# Patient Record
Sex: FEMALE | Race: WHITE | Employment: OTHER | URBAN - NONMETROPOLITAN AREA
[De-identification: names, ages, dates, MRNs, and addresses within clinical notes are randomized per-mention and may not be internally consistent; named-entity substitution may affect disease eponyms.]

---

## 2018-02-08 ENCOUNTER — HOSPITAL ENCOUNTER (OUTPATIENT)
Age: 68
Discharge: HOME | End: 2018-02-08
Payer: MEDICARE

## 2018-02-08 ENCOUNTER — INITIAL CONSULT (OUTPATIENT)
Dept: NEUROLOGY | Age: 68
End: 2018-02-08
Payer: MEDICARE

## 2018-02-08 VITALS
WEIGHT: 172.8 LBS | DIASTOLIC BLOOD PRESSURE: 78 MMHG | SYSTOLIC BLOOD PRESSURE: 106 MMHG | HEIGHT: 67 IN | BODY MASS INDEX: 27.12 KG/M2 | HEART RATE: 76 BPM

## 2018-02-08 DIAGNOSIS — R20.0 BILATERAL HAND NUMBNESS: ICD-10-CM

## 2018-02-08 DIAGNOSIS — G25.0 BENIGN ESSENTIAL TREMOR: Primary | ICD-10-CM

## 2018-02-08 DIAGNOSIS — G25.0 BENIGN ESSENTIAL TREMOR: ICD-10-CM

## 2018-02-08 LAB
FOLATE: > 20 NG/ML (ref 4.8–24.2)
VITAMIN B-12: 543 PG/ML (ref 211–911)

## 2018-02-08 PROCEDURE — 99204 OFFICE O/P NEW MOD 45 MIN: CPT | Performed by: PSYCHIATRY & NEUROLOGY

## 2018-02-08 PROCEDURE — 1036F TOBACCO NON-USER: CPT | Performed by: PSYCHIATRY & NEUROLOGY

## 2018-02-08 PROCEDURE — 82607 VITAMIN B-12: CPT

## 2018-02-08 PROCEDURE — 1123F ACP DISCUSS/DSCN MKR DOCD: CPT | Performed by: PSYCHIATRY & NEUROLOGY

## 2018-02-08 PROCEDURE — G8419 CALC BMI OUT NRM PARAM NOF/U: HCPCS | Performed by: PSYCHIATRY & NEUROLOGY

## 2018-02-08 PROCEDURE — 1090F PRES/ABSN URINE INCON ASSESS: CPT | Performed by: PSYCHIATRY & NEUROLOGY

## 2018-02-08 PROCEDURE — 82746 ASSAY OF FOLIC ACID SERUM: CPT

## 2018-02-08 PROCEDURE — G8400 PT W/DXA NO RESULTS DOC: HCPCS | Performed by: PSYCHIATRY & NEUROLOGY

## 2018-02-08 PROCEDURE — 4040F PNEUMOC VAC/ADMIN/RCVD: CPT | Performed by: PSYCHIATRY & NEUROLOGY

## 2018-02-08 PROCEDURE — 3017F COLORECTAL CA SCREEN DOC REV: CPT | Performed by: PSYCHIATRY & NEUROLOGY

## 2018-02-08 PROCEDURE — G8484 FLU IMMUNIZE NO ADMIN: HCPCS | Performed by: PSYCHIATRY & NEUROLOGY

## 2018-02-08 PROCEDURE — 3014F SCREEN MAMMO DOC REV: CPT | Performed by: PSYCHIATRY & NEUROLOGY

## 2018-02-08 PROCEDURE — 36415 COLL VENOUS BLD VENIPUNCTURE: CPT

## 2018-02-08 PROCEDURE — G8427 DOCREV CUR MEDS BY ELIG CLIN: HCPCS | Performed by: PSYCHIATRY & NEUROLOGY

## 2018-02-08 RX ORDER — PRIMIDONE 50 MG/1
50 TABLET ORAL
COMMUNITY
End: 2018-02-08

## 2018-02-08 RX ORDER — OSELTAMIVIR PHOSPHATE 75 MG/1
75 CAPSULE ORAL 2 TIMES DAILY
Status: ON HOLD | COMMUNITY
End: 2020-09-05

## 2018-02-08 RX ORDER — ALPRAZOLAM 0.25 MG/1
0.25 TABLET ORAL NIGHTLY PRN
Status: ON HOLD | COMMUNITY
End: 2020-09-05

## 2018-02-08 RX ORDER — ENALAPRIL MALEATE 5 MG/1
5 TABLET ORAL DAILY
Status: ON HOLD | COMMUNITY
End: 2020-09-05 | Stop reason: ALTCHOICE

## 2018-02-08 RX ORDER — BENZONATATE 200 MG/1
200 CAPSULE ORAL 3 TIMES DAILY PRN
COMMUNITY
End: 2019-12-24

## 2018-02-08 RX ORDER — PRIMIDONE 50 MG/1
50 TABLET ORAL NIGHTLY
Qty: 30 TABLET | Refills: 1 | Status: SHIPPED | OUTPATIENT
Start: 2018-02-08 | End: 2018-04-05 | Stop reason: SDUPTHER

## 2018-02-08 NOTE — PROGRESS NOTES
Pulse: 76   Weight: 172 lb 12.8 oz (78.4 kg)   Height: 5' 7\" (1.702 m)       Physical Examination:  General appearance - alert, well appearing, and in no distress, oriented to person, place, and time and normal weight  Mental status- Level of Alertness: awake  Orientation: person, place, time  Memory: normal  Fund of Knowledge: normal  Attention/Concentration: normal  Language: normal. Mood is normal.   Neck - supple, no significant adenopathy, carotids upstroke normal bilaterally,   There is no carotid bruit . No neck lymphadenopathy . No thyroid enlargement   Neurological -   Cranial Pmfxlb-TV-TEI:.   Cranial nerve II: Normal   Cranial nerve III: Pupils: equal, round, reactive to light  Cranial nerves III, IV, VI: Extraocular Movements: intact   Cranial nerve V: Facial sensation: intact   Cranial nerve VII:Facial strength: intact   Cranial nerve VIII: Hearing: intact   Cranial nerve IX: Palate Elevation intact bilaterally  Cranial nerve XI: Shoulder shrug intact bilaterally  Cranial nerve XII: Tongue midline   neck supple without rigidity  DTR's are decreased distal and symmetric  Babinski sign negative. Romberg unable to performed  Motor exam is 5/5 in the upper and lower extremities except Weakness of the right hand intrinsic muscles. Normal muscle tone . There is muscle atrophy in the right FDI and ADM. Sensory is intact for light touch, cortical sensation. Coordination: finger to nose intact  Gait and station intact. Abnormal movement that is symmetric action tremor noted, there is no resting tremor noted. There is head tremor, there is no voice tremor. vibration normal, proprioception normal  Skin - normal coloration, no rashes, no suspicious skin lesions  Superficial temporal artery pulses are normal.   There is no limitation of range of motion of the neck. There is no resting tremor, no pin rolling, no bradykinesia, no Hypohonia, normal blink rate.   Musculoskeletal: Has no hand arthritis, no

## 2018-02-08 NOTE — LETTER
[facility] PROFESSIONAL SERVS  [facility]  [address]  Dept: [phone]  Dept Fax: [phone]  Loc: Lila Vaughan MD        2/8/2018      Patient:  Charley Hassan  MRN:  [id number]  YOB: 1950  Date of Visit:  2/8/2018    Dear Dr. Annemarie Alfaro,    Thank you for referring Yoselin Mina to me for consultation. Please see attached visit summary with my findings. If you have any questions, please do not hesitate to call me.       Sincerely,         Carole Ma MD

## 2018-03-06 ENCOUNTER — PROCEDURE VISIT (OUTPATIENT)
Dept: NEUROLOGY | Age: 68
End: 2018-03-06
Payer: MEDICARE

## 2018-03-06 DIAGNOSIS — G56.03 BILATERAL CARPAL TUNNEL SYNDROME: Primary | ICD-10-CM

## 2018-03-06 DIAGNOSIS — R20.0 BILATERAL HAND NUMBNESS: ICD-10-CM

## 2018-03-06 PROCEDURE — 95911 NRV CNDJ TEST 9-10 STUDIES: CPT | Performed by: PSYCHIATRY & NEUROLOGY

## 2018-03-06 PROCEDURE — 95886 MUSC TEST DONE W/N TEST COMP: CPT | Performed by: PSYCHIATRY & NEUROLOGY

## 2018-03-16 ENCOUNTER — HOSPITAL ENCOUNTER (OUTPATIENT)
Dept: WOMENS IMAGING | Age: 68
Discharge: HOME | End: 2018-03-16
Payer: MEDICARE

## 2018-03-16 DIAGNOSIS — Z12.31 VISIT FOR SCREENING MAMMOGRAM: ICD-10-CM

## 2018-03-16 PROCEDURE — 77063 BREAST TOMOSYNTHESIS BI: CPT

## 2018-04-05 DIAGNOSIS — R20.0 BILATERAL HAND NUMBNESS: ICD-10-CM

## 2018-04-05 DIAGNOSIS — G25.0 BENIGN ESSENTIAL TREMOR: ICD-10-CM

## 2018-04-05 RX ORDER — PRIMIDONE 50 MG/1
50 TABLET ORAL NIGHTLY
Qty: 90 TABLET | Refills: 0 | Status: ON HOLD | OUTPATIENT
Start: 2018-04-05 | End: 2020-11-11 | Stop reason: HOSPADM

## 2018-08-01 ENCOUNTER — HOSPITAL ENCOUNTER (EMERGENCY)
Age: 68
Discharge: HOME | End: 2018-08-01
Payer: MEDICARE

## 2018-08-01 ENCOUNTER — APPOINTMENT (OUTPATIENT)
Dept: GENERAL RADIOLOGY | Age: 68
End: 2018-08-01
Payer: MEDICARE

## 2018-08-01 VITALS
TEMPERATURE: 97.8 F | HEART RATE: 71 BPM | BODY MASS INDEX: 26.84 KG/M2 | WEIGHT: 171 LBS | OXYGEN SATURATION: 97 % | DIASTOLIC BLOOD PRESSURE: 66 MMHG | RESPIRATION RATE: 18 BRPM | HEIGHT: 67 IN | SYSTOLIC BLOOD PRESSURE: 104 MMHG

## 2018-08-01 DIAGNOSIS — R07.9 CHEST PAIN, UNSPECIFIED TYPE: Primary | ICD-10-CM

## 2018-08-01 DIAGNOSIS — E87.5 HYPERKALEMIA: ICD-10-CM

## 2018-08-01 LAB
ANION GAP SERPL CALCULATED.3IONS-SCNC: 14 MEQ/L (ref 8–16)
BASOPHILS # BLD: 0.3 %
BASOPHILS ABSOLUTE: 0 THOU/MM3 (ref 0–0.1)
BUN BLDV-MCNC: 32 MG/DL (ref 7–22)
CALCIUM SERPL-MCNC: 9.9 MG/DL (ref 8.5–10.5)
CHLORIDE BLD-SCNC: 103 MEQ/L (ref 98–111)
CO2: 25 MEQ/L (ref 23–33)
CREAT SERPL-MCNC: 1.7 MG/DL (ref 0.4–1.2)
D-DIMER QUANTITATIVE: 475 NG/ML FEU (ref 0–500)
EKG ATRIAL RATE: 68 BPM
EKG P AXIS: 69 DEGREES
EKG P-R INTERVAL: 128 MS
EKG Q-T INTERVAL: 382 MS
EKG QRS DURATION: 68 MS
EKG QTC CALCULATION (BAZETT): 406 MS
EKG R AXIS: -20 DEGREES
EKG T AXIS: 70 DEGREES
EKG VENTRICULAR RATE: 68 BPM
EOSINOPHIL # BLD: 2 %
EOSINOPHILS ABSOLUTE: 0.1 THOU/MM3 (ref 0–0.4)
ERYTHROCYTE [DISTWIDTH] IN BLOOD BY AUTOMATED COUNT: 12.2 % (ref 11.5–14.5)
ERYTHROCYTE [DISTWIDTH] IN BLOOD BY AUTOMATED COUNT: 38.9 FL (ref 35–45)
GFR SERPL CREATININE-BSD FRML MDRD: 30 ML/MIN/1.73M2
GLUCOSE BLD-MCNC: 105 MG/DL (ref 70–108)
GLUCOSE BLD-MCNC: 174 MG/DL (ref 70–108)
HCT VFR BLD CALC: 37 % (ref 37–47)
HEMOGLOBIN: 12 GM/DL (ref 12–16)
IMMATURE GRANS (ABS): 0.02 THOU/MM3 (ref 0–0.07)
IMMATURE GRANULOCYTES: 0.3 %
LIPASE: 62.4 U/L (ref 5.6–51.3)
LYMPHOCYTES # BLD: 17.1 %
LYMPHOCYTES ABSOLUTE: 1.1 THOU/MM3 (ref 1–4.8)
MCH RBC QN AUTO: 28.8 PG (ref 26–33)
MCHC RBC AUTO-ENTMCNC: 32.4 GM/DL (ref 32.2–35.5)
MCV RBC AUTO: 88.9 FL (ref 81–99)
MONOCYTES # BLD: 8.5 %
MONOCYTES ABSOLUTE: 0.6 THOU/MM3 (ref 0.4–1.3)
NUCLEATED RED BLOOD CELLS: 0 /100 WBC
OSMOLALITY CALCULATION: 294.2 MOSMOL/KG (ref 275–300)
PLATELET # BLD: 161 THOU/MM3 (ref 130–400)
PMV BLD AUTO: 9.8 FL (ref 9.4–12.4)
POTASSIUM SERPL-SCNC: 5.3 MEQ/L (ref 3.5–5.2)
RBC # BLD: 4.16 MILL/MM3 (ref 4.2–5.4)
SEG NEUTROPHILS: 71.8 %
SEGMENTED NEUTROPHILS ABSOLUTE COUNT: 4.7 THOU/MM3 (ref 1.8–7.7)
SODIUM BLD-SCNC: 142 MEQ/L (ref 135–145)
TROPONIN T: < 0.01 NG/ML
TROPONIN T: < 0.01 NG/ML
WBC # BLD: 6.6 THOU/MM3 (ref 4.8–10.8)

## 2018-08-01 PROCEDURE — 94640 AIRWAY INHALATION TREATMENT: CPT

## 2018-08-01 PROCEDURE — 80048 BASIC METABOLIC PNL TOTAL CA: CPT

## 2018-08-01 PROCEDURE — 2709999900 HC NON-CHARGEABLE SUPPLY

## 2018-08-01 PROCEDURE — 83690 ASSAY OF LIPASE: CPT

## 2018-08-01 PROCEDURE — 93010 ELECTROCARDIOGRAM REPORT: CPT | Performed by: INTERNAL MEDICINE

## 2018-08-01 PROCEDURE — 93005 ELECTROCARDIOGRAM TRACING: CPT | Performed by: STUDENT IN AN ORGANIZED HEALTH CARE EDUCATION/TRAINING PROGRAM

## 2018-08-01 PROCEDURE — 36415 COLL VENOUS BLD VENIPUNCTURE: CPT

## 2018-08-01 PROCEDURE — 6370000000 HC RX 637 (ALT 250 FOR IP): Performed by: STUDENT IN AN ORGANIZED HEALTH CARE EDUCATION/TRAINING PROGRAM

## 2018-08-01 PROCEDURE — 85379 FIBRIN DEGRADATION QUANT: CPT

## 2018-08-01 PROCEDURE — 96374 THER/PROPH/DIAG INJ IV PUSH: CPT

## 2018-08-01 PROCEDURE — 2580000003 HC RX 258: Performed by: STUDENT IN AN ORGANIZED HEALTH CARE EDUCATION/TRAINING PROGRAM

## 2018-08-01 PROCEDURE — 6360000002 HC RX W HCPCS: Performed by: STUDENT IN AN ORGANIZED HEALTH CARE EDUCATION/TRAINING PROGRAM

## 2018-08-01 PROCEDURE — 82948 REAGENT STRIP/BLOOD GLUCOSE: CPT

## 2018-08-01 PROCEDURE — 84484 ASSAY OF TROPONIN QUANT: CPT

## 2018-08-01 PROCEDURE — 85025 COMPLETE CBC W/AUTO DIFF WBC: CPT

## 2018-08-01 PROCEDURE — 99285 EMERGENCY DEPT VISIT HI MDM: CPT

## 2018-08-01 PROCEDURE — 71046 X-RAY EXAM CHEST 2 VIEWS: CPT

## 2018-08-01 RX ORDER — IPRATROPIUM BROMIDE AND ALBUTEROL SULFATE 2.5; .5 MG/3ML; MG/3ML
1 SOLUTION RESPIRATORY (INHALATION) ONCE
Status: COMPLETED | OUTPATIENT
Start: 2018-08-01 | End: 2018-08-01

## 2018-08-01 RX ORDER — NITROGLYCERIN 0.4 MG/1
0.4 TABLET SUBLINGUAL EVERY 5 MIN PRN
Status: DISCONTINUED | OUTPATIENT
Start: 2018-08-01 | End: 2018-08-01 | Stop reason: HOSPADM

## 2018-08-01 RX ORDER — FUROSEMIDE 10 MG/ML
20 INJECTION INTRAMUSCULAR; INTRAVENOUS ONCE
Status: COMPLETED | OUTPATIENT
Start: 2018-08-01 | End: 2018-08-01

## 2018-08-01 RX ORDER — 0.9 % SODIUM CHLORIDE 0.9 %
1000 INTRAVENOUS SOLUTION INTRAVENOUS ONCE
Status: COMPLETED | OUTPATIENT
Start: 2018-08-01 | End: 2018-08-01

## 2018-08-01 RX ADMIN — SODIUM CHLORIDE 1000 ML: 9 INJECTION, SOLUTION INTRAVENOUS at 16:16

## 2018-08-01 RX ADMIN — IPRATROPIUM BROMIDE AND ALBUTEROL SULFATE 1 AMPULE: .5; 3 SOLUTION RESPIRATORY (INHALATION) at 16:45

## 2018-08-01 RX ADMIN — FUROSEMIDE 20 MG: 10 INJECTION, SOLUTION INTRAMUSCULAR; INTRAVENOUS at 16:12

## 2018-08-01 ASSESSMENT — ENCOUNTER SYMPTOMS
NAUSEA: 0
BACK PAIN: 0
ABDOMINAL PAIN: 0
COUGH: 1
DIARRHEA: 0
SHORTNESS OF BREATH: 0
VOMITING: 0
EYE DISCHARGE: 0
SORE THROAT: 0
EYE PAIN: 0
WHEEZING: 0
RHINORRHEA: 0

## 2018-08-01 ASSESSMENT — HEART SCORE: ECG: 0

## 2018-08-01 NOTE — ED TRIAGE NOTES
Patient presents to the ED with complaints of chest pain and chills that began about 45 minutes ago after going shopping today and doing housework. Patient denies SOB. Currently patient denies chest pain. Patient denies fevers. Patient states she took an aspirin after feeling the sharp chest pain and laid down for a little bit but didn't feel any better. EKG completed. Federico Hathaway in to evaluate patient.

## 2018-08-01 NOTE — ED NOTES
Rounding on patient. Patient denies any pain at this time. Call light in reach. Will continue to monitor.       Lolis Vigil RN  08/01/18 [id number]

## 2019-11-29 ENCOUNTER — HOSPITAL ENCOUNTER (OUTPATIENT)
Dept: WOMENS IMAGING | Age: 69
Discharge: HOME | End: 2019-11-29
Payer: MEDICARE

## 2019-11-29 DIAGNOSIS — Z12.31 VISIT FOR SCREENING MAMMOGRAM: ICD-10-CM

## 2019-11-29 PROCEDURE — 77063 BREAST TOMOSYNTHESIS BI: CPT

## 2019-12-24 ENCOUNTER — HOSPITAL ENCOUNTER (EMERGENCY)
Age: 69
Discharge: HOME | End: 2019-12-24
Payer: MEDICARE

## 2019-12-24 ENCOUNTER — APPOINTMENT (OUTPATIENT)
Dept: GENERAL RADIOLOGY | Age: 69
End: 2019-12-24
Payer: MEDICARE

## 2019-12-24 VITALS
TEMPERATURE: 97.5 F | OXYGEN SATURATION: 95 % | RESPIRATION RATE: 18 BRPM | HEIGHT: 67 IN | HEART RATE: 82 BPM | WEIGHT: 160 LBS | BODY MASS INDEX: 25.11 KG/M2 | SYSTOLIC BLOOD PRESSURE: 115 MMHG | DIASTOLIC BLOOD PRESSURE: 64 MMHG

## 2019-12-24 DIAGNOSIS — R07.9 CHEST PAIN, UNSPECIFIED TYPE: Primary | ICD-10-CM

## 2019-12-24 DIAGNOSIS — R06.89 DYSPNEA AND RESPIRATORY ABNORMALITIES: ICD-10-CM

## 2019-12-24 DIAGNOSIS — R06.00 DYSPNEA AND RESPIRATORY ABNORMALITIES: ICD-10-CM

## 2019-12-24 LAB
ALBUMIN SERPL-MCNC: 3.8 G/DL (ref 3.5–5.1)
ALP BLD-CCNC: 69 U/L (ref 38–126)
ALT SERPL-CCNC: 26 U/L (ref 11–66)
ANION GAP SERPL CALCULATED.3IONS-SCNC: 13 MEQ/L (ref 8–16)
AST SERPL-CCNC: 57 U/L (ref 5–40)
BASOPHILS # BLD: 0.4 %
BASOPHILS ABSOLUTE: 0 THOU/MM3 (ref 0–0.1)
BILIRUB SERPL-MCNC: 0.4 MG/DL (ref 0.3–1.2)
BILIRUBIN DIRECT: < 0.2 MG/DL (ref 0–0.3)
BUN BLDV-MCNC: 16 MG/DL (ref 7–22)
CALCIUM SERPL-MCNC: 10.5 MG/DL (ref 8.5–10.5)
CHLORIDE BLD-SCNC: 102 MEQ/L (ref 98–111)
CO2: 24 MEQ/L (ref 23–33)
CREAT SERPL-MCNC: 1 MG/DL (ref 0.4–1.2)
EKG ATRIAL RATE: 68 BPM
EKG P AXIS: 41 DEGREES
EKG P-R INTERVAL: 126 MS
EKG Q-T INTERVAL: 384 MS
EKG QRS DURATION: 64 MS
EKG QTC CALCULATION (BAZETT): 408 MS
EKG R AXIS: -37 DEGREES
EKG T AXIS: 40 DEGREES
EKG VENTRICULAR RATE: 68 BPM
EOSINOPHIL # BLD: 1.3 %
EOSINOPHILS ABSOLUTE: 0.1 THOU/MM3 (ref 0–0.4)
ERYTHROCYTE [DISTWIDTH] IN BLOOD BY AUTOMATED COUNT: 12.9 % (ref 11.5–14.5)
ERYTHROCYTE [DISTWIDTH] IN BLOOD BY AUTOMATED COUNT: 43.7 FL (ref 35–45)
GFR SERPL CREATININE-BSD FRML MDRD: 55 ML/MIN/1.73M2
GLUCOSE BLD-MCNC: 97 MG/DL (ref 70–108)
HCT VFR BLD CALC: 39.1 % (ref 37–47)
HEMOGLOBIN: 12 GM/DL (ref 12–16)
IMMATURE GRANS (ABS): 0.02 THOU/MM3 (ref 0–0.07)
IMMATURE GRANULOCYTES: 0.3 %
LYMPHOCYTES # BLD: 19.1 %
LYMPHOCYTES ABSOLUTE: 1.3 THOU/MM3 (ref 1–4.8)
MCH RBC QN AUTO: 28.5 PG (ref 26–33)
MCHC RBC AUTO-ENTMCNC: 30.7 GM/DL (ref 32.2–35.5)
MCV RBC AUTO: 92.9 FL (ref 81–99)
MONOCYTES # BLD: 9 %
MONOCYTES ABSOLUTE: 0.6 THOU/MM3 (ref 0.4–1.3)
NUCLEATED RED BLOOD CELLS: 0 /100 WBC
OSMOLALITY CALCULATION: 278.6 MOSMOL/KG (ref 275–300)
PLATELET # BLD: 196 THOU/MM3 (ref 130–400)
PMV BLD AUTO: 9.4 FL (ref 9.4–12.4)
POTASSIUM SERPL-SCNC: 5.9 MEQ/L (ref 3.5–5.2)
PRO-BNP: 451.7 PG/ML (ref 0–900)
RBC # BLD: 4.21 MILL/MM3 (ref 4.2–5.4)
SEG NEUTROPHILS: 69.9 %
SEGMENTED NEUTROPHILS ABSOLUTE COUNT: 4.8 THOU/MM3 (ref 1.8–7.7)
SODIUM BLD-SCNC: 139 MEQ/L (ref 135–145)
TOTAL PROTEIN: 6 G/DL (ref 6.1–8)
TROPONIN T: < 0.01 NG/ML
TROPONIN T: < 0.01 NG/ML
WBC # BLD: 6.9 THOU/MM3 (ref 4.8–10.8)

## 2019-12-24 PROCEDURE — 6370000000 HC RX 637 (ALT 250 FOR IP): Performed by: PHYSICIAN ASSISTANT

## 2019-12-24 PROCEDURE — 84484 ASSAY OF TROPONIN QUANT: CPT

## 2019-12-24 PROCEDURE — 82248 BILIRUBIN DIRECT: CPT

## 2019-12-24 PROCEDURE — 99285 EMERGENCY DEPT VISIT HI MDM: CPT | Performed by: INTERNAL MEDICINE

## 2019-12-24 PROCEDURE — 85025 COMPLETE CBC W/AUTO DIFF WBC: CPT

## 2019-12-24 PROCEDURE — 36415 COLL VENOUS BLD VENIPUNCTURE: CPT

## 2019-12-24 PROCEDURE — 80053 COMPREHEN METABOLIC PANEL: CPT

## 2019-12-24 PROCEDURE — 83880 ASSAY OF NATRIURETIC PEPTIDE: CPT

## 2019-12-24 PROCEDURE — 71046 X-RAY EXAM CHEST 2 VIEWS: CPT

## 2019-12-24 PROCEDURE — 93005 ELECTROCARDIOGRAM TRACING: CPT | Performed by: PHYSICIAN ASSISTANT

## 2019-12-24 PROCEDURE — 99284 EMERGENCY DEPT VISIT MOD MDM: CPT

## 2019-12-24 RX ORDER — ASPIRIN 81 MG/1
324 TABLET, CHEWABLE ORAL ONCE
Status: COMPLETED | OUTPATIENT
Start: 2019-12-24 | End: 2019-12-24

## 2019-12-24 RX ORDER — ATORVASTATIN CALCIUM 80 MG/1
80 TABLET, FILM COATED ORAL DAILY
Status: ON HOLD | COMMUNITY
End: 2022-06-07 | Stop reason: SDUPTHER

## 2019-12-24 RX ORDER — LISINOPRIL 2.5 MG/1
2.5 TABLET ORAL DAILY
Status: ON HOLD | COMMUNITY
End: 2020-11-11 | Stop reason: HOSPADM

## 2019-12-24 RX ORDER — EZETIMIBE 10 MG/1
10 TABLET ORAL DAILY
Status: ON HOLD | COMMUNITY
End: 2022-06-07 | Stop reason: SDUPTHER

## 2019-12-24 RX ADMIN — ASPIRIN 81 MG 324 MG: 81 TABLET ORAL at 17:45

## 2019-12-24 RX ADMIN — LIDOCAINE HYDROCHLORIDE: 20 SOLUTION ORAL; TOPICAL at 19:41

## 2019-12-24 RX ADMIN — LIDOCAINE HYDROCHLORIDE: 20 SOLUTION ORAL; TOPICAL at 18:28

## 2019-12-24 ASSESSMENT — ENCOUNTER SYMPTOMS
SHORTNESS OF BREATH: 1
VOMITING: 0
DIARRHEA: 0
NAUSEA: 0
PHOTOPHOBIA: 0
RHINORRHEA: 0
BACK PAIN: 0
COUGH: 0
ABDOMINAL PAIN: 0

## 2019-12-24 ASSESSMENT — PAIN SCALES - GENERAL
PAINLEVEL_OUTOF10: 7
PAINLEVEL_OUTOF10: 3

## 2019-12-24 ASSESSMENT — PAIN DESCRIPTION - PAIN TYPE: TYPE: ACUTE PAIN

## 2019-12-24 ASSESSMENT — HEART SCORE: ECG: 0

## 2019-12-24 ASSESSMENT — PAIN DESCRIPTION - ORIENTATION: ORIENTATION: UPPER

## 2019-12-24 ASSESSMENT — PAIN DESCRIPTION - LOCATION: LOCATION: ABDOMEN

## 2020-09-05 ENCOUNTER — HOSPITAL ENCOUNTER (INPATIENT)
Age: 70
LOS: 4 days | Discharge: NURSING, REHAB OR LONG-TERM CARE | DRG: 480 | End: 2020-09-09
Attending: STUDENT IN AN ORGANIZED HEALTH CARE EDUCATION/TRAINING PROGRAM | Admitting: ORTHOPAEDIC SURGERY
Payer: MEDICARE

## 2020-09-05 ENCOUNTER — APPOINTMENT (OUTPATIENT)
Dept: GENERAL RADIOLOGY | Age: 70
DRG: 480 | End: 2020-09-05
Payer: MEDICARE

## 2020-09-05 ENCOUNTER — APPOINTMENT (OUTPATIENT)
Dept: CT IMAGING | Age: 70
DRG: 480 | End: 2020-09-05
Payer: MEDICARE

## 2020-09-05 PROBLEM — R55 SYNCOPE AND COLLAPSE: Status: ACTIVE | Noted: 2020-09-05

## 2020-09-05 LAB
ALBUMIN SERPL-MCNC: 3.5 G/DL (ref 3.5–5.1)
ALP BLD-CCNC: 75 U/L (ref 38–126)
ALT SERPL-CCNC: 29 U/L (ref 11–66)
ANION GAP SERPL CALCULATED.3IONS-SCNC: 9 MEQ/L (ref 8–16)
AST SERPL-CCNC: 35 U/L (ref 5–40)
BASOPHILS # BLD: 0.4 %
BASOPHILS ABSOLUTE: 0 THOU/MM3 (ref 0–0.1)
BILIRUB SERPL-MCNC: 0.2 MG/DL (ref 0.3–1.2)
BILIRUBIN URINE: NEGATIVE
BLOOD, URINE: NEGATIVE
BUN BLDV-MCNC: 28 MG/DL (ref 7–22)
CALCIUM SERPL-MCNC: 9.4 MG/DL (ref 8.5–10.5)
CHARACTER, URINE: CLEAR
CHLORIDE BLD-SCNC: 106 MEQ/L (ref 98–111)
CO2: 25 MEQ/L (ref 23–33)
COLOR: YELLOW
CREAT SERPL-MCNC: 1.4 MG/DL (ref 0.4–1.2)
EKG ATRIAL RATE: 83 BPM
EKG P AXIS: 56 DEGREES
EKG P-R INTERVAL: 122 MS
EKG Q-T INTERVAL: 346 MS
EKG QRS DURATION: 76 MS
EKG QTC CALCULATION (BAZETT): 406 MS
EKG R AXIS: 3 DEGREES
EKG T AXIS: 51 DEGREES
EKG VENTRICULAR RATE: 83 BPM
EOSINOPHIL # BLD: 3.5 %
EOSINOPHILS ABSOLUTE: 0.2 THOU/MM3 (ref 0–0.4)
ERYTHROCYTE [DISTWIDTH] IN BLOOD BY AUTOMATED COUNT: 13 % (ref 11.5–14.5)
ERYTHROCYTE [DISTWIDTH] IN BLOOD BY AUTOMATED COUNT: 44.8 FL (ref 35–45)
GFR SERPL CREATININE-BSD FRML MDRD: 37 ML/MIN/1.73M2
GLUCOSE BLD-MCNC: 117 MG/DL (ref 70–108)
GLUCOSE BLD-MCNC: 179 MG/DL (ref 70–108)
GLUCOSE BLD-MCNC: 99 MG/DL (ref 70–108)
GLUCOSE URINE: NEGATIVE MG/DL
HCT VFR BLD CALC: 34.3 % (ref 37–47)
HEMOGLOBIN: 10.4 GM/DL (ref 12–16)
IMMATURE GRANS (ABS): 0.03 THOU/MM3 (ref 0–0.07)
IMMATURE GRANULOCYTES: 0.6 %
KETONES, URINE: NEGATIVE
LEUKOCYTE ESTERASE, URINE: NEGATIVE
LYMPHOCYTES # BLD: 12.8 %
LYMPHOCYTES ABSOLUTE: 0.7 THOU/MM3 (ref 1–4.8)
MCH RBC QN AUTO: 28.5 PG (ref 26–33)
MCHC RBC AUTO-ENTMCNC: 30.3 GM/DL (ref 32.2–35.5)
MCV RBC AUTO: 94 FL (ref 81–99)
MONOCYTES # BLD: 11.9 %
MONOCYTES ABSOLUTE: 0.6 THOU/MM3 (ref 0.4–1.3)
NITRITE, URINE: NEGATIVE
NUCLEATED RED BLOOD CELLS: 0 /100 WBC
OSMOLALITY CALCULATION: 285.9 MOSMOL/KG (ref 275–300)
PH UA: 5.5 (ref 5–9)
PLATELET # BLD: 147 THOU/MM3 (ref 130–400)
PMV BLD AUTO: 9.8 FL (ref 9.4–12.4)
POTASSIUM REFLEX MAGNESIUM: 5.1 MEQ/L (ref 3.5–5.2)
PRO-BNP: 439.2 PG/ML (ref 0–900)
PROTEIN UA: NEGATIVE
RBC # BLD: 3.65 MILL/MM3 (ref 4.2–5.4)
SARS-COV-2, NAAT: NOT DETECTED
SEG NEUTROPHILS: 70.8 %
SEGMENTED NEUTROPHILS ABSOLUTE COUNT: 3.6 THOU/MM3 (ref 1.8–7.7)
SODIUM BLD-SCNC: 140 MEQ/L (ref 135–145)
SPECIFIC GRAVITY, URINE: 1.01 (ref 1–1.03)
TOTAL PROTEIN: 5.8 G/DL (ref 6.1–8)
TROPONIN T: < 0.01 NG/ML
UROBILINOGEN, URINE: 0.2 EU/DL (ref 0–1)
WBC # BLD: 5.1 THOU/MM3 (ref 4.8–10.8)

## 2020-09-05 PROCEDURE — 84484 ASSAY OF TROPONIN QUANT: CPT

## 2020-09-05 PROCEDURE — 81003 URINALYSIS AUTO W/O SCOPE: CPT

## 2020-09-05 PROCEDURE — 96375 TX/PRO/DX INJ NEW DRUG ADDON: CPT

## 2020-09-05 PROCEDURE — 93005 ELECTROCARDIOGRAM TRACING: CPT | Performed by: STUDENT IN AN ORGANIZED HEALTH CARE EDUCATION/TRAINING PROGRAM

## 2020-09-05 PROCEDURE — U0002 COVID-19 LAB TEST NON-CDC: HCPCS

## 2020-09-05 PROCEDURE — 99285 EMERGENCY DEPT VISIT HI MDM: CPT

## 2020-09-05 PROCEDURE — 70450 CT HEAD/BRAIN W/O DYE: CPT

## 2020-09-05 PROCEDURE — 82948 REAGENT STRIP/BLOOD GLUCOSE: CPT

## 2020-09-05 PROCEDURE — 73030 X-RAY EXAM OF SHOULDER: CPT

## 2020-09-05 PROCEDURE — 6360000002 HC RX W HCPCS: Performed by: STUDENT IN AN ORGANIZED HEALTH CARE EDUCATION/TRAINING PROGRAM

## 2020-09-05 PROCEDURE — 99223 1ST HOSP IP/OBS HIGH 75: CPT | Performed by: PHYSICIAN ASSISTANT

## 2020-09-05 PROCEDURE — 1200000003 HC TELEMETRY R&B

## 2020-09-05 PROCEDURE — 73080 X-RAY EXAM OF ELBOW: CPT

## 2020-09-05 PROCEDURE — 72125 CT NECK SPINE W/O DYE: CPT

## 2020-09-05 PROCEDURE — 6360000002 HC RX W HCPCS: Performed by: PHYSICIAN ASSISTANT

## 2020-09-05 PROCEDURE — 73552 X-RAY EXAM OF FEMUR 2/>: CPT

## 2020-09-05 PROCEDURE — 71045 X-RAY EXAM CHEST 1 VIEW: CPT

## 2020-09-05 PROCEDURE — 96374 THER/PROPH/DIAG INJ IV PUSH: CPT

## 2020-09-05 PROCEDURE — 6370000000 HC RX 637 (ALT 250 FOR IP): Performed by: PHYSICIAN ASSISTANT

## 2020-09-05 PROCEDURE — 2580000003 HC RX 258: Performed by: PHYSICIAN ASSISTANT

## 2020-09-05 PROCEDURE — 83880 ASSAY OF NATRIURETIC PEPTIDE: CPT

## 2020-09-05 PROCEDURE — 80053 COMPREHEN METABOLIC PANEL: CPT

## 2020-09-05 PROCEDURE — 6820000001 HC L2 TRAUMA SURGERY EVALUATION: Performed by: NURSE ANESTHETIST, CERTIFIED REGISTERED

## 2020-09-05 PROCEDURE — 94760 N-INVAS EAR/PLS OXIMETRY 1: CPT

## 2020-09-05 PROCEDURE — 36415 COLL VENOUS BLD VENIPUNCTURE: CPT

## 2020-09-05 PROCEDURE — 85025 COMPLETE CBC W/AUTO DIFF WBC: CPT

## 2020-09-05 PROCEDURE — 72170 X-RAY EXAM OF PELVIS: CPT

## 2020-09-05 RX ORDER — FENTANYL CITRATE 50 UG/ML
25 INJECTION, SOLUTION INTRAMUSCULAR; INTRAVENOUS ONCE
Status: COMPLETED | OUTPATIENT
Start: 2020-09-05 | End: 2020-09-05

## 2020-09-05 RX ORDER — ATORVASTATIN CALCIUM 80 MG/1
80 TABLET, FILM COATED ORAL DAILY
Status: DISCONTINUED | OUTPATIENT
Start: 2020-09-05 | End: 2020-09-09 | Stop reason: HOSPADM

## 2020-09-05 RX ORDER — LISINOPRIL 2.5 MG/1
2.5 TABLET ORAL DAILY
Status: DISCONTINUED | OUTPATIENT
Start: 2020-09-05 | End: 2020-09-05

## 2020-09-05 RX ORDER — SODIUM CHLORIDE 0.9 % (FLUSH) 0.9 %
10 SYRINGE (ML) INJECTION PRN
Status: DISCONTINUED | OUTPATIENT
Start: 2020-09-05 | End: 2020-09-06 | Stop reason: SDUPTHER

## 2020-09-05 RX ORDER — INSULIN GLARGINE 100 [IU]/ML
20 INJECTION, SOLUTION SUBCUTANEOUS EVERY MORNING
Status: DISCONTINUED | OUTPATIENT
Start: 2020-09-06 | End: 2020-09-09 | Stop reason: HOSPADM

## 2020-09-05 RX ORDER — EZETIMIBE 10 MG/1
10 TABLET ORAL DAILY
Status: DISCONTINUED | OUTPATIENT
Start: 2020-09-05 | End: 2020-09-05

## 2020-09-05 RX ORDER — SODIUM CHLORIDE 0.9 % (FLUSH) 0.9 %
10 SYRINGE (ML) INJECTION EVERY 12 HOURS SCHEDULED
Status: DISCONTINUED | OUTPATIENT
Start: 2020-09-05 | End: 2020-09-06 | Stop reason: SDUPTHER

## 2020-09-05 RX ORDER — ACETAMINOPHEN 650 MG/1
650 SUPPOSITORY RECTAL EVERY 6 HOURS PRN
Status: DISCONTINUED | OUTPATIENT
Start: 2020-09-05 | End: 2020-09-08

## 2020-09-05 RX ORDER — ALPRAZOLAM 0.25 MG/1
0.25 TABLET ORAL NIGHTLY PRN
Status: DISCONTINUED | OUTPATIENT
Start: 2020-09-05 | End: 2020-09-05

## 2020-09-05 RX ORDER — INSULIN GLARGINE 100 [IU]/ML
36 INJECTION, SOLUTION SUBCUTANEOUS EVERY MORNING
Status: DISCONTINUED | OUTPATIENT
Start: 2020-09-06 | End: 2020-09-05

## 2020-09-05 RX ORDER — PROMETHAZINE HYDROCHLORIDE 25 MG/1
12.5 TABLET ORAL EVERY 6 HOURS PRN
Status: DISCONTINUED | OUTPATIENT
Start: 2020-09-05 | End: 2020-09-09 | Stop reason: HOSPADM

## 2020-09-05 RX ORDER — CLOPIDOGREL BISULFATE 75 MG/1
75 TABLET ORAL DAILY
Status: DISCONTINUED | OUTPATIENT
Start: 2020-09-05 | End: 2020-09-06 | Stop reason: SDUPTHER

## 2020-09-05 RX ORDER — SODIUM CHLORIDE 9 MG/ML
INJECTION, SOLUTION INTRAVENOUS CONTINUOUS
Status: DISCONTINUED | OUTPATIENT
Start: 2020-09-05 | End: 2020-09-07

## 2020-09-05 RX ORDER — ONDANSETRON 2 MG/ML
4 INJECTION INTRAMUSCULAR; INTRAVENOUS EVERY 6 HOURS PRN
Status: DISCONTINUED | OUTPATIENT
Start: 2020-09-05 | End: 2020-09-09 | Stop reason: HOSPADM

## 2020-09-05 RX ORDER — ENALAPRIL MALEATE 5 MG/1
5 TABLET ORAL DAILY
Status: DISCONTINUED | OUTPATIENT
Start: 2020-09-05 | End: 2020-09-05

## 2020-09-05 RX ORDER — PRIMIDONE 50 MG/1
50 TABLET ORAL NIGHTLY
Status: DISCONTINUED | OUTPATIENT
Start: 2020-09-05 | End: 2020-09-09 | Stop reason: HOSPADM

## 2020-09-05 RX ORDER — ATORVASTATIN CALCIUM 40 MG/1
40 TABLET, FILM COATED ORAL DAILY
Status: DISCONTINUED | OUTPATIENT
Start: 2020-09-05 | End: 2020-09-05

## 2020-09-05 RX ORDER — PRIMIDONE 50 MG/1
200 TABLET ORAL DAILY
Status: DISCONTINUED | OUTPATIENT
Start: 2020-09-06 | End: 2020-09-09 | Stop reason: HOSPADM

## 2020-09-05 RX ORDER — PROPRANOLOL HYDROCHLORIDE 80 MG/1
80 CAPSULE, EXTENDED RELEASE ORAL DAILY
Status: DISCONTINUED | OUTPATIENT
Start: 2020-09-05 | End: 2020-09-09 | Stop reason: HOSPADM

## 2020-09-05 RX ORDER — ACETAMINOPHEN 325 MG/1
650 TABLET ORAL EVERY 6 HOURS PRN
Status: DISCONTINUED | OUTPATIENT
Start: 2020-09-05 | End: 2020-09-08

## 2020-09-05 RX ORDER — POLYETHYLENE GLYCOL 3350 17 G/17G
17 POWDER, FOR SOLUTION ORAL DAILY PRN
Status: DISCONTINUED | OUTPATIENT
Start: 2020-09-05 | End: 2020-09-09 | Stop reason: HOSPADM

## 2020-09-05 RX ORDER — PRIMIDONE 50 MG/1
100 TABLET ORAL NIGHTLY
Status: ON HOLD | COMMUNITY
End: 2020-11-11 | Stop reason: HOSPADM

## 2020-09-05 RX ADMIN — SODIUM CHLORIDE: 9 INJECTION, SOLUTION INTRAVENOUS at 16:20

## 2020-09-05 RX ADMIN — ACETAMINOPHEN 650 MG: 325 TABLET ORAL at 17:25

## 2020-09-05 RX ADMIN — PRIMIDONE 50 MG: 50 TABLET ORAL at 21:53

## 2020-09-05 RX ADMIN — HYDROMORPHONE HYDROCHLORIDE 0.5 MG: 1 INJECTION, SOLUTION INTRAMUSCULAR; INTRAVENOUS; SUBCUTANEOUS at 23:41

## 2020-09-05 RX ADMIN — FENTANYL CITRATE 25 MCG: 50 INJECTION, SOLUTION INTRAMUSCULAR; INTRAVENOUS at 13:49

## 2020-09-05 RX ADMIN — FENTANYL CITRATE 25 MCG: 50 INJECTION, SOLUTION INTRAMUSCULAR; INTRAVENOUS at 12:14

## 2020-09-05 RX ADMIN — ATORVASTATIN CALCIUM 80 MG: 80 TABLET, FILM COATED ORAL at 21:53

## 2020-09-05 ASSESSMENT — ENCOUNTER SYMPTOMS
EYE DISCHARGE: 0
COLOR CHANGE: 0
ABDOMINAL DISTENTION: 0
ABDOMINAL PAIN: 0
CHEST TIGHTNESS: 0
SINUS PRESSURE: 0
EYE REDNESS: 0
BACK PAIN: 0
DIARRHEA: 0
RHINORRHEA: 0
CONSTIPATION: 0
EYE ITCHING: 0
VOMITING: 0
SINUS PAIN: 0
COUGH: 0
SHORTNESS OF BREATH: 0
EYE PAIN: 0
NAUSEA: 0

## 2020-09-05 ASSESSMENT — PAIN DESCRIPTION - PAIN TYPE: TYPE: ACUTE PAIN

## 2020-09-05 ASSESSMENT — PAIN SCALES - GENERAL
PAINLEVEL_OUTOF10: 6
PAINLEVEL_OUTOF10: 7
PAINLEVEL_OUTOF10: 7
PAINLEVEL_OUTOF10: 5
PAINLEVEL_OUTOF10: 8

## 2020-09-05 ASSESSMENT — PAIN - FUNCTIONAL ASSESSMENT: PAIN_FUNCTIONAL_ASSESSMENT: PREVENTS OR INTERFERES WITH MANY ACTIVE NOT PASSIVE ACTIVITIES

## 2020-09-05 ASSESSMENT — PAIN DESCRIPTION - FREQUENCY: FREQUENCY: INTERMITTENT

## 2020-09-05 ASSESSMENT — PAIN DESCRIPTION - ONSET: ONSET: ON-GOING

## 2020-09-05 ASSESSMENT — PAIN DESCRIPTION - DESCRIPTORS: DESCRIPTORS: STABBING

## 2020-09-05 ASSESSMENT — PAIN DESCRIPTION - LOCATION: LOCATION: HIP

## 2020-09-05 ASSESSMENT — PAIN DESCRIPTION - PROGRESSION: CLINICAL_PROGRESSION: NOT CHANGED

## 2020-09-05 ASSESSMENT — PAIN DESCRIPTION - ORIENTATION: ORIENTATION: LEFT

## 2020-09-05 NOTE — ED NOTES
Called 7K and informed Nubia that the patient is on their way to the unit. Patient transported via bed in a stable condition.      Juanito Posey  09/05/20 1418

## 2020-09-05 NOTE — LETTER
Beneficiary Notification Letter  BPCI Advanced     Your Doctor or [address],    We wanted to let you know that your health care provider, Adi, has volunteered to take part in our Centers for Medicare & Medicaid Services (CMS) Bundled Payments for [facility] (BPCI Advanced). This doesnt change your Medicare rights or benefits and you dont need to do anything. What are bundled payments? A bundled payment combines, or bundles together, payments that Medicare makes to your health care providers for the many different kinds of medical services you might get in a specific time period. In BPCI Advanced, this time period could include a hospital inpatient stay or outpatient procedure, plus 90 days. Why would Medicare bundle payments? Bundled payments are thought of as a value-based way to pay because health care providers are responsible for both the quality and cost of medical care they give. This is a relatively new way of paying health care providers compared to thefee-for-service way Medicare has traditionally paid, where providers are paid separately for each service they provide. Bundled payments encourage these providers to work together to provide better, more coordinated care during your hospital stay, or outpatient procedure, and through your recovery. What does BPCI Advance mean for me? Youre more likely to get even better care when hospitals, doctors, and other health care providers work together. In BPCI Advanced, hospitals, doctors, and other health care providers may be rewarded for providing better, more coordinated health care. Medicare will watch BPCI Advanced participants closely to make sure that you and other patients keep getting efficient, high quality care. What do I need to know about BPCI Advanced? Whats most important for you to know is that your Medicare rights and benefits wont change because your health care provider is participating in [address]. Medicare will keep covering all of your medically necessary services. Even though Medicare will pay your doctor in a different way under BPCI Advanced, how much you have to pay wont change. Health care providers and suppliers who are enrolled in Medicare will submit their Medicare claims like they always have. Youll have all the same Medicare rights and protections, including the right to choose which hospital, doctor, or other health care provider you see. If you dont want to get care from a health care provider whos participating in [address], then youll have to choose a different health care provider whos not participating in the Model. How can I give feedback about my health care? Medicare might ask you to take a voluntary survey about the services and care you received from [facility] during your hospital stay or outpatient procedure and for a specific period of time afterwards. You can decide whether you want to take the voluntary survey, but if you do, itll help Medicare make BPCI Advanced and the care of other Medicare patients better. If you have concerns or complaints about your care, you can:   · Talk to your doctor or health care provider. · Contact your Beneficiary and Family Centered Care Quality Improvement   Organization [facility]). You can get your [employer]-QIOs phone number  at  Medicare.gov/contacts or by calling 1-800-MEDICARE. TTY users can call  9-989.530.3970. Where can I learn more about BPCI Advanced? Learn more about BPCI Advanced at https://innovation.cms.gov/initiatives/bpci-advanced/:  · A list of all the hospitals and physician group practices in the country participating in [address].   · All of the inpatient and outpatient Clinical Episodes that are currently

## 2020-09-05 NOTE — ED PROVIDER NOTES
[facility] ENCOUNTER          Pt Name: Leandro Schuster  MRN: [id number]  [place] 1950  Date of evaluation: 9/5/2020  Treating Resident Physician: Chad Shaw DO  Supervising Physician: Anson Frausto MD    CHIEF COMPLAINT       Chief Complaint   Patient presents with    Fall     History obtained from the patient. HISTORY OF PRESENT ILLNESS    Leandro Schuster is a 65 y.o. female with a past medical history of diabetes and prior MI who presents to the emergency department for evaluation of syncope. Patient states that she was at the grocery store this morning shortly prior to arrival, and upon leaving the grocery store she felt acutely short of breath, and lost consciousness. She reports that she fell onto her left side, and landed on her elbow and the back of her head. She endorses losing consciousness after she fell, and bystanders report that she was dazed. She regained consciousness shortly later, however she reports that she has not been ambulatory since her fall. In the emergency department she is complaining of head pain, shoulder pain, elbow pain, and left hip pain. She is additionally complaining of swelling to her left posterior scalp. Denies vision changes, headache, nausea, vomiting. Denies neck pain. The patient takes insulin, however she denies taking any of her medicines this morning and denies eating this morning. The patient has no other acute complaints at this time. REVIEW OF SYSTEMS   Review of Systems   Constitutional: Negative for fever. HENT: Negative for rhinorrhea, sinus pressure and sinus pain. Eyes: Negative for discharge, redness and itching. Respiratory: Negative for chest tightness and shortness of breath. Cardiovascular: Negative for chest pain. Gastrointestinal: Negative for abdominal distention, abdominal pain, diarrhea, nausea and vomiting.    Genitourinary: Negative for difficulty urinating, dysuria, frequency, hematuria and urgency. Musculoskeletal: Negative for arthralgias. Skin: Negative for color change and pallor. Neurological: Negative for dizziness, light-headedness and headaches.          PAST MEDICAL AND SURGICAL HISTORY     Past Medical History:   Diagnosis Date    [facility] ([facility]     [facility] ([facility]     Hyperlipidemia     Hypoxemic respiratory failure, chronic (HCC)     secondary to sepsis and possibly pneumonia    MI (myocardial infarction) ([facility] 2011    Normocytic anemia     Type II or unspecified type diabetes mellitus without mention of complication, not stated as uncontrolled      Past Surgical History:   Procedure Laterality Date    CYSTOSCOPY  12/7/12    with stent insertion    HYSTERECTOMY      LITHOTRIPSY  12/18/2012    right         MEDICATIONS     Current Facility-Administered Medications:     ALPRAZolam (XANAX) tablet 0.25 mg, 0.25 mg, Oral, Nightly PRN, CARRIE Cortes    atorvastatin (LIPITOR) tablet 80 mg, 80 mg, Oral, Daily, Mercedes Martinez    clopidogrel (PLAVIX) tablet 75 mg, 75 mg, Oral, Daily, Mercedes Martinez    [START ON 9/6/2020] insulin glargine (LANTUS) injection vial 36 Units, 36 Units, Subcutaneous, QAM, CARRIE Cortes    primidone (MYSOLINE) tablet 50 mg, 50 mg, Oral, Nightly, CARRIE Cortes    propranolol (INDERAL LA) extended release capsule 80 mg, 80 mg, Oral, Daily, Mercedes Martinez    sodium chloride flush 0.9 % injection 10 mL, 10 mL, Intravenous, 2 times per day, [employer], [employer]    sodium chloride flush 0.9 % injection 10 mL, 10 mL, Intravenous, PRN, [employer], [employer]    acetaminophen (TYLENOL) tablet 650 mg, 650 mg, Oral, Q6H PRN **OR** acetaminophen (TYLENOL) suppository 650 mg, 650 mg, Rectal, Q6H PRN, CARRIE Cortes    polyethylene glycol (GLYCOLAX) packet 17 g, 17 g, Oral, Daily PRN, CARRIE Cortes    promethazine (PHENERGAN) tablet 12.5 mg, 12.5 mg, Oral, Q6H PRN **OR** ondansetron (ZOFRAN) injection 4 mg, 4 mg, Intravenous, Q6H PRN, CARRIE Cortes    0.9 % sodium chloride infusion, , Intravenous, Continuous, Maurilio Cortes    HYDROmorphone (DILAUDID) injection 0.25 mg, 0.25 mg, Intravenous, Q3H PRN **OR** HYDROmorphone (DILAUDID) injection 0.5 mg, 0.5 mg, Intravenous, Q3H PRN, CARRIE Rowley      SOCIAL HISTORY     Social History     Social History Narrative    Not on file     Social History     Tobacco Use    Smoking status: Never Smoker    Smokeless tobacco: Never Used   Substance Use Topics    Alcohol use: Yes     Comment: social    Drug use: No         ALLERGIES   No Known Allergies      FAMILY HISTORY     Family History   Problem Relation Age of Onset    Cancer Mother     Heart Disease Mother          PREVIOUS RECORDS   Previous records reviewed:       PHYSICAL EXAM     ED Triage Vitals [09/05/20 0945]   BP Temp Temp Source Pulse Resp SpO2 Height Weight   129/72 99 °F (37.2 °C) Oral 86 16 98 % -- 163 lb (73.9 kg)     Initial vital signs and nursing assessment reviewed and normal. Pulsoximetry is normal per my interpretation. Additional Vital Signs:  Vitals:    09/05/20 1348   BP: 120/71   Pulse: 90   Resp: 16   Temp:    SpO2: 95%       Physical Exam  Vitals signs and nursing note reviewed. Constitutional:       General: She is not in acute distress. Appearance: Normal appearance. She is not toxic-appearing. HENT:      Head: Normocephalic. Comments: There is an approximate 3 cm hematoma with surrounding ecchymosis to the left posterior parietal scalp. Right Ear: Tympanic membrane, ear canal and external ear normal.      Left Ear: Tympanic membrane, ear canal and external ear normal.      Ears:      Comments: No hemotympanum. Nose: Nose normal. No congestion or rhinorrhea. Comments: No septal hematoma.      Mouth/Throat:      Mouth: Mucous membranes are moist.      Pharynx: Oropharynx is Cardiac work-up, labs. ED RESULTS   Laboratory results:  Labs Reviewed   CBC WITH AUTO DIFFERENTIAL - Abnormal; Notable for the following components:       Result Value    RBC 3.65 (*)     Hemoglobin 10.4 (*)     Hematocrit 34.3 (*)     MCHC 30.3 (*)     Lymphocytes Absolute 0.7 (*)     All other components within normal limits   COMPREHENSIVE METABOLIC PANEL W/ REFLEX TO MG FOR LOW K - Abnormal; Notable for the following components:    Glucose 117 (*)     CREATININE 1.4 (*)     BUN 28 (*)     Total Protein 5.8 (*)     Total Bilirubin 0.2 (*)     All other components within normal limits   GLOMERULAR FILTRATION RATE, ESTIMATED - Abnormal; Notable for the following components:    Est, Glom Filt Rate 37 (*)     All other components within normal limits   TROPONIN   BRAIN NATRIURETIC PEPTIDE   URINE RT REFLEX TO CULTURE   ANION GAP   OSMOLALITY       Radiologic studies results:  XR FEMUR LEFT (MIN 2 VIEWS)   Final Result   Mildly impacted subcapital fracture left femoral neck. **This report has been created using voice recognition software. It may contain minor errors which are inherent in voice recognition technology. **      Final report electronically signed by Dr. Ladarius Davidson on 9/5/2020 11:23 AM      XR PELVIS (1-2 VIEWS)   Final Result   Acute slightly impacted subcapital fracture left femoral neck. **This report has been created using voice recognition software. It may contain minor errors which are inherent in voice recognition technology. **      Final report electronically signed by Dr. Ladarius Davidson on 9/5/2020 11:20 AM      XR ELBOW LEFT (MIN 3 VIEWS)   Final Result   Soft tissue swelling. No fracture. No dislocation. **This report has been created using voice recognition software. It may contain minor errors which are inherent in voice recognition technology. **      Final report electronically signed by Dr. Ladarius Davidson on 9/5/2020 11:23 AM      XR SHOULDER LEFT of head pain where she fell, and pain to her left elbow, left hip, and left shoulder. She was noted to have abrasions to her left shoulder and left elbow where she fell, and a hematoma on the posterior parietal scalp. Pain was controlled with fentanyl. Work-up in the emergency department was remarkable for CT of the head without contrast showing no acute intracranial process, CT of the C-spine showing no acute fracture, and an x-ray of the of the left femur and pelvis showing a mildly impacted subcapital fracture left femoral neck. Will plan to admit the patient to the hospital for further evaluation and treatment as an inpatient with orthopedics on consult. I discussed the treatment plan and test findings with the patient and all questions were answered at bedside. The patient was subsequently admitted to the hospital.     Final diagnoses:   Closed fracture of neck of left femur, initial encounter ([facility]   Syncope and collapse     Condition: condition: stable  Dispo: Admit to med/surg floor      This transcription was electronically signed. Parts of this transcriptions may have been dictated by use of voice recognition software and electronically transcribed, and parts may have been transcribed with the assistance of an ED scribe. The transcription may contain errors not detected in proofreading. Please refer to my supervising physician's documentation if my documentation differs.     Electronically Signed: Saulo Taylor, 09/05/20, 4:01 PM         Saulo Taylor DO  Resident  09/05/20 5511

## 2020-09-05 NOTE — ED NOTES
Pt medicated per orders. Appears restful and calm on cart at this time. Friend at bedside.  Maryam Boone RN  09/05/20 8983

## 2020-09-05 NOTE — H&P
Orthopedic H&P      CHIEF COMPLAINT: Fall left hip fracture     HISTORY OF PRESENT ILLNESS:      The patient is a 65 y.o. female with left femoral neck fracture s/p [place] 65. She was walking out of the grocery and fell. Isolates the majority of pain to the left hip. She has some left shoulder and elbow pain as well. She doesn't remember how it happened. She just blacked out. Denies history of other syncopal episodes. Denies numbness/tingling. Past Medical History:    Past Medical History:   Diagnosis Date    [facility] injury ([facility] [address] [facility]     Escherichia coli septicemia ([facility]     Hyperlipidemia     Hypoxemic respiratory failure, chronic (HCC)     secondary to sepsis and possibly pneumonia    MI (myocardial infarction) ([facility] [address] [facility] 2011    Normocytic anemia     Type II or unspecified type diabetes mellitus without mention of complication, not stated as uncontrolled        Past Surgical History:    Past Surgical History:   Procedure Laterality Date    CYSTOSCOPY  12/7/12    with stent insertion    HYSTERECTOMY      LITHOTRIPSY  12/18/2012    right       Medications Prior to Admission:   Prior to Admission medications    Medication Sig Start Date End Date Taking? Authorizing Provider   primidone (MYSOLINE) 50 MG tablet Take 100 mg by mouth nightly   Yes Historical Provider, MD   atorvastatin (LIPITOR) 80 MG tablet Take 80 mg by mouth daily   Yes Historical Provider, MD   lisinopril (PRINIVIL;ZESTRIL) 2.5 MG tablet Take 2.5 mg by mouth daily   Yes Historical Provider, MD   ezetimibe (ZETIA) 10 MG tablet Take 10 mg by mouth daily   Yes Historical Provider, MD   primidone (MYSOLINE) 50 MG tablet Take 1 tablet by mouth nightly  Patient taking differently: Take 200 mg by mouth daily  4/5/18  Yes Ebony Estes MD   clopidogrel (PLAVIX) 75 MG tablet Take 1 tablet by mouth daily.  3/31/14  Yes Renae Rangel, JOANNA - CNP   insulin detemir (LEVEMIR) 100 UNIT/ML injection Inject 20 Units into the skin daily    Yes Historical Provider, MD   metformin (GLUCOPHAGE) 500 MG tablet Take 1,000 mg by mouth 2 times daily (with meals). Yes Historical Provider, MD   propranolol (INDERAL LA) 80 MG CR capsule Take 60 mg by mouth daily    Yes Historical Provider, MD   Pantoprazole Sodium (PROTONIX) 40 MG PACK packet Take 40 mg by mouth daily. Yes Historical Provider, MD   aspirin 81 MG EC tablet Take 81 mg by mouth daily. Yes Historical Provider, MD   Acetaminophen (TYLENOL PO) Take  by mouth as needed. Historical Provider, MD       Allergies:    Patient has no known allergies. Social History:   Social History     Socioeconomic History    Marital status:       Spouse name: None    Number of children: None    Years of education: None    Highest education level: None   Occupational History    None   Social Needs    Financial resource strain: None    Food insecurity     Worry: None     Inability: None    Transportation needs     Medical: None     Non-medical: None   Tobacco Use    Smoking status: Never Smoker    Smokeless tobacco: Never Used   Substance and Sexual Activity    Alcohol use: Yes     Comment: social    Drug use: No    Sexual activity: Not Currently   Lifestyle    Physical activity     Days per week: None     Minutes per session: None    Stress: None   Relationships    Social connections     Talks on phone: None     Gets together: None     Attends Scientologist service: None     Active member of club or organization: None     Attends meetings of clubs or organizations: None     Relationship status: None    Intimate partner violence     Fear of current or ex partner: None     Emotionally abused: None     Physically abused: None     Forced sexual activity: None   Other Topics Concern    None   Social History Narrative    None       Family History:  Family History   Problem Relation Age of Onset    Cancer Mother     Heart Disease Mother          REVIEW OF SYSTEMS:  General: No fever or chills  Respiratory: no cough or wheezing  Cardiovascular: no chest pain or dyspnea    MSK:new left hip pain    PHYSICAL EXAM:  Blood pressure 120/71, pulse 90, temperature 99 °F (37.2 °C), temperature source Oral, resp. rate 16, height 5' 7\" (1.702 m), weight 163 lb (73.9 kg), SpO2 93 %, not currently breastfeeding. Gen: alert and oriented  Head: normocephalic and atraumatic   Chest: Non labored breathing   Heart: Reg rate   Extremity: LUE: Skin intact. Soft compartments. 2+ rad pulse. AIN/PIN/rad/u/med motor intact. RMU SILT. Superficial abrasion on elbow. No pain with ROM 0-140. No pain with wrist ROM. No significant bony TTP or swelling. Shoulder  with no pain. LLE: Skin intact. Soft compartments. 2+ DP pulse. TA/EHL/FHL/GS motor intact. DP/SP/T/S/Saph SILT. +log roll. No knee TTP or pain with ROM.           LABS:  Recent Labs     09/05/20  1012   WBC 5.1   HGB 10.4*   HCT 34.3*         K 5.1   BUN 28*   CREATININE 1.4*   GLUCOSE 117*        Radiology:   Valgus impacted left femoral neck fracture     A/P: 65 y.o. female left valgus impacted femoral neck fracture  RBA's to surgery discussed   Pt elected to proceed  To OR for tomorrow for CRPS left hip pending medical clearance   NPO  Abx OCTOR        Electronically signed by Dee Servin DO on 9/5/2020 at 7:34 PM

## 2020-09-05 NOTE — H&P
Hospitalist - History & Physical      Patient: Kam Tapia    Unit/Bed:05/005A  YOB: 1950  MRN: [id number]   Acct: [de-identified]   PCP: Josiane Leonard MD    Date of Service: Pt seen/examined on 09/05/20  and Admitted to Inpatient with expected LOS greater than two midnights due to medical therapy. Chief Complaint:  Syncope & Collapse     Assessment and Plan:-  1. Syncope & Collapse: Telemetry. Orthostatics. Echo pending. Pt might need to see Cardiology? 2. Left femoral neck fracture: Ortho consulted. NPO after midnight. Bedrest. PT/OT when appropriate. Dilaudid PRN. 3. Coronary artery disease: Pt historically takes aspirin and Plavix. This is been held due to patient needing surgery. 4. Essential hypertension: Continue home medications. 5. IDDM: Continue insulin regimen. 6. Anemia, chronic: Hgb, on arrival, 10.4. Baseline ~12. Hemoglobin will likely drop due to hip fracture and surgery. We will continue to monitor. 7. Pre-Op Risk Stratification: RCRI score is 2 due to having a positive history for ischemic heart disease and pre-operative treatment with insulin and negative for high risk surgery, history of heart failure, history of CVA,  and pre-operative creatinine greater than 2. This calculates to a 10.1% 30 day risk of MI, death or cardiac arrest. Pt understands and accepts these risks and would like to proceed with surgery. History Of Present Illness: This is a chronically ill 47-year-old female who presented to [facility] on 9/5 due to syncope and collapse and hip pain. Patient states that she fell when she left the grocery store. Patient states that she fell on her left side and then her elbow and hit the back of her head. Patient states that she lost consciousness. The patient states that she has not been able to walk since she left the grocery store and does not want to try. Patient rates the pain 10 out of 10.   Patient denies any alleviating or aggravating factors. In the emergency department, the patient was found to have a left hip fracture. All other radiographs were negative for acute issues. Patient was admitted to hospitalist due to syncopal work-up. Ortho will be consulted. Past Medical History:        Diagnosis Date    [facility] kidney [facility] ([facility]     Escherichia coli septicemia ([facility]     Hyperlipidemia     Hypoxemic respiratory failure, chronic (HCC)     secondary to sepsis and possibly pneumonia    MI (myocardial infarction) ([facility] 2011    Normocytic anemia     Type II or unspecified type diabetes mellitus without mention of complication, not stated as uncontrolled        Past Surgical History:        Procedure Laterality Date    CYSTOSCOPY  12/7/12    with stent insertion    HYSTERECTOMY      LITHOTRIPSY  12/18/2012    right       Home Medications:   No current facility-administered medications on file prior to encounter. Current Outpatient Medications on File Prior to Encounter   Medication Sig Dispense Refill    atorvastatin (LIPITOR) 80 MG tablet Take 80 mg by mouth daily      lisinopril (PRINIVIL;ZESTRIL) 2.5 MG tablet Take 2.5 mg by mouth daily      ezetimibe (ZETIA) 10 MG tablet Take 10 mg by mouth daily      primidone (MYSOLINE) 50 MG tablet Take 1 tablet by mouth nightly 90 tablet 0    oseltamivir (TAMIFLU) 75 MG capsule Take 75 mg by mouth 2 times daily      ALPRAZolam (XANAX) 0.25 MG tablet Take 0.25 mg by mouth nightly as needed for Sleep.  enalapril (VASOTEC) 5 MG tablet Take 5 mg by mouth daily      clopidogrel (PLAVIX) 75 MG tablet Take 1 tablet by mouth daily. 30 tablet 0    insulin detemir (LEVEMIR) 100 UNIT/ML injection Inject 36 Units into the skin nightly.  metformin (GLUCOPHAGE) 500 MG tablet Take 1,000 mg by mouth 2 times daily (with meals).  simvastatin (ZOCOR) 40 MG tablet Take 40 mg by mouth nightly.         propranolol (INDERAL LA) 80 MG CR capsule Take 80 mg by mouth daily. 2 tablets        Acetaminophen (TYLENOL PO) Take  by mouth as needed.  Pantoprazole Sodium (PROTONIX) 40 MG PACK packet Take 40 mg by mouth daily.  aspirin 81 MG EC tablet Take 81 mg by mouth daily. Allergies:    Patient has no known allergies. Social History:    reports that she has never smoked. She has never used smokeless tobacco. She reports current alcohol use. She reports that she does not use drugs. Family History:       Problem Relation Age of Onset    Cancer Mother     Heart Disease Mother        Diet:  No diet orders on file    Review of systems:     Review of Systems   Constitutional: Positive for activity change. Negative for chills and fever. HENT: Negative for sinus pressure, sinus pain and sneezing. Eyes: Negative for pain, redness and itching. Respiratory: Negative for cough, chest tightness and shortness of breath. Cardiovascular: Negative for chest pain, palpitations and leg swelling. Gastrointestinal: Negative for abdominal pain, constipation and diarrhea. Genitourinary: Negative for dysuria, flank pain and hematuria. Musculoskeletal: Positive for arthralgias and myalgias. Negative for back pain. Neurological: Positive for dizziness, syncope, weakness and light-headedness. Psychiatric/Behavioral: Negative for agitation, behavioral problems and confusion. PHYSICAL EXAM:  /63   Pulse 86   Temp 99 °F (37.2 °C) (Oral)   Resp 16   Wt 163 lb (73.9 kg)   SpO2 96%   BMI 25.53 kg/m²   General appearance: Chronically ill-appearing white female  HEENT: Normal cephalic, atraumatic without obvious deformity. Pupils equal, round, and reactive to light. Extra ocular muscles intact. Conjunctivae/corneas clear. Neck: Supple, with full range of motion. No jugular venous distention. Trachea midline. Respiratory:  Normal respiratory effort. Clear to auscultation, bilaterally without Rales/Wheezes/Rhonchi.   Cardiovascular: Regular rate and rhythm with normal S1/S2 without murmurs, rubs or gallops. Abdomen: Soft, non-tender, non-distended with normal bowel sounds. Musculoskeletal:  No clubbing, cyanosis or edema bilaterally. TTP to left hip region. Skin intact. Skin: Skin color, texture, turgor normal.  No rashes or lesions. Neurologic:  Neurovascularly intact without any focal sensory/motor deficits. Cranial nerves: II-XII intact, grossly non-focal.  Psychiatric: Alert and oriented, thought content appropriate, normal insight  Capillary Refill: Brisk,< 3 seconds   Peripheral Pulses: +2 palpable, equal bilaterally     Labs:   Recent Labs     09/05/20  1012   WBC 5.1   HGB 10.4*   HCT 34.3*        Recent Labs     09/05/20  1012      K 5.1      CO2 25   BUN 28*   CREATININE 1.4*   CALCIUM 9.4     Recent Labs     09/05/20  1012   AST 35   ALT 29   BILITOT 0.2*   ALKPHOS 75     Radiology:   XR FEMUR LEFT (MIN 2 VIEWS)   Final Result   Mildly impacted subcapital fracture left femoral neck. **This report has been created using voice recognition software. It may contain minor errors which are inherent in voice recognition technology. **      Final report electronically signed by Dr. Lubna Tavares on 9/5/2020 11:23 AM      XR PELVIS (1-2 VIEWS)   Final Result   Acute slightly impacted subcapital fracture left femoral neck. **This report has been created using voice recognition software. It may contain minor errors which are inherent in voice recognition technology. **      Final report electronically signed by Dr. Lubna Tavares on 9/5/2020 11:20 AM      XR ELBOW LEFT (MIN 3 VIEWS)   Final Result   Soft tissue swelling. No fracture. No dislocation. **This report has been created using voice recognition software. It may contain minor errors which are inherent in voice recognition technology. **      Final report electronically signed by Dr. Lubna Tavares on 9/5/2020 11:23 AM      XR SHOULDER LEFT (MIN 2 VIEWS)   Final Result   Osteoporosis. No fracture. **This report has been created using voice recognition software. It may contain minor errors which are inherent in voice recognition technology. **      Final report electronically signed by Dr. Christyne Cabot on 9/5/2020 11:19 AM      XR CHEST PORTABLE   Final Result   Normal mobile chest.            **This report has been created using voice recognition software. It may contain minor errors which are inherent in voice recognition technology. **      Final report electronically signed by Dr. Christyne Cabot on 9/5/2020 11:19 AM      CT Head WO Contrast   Final Result   Mild superficial soft tissue swelling. No acute intracranial process. **This report has been created using voice recognition software. It may contain minor errors which are inherent in voice recognition technology. **      Final report electronically signed by Dr. Christyne Cabot on 9/5/2020 10:53 AM      CT Cervical Spine WO Contrast   Final Result   Multifocal degenerative changes. Questionable muscle spasm right side. No acute fracture seen. **This report has been created using voice recognition software. It may contain minor errors which are inherent in voice recognition technology. **      Final report electronically signed by Dr. Christyne Cabot on 9/5/2020 10:56 AM        EKG: normal sinus rhythm, no blocks or conduction defects, no ischemic changes    Electronically signed by CARRIE Moise on 9/5/2020 at 1:18 PM

## 2020-09-06 ENCOUNTER — APPOINTMENT (OUTPATIENT)
Dept: GENERAL RADIOLOGY | Age: 70
DRG: 480 | End: 2020-09-06
Payer: MEDICARE

## 2020-09-06 ENCOUNTER — ANESTHESIA (OUTPATIENT)
Dept: OPERATING ROOM | Age: 70
DRG: 480 | End: 2020-09-06
Payer: MEDICARE

## 2020-09-06 ENCOUNTER — ANESTHESIA EVENT (OUTPATIENT)
Dept: OPERATING ROOM | Age: 70
DRG: 480 | End: 2020-09-06
Payer: MEDICARE

## 2020-09-06 VITALS
RESPIRATION RATE: 22 BRPM | TEMPERATURE: 98.6 F | DIASTOLIC BLOOD PRESSURE: 62 MMHG | SYSTOLIC BLOOD PRESSURE: 108 MMHG | OXYGEN SATURATION: 100 %

## 2020-09-06 LAB
ANION GAP SERPL CALCULATED.3IONS-SCNC: 7 MEQ/L (ref 8–16)
BUN BLDV-MCNC: 22 MG/DL (ref 7–22)
CALCIUM SERPL-MCNC: 8.7 MG/DL (ref 8.5–10.5)
CHLORIDE BLD-SCNC: 106 MEQ/L (ref 98–111)
CO2: 24 MEQ/L (ref 23–33)
CREAT SERPL-MCNC: 1.1 MG/DL (ref 0.4–1.2)
ERYTHROCYTE [DISTWIDTH] IN BLOOD BY AUTOMATED COUNT: 13.1 % (ref 11.5–14.5)
ERYTHROCYTE [DISTWIDTH] IN BLOOD BY AUTOMATED COUNT: 45.2 FL (ref 35–45)
GFR SERPL CREATININE-BSD FRML MDRD: 49 ML/MIN/1.73M2
GLUCOSE BLD-MCNC: 123 MG/DL (ref 70–108)
GLUCOSE BLD-MCNC: 129 MG/DL (ref 70–108)
GLUCOSE BLD-MCNC: 136 MG/DL (ref 70–108)
GLUCOSE BLD-MCNC: 156 MG/DL (ref 70–108)
GLUCOSE BLD-MCNC: 168 MG/DL (ref 70–108)
HCT VFR BLD CALC: 33.9 % (ref 37–47)
HEMOGLOBIN: 10.2 GM/DL (ref 12–16)
LACTIC ACID: 0.6 MMOL/L (ref 0.5–2.2)
MCH RBC QN AUTO: 28.5 PG (ref 26–33)
MCHC RBC AUTO-ENTMCNC: 30.1 GM/DL (ref 32.2–35.5)
MCV RBC AUTO: 94.7 FL (ref 81–99)
PLATELET # BLD: 131 THOU/MM3 (ref 130–400)
PMV BLD AUTO: 9.8 FL (ref 9.4–12.4)
POTASSIUM REFLEX MAGNESIUM: 4.3 MEQ/L (ref 3.5–5.2)
RBC # BLD: 3.58 MILL/MM3 (ref 4.2–5.4)
SODIUM BLD-SCNC: 137 MEQ/L (ref 135–145)
WBC # BLD: 4.9 THOU/MM3 (ref 4.8–10.8)

## 2020-09-06 PROCEDURE — 99232 SBSQ HOSP IP/OBS MODERATE 35: CPT | Performed by: FAMILY MEDICINE

## 2020-09-06 PROCEDURE — 93010 ELECTROCARDIOGRAM REPORT: CPT | Performed by: INTERNAL MEDICINE

## 2020-09-06 PROCEDURE — 7100000001 HC PACU RECOVERY - ADDTL 15 MIN: Performed by: ORTHOPAEDIC SURGERY

## 2020-09-06 PROCEDURE — 83605 ASSAY OF LACTIC ACID: CPT

## 2020-09-06 PROCEDURE — 2500000003 HC RX 250 WO HCPCS: Performed by: ORTHOPAEDIC SURGERY

## 2020-09-06 PROCEDURE — 6370000000 HC RX 637 (ALT 250 FOR IP): Performed by: PHYSICIAN ASSISTANT

## 2020-09-06 PROCEDURE — 2500000003 HC RX 250 WO HCPCS: Performed by: NURSE ANESTHETIST, CERTIFIED REGISTERED

## 2020-09-06 PROCEDURE — 73502 X-RAY EXAM HIP UNI 2-3 VIEWS: CPT

## 2020-09-06 PROCEDURE — 7100000000 HC PACU RECOVERY - FIRST 15 MIN: Performed by: ORTHOPAEDIC SURGERY

## 2020-09-06 PROCEDURE — 97530 THERAPEUTIC ACTIVITIES: CPT

## 2020-09-06 PROCEDURE — 2709999900 HC NON-CHARGEABLE SUPPLY: Performed by: ORTHOPAEDIC SURGERY

## 2020-09-06 PROCEDURE — 85027 COMPLETE CBC AUTOMATED: CPT

## 2020-09-06 PROCEDURE — 6360000002 HC RX W HCPCS: Performed by: PHYSICIAN ASSISTANT

## 2020-09-06 PROCEDURE — 3700000001 HC ADD 15 MINUTES (ANESTHESIA): Performed by: ORTHOPAEDIC SURGERY

## 2020-09-06 PROCEDURE — 6360000002 HC RX W HCPCS: Performed by: NURSE ANESTHETIST, CERTIFIED REGISTERED

## 2020-09-06 PROCEDURE — 94760 N-INVAS EAR/PLS OXIMETRY 1: CPT

## 2020-09-06 PROCEDURE — 3600000014 HC SURGERY LEVEL 4 ADDTL 15MIN: Performed by: ORTHOPAEDIC SURGERY

## 2020-09-06 PROCEDURE — 2720000010 HC SURG SUPPLY STERILE: Performed by: ORTHOPAEDIC SURGERY

## 2020-09-06 PROCEDURE — 97166 OT EVAL MOD COMPLEX 45 MIN: CPT

## 2020-09-06 PROCEDURE — 2580000003 HC RX 258: Performed by: PHYSICIAN ASSISTANT

## 2020-09-06 PROCEDURE — 6360000002 HC RX W HCPCS: Performed by: ORTHOPAEDIC SURGERY

## 2020-09-06 PROCEDURE — 36415 COLL VENOUS BLD VENIPUNCTURE: CPT

## 2020-09-06 PROCEDURE — 3700000000 HC ANESTHESIA ATTENDED CARE: Performed by: ORTHOPAEDIC SURGERY

## 2020-09-06 PROCEDURE — 3209999900 FLUORO FOR SURGICAL PROCEDURES

## 2020-09-06 PROCEDURE — C1713 ANCHOR/SCREW BN/BN,TIS/BN: HCPCS | Performed by: ORTHOPAEDIC SURGERY

## 2020-09-06 PROCEDURE — 0QS734Z REPOSITION LEFT UPPER FEMUR WITH INTERNAL FIXATION DEVICE, PERCUTANEOUS APPROACH: ICD-10-PCS | Performed by: ORTHOPAEDIC SURGERY

## 2020-09-06 PROCEDURE — 97535 SELF CARE MNGMENT TRAINING: CPT

## 2020-09-06 PROCEDURE — 2580000003 HC RX 258: Performed by: ORTHOPAEDIC SURGERY

## 2020-09-06 PROCEDURE — 3600000004 HC SURGERY LEVEL 4 BASE: Performed by: ORTHOPAEDIC SURGERY

## 2020-09-06 PROCEDURE — 1200000003 HC TELEMETRY R&B

## 2020-09-06 PROCEDURE — 82948 REAGENT STRIP/BLOOD GLUCOSE: CPT

## 2020-09-06 PROCEDURE — 80048 BASIC METABOLIC PNL TOTAL CA: CPT

## 2020-09-06 DEVICE — 6.5MMX80MM CANNULATED SCREW 22MM                                    PARTIAL THREAD STAINLESS STEEL: Type: IMPLANTABLE DEVICE | Site: HIP | Status: FUNCTIONAL

## 2020-09-06 DEVICE — 6.5MMX95MM CANNULATED SCREW 22MM                                    PARTIAL THREAD STAINLESS STEEL: Type: IMPLANTABLE DEVICE | Site: HIP | Status: FUNCTIONAL

## 2020-09-06 DEVICE — 6.5MMX90MM CANNULATED SCREW 22MM                                    PARTIAL THREAD STAINLESS STEEL: Type: IMPLANTABLE DEVICE | Site: HIP | Status: FUNCTIONAL

## 2020-09-06 DEVICE — ROUND WASHER 12.7MM: Type: IMPLANTABLE DEVICE | Site: HIP | Status: FUNCTIONAL

## 2020-09-06 RX ORDER — NICOTINE POLACRILEX 4 MG
15 LOZENGE BUCCAL PRN
Status: DISCONTINUED | OUTPATIENT
Start: 2020-09-06 | End: 2020-09-09 | Stop reason: HOSPADM

## 2020-09-06 RX ORDER — LIDOCAINE HCL/PF 100 MG/5ML
SYRINGE (ML) INJECTION PRN
Status: DISCONTINUED | OUTPATIENT
Start: 2020-09-06 | End: 2020-09-06 | Stop reason: SDUPTHER

## 2020-09-06 RX ORDER — DEXTROSE MONOHYDRATE 50 MG/ML
100 INJECTION, SOLUTION INTRAVENOUS PRN
Status: DISCONTINUED | OUTPATIENT
Start: 2020-09-06 | End: 2020-09-09 | Stop reason: HOSPADM

## 2020-09-06 RX ORDER — DEXTROSE MONOHYDRATE 25 G/50ML
12.5 INJECTION, SOLUTION INTRAVENOUS PRN
Status: DISCONTINUED | OUTPATIENT
Start: 2020-09-06 | End: 2020-09-09 | Stop reason: HOSPADM

## 2020-09-06 RX ORDER — FENTANYL CITRATE 50 UG/ML
INJECTION, SOLUTION INTRAMUSCULAR; INTRAVENOUS PRN
Status: DISCONTINUED | OUTPATIENT
Start: 2020-09-06 | End: 2020-09-06 | Stop reason: SDUPTHER

## 2020-09-06 RX ORDER — SENNA AND DOCUSATE SODIUM 50; 8.6 MG/1; MG/1
1 TABLET, FILM COATED ORAL 2 TIMES DAILY
Status: DISCONTINUED | OUTPATIENT
Start: 2020-09-06 | End: 2020-09-09 | Stop reason: HOSPADM

## 2020-09-06 RX ORDER — ASPIRIN 81 MG/1
81 TABLET ORAL DAILY
Status: DISCONTINUED | OUTPATIENT
Start: 2020-09-07 | End: 2020-09-09 | Stop reason: HOSPADM

## 2020-09-06 RX ORDER — PROPOFOL 10 MG/ML
INJECTION, EMULSION INTRAVENOUS PRN
Status: DISCONTINUED | OUTPATIENT
Start: 2020-09-06 | End: 2020-09-06 | Stop reason: SDUPTHER

## 2020-09-06 RX ORDER — SODIUM CHLORIDE 0.9 % (FLUSH) 0.9 %
10 SYRINGE (ML) INJECTION EVERY 12 HOURS SCHEDULED
Status: DISCONTINUED | OUTPATIENT
Start: 2020-09-06 | End: 2020-09-09 | Stop reason: HOSPADM

## 2020-09-06 RX ORDER — SODIUM CHLORIDE 0.9 % (FLUSH) 0.9 %
10 SYRINGE (ML) INJECTION PRN
Status: DISCONTINUED | OUTPATIENT
Start: 2020-09-06 | End: 2020-09-09 | Stop reason: HOSPADM

## 2020-09-06 RX ORDER — CLOPIDOGREL BISULFATE 75 MG/1
75 TABLET ORAL DAILY
Status: DISCONTINUED | OUTPATIENT
Start: 2020-09-07 | End: 2020-09-09 | Stop reason: HOSPADM

## 2020-09-06 RX ORDER — METOPROLOL TARTRATE 5 MG/5ML
INJECTION INTRAVENOUS PRN
Status: DISCONTINUED | OUTPATIENT
Start: 2020-09-06 | End: 2020-09-06 | Stop reason: SDUPTHER

## 2020-09-06 RX ADMIN — FENTANYL CITRATE 25 MCG: 50 INJECTION, SOLUTION INTRAMUSCULAR; INTRAVENOUS at 08:35

## 2020-09-06 RX ADMIN — HYDROMORPHONE HYDROCHLORIDE 0.5 MG: 1 INJECTION, SOLUTION INTRAMUSCULAR; INTRAVENOUS; SUBCUTANEOUS at 02:57

## 2020-09-06 RX ADMIN — ACETAMINOPHEN 650 MG: 325 TABLET ORAL at 16:13

## 2020-09-06 RX ADMIN — ATORVASTATIN CALCIUM 80 MG: 80 TABLET, FILM COATED ORAL at 21:08

## 2020-09-06 RX ADMIN — SODIUM CHLORIDE: 9 INJECTION, SOLUTION INTRAVENOUS at 14:46

## 2020-09-06 RX ADMIN — DOCUSATE SODIUM 50 MG AND SENNOSIDES 8.6 MG 1 TABLET: 8.6; 5 TABLET, FILM COATED ORAL at 21:08

## 2020-09-06 RX ADMIN — HYDROMORPHONE HYDROCHLORIDE 0.5 MG: 1 INJECTION, SOLUTION INTRAMUSCULAR; INTRAVENOUS; SUBCUTANEOUS at 06:08

## 2020-09-06 RX ADMIN — PRIMIDONE 50 MG: 50 TABLET ORAL at 21:08

## 2020-09-06 RX ADMIN — CEFAZOLIN 2 G: 10 INJECTION, POWDER, FOR SOLUTION INTRAVENOUS at 16:09

## 2020-09-06 RX ADMIN — Medication 10 ML: at 21:08

## 2020-09-06 RX ADMIN — METOPROLOL TARTRATE 2.5 MG: 5 INJECTION, SOLUTION INTRAVENOUS at 09:11

## 2020-09-06 RX ADMIN — PROPOFOL 130 MG: 10 INJECTION, EMULSION INTRAVENOUS at 08:16

## 2020-09-06 RX ADMIN — PROPRANOLOL HYDROCHLORIDE 80 MG: 80 CAPSULE, EXTENDED RELEASE ORAL at 14:46

## 2020-09-06 RX ADMIN — Medication 60 MG: at 08:16

## 2020-09-06 ASSESSMENT — PULMONARY FUNCTION TESTS
PIF_VALUE: 2
PIF_VALUE: 2
PIF_VALUE: 7
PIF_VALUE: 2
PIF_VALUE: 8
PIF_VALUE: 3
PIF_VALUE: 2
PIF_VALUE: 8
PIF_VALUE: 2
PIF_VALUE: 3
PIF_VALUE: 2
PIF_VALUE: 3
PIF_VALUE: 2
PIF_VALUE: 3
PIF_VALUE: 2
PIF_VALUE: 7
PIF_VALUE: 2
PIF_VALUE: 3
PIF_VALUE: 3
PIF_VALUE: 2
PIF_VALUE: 3
PIF_VALUE: 2
PIF_VALUE: 6
PIF_VALUE: 3
PIF_VALUE: 2
PIF_VALUE: 2
PIF_VALUE: 8
PIF_VALUE: 2
PIF_VALUE: 13
PIF_VALUE: 3
PIF_VALUE: 2
PIF_VALUE: 2
PIF_VALUE: 3
PIF_VALUE: 2
PIF_VALUE: 3
PIF_VALUE: 2
PIF_VALUE: 1
PIF_VALUE: 2
PIF_VALUE: 2
PIF_VALUE: 3
PIF_VALUE: 15
PIF_VALUE: 2
PIF_VALUE: 3
PIF_VALUE: 26
PIF_VALUE: 2
PIF_VALUE: 3
PIF_VALUE: 2
PIF_VALUE: 3
PIF_VALUE: 2
PIF_VALUE: 3
PIF_VALUE: 5
PIF_VALUE: 2
PIF_VALUE: 2

## 2020-09-06 ASSESSMENT — PAIN DESCRIPTION - PAIN TYPE: TYPE: SURGICAL PAIN

## 2020-09-06 ASSESSMENT — PAIN SCALES - GENERAL
PAINLEVEL_OUTOF10: 7
PAINLEVEL_OUTOF10: 5
PAINLEVEL_OUTOF10: 5
PAINLEVEL_OUTOF10: 7

## 2020-09-06 ASSESSMENT — PAIN DESCRIPTION - FREQUENCY: FREQUENCY: INTERMITTENT

## 2020-09-06 ASSESSMENT — PAIN DESCRIPTION - ORIENTATION: ORIENTATION: LEFT

## 2020-09-06 ASSESSMENT — PAIN SCALES - WONG BAKER: WONGBAKER_NUMERICALRESPONSE: 6

## 2020-09-06 ASSESSMENT — PAIN DESCRIPTION - PROGRESSION: CLINICAL_PROGRESSION: NOT CHANGED

## 2020-09-06 ASSESSMENT — PAIN DESCRIPTION - DESCRIPTORS: DESCRIPTORS: SORE;ACHING

## 2020-09-06 ASSESSMENT — PAIN DESCRIPTION - LOCATION: LOCATION: HIP

## 2020-09-06 ASSESSMENT — PAIN - FUNCTIONAL ASSESSMENT: PAIN_FUNCTIONAL_ASSESSMENT: PREVENTS OR INTERFERES SOME ACTIVE ACTIVITIES AND ADLS

## 2020-09-06 NOTE — PLAN OF CARE
Problem: Falls - Risk of:  Goal: Will remain free from falls  Description: Will remain free from falls  Outcome: Ongoing  Note: Patient using call light appropriately to call for assistance. Patient is compliant with use of non-skid slippers. Patient reports understanding of fall prevention when discussed. Bed alarm remains in place. Goal: Absence of physical injury  Description: Absence of physical injury  Outcome: Ongoing  Note: Patient is free from physical injury at this time. Problem: Skin Integrity:  Goal: Will show no infection signs and symptoms  Description: Will show no infection signs and symptoms  Outcome: Ongoing  Note: Scattered bruising noted in various stages of healing. No other skin impairments noted. Patient understands the importance of frequent repositioning in order to prevent any skin breakdown. Goal: Absence of new skin breakdown  Description: Absence of new skin breakdown  Outcome: Ongoing  Note: No new skin breakdown noted at this time. Problem: Pain:  Goal: Pain level will decrease  Description: Pain level will decrease  Outcome: Ongoing  Note: Patient report pain at a 5 on scale. Patient states oral and IV medication helping to achieve pain goal of a no pain on scale. Patient able to reposition for comfort, ice applied to hip, and pillows used for support. Goal: Control of acute pain  Description: Control of acute pain  Outcome: Ongoing  Goal: Control of chronic pain  Description: Control of chronic pain  Outcome: Ongoing     Problem: DISCHARGE BARRIERS  Goal: Patient's continuum of care needs are met  Outcome: Ongoing  Note: Discharge planning in progress.  consulted for discharge planning. Care plan reviewed with patient and [place]. Patient and [place] verbalize understanding of the plan of care and contribute to goal setting.

## 2020-09-06 NOTE — PROGRESS NOTES
Pt admitted to  [phone] from ED. Complaints: left hip pain. INT RAC. IV site free of s/s of infection or infiltration. Vital signs obtained. Assessment and data collection initiated. Two nurse skin assessment performed by Nubia GARRIDO and Makeda Bee. Oriented to room. Explained patients right to have family, representative or physician notified of their admission. Patient has Requested for physician to be notified. Patient has Declined for family/representative to be notified. The patient is interested in [facility]. [facility] to beds program?:  No    Policies and procedures for [facility] explained. All questions answered with no further questions at this time. Fall prevention and safety brochure discussed with patient. Bed alarm on. Call light in reach.

## 2020-09-06 NOTE — PROGRESS NOTES
8206: pt arrived to pacu. Pt drowsy but arouses easily on command. Left hip dressing cdi. Ice applied   6164: RN called report to [address]: Pt resting easy.  No needs expressed

## 2020-09-06 NOTE — OP NOTE
[address] [place] [address]                                OPERATIVE REPORT    PATIENT NAME: Jarek Pickering                   :        1950  MED REC NO:   [id number]                           ROOM:       0013  ACCOUNT NO:   [de-identified]                           ADMIT DATE: 2020  PROVIDER:     Mariela Mata D.O.    DATE OF PROCEDURE:  2020    PREOPERATIVE DIAGNOSIS:  Left hip valgus impacted femoral neck fracture. POSTOPERATIVE DIAGNOSIS:  Left hip valgus impacted femoral neck  fracture. OPERATION PERFORMED:  Closed reduction and percutaneous screw fixation  of the left hip valgus impacted fracture. SURGEON:  Mariela Mata DO    ASSISTANT:  Chad Jones PA-C    TYPE OF ANESTHESIA:  General.    ESTIMATED BLOOD LOSS:  Minimal.    IMPLANTS:  Three [employer] partially threaded 6.5 cannulated  screws, 80 mm, 90 mm and 95 mm in length, each with a washer. INDICATION FOR OPERATION:  The patient is a 77-year-old female, who  presented to the ER after a syncopal event while in a grocery store. She was diagnosed with a valgus impacted femoral neck fracture. After  medical optimization and risk stratification, I have recommended  percutaneous screw fixation. The risks, benefits, and alternatives were  reviewed. The patient elected to proceed. OPERATIVE SUMMARY:  The patient was met in the preoperative holding area  and the correct extremity was identified and marked. Informed consent  was obtained. The patient was escorted to the operative suite. General  anesthesia was administered. She was transferred to the fracture table. The fracture was valgus impacted, so reduction maneuver was not  performed. Fluoroscopy confirmed that the fracture was still reduced,  positioned only lateral and about its impacted position in AP.   The  patient was prepped and draped in a standard surgical fashion. Time-out  was taken. The correct patient, procedure, and operative site were  agreed upon by all that were present. I then placed  three partially  threaded cannulated screws. I started with a guidewire in an inferior  central position and then placed a guidewire in the superior, anterior,  and superior posterior positions. The screws were measured. The  lateral cortex was drilled, and the screws were placed under  fluoroscopic guidance. Final fluoroscopy showed acceptable fracture  reduction and appropriate hardware position. The wound was copiously  irrigated. The wound was closed in a layered fashion with the IT band  with #1 Vicryl and remainder of incision with 0 Vicryl, 2-0 Vicryl, and  3-0 Monocryl. Sterile dressing was applied. The patient was  transferred back to PACU in stable condition. There were no  complications. She will be admitted back to the floor for postoperative  antibiotics and physical therapy and possible placement. She will be  made weightbearing as tolerated with a walker.         Radha Sheridan D.O.    D: 09/06/2020 10:40:16       T: 09/06/2020 11:43:02     [email or web address]  Job#: [id number]     Doc#: [id number]    CC:

## 2020-09-06 NOTE — ANESTHESIA POSTPROCEDURE EVALUATION
Department of Anesthesiology  Postprocedure Note    Patient: Savannah Anderson  MRN: [id number]  YOB: 1950  Date of evaluation: 9/6/2020  Time:  10:29 AM     Procedure Summary     Date:  09/06/20 Room / Location:  [facility]    Anesthesia Start:  7346 Anesthesia Stop:  2395    Procedure:  HIP PINNING (Left Hip) Diagnosis:  (left hip fracture)    Surgeon:  Griselda Saavedra DO Responsible Provider:  Nuha Ambriz MD    Anesthesia Type:  general ASA Status:  3          Anesthesia Type: No value filed. Parish Phase I: Parish Score: 9    Parish Phase II:      Last vitals: Reviewed and per EMR flowsheets.        Anesthesia Post Evaluation    Patient location during evaluation: PACU  Patient participation: complete - patient participated  Level of consciousness: awake and alert  Airway patency: patent  Nausea & Vomiting: no nausea  Complications: no  Cardiovascular status: blood pressure returned to baseline and hemodynamically stable  Respiratory status: acceptable and spontaneous ventilation  Hydration status: euvolemic

## 2020-09-06 NOTE — PROGRESS NOTES
Subjective:   Pain controlled. No new complaints. Denies numbness or tingling. Afebrile. Hb 10.2. Objective:   /71   Pulse 98   Temp 98.1 °F (36.7 °C) (Oral)   Resp 15   Ht 5' 7\" (1.702 m)   Wt 163 lb (73.9 kg)   SpO2 92%   BMI 25.53 kg/m²     Recent Labs     09/05/20  1012 09/06/20  0544   WBC 5.1 4.9   HGB 10.4* 10.2*   HCT 34.3* 33.9*    131       Current Facility-Administered Medications: glucose (GLUTOSE) 40 % oral gel 15 g, 15 g, Oral, PRN  dextrose 50 % IV solution, 12.5 g, Intravenous, PRN  glucagon (rDNA) injection 1 mg, 1 mg, Intramuscular, PRN  dextrose 5 % solution, 100 mL/hr, Intravenous, PRN  atorvastatin (LIPITOR) tablet 80 mg, 80 mg, Oral, Daily  [Held by provider] clopidogrel (PLAVIX) tablet 75 mg, 75 mg, Oral, Daily  primidone (MYSOLINE) tablet 50 mg, 50 mg, Oral, Nightly  propranolol (INDERAL LA) extended release capsule 80 mg, 80 mg, Oral, Daily  sodium chloride flush 0.9 % injection 10 mL, 10 mL, Intravenous, 2 times per day  sodium chloride flush 0.9 % injection 10 mL, 10 mL, Intravenous, PRN  acetaminophen (TYLENOL) tablet 650 mg, 650 mg, Oral, Q6H PRN **OR** acetaminophen (TYLENOL) suppository 650 mg, 650 mg, Rectal, Q6H PRN  polyethylene glycol (GLYCOLAX) packet 17 g, 17 g, Oral, Daily PRN  promethazine (PHENERGAN) tablet 12.5 mg, 12.5 mg, Oral, Q6H PRN **OR** ondansetron (ZOFRAN) injection 4 mg, 4 mg, Intravenous, Q6H PRN  0.9 % sodium chloride infusion, , Intravenous, Continuous  HYDROmorphone (DILAUDID) injection 0.25 mg, 0.25 mg, Intravenous, Q3H PRN **OR** HYDROmorphone (DILAUDID) injection 0.5 mg, 0.5 mg, Intravenous, Q3H PRN  insulin glargine (LANTUS) injection vial 20 Units, 20 Units, Subcutaneous, QAM  primidone (MYSOLINE) tablet 200 mg, 200 mg, Oral, Daily      Exam:  Gen: NAD  LLE: Skin intact. Soft compartments. 2+ DP pulse. TA/EHL/FHL/GS motor intact. DP/SP/T/S/Saph SILT. +log roll.       Assessment:  65 y.o. female left valgus impacted femoral neck fracture    Plan:  RBA's to surgery discussed   Pt elected to proceed  To OR for tomorrow for CRPS left hip   Cleared and risk stratified per IM, recommend Echo postop for syncope workup  NPO  Abx OCTOR  Admit back to floor post op      Electronically signed by Michelle Bates DO on 9/6/2020 at 8:00 AM

## 2020-09-06 NOTE — PROGRESS NOTES
Pt has arrived to pre-op holding area. Nasal swabbing is performed and the patient's temperature is 97.8.

## 2020-09-06 NOTE — BRIEF OP NOTE
Brief Postoperative Note      Patient: Judah Cadena  YOB: 1950  MRN: [id number]    Date of Procedure: 9/6/2020    Pre-Op Diagnosis: left hip valgus impacted femoral neck fracture    Post-Op Diagnosis: Same       Procedure(s):  Left hip CRPS    Surgeon(s):  Terrance Gerardo DO    Assistant:  Physician Assistant: CARRIE Mustafa    Anesthesia: General    Estimated Blood Loss (mL): minimal    Complications: None    Specimens:   * No specimens in log *    Implants:  Implant Name Type Inv.  Item Serial No.  Lot No. LRB No. Used Action   WASHER RND 12.7MM Screw/Plate/Nail/Ariel WASHER RND 12.7MM  [place]  Left 3 Implanted   SCREW DARLENE 22MM PTHRD SS 6.5X90MM Screw/Plate/Nail/Ariel SCREW DARLENE 22MM PTHRD SS 6.5X90MM  [employer]  Left 1 Implanted   SCREW DARLENE 22MM PTHRD SS 6.5X95MM Screw/Plate/Nail/Ariel SCREW DARLENE 22MM PTHRD SS 6.5X95MM  [place]  Left 2 Implanted   SCREW DARLENE 22MM PTHRD SS 6.5X80MM Screw/Plate/Nail/Ariel SCREW DARLENE 22MM PTHRD SS 6.5X80MM  [employer]  Left 1 Implanted         Drains:   External Urinary Catheter (Active)   Catheter changed  Yes 09/06/20 0445   Urine Color Candis 09/06/20 0445   Urine Appearance Clear 09/06/20 0445   Output (mL) 400 mL 09/06/20 0445   Suction- Female Only 40 mmgHg continuous 09/06/20 0445   Placement Replaced 09/06/20 0445   Skin Assessment No Injury 09/06/20 0445       Findings: see post op diagnosis    Electronically signed by Terrance Gerardo DO on 9/6/2020 at 9:23 AM

## 2020-09-06 NOTE — PROGRESS NOTES
Kennethedgargabriela Rausch 58  INPATIENT OCCUPATIONAL THERAPY  [facility] ORTHOPEDICS 7K  EVALUATION    Time:   Time In: 4388  Time Out: 1750  Timed Code Treatment Minutes: 39 Minutes  Minutes: 60          Date: 2020  Patient Name: Natividad Schwab,   Gender: female      MRN: [id number]  : 1950  (65 y.o.)  Referring Practitioner: CARRIE Lee  Diagnosis: Syncope and Collapse  Additional Pertinent Hx: Pt [facility] She was walking out of the grocery store and fell. Isolates the majority of pain to the left hip. She has some left shoulder and elbow pain as well. She doesn't remember how it happened. She just blacked out. Denies history of other syncopal episodes. Denies numbness/tingling. She underwent L hip pinning on 20 in the AM.. Restrictions/Precautions:  Restrictions/Precautions: Weight Bearing  Left Lower Extremity Weight Bearing: Weight Bearing As Tolerated    Subjective  Chart Reviewed: Yes, Orders, History and Physical    Subjective: Pleasant and cooperative  Comments: RN approved session. Pt agreed to try getting up. She reported soreness in her LLE. She reported feeling suprised that it was not hurting her more. She was informed that she may still have some of the anesthesia from her surgery in her system.     Pain:  Pain Assessment  Patient Currently in Pain: Yes  Pain Assessment: Faces  Carrillo-Baker Pain Rating: Hurts even more  Pain Type: Surgical pain  Pain Location: Hip  Pain Orientation: Left  Pain Descriptors: Sore;Aching  Pain Frequency: Intermittent  Clinical Progression: Not changed  Patient's Stated Pain Goal: 3  Response to Pain Intervention: Patient Satisfied  Multiple Pain Sites: No    Social/Functional History:  Lives With: Alone  Type of Home: Apartment  Home Layout: One level  Home Access: Stairs to enter without rails  Entrance Stairs - Number of Steps: pt holds onto the door frame  Home Equipment: Quad cane, Cane, Standard walker   Bathroom preparing to walk; finishing toileting    Transfers:  Sit to stand: Moderate assistance(from the edge of bed x 2 trials)  Stand to sit: Minimal assistance(to the edge of bed)  Toilet Transfers  Toilet - Technique: Ambulating  Equipment Used: Standard bedside commode  Toilet Transfer: Minimal assistance(with cues to push off of the armrests)    Upper Extremity Assessment:Hand Dominance: Right  LUE AROM : WNL  Left Hand AROM: WNL  RUE AROM : WNL  Right Hand AROM: WNL    LUE Strength  L Hand General: 4/5  LUE Strength Comment: 3+/5 deltoid; 3+/5 pectoral; 4-/5 biceps/triceps    RUE Strength  R Hand General: 4/5  RUE Strength Comment: 3+/5 deltoid; 3+/5 pectoral; 4-/5 biceps/triceps      Sensation  Overall Sensation Status: WFL  Fine Motor Skills  Fine Motor Comment: No difficulty noted with doing self care using B hands. Activity Tolerance: Patient limited by fatigue, Patient limited by pain  Pt had an ice pack placed on her L hip following session. She was using 1L of O2 throughout. When it had been checked at the start of the session, pt was showing 88% oxygen saturation on room air. Assessment:  Assessment: Patient would benefit from continued skilled OT services to address above deficits. She presents with syncope and collapse and to undergo L femur ORIF after having sustained a L femoral neck fx. The surgery was early this AM.  She is weight bearing as tolerated. She was not using any AD for ambulating prior to admission. She also was doing her own ADLs and homemaking prior to admission. Pt drives. She demonstrated use of the bedside commode after getting up out of bed with moderate assistance needed. She needed to use a rolling walker to ambulate. She needed cues for pursed lip breathing as pt as SOB and had 1L of O2. Pt was not using any O2 at home.     Performance deficits / Impairments: Decreased functional mobility , Decreased ADL status, Decreased endurance, Decreased safe awareness, Decreased high-level IADLs  Prognosis: Good  REQUIRES OT FOLLOW UP: Yes  Decision Making: Medium Complexity    Treatment Initiated: Treatment and education initiated within context of evaluation. Evaluation time included review of current medical information, gathering information related to past medical, social and functional history, completion of standardized testing, formal and informal observation of tasks, assessment of data and development of plan of care and goals. Treatment time included skilled education and facilitation of tasks to increase safety and independence with ADL's for improved functional independence and quality of life. Pt took rest breaks after each standing trial.  She could help with repositioning in the bed after having a rest break. MIN A provided for scooting her hips into the middle of the bed and bringing her legs across the bed while in supine. Pt was instructed on safe hand placement during transfers and safe management of the walker while taking a few steps. Pt described her home routine and indicated she was independent with all of her homemaking. She was motivated to work with therapy to help regain her independence. Discharge Recommendations:  IP Rehab, Continue to assess pending progress, Patient would benefit from continued therapy after discharge    Patient Education:  OT Education: OT Role, Plan of Care, Precautions, Orientation, Transfer Training  Patient Education: Pursed lip breathing    Equipment Recommendations:  Equipment Needed: Yes  Other: LHAE    Plan:  Times per week: 6x  Current Treatment Recommendations: Endurance Training, Functional Mobility Training, Self-Care / ADL, Equipment Evaluation, Education, & procurement  Plan Comment: Pt would benefit from continued skilled OT services when medically stable and discharged from Acute. IP Rehab would be recommended.   Specific instructions for Next Treatment: Functioinal mobility; ADLs and standing balance; AE training as needed; endurance exercises and breathing techniques    Goals:  Patient goals : \"I want to return to being independent with things in my apartment. \" pt states. Short term goals  Time Frame for Short term goals: By discharge  Short term goal 1: Pt will demonstrate functional mobility ambulating to/from the bathroom while using a rolling walker with no > than MIN A to prepare for doing self care at the sink. Short term goal 2: Pt will complete simple grooming tasks with CGA while using 1-2 hand release to increase her independence with toileting routine. Short term goal 3: Pt will complete transfers to/from various surfaces including an elevated toilet seat with armrests with SBA and cues for hand placement to increase her independence with toileting routine. Short term goal 4: Pt will complete BUE light resistance exercises with demonstration and verbal cues for pursed lip breathing to increase her endurance for ease of doing self care and functional mobility. Short term goal 5: Pt will complete lower body ADLs while using any LHAE needed with no >than MIn A to increase her independence with self care. See long-term goal time frame for expected duration of plan of care. If no long-term goals established, a short length of stay is anticipated. Following session, patient left in safe position with all fall risk precautions in place.

## 2020-09-06 NOTE — PROGRESS NOTES
Hospitalist Progress Note    Patient:  Prema Johnson      Unit/Bed:7K-13/013-A    YOB: 1950    MRN: [id number]       Acct: [de-identified]     PCP: Joel Mock MD    Date of Admission: 9/5/2020    Assessment/Plan:    Anticipated Discharge in :    KRISTAN/Ash Castañeda [address] Problems    Diagnosis Date Noted    Syncope and collapse [R55] 09/05/2020     Syncope & Collapse  Continue telemetry monitoring  Obtain Orthostatic vitals, laying and sitting for now as patient is unable to bear weight  CT head with mild superficial soft tissue swelling, no acute intracranial process  Cervical CT showed multifocal degenerative changes, no acute fracture  No electrolyte abnormality, BNP normal, troponin negative  EKG normal sinus rhythm  Previous 2D echo in 5862 showed systolic function lower limits of normal, EF estimated to be 05%, grade 1 diastolic dysfunction  Previous stress test in 2014 showed no evidence of ischemia  Repeat Echo still pending    Left femoral neck fracture  S/p closed reduction and percutaneous screw fixation on 9/6  Ortho consulted. Pain control per Ortho  PT/OT when appropriate. Pre-Op Risk Stratification: RCRI score is 2 due to having a positive history for ischemic heart disease and pre-operative treatment with insulin and negative for high risk surgery, history of heart failure, history of CVA, and pre-operative creatinine greater than 2. This calculates to a 10.1% 30 day risk of MI, death or cardiac arrest. Pt understands and accepts these risks and would like to proceed with surgery.      Acute postoperative hypoxic respiratory failure  Patient initially requiring 4 LPM O2 per nasal cannula, being weaned postoperatively, currently on 2 LPM and saturating well  Echo pending  Chest x-ray on 9/5 unremarkable  Encourage incentive spirometry    Essential hypertension  Patient takes aspirin and Plavix, hold prior to surgery  Patient also takes Inderal and lisinopril    Hyperlipidemia  Continue atorvastatin and Zetia     IDDM type II  Patient is currently on Levemir 20 units daily and metformin 1000 mg twice daily, resume    Chronic normocytic anemia  Hgb, on arrival, 10.4. Baseline ~12. Hemoglobin will likely drop due to hip fracture and surgery. Continue to monitor    Chief Complaint:   syncope    Hospital Course: This is a chronically ill 77-year-old female who presented to [facility] on 9/5 due to syncope and collapse and hip pain. Patient states that she fell when she left the grocery store. Patient states that she fell on her left side and then her elbow and hit the back of her head. Patient states that she lost consciousness. The patient states that she has not been able to walk since she left the grocery store and does not want to try. Patient rates the pain 10 out of 10. Patient denies any alleviating or aggravating factors.     In the emergency department, the patient was found to have a left hip fracture. All other radiographs were negative for acute issues. Patient was admitted to hospitalist due to syncopal work-up. Ortho will be consulted. Subjective:   Patient seen and examined, appears comfortable in bed, without any signs of cardiorespiratory distress. She just came back after her surgery, appears sleepy and tired.   Otherwise VS stable, afebrile, saturating well on 2 LPM O2 per nasal cannula        Medications:  Reviewed    Infusion Medications    sodium chloride 75 mL/hr at 09/05/20 1620     Scheduled Medications    atorvastatin  80 mg Oral Daily    [Held by provider] clopidogrel  75 mg Oral Daily    primidone  50 mg Oral Nightly    propranolol  80 mg Oral Daily    sodium chloride flush  10 mL Intravenous 2 times per day    insulin glargine  20 Units Subcutaneous QAM    primidone  200 mg Oral Daily     PRN Meds: sodium chloride flush, acetaminophen **OR** acetaminophen, polyethylene glycol, promethazine **OR** ondansetron, HYDROmorphone **OR** HYDROmorphone      Intake/Output Summary (Last 24 hours) at 9/6/2020 0776  Last data filed at 9/6/2020 0445  Gross per 24 hour   Intake 1623 ml   Output 700 ml   Net 923 ml       Diet:  Diet NPO, After Midnight    Exam:  /71   Pulse 98   Temp 98.1 °F (36.7 °C) (Oral)   Resp 15   Ht 5' 7\" (1.702 m)   Wt 163 lb (73.9 kg)   SpO2 92%   BMI 25.53 kg/m²     General appearance: No apparent distress, appears stated age and cooperative. HEENT: Pupils equal, round, and reactive to light. Conjunctivae/corneas clear. Neck: Supple, with full range of motion. No jugular venous distention. Trachea midline. Respiratory:  Normal respiratory effort. Clear to auscultation, bilaterally without Rales/Wheezes/Rhonchi. Cardiovascular: Regular rate and rhythm with normal S1/S2 without murmurs, rubs or gallops. Abdomen: Soft, non-tender, non-distended with normal bowel sounds. Musculoskeletal: passive and active ROM x 4 extremities. Tenderness on palpation left hip, dressing noted on the left hip, no gross bleeding or discharge  Skin: Skin color, texture, turgor normal.  No rashes or lesions. Neurologic:  Neurovascularly intact without any focal sensory/motor deficits. Cranial nerves: II-XII intact, grossly non-focal.  Psychiatric: Alert and oriented, thought content appropriate, normal insight  Capillary Refill: Brisk,< 3 seconds   Peripheral Pulses: +2 palpable, equal bilaterally       Labs:   Recent Labs     09/05/20  1012 09/06/20  0544   WBC 5.1 4.9   HGB 10.4* 10.2*   HCT 34.3* 33.9*    131     Recent Labs     09/05/20  1012 09/06/20  0544    137   K 5.1 4.3    106   CO2 25 24   BUN 28* 22   CREATININE 1.4* 1.1   CALCIUM 9.4 8.7     Recent Labs     09/05/20  1012   AST 35   ALT 29   BILITOT 0.2*   ALKPHOS 75     No results for input(s): INR in the last 72 hours. No results for input(s): Serene [place] in the last 72 hours.     Urinalysis:      Lab Results   Component Value Date    NITRU NEGATIVE 09/05/2020    WBCUA 25-50 12/07/2012    WBCUA >200W/CLUMPS 10/20/2011    BACTERIA NONE 12/07/2012    RBCUA 25-50 12/07/2012    BLOODU NEGATIVE 09/05/2020    GLUCOSEU NEGATIVE 09/05/2020       Radiology:  XR FEMUR LEFT (MIN 2 VIEWS)   Final Result   Mildly impacted subcapital fracture left femoral neck. **This report has been created using voice recognition software. It may contain minor errors which are inherent in voice recognition technology. **      Final report electronically signed by Dr. Robin Melton on 9/5/2020 11:23 AM      XR PELVIS (1-2 VIEWS)   Final Result   Acute slightly impacted subcapital fracture left femoral neck. **This report has been created using voice recognition software. It may contain minor errors which are inherent in voice recognition technology. **      Final report electronically signed by Dr. Robin Melton on 9/5/2020 11:20 AM      XR ELBOW LEFT (MIN 3 VIEWS)   Final Result   Soft tissue swelling. No fracture. No dislocation. **This report has been created using voice recognition software. It may contain minor errors which are inherent in voice recognition technology. **      Final report electronically signed by Dr. Robin Melton on 9/5/2020 11:23 AM      XR SHOULDER LEFT (MIN 2 VIEWS)   Final Result   Osteoporosis. No fracture. **This report has been created using voice recognition software. It may contain minor errors which are inherent in voice recognition technology. **      Final report electronically signed by Dr. Robin Melton on 9/5/2020 11:19 AM      XR CHEST PORTABLE   Final Result   Normal mobile chest.            **This report has been created using voice recognition software. It may contain minor errors which are inherent in voice recognition technology. **      Final report electronically signed by Dr. Robin Melton on 9/5/2020 11:19 AM      CT Head WO Contrast   Final Result Mild superficial soft tissue swelling. No acute intracranial process. **This report has been created using voice recognition software. It may contain minor errors which are inherent in voice recognition technology. **      Final report electronically signed by Dr. Vadim Shannon on 9/5/2020 10:53 AM      CT Cervical Spine WO Contrast   Final Result   Multifocal degenerative changes. Questionable muscle spasm right side. No acute fracture seen. **This report has been created using voice recognition software. It may contain minor errors which are inherent in voice recognition technology. **      Final report electronically signed by Dr. Vadim Shannon on 9/5/2020 10:56 AM          Diet: Diet NPO, After Midnight    DVT prophylaxis: [] Lovenox                                 [] SCDs                                 [] SQ Heparin                                 [] Encourage ambulation           [] Already on Anticoagulation     Disposition:    [] Home       [] TCU       [] Rehab       [] Psych       [] SNF       [] [place]       [] Other-    Code Status: Full Code    PT/OT Eval Status:        Electronically signed by Javy Cunningham MD on 9/6/2020 at 7:40 AM

## 2020-09-06 NOTE — ANESTHESIA PRE PROCEDURE
Department of Anesthesiology  Preprocedure Note       Name:  Lyubov Men   Age:  65 y.o.  :  1950                                          MRN:  [id number]         Date:  2020      Surgeon: Shadi Trujillo):  Huey Stauffer DO    Procedure: Procedure(s):  HIP PINNING    Medications prior to admission:   Prior to Admission medications    Medication Sig Start Date End Date Taking? Authorizing Provider   primidone (MYSOLINE) 50 MG tablet Take 100 mg by mouth nightly   Yes Historical Provider, MD   atorvastatin (LIPITOR) 80 MG tablet Take 80 mg by mouth daily   Yes Historical Provider, MD   lisinopril (PRINIVIL;ZESTRIL) 2.5 MG tablet Take 2.5 mg by mouth daily   Yes Historical Provider, MD   ezetimibe (ZETIA) 10 MG tablet Take 10 mg by mouth daily   Yes Historical Provider, MD   primidone (MYSOLINE) 50 MG tablet Take 1 tablet by mouth nightly  Patient taking differently: Take 200 mg by mouth daily  18  Yes Rashad Valdez MD   clopidogrel (PLAVIX) 75 MG tablet Take 1 tablet by mouth daily. 3/31/14  Yes Renae Rangel APRN - CNP   insulin detemir (LEVEMIR) 100 UNIT/ML injection Inject 20 Units into the skin daily    Yes Historical Provider, MD   metformin (GLUCOPHAGE) 500 MG tablet Take 1,000 mg by mouth 2 times daily (with meals). Yes Historical Provider, MD   propranolol (INDERAL LA) 80 MG CR capsule Take 60 mg by mouth daily    Yes Historical Provider, MD   Pantoprazole Sodium (PROTONIX) 40 MG PACK packet Take 40 mg by mouth daily. Yes Historical Provider, MD   aspirin 81 MG EC tablet Take 81 mg by mouth daily. Yes Historical Provider, MD   Acetaminophen (TYLENOL PO) Take  by mouth as needed.       Historical Provider, MD       Current medications:    Current Facility-Administered Medications   Medication Dose Route Frequency Provider Last Rate Last Dose    glucose (GLUTOSE) 40 % oral gel 15 g  15 g Oral PRN Janeth Wells MD        dextrose 50 % IV solution  12.5 g 09/06/2020    HCT 33.9 09/06/2020    MCV 94.7 09/06/2020    RDW 13.6 02/08/2017     09/06/2020       CMP:   Lab Results   Component Value Date     09/06/2020    K 4.3 09/06/2020     09/06/2020    CO2 24 09/06/2020    BUN 22 09/06/2020    CREATININE 1.1 09/06/2020    LABGLOM 49 09/06/2020    GLUCOSE 156 09/06/2020    GLUCOSE 232 10/27/2011    PROT 5.8 09/05/2020    CALCIUM 8.7 09/06/2020    BILITOT 0.2 09/05/2020    ALKPHOS 75 09/05/2020    AST 35 09/05/2020    ALT 29 09/05/2020       POC Tests:   Recent Labs     09/06/20  0634   POCGLU 136*       Coags:   Lab Results   Component Value Date    PROTIME 1.27 10/24/2011    INR 1.08 02/08/2017    APTT 88.3 10/24/2011       HCG (If Applicable): No results found for: PREGTESTUR, PREGSERUM, HCG, HCGQUANT     ABGs: No results found for: PHART, PO2ART, COW0BWO, MLB3MOQ, BEART, [id number]     Type & Screen (If Applicable):  Lab Results   Component Value Date    LABRH POS 10/20/2011       Drug/Infectious Status (If Applicable):  No results found for: HIV, HEPCAB    COVID-19 Screening (If Applicable):   Lab Results   Component Value Date    COVID19 NOT DETECTED 09/05/2020         Anesthesia Evaluation   no history of anesthetic complications:   Airway: Mallampati: II  TM distance: >3 FB   Neck ROM: full  Mouth opening: > = 3 FB Dental:          Pulmonary:normal exam              Patient did not smoke on day of surgery. Cardiovascular:    (+) hypertension:, CAD:, hyperlipidemia                  Neuro/Psych:   Negative Neuro/Psych ROS              GI/Hepatic/Renal:   (+) GERD: well controlled,           Endo/Other:    (+) Diabetes, . Pt had no PAT visit       Abdominal:           Vascular: negative vascular ROS. Anesthesia Plan      general     ASA 3       Induction: intravenous. MIPS: Postoperative opioids intended and Prophylactic antiemetics administered.   Anesthetic plan and risks discussed with patient. Plan discussed with CRNA.                   Beatriz Kennedy MD   9/6/2020

## 2020-09-06 NOTE — PROGRESS NOTES
[facility] [place] updated per patient request on current condition and plans for surgery in the morning. All questions answered. Ashley plans to be up here at 0700 prior to surgery.

## 2020-09-07 LAB
ANION GAP SERPL CALCULATED.3IONS-SCNC: 9 MEQ/L (ref 8–16)
BASOPHILS # BLD: 0.3 %
BASOPHILS ABSOLUTE: 0 THOU/MM3 (ref 0–0.1)
BUN BLDV-MCNC: 18 MG/DL (ref 7–22)
CALCIUM SERPL-MCNC: 8.3 MG/DL (ref 8.5–10.5)
CHLORIDE BLD-SCNC: 109 MEQ/L (ref 98–111)
CO2: 22 MEQ/L (ref 23–33)
CREAT SERPL-MCNC: 1.1 MG/DL (ref 0.4–1.2)
EOSINOPHIL # BLD: 2.2 %
EOSINOPHILS ABSOLUTE: 0.1 THOU/MM3 (ref 0–0.4)
ERYTHROCYTE [DISTWIDTH] IN BLOOD BY AUTOMATED COUNT: 12.9 % (ref 11.5–14.5)
ERYTHROCYTE [DISTWIDTH] IN BLOOD BY AUTOMATED COUNT: 46.3 FL (ref 35–45)
GFR SERPL CREATININE-BSD FRML MDRD: 49 ML/MIN/1.73M2
GLUCOSE BLD-MCNC: 129 MG/DL (ref 70–108)
GLUCOSE BLD-MCNC: 135 MG/DL (ref 70–108)
GLUCOSE BLD-MCNC: 135 MG/DL (ref 70–108)
GLUCOSE BLD-MCNC: 139 MG/DL (ref 70–108)
GLUCOSE BLD-MCNC: 139 MG/DL (ref 70–108)
HCT VFR BLD CALC: 33.4 % (ref 37–47)
HEMOGLOBIN: 9.9 GM/DL (ref 12–16)
IMMATURE GRANS (ABS): 0.03 THOU/MM3 (ref 0–0.07)
IMMATURE GRANULOCYTES: 0.5 %
LV EF: 55 %
LVEF MODALITY: NORMAL
LYMPHOCYTES # BLD: 15.9 %
LYMPHOCYTES ABSOLUTE: 0.9 THOU/MM3 (ref 1–4.8)
MCH RBC QN AUTO: 28.8 PG (ref 26–33)
MCHC RBC AUTO-ENTMCNC: 29.6 GM/DL (ref 32.2–35.5)
MCV RBC AUTO: 97.1 FL (ref 81–99)
MONOCYTES # BLD: 12 %
MONOCYTES ABSOLUTE: 0.7 THOU/MM3 (ref 0.4–1.3)
NUCLEATED RED BLOOD CELLS: 0 /100 WBC
PLATELET # BLD: 128 THOU/MM3 (ref 130–400)
PMV BLD AUTO: 9.8 FL (ref 9.4–12.4)
POTASSIUM SERPL-SCNC: 4.5 MEQ/L (ref 3.5–5.2)
RBC # BLD: 3.44 MILL/MM3 (ref 4.2–5.4)
SEG NEUTROPHILS: 69.1 %
SEGMENTED NEUTROPHILS ABSOLUTE COUNT: 4.1 THOU/MM3 (ref 1.8–7.7)
SODIUM BLD-SCNC: 140 MEQ/L (ref 135–145)
WBC # BLD: 5.9 THOU/MM3 (ref 4.8–10.8)

## 2020-09-07 PROCEDURE — 80048 BASIC METABOLIC PNL TOTAL CA: CPT

## 2020-09-07 PROCEDURE — 6360000002 HC RX W HCPCS: Performed by: PHYSICIAN ASSISTANT

## 2020-09-07 PROCEDURE — 2580000003 HC RX 258: Performed by: PHYSICIAN ASSISTANT

## 2020-09-07 PROCEDURE — 94760 N-INVAS EAR/PLS OXIMETRY 1: CPT

## 2020-09-07 PROCEDURE — 36415 COLL VENOUS BLD VENIPUNCTURE: CPT

## 2020-09-07 PROCEDURE — 6370000000 HC RX 637 (ALT 250 FOR IP): Performed by: PHYSICIAN ASSISTANT

## 2020-09-07 PROCEDURE — 93306 TTE W/DOPPLER COMPLETE: CPT

## 2020-09-07 PROCEDURE — 85025 COMPLETE CBC W/AUTO DIFF WBC: CPT

## 2020-09-07 PROCEDURE — 2700000000 HC OXYGEN THERAPY PER DAY

## 2020-09-07 PROCEDURE — 1200000003 HC TELEMETRY R&B

## 2020-09-07 PROCEDURE — 99232 SBSQ HOSP IP/OBS MODERATE 35: CPT | Performed by: FAMILY MEDICINE

## 2020-09-07 PROCEDURE — 82948 REAGENT STRIP/BLOOD GLUCOSE: CPT

## 2020-09-07 RX ADMIN — ATORVASTATIN CALCIUM 80 MG: 80 TABLET, FILM COATED ORAL at 22:08

## 2020-09-07 RX ADMIN — Medication 10 ML: at 22:08

## 2020-09-07 RX ADMIN — INSULIN GLARGINE 20 UNITS: 100 INJECTION, SOLUTION SUBCUTANEOUS at 09:25

## 2020-09-07 RX ADMIN — PROPRANOLOL HYDROCHLORIDE 80 MG: 80 CAPSULE, EXTENDED RELEASE ORAL at 09:23

## 2020-09-07 RX ADMIN — PRIMIDONE 200 MG: 50 TABLET ORAL at 09:24

## 2020-09-07 RX ADMIN — ASPIRIN 81 MG: 81 TABLET ORAL at 09:23

## 2020-09-07 RX ADMIN — DOCUSATE SODIUM 50 MG AND SENNOSIDES 8.6 MG 1 TABLET: 8.6; 5 TABLET, FILM COATED ORAL at 09:24

## 2020-09-07 RX ADMIN — DOCUSATE SODIUM 50 MG AND SENNOSIDES 8.6 MG 1 TABLET: 8.6; 5 TABLET, FILM COATED ORAL at 22:08

## 2020-09-07 RX ADMIN — CLOPIDOGREL BISULFATE 75 MG: 75 TABLET ORAL at 09:23

## 2020-09-07 RX ADMIN — PRIMIDONE 50 MG: 50 TABLET ORAL at 22:08

## 2020-09-07 RX ADMIN — SODIUM CHLORIDE: 9 INJECTION, SOLUTION INTRAVENOUS at 00:15

## 2020-09-07 RX ADMIN — CEFAZOLIN 2 G: 10 INJECTION, POWDER, FOR SOLUTION INTRAVENOUS at 00:15

## 2020-09-07 ASSESSMENT — PAIN SCALES - WONG BAKER
WONGBAKER_NUMERICALRESPONSE: 0

## 2020-09-07 ASSESSMENT — PAIN DESCRIPTION - PAIN TYPE: TYPE: ACUTE PAIN

## 2020-09-07 ASSESSMENT — PAIN SCALES - GENERAL
PAINLEVEL_OUTOF10: 6
PAINLEVEL_OUTOF10: 0
PAINLEVEL_OUTOF10: 0

## 2020-09-07 ASSESSMENT — PAIN DESCRIPTION - LOCATION: LOCATION: HEAD

## 2020-09-07 NOTE — FLOWSHEET NOTE
[place] [address] PROGRESS NOTE      Patient: Shamar Roy  Room #: 3D-32/620-O            YOB: 1950  Age: 65 y.o. Gender: female            Admit Date & Time: 9/5/2020  9:41 AM    Assessment:  Patient is 65year old female. Patient is in her bed, awake and engaged in conversation. Patient shared she passed out and fell at home and in falling she injured her hip. Patient said she had surgery yesterday and is very pleased with all the staff tending to her needs. Patient spoke highly of the therapist who is helping her. Patient told me she is doing fine and is aiming at getting better, going home and will do her best not to fall again. Interventions:  Advance Directive Consult: Advance Directive materials were provided to patient and explained. Patient is not ready to complete at this time, gave information for Spiritual Care to be contacted if further assistance is needed. Outcomes:  Patient is receptive to chaplains visit, engaged in conversation. Patient accepted AD/LW form and will contact spiritual care for help when ready. Plan:  (Is there more work to be done regarding the spiritual needs identified? If so, provide details about your suggested plan for follow-up. DELETE THIS parathetical guide prior to filing!)  1. SC will follow up with patient to assist with LW/DPOA when ready.      Electronically signed by Justo Rueda on 9/6/2020 at 9:08 PM.  Lamont Chanel  [phone]

## 2020-09-07 NOTE — PROGRESS NOTES
Pt. Is A&Ox3. Patient is currently on bedrest, but goal is to get to chair today. Speech is clear. Patient denied pain. Sensory function is intact. Sclera is white. Conjunctive are pink and moist. Hand grasp is 2+ and strong bilaterally. Pedal push and pull are 2+ and strong bilaterally. Patient's facial expression is appropriate for the situation and mood is pleasant. Breathing is nonlabored and clear. Patient has a non-productive cough. Lung sounds are clear anteriorly and posteriorly bilaterally. Patient is on 2L of Oxygen per NC. Heart tones are regular and S1 and S2 are clearly noted in all four areas. No carotid bruit present. Capillary refill is less than 3 seconds. Radial, Pedal, and Posterior Tibial Pulses are 2+ bilaterally. No edema present. Sequential pumps on both legs. ROM is limited on the left leg due to surgery. ROM on right leg and upper body is full and strong. Abdomen is soft and non-tender. Bowel sounds are active. Pt is incontinent and has an external catheter present. Urine is dark yellow and clear. Surgical site dressing om the upper left thigh is clean, dry, and intact. IV dressings are clean dry and intact on the right hand and right arm. Skin is pink, warm and dry. Skin turgor is good with no tenting present.

## 2020-09-07 NOTE — PROGRESS NOTES
Subjective:   Pain controlled. Denies numbness or tingling. Afebrile. Hb 9.9. Objective:   BP (!) 140/82   Pulse 72   Temp 98.3 °F (36.8 °C) (Oral)   Resp 24   Ht 5' 7\" (1.702 m)   Wt 163 lb (73.9 kg)   SpO2 96%   BMI 25.53 kg/m²     Recent Labs     09/05/20  1012 09/06/20  0544 09/07/20  0658   WBC 5.1 4.9 5.9   HGB 10.4* 10.2* 9.9*   HCT 34.3* 33.9* 33.4*    131 128*       Current Facility-Administered Medications: glucose (GLUTOSE) 40 % oral gel 15 g, 15 g, Oral, PRN  dextrose 50 % IV solution, 12.5 g, Intravenous, PRN  glucagon (rDNA) injection 1 mg, 1 mg, Intramuscular, PRN  dextrose 5 % solution, 100 mL/hr, Intravenous, PRN  clopidogrel (PLAVIX) tablet 75 mg, 75 mg, Oral, Daily  sodium chloride flush 0.9 % injection 10 mL, 10 mL, Intravenous, 2 times per day  sodium chloride flush 0.9 % injection 10 mL, 10 mL, Intravenous, PRN  sennosides-docusate sodium (SENOKOT-S) 8.6-50 MG tablet 1 tablet, 1 tablet, Oral, BID  magnesium hydroxide (MILK OF MAGNESIA) 400 MG/5ML suspension 30 mL, 30 mL, Oral, Daily PRN  aspirin EC tablet 81 mg, 81 mg, Oral, Daily  atorvastatin (LIPITOR) tablet 80 mg, 80 mg, Oral, Daily  primidone (MYSOLINE) tablet 50 mg, 50 mg, Oral, Nightly  propranolol (INDERAL LA) extended release capsule 80 mg, 80 mg, Oral, Daily  acetaminophen (TYLENOL) tablet 650 mg, 650 mg, Oral, Q6H PRN **OR** acetaminophen (TYLENOL) suppository 650 mg, 650 mg, Rectal, Q6H PRN  polyethylene glycol (GLYCOLAX) packet 17 g, 17 g, Oral, Daily PRN  promethazine (PHENERGAN) tablet 12.5 mg, 12.5 mg, Oral, Q6H PRN **OR** ondansetron (ZOFRAN) injection 4 mg, 4 mg, Intravenous, Q6H PRN  HYDROmorphone (DILAUDID) injection 0.25 mg, 0.25 mg, Intravenous, Q3H PRN **OR** HYDROmorphone (DILAUDID) injection 0.5 mg, 0.5 mg, Intravenous, Q3H PRN  insulin glargine (LANTUS) injection vial 20 Units, 20 Units, Subcutaneous, QAM  primidone (MYSOLINE) tablet 200 mg, 200 mg, Oral, Daily      Exam:  Gen: NAD  LLE: Dressing CDI. Soft compartments. 2+ DP pulse. TA/EHL/FHL/GS motor intact. DP/SP/T/S/Saph SILT. No calf TTP or swelling. Assessment:  65 y.o. female POD 1 s/p left CRPS left hip    Plan:  Pain control  Activity: WBAT with walker   PT/OT  IM primary  DVT ppx: ASA and plavix, ambulation and EPC  DC planning: Per IM when medically appropriate.      Electronically signed by Alejandro Carrasco DO on 9/7/2020 at 11:07 AM

## 2020-09-07 NOTE — PLAN OF CARE
Problem: Falls - Risk of:  Goal: Will remain free from falls  Description: Will remain free from falls  Outcome: Ongoing  Note: Pt using call light appropriately to call for assistance with ambulation to the bathroom and to chair. Pt is also compliant with use of non-skid slippers. Pt reports understanding of fall prevention when discussed. Goal: Absence of physical injury  Description: Absence of physical injury  Outcome: Ongoing  Note: Pt free from physical injury at this time. Will continue to monitor     Problem: Skin Integrity:  Goal: Will show no infection signs and symptoms  Description: Will show no infection signs and symptoms  Outcome: Ongoing  Note: Pt on post op atb. No evidence of infection at this time. Will continue to monitor  Goal: Absence of new skin breakdown  Description: Absence of new skin breakdown  Outcome: Ongoing  Note: Pt turned and repositioned with pillow support. No new skin breakdown noted at this time. Problem: Pain:  Goal: Pain level will decrease  Description: Pain level will decrease  Outcome: Ongoing  Note: Pt denies pain at this time. Ice applied to sight. Will continue to monitor  Goal: Control of acute pain  Description: Control of acute pain  Outcome: Ongoing  Note: Pt denies pain at this time. Ice applied to sight. Will continue to monitor  Goal: Control of chronic pain  Description: Control of chronic pain  Outcome: Ongoing  Note: Pt denies pain at this time. Ice applied to sight. Will continue to monitor     Problem: DISCHARGE BARRIERS  Goal: Patient's continuum of care needs are met  Outcome: Ongoing  Note: Pt plans ECF at discharge. Care manager and social working helping with discharge needs. Care plan reviewed with patient. Patient verbalize understanding of the plan of care and contribute to goal setting.

## 2020-09-07 NOTE — PROGRESS NOTES
Hospitalist Progress Note    Patient:  Mily Peres      Unit/Bed:7K-13/013-A    YOB: 1950    MRN: [id number]       Acct: [de-identified]     PCP: Luis Manuel Flores MD    Date of Admission: 9/5/2020    Assessment/Plan:    Anticipated Discharge in :    [facility] Problems    Diagnosis Date Noted    Closed fracture of neck of left femur ([facility] [address] [S72.002A]     Acute respiratory failure with hypoxia ([address] [J96.01]     Syncope and collapse [R55] 09/05/2020     Syncope & Collapse  Continue telemetry monitoring  Obtain Orthostatic vitals, laying and sitting for now as patient is unable to bear weight  CT head with mild superficial soft tissue swelling, no acute intracranial process  Cervical CT showed multifocal degenerative changes, no acute fracture  No electrolyte abnormality, BNP normal, troponin negative  EKG normal sinus rhythm  Previous 2D echo in 3249 showed systolic function lower limits of normal, EF estimated to be 77%, grade 1 diastolic dysfunction  Previous stress test in 2014 showed no evidence of ischemia  Repeat Echo still pending    Left femoral neck fracture  S/p closed reduction and percutaneous screw fixation on 9/6  Ortho consulted. Pain control per Ortho  PT/OT when appropriate. Acute postoperative hypoxic respiratory failure  Patient initially requiring 4 LPM O2 per nasal cannula, being weaned postoperatively, currently on 2 LPM and saturating well  Echo pending  Chest x-ray on 9/5 unremarkable  Encourage incentive spirometry    Essential hypertension  Patient takes aspirin and Plavix, hold prior to surgery  Patient also takes Inderal and lisinopril    Hyperlipidemia  Continue atorvastatin and Zetia     IDDM type II  Patient is currently on Levemir 20 units daily and metformin 1000 mg twice daily, resume    Chronic normocytic anemia  Hgb, on arrival, 10.4. Baseline ~12. Hemoglobin will likely drop due to hip fracture and surgery.    Continue to monitor    Disposition  Likely to be discharged tomorrow  Pending Echo      Chief Complaint:   syncope    Hospital Course: This is a chronically ill 78-year-old female who presented to [facility] on 9/5 due to syncope and collapse and hip pain. Patient states that she fell when she left the grocery store. Patient states that she fell on her left side and then her elbow and hit the back of her head. Patient states that she lost consciousness. The patient states that she has not been able to walk since she left the grocery store and does not want to try. Patient rates the pain 10 out of 10. Patient denies any alleviating or aggravating factors.     In the emergency department, the patient was found to have a left hip fracture. All other radiographs were negative for acute issues. Patient was admitted to hospitalist due to syncopal work-up. Ortho will be consulted. Subjective:   Patient seen and examined, appears comfortable in bed, without any signs of cardiorespiratory distress. Patient states that she is tired and sleepy today, she did not touch her breakfast yet. Otherwise VS stable, afebrile, saturating well on 2 LPM O2 per nasal cannula.          Medications:  Reviewed    Infusion Medications    dextrose      sodium chloride 75 mL/hr at 09/07/20 0015     Scheduled Medications    clopidogrel  75 mg Oral Daily    sodium chloride flush  10 mL Intravenous 2 times per day    sennosides-docusate sodium  1 tablet Oral BID    aspirin  81 mg Oral Daily    atorvastatin  80 mg Oral Daily    primidone  50 mg Oral Nightly    propranolol  80 mg Oral Daily    insulin glargine  20 Units Subcutaneous QAM    primidone  200 mg Oral Daily     PRN Meds: glucose, dextrose, glucagon (rDNA), dextrose, sodium chloride flush, magnesium hydroxide, acetaminophen **OR** acetaminophen, polyethylene glycol, promethazine **OR** ondansetron, HYDROmorphone **OR** HYDROmorphone      Intake/Output Summary (Last 24 hours) at 9/7/2020 0720  Last data filed at 9/7/2020 0314  Gross per 24 hour   Intake 2292.25 ml   Output 1550 ml   Net 742.25 ml       Diet:  DIET CARB CONTROL; Exam:  /64   Pulse 96   Temp 99.3 °F (37.4 °C) (Oral)   Resp 16   Ht 5' 7\" (1.702 m)   Wt 163 lb (73.9 kg)   SpO2 95%   BMI 25.53 kg/m²     General appearance: No apparent distress, appears stated age and cooperative. HEENT: Pupils equal, round, and reactive to light. Conjunctivae/corneas clear. Neck: Supple, with full range of motion. No jugular venous distention. Trachea midline. Respiratory:  Normal respiratory effort. Clear to auscultation, bilaterally without Rales/Wheezes/Rhonchi. Cardiovascular: Regular rate and rhythm with normal S1/S2 without murmurs, rubs or gallops. Abdomen: Soft, non-tender, non-distended with normal bowel sounds. Musculoskeletal: passive and active ROM x 4 extremities. Tenderness on palpation left hip, dressing noted on the left hip, no gross bleeding or discharge  Skin: Skin color, texture, turgor normal.  No rashes or lesions. Neurologic:  Neurovascularly intact without any focal sensory/motor deficits. Cranial nerves: II-XII intact, grossly non-focal.  Psychiatric: Alert and oriented, thought content appropriate, normal insight  Capillary Refill: Brisk,< 3 seconds   Peripheral Pulses: +2 palpable, equal bilaterally       Labs:   Recent Labs     09/05/20  1012 09/06/20  0544 09/07/20  0658   WBC 5.1 4.9 5.9   HGB 10.4* 10.2* 9.9*   HCT 34.3* 33.9* 33.4*    131 128*     Recent Labs     09/05/20  1012 09/06/20  0544    137   K 5.1 4.3    106   CO2 25 24   BUN 28* 22   CREATININE 1.4* 1.1   CALCIUM 9.4 8.7     Recent Labs     09/05/20  1012   AST 35   ALT 29   BILITOT 0.2*   ALKPHOS 75     No results for input(s): INR in the last 72 hours. No results for input(s): Angel Carter in the last 72 hours.     Urinalysis:      Lab Results   Component Value Date    NITRU NEGATIVE VIEWS)   Final Result   Osteoporosis. No fracture. **This report has been created using voice recognition software. It may contain minor errors which are inherent in voice recognition technology. **      Final report electronically signed by Dr. Sherley Bautista on 9/5/2020 11:19 AM      XR CHEST PORTABLE   Final Result   Normal mobile chest.            **This report has been created using voice recognition software. It may contain minor errors which are inherent in voice recognition technology. **      Final report electronically signed by Dr. Sherley Bautista on 9/5/2020 11:19 AM      CT Head WO Contrast   Final Result   Mild superficial soft tissue swelling. No acute intracranial process. **This report has been created using voice recognition software. It may contain minor errors which are inherent in voice recognition technology. **      Final report electronically signed by Dr. Sherley Bautista on 9/5/2020 10:53 AM      CT Cervical Spine WO Contrast   Final Result   Multifocal degenerative changes. Questionable muscle spasm right side. No acute fracture seen. **This report has been created using voice recognition software. It may contain minor errors which are inherent in voice recognition technology. **      Final report electronically signed by Dr. Sherley Bautista on 9/5/2020 10:56 AM          Diet: DIET CARB CONTROL;    DVT prophylaxis: [] Lovenox                                 [] SCDs                                 [] SQ Heparin                                 [] Encourage ambulation           [] Already on Anticoagulation     Disposition:    [] Home       [] TCU       [] Rehab       [] Psych       [] SNF       [] [place]       [] Other-    Code Status: Full Code    PT/OT Eval Status:        Electronically signed by Jax Prado MD on 9/7/2020 at 7:20 AM

## 2020-09-07 NOTE — PROGRESS NOTES
Patient is only oriented to person. Patient is disoriented to place, time, and situation. Heart sounds are normal, S1 and S2 are clearly noted in all four areas. Lung sounds are nonlabored and clear anteriorly and posteriorly bilaterally. Bowel sounds are active in all four quadrants. Surgical site on the upper left thigh is clean, dry, and intact.

## 2020-09-08 LAB
GLUCOSE BLD-MCNC: 123 MG/DL (ref 70–108)
GLUCOSE BLD-MCNC: 125 MG/DL (ref 70–108)
GLUCOSE BLD-MCNC: 162 MG/DL (ref 70–108)
GLUCOSE BLD-MCNC: 185 MG/DL (ref 70–108)
HCT VFR BLD CALC: 32.6 % (ref 37–47)
HEMOGLOBIN: 10.2 GM/DL (ref 12–16)

## 2020-09-08 PROCEDURE — 85014 HEMATOCRIT: CPT

## 2020-09-08 PROCEDURE — 94760 N-INVAS EAR/PLS OXIMETRY 1: CPT

## 2020-09-08 PROCEDURE — 97163 PT EVAL HIGH COMPLEX 45 MIN: CPT

## 2020-09-08 PROCEDURE — 1200000003 HC TELEMETRY R&B

## 2020-09-08 PROCEDURE — 6370000000 HC RX 637 (ALT 250 FOR IP): Performed by: PHYSICIAN ASSISTANT

## 2020-09-08 PROCEDURE — 97530 THERAPEUTIC ACTIVITIES: CPT

## 2020-09-08 PROCEDURE — 82948 REAGENT STRIP/BLOOD GLUCOSE: CPT

## 2020-09-08 PROCEDURE — 36415 COLL VENOUS BLD VENIPUNCTURE: CPT

## 2020-09-08 PROCEDURE — 2580000003 HC RX 258: Performed by: PHYSICIAN ASSISTANT

## 2020-09-08 PROCEDURE — 97535 SELF CARE MNGMENT TRAINING: CPT

## 2020-09-08 PROCEDURE — 85018 HEMOGLOBIN: CPT

## 2020-09-08 PROCEDURE — 99232 SBSQ HOSP IP/OBS MODERATE 35: CPT | Performed by: FAMILY MEDICINE

## 2020-09-08 RX ORDER — ACETAMINOPHEN 650 MG/1
650 SUPPOSITORY RECTAL EVERY 4 HOURS PRN
Status: DISCONTINUED | OUTPATIENT
Start: 2020-09-08 | End: 2020-09-09 | Stop reason: HOSPADM

## 2020-09-08 RX ORDER — ACETAMINOPHEN 325 MG/1
650 TABLET ORAL EVERY 4 HOURS PRN
Status: DISCONTINUED | OUTPATIENT
Start: 2020-09-08 | End: 2020-09-09 | Stop reason: HOSPADM

## 2020-09-08 RX ADMIN — Medication 10 ML: at 20:34

## 2020-09-08 RX ADMIN — INSULIN GLARGINE 20 UNITS: 100 INJECTION, SOLUTION SUBCUTANEOUS at 10:13

## 2020-09-08 RX ADMIN — ATORVASTATIN CALCIUM 80 MG: 80 TABLET, FILM COATED ORAL at 20:34

## 2020-09-08 RX ADMIN — Medication 10 ML: at 10:13

## 2020-09-08 RX ADMIN — PRIMIDONE 50 MG: 50 TABLET ORAL at 20:34

## 2020-09-08 RX ADMIN — CLOPIDOGREL BISULFATE 75 MG: 75 TABLET ORAL at 10:12

## 2020-09-08 RX ADMIN — ACETAMINOPHEN 650 MG: 325 TABLET ORAL at 10:12

## 2020-09-08 RX ADMIN — PRIMIDONE 200 MG: 50 TABLET ORAL at 10:13

## 2020-09-08 RX ADMIN — PROPRANOLOL HYDROCHLORIDE 80 MG: 80 CAPSULE, EXTENDED RELEASE ORAL at 10:13

## 2020-09-08 RX ADMIN — ASPIRIN 81 MG: 81 TABLET ORAL at 10:12

## 2020-09-08 ASSESSMENT — PAIN SCALES - GENERAL
PAINLEVEL_OUTOF10: 0
PAINLEVEL_OUTOF10: 2
PAINLEVEL_OUTOF10: 0
PAINLEVEL_OUTOF10: 0

## 2020-09-08 ASSESSMENT — PAIN SCALES - WONG BAKER
WONGBAKER_NUMERICALRESPONSE: 0

## 2020-09-08 ASSESSMENT — ENCOUNTER SYMPTOMS
SHORTNESS OF BREATH: 0
CONSTIPATION: 0
ALLERGIC/IMMUNOLOGIC NEGATIVE: 1
DIARRHEA: 0
VOICE CHANGE: 0
EYES NEGATIVE: 1
TROUBLE SWALLOWING: 0
COUGH: 0
NAUSEA: 0

## 2020-09-08 NOTE — PROGRESS NOTES
Hospitalist Progress Note    Patient:  Judah Cadena      Unit/Bed:7K-13/013-A    YOB: 1950    MRN: [id number]       Acct: [de-identified]     PCP: Pasquale Bell MD    Date of Admission: 9/5/2020    Assessment/Plan:    Anticipated Discharge in :    [facility] Problems    Diagnosis Date Noted    Closed fracture of neck of left femur ([facility] [address] [S72.002A]     Acute respiratory failure with hypoxia ([address] [J96.01]     Syncope and collapse [R55] 09/05/2020     Syncope & Collapse  Continue telemetry monitoring  CT head with mild superficial soft tissue swelling, no acute intracranial process  Cervical CT showed multifocal degenerative changes, no acute fracture  No electrolyte abnormality, BNP normal, troponin negative  EKG normal sinus rhythm  Previous 2D echo in 6603 showed systolic function lower limits of normal, EF estimated to be 91%, grade 1 diastolic dysfunction  Previous stress test in 2014 showed no evidence of ischemia  Repeat Echo 9/7 Normal left ventricle size and systolic function. EF 55 %. grade 1 diastolic dysfunction  Obtain Orthostatic vitals today as patient is more awake   PT/OT    Left femoral neck fracture  S/p closed reduction and percutaneous screw fixation on 9/6  Ortho consulted. Pain control per Ortho  PT/OT when appropriate. Acute postoperative hypoxic respiratory failure  Patient documented to have hypoxia 87% post op and initially requiring 4 LPM O2 per nasal cannula, being weaned postoperatively, currently on 2 LPM and saturating well  Chest x-ray on 9/5 unremarkable  Encourage incentive spirometry  Wean as tolerated    Essential hypertension  Patient takes aspirin and Plavix, hold prior to surgery  Patient also takes Inderal and lisinopril    Hyperlipidemia  Continue atorvastatin and Zetia     IDDM type II  Patient is currently on Levemir 20 units daily and metformin 1000 mg twice daily, resume    Chronic normocytic anemia, stable  Hgb, on arrival, 10.4. Baseline ~12. Continue to monitor  Transfuse if  hgb <7 or hemodynamically unstable    Physical Deconditioning  PT/OT ordered    Disposition  Planning for discharge to ECF vs IP rehab      Chief Complaint:   syncope    Hospital Course: This is a chronically ill 70-year-old female who presented to [facility] on 9/5 due to syncope and collapse and hip pain. Patient states that she fell when she left the grocery store. Patient states that she fell on her left side and then her elbow and hit the back of her head. Patient states that she lost consciousness. The patient states that she has not been able to walk since she left the grocery store and does not want to try. Patient rates the pain 10 out of 10. Patient denies any alleviating or aggravating factors.     In the emergency department, the patient was found to have a left hip fracture. All other radiographs were negative for acute issues. Patient was admitted to hospitalist due to syncopal work-up. Ortho will be consulted. Subjective:   Patient seen and examined, appears comfortable in bed, without any signs of cardiorespiratory distress. Patient is more awake and alert today, and finished her breakfast. Echo came back unremarkable. RN to do Ortho vitals today. Otherwise VS stable, afebrile, saturating well on 2 LPM O2 per nasal cannula.          Medications:  Reviewed    Infusion Medications    dextrose       Scheduled Medications    clopidogrel  75 mg Oral Daily    sodium chloride flush  10 mL Intravenous 2 times per day    sennosides-docusate sodium  1 tablet Oral BID    aspirin  81 mg Oral Daily    atorvastatin  80 mg Oral Daily    primidone  50 mg Oral Nightly    propranolol  80 mg Oral Daily    insulin glargine  20 Units Subcutaneous QAM    primidone  200 mg Oral Daily     PRN Meds: glucose, dextrose, glucagon (rDNA), dextrose, sodium chloride flush, magnesium hydroxide, acetaminophen **OR** acetaminophen, No results for input(s): INR in the last 72 hours. No results for input(s): Thelda Rough in the last 72 hours. Urinalysis:      Lab Results   Component Value Date    NITRU NEGATIVE 09/05/2020    WBCUA 25-50 12/07/2012    WBCUA >200W/CLUMPS 10/20/2011    BACTERIA NONE 12/07/2012    RBCUA 25-50 12/07/2012    BLOODU NEGATIVE 09/05/2020    GLUCOSEU NEGATIVE 09/05/2020       Radiology:  XR HIP 2-3 VW W PELVIS LEFT   Final Result   : Postop appearance left hip. **This report has been created using voice recognition software. It may contain minor errors which are inherent in voice recognition technology. **      Final report electronically signed by Dr. Ben Powell on 9/6/2020 2:37 PM      XR HIP LEFT (2-3 VIEWS)   Final Result   Postop appearance left hip. **This report has been created using voice recognition software. It may contain minor errors which are inherent in voice recognition technology. **      Final report electronically signed by Dr. Ben Powell on 9/6/2020 9:33 AM      FLUORO FOR SURGICAL PROCEDURES   Final Result      XR FEMUR LEFT (MIN 2 VIEWS)   Final Result   Mildly impacted subcapital fracture left femoral neck. **This report has been created using voice recognition software. It may contain minor errors which are inherent in voice recognition technology. **      Final report electronically signed by Dr. Ben Powell on 9/5/2020 11:23 AM      XR PELVIS (1-2 VIEWS)   Final Result   Acute slightly impacted subcapital fracture left femoral neck. **This report has been created using voice recognition software. It may contain minor errors which are inherent in voice recognition technology. **      Final report electronically signed by Dr. Ben Powell on 9/5/2020 11:20 AM      XR ELBOW LEFT (MIN 3 VIEWS)   Final Result   Soft tissue swelling. No fracture. No dislocation. **This report has been created using voice recognition software. It may contain minor errors which are inherent in voice recognition technology. **      Final report electronically signed by Dr. Lea Bland on 9/5/2020 11:23 AM      XR SHOULDER LEFT (MIN 2 VIEWS)   Final Result   Osteoporosis. No fracture. **This report has been created using voice recognition software. It may contain minor errors which are inherent in voice recognition technology. **      Final report electronically signed by Dr. Lea Bland on 9/5/2020 11:19 AM      XR CHEST PORTABLE   Final Result   Normal mobile chest.            **This report has been created using voice recognition software. It may contain minor errors which are inherent in voice recognition technology. **      Final report electronically signed by Dr. Lea Bland on 9/5/2020 11:19 AM      CT Head WO Contrast   Final Result   Mild superficial soft tissue swelling. No acute intracranial process. **This report has been created using voice recognition software. It may contain minor errors which are inherent in voice recognition technology. **      Final report electronically signed by Dr. Lea Bland on 9/5/2020 10:53 AM      CT Cervical Spine WO Contrast   Final Result   Multifocal degenerative changes. Questionable muscle spasm right side. No acute fracture seen. **This report has been created using voice recognition software. It may contain minor errors which are inherent in voice recognition technology. **      Final report electronically signed by Dr. Lea Bland on 9/5/2020 10:56 AM          Diet: DIET CARB CONTROL;    DVT prophylaxis: [] Lovenox                                 [] SCDs                                 [] SQ Heparin                                 [] Encourage ambulation           [] Already on Anticoagulation     Disposition:    [] Home       [] TCU       [] Rehab       [] Psych       [] SNF       [] [place]       [] Other-    Code Status: Full Code    PT/OT Eval Status:        Electronically signed by Brent Jacinto MD on 9/8/2020 at 9:18 AM

## 2020-09-08 NOTE — PLAN OF CARE
Problem: Falls - Risk of:  Goal: Will remain free from falls  Description: Will remain free from falls  Outcome: Ongoing  Note: No falls this shift. Pt using call light appropriately to call for assistance with ambulation to the bathroom and to chair. Pt is also compliant with use of non-skid slippers. Pt reports understanding of fall prevention when discussed. Goal: Absence of physical injury  Description: Absence of physical injury  Outcome: Ongoing  Note: No falls this shift. Pt using call light appropriately to call for assistance with ambulation to the bathroom and to chair. Pt is also compliant with use of non-skid slippers. Pt reports understanding of fall prevention when discussed. Problem: Skin Integrity:  Goal: Will show no infection signs and symptoms  Description: Will show no infection signs and symptoms  Outcome: Ongoing  Note: Pt's left hip surgical incision healing. Dressing is dry and intact and not due to be changed today. No other skin impairments noted. Pt understands the importance of frequent repositioning in order to prevent any skin breakdown. Goal: Absence of new skin breakdown  Description: Absence of new skin breakdown  Outcome: Ongoing  Note: Pt's left hip surgical incision healing. Dressing is dry and intact and not due to be changed today. No other skin impairments noted. Pt understands the importance of frequent repositioning in order to prevent any skin breakdown. Problem: Pain:  Goal: Pain level will decrease  Description: Pain level will decrease  Outcome: Ongoing  Note: Pt report pain at 0 on scale. Pt states oral medication helping to achieve pain goal of a 3 on scale. Goal: Control of acute pain  Description: Control of acute pain  Outcome: Ongoing  Note: Pt report pain at 0 on scale. Pt states oral medication helping to achieve pain goal of a 3 on scale.    Goal: Control of chronic pain  Description: Control of chronic pain  Outcome: Ongoing  Note: Pt report pain at 0 on scale. Pt states oral medication helping to achieve pain goal of a 3 on scale. Problem: DISCHARGE BARRIERS  Goal: Patient's continuum of care needs are met  Outcome: Ongoing  Note: Pt plans ECF at discharge. Care manager and social working helping with discharge needs. Problem: Discharge Planning:  Goal: Discharged to appropriate level of care  Description: Discharged to appropriate level of care  Outcome: Ongoing  Note: Pt plans ECF at discharge. Care manager and social working helping with discharge needs. Care plan reviewed with patient. Patient verbalizes understanding of the plan of care and contributes to goal setting.

## 2020-09-08 NOTE — PROGRESS NOTES
Physician Progress Note      Melody SEAMAN #:                  [id number]  :                       1950  ADMIT DATE:       2020 9:41 AM  DISCH DATE:  RESPONDING  PROVIDER #:        Maya Peguero MD          QUERY TEXT:    Patient post ORIF of femur and noted documentation of acute post-op   respiratory failure. Please indicate one of the following and document in the   medical record: The medical record reflects the following:  Risk Factors: surgery  Clinical Indicators: hypoxia post-op requiring oxygen to maintain SpO2 and   weaned to RA  Treatment: Post-op on 4 LPM oxygen    Acute Respiratory Failure Clinical Indicators per  MS-DRG Training Guide and   Quick Reference Guide:  pO2 < 60 mmHg or SpO2 (pulse oximetry) < 91% breathing room air  pCO2 > 50 and pH < 7.35  P/F ratio (pO2 / FIO2) < 300  pO2 decrease or pCO2 increase by 10 mmHg from baseline (if known)  Supplemental oxygen of 40% or more  Presence of respiratory distress, tachypnea, dyspnea, shortness of breath,   wheezing  Unable to speak in complete sentences  Use of accessory muscles to breathe  Extreme anxiety and feeling of impending doom  Tripod position  Confusion/altered mental status/obtunded  Options provided:  -- Post-op pulmonary insufficiency  -- Acute Respiratory Failure currently as evidenced by, Please document   evidence.   -- Acute Respiratory Failure ruled out after study  -- Other - I will add my own diagnosis  -- Disagree - Not applicable / Not valid  -- Disagree - Clinically unable to determine / Unknown  -- Refer to Clinical Documentation Reviewer    PROVIDER RESPONSE TEXT:    This patient had post-op pulmonary insufficiency    Query created by: Marjorie Hernandez on 2020 11:14 AM      Electronically signed by:  Maya Peguero MD 2020 12:08 PM

## 2020-09-08 NOTE — PROGRESS NOTES
Subjective:   Pain controlled. Denies numbness or tingling. Afebrile. Objective:   /74   Pulse 89   Temp 98.5 °F (36.9 °C) (Oral)   Resp 18   Ht 5' 7\" (1.702 m)   Wt 163 lb (73.9 kg)   SpO2 93%   BMI 25.53 kg/m²     Recent Labs     09/05/20  1012 09/06/20  0544 09/07/20  0658   WBC 5.1 4.9 5.9   HGB 10.4* 10.2* 9.9*   HCT 34.3* 33.9* 33.4*    131 128*       Current Facility-Administered Medications: glucose (GLUTOSE) 40 % oral gel 15 g, 15 g, Oral, PRN  dextrose 50 % IV solution, 12.5 g, Intravenous, PRN  glucagon (rDNA) injection 1 mg, 1 mg, Intramuscular, PRN  dextrose 5 % solution, 100 mL/hr, Intravenous, PRN  clopidogrel (PLAVIX) tablet 75 mg, 75 mg, Oral, Daily  sodium chloride flush 0.9 % injection 10 mL, 10 mL, Intravenous, 2 times per day  sodium chloride flush 0.9 % injection 10 mL, 10 mL, Intravenous, PRN  sennosides-docusate sodium (SENOKOT-S) 8.6-50 MG tablet 1 tablet, 1 tablet, Oral, BID  magnesium hydroxide (MILK OF MAGNESIA) 400 MG/5ML suspension 30 mL, 30 mL, Oral, Daily PRN  aspirin EC tablet 81 mg, 81 mg, Oral, Daily  atorvastatin (LIPITOR) tablet 80 mg, 80 mg, Oral, Daily  primidone (MYSOLINE) tablet 50 mg, 50 mg, Oral, Nightly  propranolol (INDERAL LA) extended release capsule 80 mg, 80 mg, Oral, Daily  acetaminophen (TYLENOL) tablet 650 mg, 650 mg, Oral, Q6H PRN **OR** acetaminophen (TYLENOL) suppository 650 mg, 650 mg, Rectal, Q6H PRN  polyethylene glycol (GLYCOLAX) packet 17 g, 17 g, Oral, Daily PRN  promethazine (PHENERGAN) tablet 12.5 mg, 12.5 mg, Oral, Q6H PRN **OR** ondansetron (ZOFRAN) injection 4 mg, 4 mg, Intravenous, Q6H PRN  HYDROmorphone (DILAUDID) injection 0.25 mg, 0.25 mg, Intravenous, Q3H PRN **OR** HYDROmorphone (DILAUDID) injection 0.5 mg, 0.5 mg, Intravenous, Q3H PRN  insulin glargine (LANTUS) injection vial 20 Units, 20 Units, Subcutaneous, QAM  primidone (MYSOLINE) tablet 200 mg, 200 mg, Oral, Daily      Exam:  Gen: NAD  LLE: Dressing CDI.  Soft compartments. 2+ DP pulse. TA/EHL/FHL/GS motor intact. DP/SP/T/S/Saph SILT. No calf TTP or swelling.         Assessment:  65 y.o. female POD 2 s/p left CRPS left hip     Plan:  Pain control  Hb pending  Dressing is dry, OK to leave in place until DC  Activity: WBAT with walker   PT/OT  IM primary  DVT ppx: ASA and plavix, ambulation and EPC  DC planning: Per IM when medically appropriate.      Electronically signed by Dee Servin DO on 9/8/2020 at 6:16 AM

## 2020-09-08 NOTE — CARE COORDINATION
DISCHARGE/PLANNING EVALUATION  9/8/20, 10:27 AM EDT    Reason for Referral: discharge plan  Mental Status: alert, oriented to name  Decision Making: has been making her own decisions,  has Ila Brumfield, who is a friend  Family/Social/Home Environment: Maritza Lua lives alone at home. She has been managing her own care, including her meals, housekeeping, personal care, and is an active . She does not have family for support. She has a POA, Ashley, who is assisting with discharge planning, but unable to help with care at home. Maritza Lua shares that she has several other friends who are supportive. Current Services including food security, transportation and housekeeping:  No current services, was managing her grocery shopping, meals, housekeeping and transportation at home  Current Equipment: none  Payment Source: medicare, [place] [employer] supplement   Concerns or Barriers to Discharge:  POA does not want ecf, requesting inpt rehab   Post acute provider list with quality measures, geographic area and applicable managed care information provided. Questions regarding selection process answered:  Declined list, POA states preference for inpt rehab. Patient is agreeable to inpt rehab    Teach Back Method used with Ila Brumfield,,  regarding care plan and discharge plan  Patient and POA verbalize understanding of the plan of care and contribute to goal setting. Patient goals, treatment preferences and discharge plan:  Spoke with Maritza Lua. She is able to answer questions, but is confused about some things, such as how long she has been sitting in the chair, and what her plan might be at discharge. Spoke with Ila Brumfield, who shares that she feels it is early to discuss discharge plan, but prefers inpt rehab. She has a limited number of ecfs that she would consider, if necessary. Patient is in agreement with inpt rehab.       Electronically signed by WANG Campoverde on 9/8/2020 at 10:27 AM

## 2020-09-08 NOTE — PROGRESS NOTES
[address] [address]  INPATIENT PHYSICAL THERAPY  EVALUATION  [facility] ORTHOPEDICS 7K - 7K-13/013-A    Time In: 1340  Time Out: 1405  Timed Code Treatment Minutes: 10 Minutes  Minutes: 25          Date: 2020  Patient Name: Mac Noguera,  Gender:  female        MRN: [id number]  : 1950  (65 y.o.)      Referring Practitioner: CARRIE Dwoning  Diagnosis: Syncope and collapse  Additional Pertinent Hx: Per H&P, pt is a 65 y.o. female with a past medical history of diabetes and prior MI who presents to the emergency department for evaluation of syncope. Patient states that she was at the grocery store this morning shortly prior to arrival, and upon leaving the grocery store she felt acutely short of breath, and lost consciousness. She reports that she fell onto her left side, and landed on her elbow and the back of her head. She endorses losing consciousness after she fell, and bystanders report that she was dazed. She regained consciousness shortly later, however she reports that she has not been ambulatory since her fall. In the emergency department she is complaining of head pain, shoulder pain, elbow pain, and left hip pain. She is additionally complaining of swelling to her left posterior scalp. Pt is now s/p HIP PINNING on 2020 by Dr Dotty Stacy. Restrictions/Precautions:  Restrictions/Precautions: Weight Bearing, General Precautions, Fall Risk  Left Lower Extremity Weight Bearing: Weight Bearing As Tolerated    Subjective:  Chart Reviewed: Yes  Patient assessed for rehabilitation services?: Yes  Subjective: Pt sitting up in recliner and is ready to return to bed.     General:  Overall Orientation Status: Within Functional Limits    Vision: Impaired  Vision Exceptions: Wears glasses for reading    Hearing: Within functional limits         Pain: 8/10: L hip    Social/Functional History:    Lives With: Alone  Type of Home: House  Home Layout: One level  Home Access: Stairs to enter without rails  Entrance Stairs - Number of Steps: pt holds onto the door frame  Home Equipment: Quad cane, Cane, Standard walker     Bathroom Shower/Tub: Tub/Shower unit, Walk-in shower  Bathroom Toilet: Standard  Bathroom Equipment: Grab bars in shower  Bathroom Accessibility: Accessible    Receives Help From: Friend(s)  ADL Assistance: Independent  Homemaking Assistance: Independent  Homemaking Responsibilities: Yes  Ambulation Assistance: Independent  Transfer Assistance: Independent    Active : Yes  Mode of Transportation: Car  Occupation: Retired  Leisure & Hobbies: Gardening, reading, getting her hair done; partcipating in activities at [employer]  Additional Comments: Pt did not use any AD for ambulating. She did her own homemaking and cooking. OBJECTIVE:  Range of Motion:  Right Lower Extremity: WFL  Left Lower Extremity: limited by pain in hip to nearly [facility]    Strength:  Right Lower Extremity: WFL  Left Lower Extremity: limited by pain in hip    Balance:  Static Sitting Balance:  Supervision  Dynamic Sitting Balance: Stand By Assistance, Contact Guard Assistance  Static Standing Balance: Stand By Assistance, Contact Guard Assistance  Dynamic Standing Balance: Contact Guard Assistance, with verbal cues , to keep walker closer    Bed Mobility:  Sit to Supine: Moderate Assistance for LEs onto bed    Transfers:  Sit to Stand: Minimal Assistance, with increased time for completion, cues for hand placement  [facility] , cues for hand placement    Ambulation:  Minimal Assistance, with verbal cues , to keep walker closer as she tended to move it too far forward  Distance: 18 ft  Surface: Level Tile  Device:Rolling Walker  Gait Deviations:   Forward Flexed Posture, Decreased Step Length Bilaterally, Decreased Gait Speed, Decreased Heel Strike Bilaterally, Narrow Base of Support and Unsteady Gait    Exercise:  Patient was guided in 1 set(s) 10 reps of exercise to both lower Patient/Caregiver Education & Training, Safety Education & Training, Home Exercise Program, Endurance Training    Goals:  Patient goals : go home  Short term goals  Time Frame for Short term goals: at discharge  Short term goal 1: Pt to be CGA for supine <> sit to get in/out of bed  Short term goal 2: Pt to be SBA for sit <> stand to get up to ambulate  Short term goal 3: Pt to ambulate > 80 ft with RW with SBA for household distances  Long term goals  Time Frame for Long term goals : not set due to short ELOS    Following session, patient left in safe position with all fall risk precautions in place. George Rust.  Emiliano Opitz, [place] [address]

## 2020-09-08 NOTE — CARE COORDINATION
9/8/20, 10:23 AM EDT  DISCHARGE PLANNING EVALUATION:    Gallo Charles       Admitted from: ED 9/5/2020/ [facility] day: 3   Location: [address] [place] [address]- Reason for admit: Syncope and collapse [R55] Status: inpatient  Admit order signed?: yes  PMH:  has a past medical history of Acute kidney injury ([facility] Escherichia coli septicemia ([facility] Hyperlipidemia, Hypoxemic respiratory failure, chronic ([facility] MI (myocardial infarction) ([facility] Normocytic anemia, and Type II or unspecified type diabetes mellitus without mention of complication, not stated as uncontrolled. Procedure: 9/6/20 surgery by Dr Wil Mclean: Left hip CRPS  Pertinent abnormal Imaging:left hip fx  Medications:  Scheduled Meds:   clopidogrel  75 mg Oral Daily    sodium chloride flush  10 mL Intravenous 2 times per day    sennosides-docusate sodium  1 tablet Oral BID    aspirin  81 mg Oral Daily    atorvastatin  80 mg Oral Daily    primidone  50 mg Oral Nightly    propranolol  80 mg Oral Daily    insulin glargine  20 Units Subcutaneous QAM    primidone  200 mg Oral Daily     Continuous Infusions:   dextrose        Pertinent Info/Orders/Treatment Plan:  Dr Gonzalez Rm attending. Dr Joseph Baig did right hip surgery. N/V checks. Pain management. PT/OT. FWBAT. Orthostatic VS today. Diet: DIET CARB CONTROL;   Smoking status:  reports that she has never smoked.  She has never used smokeless tobacco.   PCP: Servando Cai MD  Readmission 30 days or less: no  Readmission Risk Score: 13%    Discharge Planning Evaluation  Current Residence:  Private Residence  Living Arrangements:  Alone   Support Systems:  Friends/Neighbors  Current Services PTA:     Potential Assistance Needed:  Transitional Care  Potential Assistance Purchasing Medications:  No  Does patient want to participate in local refill/ meds to beds program?  No  Type of Home Care Services:  None  Patient expects to be discharged to:  home  Expected Discharge date:  09/08/20  Follow Up Appointment:

## 2020-09-08 NOTE — PROGRESS NOTES
[facility] [facility]  Occupational Therapy  Daily Note  Time:   Time In: 830  Time Out: 0910  Timed Code Treatment Minutes: 40 Minutes  Minutes: 40          Date: 2020  Patient Name: Radha Finley,   Gender: female      Room: -13/013-A  MRN: [id number]  : 1950  (65 y.o.)  Referring Practitioner: CARRIE Weller  Diagnosis: Syncope and Collapse  Additional Pertinent Hx: Pt with left femoral neck fracture s/p [facility]. She was walking out of the grocery store and fell. Isolates the majority of pain to the left hip. She has some left shoulder and elbow pain as well. She doesn't remember how it happened. She just blacked out. Denies history of other syncopal episodes. Denies numbness/tingling. She underwent L hip pinning on 20 in the AM.. Restrictions/Precautions:  Restrictions/Precautions: Weight Bearing  Left Lower Extremity Weight Bearing: Weight Bearing As Tolerated     SUBJECTIVE: Patient in bed upon arrival agreeable to OT treatment, pleasant, min confusion noted, patient perseverating on the \"good looking doctor\", easy to redirect. PAIN: did not rate    COGNITION: Slow Processing, Decreased Insight, Impaired Memory, Inattention, Decreased Problem Solving and Decreased Safety Awareness    ADL:   Lower Extremity Dressing: Maximum Assistance. gilbert/doff socks   Toileting: Contact Guard Assistance and Dependent. CGA for balance and dep for clean as patient was incontient in bed of stool, patient then transfers to [facility] and required CGA and dep for clean up. Toilet Transfer: Contact Guard Assistance. CGA- min A at times patient with poor motor planning and advancing feet and assist with manevuering RW . BALANCE:  Sitting Balance:  Contact Guard Assistance. Standing Balance: Contact Guard Assistance, Minimal Assistance. BED MOBILITY:  Supine to Sit: Maximum Assistance      TRANSFERS:  Sit to Stand:  Minimal Assistance. Stand to Sit: Minimal Assistance. FUNCTIONAL MOBILITY:  Assistive Device: Rolling Walker  Assist Level:  Minimal Assistance. Distance: from EOB to [facility] to recliner chair    Patient required increased time, increased verbal cues for safe technique, difficulty motor planning and advancing BLE while ambulating, slow pace, small steps, required assist with maneuvering RW.     ASSESSMENT:     Activity Tolerance:  Patient tolerance of  treatment: fair. Discharge Recommendations: IP Rehab, Continue to assess pending progress, Patient would benefit from continued therapy after discharge   Equipment Recommendations: Equipment Needed: Yes  Other: LHAE  Plan: Times per week: 6x  Specific instructions for Next Treatment: Functioinal mobility; ADLs and standing balance; AE training as needed; endurance exercises and breathing techniques  Current Treatment Recommendations: Endurance Training, Functional Mobility Training, Self-Care / ADL, Equipment Evaluation, Education, & procurement  Plan Comment: Pt would benefit from continued skilled OT services when medically stable and discharged from Acute. IP Rehab would be recommended. Patient Education  Patient Education: ADL's    Goals  Short term goals  Time Frame for Short term goals: By discharge  Short term goal 1: Pt will demonstrate functional mobility ambulating to/from the bathroom while using a rolling walker with no > than MIN A to prepare for doing self care at the sink. Short term goal 2: Pt will complete simple grooming tasks with CGA while using 1-2 hand release to increase her independence with toileting routine. Short term goal 3: Pt will complete transfers to/from various surfaces including an elevated toilet seat with armrests with SBA and cues for hand placement to increase her independence with toileting routine.   Short term goal 4: Pt will complete BUE light resistance exercises with demonstration and verbal cues for pursed lip breathing to increase her endurance for ease of doing self care and functional mobility. Short term goal 5: Pt will complete lower body ADLs while using any LHAE needed with no >than MIn A to increase her independence with self care. Following session, patient left in safe position with all fall risk precautions in place.

## 2020-09-08 NOTE — CONSULTS
Physical Medicine and Rehabilitation Consult Note     Pratik Gan  [id number]    Reason for Consult: evaluation for rehabilitation needs s/p left femoral neck impacted subcapital fracture    Impression/Recommendations:     Impression:  1. Ground-level fall after syncopal event. 2. Acute slightly impacted subcapital fracture of the left femoral neck. 3. Status post closed reduction and percutaneous screw fixation of the left hip impacted subcapital fracture of the left femoral neck by Dr. Suze Choi, D.O. on 09/06/2020. EBL minimal.  4.  Gait instability. 5. Mild TBI. 6. Insulin-dependent type 2 diabetes, controlled with hyperglycemia. Last hemoglobin A1c was 6.0 on 1/6/2020.  7. Essential tremor. 8. Normocytic anemia. History of prior myocardial infarction in 2011. 9. Hypertension. 10. Hyperlipidemia. 11. Coronary artery disease. 12. History of prior myocardial infarction. 13. CKD 3.  14. 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 15. Osteoporosis. Recommendations:  1. Patient is a good candidate for IPR and is agreeable to do so. 2. Continue PT/OT. 3. Can transfer once medically stable. It was my pleasure to evaluate Pratik Gan today. Please call with questions. Lorene Wilkins MD       History:   History of Present Illness:  Pratik Gan is a 65 y.o. female who  has a past medical history of Acute kidney injury ([address] CKD (chronic kidney disease) stage 3, GFR 30-59 ml/min ([address] Diabetes mellitus, insulin dependent (IDDM), controlled ([address] Escherichia coli septicemia ([address] Essential tremor, Hyperlipidemia, Hypoxemic respiratory failure, chronic ([address] MI (myocardial infarction) ([address] Normocytic anemia, and Osteoporosis. She was admitted 9/5/2020 after being brought into the emergency department by [address] [facility] for injuries sustained after a syncopal event.   The patient had a fall that occurred while she was walking in the grocery store when she blacked out and when she regained consciousness she found herself lying on the floor with people standing around her. She had left-sided hip pain and also abrasions on her left arm along with a left-sided superficial hematoma on her parietal scalp. Her initial x-rays showed an acute slightly impacted subcapital fracture of the left femoral neck. She underwent surgical correction with a closed reduction and percutaneous screw fixation of the left hip impacted subcapital fracture of the left femoral neck by Dr. Jc Pierre on 9/6/2020. On initial consultation exam the patient was noted to be sleeping in her hospital bed in no acute distress. She awoke readily to being addressed with her name. She is alert and oriented x4. She states that her current pain level is 5/10 at rest and is noted to currently be on IV Dilaudid for pain control. She has an external female catheter in place. She did participate in physical therapy today and mobilized appropriately by walking in her room. It was discussed with the patient that she would be a good candidate to come to the acute inpatient rehabilitation unit under her Medicare benefits to improve her gait stability and be able to care for herself with less assistance required when she goes home. She states that she would be agreeable to this plan and wishes to proceed with the understanding that her length of stay will be approximately 14 days. Prior Function  Receives Help From: Friend(s)  ADL Assistance: Independent  Homemaking Assistance: Independent  Ambulation Assistance: Independent  Transfer Assistance: Independent  Additional Comments: Pt did not use any AD for ambulating. She did her own homemaking and cooking. Previously was independent of ADLs and used no AD for ambulation prior to recent admission. Initially has ambulated 25' with RW at Minimal contact assistance with PT.   With therapy is Moderate assistance for bed mobility, Minimal contact assistance for transfers, and contact-guard assistance with balance. ROM restrictions, WB restrictions, precautions: Restrictions/Precautions: Weight Bearing, General Precautions, Fall Risk  Left Lower Extremity Weight Bearing: Weight Bearing As Tolerated    Fall Risk    Current Rehabilitation Assessments:  PT:    OBJECTIVE:  Range of Motion:  Right Lower Extremity: WFL  Left Lower Extremity: limited by pain in hip to nearly [facility]     Strength:  Right Lower Extremity: WFL  Left Lower Extremity: limited by pain in hip     Balance:  Static Sitting Balance:  Supervision  Dynamic Sitting Balance: Stand By Assistance, Contact Guard Assistance  Static Standing Balance: Stand By Assistance, Contact Guard Assistance  Dynamic Standing Balance: Contact Guard Assistance, with verbal cues , to keep walker closer     Bed Mobility:  Sit to Supine: Moderate Assistance for LEs onto bed     Transfers:  Sit to Stand: Minimal Assistance, with increased time for completion, cues for hand placement  Stand to [place] [id number], cues for hand placement     Ambulation:  Minimal Assistance, with verbal cues , to keep walker closer as she tended to move it too far forward  Distance: 18 ft  Surface: Level Tile  Device:Rolling Walker  Gait Deviations: Forward Flexed Posture, Decreased Step Length Bilaterally, Decreased Gait Speed, Decreased Heel Strike Bilaterally, Narrow Base of Support and Unsteady Gait     Exercise:  Patient was guided in 1 set(s) 10 reps of exercise to both lower extremities. Ankle pumps, Glut sets, Quad sets, Heelslides, Hip abduction/adduction, Seated heel/toe raises and Long arc quads.   Exercises were completed for increased independence with functional mobility.     Functional Outcome Measures: Completed  -PAC Inpatient Mobility Raw Score : 15  AM-PAC Inpatient T-Scale Score : 39.45     ASSESSMENT:  Activity Tolerance:  Patient tolerance of  treatment: good.         Treatment Initiated: Treatment and education initiated within context of evaluation. Evaluation time included review of current medical information, gathering information related to past medical, social and functional history, completion of standardized testing, formal and informal observation of tasks, assessment of data and development of plan of care and goals. Treatment time included skilled education and facilitation of tasks to increase safety and independence with functional mobility for improved independence and quality of life.     Assessment: Body structures, Functions, Activity limitations: Decreased functional mobility , Decreased endurance, Decreased balance, Decreased ROM, Decreased strength, Increased pain  Assessment: Pt is a 65 y.o. female that is s/p L hip pinning due to fall. Pt was previously Independent for all mobility. Pt is requiring Min A for transfers and gait, and Mod A for bed mobility. Pt would benefit from continued skilled PT to address strengthening, mobility training, and safety with functional mobility. Prognosis: Good     REQUIRES PT FOLLOW UP: Yes     Discharge Recommendations: Continue to assess pending progress, IP Rehab    OT:  ADL  Grooming: Stand by assistance  UE Dressing: Maximum assistance(donning slipper socks)  LE Dressing: Maximum assistance  Toileting: Minimal assistance     Bed mobility  Supine to Sit: Moderate assistance(using the bedrail and having assist to bring her LLE over the edge of bed)  Scooting: Minimal assistance(bringing her hips forward with help to initiate)    Transfers  Sit to stand:  Moderate assistance(from the edge of bed x 2 trials)  Stand to sit: Minimal assistance(to the edge of bed)  Toilet Transfers  Toilet - Technique: Ambulating  Equipment Used: Standard bedside commode  Toilet Transfer: Minimal assistance(with cues to push off of the armrests)    Balance  Sitting Balance: Contact guard assistance(leaning toward her L side with cues needed to use the bedrail and stay in middle)  Standing Balance: Minimal assistance(preparing to take steps with cues to get as tall as possible and to have her feet spread apart)        ST:  Not applicable     Functional History:  Prior Function  Receives Help From: Friend(s)  ADL Assistance: Independent  Homemaking Assistance: Independent  Ambulation Assistance: Independent  Transfer Assistance: Independent  Additional Comments: Pt did not use any AD for ambulating. She did her own homemaking and cooking. IADL History:  Lift/Transfer Equipment Utilized: None    Past Medical, Surgical, and Family History:    Medical:   Past Medical History:   Diagnosis Date    [facility] ([facility]     CKD (chronic kidney disease) stage 3, GFR 30-59 ml/min ([facility])     Diabetes mellitus, insulin dependent (IDDM), controlled ([facility]     Escherichia coli septicemia ([facility])     Essential tremor     Hyperlipidemia     Hypoxemic respiratory failure, chronic ([facility])     secondary to sepsis and possibly pneumonia    MI (myocardial infarction) ([facility] 2011    Normocytic anemia     Osteoporosis      Surgical:   Past Surgical History:   Procedure Laterality Date    CYSTOSCOPY  12/7/12    with stent insertion    HIP SURGERY Left 9/6/2020    HIP PINNING performed by Emmanuel De La Fuente DO at [address] [place] [address] LITHOTRIPSY  12/18/2012    right     Family:   Family History   Problem Relation Age of Onset    Cancer Mother     Heart Disease Mother        Social History:   reports that she has never smoked. She has never used smokeless tobacco. She reports current alcohol use. She reports that she does not use drugs. Lives at [address] [place] [address] [address] [address]. Review of Systems   Review of Systems   Constitutional: Positive for activity change, appetite change and fatigue. Negative for fever. HENT: Negative for trouble swallowing and voice change. Eyes: Negative. Respiratory: Negative for cough and shortness of breath.     Gastrointestinal: Negative for constipation, diarrhea and nausea. Genitourinary: Negative for frequency and urgency. Musculoskeletal: Positive for arthralgias, gait problem and joint swelling. Skin: Negative for pallor. Allergic/Immunologic: Negative. Neurological: Positive for tremors and weakness. Negative for dizziness, speech difficulty, light-headedness and headaches. Psychiatric/Behavioral: Negative for confusion, decreased concentration and dysphoric mood. The patient is not nervous/anxious. All other systems reviewed and are negative.      Medications     Reviewed in EMR   clopidogrel  75 mg Oral Daily    sodium chloride flush  10 mL Intravenous 2 times per day    sennosides-docusate sodium  1 tablet Oral BID    aspirin  81 mg Oral Daily    atorvastatin  80 mg Oral Daily    primidone  50 mg Oral Nightly    propranolol  80 mg Oral Daily    insulin glargine  20 Units Subcutaneous QAM    primidone  200 mg Oral Daily     PRNs: acetaminophen **OR** acetaminophen, glucose, dextrose, glucagon (rDNA), dextrose, sodium chloride flush, magnesium hydroxide, polyethylene glycol, promethazine **OR** ondansetron, HYDROmorphone **OR** HYDROmorphone    Allergies: No Known Allergies    Physical Exam:     Physical Exam:  /61   Pulse 86   Temp 98 °F (36.7 °C) (Oral)   Resp 18   Ht 5' 7\" (1.702 m)   Wt 163 lb (73.9 kg)   SpO2 90%   BMI 25.53 kg/m²     awake  Orientation:   person, place, time, situation  Mood: within normal limits  Affect: calm  General appearance: Patient is well nourished, well developed, well groomed and in no acute distress, appearing stated age, external female catheter in place    Memory:   grossly normal  Attention/Concentration: normal  Language:  normal    Cranial Nerves:  cranial nerves II-XII are grossly intact  ROM:  abnormal - left hip fracture surgery  Motor Exam:  Motor exam is 4+ out of 5 all extremities with the exception of left hip not tested    Tone:  normal  Muscle bulk: within normal limits  Sensory:  Sensory intact  Coordination:   normal  Deep Tendon Reflexes:  Reflexes are intact and symmetrical bilaterally    Skin: warm and dry, no rash or erythema  Peripheral vascular: Pulses: Normal upper and lower extremity pulses; Edema: 1+    Relevant Studies:     Diagnostics:  Recent Results (from the past 24 hour(s))   POCT glucose    Collection Time: 09/08/20  6:05 AM   Result Value Ref Range    POC Glucose 123 (H) 70 - 108 mg/dl   Hemoglobin and Hematocrit, Blood    Collection Time: 09/08/20  6:16 AM   Result Value Ref Range    Hemoglobin 10.2 (L) 12.0 - 16.0 gm/dl    Hematocrit 32.6 (L) 37.0 - 47.0 %   POCT glucose    Collection Time: 09/08/20 11:10 AM   Result Value Ref Range    POC Glucose 162 (H) 70 - 108 mg/dl   POCT glucose    Collection Time: 09/08/20  4:15 PM   Result Value Ref Range    POC Glucose 125 (H) 70 - 108 mg/dl   POCT glucose    Collection Time: 09/08/20  8:14 PM   Result Value Ref Range    POC Glucose 185 (H) 70 - 108 mg/dl     Lab Results   Component Value Date    LABA1C 6.7 (H) 09/16/2013     No results found for: EAG  Recent Labs     09/08/20  0616 09/07/20  0658 09/06/20  0544 09/05/20  1012   WBC  --  5.9 4.9 5.1   HGB 10.2* 9.9* 10.2* 10.4*   HCT 32.6* 33.4* 33.9* 34.3*   MCV  --  97.1 94.7 94.0   PLT  --  128* 131 147       Imaging  Xr Pelvis (1-2 Views)    Result Date: 9/5/2020  PROCEDURE: XR PELVIS (1-2 VIEWS) CLINICAL INFORMATION: fall, decreased ROM, hip pain COMPARISON: No prior study. 12/5/2012 TECHNIQUE: A single AP view of the pelvis was obtained FINDINGS: Bones are demineralized, consistent with osteoporosis. Femoral heads are normally related to the acetabula. Sacroiliac joints unremarkable. Bones and pelvis are intact. There is a slightly impacted subcapital fracture of the left femoral neck. Acute slightly impacted subcapital fracture left femoral neck. **This report has been created using voice recognition software.   It may contain minor errors which are inherent in voice recognition technology. ** Final report electronically signed by Dr. Kraft Comment on 9/5/2020 11:20 AM    Xr Elbow Left (min 3 Views)    Result Date: 9/5/2020  PROCEDURE: XR ELBOW LEFT (MIN 3 VIEWS) CLINICAL INFORMATION: fall, elbow pain COMPARISON: No prior study. TECHNIQUE: AP, lateral, and both oblique views of the left  elbow were obtained FINDINGS: Mild soft tissue swelling overlies the olecranon process. There is no joint effusion. No evidence for acute fracture or dislocation. Soft tissue swelling. No fracture. No dislocation. **This report has been created using voice recognition software. It may contain minor errors which are inherent in voice recognition technology. ** Final report electronically signed by Dr. Kraft Comment on 9/5/2020 11:23 AM    Xr Hip Left (2-3 Views)    Result Date: 9/6/2020  PROCEDURE: XR HIP LEFT (2-3 VIEWS) CLINICAL INFORMATION: Left hip fracture. COMPARISON: 9/5/2020 TECHNIQUE: 2 fluoroscopic spot films of the left hip were obtained following hip pinning procedure. The actual fluoroscopy time was 2 minutes 3 seconds. FINDINGS: 3 threaded hip pins have been inserted along the axis of the left femoral neck, stabilizing the previously described subcapital fracture. Postop appearance left hip. **This report has been created using voice recognition software. It may contain minor errors which are inherent in voice recognition technology. ** Final report electronically signed by Dr. Kraft Comment on 9/6/2020 9:33 AM    Xr Femur Left (min 2 Views)    Result Date: 9/5/2020  PROCEDURE: XR FEMUR LEFT (MIN 2 VIEWS) CLINICAL INFORMATION: fall, femur pain COMPARISON: No comparison available. TECHNIQUE: AP and crossfire lateral views of the femur were obtained. FINDINGS: There is a slightly impacted subcapital fracture left femoral neck. Moderate degenerative changes are seen in the left knee. Femur otherwise unremarkable. Mildly impacted subcapital fracture left femoral neck.  **This report has been created using voice recognition software. It may contain minor errors which are inherent in voice recognition technology. ** Final report electronically signed by Dr. Lubna Tavares on 9/5/2020 11:23 AM    Ct Head Wo Contrast    Result Date: 9/5/2020  PROCEDURE: NONCONTRAST CT BRAIN CLINICAL INFORMATION: Fall, head trauma, on ASA/plavix COMPARISON: 2/8/2017 TECHNIQUE: Multiple axial 5 mm images of the brain were obtained without administration of intravenous contrast material. ALL CT SCANS AT THIS FACILITY use dose modulation, iterative reconstruction, and/or weight-based dosing when appropriate to reduce radiation dose to as low as reasonably achievable. FINDINGS: Lateral, third, fourth ventricles: Normal in size, contour, position. .. Parenchyma:  No acute infarction, mass lesion, or intracranial hemorrhage is seen. Mastoid processes: Well aerated. Normal in appearance. .   Paranasal sinuses/calvarium: Paranasal sinuses unremarkable. No skull fracture is seen. Mild focal soft tissue swelling high left parietal region posteriorly. Mild superficial soft tissue swelling. No acute intracranial process. **This report has been created using voice recognition software. It may contain minor errors which are inherent in voice recognition technology. ** Final report electronically signed by Dr. Lubna Tavares on 9/5/2020 10:53 AM    Ct Cervical Spine Wo Contrast    Result Date: 9/5/2020  PROCEDURE: CT CERVICAL SPINE WO CONTRAST CLINICAL INFORMATION: fall COMPARISON: 2/8/2017 TECHNIQUE: Multiple axial 3 mm images of the entire cervical spine were obtained without the administration of intravenous contrast material.. Computer generated sagittal images of the cervical spine were reconstructed. ALL CT SCANS AT THIS FACILITY use dose modulation, iterative reconstruction, and/or weight-based dosing when appropriate to reduce radiation dose to as low as reasonably achievable.  FINDINGS: Chronic straightening of the lower cervical lordosis, unchanged from prior study. Moderate multifocal degenerative changes scattered in the cervical spine. Prominent Schmorl's node along the infra aspect of the C6 vertebra, unchanged from prior  study. 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 Cannot exclude muscle spasm. Multifocal degenerative facet arthropathy bilaterally, worse on the left. Multifocal degenerative changes. Questionable muscle spasm right side. No acute fracture seen. **This report has been created using voice recognition software. It may contain minor errors which are inherent in voice recognition technology. ** Final report electronically signed by Dr. Larry Robles on 9/5/2020 10:56 AM    Xr Shoulder Left (min 2 Views)    Result Date: 9/5/2020  PROCEDURE: XR SHOULDER LEFT (MIN 2 VIEWS) CLINICAL INFORMATION: fall, shoulder pain COMPARISON: No prior study. TECHNIQUE: 3 standard views of the left shoulder were obtained FINDINGS: No bone, joint, or soft tissue abnormality is seen. Moderate degenerative facet arthropathy lower cervical spine. Bones are demineralized, consistent with osteoporosis. Osteoporosis. No fracture. **This report has been created using voice recognition software. It may contain minor errors which are inherent in voice recognition technology. ** Final report electronically signed by Dr. Larry Robles on 9/5/2020 11:19 AM    Xr Chest Portable    Result Date: 9/5/2020  PROCEDURE: XR CHEST PORTABLE CLINICAL INFORMATION: Trauma by fall today. COMPARISON: 12/24/2019 TECHNIQUE: A single mobile view of the chest was obtained. Normal mobile chest. **This report has been created using voice recognition software. It may contain minor errors which are inherent in voice recognition technology. ** Final report electronically signed by Dr. Larry Robles on 9/5/2020 11:19 AM    Fluoro For Surgical Procedures    Result Date: 9/6/2020  Radiology exam is complete. No Radiologist dictation.  Please follow up

## 2020-09-08 NOTE — CONSULTS
Patient seen and examined for closed reduction and percutaneous screw fixation  of the left hip valgus impacted fracture. Note to follow. Patient is a good candidate for rehab admission and is agreeable to do so. She will not require a prior authorization under her Medicare benefits. Patient will need to be transitioned to oral pain medications prior to coming to the acute inpatient rehabilitation unit. Thank you for the consult.     Ale Rosales MD

## 2020-09-09 ENCOUNTER — HOSPITAL ENCOUNTER (INPATIENT)
Age: 70
LOS: 15 days | Discharge: HOME HEALTH | DRG: 560 | End: 2020-09-24
Attending: PHYSICAL MEDICINE & REHABILITATION | Admitting: PHYSICAL MEDICINE & REHABILITATION
Payer: MEDICARE

## 2020-09-09 VITALS
RESPIRATION RATE: 18 BRPM | HEART RATE: 101 BPM | OXYGEN SATURATION: 95 % | HEIGHT: 67 IN | DIASTOLIC BLOOD PRESSURE: 63 MMHG | TEMPERATURE: 98.6 F | SYSTOLIC BLOOD PRESSURE: 107 MMHG | BODY MASS INDEX: 25.58 KG/M2 | WEIGHT: 163 LBS

## 2020-09-09 PROBLEM — S72.012D: Status: ACTIVE | Noted: 2020-09-09

## 2020-09-09 LAB
ANION GAP SERPL CALCULATED.3IONS-SCNC: 11 MEQ/L (ref 8–16)
AVERAGE GLUCOSE: 111 MG/DL (ref 70–126)
BUN BLDV-MCNC: 19 MG/DL (ref 7–22)
CALCIUM SERPL-MCNC: 9.2 MG/DL (ref 8.5–10.5)
CHLORIDE BLD-SCNC: 108 MEQ/L (ref 98–111)
CO2: 23 MEQ/L (ref 23–33)
CREAT SERPL-MCNC: 1.1 MG/DL (ref 0.4–1.2)
ERYTHROCYTE [DISTWIDTH] IN BLOOD BY AUTOMATED COUNT: 13.1 % (ref 11.5–14.5)
ERYTHROCYTE [DISTWIDTH] IN BLOOD BY AUTOMATED COUNT: 44.7 FL (ref 35–45)
FERRITIN: 120 NG/ML (ref 10–291)
GFR SERPL CREATININE-BSD FRML MDRD: 49 ML/MIN/1.73M2
GLUCOSE BLD-MCNC: 118 MG/DL (ref 70–108)
GLUCOSE BLD-MCNC: 128 MG/DL (ref 70–108)
GLUCOSE BLD-MCNC: 267 MG/DL (ref 70–108)
HBA1C MFR BLD: 5.7 % (ref 4.4–6.4)
HCT VFR BLD CALC: 35.7 % (ref 37–47)
HEMOGLOBIN: 11.1 GM/DL (ref 12–16)
IRON: 25 UG/DL (ref 50–170)
MCH RBC QN AUTO: 28.8 PG (ref 26–33)
MCHC RBC AUTO-ENTMCNC: 31.1 GM/DL (ref 32.2–35.5)
MCV RBC AUTO: 92.5 FL (ref 81–99)
PLATELET # BLD: 227 THOU/MM3 (ref 130–400)
PMV BLD AUTO: 10.2 FL (ref 9.4–12.4)
POTASSIUM SERPL-SCNC: 4.6 MEQ/L (ref 3.5–5.2)
RBC # BLD: 3.86 MILL/MM3 (ref 4.2–5.4)
SODIUM BLD-SCNC: 142 MEQ/L (ref 135–145)
VITAMIN D 25-HYDROXY: 14 NG/ML (ref 30–100)
WBC # BLD: 6.6 THOU/MM3 (ref 4.8–10.8)

## 2020-09-09 PROCEDURE — 85027 COMPLETE CBC AUTOMATED: CPT

## 2020-09-09 PROCEDURE — 83036 HEMOGLOBIN GLYCOSYLATED A1C: CPT

## 2020-09-09 PROCEDURE — 36415 COLL VENOUS BLD VENIPUNCTURE: CPT

## 2020-09-09 PROCEDURE — 97110 THERAPEUTIC EXERCISES: CPT

## 2020-09-09 PROCEDURE — 97116 GAIT TRAINING THERAPY: CPT

## 2020-09-09 PROCEDURE — 80048 BASIC METABOLIC PNL TOTAL CA: CPT

## 2020-09-09 PROCEDURE — 97530 THERAPEUTIC ACTIVITIES: CPT

## 2020-09-09 PROCEDURE — 83540 ASSAY OF IRON: CPT

## 2020-09-09 PROCEDURE — 82728 ASSAY OF FERRITIN: CPT

## 2020-09-09 PROCEDURE — 82306 VITAMIN D 25 HYDROXY: CPT

## 2020-09-09 PROCEDURE — 6370000000 HC RX 637 (ALT 250 FOR IP): Performed by: PHYSICIAN ASSISTANT

## 2020-09-09 PROCEDURE — 82948 REAGENT STRIP/BLOOD GLUCOSE: CPT

## 2020-09-09 PROCEDURE — 99239 HOSP IP/OBS DSCHRG MGMT >30: CPT | Performed by: INTERNAL MEDICINE

## 2020-09-09 PROCEDURE — 97535 SELF CARE MNGMENT TRAINING: CPT

## 2020-09-09 PROCEDURE — 1180000000 HC REHAB R&B

## 2020-09-09 PROCEDURE — 6370000000 HC RX 637 (ALT 250 FOR IP): Performed by: INTERNAL MEDICINE

## 2020-09-09 PROCEDURE — 6370000000 HC RX 637 (ALT 250 FOR IP): Performed by: PHYSICAL MEDICINE & REHABILITATION

## 2020-09-09 RX ORDER — CALCIUM CARBONATE 500(1250)
500 TABLET ORAL 2 TIMES DAILY WITH MEALS
Status: DISCONTINUED | OUTPATIENT
Start: 2020-09-09 | End: 2020-09-24 | Stop reason: HOSPADM

## 2020-09-09 RX ORDER — ASCORBIC ACID 500 MG
250 TABLET ORAL 2 TIMES DAILY WITH MEALS
Status: DISCONTINUED | OUTPATIENT
Start: 2020-09-09 | End: 2020-09-24 | Stop reason: HOSPADM

## 2020-09-09 RX ORDER — SENNA AND DOCUSATE SODIUM 50; 8.6 MG/1; MG/1
1 TABLET, FILM COATED ORAL 2 TIMES DAILY
Status: DISCONTINUED | OUTPATIENT
Start: 2020-09-09 | End: 2020-09-18

## 2020-09-09 RX ORDER — CLOPIDOGREL BISULFATE 75 MG/1
75 TABLET ORAL DAILY
Status: CANCELLED | OUTPATIENT
Start: 2020-09-10

## 2020-09-09 RX ORDER — FERROUS SULFATE 325(65) MG
325 TABLET ORAL 2 TIMES DAILY WITH MEALS
Status: CANCELLED | OUTPATIENT
Start: 2020-09-09

## 2020-09-09 RX ORDER — ACETAMINOPHEN 325 MG/1
650 TABLET ORAL EVERY 4 HOURS PRN
Status: DISCONTINUED | OUTPATIENT
Start: 2020-09-09 | End: 2020-09-09

## 2020-09-09 RX ORDER — TRAMADOL HYDROCHLORIDE 50 MG/1
50 TABLET ORAL EVERY 6 HOURS PRN
Status: DISCONTINUED | OUTPATIENT
Start: 2020-09-09 | End: 2020-09-24 | Stop reason: HOSPADM

## 2020-09-09 RX ORDER — VITAMIN B COMPLEX
2000 TABLET ORAL 2 TIMES DAILY WITH MEALS
Status: CANCELLED | OUTPATIENT
Start: 2020-09-09

## 2020-09-09 RX ORDER — PRIMIDONE 50 MG/1
50 TABLET ORAL NIGHTLY
Status: DISCONTINUED | OUTPATIENT
Start: 2020-09-09 | End: 2020-09-24 | Stop reason: HOSPADM

## 2020-09-09 RX ORDER — PROPRANOLOL HYDROCHLORIDE 80 MG/1
80 CAPSULE, EXTENDED RELEASE ORAL DAILY
Status: CANCELLED | OUTPATIENT
Start: 2020-09-10

## 2020-09-09 RX ORDER — INSULIN GLARGINE 100 [IU]/ML
20 INJECTION, SOLUTION SUBCUTANEOUS EVERY MORNING
Status: CANCELLED | OUTPATIENT
Start: 2020-09-10

## 2020-09-09 RX ORDER — POLYETHYLENE GLYCOL 3350 17 G/17G
17 POWDER, FOR SOLUTION ORAL DAILY PRN
Status: DISCONTINUED | OUTPATIENT
Start: 2020-09-09 | End: 2020-09-24 | Stop reason: HOSPADM

## 2020-09-09 RX ORDER — VITAMIN B COMPLEX
2000 TABLET ORAL 2 TIMES DAILY WITH MEALS
Status: DISCONTINUED | OUTPATIENT
Start: 2020-09-09 | End: 2020-09-09 | Stop reason: HOSPADM

## 2020-09-09 RX ORDER — ACETAMINOPHEN 650 MG/1
650 SUPPOSITORY RECTAL EVERY 4 HOURS PRN
Status: CANCELLED | OUTPATIENT
Start: 2020-09-09

## 2020-09-09 RX ORDER — NICOTINE POLACRILEX 4 MG
15 LOZENGE BUCCAL PRN
Status: DISCONTINUED | OUTPATIENT
Start: 2020-09-09 | End: 2020-09-24 | Stop reason: HOSPADM

## 2020-09-09 RX ORDER — VITAMIN B COMPLEX
5000 TABLET ORAL
Status: DISCONTINUED | OUTPATIENT
Start: 2020-09-09 | End: 2020-09-24 | Stop reason: HOSPADM

## 2020-09-09 RX ORDER — ASPIRIN 81 MG/1
81 TABLET ORAL DAILY
Status: DISCONTINUED | OUTPATIENT
Start: 2020-09-10 | End: 2020-09-24 | Stop reason: HOSPADM

## 2020-09-09 RX ORDER — NICOTINE POLACRILEX 4 MG
15 LOZENGE BUCCAL PRN
Status: CANCELLED | OUTPATIENT
Start: 2020-09-09

## 2020-09-09 RX ORDER — SENNA AND DOCUSATE SODIUM 50; 8.6 MG/1; MG/1
1 TABLET, FILM COATED ORAL 2 TIMES DAILY
Status: CANCELLED | OUTPATIENT
Start: 2020-09-09

## 2020-09-09 RX ORDER — ATORVASTATIN CALCIUM 80 MG/1
80 TABLET, FILM COATED ORAL DAILY
Status: CANCELLED | OUTPATIENT
Start: 2020-09-09

## 2020-09-09 RX ORDER — ATORVASTATIN CALCIUM 80 MG/1
80 TABLET, FILM COATED ORAL DAILY
Status: DISCONTINUED | OUTPATIENT
Start: 2020-09-09 | End: 2020-09-24 | Stop reason: HOSPADM

## 2020-09-09 RX ORDER — ASPIRIN 81 MG/1
81 TABLET ORAL DAILY
Status: CANCELLED | OUTPATIENT
Start: 2020-09-10

## 2020-09-09 RX ORDER — ACETAMINOPHEN 325 MG/1
650 TABLET ORAL EVERY 8 HOURS SCHEDULED
Status: DISCONTINUED | OUTPATIENT
Start: 2020-09-09 | End: 2020-09-24 | Stop reason: HOSPADM

## 2020-09-09 RX ORDER — CALCIUM CARBONATE 500(1250)
500 TABLET ORAL 2 TIMES DAILY WITH MEALS
Status: CANCELLED | OUTPATIENT
Start: 2020-09-09

## 2020-09-09 RX ORDER — VITAMIN B COMPLEX
2000 TABLET ORAL 2 TIMES DAILY WITH MEALS
Status: DISCONTINUED | OUTPATIENT
Start: 2020-09-09 | End: 2020-09-09

## 2020-09-09 RX ORDER — TRAMADOL HYDROCHLORIDE 50 MG/1
100 TABLET ORAL EVERY 6 HOURS PRN
Status: DISCONTINUED | OUTPATIENT
Start: 2020-09-09 | End: 2020-09-24 | Stop reason: HOSPADM

## 2020-09-09 RX ORDER — ACETAMINOPHEN 325 MG/1
650 TABLET ORAL EVERY 4 HOURS PRN
Status: CANCELLED | OUTPATIENT
Start: 2020-09-09

## 2020-09-09 RX ORDER — INSULIN GLARGINE 100 [IU]/ML
20 INJECTION, SOLUTION SUBCUTANEOUS EVERY MORNING
Status: DISCONTINUED | OUTPATIENT
Start: 2020-09-10 | End: 2020-09-15

## 2020-09-09 RX ORDER — ACETAMINOPHEN 650 MG/1
650 SUPPOSITORY RECTAL EVERY 4 HOURS PRN
Status: DISCONTINUED | OUTPATIENT
Start: 2020-09-09 | End: 2020-09-09

## 2020-09-09 RX ORDER — PROPRANOLOL HYDROCHLORIDE 80 MG/1
80 CAPSULE, EXTENDED RELEASE ORAL DAILY
Status: DISCONTINUED | OUTPATIENT
Start: 2020-09-10 | End: 2020-09-24 | Stop reason: HOSPADM

## 2020-09-09 RX ORDER — PRIMIDONE 50 MG/1
200 TABLET ORAL DAILY
Status: CANCELLED | OUTPATIENT
Start: 2020-09-10

## 2020-09-09 RX ORDER — CALCIUM CARBONATE 500(1250)
500 TABLET ORAL 2 TIMES DAILY WITH MEALS
Status: DISCONTINUED | OUTPATIENT
Start: 2020-09-09 | End: 2020-09-09

## 2020-09-09 RX ORDER — PRIMIDONE 50 MG/1
200 TABLET ORAL DAILY
Status: DISCONTINUED | OUTPATIENT
Start: 2020-09-10 | End: 2020-09-24 | Stop reason: HOSPADM

## 2020-09-09 RX ORDER — POLYETHYLENE GLYCOL 3350 17 G/17G
17 POWDER, FOR SOLUTION ORAL DAILY PRN
Status: CANCELLED | OUTPATIENT
Start: 2020-09-09

## 2020-09-09 RX ORDER — CLOPIDOGREL BISULFATE 75 MG/1
75 TABLET ORAL DAILY
Status: DISCONTINUED | OUTPATIENT
Start: 2020-09-10 | End: 2020-09-24 | Stop reason: HOSPADM

## 2020-09-09 RX ORDER — CALCIUM CARBONATE 500(1250)
500 TABLET ORAL 2 TIMES DAILY WITH MEALS
Status: DISCONTINUED | OUTPATIENT
Start: 2020-09-09 | End: 2020-09-09 | Stop reason: HOSPADM

## 2020-09-09 RX ORDER — FERROUS SULFATE 325(65) MG
325 TABLET ORAL 2 TIMES DAILY WITH MEALS
Status: DISCONTINUED | OUTPATIENT
Start: 2020-09-09 | End: 2020-09-24 | Stop reason: HOSPADM

## 2020-09-09 RX ORDER — ASCORBIC ACID 500 MG
250 TABLET ORAL 2 TIMES DAILY WITH MEALS
Status: DISCONTINUED | OUTPATIENT
Start: 2020-09-09 | End: 2020-09-09 | Stop reason: HOSPADM

## 2020-09-09 RX ORDER — FERROUS SULFATE 325(65) MG
325 TABLET ORAL 2 TIMES DAILY WITH MEALS
Status: DISCONTINUED | OUTPATIENT
Start: 2020-09-09 | End: 2020-09-09 | Stop reason: HOSPADM

## 2020-09-09 RX ORDER — ASCORBIC ACID 500 MG
250 TABLET ORAL 2 TIMES DAILY WITH MEALS
Status: CANCELLED | OUTPATIENT
Start: 2020-09-09

## 2020-09-09 RX ORDER — PRIMIDONE 50 MG/1
50 TABLET ORAL NIGHTLY
Status: CANCELLED | OUTPATIENT
Start: 2020-09-09

## 2020-09-09 RX ORDER — LIDOCAINE 4 G/G
2 PATCH TOPICAL
Status: DISCONTINUED | OUTPATIENT
Start: 2020-09-10 | End: 2020-09-24 | Stop reason: HOSPADM

## 2020-09-09 RX ORDER — ACETAMINOPHEN 325 MG/1
650 TABLET ORAL EVERY 4 HOURS PRN
Status: DISCONTINUED | OUTPATIENT
Start: 2020-09-09 | End: 2020-09-24 | Stop reason: HOSPADM

## 2020-09-09 RX ADMIN — PROPRANOLOL HYDROCHLORIDE 80 MG: 80 CAPSULE, EXTENDED RELEASE ORAL at 09:37

## 2020-09-09 RX ADMIN — ACETAMINOPHEN 650 MG: 325 TABLET ORAL at 21:59

## 2020-09-09 RX ADMIN — PRIMIDONE 50 MG: 50 TABLET ORAL at 21:58

## 2020-09-09 RX ADMIN — Medication 500 MG: at 21:58

## 2020-09-09 RX ADMIN — PRIMIDONE 200 MG: 50 TABLET ORAL at 09:37

## 2020-09-09 RX ADMIN — CLOPIDOGREL BISULFATE 75 MG: 75 TABLET ORAL at 09:38

## 2020-09-09 RX ADMIN — ATORVASTATIN CALCIUM 80 MG: 80 TABLET, FILM COATED ORAL at 21:59

## 2020-09-09 RX ADMIN — ASPIRIN 81 MG: 81 TABLET ORAL at 09:38

## 2020-09-09 RX ADMIN — DOCUSATE SODIUM 50 MG AND SENNOSIDES 8.6 MG 1 TABLET: 8.6; 5 TABLET, FILM COATED ORAL at 21:58

## 2020-09-09 RX ADMIN — Medication 5000 UNITS: at 21:58

## 2020-09-09 RX ADMIN — Medication 250 MG: at 21:58

## 2020-09-09 RX ADMIN — ACETAMINOPHEN 650 MG: 325 TABLET ORAL at 14:56

## 2020-09-09 RX ADMIN — ACETAMINOPHEN 650 MG: 325 TABLET ORAL at 15:18

## 2020-09-09 RX ADMIN — Medication 5000 UNITS: at 14:56

## 2020-09-09 RX ADMIN — INSULIN GLARGINE 20 UNITS: 100 INJECTION, SOLUTION SUBCUTANEOUS at 09:35

## 2020-09-09 RX ADMIN — FERROUS SULFATE TAB 325 MG (65 MG ELEMENTAL FE) 325 MG: 325 (65 FE) TAB at 21:58

## 2020-09-09 ASSESSMENT — PAIN SCALES - GENERAL
PAINLEVEL_OUTOF10: 0
PAINLEVEL_OUTOF10: 6
PAINLEVEL_OUTOF10: 0

## 2020-09-09 ASSESSMENT — ENCOUNTER SYMPTOMS
DIARRHEA: 0
VOICE CHANGE: 0
EYES NEGATIVE: 1
SHORTNESS OF BREATH: 0
ALLERGIC/IMMUNOLOGIC NEGATIVE: 1
COUGH: 0
TROUBLE SWALLOWING: 0
CONSTIPATION: 0
NAUSEA: 0

## 2020-09-09 ASSESSMENT — PAIN DESCRIPTION - ONSET
ONSET: ON-GOING
ONSET: ON-GOING

## 2020-09-09 ASSESSMENT — PAIN DESCRIPTION - ORIENTATION
ORIENTATION: LEFT
ORIENTATION: LEFT

## 2020-09-09 ASSESSMENT — PAIN DESCRIPTION - DESCRIPTORS
DESCRIPTORS: ACHING
DESCRIPTORS: PENETRATING

## 2020-09-09 ASSESSMENT — PAIN - FUNCTIONAL ASSESSMENT
PAIN_FUNCTIONAL_ASSESSMENT: ACTIVITIES ARE NOT PREVENTED
PAIN_FUNCTIONAL_ASSESSMENT: PREVENTS OR INTERFERES SOME ACTIVE ACTIVITIES AND ADLS

## 2020-09-09 ASSESSMENT — PAIN DESCRIPTION - PROGRESSION
CLINICAL_PROGRESSION: NOT CHANGED
CLINICAL_PROGRESSION: NOT CHANGED

## 2020-09-09 ASSESSMENT — PAIN DESCRIPTION - PAIN TYPE
TYPE: SURGICAL PAIN
TYPE: ACUTE PAIN;SURGICAL PAIN

## 2020-09-09 ASSESSMENT — PAIN DESCRIPTION - FREQUENCY: FREQUENCY: CONTINUOUS

## 2020-09-09 ASSESSMENT — PAIN DESCRIPTION - LOCATION
LOCATION: HIP
LOCATION: HIP

## 2020-09-09 NOTE — CARE COORDINATION
9/9/20, 10:01 AM EDT    Patient goals/plan/ treatment preferences discussed by  and . Patient goals/plan/ treatment preferences reviewed with patient/ family. Patient/ family verbalize understanding of discharge plan and are in agreement with goal/plan/treatment preferences. Understanding was demonstrated using the teach back method. AVS provided by RN at time of discharge, which includes all necessary medical information pertaining to the patients current course of illness, treatment, post-discharge goals of care, and treatment preferences. Services After Discharge  Services At/After Discharge: PT, OT, Nursing Services, Skilled Therapy   IMM Letter  IMM Letter given to Patient/Family/Significant other/Guardian/POA/by[de-identified] cm  IMM Letter date given[de-identified] 09/09/20  IMM Letter time given[de-identified] 0871     Planning discharge readmit to [address] [place] [address] today if medically stable.  I just discussed with Dr Lanette Wild and he will eval.

## 2020-09-09 NOTE — PROGRESS NOTES
Admitted to the Inpatient Rehabilitation Unit via wheelchair. Patient was then oriented to room and unit. Education provided on the rehabilitation routine: three hours of therapy five days per week and dining room for lunch. Explained patients right to have family, representative or physician notified of their admission. Patient has N/A for physician to be notified. Patient has N/A for family/representative to be notified. Admitting medication orders compared with acute stay medications; home medication list reviewed with patient/family. Medication issues identified No  Medication issue: NA  If yes, physician notified Dr Wang Lewis. Bladder and Bowel Function Assessment:  1. Prior history of bladder problems: urgency, frequency and stress incontinence  2. Number of pads used per day: 4  3. Frequency of night time voiding: twice  4. Fluid intake volume and pattern: na  5. Last BM: 09/09/20  6. Prior history of bowel problems: No      Incontinence      Frequent diarrhea      Constipation      Hemorrhoids      Diverticulitis      Bowel Surgery     Two nurse skin assessment performed by Skip   and Katie. Care plan was created with patient's input and goals were agreed upon. Admission folder provided with education regarding patients diagnoses, fall prevention, skin care, and M in the box. \"Data Collection Information Summary for Patients in Inpatient Rehabilitation Facilities\" and \"Privacy Act Statement - Health Care Records\" provided. Please refer to the admission navigator for further information.

## 2020-09-09 NOTE — PROGRESS NOTES
Orthopaedic Progress Note      SUBJECTIVE:    Chief Complaint   Patient presents with    Fall   POD 3 s/p left hip CRPP  hgb 11.1  Pain controlled  Denies numbness and tingling      Physical    Vitals:    09/09/20 0910   BP: 107/63   Pulse: 101   Resp: 18   Temp: 98.6 °F (37 °C)   SpO2: 95%         OBJECTIVE  LLE dressing c/d/i. Compartments soft. Flex and extends toes and ankle.  SILT      Data  CBC:   Lab Results   Component Value Date    WBC 6.6 09/09/2020    RBC 3.86 09/09/2020    RBC 3.55 10/27/2011    HGB 11.1 09/09/2020    HCT 35.7 09/09/2020    MCV 92.5 09/09/2020    MCH 28.8 09/09/2020    MCHC 31.1 09/09/2020    RDW 13.6 02/08/2017     09/09/2020    MPV 10.2 09/09/2020     BMP:    Lab Results   Component Value Date     09/09/2020    K 4.6 09/09/2020    K 4.3 09/06/2020     09/09/2020    CO2 23 09/09/2020    BUN 19 09/09/2020    LABALBU 3.5 09/05/2020    LABALBU 3.3 10/24/2011    CREATININE 1.1 09/09/2020    CALCIUM 9.2 09/09/2020    LABGLOM 49 09/09/2020    GLUCOSE 128 09/09/2020    GLUCOSE 232 10/27/2011     Uric Acid:  No components found for: URIC  PT/INR:    Lab Results   Component Value Date    PROTIME 1.27 10/24/2011    INR 1.08 02/08/2017     PTT:    Lab Results   Component Value Date    APTT 88.3 10/24/2011   [APTT  Troponin:    Lab Results   Component Value Date    TROPONINI <0.006 09/03/2012    TROPONINI 0.198 10/22/2011     Urine Culture:  No components found for: ABIODUN    Current Inpatient Medications    Current Facility-Administered Medications: ferrous sulfate (IRON 325) tablet 325 mg, 325 mg, Oral, BID WC **AND** vitamin C (ASCORBIC ACID) tablet 250 mg, 250 mg, Oral, BID WC  Vitamin D (CHOLECALCIFEROL) tablet 2,000 Units, 2,000 Units, Oral, BID WC **AND** calcium elemental (OSCAL) tablet 500 mg, 500 mg, Oral, BID WC  acetaminophen (TYLENOL) tablet 650 mg, 650 mg, Oral, Q4H PRN **OR** acetaminophen (TYLENOL) suppository 650 mg, 650 mg, Rectal, Q4H PRN  glucose (GLUTOSE) 40

## 2020-09-09 NOTE — H&P
Physical Medicine & Rehabilitation  History and Physical      Chief Complaint and Reason for Rehabilitation Admission: left femoral neck impacted subcapital fracture    History of Present Illness:  Leandro Schuster is a 65 y.o. female who  has a past medical history of Acute kidney injury ([facility] [address] CAD (coronary artery disease), CKD (chronic kidney disease) stage 3, GFR 30-59 ml/min ([facility] [address] Diabetes mellitus, insulin dependent (IDDM), controlled ([facility] [address] Escherichia coli septicemia ([facility] [address] Essential tremor, Hyperlipidemia, Hypertension, Hypoxemic respiratory failure, chronic ([address] MI (myocardial infarction) ([facility] [address] Normocytic anemia, and Osteoporosis. She was admitted 9/9/2020 to the inpatient rehabilitation unit. Original admission 9/5/2020 after being brought into the emergency department by [facility] for injuries sustained after a syncopal event. The patient had a fall that occurred while she was walking in the grocery store when she blacked out and when she regained consciousness she found herself lying on the floor with people standing around her. She had left-sided hip pain and also abrasions on her left arm along with a left-sided superficial hematoma on her parietal scalp. Her initial x-rays showed an acute slightly impacted subcapital fracture of the left femoral neck. She underwent surgical correction with a closed reduction and percutaneous screw fixation of the left hip impacted subcapital fracture of the left femoral neck by Dr. Luis Austin on 9/6/2020. On initial consultation exam the patient was noted to be sleeping in her hospital bed in no acute distress. She awoke readily to being addressed with her name. She is alert and oriented x4. She states that her current pain level is 5/10 at rest and is noted to currently be on IV Dilaudid for pain control. She has an external female catheter in place. She did participate in physical therapy today and mobilized appropriately by walking in her room.   It was discussed with the patient that she would be a good candidate to come to the acute inpatient rehabilitation unit under her Medicare benefits to improve her gait stability and be able to care for herself with less assistance required when she goes home. She states that she would be agreeable to this plan and wishes to proceed with the understanding that her length of stay will be approximately 14 days. In the interim the patient was deemed medically stable and she was in agreement with the plan for her to come to the acute inpatient rehabilitation unit. She did have a bowel movement today. Her labs were abnormal both her iron levels and vitamin D and the recommendation of supplementing for both of these issues was discussed and the patient was in agreement to starting appropriate medications. She notes that her pain is currently controlled. It was discussed that she will likely have higher pain levels with the more intensive therapy that will be offered starting tomorrow and she is agreeable to adding tramadol to her as needed medications. She had no other concerns or questions at this time. Previously was independent of ADLs and used no AD for ambulation prior to recent admission. Initially has ambulated 8' with RW at Minimal contact assistance with PT. With therapy is not tested for bed mobility, Minimal contact assistance for transfers, and Minimal contact assistance with balance. ROM restrictions, WB restrictions, precautions:      Fall Risk    Current Rehabilitation Assessments:  Physical Therapy:  OBJECTIVE:  Transfers:  Sit to Stand: Minimal Assistance  Stand to [address] for proper   hand placement. Ambulation:  Contact Guard Assistance, Minimal Assistance  Distance: 3ft + 3ft + 8ft  Surface: Level Tile  Device:Rolling Walker  Gait Deviations:   Forward Flexed Posture, Slow Susanna, Decreased Step Length on Right, Decreased Weight Shift Left, Lean to Right, Decreased Gait Speed and Decreased Heel Strike Bilaterally     Balance:  Static Sitting Balance:  Supervision  Dynamic Sitting Balance: Stand By Assistance  Static Standing Balance: Contact Guard Assistance  Dynamic Standing Balance: Minimal Assistance     Exercise:  Patient was guided in 10 reps of exercise to both lower extremities. Ankle pumps, Glut sets, Quad sets, Heelslides and Hip abduction/adduction. Exercises were completed for increased independence with functional mobility. Requires active assist for L hip exercises.      Functional Outcome Measures: Completed    ASSESSMENT:  Assessment: Patient progressing toward established goals. Activity Tolerance:  Patient tolerance of  treatment: good. Equipment Recommendations: Other: monitor for needs  Discharge Recommendations:  Continue to assess pending progress, IP Rehab, planning IP Rehab today. Occupational Therapy:  COGNITION: Decreased Recall, Decreased Insight, Impaired Memory, Inattention, Decreased Problem Solving and Decreased Safety Awareness     ADL:   Grooming: Minimal Assistance, with verbal cues  and with increased time for completion. standing at sink to wash hands   Toileting: Minimal Assistance, with verbal cues  and with increased time for completion. for thoroughness   Toilet Transfer: Minimal Assistance, with verbal cues  and with increased time for completion. to STS with cues for safety and RW placement to self .     BALANCE:  Standing Balance: Contact Guard Assistance, Minimal Assistance, with cues for safety, with increased time for completion. at [facility]      BED MOBILITY:  Not Tested     TRANSFERS:  Sit to Stand:  Minimal Assistance, with increased time for completion, cues for hand placement. from recliner and STS   Stand to Sit: Minimal Assistance, with increased time for completion, cues for hand placement.  to STS and recliner      FUNCTIONAL MOBILITY:  Assistive Device: Rolling Walker  Assist Level:  Minimal Assistance, with verbal cues  and with increased time for completion. Distance: To and from bathroom  Pt had no LOB but moves slow and requires lots of cues throughout for RW placement to self for safety      ASSESSMENT:     Activity Tolerance:  Patient tolerance of  treatment: good. Discharge Recommendations: IP Rehab, Continue to assess pending progress, Patient would benefit from continued therapy after discharge   Equipment Recommendations: Equipment Needed: Yes  Other: LHAE    Speech Therapy:  Not applicable     Past Medical History:      Diagnosis Date    Acute kidney injury ([facility] [address]     CAD (coronary artery disease)     CKD (chronic kidney disease) stage 3, [facility] 30[facility]59 ml[facility]     Diabetes mellitus, insulin dependent (IDDM), controlled ([facility] [address]     Escherichia coli septicemia ([facility] [address]     Essential tremor     Hyperlipidemia     Hypertension     Hypoxemic respiratory failure, chronic ([facility] [address]     secondary to sepsis and possibly pneumonia    MI (myocardial infarction) ([facility] [address] 2011    Normocytic anemia     Osteoporosis      Primary care provider: Carolina Sanchez MD     Past Surgical History:      Procedure Laterality Date    CYSTOSCOPY  12/7/12    with stent insertion    HIP SURGERY Left 9/6/2020    HIP PINNING performed by Barrett Garner DO at [facility] LITHOTRIPSY  12/18/2012    right     Allergies:    Patient has no known allergies.     Current Medications:    Current Facility-Administered Medications: magnesium hydroxide (MILK OF MAGNESIA) 400 MG/5ML suspension 30 mL, 30 mL, Oral, Daily PRN  sennosides-docusate sodium (SENOKOT-S) 8.6-50 MG tablet 1 tablet, 1 tablet, Oral, BID  polyethylene glycol (GLYCOLAX) packet 17 g, 17 g, Oral, Daily PRN  glucagon (rDNA) injection 1 mg, 1 mg, Intramuscular, PRN  glucose (GLUTOSE) 40 % oral gel 15 g, 15 g, Oral, PRN  [START ON 9/10/2020] aspirin EC tablet 81 mg, 81 mg, Oral, Daily  atorvastatin (LIPITOR) tablet 80 mg, 80 mg, Oral, Daily  [START ON 9/10/2020] clopidogrel (PLAVIX) tablet 75 mg, 75 mg, Oral, Daily  ferrous sulfate (IRON 325) tablet 325 mg, 325 mg, Oral, BID  **AND** vitamin C (ASCORBIC ACID) tablet 250 mg, 250 mg, Oral, BID WC  [START ON 9/10/2020] insulin glargine (LANTUS) injection vial 20 Units, 20 Units, Subcutaneous, QAM  [START ON 9/10/2020] primidone (MYSOLINE) tablet 200 mg, 200 mg, Oral, Daily  primidone (MYSOLINE) tablet 50 mg, 50 mg, Oral, Nightly  [START ON 9/10/2020] propranolol (INDERAL LA) extended release capsule 80 mg, 80 mg, Oral, Daily  acetaminophen (TYLENOL) tablet 650 mg, 650 mg, Oral, 3 times per day  acetaminophen (TYLENOL) tablet 650 mg, 650 mg, Oral, Q4H PRN  Vitamin D (CHOLECALCIFEROL) tablet 5,000 Units, 5,000 Units, Oral, TID  **AND** calcium elemental (OSCAL) tablet 500 mg, 500 mg, Oral, BID   [START ON 9/10/2020] lidocaine 4 % external patch 2 patch, 2 patch, Transdermal, QAM AC  traMADol (ULTRAM) tablet 50 mg, 50 mg, Oral, Q6H PRN **OR** traMADol (ULTRAM) tablet 100 mg, 100 mg, Oral, Q6H PRN  [START ON 9/10/2020] metFORMIN (GLUCOPHAGE) tablet 1,000 mg, 1,000 mg, Oral, BID      Social History:   reports that she has never smoked. She has never used smokeless tobacco. She reports current alcohol use. She reports that she does not use drugs. Lives at [address] [place] [address]  [address] [place] [address] [address] [id number].      Lives With: Alone  Type of Home: House  Home Layout: One level  Home Access: Stairs to enter without rails  Entrance Stairs - Number of Steps: pt holds onto the door frame  Home Equipment: Quad cane, Cane, Standard walker   Bathroom Shower/Tub: Tub/Shower unit, Walk-in shower  Bathroom Toilet: Standard  Bathroom Equipment: Grab bars in shower  Bathroom Accessibility: Accessible    Functional History:     Receives Help From: Friend(s)  ADL Assistance: [address]: Independent  Homemaking Responsibilities: Yes  Ambulation Assistance: Independent  Transfer Assistance: Independent  Active : Yes  Additional Comments: Pt did not use any AD for ambulating. She did her own homemaking and cooking. Family History:       Problem Relation Age of Onset    Cancer Mother     Heart Disease Mother      Review of Systems:  Review of Systems   Constitutional: Positive for activity change and fatigue. Negative for appetite change and fever. HENT: Negative for trouble swallowing and voice change. Eyes: Negative. Respiratory: Negative for cough and shortness of breath. Cardiovascular: Negative for leg swelling. Gastrointestinal: Negative for constipation, diarrhea and nausea. Endocrine: Negative for cold intolerance. Genitourinary: Negative for frequency and urgency. Musculoskeletal: Positive for arthralgias, gait problem and myalgias. Skin: Negative for pallor. Allergic/Immunologic: Negative. Neurological: Positive for tremors and weakness. Negative for dizziness, light-headedness, numbness and headaches. Hematological: Negative. Psychiatric/Behavioral: Positive for decreased concentration. Negative for confusion and dysphoric mood. The patient is not nervous/anxious. All other systems reviewed and are negative.      Physical Exam:  /69   Pulse 91   Temp 96.5 °F (35.8 °C) (Oral)   Resp 18   Ht 5' 7\" (1.702 m)   Wt 155 lb 9.6 oz (70.6 kg)   SpO2 97%   BMI 24.37 kg/m²     awake  Orientation:   person, place, time, situation  Mood: within normal limits  Affect: anxious  General appearance: Patient is well nourished, well developed, well groomed and in no acute distress, appearing stated age, up in chair    Memory:   normal,   Attention/Concentration: normal  Language:  normal    Cranial Nerves:  cranial nerves II-XII are grossly intact  ROM:  abnormal - left hip surgery  Motor Exam:  Motor exam is 4+ out of 5 all extremities with the exception of left hip not tested well    Tone:  normal  Muscle bulk: within normal limits  Sensory:  Sensory intact  Coordination: normal  Deep Tendon Reflexes:  Reflexes are intact and symmetrical bilaterally    Skin: warm and dry, no rash or erythema  Peripheral vascular: Pulses: Normal upper and lower extremity pulses; Edema: 1+    Diagnostics:  Recent Results (from the past 24 hour(s))   POCT glucose    Collection Time: 09/09/20  5:48 AM   Result Value Ref Range    POC Glucose 118 (H) 70 - 108 mg/dl   CBC    Collection Time: 09/09/20  8:09 AM   Result Value Ref Range    WBC 6.6 4.8 - 10.8 thou/mm3    RBC 3.86 (L) 4.20 - 5.40 mill/mm3    Hemoglobin 11.1 (L) 12.0 - 16.0 gm/dl    Hematocrit 35.7 (L) 37.0 - 47.0 %    MCV 92.5 81.0 - 99.0 fL    MCH 28.8 26.0 - 33.0 pg    MCHC 31.1 (L) 32.2 - 35.5 gm/dl    RDW-CV 13.1 11.5 - 14.5 %    RDW-SD 44.7 35.0 - 45.0 fL    Platelets 157 429 - 011 thou/mm3    MPV 10.2 9.4 - 12.4 fL   Basic Metabolic Panel    Collection Time: 09/09/20  8:09 AM   Result Value Ref Range    Sodium 142 135 - 145 meq/L    Potassium 4.6 3.5 - 5.2 meq/L    Chloride 108 98 - 111 meq/L    CO2 23 23 - 33 meq/L    Glucose 128 (H) 70 - 108 mg/dL    BUN 19 7 - 22 mg/dL    CREATININE 1.1 0.4 - 1.2 mg/dL    Calcium 9.2 8.5 - 10.5 mg/dL   Hemoglobin A1C    Collection Time: 09/09/20  8:09 AM   Result Value Ref Range    Hemoglobin A1C 5.7 4.4 - 6.4 %    AVERAGE GLUCOSE 111 70 - 126 mg/dL   Vitamin D 25 Hydroxy    Collection Time: 09/09/20  8:09 AM   Result Value Ref Range    Vit D, 25-Hydroxy 14 (L) 30 - 100 ng/ml   Iron    Collection Time: 09/09/20  8:09 AM   Result Value Ref Range    Iron 25 (L) 50 - 170 ug/dL   Ferritin    Collection Time: 09/09/20  8:09 AM   Result Value Ref Range    Ferritin 120 10 - 291 ng/mL   Anion Gap    Collection Time: 09/09/20  8:09 AM   Result Value Ref Range    Anion Gap 11.0 8.0 - 16.0 meq/L   Glomerular Filtration Rate, Estimated    Collection Time: 09/09/20  8:09 AM   Result Value Ref Range    Est, Glom Filt Rate 49 (A) ml/min/1.73m2   POCT glucose    Collection Time: 09/09/20 10:17 AM   Result Value Ref Range    POC Glucose 267 (H) 70 - 108 mg/dl      Lab Results   Component Value Date    LABA1C 5.7 09/09/2020     No results found for: EAG  Recent Labs     09/09/20  0809 09/08/20  0616 09/07/20  0658 09/06/20  0544   WBC 6.6  --  5.9 4.9   HGB 11.1* 10.2* 9.9* 10.2*   HCT 35.7* 32.6* 33.4* 33.9*   MCV 92.5  --  97.1 94.7     --  128* 131     Impression:  1. Ground-level fall after syncopal event. 2. Acute slightly impacted subcapital fracture of the left femoral neck. 3. Status post closed reduction and percutaneous screw fixation of the left hip impacted subcapital fracture of the left femoral neck by Dr. Valencia Alexander, D.O. on 09/06/2020. EBL minimal.  4. Gait instability. 5. Mild TBI. 6. Iron deficiency anemia. 7. Vitamin D deficiency. 8. Insulin-dependent type 2 diabetes, controlled with hyperglycemia. Last hemoglobin A1c was 5.7 on 9/9/2020.  9. Essential tremor. 10. History of prior myocardial infarction in 2011. 11. Hypertension. 12. Hyperlipidemia. 13. Coronary artery disease. 14. History of prior myocardial infarction. 15. CKD 3.  16. 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 17. Osteoporosis. Plan:   The patient demonstrates good potential to participate in an inpatient rehabilitation program involving at least 3 hours per day, 5 days per week of intensive rehabilitation. Rehabilitation services will include PT and OT/RT in order to improve functional status prior to discharge. Family education and training will be completed. Equipment evaluations and recommendations will be completed as appropriate. Medical management: Per primary team and Dr. Jarad Hinojosa. Consultants: Orthopedic Surgery, Internal Medicine, Physical Medicine    Narcotic usage: Tramadol last 24 hour usage pending  Last BM:    9/9. Acute/Rehabilitation Problems:  1. Ground-level fall after syncopal event. 2. Acute slightly impacted subcapital fracture of the left femoral neck.   1. Addressed by surgery below.  3. Status post closed reduction and percutaneous screw fixation of the left hip impacted subcapital fracture of the left femoral neck by Dr. Jc Pierre, D.O. on 09/06/2020. EBL minimal.  1. WBAT. 2. Wound care. 3. Pain control. 1. PRN and scheduled Tylenol. 2. Lidoderm patches. 3. Tramadol. 4. Gait instability. 1. PT/OT. 5. Mild TBI. 1. SLP. 2. Low stimulation TBI guidelines as deemed necessary. 6. Iron deficiency anemia. 1. Iron 25 and ferritin 120. Abnormal/normal.  2. Ferrous sulfate and vitamin C twice daily with meals. 7. Vitamin D deficiency. 8. Nutrition:  Consultation to dietician for nutritional counseling and recommendations. 1. CMP pending  2. Vitamin 25OH level of 14 on 9/9/2020.  3. Cholecalciferol 5000 IU 3 times daily with meals and 500 mg calcium twice daily with meals. 9. Electrolytes. 1. Normal on 9/9/2020  10. Bladder: No issues  11. Bowel: Senna, colace, MOM  12. Rehabilitation nursing will be involved for bowel, bladder, skin, and pain management. Nursing will also provide education and training to patient and family. 13. Prophylaxis:  DVT: Plavix and aspirin as directed by Orthopedic Surgery. GI: None. .  14.  and case management consultations for coordination of care and discharge planning. Chronic Problems:  1. Insulin-dependent type 2 diabetes, controlled with hyperglycemia. Last hemoglobin A1c was 5.7 on 9/9/2020.  1. Lantus 20 units every morning. 2. Metformin 1000 mg twice daily with meals. 2. Essential tremor. 1. Continue home primidone and propranolol. 3. History of prior myocardial infarction in 2011.  1. ASA 81 mg.  2. Plavix. 4. Hypertension. 1. Propranolol. 5. Hyperlipidemia. 1. Lipitor. 6. Coronary artery disease/History of prior myocardial infarction. 1. ASA 81 mg.  2. Plavix. 3. Lipitor.   7. CKD 3.  1. Cr/BUN/GFR on 9/9 was 1.1/19/1951 which is stable over 9/7 with labs of 1[birth year].  8. 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 9. Osteoporosis. 1. See plan above for vitamin D and calcium supplementation. Labs reviewed on:  1. 9/8.  2. 9/9. Infectious Disease:  1. N/A    The main medical problem(s) and comorbidities being actively managed by the physicians and requiring 24 hour rehabilitation nursing care during this stay include pain management, bowel management, wound care, iron deficiency anemia, vitamin D deficiency, diabetes management,*. The domains of functional impairment present in this patient which will require an intensive and interdisciplinary rehabilitation environment include self care, mobility, motor dysfunction, bowel/bladder management, pain management, safety and cognitive function. Estimated length of stay for this admission 14 days. Anticipated disposition: Home. The potential to achieve that is good. The post admission physician evaluation (PRUDENCE) is consistent with the pre-admission assessment. See above findings to reflect the elements required in the PRUDENCE. Patient's admitting condition is consistent with the findings of the preadmission assessment by the rehabilitation admissions coordinator. Greater than 50% of 70 minutes was spent formulating a rehabilitation admission plan, reviewing labs and imaging, and answering patient's questions in detail.     Royce Elizalde MD

## 2020-09-09 NOTE — PROGRESS NOTES
[address] [address]  Acute Inpatient Rehab Preadmission Assessment    Patient Name: Gabrielle Zaragoza        MRN: [id number]    : 1950  (65 y.o.)  Gender: female     Admitted from:[address]  Initial Assessment    Date of admission to the hospital: 2020  9:41 AM  Date patient eligible for admission:2020    Primary Diagnosis:  left femoral neck impacted subcapital fracture      Did patient have surgery? yes -   Closed reduction and percutaneous screw fixation  of the left hip valgus impacted fracture. Physicians: Sophia Fontana MD, Dr Azael López for clinical complications/co-morbidities:   Past Medical History:   Diagnosis Date    Acute kidney injury ([address]     CAD (coronary artery disease)     CKD (chronic kidney disease) stage 3, GFR 30-59 ml/min (HCC)     Diabetes mellitus, insulin dependent (IDDM), controlled ([address]     Escherichia coli septicemia ([address]     Essential tremor     Hyperlipidemia     Hypertension     Hypoxemic respiratory failure, chronic (HCC)     secondary to sepsis and possibly pneumonia    MI (myocardial infarction) ([address]     Normocytic anemia     Osteoporosis        Financial Information  Primary insurance: Medicare    Secondary Insurance:  [place] [employer] [place]    Has the patient had two or more falls in the past year or any fall with injury in the past year? yes    Did the patient have major surgery during the 100 days prior to admission?   yes    Precautions:   falls, infections and skin  Restrictions/Precautions: Weight Bearing, General Precautions, Fall Risk  Left Lower Extremity Weight Bearing: Weight Bearing As Tolerated    Isolation Precautions: None       Physiatrist: Dr. Dewey Burn    Patients Occupation: Retired  Reviewed Lab and Diagnostic reports from Current Admission: Yes    Patients Prior Functional  Level: Prior Function  Receives Help From: Friend(s)  ADL Assistance: Independent  Homemaking Assistance: Independent  Ambulation Assistance: Independent  Transfer Assistance: Independent  Additional Comments: Pt did not use any AD for ambulating. She did her own homemaking and cooking. Current functional status for upper extremity ADLs: Contact guard assistance    Current functional status for lower extremity ADLs: Maximum assistance    Current functional status for bed, chair, wheelchair transfers: Sit to Stand: Minimal Assistance, with increased time for completion, cues for hand placement  Stand to [address] [address], cues for hand placement     Current functional status for toilet transfers: Minimal Assistance, with verbal cues  and with increased time for completion. to STS with cues for safety and RW placement to self . Current functional status for locomotion: Minimal Assistance, with verbal cues , to keep walker closer as she tended to move it too far forward  Distance: 18 ft  Surface: Level Tile  Device:Rolling Walker  Gait Deviations:   Forward Flexed Posture, Decreased Step Length Bilaterally, Decreased Gait Speed, Decreased Heel Strike Bilaterally, Narrow Base of Support and Unsteady Gait    Current functional status for bladder management: Minimal contact assistance    Current functional status for bowel management:Minimal contact assistance    Current functional status for comprehension: Supervision    Current functional status for expression: Supervision    Current functional status for social interaction: Supervision    Current functional status for problem solving: Minimal contact assistance    Current functional status for memory: Minimal contact assistance    Expected level of Improvement in Self-Care:  Complete independence    Expected level of Improvement in Sphincter Control:  Complete independence    Expected level of Improvement in Transfers: Complete independence    Expected level of Improvement in Locomotion:  Complete independence    Expected level of Improvement in Communication and Social Cognition: Complete independence    Expected length of time to achieve that level of improvement: 2 weeks    Current rehab issues: ADL dysfunction,bladder management,bowel management,carry over of therapy techniques, discharge planning, disease and co-morbidity management, gait/mobility dysfunction, medication management, nutrition and hydration management,Ongoing assessment of safety, Pain management, Patient and family education, Prevention of secondary complications, Skin Integrity. Required therapy: Physical Therapy and Occupational Therapy 3 hours per day, 5-6 days per week. Recreational Therapy 1 hour per week. Expected Discharge Destination: Home    Expected Post Discharge Treatments: Out Patient    Other information relevant to the care needs:     Acute Inpatient Rehabilitation Disclosure Statement provided to patient. Patient verbalized understanding. I have reviewed and concur with the findings and results of the pre-admission screening assessment completed by the Inpatient Rehabilitation Admissions Coordinator.     Estephania Oconnell MD

## 2020-09-09 NOTE — PROGRESS NOTES
days   Your insurance Coverage has been verified as follows:    Primary Insurance: Medicare   Deductible: $1799  Coverage: Active  Secondary Insurance: [place] [employer] often cover co-pay amounts, but to ensure payment please contact your insurance company.     Alternative Resources: Please ask the  for more information [phone]

## 2020-09-09 NOTE — PLAN OF CARE
Problem: Falls - Risk of:  Goal: Will remain free from falls  Description: Will remain free from falls  Outcome: Ongoing  Pt will remain free of falls. Pt is 1 assist with walker and gait belt, pt is compliant with use of call light. Problem: Confusion - Acute:  Goal: Absence of continued neurological deterioration signs and symptoms  Description: Absence of continued neurological deterioration signs and symptoms  Outcome: Ongoing  Pt is confused when giving current date and use of phone to call friends, pt becomes visibly frustrated with forgetfullness      Problem: Injury - Risk of, Physical Injury:  Goal: Will remain free from falls  Description: Will remain free from falls  Outcome: Ongoing     Problem: Mood - Altered:  Goal: Mood stable  Description: Mood stable  Outcome: Ongoing  Pt is pleasantly confused at times.

## 2020-09-09 NOTE — PLAN OF CARE
Problem: Falls - Risk of:  Goal: Will remain free from falls  Description: Will remain free from falls  Outcome: Ongoing  Note: Pt using call light appropriately to call for assistance with ambulation to the bathroom and to chair. Pt is also compliant with use of non-skid slippers. Pt reports understanding of fall prevention when discussed. Goal: Absence of physical injury  Description: Absence of physical injury  Outcome: Ongoing  Note: Pt free from physical injury at this time. Will continue to monitor     Problem: Skin Integrity:  Goal: Will show no infection signs and symptoms  Description: Will show no infection signs and symptoms  Outcome: Ongoing  Note: Pt dressing sry ans intact  Goal: Absence of new skin breakdown  Description: Absence of new skin breakdown  Outcome: Ongoing  Note: Pt able to assist with turning and repositioning. Pillow support in place     Problem: Pain:  Goal: Pain level will decrease  Description: Pain level will decrease  Outcome: Ongoing  Note: Pt denies pain at this time. Will continue to monitor  Goal: Control of acute pain  Description: Control of acute pain  Outcome: Ongoing  Note: Pt denies pain at this time. Will continue to monitor  Goal: Control of chronic pain  Description: Control of chronic pain  Outcome: Ongoing  Note: Pt denies pain at this time. Will continue to monitor     Problem: DISCHARGE BARRIERS  Goal: Patient's continuum of care needs are met  Outcome: Ongoing  Note: Pt needs are being met     Problem: Discharge Planning:  Goal: Discharged to appropriate level of care  Description: Discharged to appropriate level of care  Outcome: Ongoing  Note: Pt plans IP rehab at discharge. Care manager and social working helping with discharge needs. Care plan reviewed with patient. Patient verbalize understanding of the plan of care and contribute to goal setting.

## 2020-09-09 NOTE — PLAN OF CARE
functional sequencing and thought organizational tasks with 70% accuracy given mod cues to improve overall executive functioning skills. Goal 3: Patient will complete functional problem solving, reasoning and basic computational tasks with 70% accuracy given mod cues to improve overall management of ADLs. Goal 4: Pt will complete functional problem solving and computational tasks wtih 70% accuracy given mod cues to improve overall management of ADL's. Timeframe for Short-term Goals: 1 week    Plan of Care: Patient to be seen by speech therapy services 30 minutes per day Monday through Friday     Anticipated interventions may include speech/language/communication therapy, cognitive training, group therapy, education, and/or dysphagia therapy based on the above goals. CASE MANAGEMENT:  Goals:   Assist patient/family with discharge planning, patient/family counseling,  and coordination with insurance during the inpatient rehabilitation stay. Other members of the multidisciplinary rehabilitation team that will be involved in the patient's plan of care include recreational therapy, dietary, respiratory therapy, and neuropsychology. Medical issues being managed closely and that require 24 hour availability of a physician:  Bowel/Bladder function, Wound care, Pain management, Infection protection, DVT prophylaxis, Fall precautions, Fluid/Electrolyte balance, Nutritional status, Anemia and History of heart disease                                           Physician anticipated functional outcomes: Improved independence with functional measures   Estimated length of stay for this admission 14 days. Medical Prognosis: Good  Anticipated disposition: Home. The potential to achieve the above medical and rehabilitative goals is good. This plan of care has been developed with the assistance and input of the multidisciplinary rehabilitation team.  The plan was reviewed with the patient.   The patient has had

## 2020-09-09 NOTE — CARE COORDINATION
CMS BPCI Advanced Beneficiary Notification letter given to patient.   Electronically signed by Vielka Bae RN on 9/9/2020 at 7:52 AM

## 2020-09-09 NOTE — PROGRESS NOTES
[facility]  INPATIENT PHYSICAL THERAPY  DAILY NOTE  [facility] ORTHOPEDICS 7K - 7K-13/013-A    Time In: 08  Time Out: 8354  Timed Code Treatment Minutes: 35 Minutes  Minutes: 33          Date: 2020  Patient Name: Mily Peres,  Gender:  female        MRN: [id number]  : 1950  (65 y.o.)     Referring Practitioner: CARRIE Méndez  Diagnosis: Syncope and collapse  Additional Pertinent Hx: Per H&P, pt is a 65 y.o. female with a past medical history of diabetes and prior MI who presents to the emergency department for evaluation of syncope. Patient states that she was at the grocery store this morning shortly prior to arrival, and upon leaving the grocery store she felt acutely short of breath, and lost consciousness. She reports that she fell onto her left side, and landed on her elbow and the back of her head. She endorses losing consciousness after she fell, and bystanders report that she was dazed. She regained consciousness shortly later, however she reports that she has not been ambulatory since her fall. In the emergency department she is complaining of head pain, shoulder pain, elbow pain, and left hip pain. She is additionally complaining of swelling to her left posterior scalp. Pt is now s/p HIP PINNING on 2020 by Dr Adrian Castillo. Prior Level of Function:  Lives With: Alone  Type of Home: House  Home Layout: One level  Home Access: Stairs to enter without rails  Entrance Stairs - Number of Steps: pt holds onto the door frame  Home Equipment: Quad cane, Cane, Standard walker   Bathroom Shower/Tub: Tub/Shower unit, Walk-in shower  Bathroom Toilet: Standard  Bathroom Equipment: Grab bars in shower  Bathroom Accessibility: Accessible    Receives Help From: Friend(s)  ADL Assistance: [facility] [facility] [address]: Independent  Homemaking Responsibilities: Yes  Ambulation Assistance: Independent  Transfer Assistance: Independent  Active :  Yes  Additional Comments: Pt did not use any AD for ambulating. She did her own homemaking and cooking. Restrictions/Precautions:  Restrictions/Precautions: Weight Bearing, General Precautions, Fall Risk  Left Lower Extremity Weight Bearing: Weight Bearing As Tolerated    SUBJECTIVE: RN approved session. Pt in bedside chair, pleasant and agreeable to therapy session. Reports ambulating to bathroom with staff, with increased fatigue. PAIN: 6/10 in L hip. Requesting pain pill post tx. Ice applied to L hip with elevation. OBJECTIVE:  Transfers:  Sit to Stand: Minimal Assistance  Stand to [address] for proper   hand placement. Ambulation:  Contact Guard Assistance, Minimal Assistance  Distance: 3ft + 3ft + 8ft  Surface: Level Tile  Device:Rolling Walker  Gait Deviations: Forward Flexed Posture, Slow Susanna, Decreased Step Length on Right, Decreased Weight Shift Left, Lean to Right, Decreased Gait Speed and Decreased Heel Strike Bilaterally    Balance:  Static Sitting Balance:  Supervision  Dynamic Sitting Balance: Stand By Assistance  Static Standing Balance: Contact Guard Assistance  Dynamic Standing Balance: Minimal Assistance    Exercise:  Patient was guided in 10 reps of exercise to both lower extremities. Ankle pumps, Glut sets, Quad sets, Heelslides and Hip abduction/adduction. Exercises were completed for increased independence with functional mobility. Requires active assist for L hip exercises. Functional Outcome Measures: Completed       ASSESSMENT:  Assessment: Patient progressing toward established goals. Activity Tolerance:  Patient tolerance of  treatment: good. Equipment Recommendations: Other: monitor for needs  Discharge Recommendations:  Continue to assess pending progress, IP Rehab, planning IP Rehab today.      Plan: Times per week: 6x O  Current Treatment Recommendations: Strengthening, ROM, Balance Training, Functional Mobility Training, Gait Training, Transfer Training, Patient/Caregiver Education & Training, Safety Education & Training, Home Exercise Program, Endurance Training    Patient Education  Patient Education: Home Exercise Program, Precautions/Restrictions, Transfers, Reviewed Prior Education, Gait, Use of [employer]    Goals:  Patient goals : go home  Short term goals  Time Frame for Short term goals: at discharge  Short term goal 1: Pt to be CGA for supine <> sit to get in/out of bed  Short term goal 2: Pt to be SBA for sit <> stand to get up to ambulate  Short term goal 3: Pt to ambulate > 80 ft with RW with SBA for household distances  Long term goals  Time Frame for Long term goals : not set due to short ELOS    Following session, patient left in safe position with all fall risk precautions in place.

## 2020-09-09 NOTE — DISCHARGE SUMMARY
Will discharge to [facility]. Assessment/Plan:    Syncope & Collapse  Continue telemetry monitoring  CT head with mild superficial soft tissue swelling, no acute intracranial process  Cervical CT showed multifocal degenerative changes, no acute fracture  No electrolyte abnormality, BNP normal, troponin negative  EKG normal sinus rhythm  Previous 2D echo in 1456 showed systolic function lower limits of normal, EF estimated to be 81%, grade 1 diastolic dysfunction  Previous stress test in 2014 showed no evidence of ischemia  Repeat Echo 9/7 Normal left ventricle size and systolic function. EF 55 %. grade 1 diastolic dysfunction     Left femoral neck fracture  S/p closed reduction and percutaneous screw fixation on 9/6  Ortho consulted. Pain control per Ortho  PT/OT when appropriate.      Acute postoperative hypoxic respiratory failure  Patient documented to have hypoxia 87% post op and initially requiring 4 LPM O2 per nasal cannula, being weaned postoperatively, currently on 2 LPM and saturating well  Chest x-ray on 9/5 unremarkable  Encourage incentive spirometry  Wean as tolerated     Essential hypertension  Patient takes aspirin and Plavix, hold prior to surgery  Patient also takes Inderal and lisinopril     Hyperlipidemia  Continue atorvastatin and Zetia      IDDM type II  Patient is currently on Levemir 20 units daily and metformin 1000 mg twice daily, resume     Chronic normocytic anemia, stable  Hgb, on arrival, 10.4.  Baseline ~12.    Continue to monitor  Transfuse if  hgb <7 or hemodynamically unstable     Physical Deconditioning  PT/OT ordered    Exam:     Vitals:  Vitals:    09/08/20 1613 09/08/20 2030 09/09/20 0330 09/09/20 0910   BP: (!) 110/59 117/61 102/60 107/63   Pulse: 79 86 83 101   Resp: 18 18 18 18   Temp: 98 °F (36.7 °C)   98.6 °F (37 °C)   TempSrc: Oral Oral  Oral   SpO2: 93% 90% 91% 95%   Weight:       Height:         Weight: Weight: 163 lb (73.9 kg)     24 hour intake/output:    Intake/Output Summary (Last 24 hours) at 9/9/2020 1016  Last data filed at 9/9/2020 0353  Gross per 24 hour   Intake 100 ml   Output 1000 ml   Net -900 ml         Labs: For convenience and continuity at follow-up the following most recent labs are provided:      CBC:    Lab Results   Component Value Date    WBC 6.6 09/09/2020    HGB 11.1 09/09/2020    HCT 35.7 09/09/2020     09/09/2020       Renal:    Lab Results   Component Value Date     09/09/2020    K 4.6 09/09/2020    K 4.3 09/06/2020     09/09/2020    CO2 23 09/09/2020    BUN 19 09/09/2020    CREATININE 1.1 09/09/2020    CALCIUM 9.2 09/09/2020    PHOS 4.4 10/24/2011         Significant Diagnostic Studies    Radiology:   XR HIP 2-3 VW W PELVIS LEFT   Final Result   : Postop appearance left hip. **This report has been created using voice recognition software. It may contain minor errors which are inherent in voice recognition technology. **      Final report electronically signed by Dr. Armando Madrigal on 9/6/2020 2:37 PM      XR HIP LEFT (2-3 VIEWS)   Final Result   Postop appearance left hip. **This report has been created using voice recognition software. It may contain minor errors which are inherent in voice recognition technology. **      Final report electronically signed by Dr. Armando Madrigal on 9/6/2020 9:33 AM      FLUORO FOR SURGICAL PROCEDURES   Final Result      XR FEMUR LEFT (MIN 2 VIEWS)   Final Result   Mildly impacted subcapital fracture left femoral neck. **This report has been created using voice recognition software. It may contain minor errors which are inherent in voice recognition technology. **      Final report electronically signed by Dr. Armando Madrigal on 9/5/2020 11:23 AM      XR PELVIS (1-2 VIEWS)   Final Result   Acute slightly impacted subcapital fracture left femoral neck. **This report has been created using voice recognition software.   It may contain minor errors which are inherent in CONSULT TO REHAB/TCU ADMISSION COORDINATOR  IP CONSULT TO PHYSICAL MEDICINE REHAB  IP CONSULT TO ORTHOPEDIC SURGERY    Disposition:    [] Home       [] TCU       [x] Rehab       [] Psych       [] SNF       [] [place]       [] Other-    Condition at Discharge: Stable    Code Status:  Full Code     Patient Instructions: Activity: activity as tolerated  Diet: DIET CARB CONTROL; Follow-up visits:   MD KRISTAN Meza/Curtis Castañeda Natanael  [address] [facility]  [phone]          [address] [facility] [address] [facility]  [address] [place] [address]  [phone]             Discharge Medications:      Papo Stout   Home Medication Instructions QPV:[id number]    Printed on:09/09/20 1022   Medication Information                      Acetaminophen (TYLENOL PO)  Take  by mouth as needed. aspirin 81 MG EC tablet  Take 81 mg by mouth daily. atorvastatin (LIPITOR) 80 MG tablet  Take 80 mg by mouth daily             clopidogrel (PLAVIX) 75 MG tablet  Take 1 tablet by mouth daily. ezetimibe (ZETIA) 10 MG tablet  Take 10 mg by mouth daily             insulin detemir (LEVEMIR) 100 UNIT/ML injection  Inject 20 Units into the skin daily              lisinopril (PRINIVIL;ZESTRIL) 2.5 MG tablet  Take 2.5 mg by mouth daily             metformin (GLUCOPHAGE) 500 MG tablet  Take 1,000 mg by mouth 2 times daily (with meals). Pantoprazole Sodium (PROTONIX) 40 MG PACK packet  Take 40 mg by mouth daily. primidone (MYSOLINE) 50 MG tablet  Take 1 tablet by mouth nightly             primidone (MYSOLINE) 50 MG tablet  Take 100 mg by mouth nightly             propranolol (INDERAL LA) 80 MG CR capsule  Take 60 mg by mouth daily                  Time Spent on discharge is more than 30 minutes in the examination, evaluation, counseling and review of medications and discharge plan. Signed:     Thank you Jennifer Bass MD for the opportunity to be involved in

## 2020-09-09 NOTE — PROGRESS NOTES
Kathy Castellano [address] [facility]  Occupational Therapy  Daily Note  Time:   Time In: 732  Time Out: 0907  Timed Code Treatment Minutes: 27 Minutes  Minutes: 27          Date: 2020  Patient Name: Prema Johnson,   Gender: female      Room: [facility]/013-A  MRN: [id number]  : 1950  (65 y.o.)  Referring Practitioner: CARRIE Warren  Diagnosis: Syncope and Collapse  Additional Pertinent Hx: [facility] She was walking out of the grocery store and fell. Isolates the majority of pain to the left hip. She has some left shoulder and elbow pain as well. She doesn't remember how it happened. She just blacked out. Denies history of other syncopal episodes. Denies numbness/tingling. She underwent L hip pinning on 20 in the AM.. Restrictions/Precautions:  Restrictions/Precautions: Weight Bearing, General Precautions, Fall Risk  Left Lower Extremity Weight Bearing: Weight Bearing As Tolerated     SUBJECTIVE: pt sitting in recliner when arrived. Pt agreeable to OT     PAIN: Pt did not state number did state pain in LLE     COGNITION: Decreased Recall, Decreased Insight, Impaired Memory, Inattention, Decreased Problem Solving and Decreased Safety Awareness    ADL:   Grooming: Minimal Assistance, with verbal cues  and with increased time for completion. standing at sink to wash hands   Toileting: Minimal Assistance, with verbal cues  and with increased time for completion. for thoroughness   Toilet Transfer: Minimal Assistance, with verbal cues  and with increased time for completion. to STS with cues for safety and RW placement to self . BALANCE:  Standing Balance: Contact Guard Assistance, Minimal Assistance, with cues for safety, with increased time for completion. at [facility]     BED MOBILITY:  Not Tested    TRANSFERS:  Sit to Stand:  Minimal Assistance, with increased time for completion, cues for hand placement.  from recliner and STS   Stand to Sit: Minimal Assistance, with increased time for completion, cues for hand placement. to STS and recliner     FUNCTIONAL MOBILITY:  Assistive Device: Rolling Walker  Assist Level:  Minimal Assistance, with verbal cues  and with increased time for completion. Distance: To and from bathroom  Pt had no LOB but moves slow and requires lots of cues throughout for RW placement to self for safety        ASSESSMENT:     Activity Tolerance:  Patient tolerance of  treatment: good. Discharge Recommendations: IP Rehab, Continue to assess pending progress, Patient would benefit from continued therapy after discharge   Equipment Recommendations: Equipment Needed: Yes  Other: LHAE  Plan: Times per week: 6x  Specific instructions for Next Treatment: Functioinal mobility; ADLs and standing balance; AE training as needed; endurance exercises and breathing techniques  Current Treatment Recommendations: Endurance Training, Functional Mobility Training, Self-Care / ADL, Equipment Evaluation, Education, & procurement  Plan Comment: Pt would benefit from continued skilled OT services when medically stable and discharged from Acute. IP Rehab would be recommended. Patient Education  Patient Education: ADL's, Importance of Increasing Activity and Assistive Device Safety    Goals  Short term goals  Time Frame for Short term goals: By discharge  Short term goal 1: Pt will demonstrate functional mobility ambulating to/from the bathroom while using a rolling walker with no > than MIN A to prepare for doing self care at the sink. Short term goal 2: Pt will complete simple grooming tasks with CGA while using 1-2 hand release to increase her independence with toileting routine. Short term goal 3: Pt will complete transfers to/from various surfaces including an elevated toilet seat with armrests with SBA and cues for hand placement to increase her independence with toileting routine.   Short term goal 4: Pt will complete BUE light resistance exercises with demonstration and verbal cues for pursed lip breathing to increase her endurance for ease of doing self care and functional mobility. Short term goal 5: Pt will complete lower body ADLs while using any LHAE needed with no >than MIn A to increase her independence with self care. Following session, patient left in safe position with all fall risk precautions in place.

## 2020-09-09 NOTE — DISCHARGE SUMMARY
Physical Medicine & Rehabilitation  Discharge Summary     Patient Identification:  Immanuel Batista  : 1950  Admit date: 2020  Discharge date:  2020   Attending provider: Sarah Dixon MD    Primary care provider: Neto Belcher MD     Discharge Diagnoses:   Primary impairment requiring rehabilitation: 8.11 Unilateral Hip Fracture      Etiologic Diagnosis that led to the condition: subcapital fracture of the left femoral neck. Comorbid conditions affecting rehabilitation:  1. Ground-level fall after syncopal event. 2. Acute slightly impacted subcapital fracture of the left femoral neck. 3. Status post closed reduction and percutaneous screw fixation of the left hip impacted subcapital fracture of the left femoral neck by Dr. Viviane Cedillo D.PARISA on 2020. EBL minimal.  4. Gait instability. 5. Mild TBI. 6. Iron deficiency anemia. 7. Vitamin D deficiency. 8. *Insulin-dependent type 2 diabetes, controlled with hyperglycemia. 9. Essential tremor. 10. History of prior myocardial infarction in . 11. Hypertension. 12. Hyperlipidemia. 13. Coronary artery disease. 14. History of prior myocardial infarction. 15. CKD 3.  16. 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 17. Osteoporosis. 18. Moderate to severe cognitive deficits. 19. Hyperkalemia. 20. *Acute kidney injury. 21. Hypercalcemia. 22. Tachycardia. 23. Depressed mood with suicidal ideation.     Discharge Functional Status:    Physical therapy:  OBJECTIVE:  Bed Mobility:  Supine to Sit: Modified Independent  Sit to Supine: Modified Independent   Scooting: Modified Independent     Transfers:  Sit to Stand: Stand By Assistance, cues for hand placement  Stand to Sit:Stand By Assistance, cues for hand placement     Ambulation:  Supervision  Distance: 12ftx2  Surface: Level Tile  Device:Rolling Walker  Gait Deviations:  Grossly steady, short steps lengths     Exercise:  Patient was guided in 1 set(s) 10 reps of exercise to both lower extremities. Ankle pumps, Glut sets, Quad sets, Heelslides, Short arc quads and Hip abduction/adduction. Exercises were completed for increased independence with functional mobility. reviewed and completed HEP, pt needing cues for appropriate completion     Functional Outcome Measures: Completed     ASSESSMENT:  Assessment: Patient progressing toward established goals, meeting 4/5 short term goals and 3 long term goals. Written information/instructions given to pt to reinforce safest technique for transfers as pt is easily distracted and will perform the transfer, but not position hands consistently. Pt will benefit from continued skilled PT in the discharge setting to reinforce safe functional mobility, progress gait by improving strength and endurance. Activity Tolerance:  Patient tolerance of  treatment: good. Pt tolerated session well. Pt demos decreased strength, endurance, balance and safety with mobility. Pt needing cues for safety with mobility and transfers.       Equipment Recommendations:Equipment Needed: Yes(RW)     Plan: Discharge to home with friend to assist.  HH P.T. to f/u     Patient Education  Patient Education: Plan of Care, Home Exercise Program, Bed Mobility, Equipment Education, Transfers, Gait, Use of Gait [place], Verbal Exercise Instruction, Home Safety Education     Goals:  Patient goals : go home  Short term goals  Time Frame for Short term goals: 1 week  Short term goal 1: pt to complete supine to/from sit at SBA to get in and out of bed  MET, see LTG  Short term goal 2: pt to complete sit to/from stand at SBA to rise from bed or chair  MET, see LTG  Short term goal 3: pt to ambulate >75ft with RW at SBA for mobility in the home MET, see LTG  Short term goal 4: pt to complete car transfer at [facility] for safe transport to home  MET, see LTG  Short term goal 5: pt score on Tinetti balance test will improve to >=20 for improved balance and decreased fall risk at home  NOT ADDRESSED  Long term goals  Time Frame for Long term goals : 2 weeks  Long term goal 1: pt to complete supine to/from sit at Mod I to get in and out of bed MET, continue  Long term goal 2: pt to complete sit to/from stand at Mod I to rise from bed or chair  NOT MET  Long term goal 3: pt to ambulate >50ft with RW at Mod I, >125ft with RW at S for mobility in the home and community  MET  Long term goal 4: pt to complete car transfer at S for safe transport to home  MET, continue  Long term goal 5: pt to negotiate 6\" platform step with RW at S for curb negotiation in the community MET  Long term goal 6: pt score on Tinetti balance test will improve to >=24 for improved balance and decreased fall risk at home  NOT MET    Mobility:  , PT Equipment Recommendations  Equipment Needed: Yes(RW), Assessment: Pt presents this date after fall resulting in L hip fracture requiring operative repair. At this time she demos significant decline in level of independence requiring use of RW as well as frequent cues for proper safety during mobility. Current score of 16 on Tinetti balance test places her at high risk for falls at this time. She requires skilled PT services to improve level of independence and mobility prior to returning home alone    Occupational therapy  COGNITION: Decreased Recall and Decreased Safety Awareness     ADL:   Upper Extremity Dressing: Modified Independent. Lower Extremity Dressing: Modified Independent. with increased time to problem solve surgical donning technique .     BALANCE:  Sitting Balance:  Modified Independent. Standing Balance: Modified Independent.       BED MOBILITY:  Not Tested     TRANSFERS:  Sit to Stand:  Modified Independent. EOB  Stand to Sit: Modified Independent.       FUNCTIONAL MOBILITY:  Assistive Device: Rolling Walker  Assist Level:  Modified Independent. Distance: within room      ASSESSMENT:  Activity Tolerance:  Patient tolerance of  treatment: good. Assessment: Assessment: Marivel Beverly has made steady gains on IP rehab. Pt is mod I with rote ADL tasks but does require supervision for standing and mobility tasks within IADLs due to poor safety awareness. She is limited by safety awareness and recall with her daily routines. She requires cont skilled OT services on a [facility] basis to progress with safety during BADL and IADL and to decrease risk of fall / future injury. Discharge Recommendations: Home with nursing aide, Home with Home health OT, 24 hour supervision or assist  Equipment Recommendations: Equipment Needed: Yes  Other: Pt may benefit from a BSC, shower chair, grab bars in the shower . BSC ordered on 9-15.   Plan: Discharge home with [facility] OT    Patient Education  Patient Education: ADL's     Goals  Short term goals  Time Frame for Short term goals: 1 week  Short term goal 1: PT will complete ADL routine with 0-1 cues for organization, problem solving adapted tech and S with LHAE PRN to increase independence in self care tasks GOAL MET   Short term goal 2: Pt will complete  grooming/ toilteting tasks with MI  while using 1-2 hand release to increase her independence with [place] ADL routine GOAL MET   Short term goal 3: PT will complete functional mobility to/ from the BR as well as around obstacles with S  and 0-1 cues for safe tech to increase independence in toileting tasks at home GOAL MET   Short term goal 4: Pt will complete self feeding tasks with AE without cues to increase ability to eat soft foods GOAL MET   Short term goal 5: Pt will complete 1 step homemaking tasks with no > min cues for safe tech safely  to increase independence in retrieving a snack GOAL NOT MET CONSISTENTLY   Long term goals  Time Frame for Long term goals : 1 week  Long term goal 1: Pt will complete ADL routine with no cues for safe and adapted tech to increase independence in self care tasks GOAL NOT MET   Long term goal 2: Pt will complete 2 step homemaking tasks with no cues to analyze, navigate multiple areas on ad to answer question  *Poor success with basic math computation. Long-Term Goals:  Timeframe for Long-term Goals: 3 weeks     LONG TERM GOAL #1:  Goal 1: Pt will improve cognitive linguistic skills to a min assist level in order to improve level of independence in the home environment. - GOAL NOT MET     Comprehension: 5 - Patient understands basic needs (hungry/hot/pain)  Expression: 5 - Expresses basic ideas/needs only (hungry/hot/pain)  Social Interaction: 5 - Patient is appropriate with supervision/cues  Problem Solving: 3 - Patient solves simple/routine tasks 50%-74%  Memory: 3 - Patient remembers 50%-74% of the time        EDUCATION:  Learner: Patient  Education:  Education Related to Potential Risks and Complications Due to Impairment/Illness/Injury, Education Related to [employer] and Wellness and Home Safety Education  Evaluation of Education: Alice Gonzalez understanding     ASSESSMENT/PLAN:  SUMMARY: Patient met 3/3 STG and partially met 1/1 LTG. Patient continues to present with a moderate cognitive impairment with deficits in the following areas: functional problem solving and reasoning, memory (immediate, delayed, working), thought organization, basic computational tasks, and sequencing. Based on these deficits, home safety concerns are present when completing home activities (cooking, laundry, cleaning, medication management). Patient's SANTINO Ramirez is aware of deficits and was present for family education session on 9/17. Ashley will assist in management of home activities. Recommendations that patient will likely NOT return to driving in the future, and concerns regarding potential continued cognitive decline have been discussed.  An additional discussion with Ashley was conducted on 9/23 that reviewed how typically patients do function best in their home environment, but that with repeated instruction and visual/verbal cues from staff during her rehab stay, patient continues to demonstrate unsafe transfer techniques which place her at increased risk for falls. POA planning to look into further resources ([employer], Jehovah's witness friends visiting more frequently, Meals on wheels, etc) to increase supervision efforts and allow for patient to remain in her home as long as safely possible. Recommend further  home health services to address cognitive deficits to improve overall home safety. Activity Tolerance:  Patient tolerance of treatment: Good     Assessment/Plan: Patient discharged from Speech Therapy at this time due to completion of inpatient rehab stay . Discharge Disposition: Home with supervision/assistance from [address] [place] [address] . Continued Speech Therapy Services recommended: Yes [facility]        Comprehension: 5 - Patient understands basic needs (hungry/hot/pain)  Expression: 5 - Expresses basic ideas/needs only (hungry/hot/pain)  Social Interaction: 5 - Patient is appropriate with supervision/cues  Problem Solving: 3 - Patient solves simple/routine tasks 50%-74%  Memory: 3 - Patient remembers 50%-74% of the time    Wt Readings from Last 3 Encounters:   09/20/20 149 lb 3.2 oz (67.7 kg)   09/05/20 163 lb (73.9 kg)   12/24/19 160 lb (72.6 kg)     Vitals:    09/23/20 0853 09/23/20 1227 09/23/20 2130 09/24/20 0752   BP: 105/68 113/66 109/60 (!) 107/59   Pulse: 100 85 90 90   Resp: 20 18 16   Temp: 98.7 °F (37.1 °C)  99.1 °F (37.3 °C) 98.8 °F (37.1 °C)   TempSrc: Oral  Oral Oral   SpO2: 92%  95% 92%   Weight:       Height:          Inpatient Rehabilitation Course:   Lyubov Mcarthur is a 65 y.o. female admitted to inpatient rehabilitation on 9/9/2020. Original admission 9/5/2020 after being brought into the emergency department by [facility] for injuries sustained after a syncopal event.   The patient had a fall that occurred while she was walking in the grocery store when she blacked out and when she regained consciousness she found herself lying on the floor with people as needed medications. She had no other concerns or questions at this time. The patient was discharged with stable vital signs with exception of asymptomatic mild hypotension; pain was well controlled at time of discharge. The patient was tolerating a diet at discharge; last bowel movement was on 9/24. Medical course on the inpatient rehabilitation unit was notable for postoperative pain control, signs and symptoms of depression, gait instability, traumatic brain injury, tachycardia, postoperative anemia, iron deficiency, and vitamin D deficiency. The patient was hesitant, or perhaps forgetful to ask for pain medications; at time of discharge she did not feel that she needed pain medications and so she was not prescribed any. She did progress with the therapies ambulating well over 100 feet consistently. She did show signs and symptoms of depression and was agreeable to trialing Prozac, chosen for its stimulating properties in the setting of the patient sleeping most of the time when not engaged in therapies. Her gait instability as measured by Tinetti score still places her in the high risk fall category at time of discharge. She was closely followed by Speech Therapy for her mild traumatic brain injury with slight improvements noted in her cognition at time of discharge. There was also a brief run of tachycardia for which an EKG was ordered which showed sinus tachycardia; this issue resolved without any intervention required. She did have persistent postoperative anemia with essentially stable hemoglobin labs throughout her stay; she did have significant low iron levels of 25 which did improve to 46 by time of discharge with a regimen of ferrous sulfate and vitamin C twice daily with meals. She was discharged with a 30-day prescription for ferrous sulfate and vitamin C twice daily to continue addressing her iron deficiency.   She also had a significantly low vitamin D 25-hydroxy level of 14 on presentation which was addressed with 5000 IU of cholecalciferol 3 times daily and 500 mg of elemental calcium twice daily with meals; vitamin D level improved to 28 at time of discharge and she was provided with a prescription for 3000 IU of cholecalciferol daily to continue addressing her vitamin D level deficits. Of note during her stay on the acute inpatient rehabilitation unit she did have multiple abnormal labs of mildly elevated calcium levels with a normal PTH level; this resolved without issues and her vitamin D supplementation and calcium were continued without any repeat of this issue. Details are provided below. The patient progressed well with the offered therapies and was discharged to home with Home Health Physical Therapy, Occupational Therapy, Speech Therapy, , Nursing and an aide. Issues addressed during rehabilitation stay and medication changes:   Acute/Rehabilitation Problems:  1. Ground-level fall after syncopal event. 2. Acute slightly impacted subcapital fracture of the left femoral neck. 1. Addressed by surgery below. 3. Status post closed reduction and percutaneous screw fixation of the left hip impacted subcapital fracture of the left femoral neck by Dr. Leatha Anna, D.O. on 09/06/2020. EBL minimal.  1. WBAT. 2. Wound care. 3. Pain control. 1. PRN and scheduled Tylenol. 2. Lidoderm patches. 3. Tramadol. Patient still requires encouragement to take her pain medications prior to her therapy sessions. 4. Gait instability. 1. PT/OT. 2. Tinetti 16/28. Improvement to 18/28 on 9/21. Risk Indicators: Less than/equal to 18 = high risk; 19-23 Moderate risk; Greater than/equal to 24 = low risk. 5. Mild TBI/Moderate to severe cognitive deficits. (MOCA) version 8.2 completed. Patient scored 10/30. 1. SLP. 2. Low stimulation TBI guidelines if deemed necessary.   3. As of 9/15 the patient was felt to have a decline in her cognition; her Nancy Soto was queried as to how he perceived her cognition and he stated this is her baseline. 4. Urinalysis was negative for signs of a UTI on 9/15. 6. Iron deficiency anemia. 1. Iron 25 and ferritin 120. Abnormal/normal.  Iron improved to 46 on 9/22. Anticipate patient discharging with 30 days of continuation on current regimen of iron and vitamin C supplementation. 2. Ferrous sulfate and vitamin C twice daily with meals. 3. Hemoglobin remained stable at 9.6 on 9/21.  7. Tachycardia. 1. Patient had an episode of tachycardia on 9/16 with a heart rate of 130 for which an EKG was ordered that showed sinus tachycardia. After receiving on propranolol her heart rate did come down to less than 100 bpm.  Resolved. 8. Nutrition:  Consultation to dietician for nutritional counseling and recommendations. 1. Protein 5.6 and albumin 2.8 on 9/10/2020. Abnormal.  2. Vitamin 25OH level of 14 on 9/9/2020.  3. Cholecalciferol 5000 IU 3 times daily with meals and 500 mg calcium twice daily with meals. Vitamin D on 9/21 has improved to 28. Anticipate patient going home on 3000 IU daily to maintain a normal vitamin D level. 9. Depressed mood with suicidal ideation. 1. Patient agreeable to trial Prozac with a starting dose of 10 mg ordered and patient endorsing understanding that her primary care provider can adjust the medication after discharge depending upon benefits at current dosing. 10. Electrolytes. 1. Normal on 9/21 with exception of.  1. Hyperkalemia with a potassium of 5.4 on 9/14. Potassium decreased to 5.1 on 9/15. Potassium stable at 5.1 on 9/21. 1. Kayexalate 15 mg x 2 ordered on 9/14. 2. Hypercalcemia. 1. Hold supplemental calcium on 9/15. Calcium normal on 9/15 at 10.3. Calcium elevated again at 11.0 on 9/16. Normal at 10.0 on 9/18. Calcium supplementation resumed. Calcium 10.0 on 9/21. Normal.  2. PTH normal at 17.4 on 9/16. 11. Bladder: No issues  12. Bowel: Senna, colace, MOM  13.  Rehabilitation nursing will be involved for bowel, bladder, skin, and pain management. Nursing will also provide education and training to patient and family. 14. Prophylaxis:  DVT: Plavix and aspirin as directed by Orthopedic Surgery. GI: None. .  15.  and case management consultations for coordination of care and discharge planning.     Chronic Problems:  1. Insulin-dependent type 2 diabetes, controlled with hyperglycemia. Last hemoglobin A1c was 5.7 on 9/9/2020.  1. Lantus 20 units every morning. Lantus increased to 24 units starting on 9/16 to improve blood glucose control. 2. Metformin 1000 mg twice daily with meals. 2. Essential tremor. 1. Continue home primidone and propranolol. 3. History of prior myocardial infarction in 2011.  1. ASA 81 mg.  2. Plavix. 4. Hypertension. 1. Propranolol. 5. Hyperlipidemia. 1. Lipitor. 6. Coronary artery disease/History of prior myocardial infarction. 1. ASA 81 mg.  2. Plavix. 3. Lipitor. 7. ISAURA superimposed on CKD 3.  1. Cr/BUN/GFR on 9/9 was 1.1/19/49 which is stable over 9/7 with labs of 1.1/18/49. Mild decrease to 1.2/25/44 on 9/10. Further worsened from 1.3/29/40 on 9/14. Renal function stable over prior at 1.3/25/40 on 9/15. Remains elevated at 1.3/25/40 on 9/18. 2. Encourage fluids. 8. 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 9. Osteoporosis. 1. See plan above for vitamin D and calcium supplementation. The patient participated in an aggressive multidisciplinary inpatient rehabilitation program involving 3 hours per day, 5 days per week of rehabilitation.     Estimated Discharge Date: 9/24   Destination: home health and discharge home with supervision  Services at Discharge: [address], Occupational Therapy, Speech Therapy, , Nursing and an aide 3x week  Is patient appropriate for an outpatient driving evaluation? yes, when appropriate  Equipment at Discharge: RW, [facility], shower chair, grab bars    Diabetic management was maximized with diet and medication options to attempt to achieve blood sugar control between . Hypertension management was undertaken with medication management on any patient with systolic blood pressure greater than 422 or diastolic blood pressure greater than 90. Hyperlipidemia was considered and the patient was started or continued on a statin medication for any LDL>100. Appropriate DVT prophylaxis options were considered throughout rehabilitation stay. DME:  Sara Alston requires a Bedside Commode to complete bathing, toileting, dressing and grooming tasks. Patient requires a Bedside Commode due to Upper Extremity/Lower Extremity Weakness and limited ambulation and is unable to walk to the bathroom at home. Without the Bedside Commode, Sara Alston is at increased risk for falls and would require increased assistance for ADL's and mobility. Consults:   Orthopedic Surgery, Internal Medicine, Family Medicine, Physical Medicine    Significant Diagnostics:     Lab Results   Component Value Date     09/24/2020    K 5.1 09/24/2020     09/24/2020    CO2 28 09/24/2020    BUN 26 (H) 09/24/2020    CREATININE 1.4 (H) 09/24/2020    GLUCOSE 78 09/24/2020    CALCIUM 10.6 (H) 09/24/2020    PROT 5.5 (L) 09/21/2020    LABALBU 3.1 (L) 09/21/2020    BILITOT 0.2 (L) 09/21/2020    ALKPHOS 117 09/21/2020    AST 39 09/21/2020    ALT 21 09/21/2020    LABGLOM 37 (A) 09/24/2020     Recent Labs     09/24/20  0611 09/21/20  0534 09/14/20  1055   WBC 5.1 6.2 7.5   HGB 9.9* 9.6* 10.5*   HCT 32.3* 31.8* 35.1*   MCV 95.6 97.0 95.1    288 281     Results for Kaycee Tamayo (MRN [id number]) as of 9/23/2020 17:39   Ref.  Range 9/9/2020 08:09 9/22/2020 00:34   Iron Latest Ref Range: 50 - 170 ug/dL 25 (L) 46 (L)     Recent Labs     09/22/20  0659 09/23/20  0747 09/24/20  0754   POCGLU 102 93 85     Labs Renal Latest Ref Rng & Units 9/24/2020 9/21/2020 9/18/2020 9/16/2020 9/15/2020   BUN 7 - 22 mg/dL 26(H) 26(H) 25(H) 25(H) 25(H)   Cr 0.4 - 1.2 mg/dL 1.4(H) 1. 3(H) 1. 3(H) 1. 3(H) 1. 3(H)   K 3.5 - 5.2 meq/L 5.1 5.1 4.9 4.6 5.1   Na 135 - 145 meq/L 139 138 139 141 142      Results for Armando Hernandez (MRN [id number]) as of 9/23/2020 17:39   Ref. Range 9/9/2020 08:09 9/21/2020 05:34   Vit D, 25-Hydroxy Latest Ref Range: 30 - 100 ng/ml 14 (L) 28 (L)     Patient Instructions:    See AVS  Follow-up visits: See after visit summary from hospitalization    Discharge Medications:   Sushila Chew   Home Medication Instructions HEA:[id number]    Printed on:09/24/20 1342   Medication Information                      Acetaminophen (TYLENOL PO)  Take  by mouth as needed. aspirin 81 MG EC tablet  Take 81 mg by mouth daily. atorvastatin (LIPITOR) 80 MG tablet  Take 80 mg by mouth daily             Cholecalciferol 75 MCG (3000 UT) TABS  Take 1 tablet by mouth daily             clopidogrel (PLAVIX) 75 MG tablet  Take 1 tablet by mouth daily. ezetimibe (ZETIA) 10 MG tablet  Take 10 mg by mouth daily             ferrous sulfate (IRON 325) 325 (65 Fe) MG tablet  Take 1 tablet by mouth 2 times daily (with meals) for 21 days             FLUoxetine (PROZAC) 10 MG capsule  Take 1 capsule by mouth daily             insulin detemir (LEVEMIR) 100 UNIT/ML injection  Inject 20 Units into the skin daily              lisinopril (PRINIVIL;ZESTRIL) 2.5 MG tablet  Take 2.5 mg by mouth daily             metformin (GLUCOPHAGE) 500 MG tablet  Take 1,000 mg by mouth 2 times daily (with meals). Pantoprazole Sodium (PROTONIX) 40 MG PACK packet  Take 40 mg by mouth daily.                primidone (MYSOLINE) 50 MG tablet  Take 1 tablet by mouth nightly             primidone (MYSOLINE) 50 MG tablet  Take 100 mg by mouth nightly             propranolol (INDERAL LA) 80 MG CR capsule  Take 60 mg by mouth daily              traMADol (ULTRAM) 50 MG tablet  Take 1 tablet by mouth every 6 hours as needed for Pain for up to 7 days. vitamin C (VITAMIN C) 250 MG tablet  Take 1 tablet by mouth 2 times daily (with meals) for 21 days               Controlled substances monitoring: not applicable.      40 minutes spent preparing the patient for discharge    Michelle Mckeon MD

## 2020-09-10 LAB
ALBUMIN SERPL-MCNC: 2.8 G/DL (ref 3.5–5.1)
ALP BLD-CCNC: 90 U/L (ref 38–126)
ALT SERPL-CCNC: 27 U/L (ref 11–66)
ANION GAP SERPL CALCULATED.3IONS-SCNC: 8 MEQ/L (ref 8–16)
AST SERPL-CCNC: 58 U/L (ref 5–40)
BILIRUB SERPL-MCNC: 0.4 MG/DL (ref 0.3–1.2)
BUN BLDV-MCNC: 25 MG/DL (ref 7–22)
CALCIUM SERPL-MCNC: 9.2 MG/DL (ref 8.5–10.5)
CHLORIDE BLD-SCNC: 106 MEQ/L (ref 98–111)
CO2: 23 MEQ/L (ref 23–33)
CREAT SERPL-MCNC: 1.2 MG/DL (ref 0.4–1.2)
ERYTHROCYTE [DISTWIDTH] IN BLOOD BY AUTOMATED COUNT: 13.1 % (ref 11.5–14.5)
ERYTHROCYTE [DISTWIDTH] IN BLOOD BY AUTOMATED COUNT: 45.3 FL (ref 35–45)
GFR SERPL CREATININE-BSD FRML MDRD: 44 ML/MIN/1.73M2
GLUCOSE BLD-MCNC: 131 MG/DL (ref 70–108)
GLUCOSE BLD-MCNC: 239 MG/DL (ref 70–108)
HCT VFR BLD CALC: 32.1 % (ref 37–47)
HEMOGLOBIN: 9.7 GM/DL (ref 12–16)
MCH RBC QN AUTO: 28.6 PG (ref 26–33)
MCHC RBC AUTO-ENTMCNC: 30.2 GM/DL (ref 32.2–35.5)
MCV RBC AUTO: 94.7 FL (ref 81–99)
PLATELET # BLD: 186 THOU/MM3 (ref 130–400)
PMV BLD AUTO: 10.1 FL (ref 9.4–12.4)
POTASSIUM SERPL-SCNC: 4.2 MEQ/L (ref 3.5–5.2)
RBC # BLD: 3.39 MILL/MM3 (ref 4.2–5.4)
SODIUM BLD-SCNC: 137 MEQ/L (ref 135–145)
TOTAL PROTEIN: 5.6 G/DL (ref 6.1–8)
WBC # BLD: 4.9 THOU/MM3 (ref 4.8–10.8)

## 2020-09-10 PROCEDURE — 97110 THERAPEUTIC EXERCISES: CPT

## 2020-09-10 PROCEDURE — 97116 GAIT TRAINING THERAPY: CPT

## 2020-09-10 PROCEDURE — 1180000000 HC REHAB R&B

## 2020-09-10 PROCEDURE — 36415 COLL VENOUS BLD VENIPUNCTURE: CPT

## 2020-09-10 PROCEDURE — 80053 COMPREHEN METABOLIC PANEL: CPT

## 2020-09-10 PROCEDURE — 97162 PT EVAL MOD COMPLEX 30 MIN: CPT

## 2020-09-10 PROCEDURE — 85027 COMPLETE CBC AUTOMATED: CPT

## 2020-09-10 PROCEDURE — 92523 SPEECH SOUND LANG COMPREHEN: CPT

## 2020-09-10 PROCEDURE — 97166 OT EVAL MOD COMPLEX 45 MIN: CPT

## 2020-09-10 PROCEDURE — 6370000000 HC RX 637 (ALT 250 FOR IP): Performed by: PHYSICAL MEDICINE & REHABILITATION

## 2020-09-10 PROCEDURE — 82948 REAGENT STRIP/BLOOD GLUCOSE: CPT

## 2020-09-10 PROCEDURE — 97530 THERAPEUTIC ACTIVITIES: CPT

## 2020-09-10 PROCEDURE — 97535 SELF CARE MNGMENT TRAINING: CPT

## 2020-09-10 PROCEDURE — 94760 N-INVAS EAR/PLS OXIMETRY 1: CPT

## 2020-09-10 PROCEDURE — 6370000000 HC RX 637 (ALT 250 FOR IP): Performed by: INTERNAL MEDICINE

## 2020-09-10 RX ADMIN — ACETAMINOPHEN 650 MG: 325 TABLET ORAL at 05:26

## 2020-09-10 RX ADMIN — FERROUS SULFATE TAB 325 MG (65 MG ELEMENTAL FE) 325 MG: 325 (65 FE) TAB at 09:50

## 2020-09-10 RX ADMIN — METFORMIN HYDROCHLORIDE 1000 MG: 500 TABLET ORAL at 16:31

## 2020-09-10 RX ADMIN — Medication 5000 UNITS: at 11:06

## 2020-09-10 RX ADMIN — ACETAMINOPHEN 650 MG: 325 TABLET ORAL at 13:47

## 2020-09-10 RX ADMIN — INSULIN GLARGINE 20 UNITS: 100 INJECTION, SOLUTION SUBCUTANEOUS at 09:42

## 2020-09-10 RX ADMIN — Medication 5000 UNITS: at 16:31

## 2020-09-10 RX ADMIN — Medication 250 MG: at 16:31

## 2020-09-10 RX ADMIN — DOCUSATE SODIUM 50 MG AND SENNOSIDES 8.6 MG 1 TABLET: 8.6; 5 TABLET, FILM COATED ORAL at 09:51

## 2020-09-10 RX ADMIN — PRIMIDONE 200 MG: 50 TABLET ORAL at 09:50

## 2020-09-10 RX ADMIN — ASPIRIN 81 MG: 81 TABLET ORAL at 09:51

## 2020-09-10 RX ADMIN — METFORMIN HYDROCHLORIDE 1000 MG: 500 TABLET ORAL at 09:52

## 2020-09-10 RX ADMIN — Medication 5000 UNITS: at 09:50

## 2020-09-10 RX ADMIN — FERROUS SULFATE TAB 325 MG (65 MG ELEMENTAL FE) 325 MG: 325 (65 FE) TAB at 16:31

## 2020-09-10 RX ADMIN — Medication 500 MG: at 16:31

## 2020-09-10 RX ADMIN — Medication: at 15:10

## 2020-09-10 RX ADMIN — CLOPIDOGREL BISULFATE 75 MG: 75 TABLET ORAL at 09:51

## 2020-09-10 RX ADMIN — PROPRANOLOL HYDROCHLORIDE 80 MG: 80 CAPSULE, EXTENDED RELEASE ORAL at 09:51

## 2020-09-10 RX ADMIN — ATORVASTATIN CALCIUM 80 MG: 80 TABLET, FILM COATED ORAL at 21:19

## 2020-09-10 RX ADMIN — ACETAMINOPHEN 650 MG: 325 TABLET ORAL at 21:19

## 2020-09-10 RX ADMIN — Medication 250 MG: at 09:50

## 2020-09-10 RX ADMIN — PRIMIDONE 50 MG: 50 TABLET ORAL at 21:19

## 2020-09-10 RX ADMIN — Medication 500 MG: at 09:49

## 2020-09-10 ASSESSMENT — ENCOUNTER SYMPTOMS
COUGH: 0
EYES NEGATIVE: 1
CONSTIPATION: 0
VOICE CHANGE: 0
ALLERGIC/IMMUNOLOGIC NEGATIVE: 1
DIARRHEA: 0
NAUSEA: 0
TROUBLE SWALLOWING: 0
SHORTNESS OF BREATH: 0

## 2020-09-10 ASSESSMENT — PAIN SCALES - GENERAL
PAINLEVEL_OUTOF10: 2
PAINLEVEL_OUTOF10: 0
PAINLEVEL_OUTOF10: 0
PAINLEVEL_OUTOF10: 6
PAINLEVEL_OUTOF10: 2

## 2020-09-10 ASSESSMENT — PAIN DESCRIPTION - LOCATION: LOCATION: HIP

## 2020-09-10 ASSESSMENT — PAIN DESCRIPTION - FREQUENCY: FREQUENCY: CONTINUOUS

## 2020-09-10 ASSESSMENT — PAIN DESCRIPTION - DESCRIPTORS: DESCRIPTORS: ACHING

## 2020-09-10 ASSESSMENT — PAIN DESCRIPTION - ORIENTATION: ORIENTATION: LEFT

## 2020-09-10 ASSESSMENT — PAIN - FUNCTIONAL ASSESSMENT: PAIN_FUNCTIONAL_ASSESSMENT: ACTIVITIES ARE NOT PREVENTED

## 2020-09-10 ASSESSMENT — PAIN DESCRIPTION - ONSET: ONSET: ON-GOING

## 2020-09-10 ASSESSMENT — PAIN DESCRIPTION - PAIN TYPE: TYPE: SURGICAL PAIN

## 2020-09-10 ASSESSMENT — PAIN DESCRIPTION - PROGRESSION: CLINICAL_PROGRESSION: NOT CHANGED

## 2020-09-10 ASSESSMENT — PAIN SCALES - WONG BAKER: WONGBAKER_NUMERICALRESPONSE: 0

## 2020-09-10 NOTE — PLAN OF CARE
Problem: Confusion - Acute:  Goal: Absence of continued neurological deterioration signs and symptoms  Description: Absence of continued neurological deterioration signs and symptoms  9/10/2020 0211 by Kinsey Carrillo LPN  Outcome: Met This Shift  9/9/2020 1520 by Nadine King LPN  Outcome: Ongoing     Problem: Mood - Altered:  Goal: Mood stable  Description: Mood stable  9/10/2020 0211 by Kinsey Carrillo LPN  Outcome: Met This Shift  9/9/2020 1520 by Nadine King LPN  Outcome: Ongoing  Goal: Absence of abusive behavior  Description: Absence of abusive behavior  Outcome: Met This Shift  Goal: Verbalizations of feeling emotionally comfortable while being cared for will increase  Description: Verbalizations of feeling emotionally comfortable while being cared for will increase  Outcome: Met This Shift     Problem: Sleep Pattern Disturbance:  Goal: Appears well-rested  Description: Appears well-rested  Outcome: Met This Shift     Problem: Skin Integrity:  Goal: Will show no infection signs and symptoms  Description: Will show no infection signs and symptoms  Outcome: Met This Shift     Problem: Falls - Risk of:  Goal: Will remain free from falls  Description: Will remain free from falls  9/10/2020 0211 by Kinsey Carrillo LPN  Outcome: Ongoing  9/9/2020 1520 by Nadine King LPN  Outcome: Ongoing  Goal: Absence of physical injury  Description: Absence of physical injury  Outcome: Ongoing     Problem: Confusion - Acute:  Goal: Mental status will be restored to baseline  Description: Mental status will be restored to baseline  Outcome: Ongoing     Problem: Discharge Planning:  Goal: Ability to perform activities of daily living will improve  Description: Ability to perform activities of daily living will improve  Outcome: Ongoing  Goal: Participates in care planning  Description: Participates in care planning  Outcome: Ongoing     Problem: Injury - Risk of, Physical Injury:  Goal: Will remain free from falls  Description: Will remain free from falls  9/10/2020 0211 by Kalli Gan LPN  Outcome: Ongoing  9/9/2020 1520 by Deana Stark LPN  Outcome: Ongoing  Goal: Absence of physical injury  Description: Absence of physical injury  Outcome: Ongoing     Problem: Psychomotor Activity - Altered:  Goal: Absence of psychomotor disturbance signs and symptoms  Description: Absence of psychomotor disturbance signs and symptoms  Outcome: Ongoing     Problem: Skin Integrity:  Goal: Absence of new skin breakdown  Description: Absence of new skin breakdown  Outcome: Ongoing     Problem: Mobility - Impaired:  Goal: Mobility will improve  Description: Mobility will improve  Outcome: Ongoing     Problem: IP BLADDER/VOIDING  Goal: LTG - patient will complete bladder elimination  Outcome: Ongoing     Problem: IP BLADDER/VOIDING  Goal: STG - Patient demonstrates no accidents  Outcome: Not Met This Shift     Problem: IP BLADDER/VOIDING  Goal: STG - Patient demonstrates ability to take care of indwelling catheter  Outcome: Completed   Care plan reviewed with patient. Patient verbalize understanding of the plan of care and contribute to goal setting.

## 2020-09-10 NOTE — CONSULTS
Comprehensive Nutrition Assessment    Type and Reason for Visit:  Initial, Positive Nutrition Screen(Weight Loss/Decreased Appetite/Intake)    Nutrition Recommendations/Plan:   *Recommend a Multivitamin w/minerals daily to aid in healing. *Started Glucerna TID. *Continue current diet. Nutrition Assessment: Pt. nutritionally compromised AEB wounds. At risk for further nutrition compromise r/t increased nutrient needs for wound healing, underlying medical condition (hx of CAD, CKD, DM, HTN, HLD, Osteoporosis). Nutrition recommendations/interventions as per above. Malnutrition Assessment:  Malnutrition Status: At risk for malnutrition (Comment)    Context:  Acute Illness     Findings of the 6 clinical characteristics of malnutrition:  Energy Intake:  No significant decrease in energy intake  Weight Loss:  1 - 1% to 2% over 1 week(-5% in one week per EMR +edema noted)     Body Fat Loss:  No significant body fat loss     Muscle Mass Loss:  No significant muscle mass loss    Fluid Accumulation:  1 - Mild Extremities   Strength:  Not Performed    Estimated Daily Nutrient Needs:  Energy (kcal):  4246-0283 kcal/day (25-30); Weight Used for Energy Requirements:  (70.6 kg on 9/9)     Protein (g):  98+ g/day (1.4+); Weight Used for Protein Requirements: 70.6 kg on 9/9    Nutrition Related Findings:  admit d/t fracture of left femur; pt with wound; pt seen- reports good appetite and intake of meals PTA; per EMR; -5% in one week per EMR +edema noted; po intake appears to vary since admit; pt denies N/V; no BM yet; pt denies difficulty chewing/swallowing food. Labs: Glucose 289 today, Hg 9.7, HgA1C 5.7% on 9/9/20. Rx includes: Vitamin D, Oscal, Iron, Vitamin C, Lantus, Metformin, Senna      Wounds:  Pressure Injury, Stage II(on coccyx)       Current Nutrition Therapies:    DIET CARB CONTROL;     Anthropometric Measures:  · Height: 5' 7\" (170.2 cm)  · Current Body Weight: 155 lb 9.6 oz (70.6 kg)(9/9; +1 LLE edema) · Admission Body Weight: 155 lb 9.6 oz (70.6 kg)(9/9; +1 LLE edema)    · Usual Body Weight: 160 lb (72.6 kg)(per pt report. Per EMR: 163 lb on 9/5/20)     · Ideal Body Weight: 135 lbs  · BMI: 24.4  · BMI Categories: Normal Weight (BMI 22.0 to 24.9) age over 72       Nutrition Diagnosis:   · Increased nutrient needs related to inadequate protein-energy intake as evidenced by wounds    Nutrition Interventions:   Food and/or Nutrient Delivery:  Continue Current Diet, Start Oral Nutrition Supplement, Vitamin Supplement  Nutrition Education/Counseling:  Education initiated   Coordination of Nutrition Care:  Continued Inpatient Monitoring    Goals:  Pt will consume 75% or more of meals to aid in healing or weight loss       Nutrition Monitoring and Evaluation:   Food/Nutrient Intake Outcomes:  Food and Nutrient Intake, Supplement Intake, Vitamin/Mineral Intake  Physical Signs/Symptoms Outcomes:  Biochemical Data, Fluid Status or Edema, Weight, Nutrition Focused Physical Findings, Skin     Discharge Planning:     Too soon to determine     Electronically signed by Aggie Cox RD, LD on 9/10/20 at 1:12 PM EDT    Contact: [phone]

## 2020-09-10 NOTE — PROGRESS NOTES
[facility]  INPATIENT PHYSICAL THERAPY  DAILY NOTE  [address] [facility] - [id number]      Time In: 1400  Time Out: 1439  Timed Code Treatment Minutes: 44 Minutes  Minutes: 39          Date: 9/10/2020  Patient Name: Sylvester Garcia,  Gender:  female        MRN: [id number]  : 1950  (65 y.o.)     Referring Practitioner: DAVID Muhammad MD  Diagnosis: subcapital fracture of left femur s/p repair  Additional Pertinent Hx: Per H&P, pt is a 65 y.o. female with a past medical history of diabetes and prior MI who presents to the emergency department for evaluation of syncope. Patient states that she was at the grocery store this morning shortly prior to arrival, and upon leaving the grocery store she felt acutely short of breath, and lost consciousness. She reports that she fell onto her left side, and landed on her elbow and the back of her head. She endorses losing consciousness after she fell, and bystanders report that she was dazed. She regained consciousness shortly later, however she reports that she has not been ambulatory since her fall. In the emergency department she is complaining of head pain, shoulder pain, elbow pain, and left hip pain. She is additionally complaining of swelling to her left posterior scalp. Pt is now s/p HIP PINNING on 2020 by Dr Lynette Palma. To IPR on      Prior Level of Function:  Lives With: Alone  Type of Home: Apartment  Home Layout: One level  Home Access: Level entry  Home Equipment: Quad cane   Bathroom Shower/Tub: Tub/Shower unit, Walk-in shower(Pt prefers the walk in shower.)  Bathroom Toilet: Standard(uses a countertop on Right side.)  Bathroom Accessibility: Accessible    Receives Help From: Friend(s)  ADL Assistance: Independent  Homemaking Assistance: Independent  Homemaking Responsibilities: Yes  Ambulation Assistance: Independent  Transfer Assistance: Independent  Active :  Yes  Additional Comments: Pt did not use any AD for ambulating. She did her own homemaking and cooking. reports walking daily PTA. Has a  1 x month    Restrictions/Precautions:  Restrictions/Precautions: Weight Bearing, General Precautions, Fall Risk  Left Lower Extremity Weight Bearing: Weight Bearing As Tolerated    SUBJECTIVE: pt up in chair on arrival agreeable for therapy but did c/o of fatigue, after she was in bed she requested to get back upto the bathroom, pt demonstrated some difficulty with remembering things and she was aware of this during session     PAIN: 5/10: at left LE     OBJECTIVE:  Bed Mobility:  Supine to Sit: Moderate Assistance, at trunk and assist at left LE, once sitting pt needed min assist to scoot to edge of bed   Sit to Supine: Minimal Assistance, at left LE, then 1/2 bridges to reposition hips and once in bed she needed min assist at shoulders      Transfers:  Sit to Stand: Minimal Assistance, cues for hand placement 100% of time   Stand to [facility] 68 cues for hand placement     Ambulation:  Contact Guard Assistance  Distance: 45x1, 10x1  Surface: Level Tile  Device:Rolling Walker  Gait Deviations:  Slow lelo with decreased stance at left LE, she demonstrated a heavy reliance on walker with shortened step length at right LE,     Balance:  Standing in bathroom she was able to release an UE to complete clothing management with CGA for balance     Exercise:  Patient was guided in 1 set(s) 10 reps of exercise to both lower extremities. Ankle pumps, Glut sets, Quad sets, Heelslides, Short arc quads, Hip abduction/adduction, Long arc quads and with assist at left LE . Exercises were completed for increased independence with functional mobility. Functional Outcome Measures: Not completed      ASSESSMENT:  Assessment: Patient progressing toward established goals. Activity Tolerance:  Patient tolerance of  treatment: fair.         Equipment Recommendations:Equipment Needed: Yes(RW)  Discharge

## 2020-09-10 NOTE — PLAN OF CARE
Problem: DISCHARGE BARRIERS  Goal: Patient's continuum of care needs are met  Note:   [facility]  Physical Medicine Case Management Assessment    [x] Inpatient Rehabilitation Unit  [] Transitional Care Unit    Patient Name: Maribel Banks        MRN: [id number]    : 1950  (65 y.o.)  Gender: female     Date of Admission: 2020     Family/Social/Home Environment: Prior to hospitalization, patient indicates independence with ADL's and personal care. Patient lives alone. Patient reports enjoying \"getting out\" and \"doing things\". Patient was not using any medical equipment, but reports having access to a quad cane. Patient reports ability to drive, complete errands, manage own finances, manage own medications, meal preparation, and laundry. Patient reports having a cleaning lady once a month. Patient reports managing own insulin at home, but admits to not checking her blood sugar very often or often enough. Patient reports plan to look into an emergency alert button upon discharge. Patient reports primary support to be provided by friend/Ashley DE. Patient reports talking to friend, Soo Francois, on a daily basis and visiting three to four times a week. Patient will not have twenty four hour support available at discharge. Social/Functional History  Lives With: Alone  Type of Home: House  Home Layout: One level  Home Access: Level entry  Home Equipment: Quad cane  Ambulation Assistance: Independent  Transfer Assistance: Independent  Active : Yes  Mode of Transportation: Car  Occupation: Retired  Leisure & Hobbies: Gardening, reading, getting her hair done; partcipating in activities at [employer]  Additional Comments: Pt did not use any AD for ambulating. She did her own homemaking and cooking. reports walking daily PTA    Contact/Guardian Information: Terry Lucio, [phone]. Community Resources Utilized: No community resources utilized prior to admission. Sexuality/Intimacy: No issues or concerns identified at time of SW assessment. Complementary Health Approaches: Patient with no current interest in complementary health approaches at time of SW assessment. Anticipated Needs/Discharge Plans: Anticipate patient would benefit from continued therapy upon discharge home. SW met with patient to introduce self and explain role, complete SW assessment, and initiate discharge planning. Prior to hospitalization, patient indicates independence with ADL's and personal care. Patient lives alone. Patient reports enjoying \"getting out\" and \"doing things\". Patient was not using any medical equipment, but reports having access to a quad cane. Patient reports ability to drive, complete errands, manage own finances, manage own medications, meal preparation, and laundry. Patient reports having a cleaning lady once a month. Patient reports managing own insulin at home, but admits to not checking her blood sugar very often or often enough. Patient reports plan to look into an emergency alert button upon discharge. Patient reports primary support to be provided by friend/Ashley DE. Patient reports talking to friend, Juanis Bae, on a daily basis and visiting three to four times a week. Patient will not have twenty four hour support available at discharge. Anticipate patient would benefit from continued therapy upon discharge home. SW reviewed with patient team conference on Tuesday, 09/15/2020, to further discuss progress and discharge recommendations. SW to follow and maintain involvement in discharge planning.            Discharge Planning  Living Arrangements: Alone  Support Systems: Other (Comment), Friends/Neighbors(SANTINO Pizarro)  Potential Assistance Needed: Outpatient PT/OT  Potential Assistance Purchasing Medications: No  Meds-to-Beds: Does the patient want to have any new prescriptions delivered to bedside prior to discharge?: No(Patient prefers to use [facility] on [address] for discharge medications.)  Type of Home Care Services: None  Patient expects to be discharged to[de-identified] home  Expected Discharge Date: (Undetermined)  Follow Up Appointment: Best Day/Time : Monday AM      Electronically signed by WANG Wilkerson on 9/10/2020 at 8:53 AM

## 2020-09-10 NOTE — PROGRESS NOTES
RECREATIONAL THERAPY  MISSED TREATMENT    []Transitional Care Unit  [x]Inpatient Rehabilitation    Date:  9/10/2020            Patient Name: Tone Membreno           MRN: [id number]  Acct: [de-identified]          YOB: 1950 (65 y.o.)       Gender: female   Diagnosis: subcapital fracture of the left femur , s/p repair  Physician: Referring Practitioner: Dr. Rochelle Pitt:    []Hold treatment per nursing request  []Patient refusal  []Family declined treatment  []Patient at testing and/or off the floor  []Patient unavailable, with PT/OT/nursing, etc.  [x]Other after lunch pt was having visitors come in to visit and she declined coming to play bingo with peers   Adriana Braswell, [address]    9/10/2020

## 2020-09-10 NOTE — PROGRESS NOTES
Physical Medicine & Rehabilitation  Progress Note    Chief Complaint:  left femoral neck impacted subcapital fracture    Subjective: Current level of pain is stated to be 4/10 at rest.  She states that she is happy that she made the decision to come to the acute inpatient rehabilitation unit and feels that the staff is been very nice and she is benefiting from the offered therapies. Her labs from today were discussed including her mildly decreased hemoglobin. Additionally she was again reminded to make sure she takes her pain medications before she starts her therapy day. She had no other concerns or questions at this time. Rehabilitation:   Physical Therapy:  OBJECTIVE:  Bed Mobility:  Supine to Sit: Moderate Assistance, at trunk and assist at left LE, once sitting pt needed min assist to scoot to edge of bed   Sit to Supine: Minimal Assistance, at left LE, then 1/2 bridges to reposition hips and once in bed she needed min assist at shoulders       Transfers:  Sit to Stand: Minimal Assistance, cues for hand placement 100% of time   Stand to [facility] 68 cues for hand placement      Ambulation:  Contact Guard Assistance  Distance: 45x1, 10x1  Surface: Level Tile  Device:Rolling Walker  Gait Deviations:  Slow lelo with decreased stance at left LE, she demonstrated a heavy reliance on walker with shortened step length at right LE,      Balance:  Standing in bathroom she was able to release an UE to complete clothing management with CGA for balance      Exercise:  Patient was guided in 1 set(s) 10 reps of exercise to both lower extremities. Ankle pumps, Glut sets, Quad sets, Heelslides, Short arc quads, Hip abduction/adduction, Long arc quads and with assist at left LE . Exercises were completed for increased independence with functional mobility.     Functional Outcome Measures: Not completed    ASSESSMENT:  Assessment: Patient progressing toward established goals.   Activity Tolerance: Patient tolerance of  treatment: fair. Equipment Recommendations:Equipment Needed: Yes(RW)  Discharge Recommendations:   Continue to assess pending progress    Occupational Therapy:  Cognition/Orientation:  min cues for problem solving and orientation, min cues for thoroughness and attention to task     ADL's:      EATING:Supervision or touching assistance. ,  CARE Score: 4. ORAL HYGIENE:Substantial/maximal assistance. Pt able to apply / remove dentures. Assist for cleaning , denture tablet and rinsing. Barrera Sarkar CARE Score: 2. TOILETING HYGIENE:Substantial/maximal assistance. assist for wiping for thoroughness, mod A for clothing mgt up and down. min unsteadiness in standing . CGA to light min A , leaning posteriorly. CARE Score: 2. SHOWERING/BATHING:Partial/moderate assistance. assist for B feet and bottom. min cues for organization and thoroughness. Barrera Sarkar CARE Score: 3.     UPPER BODY DRESSING:Partial/moderate assistance. min A for shirt. seated . Barrera Sarkar CARE Score: 3. LOWER BODY DRESSING:Substantial/maximal assistance. assist for pants over feet, and shoes. CGA to light min A for standign to pull pants up with min A.. CARE Score: 2. FOOTWEAR:Dependent   . CARE Score: 1.     TOILET TRANSFER: Partial/moderate assistance  min A sit to stand and stand to sit to an ETS with mod cues for safe hand placement. CARE Score: 3        Functional Mobility:    Functional Mobility  Functional - Mobility Device: Rolling Walker  Activity: To/from bathroom  Assist Level: Minimal assistance  Functional Mobility Comments: CGA to min A for functional mobility and min cues for safe tech.  ambulated in room x 2nd trial to and from the BR for grooming tasks      Balance:  Balance  Sitting Balance: Stand by assistance  Standing Balance: Minimal assistance     Transfers:  Sit to stand: Minimal assistance  Stand to sit: Minimal assistance  Transfer Comments: mod cues for safe hand placement,  Toilet recall, orientation, auditory comprehension, thought organization, and abstract thinking. Pt demonstrates [facility] that [facility] for simple commands/directions/tasks, but presents with increasing difficulty as tasks become more complex. Pt demonstrates expressive language skills to be [facility] for simple ideas/information but often experiences word finding deficits and decreased processing speed when  expressing complex ideas/information. Voice and speech are judged to be [facility]. Pt would benefit from further continued speech therapy services to further address the above barriers and deficits. There is some concern at this time, in regards to severity of cognitive deficits, to patient's safety with driving and independent living. Will further monitor and provide recommendations as warranted. Rehabilitation Potential: good    Review of Systems:  Review of Systems   Constitutional: Positive for activity change. Negative for appetite change, fatigue and fever. HENT: Negative for trouble swallowing and voice change. Eyes: Negative. Respiratory: Negative for cough and shortness of breath. Cardiovascular: Positive for leg swelling. Gastrointestinal: Negative for constipation, diarrhea and nausea. Endocrine: Negative for cold intolerance. Genitourinary: Positive for urgency. Negative for frequency. Musculoskeletal: Positive for arthralgias, gait problem and myalgias. Skin: Negative for pallor. Allergic/Immunologic: Negative. Neurological: Positive for tremors and weakness. Negative for dizziness, light-headedness and headaches. Hematological: Negative. Psychiatric/Behavioral: Positive for decreased concentration. Negative for confusion and dysphoric mood. The patient is not nervous/anxious. All other systems reviewed and are negative.      Objective:  BP (!) 109/57   Pulse 106   Temp 98.4 °F (36.9 °C) (Oral)   Resp 18   Ht 5' 7\" (1.702 m)   Wt 155 lb 9.6 oz (70.6 kg)   SpO2 AM   Result Value Ref Range    Sodium 137 135 - 145 meq/L    Potassium 4.2 3.5 - 5.2 meq/L    Chloride 106 98 - 111 meq/L    CO2 23 23 - 33 meq/L    Glucose 131 (H) 70 - 108 mg/dL    BUN 25 (H) 7 - 22 mg/dL    CREATININE 1.2 0.4 - 1.2 mg/dL    Calcium 9.2 8.5 - 10.5 mg/dL    AST 58 (H) 5 - 40 U/L    Alkaline Phosphatase 90 38 - 126 U/L    Total Protein 5.6 (L) 6.1 - 8.0 g/dL    Alb 2.8 (L) 3.5 - 5.1 g/dL    Total Bilirubin 0.4 0.3 - 1.2 mg/dL    ALT 27 11 - 66 U/L   Anion Gap    Collection Time: 09/10/20  6:17 AM   Result Value Ref Range    Anion Gap 8.0 8.0 - 16.0 meq/L   Glomerular Filtration Rate, Estimated    Collection Time: 09/10/20  6:17 AM   Result Value Ref Range    Est, Glom Filt Rate 44 (A) ml/min/1.73m2   POCT glucose    Collection Time: 09/10/20  9:39 AM   Result Value Ref Range    POC Glucose 239 (H) 70 - 108 mg/dl     Labs Renal Latest Ref Rng & Units 9/10/2020 9/9/2020 9/7/2020 9/6/2020 9/5/2020   BUN 7 - 22 mg/dL 25(H) 19 18 22 28(H)   Cr 0.4 - 1.2 mg/dL 1.2 1.1 1.1 1.1 1.4(H)   K 3.5 - 5.2 meq/L 4.2 4.6 4.5 4.3 5.1   Na 135 - 145 meq/L 137 142 140 137 140      Recent Labs     09/10/20  0617 09/09/20  0809 09/08/20  0616 09/07/20  0658   WBC 4.9 6.6  --  5.9   HGB 9.7* 11.1* 10.2* 9.9*   HCT 32.1* 35.7* 32.6* 33.4*   MCV 94.7 92.5  --  97.1    227  --  128*      Impression:  1. Ground-level fall after syncopal event. 2. Acute slightly impacted subcapital fracture of the left femoral neck. 3. Status post closed reduction and percutaneous screw fixation of the left hip impacted subcapital fracture of the left femoral neck by Dr. Viviane Cedillo, D.O. on 09/06/2020. EBL minimal.  4. Gait instability. 5. Mild TBI. 6. Iron deficiency anemia. 7. Vitamin D deficiency. 8. Insulin-dependent type 2 diabetes, controlled with hyperglycemia. Last hemoglobin A1c was 5.7 on 9/9/2020.  9. Essential tremor.   10. History of prior myocardial infarction in 2011.  11. Hypertension. 12. Hyperlipidemia. 13. Coronary artery disease. 14. History of prior myocardial infarction. 15. CKD 3.  16. 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 17. Osteoporosis. 18. Moderate to severe cognitive deficits. (MOCA) version 8.2 completed. Patient scored 10/30.     Plan:      Medical management: Per primary team and Dr. Maria R Trevino, Internal Medicine, Physical Medicine     Narcotic usage: Tramadol last 24 hour usage pending  Last BM:   Stool Amount: Small (09/10/20 1132)     FUNCTIONAL OUTCOMES TOOLS:    GODINEZ -      Tinetti - Balance Score: 10  Gait Score: 6  Tinetti Total Score: 16    TUG -       Acute/Rehabilitation Problems:  1. Ground-level fall after syncopal event. 2. Acute slightly impacted subcapital fracture of the left femoral neck. 1. Addressed by surgery below. 3. Status post closed reduction and percutaneous screw fixation of the left hip impacted subcapital fracture of the left femoral neck by Dr. Lizeth Means DCHARITY on 09/06/2020. EBL minimal.  1. WBAT. 2. Wound care. 3. Pain control. 1. PRN and scheduled Tylenol. 2. Lidoderm patches. 3. Tramadol. 4. Gait instability. 1. PT/OT. 2. Tinetti 16/28. Risk Indicators: Less than/equal to 18 = high risk; 19-23 Moderate risk; Greater than/equal to 24 = low risk. 5. Mild TBI/Moderate to severe cognitive deficits. (MOCA) version 8.2 completed. Patient scored 10/30. 1. SLP. 2. Low stimulation TBI guidelines if deemed necessary. 6. Iron deficiency anemia. 1. Iron 25 and ferritin 120. Abnormal/normal.  2. Ferrous sulfate and vitamin C twice daily with meals. 7. Nutrition:  Consultation to dietician for nutritional counseling and recommendations. 1. Protein 5.6 and albumin 2.8 on 9/10/2020. Abnormal.  2. Vitamin 25OH level of 14 on 9/9/2020.  3. Cholecalciferol 5000 IU 3 times daily with meals and 500 mg calcium twice daily with meals. 8. Electrolytes.   1. Normal on 9/10.  9. Bladder: No issues  10. Bowel: Senna, colace, MOM  11. Rehabilitation nursing will be involved for bowel, bladder, skin, and pain management. Nursing will also provide education and training to patient and family. 12. Prophylaxis:  DVT: Plavix and aspirin as directed by Orthopedic Surgery. GI: None. .  13.  and case management consultations for coordination of care and discharge planning.     Chronic Problems:  1. Insulin-dependent type 2 diabetes, controlled with hyperglycemia. Last hemoglobin A1c was 5.7 on 9/9/2020.  1. Lantus 20 units every morning. 2. Metformin 1000 mg twice daily with meals. 2. Essential tremor. 1. Continue home primidone and propranolol. 3. History of prior myocardial infarction in 2011.  1. ASA 81 mg.  2. Plavix. 4. Hypertension. 1. Propranolol. 5. Hyperlipidemia. 1. Lipitor. 6. Coronary artery disease/History of prior myocardial infarction. 1. ASA 81 mg.  2. Plavix. 3. Lipitor. 7. CKD 3.  1. Cr/BUN/GFR on 9/9 was 1.1/19/49 which is stable over 9/7 with labs of 1.1/18/49. Mild decrease to 1.2/25/44 on 9/10.  2. Encourage fluids. 8. 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 9. Osteoporosis. 1. See plan above for vitamin D and calcium supplementation.     Labs reviewed on:  1. 9/8.  2. 9/9.  3. 9/10.     Infectious Disease:  1. N/A     Missed Therapy Time:  None     DME:    Discharge Plan:      Greater than 50% of 30 minutes was spent with the patient reviewing her progress with her first day of therapy, the findings from her speech therapy evaluation, and encouragement to use her medications appropriately so that she can tolerate her offered therapies. An additional 10 minutes of patient related documentation for completion of the IPOC was in addition to the time previously noted.       Alka Solomon MD

## 2020-09-10 NOTE — PROGRESS NOTES
Level entry  Home Equipment: Quad cane    LANGUAGE:  Receptive:  2 Step Commands: 4/4  Complex Yes/No Questions: 3/4    Expressive:  Confrontational Naming: 3/3  Responsive Naming: 3/4    COGNITION:  Jordan Cognitive Assessment (MOCA) version 8.2 completed. Pt scored 10/30. Normal is greater than or equal to 26/30. Orientation: 2/6  Immediate Recall: 3/5  Short-Term Recall: 3/5  Divergent Naming: 3/3  Sequencin/4  Attention: 26  Math Computation: 0/  Executive Functionin/5    SWALLOWING:  Current Diet: Regular diet with thin liquids   WNL    Comprehension: 4 - Patient understands basic needs 75-90%+ of the time  Expression: 5 - Expresses basic ideas/needs only (hungry/hot/pain)  Social Interaction: 5 - Patient is appropriate with supervision/cues  Problem Solving: 3 - Patient solves simple/routine tasks 50%-74%  Memory: 2 - Patient remembers 25%-49% of the time    RECOMMENDATIONS/ASSESSMENT:  DIAGNOSTIC IMPRESSIONS:  Pt presents with moderate to severe cognitive deficits characterized by impaired skills in executive functioning, immediate and delayed recall, orientation, auditory comprehension, thought organization, and abstract thinking. Pt demonstrates receptive language skills that are [facility] for simple commands/directions/tasks, but presents with increasing difficulty as tasks become more complex. Pt demonstrates expressive language skills to be [facility] for simple ideas/information but often experiences word finding deficits and decreased processing speed when  expressing complex ideas/information. Voice and speech are judged to be [facility]. Pt would benefit from further continued speech therapy services to further address the above barriers and deficits. There is some concern at this time, in regards to severity of cognitive deficits, to patient's safety with driving and independent living. Will further monitor and provide recommendations as warranted.    Rehabilitation Potential: good    EDUCATION:  Learner: Patient  Education:  Reviewed recommendations for follow-up and Education Related to Potential Risks and Complications Due to Impairment/Illness/Injury  Evaluation of Education: Verbalizes understanding, Needs further instruction and Family not present    PLAN:  Skilled SLP intervention on IP Rehab or TCU 60 minutes per day ,BID, 5 days per week. Specific interventions for next session may include: cognitive skills    PATIENT GOAL:    Did not state. Will further assess during treatment. SHORT TERM GOALS:  Short-term Goals  Timeframe for Short-term Goals: 1 week  Goal 1: Pt will complete orientation, immediate/delayed recall and working memory tasks with 70% accuracy given mod cues to improve retention of new and functional information. Goal 2: Pt will complete functional sequencing and thought organizational tasks with 70% accuracy given mod cues to improve overall executive functioning skills. Goal 3: Patient will complete functional problem solving, reasoning and basic computational tasks with 70% accuracy given mod cues to improve overall management of ADLs. Goal 4: Pt will complete functional problem solving and computational tasks wtih 70% accuracy given mod cues to improve overall management of ADL's. LONG TERM GOALS:  Long-term Goals  Timeframe for Long-term Goals: 3 weeks  Goal 1: Pt will improve cognitive linguistic skills to a min assist level in order to improve level of independence in the home environment. [address] \[facility] [place] [facility]\"" Speech Intern  Belia Baig.  [address] [facility], [facility] [address], [address]

## 2020-09-10 NOTE — PROGRESS NOTES
[address] [address]  Recreational Therapy  Evaluation  Inpatient Rehabilitation Unit      Time Spent with Patient: 40 minutes    Date:  9/10/2020       Patient Name: Jeromy Parks      MRN: [id number]       YOB: 1950 (71 y.o.)       Gender: female          RESTRICTIONS/PRECAUTIONS:  Restrictions/Precautions: Weight Bearing, General Precautions, Fall Risk  Vision: Impaired  Hearing: Within functional limits    PAIN: 8    SUBJECTIVE:  Pt lives alone-her  passed away several years ago and she has no children-she has no siblings but has great friends close by    VISION:  Glasses-her glasses are not here and she is going to ask her good friend Steve Richey if she has them-she can see without them but she cannot read without them    HEARING: Within Normal Limit    LEISURE INTERESTS:   Pt states she was independent with her cooking and cleaning, she states she has good friends close by and they enjoy going out to eat, visiting and watching movies-she gets her hair done weekly and told her about our beautician here each week, she enjoys her Worship and Worship activities-gave her some adult coloring sheets and colored pencils to use in free time-feels she is not up to any word puzzles yet due to her hitting her head-she does repeat herself during conversation-she is very pleasant and social-she enjoys reading but does not have her glasses here and will ask friend if she has them-    BARRIERS TO LEISURE INTERESTS:    Decreased endurance, Impaired vision and Pain           Patient Education  New Education Provided: Importance of Leisure, RT Plan of Care    Plan:  Continue to follow patient through this admission  See patient individually    Electronically signed by: NISHA Daniels  Date: 9/10/2020

## 2020-09-10 NOTE — PROGRESS NOTES
Bathroom Shower/Tub: Tub/Shower unit, Walk-in shower(Pt prefers the walk in shower.)  Bathroom Toilet: Standard(uses a countertop on Right side.)  Bathroom Accessibility: Accessible    Receives Help From: Friend(s)  ADL Assistance: Independent  Homemaking Assistance: Independent  Homemaking Responsibilities: Yes  Ambulation Assistance: Independent  Transfer Assistance: Independent    Active : Yes  Mode of Transportation: Car  Occupation: Retired  Leisure & Hobbies: Gardening, reading, getting her hair done; partcipating in activities at [employer]  Additional Comments: Pt did not use any AD for ambulating. She did her own homemaking and cooking. reports walking daily PTA. Has a  1 x month    Cognition/Orientation:   min cues for problem solving and orientation, min cues for thoroughness and attention to task       ADL's:      EATING:Supervision or touching assistance. ,  CARE Score: 4. ORAL HYGIENE:Substantial/maximal assistance. Pt able to apply / remove dentures. Assist for cleaning , denture tablet and rinsing. Tonye Cogan CARE Score: 2. TOILETING HYGIENE:Substantial/maximal assistance. assist for wiping for thoroughness, mod A for clothing mgt up and down. min unsteadiness in standing . CGA to light min A , leaning posteriorly. CARE Score: 2. SHOWERING/BATHING:Partial/moderate assistance. assist for B feet and bottom. min cues for organization and thoroughness. Tonye Cogan CARE Score: 3.     UPPER BODY DRESSING:Partial/moderate assistance. min A for shirt. seated . Tonye Cogan CARE Score: 3. LOWER BODY DRESSING:Substantial/maximal assistance. assist for pants over feet, and shoes. CGA to light min A for standign to pull pants up with min A.. CARE Score: 2. FOOTWEAR:Dependent   . CARE Score: 1.     TOILET TRANSFER: Partial/moderate assistance  min A sit to stand and stand to sit to an ETS with mod cues for safe hand placement.   CARE Score: 3         Functional Mobility:       Functional Mobility  Functional - Mobility Device: Rolling Walker  Activity: To/from bathroom  Assist Level: Minimal assistance  Functional Mobility Comments: CGA to min A for functional mobility and min cues for safe tech. ambulated in room x 2nd trial to and from the BR for grooming tasks     Balance:  Balance  Sitting Balance: Stand by assistance  Standing Balance: Minimal assistance    Transfers:  Sit to stand: Minimal assistance  Stand to sit: Minimal assistance  Transfer Comments: mod cues for safe hand placement,  Toilet Transfers  Equipment Used: Raised toilet seat with rails  Toilet Transfer: Minimal assistance    Upper Extremity Assessment:   LUE AROM : WFL  Left Hand AROM: WFL       LUE Strength  LUE Strength Comment: min deconditioned throughout B UEs. 4-/5, intention tremor noted throughout B UEs            Activity Tolerance: Patient Tolerated treatment well       Assessment:  Assessment: Pt presents with a decline in independent functioning due to a recent fall with hip fracture. She currently requires max A for LE ADLs, min A for safe transfers and short distance ambulation with the RW. THis is a decline from being independent with ADLs, and homemaking tasks . Pt would benefit from additional skilled OT services to address increasing independence in self care and functional mobility to return home as independently as possibe to reduce incidence of falls and caregiver burdon. Performance deficits / Impairments: Decreased functional mobility , Decreased ADL status, Decreased endurance, Decreased safe awareness, Decreased high-level IADLs, Decreased balance, Decreased strength  Prognosis: Good  Decision Making: Medium Complexity    Treatment Initiated: Treatment and education initiated within context of evaluation.   Evaluation time included review of current medical information, gathering information related to past medical, social and functional history, completion of standardized testing, formal and informal with no cues for safe and adapted tech to increase independence in self care tasks  Long term goal 2: Pt will complete 2 step homemaking tasks with no cues for safe tech and S for problem solving to increase independence in light homemaking tasks  See long-term goal time frame for expected duration of plan of care. If no long-term goals established, a short length of stay is anticipated. Following session, patient left in safe position with all fall risk precautions in place.

## 2020-09-10 NOTE — FLOWSHEET NOTE
09/10/20 1445   Vital Signs   Orthostatic B/P and Pulse?  Yes   Blood Pressure Lying 96/57   Pulse Lying 84 PER MINUTE   Blood Pressure Sitting 98/60   Pulse Sitting 88 PER MINUTE   Blood Pressure Standing 109/57   Pulse Standing 106 PER MINUTE

## 2020-09-10 NOTE — PROGRESS NOTES
[facility]  INPATIENT PHYSICAL THERAPY  EVALUATION  [address] [facility] - [id number]    Time In: 0700  Time Out: 0800  Timed Code Treatment Minutes: 39 Minutes  Minutes: 60          Date: 9/10/2020  Patient Name: Maribel Banks,  Gender:  female        MRN: [id number]  : 1950  (65 y.o.)      Referring Practitioner: DAVID Pederson MD  Diagnosis: subcapital fracture of left femur s/p repair  Additional Pertinent Hx: Per H&P, pt is a 65 y.o. female with a past medical history of diabetes and prior MI who presents to the emergency department for evaluation of syncope. Patient states that she was at the grocery store this morning shortly prior to arrival, and upon leaving the grocery store she felt acutely short of breath, and lost consciousness. She reports that she fell onto her left side, and landed on her elbow and the back of her head. She endorses losing consciousness after she fell, and bystanders report that she was dazed. She regained consciousness shortly later, however she reports that she has not been ambulatory since her fall. In the emergency department she is complaining of head pain, shoulder pain, elbow pain, and left hip pain. She is additionally complaining of swelling to her left posterior scalp. Pt is now s/p HIP PINNING on 2020 by Dr Cristóbal Sanchez.  To IPR on      Restrictions/Precautions:  Restrictions/Precautions: Weight Bearing, General Precautions, Fall Risk  Left Lower Extremity Weight Bearing: Weight Bearing As Tolerated    Subjective:  Chart Reviewed: Yes  Patient assessed for rehabilitation services?: Yes  Subjective: pt in chair on arrival, pleasant and agrees to therapy evaluation    General:  Overall Orientation Status: Within Functional Limits  Follows Commands: Within Functional Limits    Vision: Impaired  Vision Exceptions: Wears glasses for reading    Hearing: Within functional limits         Pain: 3/10: L hip at end of session    Social/Functional History:    Lives With: Alone  Type of Home: House  Home Layout: One level  Home Access: Level entry  Home Equipment: Quad cane                   Ambulation Assistance: Independent  Transfer Assistance: Independent    Active : Yes  Mode of Transportation: Car  Occupation: Retired  Leisure & Hobbies: Gardening, reading, getting her hair done; partcipating in activities at [employer]  Additional Comments: Pt did not use any AD for ambulating. She did her own homemaking and cooking. reports walking daily PTA    OBJECTIVE:  Range of Motion:  Bilateral Lower Extremity: WFL    Strength:  Right Lower Extremity: WFL  Left Lower Extremity: Impaired - hip flexion 3/5, knee extension and flexion 4-/5, ankle WFL    Balance:  Dynamic Standing Balance: Contact Guard Assistance, with verbal cues , with increased time for completion, in restroom able to gilbert depends and shorts with cues for safety, B UE release. also able to april hands at sink and brush hair with increased time and mild instability during BUE release.  education provided on single UE release and increased stability, pt verbalized agreement    Bed Mobility:  Rolling to Left: Contact Guard Assistance   Rolling to Right: Contact Guard Assistance   Supine to Sit: Contact Guard Assistance, with verbal cues , with increased time for completion  Sit to Supine: Contact Guard Assistance, with verbal cues , with increased time for completion   Scooting: Contact Guard Assistance, with increased time for completion    Transfers:  Sit to Stand: [address], cues for hand placement  Stand to [facility], cues for hand placement  -Poor carryover of hand placement throughout session    Ambulation:  [address], with verbal cues , with increased time for completion, cues for upright trunk and to maintain close proximity to walker  Distance: 15ft to restroom, 40ft  Surface: Level Tile  Device:Rolling Walker  Gait Deviations: Forward Flexed Posture, Slow Susanna, Decreased Step Length Bilaterally, Decreased Weight Shift Left, Decreased Gait Speed, Narrow Base of Support, Mild Path Deviations and Unsteady Gait    Exercise:  Patient was guided in 1 set(s) 10 reps of exercise to both lower extremities. Ankle pumps, Glut sets, Heelslides, Short arc quads and Hip abduction/adduction. Exercises were completed for increased independence with functional mobility. Functional Outcome Measures: Completed  Balance Score: 10  Gait Score: 6  Tinetti Total Score: 16  Risk Indicators:  Less than/equal to 18 = high risk  19-23 Moderate risk  Greater than/equal to 24 = low risk    ASSESSMENT:  Activity Tolerance:  Patient tolerance of  treatment: good. Motivated throughout session      Treatment Initiated: Treatment and education initiated within context of evaluation. Evaluation time included review of current medical information, gathering information related to past medical, social and functional history, completion of standardized testing, formal and informal observation of tasks, assessment of data and development of plan of care and goals. Treatment time included skilled education and facilitation of tasks to increase safety and independence with functional mobility for improved independence and quality of life. Assessment: Body structures, Functions, Activity limitations: Decreased functional mobility , Decreased endurance, Decreased balance, Decreased ROM, Decreased strength, Increased pain  Assessment: Pt presents this date after fall resulting in L hip fracture requiring operative repair. At this time she demos significant decline in level of independence requiring use of RW as well as frequent cues for proper safety during mobility. Current score of 16 on Tinetti balance test places her at high risk for falls at this time.  She requires skilled PT services to improve level of independence and mobility prior to returning home alone  Prognosis: Good         Discharge Recommendations:  Discharge Recommendations: Continue to assess pending progress    Patient Education:  PT Education: Goals, PT Role, Plan of Care, Functional Mobility Training    Equipment Recommendations:  Equipment Needed: Yes(RW)    Plan:  Times per week: 5x/wk 90 min, 1x/wk 30 min  Current Treatment Recommendations: Strengthening, ROM, Balance Training, Functional Mobility Training, Gait Training, Transfer Training, Patient/Caregiver Education & Training, Safety Education & Training, Home Exercise Program, Endurance Training, Stair training, Equipment Evaluation, Education, & procurement, Wheelchair Mobility Training, ADL/Self-care Training    Goals:  Patient goals : go home  Short term goals  Time Frame for Short term goals: 1 week  Short term goal 1: pt to complete supine to/from sit at SBA to get in and out of bed  Short term goal 2: pt to complete sit to/from stand at SBA to rise from bed or chair  Short term goal 3: pt to ambulate >75ft with RW at [place] [id number] for mobility in the home  Short term goal 4: pt to complete car transfer at [facility] for safe transport to home  Short term goal 5: pt score on Tinetti balance test will improve to >=20 for improved balance and decreased fall risk at home  Long term goals  Time Frame for Long term goals : 2 weeks  Long term goal 1: pt to complete supine to/from sit at Mod I to get in and out of bed  Long term goal 2: pt to complete sit to/from stand at Mod I to rise from bed or chair  Long term goal 3: pt to ambulate >50ft with RW at Mod I, >125ft with RW at S for mobility in the home and community  Long term goal 4: pt to complete car transfer at S for safe transport to home  Long term goal 5: pt to negotiate 6\" platform step with RW at S for curb negotiation in the community  Long term goal 6: pt score on Tinetti balance test will improve to >=24 for improved balance and decreased fall risk at home    Following session, patient left in safe position with all fall risk precautions in place.

## 2020-09-10 NOTE — PLAN OF CARE
Problem: Nutrition  Goal: Optimal nutrition therapy  Outcome: Ongoing   Nutrition Problem #1: Increased nutrient needs  Intervention: Food and/or Nutrient Delivery: Continue Current Diet, Start Oral Nutrition Supplement, Vitamin Supplement  Nutritional Goals: Pt will consume 75% or more of meals to aid in healing or weight loss

## 2020-09-11 LAB — GLUCOSE BLD-MCNC: 125 MG/DL (ref 70–108)

## 2020-09-11 PROCEDURE — 1180000000 HC REHAB R&B

## 2020-09-11 PROCEDURE — 6370000000 HC RX 637 (ALT 250 FOR IP): Performed by: INTERNAL MEDICINE

## 2020-09-11 PROCEDURE — 82948 REAGENT STRIP/BLOOD GLUCOSE: CPT

## 2020-09-11 PROCEDURE — 97530 THERAPEUTIC ACTIVITIES: CPT

## 2020-09-11 PROCEDURE — 97129 THER IVNTJ 1ST 15 MIN: CPT

## 2020-09-11 PROCEDURE — 6370000000 HC RX 637 (ALT 250 FOR IP): Performed by: PHYSICAL MEDICINE & REHABILITATION

## 2020-09-11 PROCEDURE — 97110 THERAPEUTIC EXERCISES: CPT

## 2020-09-11 PROCEDURE — 97130 THER IVNTJ EA ADDL 15 MIN: CPT

## 2020-09-11 PROCEDURE — 97116 GAIT TRAINING THERAPY: CPT

## 2020-09-11 RX ADMIN — ACETAMINOPHEN 650 MG: 325 TABLET ORAL at 05:07

## 2020-09-11 RX ADMIN — Medication 5000 UNITS: at 08:58

## 2020-09-11 RX ADMIN — METFORMIN HYDROCHLORIDE 1000 MG: 500 TABLET ORAL at 16:37

## 2020-09-11 RX ADMIN — CLOPIDOGREL BISULFATE 75 MG: 75 TABLET ORAL at 08:57

## 2020-09-11 RX ADMIN — FERROUS SULFATE TAB 325 MG (65 MG ELEMENTAL FE) 325 MG: 325 (65 FE) TAB at 08:57

## 2020-09-11 RX ADMIN — PROPRANOLOL HYDROCHLORIDE 80 MG: 80 CAPSULE, EXTENDED RELEASE ORAL at 08:57

## 2020-09-11 RX ADMIN — ATORVASTATIN CALCIUM 80 MG: 80 TABLET, FILM COATED ORAL at 21:53

## 2020-09-11 RX ADMIN — ACETAMINOPHEN 650 MG: 325 TABLET ORAL at 13:09

## 2020-09-11 RX ADMIN — Medication 5000 UNITS: at 16:37

## 2020-09-11 RX ADMIN — ACETAMINOPHEN 650 MG: 325 TABLET ORAL at 21:54

## 2020-09-11 RX ADMIN — PRIMIDONE 50 MG: 50 TABLET ORAL at 21:53

## 2020-09-11 RX ADMIN — ASPIRIN 81 MG: 81 TABLET ORAL at 08:57

## 2020-09-11 RX ADMIN — FERROUS SULFATE TAB 325 MG (65 MG ELEMENTAL FE) 325 MG: 325 (65 FE) TAB at 16:37

## 2020-09-11 RX ADMIN — Medication 5000 UNITS: at 13:08

## 2020-09-11 RX ADMIN — METFORMIN HYDROCHLORIDE 1000 MG: 500 TABLET ORAL at 08:58

## 2020-09-11 RX ADMIN — Medication 250 MG: at 08:58

## 2020-09-11 RX ADMIN — TRAMADOL HYDROCHLORIDE 50 MG: 50 TABLET, FILM COATED ORAL at 10:46

## 2020-09-11 RX ADMIN — Medication 250 MG: at 16:37

## 2020-09-11 RX ADMIN — PRIMIDONE 200 MG: 50 TABLET ORAL at 08:58

## 2020-09-11 RX ADMIN — DOCUSATE SODIUM 50 MG AND SENNOSIDES 8.6 MG 1 TABLET: 8.6; 5 TABLET, FILM COATED ORAL at 08:57

## 2020-09-11 RX ADMIN — Medication 500 MG: at 08:58

## 2020-09-11 RX ADMIN — INSULIN GLARGINE 20 UNITS: 100 INJECTION, SOLUTION SUBCUTANEOUS at 08:59

## 2020-09-11 RX ADMIN — Medication 500 MG: at 16:37

## 2020-09-11 ASSESSMENT — PAIN SCALES - WONG BAKER
WONGBAKER_NUMERICALRESPONSE: 0

## 2020-09-11 ASSESSMENT — PAIN DESCRIPTION - PAIN TYPE: TYPE: SURGICAL PAIN

## 2020-09-11 ASSESSMENT — PAIN - FUNCTIONAL ASSESSMENT: PAIN_FUNCTIONAL_ASSESSMENT: ACTIVITIES ARE NOT PREVENTED

## 2020-09-11 ASSESSMENT — PAIN SCALES - GENERAL
PAINLEVEL_OUTOF10: 0
PAINLEVEL_OUTOF10: 6
PAINLEVEL_OUTOF10: 0
PAINLEVEL_OUTOF10: 0
PAINLEVEL_OUTOF10: 8
PAINLEVEL_OUTOF10: 4
PAINLEVEL_OUTOF10: 0
PAINLEVEL_OUTOF10: 0

## 2020-09-11 ASSESSMENT — PAIN DESCRIPTION - LOCATION: LOCATION: HIP

## 2020-09-11 ASSESSMENT — PAIN DESCRIPTION - ORIENTATION: ORIENTATION: LEFT

## 2020-09-11 NOTE — PROGRESS NOTES
time  Expression: 5 - Expresses basic ideas/needs only (hungry/hot/pain)  Social Interaction: 5 - Patient is appropriate with supervision/cues  Problem Solving: 3 - Patient solves simple/routine tasks 50%-74%  Memory: 3 - Patient remembers 50%-74% of the time     EDUCATION:  Learner: Patient  Education:  Education Related to Potential Risks and Complications Due to Impairment/Illness/Injury, Education Related to [employer] and Wellness and Home Safety Education  Evaluation of Education: Prema Sibley understanding     ASSESSMENT/PLAN:  Activity Tolerance:  Patient tolerance of  treatment: Good     Assessment/Plan: Patient progressing toward established goals. Continues to require skilled care of licensed speech pathologist to progress toward achievement of established goals and plan of care. .     Plan for Next Session: Medication Management Task, Delayed Recall      Review of Systems:  Review of Systems   Constitutional: Positive for activity change. Negative for appetite change, fatigue and fever. HENT: Negative for trouble swallowing and voice change. Eyes: Negative. Respiratory: Negative for cough and shortness of breath. Cardiovascular: Positive for leg swelling. Gastrointestinal: Negative for constipation, diarrhea and nausea. Endocrine: Negative for cold intolerance. Genitourinary: Positive for urgency. Negative for frequency. Musculoskeletal: Positive for arthralgias, gait problem and myalgias. Skin: Negative for pallor. Allergic/Immunologic: Negative. Neurological: Positive for tremors and weakness. Negative for dizziness, light-headedness and headaches. Hematological: Negative. Psychiatric/Behavioral: Positive for decreased concentration. Negative for confusion and dysphoric mood. The patient is not nervous/anxious. All other systems reviewed and are negative.      Objective:  BP (!) 104/58   Pulse 83   Temp 98.3 °F (36.8 °C) (Oral)   Resp 16   Ht 5' 7\" (1.702 m) WBC 4.9 6.6  --  5.9   HGB 9.7* 11.1* 10.2* 9.9*   HCT 32.1* 35.7* 32.6* 33.4*   MCV 94.7 92.5  --  97.1    227  --  128*      Impression:  1. Ground-level fall after syncopal event. 2. Acute slightly impacted subcapital fracture of the left femoral neck. 3. Status post closed reduction and percutaneous screw fixation of the left hip impacted subcapital fracture of the left femoral neck by Dr. Ankush Rincon, D.O. on 09/06/2020. EBL minimal.  4. Gait instability. 5. Mild TBI. 6. Iron deficiency anemia. 7. Vitamin D deficiency. 8. Insulin-dependent type 2 diabetes, controlled with hyperglycemia. Last hemoglobin A1c was 5.7 on 9/9/2020.  9. Essential tremor. 10. History of prior myocardial infarction in 2011. 11. Hypertension. 12. Hyperlipidemia. 13. Coronary artery disease. 14. History of prior myocardial infarction. 15. CKD 3.  16. 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 17. Osteoporosis. 18. Moderate to severe cognitive deficits. (MOCA) version 8.2 completed. Patient scored 10/30.     Plan:      Medical management: Per primary team and Dr. Gabby Santos, Internal Medicine, Physical Medicine     Narcotic usage: Tramadol last 24 hour usage 50 mg. Increased. Last BM:   Stool Amount: Medium (09/11/20 0520)     FUNCTIONAL OUTCOMES TOOLS:    GODINEZ -      Tinetti - Balance Score: 10  Gait Score: 6  Tinetti Total Score: 16    TUG -       Acute/Rehabilitation Problems:  1. Ground-level fall after syncopal event. 2. Acute slightly impacted subcapital fracture of the left femoral neck. 1. Addressed by surgery below. 3. Status post closed reduction and percutaneous screw fixation of the left hip impacted subcapital fracture of the left femoral neck by Dr. Ankush Rincon, D.O. on 09/06/2020. EBL minimal.  1. WBAT. 2. Wound care. 3. Pain control. 1. PRN and scheduled Tylenol. 2. Lidoderm patches. 3. Tramadol. 4. Gait instability. 1. PT/OT. 2. Tinetti 16/28.   Risk calcium supplementation.     Labs reviewed on:  1. 9/8.  2. 9/9.  3. 9/10.     Infectious Disease:  1. N/A     Missed Therapy Time:  None     DME:    Discharge Plan:      Greater than 50% of 30 minutes was spent with the patient reviewing her progress with therapy, her current level of pain control, and the plans for drawing labs on this coming Monday.      Catarino Herzog MD

## 2020-09-11 NOTE — PLAN OF CARE
Care plan reviewed with patient. Patient verbalize understanding of the plan of care and contribute to goal setting.         Problem: Falls - Risk of:  Goal: Will remain free from falls  Description: Will remain free from falls  Outcome: Met This Shift  Note: Patient requests assistance with transfers and has remained free of falls during this shift    Goal: Absence of physical injury  Description: Absence of physical injury  Outcome: Met This Shift  Note: Patient requests assistance with transfers and has remained free of falls during this shift       Problem: Confusion - Acute:  Goal: Absence of continued neurological deterioration signs and symptoms  Description: Absence of continued neurological deterioration signs and symptoms  Outcome: Met This Shift  Note: No confusion noted  Goal: Mental status will be restored to baseline  Description: Mental status will be restored to baseline  Outcome: Met This Shift  Note: No confusion noted     Problem: Injury - Risk of, Physical Injury:  Goal: Will remain free from falls  Description: Will remain free from falls  Outcome: Met This Shift  Note: Patient requests assistance with transfers and has remained free of falls during this shift    Goal: Absence of physical injury  Description: Absence of physical injury  Outcome: Met This Shift  Note: Patient requests assistance with transfers and has remained free of falls during this shift       Problem: Mood - Altered:  Goal: Mood stable  Description: Mood stable  Outcome: Met This Shift  Goal: Absence of abusive behavior  Description: Absence of abusive behavior  Outcome: Met This Shift  Goal: Verbalizations of feeling emotionally comfortable while being cared for will increase  Description: Verbalizations of feeling emotionally comfortable while being cared for will increase  Outcome: Met This Shift     Problem: Skin Integrity:  Goal: Will show no infection signs and symptoms  Description: Will show no infection signs and symptoms  Outcome: Met This Shift  Note:  Skin care provided and no new skin issues identified. Goal: Absence of new skin breakdown  Description: Absence of new skin breakdown  Outcome: Met This Shift  Note:  Skin care provided and no new skin issues identified.         Problem: IP BLADDER/VOIDING  Goal: LTG - patient will complete bladder elimination  Outcome: Met This Shift  Note: Patient continent of bladder during this shift    Goal: LTG - patient will achieve acceptable level of continence  Outcome: Met This Shift  Note: Patient continent of bladder during this shift    Goal: STG - Patient demonstrates no accidents  Outcome: Met This Shift  Note: Patient continent of bladder during this shift       Problem: Pain:  Goal: Pain level will decrease  Description: Pain level will decrease  Outcome: Met This Shift  Note: Pain was controlled with prescribed pain medication during this shift    Goal: Control of acute pain  Description: Control of acute pain  Outcome: Met This Shift  Note: Pain was controlled with prescribed pain medication during this shift    Goal: Control of chronic pain  Description: Control of chronic pain  Outcome: Met This Shift  Note: Pain was controlled with prescribed pain medication during this shift       Problem: Discharge Planning:  Goal: Ability to perform activities of daily living will improve  Description: Ability to perform activities of daily living will improve  Outcome: Ongoing  Note: Patient continues to attempt to complete ADL's and requests assistance with transfers

## 2020-09-11 NOTE — PROGRESS NOTES
for ambulating. She did her own homemaking and cooking. reports walking daily PTA. Has a  1 x month    Restrictions/Precautions:  Restrictions/Precautions: Weight Bearing, General Precautions, Fall Risk  Left Lower Extremity Weight Bearing: Weight Bearing As Tolerated    SUBJECTIVE: Pt. Seated in WC and pleasantly agrees to therapy session. Pt. Al Null on completing stairs and nu-step activities. PAIN: Not rated: L hip    OBJECTIVE:  Transfers:  Sit to Stand: Contact Guard Assistance  Stand to [employer] Assistance  Stand Pivot:Contact Guard Assistance    Ambulation:  Not Tested    Stairs:  Stairs:  6\" steps. X 4 using Bilateral Handrails and Contact Guard Assistance. Balance:  Dynamic Standing Balance: Contact Guard Assistance  Pt. Stood with no UE support while completing ring toss activity. Pt. Steady overall but limits L sided weight shift throughout. Pt. Requiring rest breaks due to fatigue. Activity completed to improve standing tolerance and balance to return to PLOF    Exercise:  Pt. Completed nu-step L3 for 10' for increased strength and endurance for improved functional mobility. Functional Outcome Measures: Not completed       ASSESSMENT:  Assessment: Patient progressing toward established goals. Activity Tolerance:  Patient tolerance of  treatment: good.       Equipment Recommendations:Equipment Needed: Yes(RW)  Discharge Recommendations:  Continue to assess pending progress    Plan: Times per week: 5x/wk 90 min, 1x/wk 30 min  Current Treatment Recommendations: Strengthening, ROM, Balance Training, Functional Mobility Training, Gait Training, Transfer Training, Patient/Caregiver Education & Training, Safety Education & Training, Home Exercise Program, Endurance Training, Stair training, Equipment Evaluation, Education, & procurement, Wheelchair Mobility Training, ADL/Self-care Training    Patient Education  Patient Education: Plan of Care, Transfers, Reviewed Prior Education, Stairs, Verbal Exercise Instruction    Goals:  Patient goals : go home  Short term goals  Time Frame for Short term goals: 1 week  Short term goal 1: pt to complete supine to/from sit at SBA to get in and out of bed  Short term goal 2: pt to complete sit to/from stand at SBA to rise from bed or chair  Short term goal 3: pt to ambulate >75ft with RW at SBA for mobility in the home  Short term goal 4: pt to complete car transfer at [facility] for safe transport to home  Short term goal 5: pt score on Tinetti balance test will improve to >=20 for improved balance and decreased fall risk at home  Long term goals  Time Frame for Long term goals : 2 weeks  Long term goal 1: pt to complete supine to/from sit at Mod I to get in and out of bed  Long term goal 2: pt to complete sit to/from stand at Mod I to rise from bed or chair  Long term goal 3: pt to ambulate >50ft with RW at Mod I, >125ft with RW at S for mobility in the home and community  Long term goal 4: pt to complete car transfer at S for safe transport to home  Long term goal 5: pt to negotiate 6\" platform step with RW at S for curb negotiation in the community  Long term goal 6: pt score on Tinetti balance test will improve to >=24 for improved balance and decreased fall risk at home    Following session, patient left in safe position with all fall risk precautions in place.

## 2020-09-11 NOTE — PROGRESS NOTES
[address] [facility] THERAPY  [address] [facility]  DAILY NOTE    TIME   SLP Individual Minutes  Time In: 7434  Time Out: 0930  Minutes: 25  Timed Code Treatment Minutes: 25 Minutes       Date: 2020  Patient Name: Akanksha Contreras      CSN: [id number]   : 1950  (65 y.o.)  Gender: female   Referring Physician:  Dr. Tiffany Allen   Diagnosis: Left femoral neck impacted subcapital fracture  Secondary Diagnosis: Cognition  Precautions: Fall risk  Current Diet: Regular diet, thin liquids  Swallowing Strategies: Standard Universal Swallow Precautions  Date of Last MBS: Not Applicable    Pain:  No pain reported. Subjective:  Patient seated upright in wheelchair, pleasant and attentive. Short-Term Goals:  SHORT TERM GOAL #1:  Goal 1: Pt will complete orientation, immediate/delayed recall and working memory tasks with 70% accuracy given mod cues to improve retention of new and functional information. INTERVENTIONS: When asked to recall what the speech therapists do, Pt independently said they help her \"thinking skills. \"     Recall of date- Independent for month and date    SHORT TERM GOAL #2:  Goal 2: Pt will complete functional sequencing and thought organizational tasks with 70% accuracy given mod cues to improve overall executive functioning skills. INTERVENTIONS: Pt completed medication management task with 3 prescriptions that were to be sorted in a pill box.    Prescription 1: Completed task with 0 errors, required min cues to determine where to put pills in box and what time of day pills should be taken  Prescription 2: Completed task with 3 errors, required min cues to determine where to put pills in box and what time of day pills should be taken  Prescription 3: Completed task with 1 error, required min cues to determine what time of day pills should be taken  *Pt verbalized that essential tremor made task somewhat difficult when manipulating pills but expressed willingness to practice using pill box because she \"liked the layout. \"  *When confronted with problem (unsure of what time of day to take pill), Pt required min cues to determine time of day to take pill based on needing to take with or without food. Cues included questions that targeted functional reasoning. Recommend medication management tasks continue with gradual increasing complexity. SHORT TERM GOAL #3:  Goal 3: Patient will complete functional problem solving, reasoning and basic computational tasks with 70% accuracy given mod cues to improve overall management of ADLs. INTERVENTIONS: *See Goal 2 for details on functional problem solving and reasoning. Long-Term Goals:  Timeframe for Long-term Goals: 3 weeks    LONG TERM GOAL #1:  Goal 1: Pt will improve cognitive linguistic skills to a min assist level in order to improve level of independence in the home environment. Comprehension: 4 - Patient understands basic needs 75-90%+ of the time  Expression: 5 - Expresses basic ideas/needs only (hungry/hot/pain)  Social Interaction: 5 - Patient is appropriate with supervision/cues  Problem Solving: 3 - Patient solves simple/routine tasks 50%-74%  Memory: 2 - Patient remembers 25%-49% of the time    EDUCATION:  Learner: Patient  Education:  Reviewed ST goals and Plan of Care, Education Related to [employer] and Wellness and Home Safety Education  Evaluation of Education: Verbalizes understanding and Demonstrates with assistance    ASSESSMENT/PLAN:  Activity Tolerance:  Patient tolerance of  treatment: Good     Assessment/Plan: Patient progressing toward established goals. Continues to require skilled care of licensed speech pathologist to progress toward achievement of established goals and plan of care. .     Plan for Next Session: Calendar Scheduling task, Thought Organization, Immediate/Delayed Recall     [facility] YI Speech Intern  García Ambrose.  [address] [facility], [facility] [address], [address]

## 2020-09-11 NOTE — PROGRESS NOTES
[address]  Occupational Therapy  Daily Note  Time:   Time In: 0700  Time Out: 0830  Timed Code Treatment Minutes: 90 Minutes  Minutes: 90    Date: 2020  Patient Name: Kam Tapia,   Gender: female      Room: [facility]/054-A  MRN: [id number]  : 1950  (65 y.o.)  Referring Practitioner: Dr. Geni Hansen  Diagnosis: subcapital fracture of the left femur , s/p repair  Additional Pertinent Hx: She was admitted 2020 after being brought into the emergency department by [address] [facility] for injuries sustained after a syncopal event. The patient had a fall that occurred while she was walking in the grocery store when she blacked out and when she regained consciousness she found herself lying on the floor with people standing around her. She had left-sided hip pain and also abrasions on her left arm along with a left-sided superficial hematoma on her parietal scalp. Her initial x-rays showed an acute slightly impacted subcapital fracture of the left femoral neck. She underwent surgical correction with a closed reduction and percutaneous screw fixation of the left hip impacted subcapital fracture of the left femoral neck by Dr. Ernst Rubinstein on 2020. Restrictions/Precautions:  Restrictions/Precautions: Weight Bearing, General Precautions, Fall Risk  Left Lower Extremity Weight Bearing: Weight Bearing As Tolerated     SUBJECTIVE: Patient supine upon arrival, min increased time to arouse. Agreeable to OT. Joking appropriately throughout session, in bright spirits. PAIN: 4/10: L hip. COGNITION: WFL    ADL:   Grooming: Contact Guard Assistance. standing x 2 min hand hygiene. Sat to comb hair secondary to fatigue. Bathing: Contact Guard Assistance. CGA standing balane during bottom / rajan care. Upper Extremity Dressing: Minimal Assistance. min A with bra on back. set-up with t-shirt. Lower Extremity Dressing: Minimal Assistance.   min A to complete R leg due to toe catching. Skilled edu on donning techniques. CGA balance. Toileting: Contact Guard Assistance. Toilet Transfer: Contact Guard Assistance. ETS . * Sponge bath vs shower completed per pt's preference     BALANCE:  Sitting Balance:  Modified Independent. Standing Balance: Contact Guard Assistance. BED MOBILITY:  Not Tested    TRANSFERS:  Sit to Stand:  Modified Independent. Stand to Sit: Contact Guard Assistance. EOB, ETS, w/c.   * With mod VCs for hand placement during transfers     FUNCTIONAL MOBILITY:  Assistive Device: Rolling Walker  Assist Level:  Contact Guard Assist   Distance: To and from bathroom and within apartment during IADL. Used w/c for long distances secondary to fatigue. No LOB but with min unsteadiness and min VCs for RW safety. ADDITIONAL ACTIVITIES:  Pt completed graded IADL task that facilitated: RW safety awareness, fall risk prevention awareness, standing endurance and balance. Pt demo'ed endurance ranging from x 3 min to x 6 minutes with self initiated rest breaks between each event due to fatigue. Pt required CGA throughout and demo'ed min unsteadiness. Pt with min impulsiveness with transfers requiring moderate cue'ing for hand placement and  RW safety. ASSESSMENT:     Activity Tolerance:  Patient tolerance of  treatment: good. Discharge Recommendations: Continue to assess pending progress   Equipment Recommendations: Equipment Needed: Yes  Other: Pt may benefit from a BSC, shower chair, grab bars in the shower . Need to assess for LHAE.   Plan: Times per week: 5xs week x 90 min, 1 x week x 30 min  Current Treatment Recommendations: Endurance Training, Functional Mobility Training, Self-Care / ADL, Equipment Evaluation, Education, & procurement, Patient/Caregiver Education & Training, Home Management Training, Safety Education & Training    Patient Education  Patient Education: ADL's, IADL's, Energy Conservation, Precautions, Equipment Education, Home Safety and Assistive Device Safety    Goals  Short term goals  Time Frame for Short term goals: 1 week  Short term goal 1: PT will complete ADL routine with no > minimal cues for organization, problem solving adapted tech and min A with LHE to increase independence in self care tasks  Short term goal 2: Pt will complete simple grooming tasks with CGA while using 1-2 hand release to increase her independence with toileting routine. Short term goal 3: PT will complete functional mobility to/ from the BR as well as around obstacles with CGA and no > Min cues for safe tech to increase independence in toileting tasks at home  Short term goal 4: Pt will complete self feeding tasks with AE without cues to increase ability to eat soft foods  Short term goal 5: Pt will complete 1 step homemaking tasks iwth no > min cues for safe tech to increase independence in retrieving a snack  Long term goals  Time Frame for Long term goals : 2-3 weeks  Long term goal 1: Pt will complete ADL routine with no cues for safe and adapted tech to increase independence in self care tasks  Long term goal 2: Pt will complete 2 step homemaking tasks with no cues for safe tech and S for problem solving to increase independence in light homemaking tasks    Following session, patient left in safe position with all fall risk precautions in place.

## 2020-09-11 NOTE — PROGRESS NOTES
[facility]  INPATIENT PHYSICAL THERAPY  DAILY NOTE  [address] [facility] - 7E-54/054-A    Time In: 1130  Time Out: 1230  Timed Code Treatment Minutes: 60 Minutes  Minutes: 60          Date: 2020  Patient Name: Christ Johnson,  Gender:  female        MRN: [id number]  : 1950  (65 y.o.)     Referring Practitioner: DAVID Quintanilla MD  Diagnosis: subcapital fracture of left femur s/p repair  Additional Pertinent Hx: Per H&P, pt is a 65 y.o. female with a past medical history of diabetes and prior MI who presents to the emergency department for evaluation of syncope. Patient states that she was at the grocery store this morning shortly prior to arrival, and upon leaving the grocery store she felt acutely short of breath, and lost consciousness. She reports that she fell onto her left side, and landed on her elbow and the back of her head. She endorses losing consciousness after she fell, and bystanders report that she was dazed. She regained consciousness shortly later, however she reports that she has not been ambulatory since her fall. In the emergency department she is complaining of head pain, shoulder pain, elbow pain, and left hip pain. She is additionally complaining of swelling to her left posterior scalp. Pt is now s/p HIP PINNING on 2020 by Dr Will Rincon. To IPR on      Prior Level of Function:  Lives With: Alone  Type of Home: Apartment  Home Layout: One level  Home Access: Level entry  Home Equipment: Quad cane   Bathroom Shower/Tub: Tub/Shower unit, Walk-in shower(Pt prefers the walk in shower.)  Bathroom Toilet: Standard(uses a countertop on Right side.)  Bathroom Accessibility: Accessible    Receives Help From: Friend(s)  ADL Assistance: Independent  Homemaking Assistance: Independent  Homemaking Responsibilities: Yes  Ambulation Assistance: Independent  Transfer Assistance: Independent  Active :  Yes  Additional Comments: Pt did not use any AD for ambulating. She did her own homemaking and cooking. reports walking daily PTA. Has a  1 x month    Restrictions/Precautions:  Restrictions/Precautions: Weight Bearing, General Precautions, Fall Risk  Left Lower Extremity Weight Bearing: Weight Bearing As Tolerated    SUBJECTIVE: Pt. Seated in [facility] upon arrival and pleasantly agrees to therapy session. Pt. Talkative throughout session. Pt. With difficulty staying on task during session. PAIN: 6/10: L hip    OBJECTIVE:  Transfers:  Sit to Stand: Contact Guard Assistance  Stand to [employer] Assistance    Ambulation:  Stand By Assistance, [employer] Assistance  Distance: 80' x 1, 35' x 2, 12' x 2  Surface: Level Tile  Device:Rolling Walker  Gait Deviations:  Slow Susanna, Decreased Step Length Bilaterally, Decreased Weight Shift Left and Decreased Gait Speed    Stairs:  Stairs:  6\" steps. X 4 using Bilateral Handrails and Contact Guard Assistance, with verbal cues , with increased time for completion. Balance:  Dynamic Standing Balance: Contact Guard Assistance, Minimal Assistance  Pt. Stood at [employer] with double UE support while tapping feet on cones in alternating pattern. Pt. Slightly unsteady requiring cues and assistance for support during SLS. Pt. Fatigues easily requiring a rest break after bout. Exercise:  Patient was guided in 1  set(s) 10 reps of exercise to both lower extremities. Glut sets, Hip abduction/adduction, Seated marches, Seated hamstring curls, Seated heel/toe raises, Long arc quads and Seated isometric hip adduction. Nu step completed for 8' on L3 to increase strength and endurance. Exercises were completed for increased independence with functional mobility. Functional Outcome Measures: Not completed       ASSESSMENT:  Assessment: Patient progressing toward established goals. Activity Tolerance:  Patient tolerance of  treatment: good.       Equipment Recommendations:Equipment Needed: Yes(RW)  Discharge

## 2020-09-11 NOTE — PROGRESS NOTES
[address] [facility] THERAPY  [address] [facility]  DAILY NOTE    TIME   SLP Individual Minutes  Time In: 2417  Time Out: 1130  Minutes: 28  Timed Code Treatment Minutes: 28 Minutes       Date: 2020  Patient Name: Mily Peres      CSN: [id number]   : 1950  (65 y.o.)  Gender: female   Referring Physician:  Dr. Mamie Scott   Diagnosis: Left femoral neck impacted subcapital fracture  Secondary Diagnosis: Cognition  Precautions: Fall risk  Current Diet: Regular diet, thin liquids  Swallowing Strategies: Standard Universal Swallow Precautions  Date of Last MBS: Not Applicable    Pain:  No pain reported. Subjective:  Patient seated upright in wheelchair, pleasant and attentive. Patient reported that family friend was working to obtain a Life Alert device for the patient when she is discharged. The family friend will also be installing bars in the patient's home bathroom to assist with stability. Patient expressed content and excitement about speech therapy with multiple comments throughout the session. Short-Term Goals:  SHORT TERM GOAL #1:  Goal 1: Pt will complete orientation, immediate/delayed recall and working memory tasks with 70% accuracy given mod cues to improve retention of new and functional information. INTERVENTIONS: Immediate recall of appointment details: 2/2 ind  Delayed recall of appointment details (~3-5 min): 1/1 max cues, cues to reference appointment card. -Decreased comprehension of task resulting in poor retention of information. SHORT TERM GOAL #2:  Goal 2: Pt will complete functional sequencing and thought organizational tasks with 70% accuracy given mod cues to improve overall executive functioning skills. INTERVENTIONS: Patient was presented with a blank monthly calendar and verbally presented appointments (event name, date, time) that patient was to write in calendar.  Patient expressed that writing was difficult due to essential tremor and stated that family friend often keeps track of her appointments and notifies patient when appointments are approaching. A discussion about ways the patient could organize appointment/activity times was completed. A decision was made to meet with patient's POA towards end of stay to discuss ways that she could assist in organizing patient's appointments including writing in the appointment names and times on patient's calendar due to patient's writing difficulties. Answering questions about appointment card with two appointments: 7/7 min cues, cues included referencing appointment card and verbal directions to reference appointment card    Cee Hernandez 1277 #3:  Goal 3: Patient will complete functional problem solving, reasoning and basic computational tasks with 70% accuracy given mod cues to improve overall management of ADLs. INTERVENTIONS: Choosing items that belong in category from word list: 10/10 ind  Choosing 4 items required to complete a task: 3/4 ind, 1/4 min cues- cues included describing missing item. Long-Term Goals:  Timeframe for Long-term Goals: 3 weeks    LONG TERM GOAL #1:  Goal 1: Pt will improve cognitive linguistic skills to a min assist level in order to improve level of independence in the home environment.        Comprehension: 4 - Patient understands basic needs 75-90%+ of the time  Expression: 5 - Expresses basic ideas/needs only (hungry/hot/pain)  Social Interaction: 5 - Patient is appropriate with supervision/cues  Problem Solving: 3 - Patient solves simple/routine tasks 50%-74%  Memory: 3 - Patient remembers 50%-74% of the time    EDUCATION:  Learner: Patient  Education:  Education Related to Potential Risks and Complications Due to Impairment/Illness/Injury, Education Related to [employer] and Wellness and Home Safety Education  Evaluation of Education: Verbalizes understanding    ASSESSMENT/PLAN:  Activity Tolerance:  Patient tolerance of  treatment: Good     Assessment/Plan: Patient progressing toward established goals. Continues to require skilled care of licensed speech pathologist to progress toward achievement of established goals and plan of care. .     Plan for Next Session: Medication Management Task, Delayed Recall      [facility] Speech Intern  Phillip Wynne.  [address] [facility] [address], [address]

## 2020-09-11 NOTE — PROGRESS NOTES
[address] [place] [address]  Recreational Therapy  Daily Note  [address]    Time Spent with Patient: 0 minutes    Date:  9/11/2020       Patient Name: Maribel Banks      MRN: [id number]      YOB: 1950 (65 y.o.)       Gender: female  Diagnosis: subcapital fracture of the left femur , s/p repair  Referring Practitioner: Dr. Adriana Flores    RESTRICTIONS/PRECAUTIONS:  Restrictions/Precautions: Weight Bearing, General Precautions, Fall Risk  Vision: Impaired  Hearing: Within functional limits    PAIN: 0-no c/o pain     SUBJECTIVE:  I will help    OBJECTIVE:  O.T. brought pt to the apartment to help clean potatoes, poke them and season them and put in crock pot for lunch today-affect brightened-social-good sense of humor          Patient Education  New Education Provided: Importance of Leisure,     Electronically signed by: NISHA Harris  Date: 9/11/2020

## 2020-09-12 PROBLEM — E11.9 TYPE 2 DIABETES MELLITUS WITHOUT COMPLICATION, WITH LONG-TERM CURRENT USE OF INSULIN (HCC): Status: ACTIVE | Noted: 2020-09-12

## 2020-09-12 PROBLEM — E55.9 VITAMIN D DEFICIENCY: Status: ACTIVE | Noted: 2020-09-12

## 2020-09-12 PROBLEM — Z79.4 TYPE 2 DIABETES MELLITUS WITHOUT COMPLICATION, WITH LONG-TERM CURRENT USE OF INSULIN (HCC): Status: ACTIVE | Noted: 2020-09-12

## 2020-09-12 PROBLEM — M80.00XD AGE-RELATED OSTEOPOROSIS WITH CURRENT PATHOLOGICAL FRACTURE WITH ROUTINE HEALING: Status: ACTIVE | Noted: 2020-09-12

## 2020-09-12 LAB — GLUCOSE BLD-MCNC: 131 MG/DL (ref 70–108)

## 2020-09-12 PROCEDURE — 97116 GAIT TRAINING THERAPY: CPT

## 2020-09-12 PROCEDURE — 97130 THER IVNTJ EA ADDL 15 MIN: CPT

## 2020-09-12 PROCEDURE — 6370000000 HC RX 637 (ALT 250 FOR IP): Performed by: FAMILY MEDICINE

## 2020-09-12 PROCEDURE — 97129 THER IVNTJ 1ST 15 MIN: CPT

## 2020-09-12 PROCEDURE — 97530 THERAPEUTIC ACTIVITIES: CPT

## 2020-09-12 PROCEDURE — 82948 REAGENT STRIP/BLOOD GLUCOSE: CPT

## 2020-09-12 PROCEDURE — 6370000000 HC RX 637 (ALT 250 FOR IP): Performed by: INTERNAL MEDICINE

## 2020-09-12 PROCEDURE — 97110 THERAPEUTIC EXERCISES: CPT

## 2020-09-12 PROCEDURE — 97535 SELF CARE MNGMENT TRAINING: CPT

## 2020-09-12 PROCEDURE — 6370000000 HC RX 637 (ALT 250 FOR IP): Performed by: PHYSICAL MEDICINE & REHABILITATION

## 2020-09-12 PROCEDURE — 1180000000 HC REHAB R&B

## 2020-09-12 RX ORDER — M-VIT,TX,IRON,MINS/CALC/FOLIC 27MG-0.4MG
1 TABLET ORAL DAILY
Status: DISCONTINUED | OUTPATIENT
Start: 2020-09-12 | End: 2020-09-24 | Stop reason: HOSPADM

## 2020-09-12 RX ADMIN — ASPIRIN 81 MG: 81 TABLET ORAL at 08:36

## 2020-09-12 RX ADMIN — ACETAMINOPHEN 650 MG: 325 TABLET ORAL at 16:54

## 2020-09-12 RX ADMIN — METFORMIN HYDROCHLORIDE 1000 MG: 500 TABLET ORAL at 08:36

## 2020-09-12 RX ADMIN — Medication 5000 UNITS: at 08:35

## 2020-09-12 RX ADMIN — ACETAMINOPHEN 650 MG: 325 TABLET ORAL at 06:03

## 2020-09-12 RX ADMIN — Medication 500 MG: at 08:34

## 2020-09-12 RX ADMIN — INSULIN GLARGINE 20 UNITS: 100 INJECTION, SOLUTION SUBCUTANEOUS at 10:25

## 2020-09-12 RX ADMIN — Medication 500 MG: at 16:55

## 2020-09-12 RX ADMIN — CLOPIDOGREL BISULFATE 75 MG: 75 TABLET ORAL at 08:36

## 2020-09-12 RX ADMIN — MULTIPLE VITAMINS W/ MINERALS TAB 1 TABLET: TAB at 16:55

## 2020-09-12 RX ADMIN — Medication 250 MG: at 16:53

## 2020-09-12 RX ADMIN — ACETAMINOPHEN 650 MG: 325 TABLET ORAL at 20:34

## 2020-09-12 RX ADMIN — FERROUS SULFATE TAB 325 MG (65 MG ELEMENTAL FE) 325 MG: 325 (65 FE) TAB at 16:53

## 2020-09-12 RX ADMIN — FERROUS SULFATE TAB 325 MG (65 MG ELEMENTAL FE) 325 MG: 325 (65 FE) TAB at 08:35

## 2020-09-12 RX ADMIN — METFORMIN HYDROCHLORIDE 1000 MG: 500 TABLET ORAL at 16:54

## 2020-09-12 RX ADMIN — PRIMIDONE 50 MG: 50 TABLET ORAL at 20:35

## 2020-09-12 RX ADMIN — PRIMIDONE 200 MG: 50 TABLET ORAL at 08:35

## 2020-09-12 RX ADMIN — Medication 250 MG: at 08:34

## 2020-09-12 RX ADMIN — Medication 5000 UNITS: at 16:53

## 2020-09-12 RX ADMIN — PROPRANOLOL HYDROCHLORIDE 80 MG: 80 CAPSULE, EXTENDED RELEASE ORAL at 08:34

## 2020-09-12 RX ADMIN — ATORVASTATIN CALCIUM 80 MG: 80 TABLET, FILM COATED ORAL at 20:35

## 2020-09-12 ASSESSMENT — PAIN SCALES - WONG BAKER
WONGBAKER_NUMERICALRESPONSE: 0

## 2020-09-12 ASSESSMENT — PAIN SCALES - GENERAL
PAINLEVEL_OUTOF10: 0
PAINLEVEL_OUTOF10: 0
PAINLEVEL_OUTOF10: 4

## 2020-09-12 NOTE — PROGRESS NOTES
[address] [facility] THERAPY  [address] [facility]  DAILY NOTE    TIME   SLP Individual Minutes  Time In: 1200  Time Out: 1230  Minutes: 30  Timed Code Treatment Minutes: 30 Minutes       Date: 2020  Patient Name: Marcio Kaba      CSN: [id number]   : 1950  (65 y.o.)  Gender: female   Referring Physician:  Dr. Jonathan Jeffrey   Diagnosis: Left femoral neck impacted subcapital fracture  Secondary Diagnosis: Cognition  Precautions: Fall risk  Current Diet: Regular diet, thin liquids  Swallowing Strategies: Standard Universal Swallow Precautions  Date of Last MBS: Not Applicable    Pain:  No pain reported. Subjective:  Patient seen resting in bed upon ST arrival. Patient reported being fatigued following AM therapy sessions, however, in agreement to participate in therapy this date. No family present. Short-Term Goals:  SHORT TERM GOAL #1:  Goal 1: Pt will complete orientation, immediate/delayed recall and working memory tasks with 70% accuracy given mod cues to improve retention of new and functional information. INTERVENTIONS:   Orientation  Date:  with visual aid  DUSTY:  with visual aid  Month:  independent  Year:  independent  Place:  independent   Floor:  with explicit instruction  Win/1 with explicit instruction  Room:  independent with use of visual aid  *Patient with improved performance given calendar as visual aid.   *Patient with excellent use of care board to locate information upon request.    Recall of Appointment  Immediate Recall: 2/3 independent, 3/3 with additional repetition  5 Minute Delayed Recall: 3/3 given verbal multiple choice cues  10 Minute Delayed Recall: 3/3 given additional repetition and verbal multiple choice cues  15 Minute Delayed Recall: 3/3 with written visual aid  *Patient initially successful with recall of three unit appointment with repetition, however, in need of increasing levels of verbal cues to follow. Therefore, written visual aid implemented, however, patient unable to read writing; ST wrote information with patient instruction with good success following 15 minute delay. *Patient with excellent, independent use of written visual aid. SHORT TERM GOAL #2:  Goal 2: Pt will complete functional sequencing and thought organizational tasks with 70% accuracy given mod cues to improve overall executive functioning skills. INTERVENTIONS: No formal thought organization tasks completed this date, however, patient with decreased ability to maintain conversational topic; suspect due to increased levels of fatigue this date. Increased presence of anomia with need for ST assistance to name items (e.g., grocery store, dust pan, etc.). SHORT TERM GOAL #3:  Goal 3: Patient will complete functional problem solving, reasoning and basic computational tasks with 70% accuracy given mod cues to improve overall management of ADLs. INTERVENTIONS:   Functional Problem Solving - Medical: 2/3 independent, 1/3 with moderate cuing  *Continue to monitor and provide interventions for home safety and education, including calling emergency services (e.g., 911). *Patient currently unsafe to return to home independently. Long-Term Goals:  Timeframe for Long-term Goals: 3 weeks    LONG TERM GOAL #1:  Goal 1: Pt will improve cognitive linguistic skills to a min assist level in order to improve level of independence in the home environment.        Comprehension: 4 - Patient understands basic needs 75-90%+ of the time  Expression: 5 - Expresses basic ideas/needs only (hungry/hot/pain)  Social Interaction: 5 - Patient is appropriate with supervision/cues  Problem Solving: 3 - Patient solves simple/routine tasks 50%-74%  Memory: 2 - Patient remembers 25%-49% of the time    EDUCATION:  Learner: Patient  Education:  Reviewed ST goals and Plan of Care and Home Safety Education  Evaluation of Education: Fartun understanding    ASSESSMENT/PLAN:  Activity Tolerance:  Patient tolerance of  treatment: Good     Assessment/Plan: Patient progressing toward established goals. Continues to require skilled care of licensed speech pathologist to progress toward achievement of established goals and plan of care. .     Plan for Next Session: Medication Management Task     Makayla Carrera M.S., [facility]

## 2020-09-12 NOTE — PROGRESS NOTES
[address] [address]  INPATIENT PHYSICAL THERAPY  DAILY NOTE  [address] [facility] - 7E-[id number]    Time In: 0830  Time Out: 0930  Timed Code Treatment Minutes: 60 Minutes  Minutes: 60          Date: 2020  Patient Name: Kacie Howard,  Gender:  female        MRN: [id number]  : 1950  (65 y.o.)     Referring Practitioner: DAVID Limon MD  Diagnosis: subcapital fracture of left femur s/p repair  Additional Pertinent Hx: Per H&P, pt is a 65 y.o. female with a past medical history of diabetes and prior MI who presents to the emergency department for evaluation of syncope. Patient states that she was at the grocery store this morning shortly prior to arrival, and upon leaving the grocery store she felt acutely short of breath, and lost consciousness. She reports that she fell onto her left side, and landed on her elbow and the back of her head. She endorses losing consciousness after she fell, and bystanders report that she was dazed. She regained consciousness shortly later, however she reports that she has not been ambulatory since her fall. In the emergency department she is complaining of head pain, shoulder pain, elbow pain, and left hip pain. She is additionally complaining of swelling to her left posterior scalp. Pt is now s/p HIP PINNING on 2020 by Dr Cassandra Adames. To IPR on      Prior Level of Function:  Lives With: Alone  Type of Home: Apartment  Home Layout: One level  Home Access: Level entry  Home Equipment: Quad cane   Bathroom Shower/Tub: Tub/Shower unit, Walk-in shower(Pt prefers the walk in shower.)  Bathroom Toilet: Standard(uses a countertop on Right side.)  Bathroom Accessibility: Accessible    Receives Help From: Friend(s)  ADL Assistance: Independent  Homemaking Assistance: Independent  Homemaking Responsibilities: Yes  Ambulation Assistance: Independent  Transfer Assistance: Independent  Active :  Yes  Additional Comments: Pt did not use any AD for ambulating. She did her own homemaking and cooking. reports walking daily PTA. Has a  1 x month    Restrictions/Precautions:  Restrictions/Precautions: Weight Bearing, General Precautions, Fall Risk  Left Lower Extremity Weight Bearing: Weight Bearing As Tolerated    SUBJECTIVE: Pt. Seated in WC and pleasantly agrees to therapy session. Pt. Talkative and easily distracted. Pt. Requests to complete nu-step activity during session. PAIN: Not rated: L hip    OBJECTIVE:  Bed Mobility:  Supine to Sit: Minimal Assistance  Sit to Supine: Minimal Assistance     Transfers:  Sit to Stand: Stand By Assistance  Stand to Sit:Stand By Assistance    Ambulation:  Stand By Assistance  Distance: 130' x 2, 40' x 1  Surface: Level Tile  Device:Rolling Walker  Gait Deviations: Forward Flexed Posture, Slow Susanna, Decreased Weight Shift Left, Decreased Gait Speed, Decreased Heel Strike Bilaterally and Decreased Terminal Knee Extension    Balance:  Dynamic Standing Balance: Stand By Assistance   Pt. Stood at tray table with varying amounts of UE support. Pt. Instructed to place clothespins on yardstick, following the same pattern throughout. Pt. Easily distracted and unable to place clothespins in the correct sequence. Pt. Stood for ~10' total. Pt. With slight forward flexed posture throughout. Exercise:  Pt. Completed 10' on nu-step L 3 to increase B LE strength and endurance. Patient was guided in 1 set(s) 10 reps of exercise to both lower extremities. Ankle pumps, Glut sets, Heelslides, Hip abduction/adduction, Seated marches, Long arc quads and Seated isometric hip adduction. Exercises were completed for increased independence with functional mobility. Functional Outcome Measures: Not completed       ASSESSMENT:  Assessment: Patient progressing toward established goals. Activity Tolerance:  Patient tolerance of  treatment: good.       Equipment Recommendations:Equipment Needed: Yes(RW)  Discharge Recommendations:  Continue to assess pending progress    Plan: Times per week: 5x/wk 90 min, 1x/wk 30 min  Current Treatment Recommendations: Strengthening, ROM, Balance Training, Functional Mobility Training, Gait Training, Transfer Training, Patient/Caregiver Education & Training, Safety Education & Training, Home Exercise Program, Endurance Training, Stair training, Equipment Evaluation, Education, & procurement, Wheelchair Mobility Training, ADL/Self-care Training    Patient Education  Patient Education: Plan of Care, Transfers, Gait, Verbal Exercise Instruction    Goals:  Patient goals : go home  Short term goals  Time Frame for Short term goals: 1 week  Short term goal 1: pt to complete supine to/from sit at SBA to get in and out of bed  Short term goal 2: pt to complete sit to/from stand at SBA to rise from bed or chair  Short term goal 3: pt to ambulate >75ft with RW at [place] [address] for mobility in the home  Short term goal 4: pt to complete car transfer at [facility] for safe transport to home  Short term goal 5: pt score on Tinetti balance test will improve to >=20 for improved balance and decreased fall risk at home  Long term goals  Time Frame for Long term goals : 2 weeks  Long term goal 1: pt to complete supine to/from sit at Mod I to get in and out of bed  Long term goal 2: pt to complete sit to/from stand at Mod I to rise from bed or chair  Long term goal 3: pt to ambulate >50ft with RW at Mod I, >125ft with RW at S for mobility in the home and community  Long term goal 4: pt to complete car transfer at S for safe transport to home  Long term goal 5: pt to negotiate 6\" platform step with RW at S for curb negotiation in the community  Long term goal 6: pt score on Tinetti balance test will improve to >=24 for improved balance and decreased fall risk at home    Following session, patient left in safe position with all fall risk precautions in place.

## 2020-09-12 NOTE — CONSULTS
Department of Family Practice  Consult Note        Reason for Consult:  Medical management on Inpatient Rehab   Requesting Physician:  Dr Murrell Necessary:  Fall with fracture of the left hip. History Obtained From:  patient, EMR    HISTORY OF PRESENT ILLNESS:              The patient is a 65 y.o. female with significant past medical history of       Diagnosis Date    [facility]     CAD (coronary artery disease)     CKD (chronic kidney disease) stage 3, GFR 30-59 ml/min (HCC)     Diabetes mellitus, insulin dependent (IDDM), controlled ([address]     Escherichia coli septicemia ([address]     Essential tremor     Hyperlipidemia     Hypertension     Hypoxemic respiratory failure, chronic (HCC)     secondary to sepsis and possibly pneumonia    MI (myocardial infarction) ([address] 2011    Normocytic anemia     Osteoporosis     who presents with a fracture of the left hip after falling at a [employer]. She stated some initial soreness to the left elbow and shoulder initially but that is much better now. She feels that the left leg is becoming less sore and progressively stronger with time. Following the acute hospital stay she was too deconditioned to return home so she presents to the Inpatient Rehab unit for more concentrated time with therapies prior to the anticipated return home.     Past Medical History:        Diagnosis Date    Acute kidney injury ([facility]     CAD (coronary artery disease)     CKD (chronic kidney disease) stage 3, GFR 30-59 ml/min (HCC)     Diabetes mellitus, insulin dependent (IDDM), controlled (HCC)     Escherichia coli septicemia (HCC)     Essential tremor     Hyperlipidemia     Hypertension     Hypoxemic respiratory failure, chronic (HCC)     secondary to sepsis and possibly pneumonia    MI (myocardial infarction) ([address] 2011    Normocytic anemia     Osteoporosis      Past Surgical History:        Procedure Laterality Date    CYSTOSCOPY  12/7/12    with stent insertion    HIP SURGERY Left 9/6/2020    HIP PINNING performed by Dmitriy Sanderson DO at [facility] LITHOTRIPSY  12/18/2012    right     Current Medications:   Current Facility-Administered Medications: therapeutic multivitamin-minerals 1 tablet, 1 tablet, Oral, Daily  zinc oxide (PINXAV) 30 % ointment, , Topical, 4x Daily PRN  magnesium hydroxide (MILK OF MAGNESIA) 400 MG/5ML suspension 30 mL, 30 mL, Oral, Daily PRN  sennosides-docusate sodium (SENOKOT-S) 8.6-50 MG tablet 1 tablet, 1 tablet, Oral, BID  polyethylene glycol (GLYCOLAX) packet 17 g, 17 g, Oral, Daily PRN  glucagon (rDNA) injection 1 mg, 1 mg, Intramuscular, PRN  glucose (GLUTOSE) 40 % oral gel 15 g, 15 g, Oral, PRN  aspirin EC tablet 81 mg, 81 mg, Oral, Daily  atorvastatin (LIPITOR) tablet 80 mg, 80 mg, Oral, Daily  clopidogrel (PLAVIX) tablet 75 mg, 75 mg, Oral, Daily  ferrous sulfate (IRON 325) tablet 325 mg, 325 mg, Oral, BID WC **AND** vitamin C (ASCORBIC ACID) tablet 250 mg, 250 mg, Oral, BID WC  insulin glargine (LANTUS) injection vial 20 Units, 20 Units, Subcutaneous, QAM  primidone (MYSOLINE) tablet 200 mg, 200 mg, Oral, Daily  primidone (MYSOLINE) tablet 50 mg, 50 mg, Oral, Nightly  propranolol (INDERAL LA) extended release capsule 80 mg, 80 mg, Oral, Daily  acetaminophen (TYLENOL) tablet 650 mg, 650 mg, Oral, 3 times per day  acetaminophen (TYLENOL) tablet 650 mg, 650 mg, Oral, Q4H PRN  Vitamin D (CHOLECALCIFEROL) tablet 5,000 Units, 5,000 Units, Oral, TID WC **AND** calcium elemental (OSCAL) tablet 500 mg, 500 mg, Oral, BID WC  lidocaine 4 % external patch 2 patch, 2 patch, Transdermal, QAM AC  traMADol (ULTRAM) tablet 50 mg, 50 mg, Oral, Q6H PRN **OR** traMADol (ULTRAM) tablet 100 mg, 100 mg, Oral, Q6H PRN  metFORMIN (GLUCOPHAGE) tablet 1,000 mg, 1,000 mg, Oral, BID WC  Allergies:  Patient has no known allergies.     Social History:   OCCUPATION:  Unemployed    Family History:       Problem Relation Age of Onset    Cancer Mother     Heart Disease Mother      REVIEW OF SYSTEMS:    CONSTITUTIONAL:  positive for  fatigue  EYES:  positive for  glasses  HEENT:  negative for  epistaxis  RESPIRATORY:  negative for  dyspnea  CARDIOVASCULAR:  negative for  chest pain  GASTROINTESTINAL:  negative for jaundice  GENITOURINARY:  negative for dysuria  INTEGUMENT/BREAST:  negative for rash  HEMATOLOGIC/LYMPHATIC:  negative for petechiae  ALLERGIC/IMMUNOLOGIC:  negative for anaphylaxis  ENDOCRINE:  negative for tremor  MUSCULOSKELETAL:  positive for  myalgias, arthralgias, stiff joints, decreased range of motion, muscle weakness and bone pain  NEUROLOGICAL:  positive for gait problems and weakness  BEHAVIOR/PSYCH:  negative for elated mood  PHYSICAL EXAM:      Vitals:    /62   Pulse 78   Temp 98.9 °F (37.2 °C) (Oral)   Resp 16   Ht 5' 7\" (1.702 m)   Wt 161 lb 9.6 oz (73.3 kg)   SpO2 95%   BMI 25.31 kg/m²     CONSTITUTIONAL:  awake, alert, cooperative, no apparent distress, and appears stated age  EYES:  sclera clear and conjunctiva normal  ENT:  normocepalic, without obvious abnormality  NECK:  supple, symmetrical, trachea midline  HEMATOLOGIC/LYMPHATICS:  no cervical lymphadenopathy and no supraclavicular lymphadenopathy  LUNGS:  no increased work of breathing, good air exchange and clear to auscultation  CARDIOVASCULAR:  normal S1 and S2, no murmur noted and no edema  ABDOMEN:  normal bowel sounds, soft and non-distended  CHEST/BREASTS:  symmetrical expansion  MUSCULOSKELETAL:  tone is normal  NEUROLOGIC:  weak  SKIN:  no rashes    Results for Charlotte Leon (MRN [id number]) as of 9/12/2020 17:52   Ref.  Range 9/9/2020 10:17 9/10/2020 06:17 9/10/2020 09:39 9/11/2020 08:42 9/12/2020 08:19   Glucose Latest Ref Range: 70 - 108 mg/dL  131 (H)      POC Glucose Latest Ref Range: 70 - 108 mg/dl 267 (H)  239 (H) 125 (H) 131 (H)     IMPRESSION/RECOMMENDATIONS:      Active Hospital Problems    Diagnosis Date Noted    Type 2 diabetes mellitus without complication, with long-term current use of insulin ([facility] [address] [E11.9, Z79.4] 09/12/2020    Vitamin D deficiency [E55.9] 09/12/2020    Age-related osteoporosis with current pathological fracture with routine healing [M80.00XD] 09/12/2020    Subcapital fracture of femur, left, closed, with routine healing, subsequent encounter [S72.012D] 09/09/2020    Syncope and collapse [R55] 09/05/2020    Limb tremor [R25.1] 03/29/2014

## 2020-09-12 NOTE — PROGRESS NOTES
[address]  Occupational Therapy  Daily Note  Time:   Time In: 1030  Time Out: 1130  Timed Code Treatment Minutes: 60 Minutes  Minutes: 60    Date: 2020  Patient Name: Kam Tapia,   Gender: female      Room: [facility]54/054-A  MRN: [id number]  : 1950  (65 y.o.)  Referring Practitioner: Dr. Geni Hansen  Diagnosis: subcapital fracture of the left femur , s/p repair  Additional Pertinent Hx: She was admitted 2020 after being brought into the emergency department by [facility] for injuries sustained after a syncopal event. The patient had a fall that occurred while she was walking in the grocery store when she blacked out and when she regained consciousness she found herself lying on the floor with people standing around her. She had left-sided hip pain and also abrasions on her left arm along with a left-sided superficial hematoma on her parietal scalp. Her initial x-rays showed an acute slightly impacted subcapital fracture of the left femoral neck. She underwent surgical correction with a closed reduction and percutaneous screw fixation of the left hip impacted subcapital fracture of the left femoral neck by Dr. Ernst Rubinstein on 2020. Restrictions/Precautions:  Restrictions/Precautions: Weight Bearing, General Precautions, Fall Risk  Left Lower Extremity Weight Bearing: Weight Bearing As Tolerated     SUBJECTIVE: Patient pleasant and cooperative, agreeable to OT. PAIN: 6/10: back     COGNITION: WFL and Decreased Recall    ADL:   Grooming: with set-up. Bathing: Contact Guard Assistance. [facility] standing in shower. edu on safety. Upper Extremity Dressing: with set-up. Lower Extremity Dressing: Minimal Assistance. with LLE. CGA balance. Shower Transfer: [address] [facility] [address] Amrita Jack BALANCE:  Sitting Balance:  Modified Independent. Standing Balance: Contact Guard Assistance.       BED MOBILITY:  Sit to Supine: Minimal Assistance with LLE    TRANSFERS:  Sit to Stand:  Contact Guard Assistance. w/c, shower chair. x 1 cue for safety. Stand to Sit: Contact Guard Assistance. FUNCTIONAL MOBILITY:  Assistive Device: Rolling Walker  Assist Level:  Contact Guard Assistance. Distance: Within room. used w/c for long distances secondary to fatigue. ASSESSMENT:     Activity Tolerance:  Patient tolerance of  treatment: good. Discharge Recommendations: Continue to assess pending progress   Equipment Recommendations: Equipment Needed: Yes  Other: Pt may benefit from a BSC, shower chair, grab bars in the shower . Need to assess for LHAE. Plan: Times per week: 5xs week x 90 min, 1 x week x 30 min  Current Treatment Recommendations: Endurance Training, Functional Mobility Training, Self-Care / ADL, Equipment Evaluation, Education, & procurement, Patient/Caregiver Education & Training, Home Management Training, Safety Education & Training    Patient Education  Patient Education: ADL's, Reviewed Prior Education, Home Safety and Assistive Device Safety    Goals  Short term goals  Time Frame for Short term goals: 1 week  Short term goal 1: PT will complete ADL routine with no > minimal cues for organization, problem solving adapted tech and min A with LHE to increase independence in self care tasks  Short term goal 2: Pt will complete simple grooming tasks with CGA while using 1-2 hand release to increase her independence with toileting routine.   Short term goal 3: PT will complete functional mobility to/ from the BR as well as around obstacles with CGA and no > Min cues for safe tech to increase independence in toileting tasks at home  Short term goal 4: Pt will complete self feeding tasks with AE without cues to increase ability to eat soft foods  Short term goal 5: Pt will complete 1 step homemaking tasks iwth no > min cues for safe tech to increase independence in retrieving a snack  Long term goals  Time Frame for Long term

## 2020-09-12 NOTE — PROGRESS NOTES
[address]  Occupational Therapy  Daily Note  Time:   Time In: 1330  Time Out: 1400  Timed Code Treatment Minutes: 30 Minutes  Minutes: 30    Date: 2020  Patient Name: Judah Cadena,   Gender: female      Room: [facility]54/054-A  MRN: [id number]  : 1950  (65 y.o.)  Referring Practitioner: Dr. Vivien Hooks  Diagnosis: subcapital fracture of the left femur , s/p repair  Additional Pertinent Hx: She was admitted 2020 after being brought into the emergency department by [address] [facility] for injuries sustained after a syncopal event. The patient had a fall that occurred while she was walking in the grocery store when she blacked out and when she regained consciousness she found herself lying on the floor with people standing around her. She had left-sided hip pain and also abrasions on her left arm along with a left-sided superficial hematoma on her parietal scalp. Her initial x-rays showed an acute slightly impacted subcapital fracture of the left femoral neck. She underwent surgical correction with a closed reduction and percutaneous screw fixation of the left hip impacted subcapital fracture of the left femoral neck by Dr. Cosme Guzman on 2020. Restrictions/Precautions:  Restrictions/Precautions: Weight Bearing, General Precautions, Fall Risk  Left Lower Extremity Weight Bearing: Weight Bearing As Tolerated     SUBJECTIVE: Patient pleasant and cooperative, agreeable to OT. Reports fatigue, but agreeable to complete IADLs    PAIN: 6/10: back     COGNITION: WFL and Decreased Recall    ADL:   Grooming: CGA - hand hygiene x 2 min     BALANCE:  Sitting Balance:  Modified Independent. Standing Balance: Contact Guard Assistance. BED MOBILITY:  Sit to Supine: Minimal Assistance with LLE    TRANSFERS:  Sit to Stand:  Contact Guard Assistance. W/c, x 2 cues for hand placement   Stand to Sit: Contact Guard Assistance.   X 1 cue for hand placement weeks  Long term goal 1: Pt will complete ADL routine with no cues for safe and adapted tech to increase independence in self care tasks  Long term goal 2: Pt will complete 2 step homemaking tasks with no cues for safe tech and S for problem solving to increase independence in light homemaking tasks    Following session, patient left in safe position with all fall risk precautions in place.

## 2020-09-13 LAB — GLUCOSE BLD-MCNC: 166 MG/DL (ref 70–108)

## 2020-09-13 PROCEDURE — 1180000000 HC REHAB R&B

## 2020-09-13 PROCEDURE — 6370000000 HC RX 637 (ALT 250 FOR IP): Performed by: PHYSICAL MEDICINE & REHABILITATION

## 2020-09-13 PROCEDURE — 82948 REAGENT STRIP/BLOOD GLUCOSE: CPT

## 2020-09-13 PROCEDURE — 6370000000 HC RX 637 (ALT 250 FOR IP): Performed by: FAMILY MEDICINE

## 2020-09-13 PROCEDURE — 6370000000 HC RX 637 (ALT 250 FOR IP): Performed by: INTERNAL MEDICINE

## 2020-09-13 RX ADMIN — PRIMIDONE 200 MG: 50 TABLET ORAL at 09:17

## 2020-09-13 RX ADMIN — PRIMIDONE 50 MG: 50 TABLET ORAL at 21:04

## 2020-09-13 RX ADMIN — PROPRANOLOL HYDROCHLORIDE 80 MG: 80 CAPSULE, EXTENDED RELEASE ORAL at 09:16

## 2020-09-13 RX ADMIN — TRAMADOL HYDROCHLORIDE 50 MG: 50 TABLET, FILM COATED ORAL at 10:33

## 2020-09-13 RX ADMIN — METFORMIN HYDROCHLORIDE 1000 MG: 500 TABLET ORAL at 18:46

## 2020-09-13 RX ADMIN — MULTIPLE VITAMINS W/ MINERALS TAB 1 TABLET: TAB at 09:16

## 2020-09-13 RX ADMIN — CLOPIDOGREL BISULFATE 75 MG: 75 TABLET ORAL at 09:19

## 2020-09-13 RX ADMIN — Medication 500 MG: at 09:17

## 2020-09-13 RX ADMIN — FERROUS SULFATE TAB 325 MG (65 MG ELEMENTAL FE) 325 MG: 325 (65 FE) TAB at 18:45

## 2020-09-13 RX ADMIN — ACETAMINOPHEN 650 MG: 325 TABLET ORAL at 21:04

## 2020-09-13 RX ADMIN — DOCUSATE SODIUM 50 MG AND SENNOSIDES 8.6 MG 1 TABLET: 8.6; 5 TABLET, FILM COATED ORAL at 21:04

## 2020-09-13 RX ADMIN — ATORVASTATIN CALCIUM 80 MG: 80 TABLET, FILM COATED ORAL at 21:03

## 2020-09-13 RX ADMIN — INSULIN GLARGINE 20 UNITS: 100 INJECTION, SOLUTION SUBCUTANEOUS at 09:39

## 2020-09-13 RX ADMIN — Medication 5000 UNITS: at 09:16

## 2020-09-13 RX ADMIN — Medication 5000 UNITS: at 18:46

## 2020-09-13 RX ADMIN — Medication 250 MG: at 18:45

## 2020-09-13 RX ADMIN — Medication 500 MG: at 18:45

## 2020-09-13 RX ADMIN — FERROUS SULFATE TAB 325 MG (65 MG ELEMENTAL FE) 325 MG: 325 (65 FE) TAB at 09:16

## 2020-09-13 RX ADMIN — Medication 5000 UNITS: at 14:48

## 2020-09-13 RX ADMIN — ASPIRIN 81 MG: 81 TABLET ORAL at 09:16

## 2020-09-13 RX ADMIN — Medication 250 MG: at 09:17

## 2020-09-13 RX ADMIN — METFORMIN HYDROCHLORIDE 1000 MG: 500 TABLET ORAL at 09:17

## 2020-09-13 RX ADMIN — ACETAMINOPHEN 650 MG: 325 TABLET ORAL at 14:48

## 2020-09-13 ASSESSMENT — PAIN SCALES - GENERAL
PAINLEVEL_OUTOF10: 0
PAINLEVEL_OUTOF10: 0
PAINLEVEL_OUTOF10: 5

## 2020-09-13 ASSESSMENT — PAIN SCALES - WONG BAKER
WONGBAKER_NUMERICALRESPONSE: 0

## 2020-09-13 ASSESSMENT — ENCOUNTER SYMPTOMS
VOICE CHANGE: 0
NAUSEA: 0
TROUBLE SWALLOWING: 0
SHORTNESS OF BREATH: 0
DIARRHEA: 0
CONSTIPATION: 0
EYES NEGATIVE: 1
ALLERGIC/IMMUNOLOGIC NEGATIVE: 1
COUGH: 0

## 2020-09-13 NOTE — PLAN OF CARE
Problem: Falls - Risk of:  Goal: Will remain free from falls  Description: Will remain free from falls  9/13/2020 0244 by Noreen Restrepo LPN  Outcome: Met This Shift  Note: Pt. Without falls or incidents noted. 9/13/2020 0216 by Noreen Restrepo LPN  Outcome: Ongoing  Note: No incidents or issues noted. 9/12/2020 1449 by Shahnaz Borrero RN  Outcome: Ongoing  Note: Pt using call light appropriately to call for assistance with ambulation to the bathroom and to chair. Pt is also compliant with use of non-skid slippers. Pt reports understanding of fall prevention when discussed. Problem: Injury - Risk of, Physical Injury:  Goal: Will remain free from falls  Description: Will remain free from falls  9/13/2020 0244 by Noreen Restrepo LPN  Outcome: Met This Shift  Note: Pt. Without falls or incidents noted. 9/13/2020 0216 by Noreen Restrepo LPN  Outcome: Ongoing  Note: No incidents or issues noted. 9/12/2020 1449 by Shahnaz Borrero RN  Outcome: Ongoing  Note: Pt using call light appropriately to call for assistance with ambulation to the bathroom and to chair. Pt is also compliant with use of non-skid slippers. Pt reports understanding of fall prevention when discussed. Problem: Mood - Altered:  Goal: Mood stable  Description: Mood stable  9/13/2020 0244 by Noreen Restrepo LPN  Outcome: Met This Shift  9/13/2020 0216 by Noreen Restrepo LPN  Outcome: Met This Shift  Note: Pt. Mood pleasant no mood changes noted.   Goal: Absence of abusive behavior  Description: Absence of abusive behavior  9/13/2020 0244 by Noreen Restrepo LPN  Outcome: Met This Shift  9/13/2020 0216 by Noreen Restrepo LPN  Outcome: Met This Shift  Goal: Verbalizations of feeling emotionally comfortable while being cared for will increase  Description: Verbalizations of feeling emotionally comfortable while being cared for will increase  9/13/2020 0244 by Noreen Restrepo LPN  Outcome: Met This Shift  9/13/2020 0216 by Noreen Restrepo LPN  Outcome: Met This Shift     Problem: Skin Integrity:  Goal: Absence of new skin breakdown  Description: Absence of new skin breakdown  9/13/2020 0244 by Claudeen Ore Hire, LPN  Outcome: Met This Shift  9/12/2020 1449 by Etta Nicole, HEMA  Outcome: Ongoing  Note: Open area at coccyx. Cleansed with soap and water, EPC applied. No new skin breakdown noted     Problem: Falls - Risk of:  Goal: Absence of physical injury  Description: Absence of physical injury  9/13/2020 0244 by Claudeen Ore Hire, LPN  Outcome: Ongoing  9/13/2020 0216 by Claudeen Ore Hire, LPN  Outcome: Met This Shift  Note: No injuries noted this shift. 9/12/2020 1449 by Etta Nicole, HEMA  Outcome: Ongoing  Note: No injuries noted. Problem: Confusion - Acute:  Goal: Absence of continued neurological deterioration signs and symptoms  Description: Absence of continued neurological deterioration signs and symptoms  9/13/2020 0244 by Claudeen Ore Hire, LPN  Outcome: Ongoing  Note: Nuero deficits continue. 9/13/2020 0216 by Claudeen Ore Hire, LPN  Outcome: Ongoing  Note: Ocassional forgetfulness noted earlier. Goal: Mental status will be restored to baseline  Description: Mental status will be restored to baseline  Outcome: Ongoing     Problem: Discharge Planning:  Goal: Ability to perform activities of daily living will improve  Description: Ability to perform activities of daily living will improve  Outcome: Ongoing  Note: Client needing verbal cues for ADLs. Sequencing is a problem for patient. Goal: Participates in care planning  Description: Participates in care planning  Outcome: Ongoing  Note: Care plan discussed. with patient and unable to state goals. Problem: Injury - Risk of, Physical Injury:  Goal: Absence of physical injury  Description: Absence of physical injury  9/13/2020 0244 by Claudeen Ore Hire, LPN  Outcome: Ongoing  9/13/2020 0216 by Claudeen Ore Hire, LPN  Outcome: Met This Shift  Note: No injuries noted this shift.   9/12/2020 1449 by Chacha Crow Elie Lobato RN  Outcome: Ongoing  Note: No injuries noted. Problem: Psychomotor Activity - Altered:  Goal: Absence of psychomotor disturbance signs and symptoms  Description: Absence of psychomotor disturbance signs and symptoms  9/13/2020 0244 by Katie Restrepo LPN  Outcome: Ongoing  9/13/2020 0216 by Katie Restrepo LPN  Outcome: Ongoing     Problem: Sensory Perception - Impaired:  Goal: Demonstrations of improved sensory functioning will increase  Description: Demonstrations of improved sensory functioning will increase  Outcome: Ongoing  Goal: Decrease in sensory misperception frequency  Description: Decrease in sensory misperception frequency  Outcome: Ongoing  Goal: Able to distinguish between reality-based and nonreality-based thinking  Description: Able to distinguish between reality-based and nonreality-based thinking  Outcome: Ongoing  Note: Flight of ideas. Goal: Able to interrupt nonreality-based thinking  Description: Able to interrupt nonreality-based thinking  Outcome: Ongoing     Problem: Skin Integrity:  Goal: Will show no infection signs and symptoms  Description: Will show no infection signs and symptoms  9/13/2020 0244 by Katie Restrepo LPN  Outcome: Ongoing  Note: Coccyx continues to be peeling and red. 9/12/2020 1449 by Mirtha Olson RN  Outcome: Ongoing  Note: No new s/s of infection noted at this time. Problem: Mobility - Impaired:  Goal: Mobility will improve  Description: Mobility will improve  9/13/2020 0244 by Katie Restrepo LPN  Outcome: Ongoing  Note: Pt. Up with assist of one and the walker. 9/12/2020 1449 by Mirtha Olson RN  Outcome: Ongoing  Note: Up with 1 assist, walker, and gait belt, and with steady gait.       Problem: IP BLADDER/VOIDING  Goal: LTG - patient will complete bladder elimination  Outcome: Ongoing

## 2020-09-13 NOTE — PROGRESS NOTES
toward established goals. Activity Tolerance:  Patient tolerance of  treatment: good. Equipment Recommendations:Equipment Needed: Yes(RW)  Discharge Recommendations:  Continue to assess pending progress     Occupational Therapy:  COGNITION: Decreased Recall, Decreased Insight, Decreased Problem Solving, Decreased Safety Awareness and Impaired Attention     ADL:   Grooming: Contact Guard Assistance. Standing sink side washing hands and for oral care. Toileting: Contact Guard Assistance. CGA for clothing management. Pt completed hygeine while seated on RTS. Toilet Transfer: Contact Guard Assistance. To/from RTS- mod cues for hand placement. .     BALANCE:  Sitting Balance:  Supervision. Standing Balance: Contact Guard Assistance. x7 minutes within IADL task.     BED MOBILITY:  Supine to Sit: Minimal Assistance Guero guiding BLE to use bed rails as assist.  Scooting: Contact Guard Assistance EOB.     TRANSFERS:  Sit to Stand:  Contact Guard Assistance. Stand to Sit: Contact Guard Assistance. Comment: Mod v/c to attend to visual aide for proper hand placement.     FUNCTIONAL MOBILITY:  Assistive Device: Rolling Walker  Assist Level:  Contact Guard Assistance. Distance: Completed functional mobility to/from BR and within therapy kitchen at fair pace, no LOB noted. Pt requires mod cues for walker safety- seated rest break after trial of mobility, min fatigue noted.     ADDITIONAL ACTIVITIES:  Pt participated in IADL task to ambulate within therapy kitchen with use of RW to reach into various planes at high/low levels to retrieve items for place setting at dining table- pt able to verbalize all items needed for task upon request. Pt required CGA for balance and max cues for safe technique transporting items along countertop utilizing walker bag and mod cues for walker safety to keep within TIFFANIE as pt attempted to abandon x3 episodes while reaching outside of TIFFANIE.  Completed to increase kitchen safety and facilitate functional reaching required for simple meal prep tasks.     ASSESSMENT:     Activity Tolerance:  Patient tolerance of  treatment: fair. Pt limited by decreased cognition. Discharge Recommendations: Continue to assess pending progress   Equipment Recommendations: Equipment Needed: Yes  Other: Pt may benefit from a BSC, shower chair, grab bars in the shower . Need to assess for LHAE. Speech Therapy:  Short-Term Goals:  SHORT TERM GOAL #1:  Goal 1: Pt will complete orientation, immediate/delayed recall and working memory tasks with 70% accuracy given mod cues to improve retention of new and functional information. - GOAL MET  REVISED GOAL: Pt will complete orientation, immediate/delayed recall and working memory tasks with 80% accuracy given mod cues to improve retention of new and functional information. INTERVENTIONS: Orientation WITH use of calendar  Month: indep  Year: mod cues  DOM: indep  DUSTY: mod cues  Place: max cues  Name: Leonel Cousin: magno  *fair-poor comprehension across task overall, pt with decreased understanding of question (e.g., required re-phrasing/repetition of question \"what year is it\" x3 before pt answered appropriately; initial answers included \"September\", \"the 14th\", \"September\")     AM SESSION:   Immediate 3-sentence retention of new information - 4/10 indep, 3/10 min cues, 3/10 mod cues  *decreased immediate recall and sustained attention/focus to task      SHORT TERM GOAL #2:  Goal 2: Pt will complete functional sequencing and thought organizational tasks with 70% accuracy given mod cues to improve overall executive functioning skills.  - GOAL NOT MET, CONTINUE   INTERVENTIONS: Setting up blank calendar according to current date - MAX cueing/assist  required for visual organization, placement of month/year/dates, and sequencing of numerical information      Identifying specific dates on a calendar (e.g., \"find the 14th of September\") - 3/5 indep, 2/5 mod cues  *poor visual scanning  *spoke with pt re: assistance managing calendar, pt stated Radha Jeffrey is my go to for that, her and Eva Ferreira do everything for me\"      AM SESSION:   Written x6 step ADL sequencing task  1. Making cookies - 3/6 indep, 1/6 min cues, 1/6 mod cues, 1/6 max cues  2. Getting groceries - 4/6 indep, 2/6 min cues  *very decreased sequential thought  *slow processing speed      SHORT TERM GOAL #3:  Goal 3: Patient will complete functional problem solving, reasoning and basic computational tasks with 70% accuracy given mod cues to improve overall management of ADLs. - GOAL NOT MET, CONTINUE   INTERVENTIONS: Interpretation of restaurant menu - 3/10 indep, 1/10 min cues, 6/10 max cues  *poor visual scanning; required max cues to utilize finger throughout, which appeared to improve overall success  *decreased success with reading numerical information (e.g., prices, time, dates)     AM SESSION:   Presented with x10 hypothetical problematic household scenarios, instructed to prioritize into order of importance (#1-10) then find a solution to each problem. Prioritizing - 7/10 indep, 3/10 min cues  Finding solutions - 6/10 indep, 3/10 min cues, 1/10 mod cues  *good insight into need for assistance with taxing or complex household chores  *decreased inclusion of detail with verbal reasoning      Long-Term Goals:  Timeframe for Long-term Goals: 3 weeks     LONG TERM GOAL #1:  Goal 1: Pt will improve cognitive linguistic skills to a min assist level in order to improve level of independence in the home environment.  - GOAL NOT MET, CONTINUE     Comprehension: 3 - Patient understands basic needs 50-74% of the time  Expression: 5 - Expresses basic ideas/needs only (hungry/hot/pain)  Social Interaction: 6 - Patient requires medication for mood and/or effect  Problem Solving: 3 - Patient solves simple/routine tasks 50%-74%  Memory: 2 - Patient remembers 25%-49% of the time      ST FIM ASSESSMENT:       Admission score Current score Goal Status   COMMUNICATION 4 - Patient understands basic needs 75-90%+ of the time    3 - Patient understands basic needs 50-74% of the time    Progressing   EXPRESSION 5 - Expresses basic ideas/needs only (hungry/hot/pain)       5 - Expresses basic ideas/needs only (hungry/hot/pain)    Stable   SOCIAL INTERACTION 5 - Patient is appropriate with supervision/cues    6 - Patient requires medication for mood and/or effect    Progressing   PROBLEM SOLVING 3 - Patient solves simple/routine tasks 50%-74%    3 - Patient solves simple/routine tasks 50%-74%    Stable   MEMORY 2 - Patient remembers 25%-49% of the time    2 - Patient remembers 25%-49% of the time    Stable      EDUCATION:  Learner: Patient  Education:  Reviewed ST goals and Plan of Care, Reviewed recommendations for follow-up, Education Related to Prevention of Recurrence of Impairment/Illness/Injury and Home Safety Education  Evaluation of Education: Verbalizes understanding     ASSESSMENT/PLAN:  SUMMARY:  Pt has met 1/3 STG's and 0/1 LTG's this therapy period. Improvements noted in basic recall, sequencing, and orientation with use of calendar. Pt continues to present with a  moderate-severe cognitive impairment characterized by deficits in memory, auditory comprehension, thought organization, sequencing, problem solving, basic orientation, and reasoning. Question whether cognitive functioning slightly regressed this date, given pt's extreme difficulty interpreting a [employer], identifying dates on a calendar, and responding to simple orientation questions. Receptive and expressive language skills appear WFL for simple commands during basic tasks, however, comprehension appears very poor upon introduction to mildly complex verbal or written instruction, requiring multiple repetitions/re-phrasing.  At this time, significant concerns for making return to driving, living alone, and ADL management (although do question pt's cognitive demand in home setting, as social hx appears to be inconsistently gathered). Would recommend nearly 24/7 supervision or at least a Life Alert device in addition to close supervision, as well as no return to driving. Suspect ongoing needs for skilled ST services to address the aforementioned cognitive deficits and permit potential return to PLOF. Activity Tolerance:  Patient tolerance of treatment: Good     Assessment/Plan: Patient progressing toward established goals. Continues to require skilled care of licensed speech pathologist to progress toward achievement of established goals and plan of care. Plan for Next Session: problem solving, very basic med management    Review of Systems:  Review of Systems   Constitutional: Positive for activity change. Negative for appetite change, fatigue and fever. HENT: Negative for trouble swallowing and voice change. Eyes: Negative. Respiratory: Negative for cough and shortness of breath. Cardiovascular: Negative for leg swelling. Gastrointestinal: Negative for constipation, diarrhea and nausea. Endocrine: Negative for cold intolerance. Genitourinary: Positive for urgency. Negative for frequency. Musculoskeletal: Positive for arthralgias, gait problem and myalgias. Skin: Negative for pallor. Allergic/Immunologic: Negative. Neurological: Positive for tremors and weakness. Negative for dizziness, light-headedness and headaches. Hematological: Negative. Psychiatric/Behavioral: Positive for decreased concentration. Negative for confusion and dysphoric mood. The patient is not nervous/anxious. All other systems reviewed and are negative. Objective:  /63   Pulse 88   Temp 98.1 °F (36.7 °C) (Oral)   Resp 16   Ht 5' 7\" (1.702 m)   Wt 161 lb 9.6 oz (73.3 kg)   SpO2 94%   BMI 25.31 kg/m²   CURRENT VITALS:  height is 5' 7\" (1.702 m) and weight is 161 lb 9.6 oz (73.3 kg). Her oral temperature is 98.1 °F (36.7 °C).  Her blood pressure is meq/L    CO2 28 23 - 33 meq/L    Glucose 116 (H) 70 - 108 mg/dL    BUN 29 (H) 7 - 22 mg/dL    CREATININE 1.3 (H) 0.4 - 1.2 mg/dL    Calcium 10.9 (H) 8.5 - 10.5 mg/dL   Anion Gap    Collection Time: 09/14/20 10:55 AM   Result Value Ref Range    Anion Gap 12.0 8.0 - 16.0 meq/L   Glomerular Filtration Rate, Estimated    Collection Time: 09/14/20 10:55 AM   Result Value Ref Range    Est, Glom Filt Rate 40 (A) ml/min/1.73m2     Labs Renal Latest Ref Rng & Units 9/14/2020 9/10/2020 9/9/2020 9/7/2020 9/6/2020   BUN 7 - 22 mg/dL 29(H) 25(H) 19 18 22   Cr 0.4 - 1.2 mg/dL 1.3(H) 1.2 1.1 1.1 1.1   K 3.5 - 5.2 meq/L 5.4(H) 4.2 4.6 4.5 4.3   Na 135 - 145 meq/L 140 137 142 140 137      Recent Labs     09/14/20  1055 09/10/20  0617 09/09/20  0809   WBC 7.5 4.9 6.6   HGB 10.5* 9.7* 11.1*   HCT 35.1* 32.1* 35.7*   MCV 95.1 94.7 92.5    186 227      Impression:  1. Ground-level fall after syncopal event. 2. Acute slightly impacted subcapital fracture of the left femoral neck. 3. Status post closed reduction and percutaneous screw fixation of the left hip impacted subcapital fracture of the left femoral neck by Dr. Cosme Guzman D.O. on 09/06/2020. EBL minimal.  4. Gait instability. 5. Mild TBI. 6. Iron deficiency anemia. 7. Vitamin D deficiency. 8. Insulin-dependent type 2 diabetes, controlled with hyperglycemia. Last hemoglobin A1c was 5.7 on 9/9/2020.  9. Essential tremor. 10. History of prior myocardial infarction in 2011. 11. Hypertension. 12. Hyperlipidemia. 13. Coronary artery disease. 14. History of prior myocardial infarction. 15. CKD 3.  16. 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 17. Osteoporosis. 18. Moderate to severe cognitive deficits. (MOCA) version 8.2 completed. Patient scored 10/30.  19. Hyperkalemia. 20. Acute kidney injury.   21. Hypercalcemia.     Plan:      Medical management: Per primary team and Dr. May Lopez, Internal Medicine, Physical Medicine     Narcotic usage: Tramadol last 24 hour usage none. Increased. Last BM:   Stool Amount: Large (09/14/20 1018)     FUNCTIONAL OUTCOMES TOOLS:    GODINEZ -      Tinetti - Balance Score: 10  Gait Score: 6  Tinetti Total Score: 16    TUG -       Acute/Rehabilitation Problems:  1. Ground-level fall after syncopal event. 2. Acute slightly impacted subcapital fracture of the left femoral neck. 1. Addressed by surgery below. 3. Status post closed reduction and percutaneous screw fixation of the left hip impacted subcapital fracture of the left femoral neck by Dr. Cosme Guzman D.O. on 09/06/2020. EBL minimal.  1. WBAT. 2. Wound care. 3. Pain control. 1. PRN and scheduled Tylenol. 2. Lidoderm patches. 3. Tramadol. 4. Gait instability. 1. PT/OT. 2. Tinetti 16/28. Risk Indicators: Less than/equal to 18 = high risk; 19-23 Moderate risk; Greater than/equal to 24 = low risk. 5. Mild TBI/Moderate to severe cognitive deficits. (MOCA) version 8.2 completed. Patient scored 10/30. 1. SLP. 2. Low stimulation TBI guidelines if deemed necessary. 6. Iron deficiency anemia. 1. Iron 25 and ferritin 120. Abnormal/normal.  2. Ferrous sulfate and vitamin C twice daily with meals. 7. Nutrition:  Consultation to dietician for nutritional counseling and recommendations. 1. Protein 5.6 and albumin 2.8 on 9/10/2020. Abnormal.  2. Vitamin 25OH level of 14 on 9/9/2020.  3. Cholecalciferol 5000 IU 3 times daily with meals and 500 mg calcium twice daily with meals. 8. Electrolytes. 1. Normal on 9/14 with exception of.  1. Hyperkalemia with a potassium of 5.4 on 9/14. 1. Kayexalate 15 mg x 2 ordered on 9/14. Recheck lab on 9/15. 2. Hypercalcemia. 1. Hold supplemental calcium on 9/15 and recheck labs. 9. Bladder: No issues  10. Bowel: Senna, colace, MOM  11. Rehabilitation nursing will be involved for bowel, bladder, skin, and pain management.   Nursing will also provide education and training to patient and family. 12. Prophylaxis:  DVT: Plavix and aspirin as directed by Orthopedic Surgery. GI: None. .  13.  and case management consultations for coordination of care and discharge planning.     Chronic Problems:  1. Insulin-dependent type 2 diabetes, controlled with hyperglycemia. Last hemoglobin A1c was 5.7 on 9/9/2020.  1. Lantus 20 units every morning. 2. Metformin 1000 mg twice daily with meals. 2. Essential tremor. 1. Continue home primidone and propranolol. 3. History of prior myocardial infarction in 2011.  1. ASA 81 mg.  2. Plavix. 4. Hypertension. 1. Propranolol. 5. Hyperlipidemia. 1. Lipitor. 6. Coronary artery disease/History of prior myocardial infarction. 1. ASA 81 mg.  2. Plavix. 3. Lipitor. 7. ISAURA superimposed on CKD 3.  1. Cr/BUN/GFR on 9/9 was 1.1/19/49 which is stable over 9/7 with labs of 1.1/18/49. Mild decrease to 1.2/25/44 on 9/10. Further worsened from 1.3/29/40 on 9/14. 2. Encourage fluids. 8. 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 9. Osteoporosis. 1. See plan above for vitamin D and calcium supplementation.     Labs reviewed on:  1. 9/8.  2. 9/9.  3. 9/10.  4. 9/14.     Infectious Disease:  1. N/A     Missed Therapy Time:  None     DME:    Discharge Plan:      Greater than 50% of 30 minutes was spent with the patient reviewing her progress with therapies, her current level of pain control, and her lab abnormalities including elevated calcium and potassium levels with mildly worsened renal function.     Barbara Saeed MD

## 2020-09-13 NOTE — PLAN OF CARE
Problem: Falls - Risk of:  Goal: Will remain free from falls  Description: Will remain free from falls  9/13/2020 1609 by Otilia Walls RN  Outcome: Ongoing  Note: Pt using call light appropriately to call for assistance with ambulation to the bathroom and to chair. Pt is also compliant with use of non-skid slippers. Pt reports understanding of fall prevention when discussed. Problem: Falls - Risk of:  Goal: Absence of physical injury  Description: Absence of physical injury  9/13/2020 1609 by Otilia Walls RN  Outcome: Ongoing  Note: No s/s of physical injury noted at this time. Problem: Confusion - Acute:  Goal: Absence of continued neurological deterioration signs and symptoms  Description: Absence of continued neurological deterioration signs and symptoms  9/13/2020 1609 by Otilia Walls RN    Problem: Confusion - Acute:  Goal: Absence of continued neurological deterioration signs and symptoms  Description: Absence of continued neurological deterioration signs and symptoms  9/13/2020 1609 by Otilia Walls RN  Outcome: Ongoing  Note: Patient alert and oriented x3. Problem: Injury - Risk of, Physical Injury:  Goal: Will remain free from falls  Description: Will remain free from falls  9/13/2020 1609 by Otilia Walls RN  Outcome: Ongoing  Note: Pt using call light appropriately to call for assistance with ambulation to the bathroom and to chair. Pt is also compliant with use of non-skid slippers. Pt reports understanding of fall prevention when discussed. Care plan reviewed with patient. Patient verbalize understanding of the plan of care and contribute to goal setting.

## 2020-09-14 LAB
ANION GAP SERPL CALCULATED.3IONS-SCNC: 12 MEQ/L (ref 8–16)
BUN BLDV-MCNC: 29 MG/DL (ref 7–22)
CALCIUM SERPL-MCNC: 10.9 MG/DL (ref 8.5–10.5)
CHLORIDE BLD-SCNC: 100 MEQ/L (ref 98–111)
CO2: 28 MEQ/L (ref 23–33)
CREAT SERPL-MCNC: 1.3 MG/DL (ref 0.4–1.2)
ERYTHROCYTE [DISTWIDTH] IN BLOOD BY AUTOMATED COUNT: 13.3 % (ref 11.5–14.5)
ERYTHROCYTE [DISTWIDTH] IN BLOOD BY AUTOMATED COUNT: 45.3 FL (ref 35–45)
GFR SERPL CREATININE-BSD FRML MDRD: 40 ML/MIN/1.73M2
GLUCOSE BLD-MCNC: 116 MG/DL (ref 70–108)
GLUCOSE BLD-MCNC: 117 MG/DL (ref 70–108)
HCT VFR BLD CALC: 35.1 % (ref 37–47)
HEMOGLOBIN: 10.5 GM/DL (ref 12–16)
MCH RBC QN AUTO: 28.5 PG (ref 26–33)
MCHC RBC AUTO-ENTMCNC: 29.9 GM/DL (ref 32.2–35.5)
MCV RBC AUTO: 95.1 FL (ref 81–99)
PLATELET # BLD: 281 THOU/MM3 (ref 130–400)
PMV BLD AUTO: 9.9 FL (ref 9.4–12.4)
POTASSIUM SERPL-SCNC: 5.4 MEQ/L (ref 3.5–5.2)
RBC # BLD: 3.69 MILL/MM3 (ref 4.2–5.4)
SODIUM BLD-SCNC: 140 MEQ/L (ref 135–145)
WBC # BLD: 7.5 THOU/MM3 (ref 4.8–10.8)

## 2020-09-14 PROCEDURE — 82948 REAGENT STRIP/BLOOD GLUCOSE: CPT

## 2020-09-14 PROCEDURE — 1180000000 HC REHAB R&B

## 2020-09-14 PROCEDURE — 6370000000 HC RX 637 (ALT 250 FOR IP): Performed by: PHYSICAL MEDICINE & REHABILITATION

## 2020-09-14 PROCEDURE — 97110 THERAPEUTIC EXERCISES: CPT

## 2020-09-14 PROCEDURE — 97129 THER IVNTJ 1ST 15 MIN: CPT

## 2020-09-14 PROCEDURE — 97535 SELF CARE MNGMENT TRAINING: CPT

## 2020-09-14 PROCEDURE — 6370000000 HC RX 637 (ALT 250 FOR IP): Performed by: FAMILY MEDICINE

## 2020-09-14 PROCEDURE — 97116 GAIT TRAINING THERAPY: CPT

## 2020-09-14 PROCEDURE — 6370000000 HC RX 637 (ALT 250 FOR IP): Performed by: INTERNAL MEDICINE

## 2020-09-14 PROCEDURE — 80048 BASIC METABOLIC PNL TOTAL CA: CPT

## 2020-09-14 PROCEDURE — 97130 THER IVNTJ EA ADDL 15 MIN: CPT

## 2020-09-14 PROCEDURE — 97530 THERAPEUTIC ACTIVITIES: CPT

## 2020-09-14 PROCEDURE — 36415 COLL VENOUS BLD VENIPUNCTURE: CPT

## 2020-09-14 PROCEDURE — 85027 COMPLETE CBC AUTOMATED: CPT

## 2020-09-14 RX ORDER — SODIUM POLYSTYRENE SULFONATE 15 G/60ML
15 SUSPENSION ORAL; RECTAL EVERY 8 HOURS
Status: COMPLETED | OUTPATIENT
Start: 2020-09-14 | End: 2020-09-15

## 2020-09-14 RX ADMIN — METFORMIN HYDROCHLORIDE 1000 MG: 500 TABLET ORAL at 17:21

## 2020-09-14 RX ADMIN — ACETAMINOPHEN 650 MG: 325 TABLET ORAL at 13:18

## 2020-09-14 RX ADMIN — PRIMIDONE 200 MG: 50 TABLET ORAL at 07:55

## 2020-09-14 RX ADMIN — CLOPIDOGREL BISULFATE 75 MG: 75 TABLET ORAL at 07:54

## 2020-09-14 RX ADMIN — Medication 250 MG: at 17:21

## 2020-09-14 RX ADMIN — Medication 5000 UNITS: at 07:55

## 2020-09-14 RX ADMIN — PRIMIDONE 50 MG: 50 TABLET ORAL at 21:38

## 2020-09-14 RX ADMIN — INSULIN GLARGINE 20 UNITS: 100 INJECTION, SOLUTION SUBCUTANEOUS at 07:56

## 2020-09-14 RX ADMIN — DOCUSATE SODIUM 50 MG AND SENNOSIDES 8.6 MG 1 TABLET: 8.6; 5 TABLET, FILM COATED ORAL at 07:55

## 2020-09-14 RX ADMIN — Medication 500 MG: at 17:21

## 2020-09-14 RX ADMIN — MULTIPLE VITAMINS W/ MINERALS TAB 1 TABLET: TAB at 07:55

## 2020-09-14 RX ADMIN — FERROUS SULFATE TAB 325 MG (65 MG ELEMENTAL FE) 325 MG: 325 (65 FE) TAB at 07:54

## 2020-09-14 RX ADMIN — FERROUS SULFATE TAB 325 MG (65 MG ELEMENTAL FE) 325 MG: 325 (65 FE) TAB at 17:21

## 2020-09-14 RX ADMIN — PROPRANOLOL HYDROCHLORIDE 80 MG: 80 CAPSULE, EXTENDED RELEASE ORAL at 07:55

## 2020-09-14 RX ADMIN — Medication 5000 UNITS: at 17:21

## 2020-09-14 RX ADMIN — ATORVASTATIN CALCIUM 80 MG: 80 TABLET, FILM COATED ORAL at 21:37

## 2020-09-14 RX ADMIN — ASPIRIN 81 MG: 81 TABLET ORAL at 07:54

## 2020-09-14 RX ADMIN — Medication 500 MG: at 07:55

## 2020-09-14 RX ADMIN — DOCUSATE SODIUM 50 MG AND SENNOSIDES 8.6 MG 1 TABLET: 8.6; 5 TABLET, FILM COATED ORAL at 21:38

## 2020-09-14 RX ADMIN — ACETAMINOPHEN 650 MG: 325 TABLET ORAL at 21:37

## 2020-09-14 RX ADMIN — ACETAMINOPHEN 650 MG: 325 TABLET ORAL at 06:10

## 2020-09-14 RX ADMIN — Medication 250 MG: at 07:55

## 2020-09-14 RX ADMIN — METFORMIN HYDROCHLORIDE 1000 MG: 500 TABLET ORAL at 07:54

## 2020-09-14 RX ADMIN — Medication 5000 UNITS: at 13:18

## 2020-09-14 ASSESSMENT — PAIN DESCRIPTION - FREQUENCY
FREQUENCY: INTERMITTENT
FREQUENCY: INTERMITTENT

## 2020-09-14 ASSESSMENT — PAIN SCALES - GENERAL
PAINLEVEL_OUTOF10: 4
PAINLEVEL_OUTOF10: 5
PAINLEVEL_OUTOF10: 0
PAINLEVEL_OUTOF10: 5
PAINLEVEL_OUTOF10: 0

## 2020-09-14 ASSESSMENT — PAIN DESCRIPTION - LOCATION
LOCATION: HIP
LOCATION: HIP

## 2020-09-14 ASSESSMENT — PAIN DESCRIPTION - PAIN TYPE
TYPE: ACUTE PAIN
TYPE: ACUTE PAIN

## 2020-09-14 ASSESSMENT — PAIN DESCRIPTION - DESCRIPTORS
DESCRIPTORS: DISCOMFORT
DESCRIPTORS: DISCOMFORT

## 2020-09-14 ASSESSMENT — PAIN DESCRIPTION - PROGRESSION
CLINICAL_PROGRESSION: NOT CHANGED

## 2020-09-14 ASSESSMENT — PAIN DESCRIPTION - ORIENTATION
ORIENTATION: LEFT
ORIENTATION: LEFT

## 2020-09-14 ASSESSMENT — PAIN DESCRIPTION - ONSET
ONSET: ON-GOING
ONSET: ON-GOING

## 2020-09-14 ASSESSMENT — PAIN - FUNCTIONAL ASSESSMENT
PAIN_FUNCTIONAL_ASSESSMENT: ACTIVITIES ARE NOT PREVENTED
PAIN_FUNCTIONAL_ASSESSMENT: ACTIVITIES ARE NOT PREVENTED

## 2020-09-14 NOTE — PLAN OF CARE
Problem: Falls - Risk of:  Goal: Will remain free from falls  Description: Will remain free from falls  Outcome: Ongoing  Absent of falls this shift. Call light within reach. Educated Pt on use of call light and waiting on staff to assist with ambulation. Problem: Skin Integrity:  Goal: Will show no infection signs and symptoms  Description: Will show no infection signs and symptoms  Outcome: Ongoing  Skin assessment done every shift. Problem: Pain:  Goal: Pain level will decrease  Description: Pain level will decrease  Outcome: Ongoing  Assess pain during hourly rounding. Encouraged non-pharmaceutical interventions.

## 2020-09-14 NOTE — PROGRESS NOTES
[facility]  Inpatient Rehabilitation  Occupational Therapy  Progress Note  Time:  Time In: 940   Time Out: 0518  Timed Code Treatment Minutes: 60 Minutes  Minutes: 60          Date: 2020  Patient Name: Sara Alston,   Gender: female      Room: -54/054-A  MRN: [id number]  : 1950  (65 y.o.)  Referring Practitioner: Dr. Lora Calvillo  Diagnosis: subcapital fracture of the left femur , s/p repair  Additional Pertinent Hx: She was admitted 2020 after being brought into the emergency department by [address] [facility] for injuries sustained after a syncopal event. The patient had a fall that occurred while she was walking in the grocery store when she blacked out and when she regained consciousness she found herself lying on the floor with people standing around her. She had left-sided hip pain and also abrasions on her left arm along with a left-sided superficial hematoma on her parietal scalp. Her initial x-rays showed an acute slightly impacted subcapital fracture of the left femoral neck. She underwent surgical correction with a closed reduction and percutaneous screw fixation of the left hip impacted subcapital fracture of the left femoral neck by Dr. Elli Noriega on 2020. Restrictions/Precautions:  Restrictions/Precautions: Weight Bearing, General Precautions, Fall Risk  Left Lower Extremity Weight Bearing: Weight Bearing As Tolerated    SUBJECTIVE: cooperative, reports having an upset stomach this AM.     PAIN: 5/10: in left hip;. COGNITION: Slow Processing, Decreased Recall and Decreased Insight    ADL:   Grooming: Stand By Assistance. standing sinkside x 3 min for grooming tasks. Toileting: Stand By Assistance. Toilet Transfer: Contact Guard Assistance. [place] BALANCE:  Sitting Balance:  Supervision. Standing Balance: Stand By Assistance. BED MOBILITY:  Not Tested    TRANSFERS:  Sit to Stand:  Contact Guard Assistance.  moderate cues for safe hand placement with Continue to assess pending progress  Equipment Recommendations: Equipment Needed: Yes  Other: Pt may benefit from a BSC, shower chair, grab bars in the shower . Need to assess for LHAE. Plan: Times per week: 5xs week x 90 min, 1 x week x 30 min  Current Treatment Recommendations: Endurance Training, Functional Mobility Training, Self-Care / ADL, Equipment Evaluation, Education, & procurement, Patient/Caregiver Education & Training, Home Management Training, Safety Education & Training    Patient Education  Patient Education: ADL's and IADL's    Goals  Short term goals  Time Frame for Short term goals: 1 week  Short term goal 1: PT will complete ADL routine with no > minimal cues for organization, problem solving adapted tech and min A with LHE to increase independence in self care tasks MET REVISE  Short term goal 2: Pt will complete simple grooming tasks with CGA while using 1-2 hand release to increase her independence with toileting routine. MET REVISE  Short term goal 3: PT will complete functional mobility to/ from the BR as well as around obstacles with CGA and no > Min cues for safe tech to increase independence in toileting tasks at home.  MET REVISE  Short term goal 4: Pt will complete self feeding tasks with AE without cues to increase ability to eat soft foods NOT MET CONTINUE  Short term goal 5: Pt will complete 1 step homemaking tasks iwth no > min cues for safe tech to increase independence in retrieving a snack NOT MET CONSISTENTLY CONTINUE  Long term goals  Time Frame for Long term goals : 2-3 weeks  Long term goal 1: Pt will complete ADL routine with no cues for safe and adapted tech to increase independence in self care tasks NOT MET CONTINUE  Long term goal 2: Pt will complete 2 step homemaking tasks with no cues for safe tech and S for problem solving to increase independence in light homemaking tasks NOT MET CONTINUE    Revised Short-Term Goals  Short term goals  Time Frame for Short term goals: 1 week  Short term goal 1: PT will complete ADL routine with 0-1 cues for organization, problem solving adapted tech and Setup with LHAE PRN to increase independence in self care tasks  Short term goal 2: Pt will complete simple grooming tasks with  S while using 1-2 hand release to increase her independence with toileting routine. Short term goal 3: PT will complete functional mobility to/ from the BR as well as around obstacles with S  and 0-1 cues for safe tech to increase independence in toileting tasks at home  Short term goal 4: Pt will complete self feeding tasks with AE without cues to increase ability to eat soft foods  Short term goal 5: Pt will complete 1 step homemaking tasks iwth no > min cues for safe tech to increase independence in retrieving a snack  Long term goals  Time Frame for Long term goals : 2-3 weeks  Long term goal 1: Pt will complete ADL routine with no cues for safe and adapted tech to increase independence in self care tasks  Long term goal 2: Pt will complete 2 step homemaking tasks with no cues for safe tech and S for problem solving to increase independence in light homemaking tasks    Following session, patient left in safe position with all fall risk precautions in place.

## 2020-09-14 NOTE — PROGRESS NOTES
[address] [address]  INPATIENT PHYSICAL THERAPY  DAILY NOTE  [address]    Time In: 1325  Time Out: 1355  Timed Code Treatment Minutes: 30 Minutes  Minutes: 30          Date: 2020  Patient Name: Pratik Gan,  Gender:  female        MRN: [id number]  : 1950  (65 y.o.)     Referring Practitioner: DAVID Payne MD  Diagnosis: subcapital fracture of left femur s/p repair  Additional Pertinent Hx: Per H&P, pt is a 65 y.o. female with a past medical history of diabetes and prior MI who presents to the emergency department for evaluation of syncope. Patient states that she was at the grocery store this morning shortly prior to arrival, and upon leaving the grocery store she felt acutely short of breath, and lost consciousness. She reports that she fell onto her left side, and landed on her elbow and the back of her head. She endorses losing consciousness after she fell, and bystanders report that she was dazed. She regained consciousness shortly later, however she reports that she has not been ambulatory since her fall. In the emergency department she is complaining of head pain, shoulder pain, elbow pain, and left hip pain. She is additionally complaining of swelling to her left posterior scalp. Pt is now s/p HIP PINNING on 2020 by Dr Charan Swenson. To IPR on      Prior Level of Function:  Lives With: Alone  Type of Home: Apartment  Home Layout: One level  Home Access: Level entry  Home Equipment: Quad cane   Bathroom Shower/Tub: Tub/Shower unit, Walk-in shower(Pt prefers the walk in shower.)  Bathroom Toilet: Standard(uses a countertop on Right side.)  Bathroom Accessibility: Accessible    Receives Help From: Friend(s)  ADL Assistance: Independent  Homemaking Assistance: Independent  Homemaking Responsibilities: Yes  Ambulation Assistance: Independent  Transfer Assistance: Independent  Active :  Yes  Additional Comments: Pt did not use any AD 10  Gait Score: 6  Tinetti Total Score: 16    ASSESSMENT:  Assessment: Patient progressing toward established goals. Activity Tolerance:  Patient tolerance of  treatment: good. Pt tolerated session well. Pt demos decreased strength, endurance, balance, and will benefit from cont Pt at this time to improve safety awareness and decrease the risk for falls.      Equipment Recommendations:Equipment Needed: Yes(RW)  Discharge Recommendations:  Continue to assess pending progress    Plan: Times per week: 5x/wk 90 min, 1x/wk 30 min  Current Treatment Recommendations: Strengthening, ROM, Balance Training, Functional Mobility Training, Gait Training, Transfer Training, Patient/Caregiver Education & Training, Safety Education & Training, Home Exercise Program, Endurance Training, Stair training, Equipment Evaluation, Education, & procurement, Wheelchair Mobility Training, ADL/Self-care Training    Patient Education  Patient Education: Plan of Care, Transfers, Gait, Verbal Exercise Instruction    Goals:  Patient goals : go home  Short term goals  Time Frame for Short term goals: 1 week  Short term goal 1: pt to complete supine to/from sit at SBA to get in and out of bed  Short term goal 2: pt to complete sit to/from stand at SBA to rise from bed or chair  Short term goal 3: pt to ambulate >75ft with RW at [place] [address] for mobility in the home  Short term goal 4: pt to complete car transfer at [facility] for safe transport to home  Short term goal 5: pt score on Tinetti balance test will improve to >=20 for improved balance and decreased fall risk at home  Long term goals  Time Frame for Long term goals : 2 weeks  Long term goal 1: pt to complete supine to/from sit at Mod I to get in and out of bed  Long term goal 2: pt to complete sit to/from stand at Mod I to rise from bed or chair  Long term goal 3: pt to ambulate >50ft with RW at Mod I, >125ft with RW at S for mobility in the home and community  Long term goal 4: pt to complete car

## 2020-09-14 NOTE — PROGRESS NOTES
for ambulating. She did her own homemaking and cooking. reports walking daily PTA. Has a  1 x month    Restrictions/Precautions:  Restrictions/Precautions: Weight Bearing, General Precautions, Fall Risk  Left Lower Extremity Weight Bearing: Weight Bearing As Tolerated    SUBJECTIVE: Pt. Seated in BS chair upon arrival and pleasantly agrees to therapy session. Pt. Talkative and easily distracted throughout session. PAIN: Not rated: L hip    OBJECTIVE:  Bed Mobility:  Supine to Sit: Stand By Assistance  Sit to Supine: Stand By Assistance     Transfers:  Sit to Stand: Stand By Assistance  Stand to Sit:Stand By Assistance  Car:Stand By Assistance  VCs required for hand placement throughout    Ambulation:  Stand By Assistance, Moderate Assistance, with verbal cues , with increased time for completion  Distance: 75' x 1, 150' x 1, multiple shorter distances  Surface: Level Tile  Device:Rolling Walker  Gait Deviations: Forward Flexed Posture, Slow Susanna, Decreased Step Length Bilaterally, Lean to Left, Decreased Gait Speed, Decreased Heel Strike Bilaterally, Unsteady Gait, Decreased Terminal Knee Extension and Decreased step height bilaterally. Pt. Impulsive at times requiring cues for safety. Mod A required on one occasion due to posterior LOB. Stairs:  Platform:  6\" platform X 1 using [employer] and Stand By Assistance, [employer], with verbal cues , with increased time for completion. Exercise:  Patient was guided in 1 set(s) 10 reps of exercise to both lower extremities. Ankle pumps, Quad sets, Heelslides, Hip abduction/adduction and Long arc quads. Exercises were completed for increased independence with functional mobility. Functional Outcome Measures: Not completed       ASSESSMENT:  Assessment: Patient progressing toward established goals. Activity Tolerance:  Patient tolerance of  treatment: good.       Equipment Recommendations:Equipment Needed: Yes(RW)  Discharge Recommendations:  Continue to assess pending progress    Plan: Times per week: 5x/wk 90 min, 1x/wk 30 min  Current Treatment Recommendations: Strengthening, ROM, Balance Training, Functional Mobility Training, Gait Training, Transfer Training, Patient/Caregiver Education & Training, Safety Education & Training, Home Exercise Program, Endurance Training, Stair training, Equipment Evaluation, Education, & procurement, Wheelchair Mobility Training, ADL/Self-care Training    Patient Education  Patient Education: Plan of Care, Precautions/Restrictions, Transfers, Gait, Stairs, Car Transfers, Verbal Exercise Instruction, Home Safety Education    Goals:  Patient goals : go home  Short term goals  Time Frame for Short term goals: 1 week  Short term goal 1: pt to complete supine to/from sit at SBA to get in and out of bed  Short term goal 2: pt to complete sit to/from stand at SBA to rise from bed or chair  Short term goal 3: pt to ambulate >75ft with RW at [place] [address] for mobility in the home  Short term goal 4: pt to complete car transfer at [facility] for safe transport to home  Short term goal 5: pt score on Tinetti balance test will improve to >=20 for improved balance and decreased fall risk at home  Long term goals  Time Frame for Long term goals : 2 weeks  Long term goal 1: pt to complete supine to/from sit at Mod I to get in and out of bed  Long term goal 2: pt to complete sit to/from stand at Mod I to rise from bed or chair  Long term goal 3: pt to ambulate >50ft with RW at Mod I, >125ft with RW at S for mobility in the home and community  Long term goal 4: pt to complete car transfer at S for safe transport to home  Long term goal 5: pt to negotiate 6\" platform step with RW at S for curb negotiation in the community  Long term goal 6: pt score on Tinetti balance test will improve to >=24 for improved balance and decreased fall risk at home    Following session, patient left in safe position with all fall risk precautions in place.

## 2020-09-14 NOTE — PROGRESS NOTES
[facility]  INPATIENT PHYSICAL THERAPY  PROGRESS NOTE  [address] [facility] - 7E-54/054-A    Time In: 0830  Time Out: 0910  Timed Code Treatment Minutes: 40 Minutes  Minutes: 40          Date: 2020  Patient Name: Kiki North,  Gender:  female        MRN: [id number]  : 1950  (65 y.o.)     Referring Practitioner: DAVID Johnson MD  Diagnosis: subcapital fracture of left femur s/p repair  Additional Pertinent Hx: Per H&P, pt is a 65 y.o. female with a past medical history of diabetes and prior MI who presents to the emergency department for evaluation of syncope. Patient states that she was at the grocery store this morning shortly prior to arrival, and upon leaving the grocery store she felt acutely short of breath, and lost consciousness. She reports that she fell onto her left side, and landed on her elbow and the back of her head. She endorses losing consciousness after she fell, and bystanders report that she was dazed. She regained consciousness shortly later, however she reports that she has not been ambulatory since her fall. In the emergency department she is complaining of head pain, shoulder pain, elbow pain, and left hip pain. She is additionally complaining of swelling to her left posterior scalp. Pt is now s/p HIP PINNING on 2020 by Dr Chris Aguilera. To IPR on      Prior Level of Function:  Lives With: Alone  Type of Home: Apartment  Home Layout: One level  Home Access: Level entry  Home Equipment: Quad cane   Bathroom Shower/Tub: Tub/Shower unit, Walk-in shower(Pt prefers the walk in shower.)  Bathroom Toilet: Standard(uses a countertop on Right side.)  Bathroom Accessibility: Accessible    Receives Help From: Friend(s)  ADL Assistance: Independent  Homemaking Assistance: Independent  Homemaking Responsibilities: Yes  Ambulation Assistance: Independent  Transfer Assistance: Independent  Active :  Yes  Additional Comments: Pt did not use any AD for ambulating. She did her own homemaking and cooking. reports walking daily PTA. Has a  1 x month    Restrictions/Precautions:  Restrictions/Precautions: Weight Bearing, General Precautions, Fall Risk  Left Lower Extremity Weight Bearing: Weight Bearing As Tolerated    SUBJECTIVE: Pt. In restroom with RN present upon arrival and pleasantly agrees to therapy session. Pt reports upset stomach this morning. Pt. Requesting to complete nu-step during session. PAIN: Not rated: L hip    OBJECTIVE:  Transfers:  Sit to Stand: Stand By Assistance  Stand to Sit:Stand By [address], with verbal cues    Ambulation:  Contact Guard Assistance  Distance: 20' x 1, 30' x 1  Surface: Level Tile  Device:Rolling Walker  Gait Deviations: Forward Flexed Posture, Slow Susanna, Decreased Step Length Bilaterally, Decreased Gait Speed, Decreased Heel Strike Bilaterally and Decreased Terminal Knee Extension      Exercise:  Pt. Completed 10' on nu-step L3 to increase strength and endurance in LEs and improve functional mobility. Functional Outcome Measures: Completed  Balance Score: 10  Gait Score: 6  Tinetti Total Score: 16     Risk Indicators:  Less than/equal to 18 = high risk  19-23 Moderate risk  Greater than/equal to 24 = low risk      ASSESSMENT:  Assessment: Patient progressing toward established goals, meeting 3 short term goals. Pt showing decreased strength, endurance as result of recent femur fracture. Pt will benefit from continued skilled PT to further advance in these areas for improved gait status, transfers and overall safety. Activity Tolerance:  Patient tolerance of  treatment: good.       Equipment Recommendations:Equipment Needed: Yes(RW)  Discharge Recommendations:  Continue to assess pending progress    Plan: Times per week: 5x/wk 90 min, 1x/wk 30 min  Current Treatment Recommendations: Strengthening, ROM, Balance Training, Functional Mobility Training, Progress Note completed by Jazzmine Buck, PT.

## 2020-09-14 NOTE — PROGRESS NOTES
RECREATIONAL THERAPY  MISSED TREATMENT    []Transitional Care Unit  [x]Inpatient Rehabilitation    Date:  9/14/2020            Patient Name: Jeromy Parks           MRN: [id number]  Acct: [de-identified]          YOB: 1950 (65 y.o.)       Gender: female   Diagnosis: subcapital fracture of the left femur , s/p repair  Physician: Referring Practitioner: Dr. Kayla Bautista:    []Hold treatment per nursing request  []Patient refusal  []Family declined treatment  []Patient at testing and/or off the floor  []Patient unavailable, with PT/OT/nursing, etc.  [x]Other pt sound asleep in recliner this am -no RT this am   Malick Cabrera, CTRS    9/14/2020

## 2020-09-14 NOTE — PROGRESS NOTES
[address] [facility] THERAPY  [address] [facility]  PROGRESS NOTE    TIME   SLP Individual Minutes  Time In: 1400  Time Out: 1430  Minutes: 30  Timed Code Treatment Minutes: 30 Minutes       Date: 2020  Patient Name: Radha Finley      CSN: [id number]   : 1950  (65 y.o.)  Gender: female   Referring Physician:  Dr. Kelly Bush    Diagnosis: Left femoral neck impacted subcapital fracture  Secondary Diagnosis: Cognition  Precautions: Fall risk  Current Diet: Regular diet, thin liquids  Swallowing Strategies: Standard Universal Swallow Precautions  Date of Last MBS: Not Applicable    Pain:  No pain reported. Subjective:  Pt sitting upright in recliner for duration of session. Alert and pleasantly engaged throughout. Given significant cognitive deficits observed across session and pt not fully comprehending questions asked of her, ST re-addressed social hx briefly this date. When asked, \"do you manage your own medications? \" pt states \"yes\", but when asked Damita Klinefelter fill out your pill box? \" pt states \"yes\". Then discussed calendar management; pt stated \"Radha takes care of that, I can't write\" (this observation also noted during tasks). Suspect pt does not manage calendar or medications independently. Will need to further confirm with family members/said Ye De La Fuente. Short-Term Goals:  SHORT TERM GOAL #1:  Goal 1: Pt will complete orientation, immediate/delayed recall and working memory tasks with 70% accuracy given mod cues to improve retention of new and functional information. - GOAL MET  REVISED GOAL: Pt will complete orientation, immediate/delayed recall and working memory tasks with 80% accuracy given mod cues to improve retention of new and functional information.   INTERVENTIONS: Orientation WITH use of calendar  Month: indep  Year: mod cues  DOM: indep  DUSTY: mod cues  Place: max cues  Name: [address] [place] [address] Avenue: indep  *fair-poor comprehension across task overall, pt with decreased understanding of question (e.g., required re-phrasing/repetition of question \"what year is it\" x3 before pt answered appropriately; initial answers included \"September\", \"the 14th\", \"September\")    AM SESSION:   Immediate 3-sentence retention of new information - 4/10 indep, 3/10 min cues, 3/10 mod cues  *decreased immediate recall and sustained attention/focus to task     Cee Hernandez 1277 #2:  Goal 2: Pt will complete functional sequencing and thought organizational tasks with 70% accuracy given mod cues to improve overall executive functioning skills. - GOAL NOT MET, CONTINUE   INTERVENTIONS: Setting up blank calendar according to current date - MAX cueing/assist  required for visual organization, placement of month/year/dates, and sequencing of numerical information     Identifying specific dates on a calendar (e.g., \"find the 14th of September\") - 3/5 indep, 2/5 mod cues  *poor visual scanning  *spoke with pt re: assistance managing calendar, pt stated Nicole Villarreal is my go to for that, her and Ladonna Rowe do everything for me\"     AM SESSION:   Written x6 step ADL sequencing task  1. Making cookies - 3/6 indep, 1/6 min cues, 1/6 mod cues, 1/6 max cues  2.  Getting groceries - 4/6 indep, 2/6 min cues  *very decreased sequential thought  *slow processing speed     SHORT TERM GOAL #3:  Goal 3: Patient will complete functional problem solving, reasoning and basic computational tasks with 70% accuracy given mod cues to improve overall management of ADLs. - GOAL NOT MET, CONTINUE   INTERVENTIONS: Interpretation of restaurant menu - 3/10 indep, 1/10 min cues, 6/10 max cues  *poor visual scanning; required max cues to utilize finger throughout, which appeared to improve overall success  *decreased success with reading numerical information (e.g., prices, time, dates)    AM SESSION:   Presented with x10 hypothetical problematic household scenarios, instructed to prioritize into order of importance (#1-10) then find a solution to each problem. Prioritizing - 7/10 indep, 3/10 min cues  Finding solutions - 6/10 indep, 3/10 min cues, 1/10 mod cues  *good insight into need for assistance with taxing or complex household chores  *decreased inclusion of detail with verbal reasoning     Long-Term Goals:  Timeframe for Long-term Goals: 3 weeks    LONG TERM GOAL #1:  Goal 1: Pt will improve cognitive linguistic skills to a min assist level in order to improve level of independence in the home environment. - GOAL NOT MET, CONTINUE        Comprehension: 3 - Patient understands basic needs 50-74% of the time  Expression: 5 - Expresses basic ideas/needs only (hungry/hot/pain)  Social Interaction: 6 - Patient requires medication for mood and/or effect  Problem Solving: 3 - Patient solves simple/routine tasks 50%-74%  Memory: 2 - Patient remembers 25%-49% of the time     ST FIM ASSESSMENT:     Admission score Current score Goal Status   COMMUNICATION 4 - Patient understands basic needs 75-90%+ of the time   3 - Patient understands basic needs 50-74% of the time   Progressing   EXPRESSION 5 - Expresses basic ideas/needs only (hungry/hot/pain)     5 - Expresses basic ideas/needs only (hungry/hot/pain)   Stable   SOCIAL INTERACTION 5 - Patient is appropriate with supervision/cues   6 - Patient requires medication for mood and/or effect   Progressing   PROBLEM SOLVING 3 - Patient solves simple/routine tasks 50%-74%   3 - Patient solves simple/routine tasks 50%-74%   Stable   MEMORY 2 - Patient remembers 25%-49% of the time   2 - Patient remembers 25%-49% of the time   Stable         EDUCATION:  Learner: Patient  Education:  Reviewed ST goals and Plan of Care, Reviewed recommendations for follow-up, Education Related to Prevention of Recurrence of Impairment/Illness/Injury and Home Safety Education  Evaluation of Education: Verbalizes understanding    ASSESSMENT/PLAN:  SUMMARY:  Pt has met 1/3 STG's and 0/1 LTG's this therapy period.

## 2020-09-14 NOTE — PROGRESS NOTES
[address]  Occupational Therapy  Daily Note  Time:   Time In: 1200  Time Out: 1230  Timed Code Treatment Minutes: 30 Minutes  Minutes: 30          Date: 2020  Patient Name: Kyle Krishna,   Gender: female      Room: -54/054-A  MRN: [id number]  : 1950  (65 y.o.)  Referring Practitioner: Dr. Jeromy Montero  Diagnosis: subcapital fracture of the left femur , s/p repair  Additional Pertinent Hx: She was admitted 2020 after being brought into the emergency department by [facility] for injuries sustained after a syncopal event. The patient had a fall that occurred while she was walking in the grocery store when she blacked out and when she regained consciousness she found herself lying on the floor with people standing around her. She had left-sided hip pain and also abrasions on her left arm along with a left-sided superficial hematoma on her parietal scalp. Her initial x-rays showed an acute slightly impacted subcapital fracture of the left femoral neck. She underwent surgical correction with a closed reduction and percutaneous screw fixation of the left hip impacted subcapital fracture of the left femoral neck by Dr. Sarbjit Michaud on 2020. Restrictions/Precautions:  Restrictions/Precautions: Weight Bearing, General Precautions, Fall Risk  Left Lower Extremity Weight Bearing: Weight Bearing As Tolerated     SUBJECTIVE: Pt lying supine with RLE hanging off EOB upon arrival requesting to use BR- bed alarms on, however, not alarming. Pt agreeable to OT session. PAIN: None. COGNITION: Decreased Recall, Decreased Insight, Decreased Problem Solving, Decreased Safety Awareness and Impaired Attention    ADL:   Grooming: Contact Guard Assistance. Standing sink side washing hands and for oral care. Toileting: Contact Guard Assistance. CGA for clothing management. Pt completed hygeine while seated on RTS.   Toilet Transfer: Contact Guard procurement, Patient/Caregiver Education & Training, Home Management Training, Safety Education & Training    Patient Education  Patient Education: ADL's, IADL's, Assistive Device Safety and Safety with transfers and mobility. Goals  Short term goals  Time Frame for Short term goals: 1 week  Short term goal 1: PT will complete ADL routine with no > minimal cues for organization, problem solving adapted tech and min A with LHE to increase independence in self care tasks  Short term goal 2: Pt will complete simple grooming tasks with CGA while using 1-2 hand release to increase her independence with toileting routine. Short term goal 3: PT will complete functional mobility to/ from the BR as well as around obstacles with CGA and no > Min cues for safe tech to increase independence in toileting tasks at home  Short term goal 4: Pt will complete self feeding tasks with AE without cues to increase ability to eat soft foods  Short term goal 5: Pt will complete 1 step homemaking tasks iwth no > min cues for safe tech to increase independence in retrieving a snack  Long term goals  Time Frame for Long term goals : 2-3 weeks  Long term goal 1: Pt will complete ADL routine with no cues for safe and adapted tech to increase independence in self care tasks  Long term goal 2: Pt will complete 2 step homemaking tasks with no cues for safe tech and S for problem solving to increase independence in light homemaking tasks    Following session, patient left in safe position with all fall risk precautions in place.

## 2020-09-14 NOTE — PROGRESS NOTES
[facility] NOTE    Conference Date: 9/15/2020  Admit Date:  2020 11:56 AM  Patient Name: Natividad Schwab    MRN: [id number]    : 1950  (65 y.o.)  Rehabilitation Admitting Diagnosis:  Subcapital fracture of left femur s/p repair  Referring Practitioner: DAVID Salvador MD    CASE MANAGEMENT  Current issues/needs regarding patient and family discharge status: SW met with patient to introduce self and explain role, complete SW assessment, and initiate discharge planning. Prior to hospitalization, patient indicates independence with ADL's and personal care. Patient lives alone. Patient reports enjoying \"getting out\" and \"doing things\". Patient was not using any medical equipment, but reports having access to a quad cane. Patient reports ability to drive, complete errands, manage own finances, manage own medications, meal preparation, and laundry. Patient reports having a cleaning lady once a month. Patient reports managing own insulin at home, but admits to not checking her blood sugar very often or often enough. Patient reports plan to look into an emergency alert button upon discharge. Patient reports primary support to be provided by friend/Ashley DE. Patient reports talking to friendBrandyn, on a daily basis and visiting three to four times a week. Patient will not have twenty four hour support available at discharge. Anticipate patient would benefit from continued therapy upon discharge home. SW reviewed with patient team conference on Tuesday, 09/15/2020, to further discuss progress and discharge recommendations. SW to follow and maintain involvement in discharge planning. PHYSICAL THERAPY    Assessment: Patient progressing toward established goals, meeting 3 short term goals. Pt showing decreased strength, endurance as result of recent femur fracture.   Pt will benefit from continued skilled PT to further advance in these areas for improved gait status, transfers and overall safety. Equipment Needed: Yes(RW)    SPEECH THERAPY  Pt has met 1/3 STG's and 0/1 LTG's this therapy period. Improvements noted in basic recall, sequencing, and orientation with use of calendar. Pt continues to present with a  moderate-severe cognitive impairment characterized by deficits in memory, auditory comprehension, thought organization, sequencing, problem solving, basic orientation, and reasoning. Question whether cognitive functioning slightly regressed this date, given pt's extreme difficulty interpreting a [employer], identifying dates on a calendar, and responding to simple orientation questions. Receptive and expressive language skills appear WFL for simple commands during basic tasks, however, comprehension appears very poor upon introduction to mildly complex verbal or written instruction, requiring multiple repetitions/re-phrasing. At this time, significant concerns for making return to driving, living alone, and ADL management (although do question pt's cognitive demand in home setting, as social hx appears to be inconsistently gathered). Would recommend nearly 24/7 supervision or at least a Life Alert device in addition to close supervision, as well as no return to driving. Suspect ongoing needs for skilled ST services to address the aforementioned cognitive deficits and permit potential return to PLOF. [address] is making steady gains with ADLs and safe mobility meeting 3/5 STGs. She currently requires min A for LE ADLs, CGA for safe transfers and  ambulation with the RW. She requires minimal cueing for safe hand placement with transfers as well as RW safety with functional ambulation. She requires moderate cues to max cues at times for problem solving RW safety with IADL tasks . THis remains a decline from being independent with ADLs, and homemaking tasks .  Pt would benefit from additional skilled OT services to address increasing independence in self care and functional mobility to return home as independently as possibe to reduce incidence of falls and caregiver burdon. Equipment Needed: Yes  Other: Pt may benefit from a BSC, shower chair, grab bars in the shower . Need to assess for LHAE. RECREATIONAL THERAPY  Patient has been offered participation in recreational therapy activities and participates as able. Pt states she was independent with her cooking and cleaning, she states she has good friends close by and they enjoy going out to eat, visiting and watching movies-she gets her hair done weekly and told her about our beautician here each week, she enjoys her Worship and Worship activities-gave her some adult coloring sheets and colored pencils to use in free time-feels she is not up to any word puzzles yet due to her hitting her head-she does repeat herself during conversation-she is very pleasant and social-she enjoys reading but does not have her glasses here and will ask friend if she has them- after lunch pt was having visitors come in to visit and she declined coming to play bingo with peers -O. T. brought pt to the apartment to help clean potatoes, poke them and season them and put in crock pot for lunch today-affect brightened-social-good sense of humor  -Pt found out that her friend/POA has her glasses and is in hopes that she will bring them in this weekend sometime-she cannot see to read without her glasses -states she is not ready yet for any word puzzles and may try some coloring this weekend-pleasant    NUTRITION  Weight: 161 lb 9.6 oz (73.3 kg) / Body mass index is 25.31 kg/m². Current diet: DIET CARB CONTROL; Dietary Nutrition Supplements: Diabetic Oral Supplement  Please see nutrition note for details.     NURSING  Continent of Bowel and Bladder: No  Pain is Managed:  Yes  Signs and Symptoms of Infection:  No  Signs and Symptoms of Skin Breakdown:  No  Injury and Fall Free during Inpatient Rehabilitation Admission: Yes    Consultations/Labs/X-rays: Orthopedic Surgery, Internal Medicine, Physical Medicine    Family Education: Need to make contact with family to initiate education  Fall Risk:  Falling star program initiated  Is the patient appropriate for a stay in the functional apartment? no    Discharge Plan   Estimated Discharge Date: 9/24   Destination: home health and discharge home with supervision  Services at Discharge: [address], Occupational Therapy, Speech Therapy, Nursing and an aide 3x week  Is patient appropriate for an outpatient driving evaluation? yes, when appropriate  Equipment at Discharge: RW, BSC, shower chair, grab bars  Factors facilitating achievement of predicted outcomes: Friend support, Motivated, Cooperative, Pleasant and Has needed Durable Medical Equipment at home  Barriers to the achievement of predicted outcomes: Limited family support, Cognitive deficit and Impulsivity    Team Members Present at Conference:  [facility]  Occupational Therapist:Kesha Jules [address] OTR/L [facility], [facility]  Speech Jeanine Sample M.S. [address]   Nurse:Nidia Foley, RN  Psychologist: Cm Levin, PhD.    I approve the established interdisciplinary plan of care as documented within the medical record of Geary Rue. Ethan Siemens

## 2020-09-14 NOTE — PROGRESS NOTES
[address] [facility] THERAP[facility]  [facility]  DAILY NOTE    TIME   SLP Individual Minutes  Time In: 1130  Time Out: 1200  Minutes: 30  Timed Code Treatment Minutes: 30 Minutes       Date: 2020  Patient Name: Fatemeh Chavez      CSN: [id number]   : 1950  (65 y.o.)  Gender: female   Referring Physician:  Dr. Harper Sher    Diagnosis: Left femoral neck impacted subcapital fracture  Secondary Diagnosis: Cognition  Precautions: Fall risk  Current Diet: Regular diet, thin liquids  Swallowing Strategies: Standard Universal Swallow Precautions  Date of Last MBS: Not Applicable    Pain:  No pain reported. Subjective:  Pt resting in bed for duration of session, but remained compliant with given tasks and cooperative throughout. Short-Term Goals:  SHORT TERM GOAL #1:  Goal 1: Pt will complete orientation, immediate/delayed recall and working memory tasks with 70% accuracy given mod cues to improve retention of new and functional information. INTERVENTIONS: Immediate 3-sentence retention of new information - 4/10 indep, 3/10 min cues, 3/10 mod cues  *decreased immediate recall and sustained attention/focus to task     [address] #2:  Goal 2: Pt will complete functional sequencing and thought organizational tasks with 70% accuracy given mod cues to improve overall executive functioning skills. INTERVENTIONS: Written x6 step ADL sequencing task  1. Making cookies - 3/6 indep, 1/6 min cues, 1/6 mod cues, 1/6 max cues  2. Getting groceries - 4/6 indep, 2/6 min cues  *very decreased sequential thought  *slow processing speed     SHORT TERM GOAL #3:  Goal 3: Patient will complete functional problem solving, reasoning and basic computational tasks with 70% accuracy given mod cues to improve overall management of ADLs.   INTERVENTIONS: Presented with x10 hypothetical problematic household scenarios, instructed to prioritize into order of importance (#1-10) then find a solution to each problem. Prioritizing - 7/10 indep, 3/10 min cues  Finding solutions - 6/10 indep, 3/10 min cues, 1/10 mod cues  *good insight into need for assistance with taxing or complex household chores  *decreased inclusion of detail with verbal reasoning     Long-Term Goals:  Timeframe for Long-term Goals: 3 weeks    LONG TERM GOAL #1:  Goal 1: Pt will improve cognitive linguistic skills to a min assist level in order to improve level of independence in the home environment. Comprehension: 4 - Patient understands basic needs 75-90%+ of the time  Expression: 5 - Expresses basic ideas/needs only (hungry/hot/pain)  Social Interaction: 6 - Patient requires medication for mood and/or effect  Problem Solving: 3 - Patient solves simple/routine tasks 50%-74%  Memory: 2 - Patient remembers 25%-49% of the time    EDUCATION:  Learner: Patient  Education:  Reviewed ST goals and Plan of Care, Reviewed recommendations for follow-up, Education Related to Prevention of Recurrence of Impairment/Illness/Injury and Home Safety Education  Evaluation of Education: Verbalizes understanding    ASSESSMENT/PLAN:  Activity Tolerance:  Patient tolerance of treatment: Good     Assessment/Plan: Patient progressing toward established goals. Continues to require skilled care of licensed speech pathologist to progress toward achievement of established goals and plan of care.      Plan for Next Session: medication management, calendar management    Royal Adam M.S. [address] [facility] ([place] [id number]

## 2020-09-15 LAB
ANION GAP SERPL CALCULATED.3IONS-SCNC: 6 MEQ/L (ref 8–16)
BILIRUBIN URINE: NEGATIVE
BLOOD, URINE: NEGATIVE
BUN BLDV-MCNC: 25 MG/DL (ref 7–22)
CALCIUM SERPL-MCNC: 10.3 MG/DL (ref 8.5–10.5)
CHARACTER, URINE: CLEAR
CHLORIDE BLD-SCNC: 103 MEQ/L (ref 98–111)
CO2: 33 MEQ/L (ref 23–33)
COLOR: YELLOW
CREAT SERPL-MCNC: 1.3 MG/DL (ref 0.4–1.2)
GFR SERPL CREATININE-BSD FRML MDRD: 40 ML/MIN/1.73M2
GLUCOSE BLD-MCNC: 180 MG/DL (ref 70–108)
GLUCOSE URINE: NEGATIVE MG/DL
KETONES, URINE: NEGATIVE
LEUKOCYTE ESTERASE, URINE: NEGATIVE
NITRITE, URINE: NEGATIVE
PH UA: 8.5 (ref 5–9)
POTASSIUM SERPL-SCNC: 5.1 MEQ/L (ref 3.5–5.2)
PROTEIN UA: NEGATIVE
SODIUM BLD-SCNC: 142 MEQ/L (ref 135–145)
SPECIFIC GRAVITY, URINE: 1.02 (ref 1–1.03)
UROBILINOGEN, URINE: 0.2 EU/DL (ref 0–1)

## 2020-09-15 PROCEDURE — 6370000000 HC RX 637 (ALT 250 FOR IP): Performed by: FAMILY MEDICINE

## 2020-09-15 PROCEDURE — 97535 SELF CARE MNGMENT TRAINING: CPT

## 2020-09-15 PROCEDURE — 81003 URINALYSIS AUTO W/O SCOPE: CPT

## 2020-09-15 PROCEDURE — 97116 GAIT TRAINING THERAPY: CPT

## 2020-09-15 PROCEDURE — 97110 THERAPEUTIC EXERCISES: CPT

## 2020-09-15 PROCEDURE — 97130 THER IVNTJ EA ADDL 15 MIN: CPT

## 2020-09-15 PROCEDURE — 36415 COLL VENOUS BLD VENIPUNCTURE: CPT

## 2020-09-15 PROCEDURE — 97129 THER IVNTJ 1ST 15 MIN: CPT

## 2020-09-15 PROCEDURE — 6370000000 HC RX 637 (ALT 250 FOR IP): Performed by: INTERNAL MEDICINE

## 2020-09-15 PROCEDURE — 97530 THERAPEUTIC ACTIVITIES: CPT

## 2020-09-15 PROCEDURE — 80048 BASIC METABOLIC PNL TOTAL CA: CPT

## 2020-09-15 PROCEDURE — 1180000000 HC REHAB R&B

## 2020-09-15 PROCEDURE — 6370000000 HC RX 637 (ALT 250 FOR IP): Performed by: PHYSICAL MEDICINE & REHABILITATION

## 2020-09-15 RX ORDER — INSULIN GLARGINE 100 [IU]/ML
24 INJECTION, SOLUTION SUBCUTANEOUS EVERY MORNING
Status: DISCONTINUED | OUTPATIENT
Start: 2020-09-16 | End: 2020-09-24 | Stop reason: HOSPADM

## 2020-09-15 RX ADMIN — FERROUS SULFATE TAB 325 MG (65 MG ELEMENTAL FE) 325 MG: 325 (65 FE) TAB at 17:04

## 2020-09-15 RX ADMIN — ATORVASTATIN CALCIUM 80 MG: 80 TABLET, FILM COATED ORAL at 19:45

## 2020-09-15 RX ADMIN — SODIUM POLYSTYRENE SULFONATE 15 G: 15 SUSPENSION ORAL; RECTAL at 00:14

## 2020-09-15 RX ADMIN — METFORMIN HYDROCHLORIDE 1000 MG: 500 TABLET ORAL at 10:53

## 2020-09-15 RX ADMIN — ACETAMINOPHEN 650 MG: 325 TABLET ORAL at 12:56

## 2020-09-15 RX ADMIN — ACETAMINOPHEN 650 MG: 325 TABLET ORAL at 05:46

## 2020-09-15 RX ADMIN — PRIMIDONE 50 MG: 50 TABLET ORAL at 19:45

## 2020-09-15 RX ADMIN — Medication 5000 UNITS: at 10:53

## 2020-09-15 RX ADMIN — PRIMIDONE 200 MG: 50 TABLET ORAL at 10:53

## 2020-09-15 RX ADMIN — Medication 250 MG: at 10:53

## 2020-09-15 RX ADMIN — DOCUSATE SODIUM 50 MG AND SENNOSIDES 8.6 MG 1 TABLET: 8.6; 5 TABLET, FILM COATED ORAL at 10:52

## 2020-09-15 RX ADMIN — MULTIPLE VITAMINS W/ MINERALS TAB 1 TABLET: TAB at 10:53

## 2020-09-15 RX ADMIN — CLOPIDOGREL BISULFATE 75 MG: 75 TABLET ORAL at 10:54

## 2020-09-15 RX ADMIN — Medication 5000 UNITS: at 17:05

## 2020-09-15 RX ADMIN — ACETAMINOPHEN 650 MG: 325 TABLET ORAL at 17:04

## 2020-09-15 RX ADMIN — Medication 250 MG: at 17:04

## 2020-09-15 RX ADMIN — METFORMIN HYDROCHLORIDE 1000 MG: 500 TABLET ORAL at 17:05

## 2020-09-15 RX ADMIN — ASPIRIN 81 MG: 81 TABLET ORAL at 10:54

## 2020-09-15 RX ADMIN — FERROUS SULFATE TAB 325 MG (65 MG ELEMENTAL FE) 325 MG: 325 (65 FE) TAB at 10:54

## 2020-09-15 RX ADMIN — Medication 5000 UNITS: at 12:58

## 2020-09-15 RX ADMIN — SODIUM POLYSTYRENE SULFONATE 15 G: 15 SUSPENSION ORAL; RECTAL at 10:52

## 2020-09-15 ASSESSMENT — ENCOUNTER SYMPTOMS
NAUSEA: 0
ALLERGIC/IMMUNOLOGIC NEGATIVE: 1
VOICE CHANGE: 0
COUGH: 0
EYES NEGATIVE: 1
CONSTIPATION: 0
SHORTNESS OF BREATH: 0
DIARRHEA: 0
TROUBLE SWALLOWING: 0

## 2020-09-15 ASSESSMENT — PAIN - FUNCTIONAL ASSESSMENT: PAIN_FUNCTIONAL_ASSESSMENT: ACTIVITIES ARE NOT PREVENTED

## 2020-09-15 ASSESSMENT — PAIN SCALES - WONG BAKER: WONGBAKER_NUMERICALRESPONSE: 0

## 2020-09-15 ASSESSMENT — PAIN SCALES - GENERAL
PAINLEVEL_OUTOF10: 0
PAINLEVEL_OUTOF10: 5
PAINLEVEL_OUTOF10: 0
PAINLEVEL_OUTOF10: 5
PAINLEVEL_OUTOF10: 0

## 2020-09-15 ASSESSMENT — PAIN DESCRIPTION - FREQUENCY: FREQUENCY: INTERMITTENT

## 2020-09-15 ASSESSMENT — PAIN DESCRIPTION - PAIN TYPE: TYPE: ACUTE PAIN

## 2020-09-15 ASSESSMENT — PAIN DESCRIPTION - PROGRESSION: CLINICAL_PROGRESSION: NOT CHANGED

## 2020-09-15 ASSESSMENT — PAIN DESCRIPTION - ONSET: ONSET: ON-GOING

## 2020-09-15 ASSESSMENT — PAIN DESCRIPTION - ORIENTATION: ORIENTATION: LEFT

## 2020-09-15 ASSESSMENT — PAIN DESCRIPTION - LOCATION: LOCATION: HIP

## 2020-09-15 ASSESSMENT — PAIN DESCRIPTION - DESCRIPTORS: DESCRIPTORS: DISCOMFORT

## 2020-09-15 NOTE — PLAN OF CARE
Problem: DISCHARGE BARRIERS  Goal: Patient's continuum of care needs are met  Note: Team conference held Tuesday, 09/15/2020. Recommendations of the team were explained to the patient by WANG Bo, and Dr. Tiffany Allen. Team is recommending that patient continue on acute inpatient rehab for nine more days, with expected discharge date of Thursday, 09/24/2020. Prior to discharge, team is recommending family education (possibly with friend, Mary Alice Blake). Following discharge, team is recommending home health care services for RN/PT/OT/ST/HHA. Concerns regarding patient's increased confusion and ability to return home if confusion does not improve. Care plan reviewed with patient. Patient verbalized understanding of the plan of care and contributed to goal setting. SW to follow and maintain involvement in discharge planning.

## 2020-09-15 NOTE — PROGRESS NOTES
[facility]  INPATIENT PHYSICAL THERAPY  DAILY NOTE  [address] [facility],4Th Floor - 7E-54/054-A    Time In: 0700  Time Out: 0800  Timed Code Treatment Minutes: 60 Minutes  Minutes: 60          Date: 9/15/2020  Patient Name: Marcio Kaba,  Gender:  female        MRN: [id number]  : 1950  (65 y.o.)     Referring Practitioner: DAVID Jeffrey MD  Diagnosis: subcapital fracture of left femur s/p repair  Additional Pertinent Hx: Per H&P, pt is a 65 y.o. female with a past medical history of diabetes and prior MI who presents to the emergency department for evaluation of syncope. Patient states that she was at the grocery store this morning shortly prior to arrival, and upon leaving the grocery store she felt acutely short of breath, and lost consciousness. She reports that she fell onto her left side, and landed on her elbow and the back of her head. She endorses losing consciousness after she fell, and bystanders report that she was dazed. She regained consciousness shortly later, however she reports that she has not been ambulatory since her fall. In the emergency department she is complaining of head pain, shoulder pain, elbow pain, and left hip pain. She is additionally complaining of swelling to her left posterior scalp. Pt is now s/p HIP PINNING on 2020 by Dr Jose Ramon Skelton. To IPR on      Prior Level of Function:  Lives With: Alone  Type of Home: Apartment  Home Layout: One level  Home Access: Level entry  Home Equipment: Quad cane   Bathroom Shower/Tub: Tub/Shower unit, Walk-in shower(Pt prefers the walk in shower.)  Bathroom Toilet: Standard(uses a countertop on Right side.)  Bathroom Accessibility: Accessible    Receives Help From: Friend(s)  ADL Assistance: Independent  Homemaking Assistance: Independent  Homemaking Responsibilities: Yes  Ambulation Assistance: Independent  Transfer Assistance: Independent  Active :  Yes  Additional Comments: Pt did not use any AD for ambulating. She did her own homemaking and cooking. reports walking daily PTA. Has a  1 x month    Restrictions/Precautions:  Restrictions/Precautions: Weight Bearing, General Precautions, Fall Risk  Left Lower Extremity Weight Bearing: Weight Bearing As Tolerated    SUBJECTIVE: Pt. Laying in bed upon arrival and agrees to therapy session with min encouragement. Pt. Requests for female staff to assist with clothing management. Per RN, pt. Incontinent and requiring to use restroom. PAIN: Not rated: L Hip     OBJECTIVE:  Transfers:  Sit to Stand: [employer], with verbal cues  Stand to [place] [address], Minimal Assistance, with verbal cues  Stand Pivot:[employer], with verbal cues    Ambulation:  Stand By Assistance, [employer], with verbal cues   Distance: 150' x 1, 20' x 1, 30' x 1, 50' x 2  Surface: Level Tile  Device:Rolling Walker  Gait Deviations: Forward Flexed Posture, Slow Susanna, Decreased Step Length Bilaterally, Decreased Weight Shift Left, Lean to Left, Decreased Gait Speed, Decreased Heel Strike Bilaterally, Mild Path Deviations and Unsteady Gait. Extra time spent on gait training to emphasize posture, proper walker use, and safety. Mirror utilized at times for [employer]. Exercise:  Pt. Completed 10' on nu-step L3 to increase strength and endurance for functional mobility. Functional Outcome Measures: Not completed       ASSESSMENT:  Assessment: Patient progressing toward established goals. Activity Tolerance:  Patient tolerance of  treatment: good.       Equipment Recommendations:Equipment Needed: Yes(RW)  Discharge Recommendations:  Continue to assess pending progress    Plan: Times per week: 5x/wk 90 min, 1x/wk 30 min  Current Treatment Recommendations: Strengthening, ROM, Balance Training, Functional Mobility Training, Gait Training, Transfer Training, Patient/Caregiver Education & Training, Safety Education & Training, Home Exercise Program, Endurance Training, Stair training, Equipment Evaluation, Education, & procurement, Wheelchair Mobility Training, ADL/Self-care Training    Patient Education  Patient Education: Plan of Care, Equipment Education, Transfers, Reviewed Prior Education, Gait, Verbal Exercise Instruction, Health Promotion and Wellness Education    Goals:  Patient goals : go home  Short term goals  Time Frame for Short term goals: 1 week  Short term goal 1: pt to complete supine to/from sit at SBA to get in and out of bed  MET, see LTG  Short term goal 2: pt to complete sit to/from stand at SBA to rise from bed or chair  MET, see LTG  Short term goal 3: pt to ambulate >75ft with RW at [place] [address] for mobility in the home  Short term goal 4: pt to complete car transfer at [facility] for safe transport to home  MET, see LTG  Short term goal 5: pt score on Tinetti balance test will improve to >=20 for improved balance and decreased fall risk at home  Long term goals  Time Frame for Long term goals : 2 weeks  Long term goal 1: pt to complete supine to/from sit at Mod I to get in and out of bed  Long term goal 2: pt to complete sit to/from stand at Mod I to rise from bed or chair  Long term goal 3: pt to ambulate >50ft with RW at Mod I, >125ft with RW at S for mobility in the home and community  Long term goal 4: pt to complete car transfer at S for safe transport to home  Long term goal 5: pt to negotiate 6\" platform step with RW at S for curb negotiation in the community  Long term goal 6: pt score on Tinetti balance test will improve to >=24 for improved balance and decreased fall risk at home    Following session, patient left in safe position with all fall risk precautions in place.

## 2020-09-15 NOTE — PROGRESS NOTES
Leadership rounds conducted with patient, patient verbalizes satisfaction with nursing care and overall experience on this nursing unit.

## 2020-09-15 NOTE — PLAN OF CARE
Problem: Nutrition  Goal: Optimal nutrition therapy  Outcome: Ongoing   Nutrition Problem #1: Increased nutrient needs  Intervention: Food and/or Nutrient Delivery: Continue Current Diet, Continue Oral Nutrition Supplement, Vitamin Supplement  Nutritional Goals: Pt will consume 75% or more of meals to aid in healing or weight loss

## 2020-09-15 NOTE — CARE COORDINATION
Leandro Schuster was evaluated today and a DME order was entered for a wheeled walker because she requires this to successfully complete daily living tasks of ambulating. A wheeled walker is necessary due to the patient's unsteady gait, upper body weakness, and inability to  an ambulation device; and she can ambulate only by pushing a walker instead of a lesser assistive device such as a cane, crutch, or standard walker. The need for this equipment was discussed with the patient and she understands and is in agreement.

## 2020-09-15 NOTE — PROGRESS NOTES
Assistance. with safety education. [facility] BALANCE:  Sitting Balance:  Modified Independent. Standing Balance: Stand By Assistance. BED MOBILITY:  Supine to Sit: Modified Independent    Sit to Supine: Minimal Assistance A with LLE. TRANSFERS:  Sit to Stand:  Stand By Assistance. with edu on hand placement   Stand to Sit: Stand By Assistance. with edu on hand placemetn and RW placement     FUNCTIONAL MOBILITY:  Assistive Device: Rolling Walker  Assist Level:  Stand by Assist   Distance: To and from bathroom and To and from shower room     ASSESSMENT:     Activity Tolerance:  Patient tolerance of  treatment: fair. Discharge Recommendations: 24 hour supervision or assist, Home with Home health OT, Home with nursing aide   Equipment Recommendations: Equipment Needed: Yes  Other: Pt may benefit from a BSC, shower chair, grab bars in the shower . Need to assess for LHAE. BSC ordered on 9-15. Plan: Times per week: 5xs week x 90 min, 1 x week x 30 min  Current Treatment Recommendations: Endurance Training, Functional Mobility Training, Self-Care / ADL, Equipment Evaluation, Education, & procurement, Patient/Caregiver Education & Training, Home Management Training, Safety Education & Training    Patient Education  Patient Education: ADL's, Precautions, Reviewed Prior Education and Assistive Device Safety    Goals  Short term goals  Time Frame for Short term goals: 1 week  Short term goal 1: PT will complete ADL routine with 0-1 cues for organization, problem solving adapted tech and Setup with LHAE PRN to increase independence in self care tasks  Short term goal 2: Pt will complete simple grooming tasks with  S while using 1-2 hand release to increase her independence with toileting routine.   Short term goal 3: PT will complete functional mobility to/ from the BR as well as around obstacles with S  and 0-1 cues for safe tech to increase independence in toileting tasks at home  Short term goal 4: Pt will

## 2020-09-15 NOTE — PROGRESS NOTES
Physical Medicine & Rehabilitation  Progress Note    Chief Complaint:  left femoral neck impacted subcapital fracture    Subjective:  Care conference held today with proposed discharge date on 9/24 to home with Home Health Physical Therapy, Occupational Therapy, Speech Therapy, Nursing and an aide. Family not present. She is in agreement with plan and timing. Pain is controlled. Her potassium and calcium levels are now normal again after holding calcium supplements and giving 2 doses of Kayexalate. There was concern expressed in the care conference that the patient's cognition had worsened over the last few days; the urinalysis was negative. Patient was seen later in the day by her Anabel Wood and he confirmed that the patient is usually \"goofy\" and provides entertainment when she is attending [employer] services. She had no other concerns or questions at this time. Rehabilitation:   Physical Therapy:  OBJECTIVE:  Transfers:  Sit to Stand: Contact Guard Assistance  Stand to [facility] 68  Max cues required for safe technique and proper hand placement with transfers      Ambulation:  [employer], Minimal Assistance, with verbal cues , with increased time for completion  Distance: 225' x 1, 150' x 1, multiple shorter distances  Surface: Level Tile  Device:Rolling Walker  Gait Deviations: Forward Flexed Posture, Slow Susanna, Decreased Step Length Bilaterally, Decreased Weight Shift Right, Lean to Left, Decreased Gait Speed, Decreased Heel Strike Bilaterally, Unsteady Gait and Increased reliance on walker. Many verbal and tactile cues required throughout for posture and safety.      Balance:  Pt. ambulated with quad cane to further challenge balance during ambulation and to inhibit reliance through UEs . Pt. Unsteady throughout requiring min-mod A but pt.  Does demo improved upright posture with fewer verbal cues.      Exercise:  None     Functional Outcome Measures: Not completed    ASSESSMENT:  Assessment: Patient progressing toward established goals. Activity Tolerance:  Patient tolerance of  treatment: good. Equipment Recommendations:Equipment Needed: Yes(RW)  Discharge Recommendations:  Continue to assess pending progress     Occupational Therapy:  COGNITION: Decreased Recall, Decreased Safety Awareness and Impaired Attention     ADL:   Grooming: Stand By Assistance.  hand hygiene  Toileting: Stand By Assistance. Toilet Transfer: Stand By Assistance. .     BALANCE:  Sitting Balance:  Modified Independent. Standing Balance: Stand By Assistance.       BED MOBILITY:  Not Tested     TRANSFERS:  Sit to Stand:  Stand By Assistance. EOB, ETS   Stand to Sit: Stand By Assistance. with cues for hand placement and alignment      FUNCTIONAL MOBILITY:  Assistive Device: Rolling Walker  Assist Level:  Stand By Assistance. Distance: To/from [facility]   - skilled edu on gravel technique       ADDITIONAL ACTIVITIES:  - Pt completed IADL task of ambulating within [facility] to attend to plants, graded to challenge RW safety, standing balance/endurance, and edu on fall risk prevention. SBA throughout with an endurance of x 8 min     - Patient identified one of their personal goals is to be able to sustain functional standing positions in order to complete various ADL and IADL skills in standing position, such as sinkside grooming or washing dishes. Dynamic standing task was then facilitated to challenge 1 UE release from AD reaching into graded planes and heights. Patient required SBA, and demo'ed an endurance of 5 minutes. ASSESSMENT:     Activity Tolerance:  Patient tolerance of  treatment: good. Discharge Recommendations: 24 hour supervision or assist, Home with Home health OT, Home with nursing aide   Equipment Recommendations: Equipment Needed: Yes  Other: Pt may benefit from a BSC, shower chair, grab bars in the shower .  Need to assess for LHAE. BSC ordered on 9-15.    Speech Therapy:  Short-Term Goals:  SHORT TERM GOAL #1:  Goal 1: Pt will complete orientation, immediate/delayed recall and working memory tasks with 80% accuracy given mod cues to improve retention of new and functional information. INTERVENTIONS: Orientation details (location, date, , reason for hospitalization)   -City:  min cues  -Label current location:  min cues  -Month:  mod cues, cues included referencing calendar   -Day:  mod cues, cues included referencing calendar  -Year: , cues included referencing calendar  -Reason for hospitalization:  min cues   *Pt continues to have difficulty recalling date without visual, often requires cues to use calendar to determine date      Delayed recall of 3 details of information AM session (indepedently initiated by Pt): 1/3 ind, 2/3 max cues   *Poor success overall with delayed recall.      SHORT TERM GOAL #2:  Goal 2: Pt will complete functional sequencing and thought organizational tasks with 70% accuracy given mod cues to improve overall executive functioning skills. INTERVENTIONS: Identifying time on clock:  ind, / min cues   Time management problems presented verbally: 8 ind, 3/8 min cues, 1/8 mod cues, 3/8 max cues   *Pt did better on time management problems when provided visual representation of clock, Pt reported difficulty in conceptualizing times mentally      SHORT TERM GOAL #3:  Goal 3: Patient will complete functional problem solving, reasoning and basic computational tasks with 70% accuracy given mod cues to improve overall management of ADLs.   INTERVENTIONS: Did not address d/t focus on other goals.      Long-Term Goals:  Timeframe for Long-term Goals: 3 weeks     LONG TERM GOAL #1:  Goal 1: Pt will improve cognitive linguistic skills to a min assist level in order to improve level of independence in the home environment.       Comprehension: 3 - Patient understands basic needs 50-74% of the time  Expression: 5 - Expresses basic ideas/needs only (hungry/hot/pain)  Social Interaction: 5 - Patient is appropriate with supervision/cues  Problem Solving: 3 - Patient solves simple/routine tasks 50%-74%  Memory: 2 - Patient remembers 25%-49% of the time     Review of Systems:  Review of Systems   Constitutional: Positive for activity change. Negative for appetite change, fatigue and fever. HENT: Negative for trouble swallowing and voice change. Eyes: Negative. Respiratory: Negative for cough and shortness of breath. Cardiovascular: Negative for leg swelling. Gastrointestinal: Negative for constipation, diarrhea and nausea. Endocrine: Negative for cold intolerance. Genitourinary: Positive for urgency. Negative for frequency. Musculoskeletal: Positive for arthralgias, gait problem and myalgias. Skin: Negative for pallor. Allergic/Immunologic: Negative. Neurological: Positive for tremors and weakness. Negative for dizziness, light-headedness and headaches. Hematological: Negative. Psychiatric/Behavioral: Positive for decreased concentration. Negative for confusion and dysphoric mood. The patient is not nervous/anxious. All other systems reviewed and are negative. Objective:  BP 97/61   Pulse 98   Temp 97.8 °F (36.6 °C) (Oral)   Resp 18   Ht 5' 7\" (1.702 m)   Wt 161 lb 9.6 oz (73.3 kg)   SpO2 92%   BMI 25.31 kg/m²   CURRENT VITALS:  height is 5' 7\" (1.702 m) and weight is 161 lb 9.6 oz (73.3 kg). Her oral temperature is 97.8 °F (36.6 °C). Her blood pressure is 97/61 and her pulse is 98. Her respiration is 18 and oxygen saturation is 92%. Body mass index is 25.31 kg/m².   Temperature Range (24h):Temp: 97.8 °F (36.6 °C) Temp  Av °F (36.7 °C)  Min: 97.8 °F (36.6 °C)  Max: 98.1 °F (36.7 °C)  BP Range (41K): Systolic (03JLQ), MGJ:822 , Min:97 , CCP:741     Diastolic (32KWW), ZLU:68, Min:61, Max:63    Pulse Range (24h): Pulse  Av  Min: 88  Max: 98  Respiration Range (24h): Resp  Av Min: 16  Max: 18  Current Pulse Ox (24h):  SpO2: 92 %  Pulse Ox Range (24h):  SpO2  Av %  Min: 92 %  Max: 94 %  Oxygen Amount and Delivery:      awake  Orientation:   person, place, time, situation  Mood: within normal limits  Affect: calm  General appearance: in no acute distress, up in wheelchair     Memory:   normal,   Attention/Concentration: normal  Language:  normal     ROM:  abnormal - left hip surgery  Motor Exam:  Motor exam is 4+ out of 5 all extremities     Sensory:  Sensory intact  Coordination:   normal  Deep Tendon Reflexes:  Reflexes are intact and symmetrical bilaterally     Skin: warm and dry, no rash or erythema  Peripheral vascular: Pulses: Normal upper and lower extremity pulses; Edema: 1+    Diagnostics:   Recent Results (from the past 24 hour(s))   Basic Metabolic Panel    Collection Time: 09/15/20 10:33 AM   Result Value Ref Range    Sodium 142 135 - 145 meq/L    Potassium 5.1 3.5 - 5.2 meq/L    Chloride 103 98 - 111 meq/L    CO2 33 23 - 33 meq/L    Glucose 180 (H) 70 - 108 mg/dL    BUN 25 (H) 7 - 22 mg/dL    CREATININE 1.3 (H) 0.4 - 1.2 mg/dL    Calcium 10.3 8.5 - 10.5 mg/dL   Anion Gap    Collection Time: 09/15/20 10:33 AM   Result Value Ref Range    Anion Gap 6.0 (L) 8.0 - 16.0 meq/L   Glomerular Filtration Rate, Estimated    Collection Time: 09/15/20 10:33 AM   Result Value Ref Range    Est, Glom Filt Rate 40 (A) ml/min/1.73m2   Urinalysis Reflex to Culture    Collection Time: 09/15/20 11:20 AM    Specimen: Urine, clean catch   Result Value Ref Range    Glucose, Ur NEGATIVE NEGATIVE mg/dl    Bilirubin Urine NEGATIVE NEGATIVE    Ketones, Urine NEGATIVE NEGATIVE    Specific Gravity, Urine 1.018 1.002 - 1.030    Blood, Urine NEGATIVE NEGATIVE    pH, UA 8.5 5.0 - 9.0    Protein, UA NEGATIVE NEGATIVE    Urobilinogen, Urine 0.2 0.0 - 1.0 eu/dl    Nitrite, Urine NEGATIVE NEGATIVE    Leukocyte Esterase, Urine NEGATIVE NEGATIVE    Color, UA YELLOW STRAW-YELLOW    Character, Urine CLEAR neck.  1. Addressed by surgery below. 3. Status post closed reduction and percutaneous screw fixation of the left hip impacted subcapital fracture of the left femoral neck by Dr. Sarbjit Michaud, D.O. on 09/06/2020. EBL minimal.  1. WBAT. 2. Wound care. 3. Pain control. 1. PRN and scheduled Tylenol. 2. Lidoderm patches. 3. Tramadol. 4. Gait instability. 1. PT/OT. 2. Tinetti 16/28. Risk Indicators: Less than/equal to 18 = high risk; 19-23 Moderate risk; Greater than/equal to 24 = low risk. 5. Mild TBI/Moderate to severe cognitive deficits. (MOCA) version 8.2 completed. Patient scored 10/30. 1. SLP. 2. Low stimulation TBI guidelines if deemed necessary. 3. As of 9/15 the patient was felt to have a decline in her cognition; her Isaías Resendiz was queried as to how he perceived her cognition and he stated this is her baseline. 4. Urinalysis was negative for signs of a UTI on 9/15. 6. Iron deficiency anemia. 1. Iron 25 and ferritin 120. Abnormal/normal.  2. Ferrous sulfate and vitamin C twice daily with meals. 7. Nutrition:  Consultation to dietician for nutritional counseling and recommendations. 1. Protein 5.6 and albumin 2.8 on 9/10/2020. Abnormal.  2. Vitamin 25OH level of 14 on 9/9/2020.  3. Cholecalciferol 5000 IU 3 times daily with meals and 500 mg calcium twice daily with meals. 8. Electrolytes. 1. Normal on 9/14 with exception of.  1. Hyperkalemia with a potassium of 5.4 on 9/14. Potassium decreased to 5.1 on 9/15. 1. Kayexalate 15 mg x 2 ordered on 9/14. 2. Hypercalcemia. 1. Hold supplemental calcium on 9/15. Calcium normal on 9/15 at 10.3.  9. Bladder: No issues  10. Bowel: Senna, colace, MOM  11. Rehabilitation nursing will be involved for bowel, bladder, skin, and pain management. Nursing will also provide education and training to patient and family. 12. Prophylaxis:  DVT: Plavix and aspirin as directed by Orthopedic Surgery. GI: None. .  13.  and case management consultations for coordination of care and discharge planning.     Chronic Problems:  1. Insulin-dependent type 2 diabetes, controlled with hyperglycemia. Last hemoglobin A1c was 5.7 on 9/9/2020.  1. Lantus 20 units every morning. Lantus increased to 24 units starting on 9/16 to improve blood glucose control. 2. Metformin 1000 mg twice daily with meals. 2. Essential tremor. 1. Continue home primidone and propranolol. 3. History of prior myocardial infarction in 2011.  1. ASA 81 mg.  2. Plavix. 4. Hypertension. 1. Propranolol. 5. Hyperlipidemia. 1. Lipitor. 6. Coronary artery disease/History of prior myocardial infarction. 1. ASA 81 mg.  2. Plavix. 3. Lipitor. 7. ISAURA superimposed on CKD 3.  1. Cr/BUN/GFR on 9/9 was 1.1/19/49 which is stable over 9/7 with labs of 1.1/18/49. Mild decrease to 1.2/25/44 on 9/10. Further worsened from 1.3/29/40 on 9/14. Renal function stable over prior at 1.3/25/40 on 9/15. 2. Encourage fluids. 8. 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 9. Osteoporosis. 1. See plan above for vitamin D and calcium supplementation.     Labs reviewed on:  1. 9/8.  2. 9/9.  3. 9/10.  4. 9/14.  5. 9/15.     Infectious Disease:  1. N/A     Missed Therapy Time:  None     DME:  Savannah Anderson requires a Bedside Commode to complete bathing, toileting, dressing and grooming tasks. Patient requires a Bedside Commode due to Upper Extremity/Lower Extremity Weakness and limited ambulation and is unable to walk to the bathroom at home. Without the Bedside Commode, Savannah Anderson is at increased risk for falls and would require increased assistance for ADL's and mobility.     Discharge Plan:  Estimated Discharge Date: 9/24   Destination: home health and discharge home with supervision  Services at Discharge: [address] [place] [address] Rd, Occupational Therapy, Speech Therapy, Nursing and an aide 3x week  Is patient appropriate for an outpatient driving evaluation? yes, when appropriate  Equipment at Discharge: RW, BSC, shower chair, grab bars    Greater than 50% of 30 minutes was spent with the patient reviewing the plan and recommendations from today's care conference, reviewing her labs and encouraged her to improve her fluid intake, and reviewing her current level of pain control.     Christie Watson MD

## 2020-09-15 NOTE — PROGRESS NOTES
[address]  Occupational Therapy  Daily Note  Time:   Time In: 1330  Time Out: 1400  Timed Code Treatment Minutes: 30 Minutes  Minutes: 30    Date: 9/15/2020  Patient Name: Casey Wheeler,   Gender: female      Room: [facility] [place]/054-A  MRN: [id number]  : 1950  (65 y.o.)  Referring Practitioner: Dr. Demian Restrepo  Diagnosis: subcapital fracture of the left femur , s/p repair  Additional Pertinent Hx: She was admitted 2020 after being brought into the emergency department by [address] for injuries sustained after a syncopal event. The patient had a fall that occurred while she was walking in the grocery store when she blacked out and when she regained consciousness she found herself lying on the floor with people standing around her. She had left-sided hip pain and also abrasions on her left arm along with a left-sided superficial hematoma on her parietal scalp. Her initial x-rays showed an acute slightly impacted subcapital fracture of the left femoral neck. She underwent surgical correction with a closed reduction and percutaneous screw fixation of the left hip impacted subcapital fracture of the left femoral neck by Dr. Angelita Hanna on 2020. Restrictions/Precautions:  Restrictions/Precautions: Weight Bearing, General Precautions, Fall Risk  Left Lower Extremity Weight Bearing: Weight Bearing As Tolerated     SUBJECTIVE: Pt pleasant and cooperative. Agreeable to OT. Min difficult to get pt to fully attend to tasks. PAIN: Denies pain     COGNITION: Decreased Recall, Decreased Safety Awareness and Impaired Attention    ADL:   Grooming: Stand By Assistance.  hand hygiene  Toileting: Stand By Assistance. Toilet Transfer: Stand By Assistance. Milagros Nowak BALANCE:  Sitting Balance:  Modified Independent. Standing Balance: Stand By Assistance. BED MOBILITY:  Not Tested    TRANSFERS:  Sit to Stand:  Stand By Assistance.  EOB, ETS   Stand to Sit: Stand By Assistance. with cues for hand placement and alignment     FUNCTIONAL MOBILITY:  Assistive Device: Rolling Walker  Assist Level:  Stand By Assistance. Distance: To/from [employer]   - skilled edu on gravel technique      ADDITIONAL ACTIVITIES:  - Pt completed IADL task of ambulating within [facility] to attend to plants, graded to challenge RW safety, standing balance/endurance, and edu on fall risk prevention. SBA throughout with an endurance of x 8 min     - Patient identified one of their personal goals is to be able to sustain functional standing positions in order to complete various ADL and IADL skills in standing position, such as sinkside grooming or washing dishes. Dynamic standing task was then facilitated to challenge 1 UE release from AD reaching into graded planes and heights. Patient required SBA, and demo'ed an endurance of 5 minutes. ASSESSMENT:     Activity Tolerance:  Patient tolerance of  treatment: good. Discharge Recommendations: 24 hour supervision or assist, Home with Home health OT, Home with nursing aide   Equipment Recommendations: Equipment Needed: Yes  Other: Pt may benefit from a BSC, shower chair, grab bars in the shower . Need to assess for LHAE. BSC ordered on 9-15.   Plan: Times per week: 5xs week x 90 min, 1 x week x 30 min  Current Treatment Recommendations: Endurance Training, Functional Mobility Training, Self-Care / ADL, Equipment Evaluation, Education, & procurement, Patient/Caregiver Education & Training, Home Management Training, Safety Education & Training    Patient Education  Patient Education: IADL's and endurance building    Goals  Short term goals  Time Frame for Short term goals: 1 week  Short term goal 1: PT will complete ADL routine with 0-1 cues for organization, problem solving adapted tech and Setup with LHAE PRN to increase independence in self care tasks  Short term goal 2: Pt will complete simple grooming tasks with  S while using 1-2 hand release to increase her independence with toileting routine. Short term goal 3: PT will complete functional mobility to/ from the BR as well as around obstacles with S  and 0-1 cues for safe tech to increase independence in toileting tasks at home  Short term goal 4: Pt will complete self feeding tasks with AE without cues to increase ability to eat soft foods  Short term goal 5: Pt will complete 1 step homemaking tasks iwth no > min cues for safe tech to increase independence in retrieving a snack  Long term goals  Time Frame for Long term goals : 2-3 weeks  Long term goal 1: Pt will complete ADL routine with no cues for safe and adapted tech to increase independence in self care tasks  Long term goal 2: Pt will complete 2 step homemaking tasks with no cues for safe tech and S for problem solving to increase independence in light homemaking tasks    Following session, patient left in safe position with all fall risk precautions in place.

## 2020-09-15 NOTE — PROGRESS NOTES
[address] [place] [address]  Recreational Therapy  Daily Note  [address]    Time Spent with Patient: 10 minutes    Date:  9/15/2020       Patient Name: Cora Pulido      MRN: [id number]      YOB: 1950 (65 y.o.)       Gender: female  Diagnosis: subcapital fracture of the left femur , s/p repair  Referring Practitioner: Dr. Salena Jaimes    RESTRICTIONS/PRECAUTIONS:  Restrictions/Precautions: Weight Bearing, General Precautions, Fall Risk  Vision: Impaired  Hearing: Within functional limits    PAIN: 0 -no c/o pain     SUBJECTIVE:  I would love that    OBJECTIVE:  Pt would like to get her hair done by our beautician tomorrow and will notify her friend of money needed and also would like to get a [employer] on Friday -states she loves their hamburgers-affect bright-appreciative          Patient Education  New Education Provided: Importance of Leisure,     Electronically signed by: NISHA Anaya  Date: 9/15/2020
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
## 2020-09-15 NOTE — PLAN OF CARE
I have reviewed the patient's plan of care and based on this review, the patient treatment plan has been updated. Problem: Falls - Risk of:  Goal: Will remain free from falls  Description: Will remain free from falls  Outcome: Ongoing  Goal: Absence of physical injury  Description: Absence of physical injury  Outcome: Ongoing  Note: Patient verbalizes understanding of fall precautions, uses call light appropriately. Problem: Confusion - Acute:  Goal: Absence of continued neurological deterioration signs and symptoms  Description: Absence of continued neurological deterioration signs and symptoms  Outcome: Ongoing  Goal: Mental status will be restored to baseline  Description: Mental status will be restored to baseline  Outcome: Ongoing  Note: Mental status resolving slowly. Problem: Discharge Planning:  Goal: Ability to perform activities of daily living will improve  Description: Ability to perform activities of daily living will improve  Outcome: Ongoing  Goal: Participates in care planning  Description: Participates in care planning  Outcome: Ongoing  Note: Working toward goal of discharge to home. Problem: Injury - Risk of, Physical Injury:  Goal: Will remain free from falls  Description: Will remain free from falls  Outcome: Ongoing  Goal: Absence of physical injury  Description: Absence of physical injury  Outcome: Ongoing  Note: Patient verbalizes understanding of fall precautions, uses call light appropriately. Problem: Mood - Altered:  Goal: Mood stable  Description: Mood stable  Outcome: Ongoing  Goal: Absence of abusive behavior  Description: Absence of abusive behavior  Outcome: Ongoing  Goal: Verbalizations of feeling emotionally comfortable while being cared for will increase  Description: Verbalizations of feeling emotionally comfortable while being cared for will increase  Outcome: Ongoing  Note: Mood remains stable 75% of the time.      Problem: Psychomotor Activity - Altered:  Goal: Absence of psychomotor disturbance signs and symptoms  Description: Absence of psychomotor disturbance signs and symptoms  Outcome: Ongoing  Note: No signs or symptoms of psychomotor disturbance noted. Problem: Sensory Perception - Impaired:  Goal: Demonstrations of improved sensory functioning will increase  Description: Demonstrations of improved sensory functioning will increase  Outcome: Ongoing  Goal: Decrease in sensory misperception frequency  Description: Decrease in sensory misperception frequency  Outcome: Ongoing  Goal: Able to refrain from responding to false sensory perceptions  Description: Able to refrain from responding to false sensory perceptions  Outcome: Ongoing  Goal: Demonstrates accurate environmental perceptions  Description: Demonstrates accurate environmental perceptions  Outcome: Ongoing  Goal: Able to distinguish between reality-based and nonreality-based thinking  Description: Able to distinguish between reality-based and nonreality-based thinking  Outcome: Ongoing  Goal: Able to interrupt nonreality-based thinking  Description: Able to interrupt nonreality-based thinking  Outcome: Ongoing  Note: Working toward goal of reality based thinking. Problem: Sleep Pattern Disturbance:  Goal: Appears well-rested  Description: Appears well-rested  Outcome: Ongoing  Note: Patient states she is sleeping well at night. Problem: IP BLADDER/VOIDING  Goal: LTG - patient will complete bladder elimination  Outcome: Ongoing  Goal: LTG - Patient will utilize adaptive techniques/equipment to complete bladder elimination  Outcome: Ongoing  Goal: LTG - patient will achieve acceptable level of continence  Outcome: Ongoing  Goal: STG - Patient demonstrates no accidents  Outcome: Ongoing  Note: Continent of bladder 75% of the time.      Problem: DISCHARGE BARRIERS  Goal: Patient's continuum of care needs are met  9/15/2020 1543 by Natalie Durant RN  Outcome: Ongoing  Note: Working

## 2020-09-16 LAB
ANION GAP SERPL CALCULATED.3IONS-SCNC: 10 MEQ/L (ref 8–16)
BUN BLDV-MCNC: 25 MG/DL (ref 7–22)
CALCIUM SERPL-MCNC: 11 MG/DL (ref 8.5–10.5)
CHLORIDE BLD-SCNC: 100 MEQ/L (ref 98–111)
CO2: 31 MEQ/L (ref 23–33)
CREAT SERPL-MCNC: 1.3 MG/DL (ref 0.4–1.2)
EKG ATRIAL RATE: 115 BPM
EKG P AXIS: 43 DEGREES
EKG P-R INTERVAL: 118 MS
EKG Q-T INTERVAL: 312 MS
EKG QRS DURATION: 64 MS
EKG QTC CALCULATION (BAZETT): 431 MS
EKG R AXIS: -27 DEGREES
EKG T AXIS: 54 DEGREES
EKG VENTRICULAR RATE: 115 BPM
GFR SERPL CREATININE-BSD FRML MDRD: 40 ML/MIN/1.73M2
GLUCOSE BLD-MCNC: 114 MG/DL (ref 70–108)
GLUCOSE BLD-MCNC: 125 MG/DL (ref 70–108)
POTASSIUM SERPL-SCNC: 4.6 MEQ/L (ref 3.5–5.2)
PTH INTACT: 17.4 PG/ML (ref 15–65)
SODIUM BLD-SCNC: 141 MEQ/L (ref 135–145)

## 2020-09-16 PROCEDURE — 6370000000 HC RX 637 (ALT 250 FOR IP): Performed by: FAMILY MEDICINE

## 2020-09-16 PROCEDURE — 6370000000 HC RX 637 (ALT 250 FOR IP): Performed by: PHYSICAL MEDICINE & REHABILITATION

## 2020-09-16 PROCEDURE — 82948 REAGENT STRIP/BLOOD GLUCOSE: CPT

## 2020-09-16 PROCEDURE — 97535 SELF CARE MNGMENT TRAINING: CPT

## 2020-09-16 PROCEDURE — 97110 THERAPEUTIC EXERCISES: CPT

## 2020-09-16 PROCEDURE — 97530 THERAPEUTIC ACTIVITIES: CPT

## 2020-09-16 PROCEDURE — 83970 ASSAY OF PARATHORMONE: CPT

## 2020-09-16 PROCEDURE — 97130 THER IVNTJ EA ADDL 15 MIN: CPT | Performed by: SPEECH-LANGUAGE PATHOLOGIST

## 2020-09-16 PROCEDURE — 97116 GAIT TRAINING THERAPY: CPT

## 2020-09-16 PROCEDURE — 93010 ELECTROCARDIOGRAM REPORT: CPT | Performed by: NUCLEAR MEDICINE

## 2020-09-16 PROCEDURE — 1180000000 HC REHAB R&B

## 2020-09-16 PROCEDURE — 97129 THER IVNTJ 1ST 15 MIN: CPT | Performed by: SPEECH-LANGUAGE PATHOLOGIST

## 2020-09-16 PROCEDURE — 6370000000 HC RX 637 (ALT 250 FOR IP): Performed by: INTERNAL MEDICINE

## 2020-09-16 PROCEDURE — 36415 COLL VENOUS BLD VENIPUNCTURE: CPT

## 2020-09-16 PROCEDURE — 80048 BASIC METABOLIC PNL TOTAL CA: CPT

## 2020-09-16 PROCEDURE — 93005 ELECTROCARDIOGRAM TRACING: CPT | Performed by: PHYSICAL MEDICINE & REHABILITATION

## 2020-09-16 RX ADMIN — Medication 5000 UNITS: at 12:39

## 2020-09-16 RX ADMIN — PROPRANOLOL HYDROCHLORIDE 80 MG: 80 CAPSULE, EXTENDED RELEASE ORAL at 08:43

## 2020-09-16 RX ADMIN — FERROUS SULFATE TAB 325 MG (65 MG ELEMENTAL FE) 325 MG: 325 (65 FE) TAB at 17:37

## 2020-09-16 RX ADMIN — Medication 5000 UNITS: at 17:37

## 2020-09-16 RX ADMIN — Medication 250 MG: at 08:43

## 2020-09-16 RX ADMIN — MULTIPLE VITAMINS W/ MINERALS TAB 1 TABLET: TAB at 08:44

## 2020-09-16 RX ADMIN — DOCUSATE SODIUM 50 MG AND SENNOSIDES 8.6 MG 1 TABLET: 8.6; 5 TABLET, FILM COATED ORAL at 21:52

## 2020-09-16 RX ADMIN — FERROUS SULFATE TAB 325 MG (65 MG ELEMENTAL FE) 325 MG: 325 (65 FE) TAB at 08:43

## 2020-09-16 RX ADMIN — ATORVASTATIN CALCIUM 80 MG: 80 TABLET, FILM COATED ORAL at 21:51

## 2020-09-16 RX ADMIN — ASPIRIN 81 MG: 81 TABLET ORAL at 08:42

## 2020-09-16 RX ADMIN — METFORMIN HYDROCHLORIDE 1000 MG: 500 TABLET ORAL at 08:43

## 2020-09-16 RX ADMIN — ACETAMINOPHEN 650 MG: 325 TABLET ORAL at 21:51

## 2020-09-16 RX ADMIN — ACETAMINOPHEN 650 MG: 325 TABLET ORAL at 06:15

## 2020-09-16 RX ADMIN — Medication 250 MG: at 17:37

## 2020-09-16 RX ADMIN — CLOPIDOGREL BISULFATE 75 MG: 75 TABLET ORAL at 08:42

## 2020-09-16 RX ADMIN — ACETAMINOPHEN 650 MG: 325 TABLET ORAL at 12:38

## 2020-09-16 RX ADMIN — TRAMADOL HYDROCHLORIDE 50 MG: 50 TABLET, FILM COATED ORAL at 08:42

## 2020-09-16 RX ADMIN — Medication 5000 UNITS: at 08:43

## 2020-09-16 RX ADMIN — PRIMIDONE 50 MG: 50 TABLET ORAL at 21:52

## 2020-09-16 RX ADMIN — DOCUSATE SODIUM 50 MG AND SENNOSIDES 8.6 MG 1 TABLET: 8.6; 5 TABLET, FILM COATED ORAL at 08:42

## 2020-09-16 RX ADMIN — PRIMIDONE 200 MG: 50 TABLET ORAL at 08:43

## 2020-09-16 RX ADMIN — METFORMIN HYDROCHLORIDE 1000 MG: 500 TABLET ORAL at 17:37

## 2020-09-16 RX ADMIN — INSULIN GLARGINE 24 UNITS: 100 INJECTION, SOLUTION SUBCUTANEOUS at 08:42

## 2020-09-16 ASSESSMENT — ENCOUNTER SYMPTOMS
COUGH: 0
CONSTIPATION: 0
SHORTNESS OF BREATH: 0
NAUSEA: 0
TROUBLE SWALLOWING: 0
DIARRHEA: 0
EYES NEGATIVE: 1
VOICE CHANGE: 0
ALLERGIC/IMMUNOLOGIC NEGATIVE: 1

## 2020-09-16 ASSESSMENT — PAIN SCALES - GENERAL
PAINLEVEL_OUTOF10: 0
PAINLEVEL_OUTOF10: 4
PAINLEVEL_OUTOF10: 6
PAINLEVEL_OUTOF10: 0
PAINLEVEL_OUTOF10: 0

## 2020-09-16 NOTE — PROGRESS NOTES
[address]  Occupational Therapy  Daily Note  Time:   Time In: 0700  Time Out: 0800  Timed Code Treatment Minutes: 60 Minutes  Minutes: 60    Date: 2020  Patient Name: Leandro Schuster,   Gender: female      Room: [facility]54/054-A  MRN: [id number]  : 1950  (65 y.o.)  Referring Practitioner: Dr. Maria Eugenia Mckeon  Diagnosis: subcapital fracture of the left femur , s/p repair  Additional Pertinent Hx: She was admitted 2020 after being brought into the emergency department by [facility] for injuries sustained after a syncopal event. The patient had a fall that occurred while she was walking in the grocery store when she blacked out and when she regained consciousness she found herself lying on the floor with people standing around her. She had left-sided hip pain and also abrasions on her left arm along with a left-sided superficial hematoma on her parietal scalp. Her initial x-rays showed an acute slightly impacted subcapital fracture of the left femoral neck. She underwent surgical correction with a closed reduction and percutaneous screw fixation of the left hip impacted subcapital fracture of the left femoral neck by Dr. Luis Austin on 2020. Restrictions/Precautions:  Restrictions/Precautions: Weight Bearing, General Precautions, Fall Risk  Left Lower Extremity Weight Bearing: Weight Bearing As Tolerated     SUBJECTIVE: Patient in chair upon arrival, talkative     PAIN: did not rate     COGNITION: Decreased Recall, Decreased Insight, Decreased Safety Awareness and Impaired Attention    ADL:   Grooming: Stand By Assistance. at sink completed oral care and brushing hair . BALANCE:  Sitting Balance:  Modified Independent. Standing Balance: Stand By Assistance. BED MOBILITY:  Not Tested    TRANSFERS:  Sit to Stand:  Stand By Assistance, cues for hand placement. Stand to Sit: Stand By Assistance, cues for hand placement.       FUNCTIONAL will complete functional mobility to/ from the BR as well as around obstacles with S  and 0-1 cues for safe tech to increase independence in toileting tasks at home  Short term goal 4: Pt will complete self feeding tasks with AE without cues to increase ability to eat soft foods  Short term goal 5: Pt will complete 1 step homemaking tasks iwth no > min cues for safe tech to increase independence in retrieving a snack  Long term goals  Time Frame for Long term goals : 2-3 weeks  Long term goal 1: Pt will complete ADL routine with no cues for safe and adapted tech to increase independence in self care tasks  Long term goal 2: Pt will complete 2 step homemaking tasks with no cues for safe tech and S for problem solving to increase independence in light homemaking tasks    Following session, patient left in safe position with all fall risk precautions in place.

## 2020-09-16 NOTE — PROGRESS NOTES
[facility] THERAPY  [facility]  DAILY NOTE    TIME  SLP Individual Minutes  Time In: 8881  Time Out: 1030  Minutes: 23  Timed Code Treatment Minutes: 23 Minutes       Date: 2020  Patient Name: Mily Peres      CSN: [id number]   : 1950  (65 y.o.)  Gender: female   Referring Physician:  Dr. Mamie Scott    Diagnosis: Left femoral neck impacted subcapital fracture  Secondary Diagnosis: Cognition  Precautions: Fall risk  Current Diet: Regular diet, thin liquids  Swallowing Strategies: Standard Universal Swallow Precautions  Date of Last MBS: Not Applicable    Pain:  No pain reported. Subjective:  Pt ambulating to the restroom when SLP entered the room, resulting in 7 minutes of missed time. Patient present and cooperative throughout the session. *Patient inquiring about the policy with [facility] in regards to visiting friends. Discussed that patient will be considered \"homebound\" and therefore not be able to go out to dinner with friends. Reviewed some of the concerns with community navigation and risk for falls, with discussion to complete [facility] therapy course prior to return to community events. Suggested patient offer to have visitors come to her home instead. Short-Term Goals:  SHORT TERM GOAL #1:  Goal 1: Pt will complete orientation, immediate/delayed recall and working memory tasks with 80% accuracy given mod cues to improve retention of new and functional information. INTERVENTIONS: Orientation:  -Month- Indep  -Year- Indep  -Date: Min cues   -DUSTY- Indep  -Location- Indep  -TOD- Indep    Functional recall: Reviewed functional event information (\"Wed. Hair appointment for wash, cut and style for $10\"), with prompts to have patient write the information down for future review. Poor success overall with legibility making it difficult to review written notes.  Discussed reviewing the information several time to aid in retention of information.   -Immediate recall: 3/3 indep  *Will assess delayed recall in later session. SHORT TERM GOAL #2:  Goal 2: Pt will complete functional sequencing and thought organizational tasks with 70% accuracy given mod cues to improve overall executive functioning skills. INTERVENTIONS: Daily schedule navigation for determining times of therapies: 4/4 indep  *Appropriate visual scanning and thought organization for determining target responses. SHORT TERM GOAL #3:  Goal 3: Patient will complete functional problem solving, reasoning and basic computational tasks with 70% accuracy given mod cues to improve overall management of ADLs. INTERVENTIONS:   Written comprehension tasks (2 step, 4 components): 15/16 indep, 1/16 with min cues. *Cues needed to attend to specific directives. Long-Term Goals:  Timeframe for Long-term Goals: 3 weeks    LONG TERM GOAL #1:  Goal 1: Pt will improve cognitive linguistic skills to a min assist level in order to improve level of independence in the home environment. Comprehension: 4 - Patient understands basic needs 75-90%+ of the time  Expression: 5 - Expresses basic ideas/needs only (hungry/hot/pain)  Social Interaction: 5 - Patient is appropriate with supervision/cues  Problem Solving: 3 - Patient solves simple/routine tasks 50%-74%  Memory: 2 - Patient remembers 25%-49% of the time         EDUCATION:  Learner: Patient  Education:  Reviewed recommendations for follow-up and Education Related to Potential Risks and Complications Due to Impairment/Illness/Injury  Evaluation of Education: Verbalizes understanding    ASSESSMENT/PLAN:  Activity Tolerance:  Patient tolerance of treatment: Good     Assessment/Plan: Patient progressing toward established goals. Continues to require skilled care of licensed speech pathologist to progress toward achievement of established goals and plan of care.      Plan for Next Session:  Immediate and Delayed Recall with focus on compensatory strategies, Basic Money Management, Grocery advertisement navigation       Wendie Lagunas.  [address] [facility], [facility] [address], [address]

## 2020-09-16 NOTE — PROGRESS NOTES
[address]  Occupational Therapy  Daily Note  Time:   Time In: 1100  Time Out: 1130  Timed Code Treatment Minutes: 30 Minutes  Minutes: 30          Date: 2020  Patient Name: Kacie Howard,   Gender: female      Room: [facility]54/054-A  MRN: [id number]  : 1950  (65 y.o.)  Referring Practitioner: Dr. Jewel Mayo  Diagnosis: subcapital fracture of the left femur , s/p repair  Additional Pertinent Hx: She was admitted 2020 after being brought into the emergency department by [facility] for injuries sustained after a syncopal event. The patient had a fall that occurred while she was walking in the grocery store when she blacked out and when she regained consciousness she found herself lying on the floor with people standing around her. She had left-sided hip pain and also abrasions on her left arm along with a left-sided superficial hematoma on her parietal scalp. Her initial x-rays showed an acute slightly impacted subcapital fracture of the left femoral neck. She underwent surgical correction with a closed reduction and percutaneous screw fixation of the left hip impacted subcapital fracture of the left femoral neck by Dr. Cl Neal on 2020. Restrictions/Precautions:  Restrictions/Precautions: Weight Bearing, General Precautions, Fall Risk  Left Lower Extremity Weight Bearing: Weight Bearing As Tolerated     SUBJECTIVE: Patient in chair upon arrival agreeable to OT treatment     PAIN: no     COGNITION: Slow Processing, Decreased Recall and Decreased Safety Awareness    ADL:   No ADL's completed this session. Collette Ruts BALANCE:  Sitting Balance:  Modified Independent. Standing Balance: Stand By Assistance. BED MOBILITY:  Not Tested    TRANSFERS:  Sit to Stand:  Stand By Assistance. Stand to Sit: Stand By Assistance. FUNCTIONAL MOBILITY:  Assistive Device: Rolling Walker  Assist Level:  Stand By Assistance. Distance:  To and from therapy apartment     ADDITIONAL ACTIVITIES:  Patient completed IADL dynamic standing tasks with SBA and multiple verbal cues for safety awareness with walker placement. Patient stood to load  with SBA. Steady no LOB     ASSESSMENT:     Activity Tolerance:  Patient tolerance of  treatment: fair. Discharge Recommendations: 24 hour supervision or assist, Home with Home health OT, Home with nursing aide   Equipment Recommendations: Equipment Needed: Yes  Other: Pt may benefit from a BSC, shower chair, grab bars in the shower . Need to assess for LHAE. BSC ordered on 9-15. Plan: Times per week: 5xs week x 90 min, 1 x week x 30 min  Current Treatment Recommendations: Endurance Training, Functional Mobility Training, Self-Care / ADL, Equipment Evaluation, Education, & procurement, Patient/Caregiver Education & Training, Home Management Training, Safety Education & Training    Patient Education  Patient Education: IADL's    Goals  Short term goals  Time Frame for Short term goals: 1 week  Short term goal 1: PT will complete ADL routine with 0-1 cues for organization, problem solving adapted tech and Setup with LHAE PRN to increase independence in self care tasks  Short term goal 2: Pt will complete simple grooming tasks with  S while using 1-2 hand release to increase her independence with toileting routine.   Short term goal 3: PT will complete functional mobility to/ from the BR as well as around obstacles with S  and 0-1 cues for safe tech to increase independence in toileting tasks at home  Short term goal 4: Pt will complete self feeding tasks with AE without cues to increase ability to eat soft foods  Short term goal 5: Pt will complete 1 step homemaking tasks iwth no > min cues for safe tech to increase independence in retrieving a snack  Long term goals  Time Frame for Long term goals : 2-3 weeks  Long term goal 1: Pt will complete ADL routine with no cues for safe and adapted tech to increase independence in self care tasks  Long term goal 2: Pt will complete 2 step homemaking tasks with no cues for safe tech and S for problem solving to increase independence in light homemaking tasks    Following session, patient left in safe position with all fall risk precautions in place.

## 2020-09-16 NOTE — PROGRESS NOTES
[facility]  INPATIENT PHYSICAL THERAPY  DAILY NOTE  [address]      Time In: 1498  Time Out: 1433  Timed Code Treatment Minutes: 29 Minutes  Minutes: 29          Date: 2020  Patient Name: Maribel Banks,  Gender:  female        MRN: [id number]  : 1950  (65 y.o.)     Referring Practitioner: DAVID Pederson MD  Diagnosis: subcapital fracture of left femur s/p repair  Additional Pertinent Hx: Per H&P, pt is a 65 y.o. female with a past medical history of diabetes and prior MI who presents to the emergency department for evaluation of syncope. Patient states that she was at the grocery store this morning shortly prior to arrival, and upon leaving the grocery store she felt acutely short of breath, and lost consciousness. She reports that she fell onto her left side, and landed on her elbow and the back of her head. She endorses losing consciousness after she fell, and bystanders report that she was dazed. She regained consciousness shortly later, however she reports that she has not been ambulatory since her fall. In the emergency department she is complaining of head pain, shoulder pain, elbow pain, and left hip pain. She is additionally complaining of swelling to her left posterior scalp. Pt is now s/p HIP PINNING on 2020 by Dr Cristóbal Sanchez. To IPR on      Prior Level of Function:  Lives With: Alone  Type of Home: Apartment  Home Layout: One level  Home Access: Level entry  Home Equipment: Quad cane   Bathroom Shower/Tub: Tub/Shower unit, Walk-in shower(Pt prefers the walk in shower.)  Bathroom Toilet: Standard(uses a countertop on Right side.)  Bathroom Accessibility: Accessible    Receives Help From: Friend(s)  ADL Assistance: Independent  Homemaking Assistance: Independent  Homemaking Responsibilities: Yes  Ambulation Assistance: Independent  Transfer Assistance: Independent  Active :  Yes  Additional Comments: Pt did not use any AD for ambulating. She did her own homemaking and cooking. reports walking daily PTA. Has a  1 x month    Restrictions/Precautions:  Restrictions/Precautions: Weight Bearing, General Precautions, Fall Risk  Left Lower Extremity Weight Bearing: Weight Bearing As Tolerated    SUBJECTIVE: pt up in chair on arrival cooperative and eager for therapy, she cont to get distracted and decreased carryover with cues for safety throughout session     PAIN: 5/10: at left LE     OBJECTIVE:  Transfers:  Sit to Stand: Stand By Assistance, to [address] to [address], to SBA  Pt required verbal cues and tactile cues ~ 75% of the time when completing various surfaces in apt area, recliner, sofa, arm chair and armless chair  Car transfer with CGA cues provided for proper technique     Ambulation:  [address], to SBA  Distance: 150x2- and various distances in apt area   Surface: Level Tile and carpet  Device:Rolling Walker  Gait Deviations:  Cues given for safety abraham in narrow spaces in apt area for proper walker placement abraham when she wasn't able to fit walker forward, she also needed cues to keep UEs on walker she would remove her UE to wave while still walking cues to stop if she has to let go of walker, pt also struggled with walker in and out of storm door needing min assist to manage the door cues provided for safety   Stairs:  Contact Guard Assistance  Number of Steps: 2  Height: 6\" step with Rolling Walker completed steps about 15 min apart and she required cues for proper sequencing up and down the step along with cues for walker placement         Functional Outcome Measures: Not completed       ASSESSMENT:  Assessment: Patient progressing toward established goals. Activity Tolerance:  Patient tolerance of  treatment: fair.         Equipment Recommendations:Equipment Needed: Yes(RW)  Discharge Recommendations:    Continue to assess pending progress    Plan: Times per week: 5x/wk 90 min, 1x/wk 30 min  Current Treatment Recommendations: Strengthening, ROM, Balance Training, Functional Mobility Training, Gait Training, Transfer Training, Patient/Caregiver Education & Training, Safety Education & Training, Home Exercise Program, Endurance Training, Stair training, Equipment Evaluation, Education, & procurement, Wheelchair Mobility Training, ADL/Self-care Training    Patient Education  Patient Education: Plan of Care, Gait, Stairs    Goals:  Patient goals : go home  Short term goals  Time Frame for Short term goals: 1 week  Short term goal 1: pt to complete supine to/from sit at SBA to get in and out of bed  MET, see LTG  Short term goal 2: pt to complete sit to/from stand at SBA to rise from bed or chair  MET, see LTG  Short term goal 3: pt to ambulate >75ft with RW at [place] [id number] for mobility in the home  Short term goal 4: pt to complete car transfer at [facility] for safe transport to home  MET, see LTG  Short term goal 5: pt score on Tinetti balance test will improve to >=20 for improved balance and decreased fall risk at home  Long term goals  Time Frame for Long term goals : 2 weeks  Long term goal 1: pt to complete supine to/from sit at Mod I to get in and out of bed  Long term goal 2: pt to complete sit to/from stand at Mod I to rise from bed or chair  Long term goal 3: pt to ambulate >50ft with RW at Mod I, >125ft with RW at S for mobility in the home and community  Long term goal 4: pt to complete car transfer at S for safe transport to home  Long term goal 5: pt to negotiate 6\" platform step with RW at S for curb negotiation in the community  Long term goal 6: pt score on Tinetti balance test will improve to >=24 for improved balance and decreased fall risk at home    Following session, patient left in safe position with all fall risk precautions in place.

## 2020-09-16 NOTE — DISCHARGE INSTR - COC
with current pathological fracture with routine healing M80.00XD       Isolation/Infection:   Isolation          No Isolation        Patient Infection Status     Infection Onset Added Last Indicated Last Indicated By Review Planned Expiration Resolved Resolved By    None active    Resolved    COVID-19 Rule Out 09/05/20 09/05/20 09/05/20 COVID-19 (Ordered)   09/05/20 Rule-Out Test Resulted          Nurse Assessment:  Last Vital Signs: /68   Pulse 84   Temp 98.1 °F (36.7 °C) (Oral)   Resp 18   Ht 5' 7\" (1.702 m)   Wt 161 lb 9.6 oz (73.3 kg)   SpO2 96%   BMI 25.31 kg/m²     Last documented pain score (0-10 scale): Pain Level: 6  Last Weight:   Wt Readings from Last 1 Encounters:   09/12/20 161 lb 9.6 oz (73.3 kg)     Mental Status:  oriented and alert    IV Access:  - None    Nursing Mobility/ADLs:  Walking   Independent  Transfer  Independent  Bathing  Independent  Dressing  Independent  Toileting  Independent  Feeding  Independent  Med Admin  Assisted  Med Delivery   none    Wound Care Documentation and Therapy:  Wound 09/07/20 Coccyx Mid purple/pink area (Active)   Wound Image   09/09/20 1606   Wound Pressure Stage  2 09/12/20 0830   Dressing Status Clean;Dry 09/16/20 0852   Dressing Changed Other (Comment) 09/09/20 1606   Dressing/Treatment Other (comment); Protective barrier 09/16/20 0852   Wound Length (cm) 1.5 cm 09/09/20 1606   Wound Width (cm) 2 cm 09/09/20 1606   Wound Surface Area (cm^2) 3 cm^2 09/09/20 1606   Wound Assessment Intact 09/15/20 1401   Drainage Amount None 09/16/20 0852   Odor None 09/16/20 0852   Margins Epibole (rolled edges) 09/13/20 0800   Red%Wound Bed 90 09/09/20 1606   Number of days: 8        Elimination:  Continence:   · Bowel:  Yes  · Bladder: No wears depends and has incontinence at times  Urinary Catheter: None   Colostomy/Ileostomy/Ileal Conduit: No       Date of Last BM: 9/23/2020    Intake/Output Summary (Last 24 hours) at 9/16/2020 1448  Last data filed at 9/16/2020 1241  Gross per 24 hour   Intake 675 ml   Output --   Net 675 ml     I/O last 3 completed shifts: In: 425 [P.O.:425]  Out: -     Safety Concerns:     History of Falls (last 30 days)    Impairments/Disabilities:      None    Nutrition Therapy:  Current Nutrition Therapy:   - Oral Diet:  Carb Control 3 carbs/meal (1500kcals/day)    Routes of Feeding: Oral  Liquids: Thin Liquids  Daily Fluid Restriction: no  Last Modified Barium Swallow with Video (Video Swallowing Test): not done    Treatments at the Time of Hospital Discharge:   Respiratory Treatments:   Oxygen Therapy:  is not on home oxygen therapy. Ventilator:    - No ventilator support    Rehab Therapies: Physical Therapy, Occupational Therapy and Speech/Language Therapy  Weight Bearing Status/Restrictions: No weight bearing restirctions  Other Medical Equipment (for information only, NOT a DME order):  walker  Other Treatments: use pink salve to coccyx 2 -3   Times a day till area healed  Nurse to set up meds in pill box and  Also insulin and put in refrigerator for pt. To use. Pt. To check blood sugars as did prior at home. Remove and replace MARNIE hose daily  Patient's personal belongings (please select all that are sent with patient):  Glasses    RN SIGNATURE:      Home health nurse to draw up insulin and put in refrig along with setting up pt.  Pill box

## 2020-09-16 NOTE — PLAN OF CARE
Problem: Falls - Risk of:  Goal: Will remain free from falls  Description: Will remain free from falls  9/15/2020 2054 by Abdullahi Rutledge RN  Outcome: Ongoing  Note: Taiwo Scott understanding of fall precautions. Uses call light appropriately. Non skid footwear, gait belt and walker in use for transfer. Bed in lowest position with alarm on and functioning and call light within reach.

## 2020-09-16 NOTE — PROGRESS NOTES
[facility]  INPATIENT PHYSICAL THERAPY  DAILY NOTE  [address] [facility] - [id number]    Time In: 0930  Time Out: 1000  Timed Code Treatment Minutes: 30 Minutes  Minutes: 30          Date: 2020  Patient Name: Prema Johnson,  Gender:  female        MRN: [id number]  : 1950  (65 y.o.)     Referring Practitioner: DAVID Bingham MD  Diagnosis: subcapital fracture of left femur s/p repair  Additional Pertinent Hx: Per H&P, pt is a 65 y.o. female with a past medical history of diabetes and prior MI who presents to the emergency department for evaluation of syncope. Patient states that she was at the grocery store this morning shortly prior to arrival, and upon leaving the grocery store she felt acutely short of breath, and lost consciousness. She reports that she fell onto her left side, and landed on her elbow and the back of her head. She endorses losing consciousness after she fell, and bystanders report that she was dazed. She regained consciousness shortly later, however she reports that she has not been ambulatory since her fall. In the emergency department she is complaining of head pain, shoulder pain, elbow pain, and left hip pain. She is additionally complaining of swelling to her left posterior scalp. Pt is now s/p HIP PINNING on 2020 by Dr Compa Lovelace. To IPR on      Prior Level of Function:  Lives With: Alone  Type of Home: Apartment  Home Layout: One level  Home Access: Level entry  Home Equipment: Quad cane   Bathroom Shower/Tub: Tub/Shower unit, Walk-in shower(Pt prefers the walk in shower.)  Bathroom Toilet: Standard(uses a countertop on Right side.)  Bathroom Accessibility: Accessible    Receives Help From: Friend(s)  ADL Assistance: Independent  Homemaking Assistance: Independent  Homemaking Responsibilities: Yes  Ambulation Assistance: Independent  Transfer Assistance: Independent  Active :  Yes  Additional Comments: Pt did not use any AD for ambulating. She did her own homemaking and cooking. reports walking daily PTA. Has a  1 x month    Restrictions/Precautions:  Restrictions/Precautions: Weight Bearing, General Precautions, Fall Risk  Left Lower Extremity Weight Bearing: Weight Bearing As Tolerated    SUBJECTIVE: Pt seated in w/c. Pleasant and cooperative. PAIN: 0/10:     OBJECTIVE:    Transfers:  Sit to Stand: CGA initially, then progressed to SBA. verbal cues for hand placement 50% of trials. Trials from w/c, padded armchair and recliner. Stand to Sit:Contact Guard Assistance to [address] Cue to fully align prior to attempt to reach hands back and for controlled descent. Pt was referring to handout taped to RW for technique, often. Ambulation:  Stand By Assistance straight path and turns. CGA with sidestepping with RW through narrowed spaces. Distance: 25 ft, 50 ft tile and carpet  Surface: Level Tile and Carpet  Device:Rolling Walker  Gait Deviations:  Slow Susanna and Decreased Step Length Bilaterally  Stairs:  Contact Guard Assistance  Number of Steps: 2  Height: 6\" step with Parallel Bars   Cues for sequencing  ASSESSMENT:  Assessment: Patient progressing toward established goals. Activity Tolerance:  Patient tolerance of  treatment: good.       Equipment Recommendations:Equipment Needed: Yes(RW)  Discharge Recommendations:  Continue to assess pending progress    Plan: Times per week: 5x/wk 90 min, 1x/wk 30 min  Current Treatment Recommendations: Strengthening, ROM, Balance Training, Functional Mobility Training, Gait Training, Transfer Training, Patient/Caregiver Education & Training, Safety Education & Training, Home Exercise Program, Endurance Training, Stair training, Equipment Evaluation, Education, & procurement, Wheelchair Mobility Training, ADL/Self-care Training    Patient Education  Patient Education: Transfers, Gait, Stairs,  - Patient Verbalized Understanding, - Patient Requires Continued Education    Goals:  Patient goals : go home  Short term goals  Time Frame for Short term goals: 1 week  Short term goal 1: pt to complete supine to/from sit at SBA to get in and out of bed  MET, see LTG  Short term goal 2: pt to complete sit to/from stand at SBA to rise from bed or chair  MET, see LTG  Short term goal 3: pt to ambulate >75ft with RW at SBA for mobility in the home  Short term goal 4: pt to complete car transfer at [facility] for safe transport to home  MET, see LTG  Short term goal 5: pt score on Tinetti balance test will improve to >=20 for improved balance and decreased fall risk at home  Long term goals  Time Frame for Long term goals : 2 weeks  Long term goal 1: pt to complete supine to/from sit at Mod I to get in and out of bed  Long term goal 2: pt to complete sit to/from stand at Mod I to rise from bed or chair  Long term goal 3: pt to ambulate >50ft with RW at Mod I, >125ft with RW at S for mobility in the home and community  Long term goal 4: pt to complete car transfer at S for safe transport to home  Long term goal 5: pt to negotiate 6\" platform step with RW at S for curb negotiation in the community  Long term goal 6: pt score on Tinetti balance test will improve to >=24 for improved balance and decreased fall risk at home    Following session, patient left in safe position with all fall risk precautions in place.

## 2020-09-16 NOTE — PROGRESS NOTES
[facility]  INPATIENT PHYSICAL THERAPY  DAILY NOTE  [address] [facility] - [id number]      Time In: 1150  Time Out: 1225  Timed Code Treatment Minutes: 35 Minutes  Minutes: 35          Date: 2020  Patient Name: Casey Wheelre,  Gender:  female        MRN: [id number]  : 1950  (65 y.o.)     Referring Practitioner: DAVID Jameson MD  Diagnosis: subcapital fracture of left femur s/p repair  Additional Pertinent Hx: Per H&P, pt is a 65 y.o. female with a past medical history of diabetes and prior MI who presents to the emergency department for evaluation of syncope. Patient states that she was at the grocery store this morning shortly prior to arrival, and upon leaving the grocery store she felt acutely short of breath, and lost consciousness. She reports that she fell onto her left side, and landed on her elbow and the back of her head. She endorses losing consciousness after she fell, and bystanders report that she was dazed. She regained consciousness shortly later, however she reports that she has not been ambulatory since her fall. In the emergency department she is complaining of head pain, shoulder pain, elbow pain, and left hip pain. She is additionally complaining of swelling to her left posterior scalp. Pt is now s/p HIP PINNING on 2020 by Dr Evelina Tavarez. To IPR on      Prior Level of Function:  Lives With: Alone  Type of Home: Apartment  Home Layout: One level  Home Access: Level entry  Home Equipment: Quad cane   Bathroom Shower/Tub: Tub/Shower unit, Walk-in shower(Pt prefers the walk in shower.)  Bathroom Toilet: Standard(uses a countertop on Right side.)  Bathroom Accessibility: Accessible    Receives Help From: Friend(s)  ADL Assistance: Independent  Homemaking Assistance: Independent  Homemaking Responsibilities: Yes  Ambulation Assistance: Independent  Transfer Assistance: Independent  Active :  Yes  Additional Comments: Pt did not use any AD for ambulating. She did her own homemaking and cooking. reports walking daily PTA. Has a  1 x month    Restrictions/Precautions:  Restrictions/Precautions: Weight Bearing, General Precautions, Fall Risk  Left Lower Extremity Weight Bearing: Weight Bearing As Tolerated    SUBJECTIVE: pt up in chair she was easily distracted throughout session needing cues to stay on task     PAIN: 6/10: at left LE     OBJECTIVE:  Bed Mobility:  Not Tested    Transfers:  Sit to Stand: Stand By Assistance  Stand to Sit:Stand By Assistance  Pt required verbal or tactile cues ~ 50% of time for hand placement   Ambulation:  Stand By Assistance  Distance: 150x2  Surface: Level Tile  Device:Rolling Walker  Gait Deviations:  Cues given for posture to avoid leaning on walker and to keep walker closer to her, she was easily distracted in the halls but no loss of balance     Balance:  pt completed dynamic balance activity in standing w/o UE at support reaching above head and to knee ht and ~ 2 in out of base with SBA To occ CGA     Exercise:  Patient was guided in 1 set(s) 10-15 reps of exercise to both lower extremities. Seated hamstring curls, Long arc quads, Standing heel/toe raises, Standing marches and Mini squats standing ex with UE at support and CGA for balance, pt did require assist for keeping count of ex due to her being distracted . Exercises were completed for increased independence with functional mobility. Functional Outcome Measures: Not completed       ASSESSMENT:  Assessment: Patient progressing toward established goals. Activity Tolerance:  Patient tolerance of  treatment: fair.         Equipment Recommendations:Equipment Needed: Yes(RW)  Discharge Recommendations:    Continue to assess pending progress    Plan: Times per week: 5x/wk 90 min, 1x/wk 30 min  Current Treatment Recommendations: Strengthening, ROM, Balance Training, Functional Mobility Training, Gait Training, Transfer Training, Patient/Caregiver Education & Training, Safety Education & Training, Home Exercise Program, Endurance Training, Stair training, Equipment Evaluation, Education, & procurement, Wheelchair Mobility Training, ADL/Self-care Training    Patient Education  Patient Education: Plan of Care, hand placement with transfers, and posture with gait     Goals:  Patient goals : go home  Short term goals  Time Frame for Short term goals: 1 week  Short term goal 1: pt to complete supine to/from sit at SBA to get in and out of bed  MET, see LTG  Short term goal 2: pt to complete sit to/from stand at SBA to rise from bed or chair  MET, see LTG  Short term goal 3: pt to ambulate >75ft with RW at [place] [id number] for mobility in the home  Short term goal 4: pt to complete car transfer at [facility] for safe transport to home  MET, see LTG  Short term goal 5: pt score on Tinetti balance test will improve to >=20 for improved balance and decreased fall risk at home  Long term goals  Time Frame for Long term goals : 2 weeks  Long term goal 1: pt to complete supine to/from sit at Mod I to get in and out of bed  Long term goal 2: pt to complete sit to/from stand at Mod I to rise from bed or chair  Long term goal 3: pt to ambulate >50ft with RW at Mod I, >125ft with RW at S for mobility in the home and community  Long term goal 4: pt to complete car transfer at S for safe transport to home  Long term goal 5: pt to negotiate 6\" platform step with RW at S for curb negotiation in the community  Long term goal 6: pt score on Tinetti balance test will improve to >=24 for improved balance and decreased fall risk at home    Following session, patient left in safe position with all fall risk precautions in place.

## 2020-09-16 NOTE — PROGRESS NOTES
[facility]  Recreational Therapy  Daily Note  [address]    Time Spent with Patient: 25 minutes    Date:  9/16/2020       Patient Name: Mac Noguera      MRN: [id number]      YOB: 1950 (65 y.o.)       Gender: female  Diagnosis: subcapital fracture of the left femur , s/p repair  Referring Practitioner: Dr. French Barkley    RESTRICTIONS/PRECAUTIONS:  Restrictions/Precautions: Weight Bearing, General Precautions, Fall Risk  Vision: Impaired  Hearing: Within functional limits    PAIN: 0-no c/o pain     SUBJECTIVE:  I need to call Teresita Rodrigez:  Helped pt call her POA to bring in money this am for her to get her hair done by our beautician-sit to stand from recliner with CGA-ambulated 3 ft to w/c with RW and CGA-via w/c took her to get her hair done by our beautician-affect bright and social-enjoyed getting her hair done         Patient Education  New Education Provided: Importance of Leisure, Transfer technique    Electronically signed by: NISHA Mcclendon  Date: 9/16/2020

## 2020-09-16 NOTE — PLAN OF CARE
Problem: DISCHARGE BARRIERS  Goal: Patient's continuum of care needs are met  9/16/2020 1447 by Calvert Litten, LSW  Note: SW met with patient and contacted patient's friend/Ashley DE, to review information discussed during care conference and to initiate discharge planning. SW reviewed with patient and friend/Ashley DE, anticipated discharge for Thursday, 09/24/2020, with home health care services. Patient and friend/Ashley DE, are familiar with [facility] and would prefer [facility] to provide services; decline need for home health care choice list. SW reviewed with friend/Ashley DE, recommendation for family education prior to discharge. Family education scheduled for Friday, 09/18/2020, from 9:00am to 12:30pm. Therapy board updated accordingly. Dk Gomez, reports patient's landlord will be installing grab bars in the shower, life alert button has been ordered for use at discharge. Dk Footman, indicates that patient is \"not far off\" of her cognitive baseline. SW discussed recommendation for no driving and need for increased supervision upon discharge. Dk Paulman, indicates that is has been completing finances for ten years and plans to arrange patient's medications. SW also inquired about patient's alcohol consumption/use. Dk Gomez, indicates patient's comments related to alcohol are just \"her personality\" and that patient does not have a history of significant alcohol use. Care plan reviewed with patient and friend/Ashley DE. Patient and friend/Ashley DE, verbalized understanding of the plan of care and contributed to goal setting. SW contacted [facility] with referral for RN/PT/OT/ST/HHA. Referral information and discharge date of Thursday, 09/24/2020, provided to [facility]. Referral information faxed to agency. SW to follow and maintain involvement in discharge planning.

## 2020-09-16 NOTE — PROGRESS NOTES
[address] [facility] THERAPY  [address] [facility]  DAILY NOTE    TIME  SLP Individual Minutes  Time In: 1330  Time Out: 1400  Minutes: 30  Timed Code Treatment Minutes: 30 Minutes       Date: 2020  Patient Name: Chadd Armstrong      CSN: [id number]   : 1950  (65 y.o.)  Gender: female   Referring Physician:  Dr. Grady Holloway    Diagnosis: Left femoral neck impacted subcapital fracture  Secondary Diagnosis: Cognition  Precautions: Fall risk  Current Diet: Regular diet, thin liquids  Swallowing Strategies: Standard Universal Swallow Precautions  Date of Last MBS: Not Applicable    Pain:  No pain reported. Subjective:  Pt sitting upright in chair, pleasant and cooperative. Short-Term Goals:  SHORT TERM GOAL #1:  Goal 1: Pt will complete orientation, immediate/delayed recall and working memory tasks with 80% accuracy given mod cues to improve retention of new and functional information. INTERVENTIONS:   Functional recall: Recall of information provided in morning session. Initially, no recall of event information. With multiple choice cues, patient able to identify that the event was a hair appointment, min cues needed for the day of the appointment, but she was independent with recall of the cost.   -1/3 indep, 1/3 with min cues, 1/3 with mod cues   *Unable to utilize written compensatory strategies due to poor legibility. Will pursue other options for compensations such as auditory recording/use of Mohini device. SHORT TERM GOAL #2:  Goal 2: Pt will complete functional sequencing and thought organizational tasks with 70% accuracy given mod cues to improve overall executive functioning skills. INTERVENTIONS: Grocery advertisement navigation:  indep,  wrote portion of the prompt as the answer (ie: prompt- \"are grapes 59 cents a pound? \", patient response- \"grapes\").  Patient able to identify errors with responses and correct verbally during review. SHORT TERM GOAL #3:  Goal 3: Patient will complete functional problem solving, reasoning and basic computational tasks with 70% accuracy given mod cues to improve overall management of ADLs. INTERVENTIONS:   Money related word problems:  3/10 indep, 3/10 with min assist from therapist for inputting information into a calculator based on patients verbal directions (patient unable to input information into calculator independently due to hand tremors), 4/10 with total assist for math reasoning, math computation and problem set up. *Patient successful with determining appropriate math functions if addition or subtraction, but unable to identify correct function or execute math computation based on multiplication or division. *Will continue to address in future sessions to improve independence needed for basic math computation in the community environment (ie: paying for meals at restaurants, buying groceries etc.)    Long-Term Goals:  Timeframe for Long-term Goals: 3 weeks    LONG TERM GOAL #1:  Goal 1: Pt will improve cognitive linguistic skills to a min assist level in order to improve level of independence in the home environment. Comprehension: 4 - Patient understands basic needs 75-90%+ of the time  Expression: 5 - Expresses basic ideas/needs only (hungry/hot/pain)  Social Interaction: 5 - Patient is appropriate with supervision/cues  Problem Solving: 3 - Patient solves simple/routine tasks 50%-74%  Memory: 2 - Patient remembers 25%-49% of the time         EDUCATION:  Learner: Patient  Education:  Reviewed recommendations for follow-up and Education Related to Potential Risks and Complications Due to Impairment/Illness/Injury  Evaluation of Education: Verbalizes understanding    ASSESSMENT/PLAN:  Activity Tolerance:  Patient tolerance of treatment: Good     Assessment/Plan: Patient progressing toward established goals.   Continues to require skilled care of licensed speech pathologist to progress toward achievement of established goals and plan of care. Plan for Next Session:  Immediate and Delayed Recall with focus on compensatory strategies, Medication management, Hospital navigation      [address] [facility].  [address] [facility], [facility] [address] [place]

## 2020-09-16 NOTE — PROGRESS NOTES
Physical Medicine & Rehabilitation  Progress Note    Chief Complaint:  left femoral neck impacted subcapital fracture    Subjective:  She notes that her pain is controlled at this time; but does not offer a rating. She has made nice progress with her therapies and is looking forward to her discharge next week. She was tachycardic at 130 this morning with an EKG showing simple sinus tachycardia; after her morning medications including propranolol her heart rate did come down below 100. She does not have any other concerns or questions at this time. Rehabilitation:   Physical Therapy:  OBJECTIVE:     Transfers:  Sit to Stand: CGA initially, then progressed to SBA. verbal cues for hand placement 50% of trials. Trials from w/c, padded armchair and recliner. Stand to Sit:Contact Guard Assistance to [address] Cue to fully align prior to attempt to reach hands back and for controlled descent. Pt was referring to handout taped to RW for technique, often.     Ambulation:  Stand By Assistance straight path and turns. CGA with sidestepping with RW through narrowed spaces. Distance: 25 ft, 50 ft tile and carpet  Surface: Level Tile and Carpet  Device:Rolling Walker  Gait Deviations:  Slow Susanna and Decreased Step Length Bilaterally  Stairs:  Contact Guard Assistance  Number of Steps: 2  Height: 6\" step with Parallel Bars   Cues for sequencing  ASSESSMENT:  Assessment: Patient progressing toward established goals. Activity Tolerance:  Patient tolerance of  treatment: good. Equipment Recommendations:Equipment Needed: Yes(RW)  Discharge Recommendations:  Continue to assess pending progress     Occupational Therapy:  COGNITION: Decreased Recall, Decreased Insight, Decreased Safety Awareness and Impaired Attention     ADL:   Grooming: Stand By Assistance. at sink completed oral care and brushing hair .     BALANCE:  Sitting Balance:  Modified Independent.     Standing Balance: Stand By Assistance.       BED mod cues   *Unable to utilize written compensatory strategies due to poor legibility. Will pursue other options for compensations such as auditory recording/use of Mohini device.      SHORT TERM GOAL #2:  Goal 2: Pt will complete functional sequencing and thought organizational tasks with 70% accuracy given mod cues to improve overall executive functioning skills. INTERVENTIONS: Grocery advertisement navigation: 4/6 indep, 2/6 wrote portion of the prompt as the answer (ie: prompt- \"are grapes 59 cents a pound? \", patient response- \"grapes\"). Patient able to identify errors with responses and correct verbally during review.      SHORT TERM GOAL #3:  Goal 3: Patient will complete functional problem solving, reasoning and basic computational tasks with 70% accuracy given mod cues to improve overall management of ADLs. INTERVENTIONS:   Money related word problems:  3/10 indep, 3/10 with min assist from therapist for inputting information into a calculator based on patients verbal directions (patient unable to input information into calculator independently due to hand tremors), 4/10 with total assist for math reasoning, math computation and problem set up. *Patient successful with determining appropriate math functions if addition or subtraction, but unable to identify correct function or execute math computation based on multiplication or division.    *Will continue to address in future sessions to improve independence needed for basic math computation in the community environment (ie: paying for meals at restaurants, buying groceries etc.)     Long-Term Goals:  Timeframe for Long-term Goals: 3 weeks     LONG TERM GOAL #1:  Goal 1: Pt will improve cognitive linguistic skills to a min assist level in order to improve level of independence in the home environment.       Comprehension: 4 - Patient understands basic needs 75-90%+ of the time  Expression: 5 - Expresses basic ideas/needs only (hungry/hot/pain)  Social Interaction: 5 - Patient is appropriate with supervision/cues  Problem Solving: 3 - Patient solves simple/routine tasks 50%-74%  Memory: 2 - Patient remembers 25%-49% of the time     EDUCATION:  Learner: Patient  Education:  Reviewed recommendations for follow-up and Education Related to Potential Risks and Complications Due to Impairment/Illness/Injury  Evaluation of Education: Verbalizes understanding     ASSESSMENT/PLAN:  Activity Tolerance:  Patient tolerance of treatment: Good     Assessment/Plan: Patient progressing toward established goals. Continues to require skilled care of licensed speech pathologist to progress toward achievement of established goals and plan of care. Plan for Next Session:  Immediate and Delayed Recall with focus on compensatory strategies, Medication management, Hospital navigation    Review of Systems:  Review of Systems   Constitutional: Positive for activity change. Negative for appetite change, fatigue and fever. HENT: Negative for trouble swallowing and voice change. Eyes: Negative. Respiratory: Negative for cough and shortness of breath. Cardiovascular: Negative for leg swelling. Gastrointestinal: Negative for constipation, diarrhea and nausea. Endocrine: Negative for cold intolerance. Genitourinary: Positive for urgency. Negative for frequency. Musculoskeletal: Positive for arthralgias, gait problem and myalgias. Skin: Negative for pallor. Allergic/Immunologic: Negative. Neurological: Positive for tremors and weakness. Negative for dizziness, light-headedness and headaches. Hematological: Negative. Psychiatric/Behavioral: Positive for decreased concentration. Negative for confusion and dysphoric mood. The patient is not nervous/anxious. All other systems reviewed and are negative.      Objective:  /68   Pulse 84   Temp 98.1 °F (36.7 °C) (Oral)   Resp 18   Ht 5' 7\" (1.702 m)   Wt 161 lb 9.6 oz (73.3 kg)   SpO2 96%   BMI 25.31 kg/m²   CURRENT VITALS:  height is 5' 7\" (1.702 m) and weight is 161 lb 9.6 oz (73.3 kg). Her oral temperature is 98.1 °F (36.7 °C). Her blood pressure is 108/68 and her pulse is 84. Her respiration is 18 and oxygen saturation is 96%. Body mass index is 25.31 kg/m².   Temperature Range (24h):Temp: 98.1 °F (36.7 °C) Temp  Av.1 °F (36.7 °C)  Min: 98.1 °F (36.7 °C)  Max: 98.1 °F (36.7 °C)  BP Range (72O): Systolic (41SYS), PLI:775 , Min:108 , BHF:408     Diastolic (79EHY), EHQ:83, Min:68, Max:74    Pulse Range (24h): Pulse  Av  Min: 84  Max: 130  Respiration Range (24h): Resp  Av  Min: 18  Max: 18  Current Pulse Ox (24h):  SpO2: 96 %  Pulse Ox Range (24h):  SpO2  Av %  Min: 96 %  Max: 96 %  Oxygen Amount and Delivery:      awake  Orientation:   person, place, time, situation  Mood: within normal limits  Affect: calm  General appearance: in no acute distress, up in chair    Memory:   normal,   Attention/Concentration: normal  Language:  normal     ROM:  abnormal - left hip surgery  Motor Exam:  Motor exam is 4+ out of 5 all extremities     Sensory:  Sensory intact  Coordination:   normal  Deep Tendon Reflexes:  Reflexes are intact and symmetrical bilaterally     Skin: warm and dry, no rash or erythema  Peripheral vascular: Pulses: Normal upper and lower extremity pulses; Edema: 1+    Diagnostics:   Recent Results (from the past 24 hour(s))   POCT glucose    Collection Time: 20  8:03 AM   Result Value Ref Range    POC Glucose 125 (H) 70 - 108 mg/dl   EKG 12 Lead    Collection Time: 20  8:14 AM   Result Value Ref Range    Ventricular Rate 115 BPM    Atrial Rate 115 BPM    P-R Interval 118 ms    QRS Duration 64 ms    Q-T Interval 312 ms    QTc Calculation (Bazett) 431 ms    P Axis 43 degrees    R Axis -27 degrees    T Axis 54 degrees   Basic Metabolic Panel    Collection Time: 20 11:30 AM   Result Value Ref Range    Sodium 141 135 - 145 meq/L    Potassium 4.6 3.5 - 5.2 meq/L Chloride 100 98 - 111 meq/L    CO2 31 23 - 33 meq/L    Glucose 114 (H) 70 - 108 mg/dL    BUN 25 (H) 7 - 22 mg/dL    CREATININE 1.3 (H) 0.4 - 1.2 mg/dL    Calcium 11.0 (H) 8.5 - 10.5 mg/dL   Anion Gap    Collection Time: 09/16/20 11:30 AM   Result Value Ref Range    Anion Gap 10.0 8.0 - 16.0 meq/L   Glomerular Filtration Rate, Estimated    Collection Time: 09/16/20 11:30 AM   Result Value Ref Range    Est, Glom Filt Rate 40 (A) ml/min/1.73m2   PTH, Intact    Collection Time: 09/16/20  1:02 PM   Result Value Ref Range    Pth Intact 17.4 15.0 - 65.0 pg/mL     Labs Renal Latest Ref Rng & Units 9/16/2020 9/15/2020 9/14/2020 9/10/2020 9/9/2020   BUN 7 - 22 mg/dL 25(H) 25(H) 29(H) 25(H) 19   Cr 0.4 - 1.2 mg/dL 1.3(H) 1. 3(H) 1. 3(H) 1.2 1.1   K 3.5 - 5.2 meq/L 4.6 5.1 5.4(H) 4.2 4.6   Na 135 - 145 meq/L 141 142 140 137 142      Recent Labs     09/14/20  1055 09/10/20  0617 09/09/20  0809   WBC 7.5 4.9 6.6   HGB 10.5* 9.7* 11.1*   HCT 35.1* 32.1* 35.7*   MCV 95.1 94.7 92.5    186 227      Impression:  1. Ground-level fall after syncopal event. 2. Acute slightly impacted subcapital fracture of the left femoral neck. 3. Status post closed reduction and percutaneous screw fixation of the left hip impacted subcapital fracture of the left femoral neck by Dr. Durga Fink, D.O. on 09/06/2020. EBL minimal.  4. Gait instability. 5. Mild TBI. 6. Iron deficiency anemia. 7. Vitamin D deficiency. 8. Insulin-dependent type 2 diabetes, controlled with hyperglycemia. Last hemoglobin A1c was 5.7 on 9/9/2020.  9. Essential tremor. 10. History of prior myocardial infarction in 2011. 11. Hypertension. 12. Hyperlipidemia. 13. Coronary artery disease. 14. History of prior myocardial infarction. 15. CKD 3.  16. 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 17. Osteoporosis. 18. Moderate to severe cognitive deficits. (MOCA) version 8.2 completed. Patient scored 10/30.  19. Hyperkalemia.   20. Acute kidney injury. 21. Hypercalcemia. 22. Tachycardia.     Plan:      Medical management: Per primary team and Dr. Shayla Haro, Internal Medicine, Physical Medicine     Narcotic usage: Tramadol last 48 hour usage none. Stable. Last BM:   Stool Amount: Medium (09/15/20 1300)     FUNCTIONAL OUTCOMES TOOLS:    GODINEZ -      Tinetti - Balance Score: 10  Gait Score: 6  Tinetti Total Score: 16    TUG -       Acute/Rehabilitation Problems:  1. Ground-level fall after syncopal event. 2. Acute slightly impacted subcapital fracture of the left femoral neck. 1. Addressed by surgery below. 3. Status post closed reduction and percutaneous screw fixation of the left hip impacted subcapital fracture of the left femoral neck by Dr. Wil Mclean D.O. on 09/06/2020. EBL minimal.  1. WBAT. 2. Wound care. 3. Pain control. 1. PRN and scheduled Tylenol. 2. Lidoderm patches. 3. Tramadol. 4. Gait instability. 1. PT/OT. 2. Tinetti 16/28. Risk Indicators: Less than/equal to 18 = high risk; 19-23 Moderate risk; Greater than/equal to 24 = low risk. 5. Mild TBI/Moderate to severe cognitive deficits. (MOCA) version 8.2 completed. Patient scored 10/30. 1. SLP. 2. Low stimulation TBI guidelines if deemed necessary. 3. As of 9/15 the patient was felt to have a decline in her cognition; her Garcia Mueller was queried as to how he perceived her cognition and he stated this is her baseline. 4. Urinalysis was negative for signs of a UTI on 9/15. 6. Iron deficiency anemia. 1. Iron 25 and ferritin 120. Abnormal/normal.  2. Ferrous sulfate and vitamin C twice daily with meals. 7. Tachycardia. 1. Patient had an episode of tachycardia on 9/16 with a heart rate of 130 for which an EKG was ordered that showed sinus tachycardia. After receiving on propranolol her heart rate did come down to less than 100 bpm.  8. Nutrition:  Consultation to dietician for nutritional counseling and recommendations.   1. Protein 5.6 and albumin 2.8 on 9/10/2020. Abnormal.  2. Vitamin 25OH level of 14 on 9/9/2020.  3. Cholecalciferol 5000 IU 3 times daily with meals and 500 mg calcium twice daily with meals. 9. Electrolytes. 1. Normal on 9/14 with exception of.  1. Hyperkalemia with a potassium of 5.4 on 9/14. Potassium decreased to 5.1 on 9/15. 1. Kayexalate 15 mg x 2 ordered on 9/14. 2. Hypercalcemia. 1. Hold supplemental calcium on 9/15. Calcium normal on 9/15 at 10.3. Calcium elevated again at 11.0 on 9/16. 2. PTH normal at 17.4 on 9/16. 10. Bladder: No issues  11. Bowel: Senna, colace, MOM  12. Rehabilitation nursing will be involved for bowel, bladder, skin, and pain management. Nursing will also provide education and training to patient and family. 13. Prophylaxis:  DVT: Plavix and aspirin as directed by Orthopedic Surgery. GI: None. .  14.  and case management consultations for coordination of care and discharge planning.     Chronic Problems:  1. Insulin-dependent type 2 diabetes, controlled with hyperglycemia. Last hemoglobin A1c was 5.7 on 9/9/2020.  1. Lantus 20 units every morning. Lantus increased to 24 units starting on 9/16 to improve blood glucose control. 2. Metformin 1000 mg twice daily with meals. 2. Essential tremor. 1. Continue home primidone and propranolol. 3. History of prior myocardial infarction in 2011.  1. ASA 81 mg.  2. Plavix. 4. Hypertension. 1. Propranolol. 5. Hyperlipidemia. 1. Lipitor. 6. Coronary artery disease/History of prior myocardial infarction. 1. ASA 81 mg.  2. Plavix. 3. Lipitor. 7. ISAURA superimposed on CKD 3.  1. Cr/BUN/GFR on 9/9 was 1.1/19/49 which is stable over 9/7 with labs of 1.1/18/49. Mild decrease to 1.2/25/44 on 9/10. Further worsened from 1.3/29/40 on 9/14. Renal function stable over prior at 1.3/25/40 on 9/15. Remains elevated at 1.3/25/40 on 9/16. 2. Encourage fluids. 8. 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 9. Osteoporosis.   1. See plan above for vitamin D and calcium supplementation.     Labs reviewed on:  1. 9/8.  2. 9/9.  3. 9/10.  4. 9/14.  5. 9/15.  6. 9/16.     Infectious Disease:  1. N/A     Missed Therapy Time:  None     DME:  Tone Membreno requires a Bedside Commode to complete bathing, toileting, dressing and grooming tasks. Patient requires a Bedside Commode due to Upper Extremity/Lower Extremity Weakness and limited ambulation and is unable to walk to the bathroom at home. Without the Bedside Commode, Tone Membreno is at increased risk for falls and would require increased assistance for ADL's and mobility. Discharge Plan:  Estimated Discharge Date: 9/24   Destination: home health and discharge home with supervision  Services at Discharge: [address] [place] [address] Rd, Occupational Therapy, Speech Therapy, Nursing and an aide 3x week  Is patient appropriate for an outpatient driving evaluation? yes, when appropriate  Equipment at Discharge: RW, BSC, shower chair, grab bars    Greater than 50% of 30 minutes was spent with the patient reviewing her progress with therapies, her consistently abnormal renal labs including elevated calcium as well as creatinine with encouragement to improve her fluid intake, and her current level of pain control.     Michelle Mckeon MD

## 2020-09-16 NOTE — PLAN OF CARE
Problem: Falls - Risk of:  Goal: Will remain free from falls  Description: Will remain free from falls  Outcome: Ongoing  Note: Pt remains free from falls. She is 1 assist with the walker. Steady on her feet. Problem: Discharge Planning:  Goal: Ability to perform activities of daily living will improve  Description: Ability to perform activities of daily living will improve  Outcome: Ongoing  Note: Pt DC is set for 9/24     Problem: Injury - Risk of, Physical Injury:  Goal: Will remain free from falls  Description: Will remain free from falls  Outcome: Ongoing  Note: Pt remains free from falls. She is 1 assist with the walker. Steady on her feet. Problem: Mood - Altered:  Goal: Mood stable  Description: Mood stable  Outcome: Ongoing  Note: Pt seems to be in a good mood today. Problem: Skin Integrity:  Goal: Absence of new skin breakdown  Description: Absence of new skin breakdown  Outcome: Ongoing  Note: Pinxav on buttocks     Problem: Mobility - Impaired:  Goal: Mobility will improve  Description: Mobility will improve  Outcome: Ongoing  Note: Pt continues to work with PT and OT. 1 assist with the walk during ambulation     Problem: Pain:  Goal: Pain level will decrease  Description: Pain level will decrease  Outcome: Ongoing  Note: Pt co pain in her L hip. Ice ineffective.  Takes tylenol and tramadol for pain

## 2020-09-17 LAB — GLUCOSE BLD-MCNC: 86 MG/DL (ref 70–108)

## 2020-09-17 PROCEDURE — 97116 GAIT TRAINING THERAPY: CPT

## 2020-09-17 PROCEDURE — 97110 THERAPEUTIC EXERCISES: CPT

## 2020-09-17 PROCEDURE — 97130 THER IVNTJ EA ADDL 15 MIN: CPT | Performed by: SPEECH-LANGUAGE PATHOLOGIST

## 2020-09-17 PROCEDURE — 6370000000 HC RX 637 (ALT 250 FOR IP): Performed by: FAMILY MEDICINE

## 2020-09-17 PROCEDURE — 6370000000 HC RX 637 (ALT 250 FOR IP): Performed by: INTERNAL MEDICINE

## 2020-09-17 PROCEDURE — 82948 REAGENT STRIP/BLOOD GLUCOSE: CPT

## 2020-09-17 PROCEDURE — 97129 THER IVNTJ 1ST 15 MIN: CPT

## 2020-09-17 PROCEDURE — 1180000000 HC REHAB R&B

## 2020-09-17 PROCEDURE — 6370000000 HC RX 637 (ALT 250 FOR IP): Performed by: PHYSICAL MEDICINE & REHABILITATION

## 2020-09-17 PROCEDURE — 97130 THER IVNTJ EA ADDL 15 MIN: CPT

## 2020-09-17 PROCEDURE — 97530 THERAPEUTIC ACTIVITIES: CPT

## 2020-09-17 PROCEDURE — 97535 SELF CARE MNGMENT TRAINING: CPT

## 2020-09-17 PROCEDURE — 97129 THER IVNTJ 1ST 15 MIN: CPT | Performed by: SPEECH-LANGUAGE PATHOLOGIST

## 2020-09-17 RX ADMIN — Medication 5000 UNITS: at 14:16

## 2020-09-17 RX ADMIN — Medication 5000 UNITS: at 16:31

## 2020-09-17 RX ADMIN — ASPIRIN 81 MG: 81 TABLET ORAL at 08:39

## 2020-09-17 RX ADMIN — CLOPIDOGREL BISULFATE 75 MG: 75 TABLET ORAL at 08:39

## 2020-09-17 RX ADMIN — Medication 250 MG: at 16:31

## 2020-09-17 RX ADMIN — TRAMADOL HYDROCHLORIDE 50 MG: 50 TABLET, FILM COATED ORAL at 10:40

## 2020-09-17 RX ADMIN — DOCUSATE SODIUM 50 MG AND SENNOSIDES 8.6 MG 1 TABLET: 8.6; 5 TABLET, FILM COATED ORAL at 20:42

## 2020-09-17 RX ADMIN — PROPRANOLOL HYDROCHLORIDE 80 MG: 80 CAPSULE, EXTENDED RELEASE ORAL at 08:51

## 2020-09-17 RX ADMIN — METFORMIN HYDROCHLORIDE 1000 MG: 500 TABLET ORAL at 16:31

## 2020-09-17 RX ADMIN — MULTIPLE VITAMINS W/ MINERALS TAB 1 TABLET: TAB at 08:50

## 2020-09-17 RX ADMIN — ACETAMINOPHEN 650 MG: 325 TABLET ORAL at 05:53

## 2020-09-17 RX ADMIN — PRIMIDONE 200 MG: 50 TABLET ORAL at 08:39

## 2020-09-17 RX ADMIN — INSULIN GLARGINE 24 UNITS: 100 INJECTION, SOLUTION SUBCUTANEOUS at 08:40

## 2020-09-17 RX ADMIN — ATORVASTATIN CALCIUM 80 MG: 80 TABLET, FILM COATED ORAL at 20:42

## 2020-09-17 RX ADMIN — ACETAMINOPHEN 650 MG: 325 TABLET ORAL at 14:16

## 2020-09-17 RX ADMIN — ACETAMINOPHEN 650 MG: 325 TABLET ORAL at 20:42

## 2020-09-17 RX ADMIN — PRIMIDONE 50 MG: 50 TABLET ORAL at 20:42

## 2020-09-17 RX ADMIN — Medication 5000 UNITS: at 08:40

## 2020-09-17 RX ADMIN — Medication 250 MG: at 08:40

## 2020-09-17 RX ADMIN — METFORMIN HYDROCHLORIDE 1000 MG: 500 TABLET ORAL at 08:40

## 2020-09-17 ASSESSMENT — PAIN DESCRIPTION - ORIENTATION
ORIENTATION: LEFT
ORIENTATION: LEFT

## 2020-09-17 ASSESSMENT — ENCOUNTER SYMPTOMS
EYES NEGATIVE: 1
COUGH: 0
CONSTIPATION: 0
VOICE CHANGE: 0
SHORTNESS OF BREATH: 0
ALLERGIC/IMMUNOLOGIC NEGATIVE: 1
DIARRHEA: 0
NAUSEA: 0
TROUBLE SWALLOWING: 0

## 2020-09-17 ASSESSMENT — PAIN DESCRIPTION - PAIN TYPE: TYPE: ACUTE PAIN

## 2020-09-17 ASSESSMENT — PAIN SCALES - GENERAL
PAINLEVEL_OUTOF10: 0
PAINLEVEL_OUTOF10: 4
PAINLEVEL_OUTOF10: 0
PAINLEVEL_OUTOF10: 0
PAINLEVEL_OUTOF10: 8
PAINLEVEL_OUTOF10: 0
PAINLEVEL_OUTOF10: 3

## 2020-09-17 ASSESSMENT — PAIN DESCRIPTION - LOCATION
LOCATION: HIP
LOCATION: HIP

## 2020-09-17 ASSESSMENT — PAIN DESCRIPTION - DESCRIPTORS: DESCRIPTORS: ACHING

## 2020-09-17 NOTE — PLAN OF CARE
Care plan reviewed with patient and  verbalize understanding of the plan of care and contribute to goal setting.       Problem: Injury - Risk of, Physical Injury:  Goal: Will remain free from falls  Description: Will remain free from falls  Outcome: Met This Shift  Note: With cues uses call light     Problem: Confusion - Acute:  Goal: Mental status will be restored to baseline  Description: Mental status will be restored to baseline  Outcome: Ongoing  Note: Working with speeck therapy     Problem: Falls - Risk of:  Goal: Absence of physical injury  Description: Absence of physical injury  Outcome: Met This Shift  Note: No new skin issues     Problem: Falls - Risk of:  Goal: Will remain free from falls  Description: Will remain free from falls  Outcome: Met This Shift  Note: With cues uses call light  Goal: Absence of physical injury  Description: Absence of physical injury  Outcome: Met This Shift  Note: No new skin issues

## 2020-09-17 NOTE — PROGRESS NOTES
Collette Ruts [address] [address]  [facility]  Occupational Therapy  Daily Note  Time:   Time In: 1430  Time Out: 1500  Timed Code Treatment Minutes: 30 Minutes  Minutes: 30          Date: 2020  Patient Name: Kacie Howard,   Gender: female      Room: [facility]/054-A  MRN: [id number]  : 1950  (65 y.o.)  Referring Practitioner: Dr. Jewel Mayo  Diagnosis: subcapital fracture of the left femur , s/p repair  Additional Pertinent Hx: She was admitted 2020 after being brought into the emergency department by [address] [facility] for injuries sustained after a syncopal event. The patient had a fall that occurred while she was walking in the grocery store when she blacked out and when she regained consciousness she found herself lying on the floor with people standing around her. She had left-sided hip pain and also abrasions on her left arm along with a left-sided superficial hematoma on her parietal scalp. Her initial x-rays showed an acute slightly impacted subcapital fracture of the left femoral neck. She underwent surgical correction with a closed reduction and percutaneous screw fixation of the left hip impacted subcapital fracture of the left femoral neck by Dr. Cl Neal on 2020. Restrictions/Precautions:  Restrictions/Precautions: Weight Bearing, General Precautions, Fall Risk  Left Lower Extremity Weight Bearing: Weight Bearing As Tolerated     SUBJECTIVE: pt sitting in w/c when arrived. Pt agreeable to OT     PAIN: pt stated is having some pain in LLE hip area     COGNITION: Slow Processing, Decreased Insight, Inattention, Decreased Problem Solving and Decreased Safety Awareness    ADL:   No ADL's completed this session. [facility] BALANCE:  Standing Balance: Contact Guard Assistance, Minimal Assistance, with cues for safety, with increased time for completion. at [facility] for greater then 1-2 min     BED MOBILITY:  Not Tested    TRANSFERS:  Sit to Stand:  Contact Guard Assistance.  from w/c and arm chair   Stand to Sit: Contact Guard Assistance. to arm chair and recliner     FUNCTIONAL MOBILITY:  Assistive Device: Rolling Walker  Assist Level:  Contact Guard Assistance and with verbal cues . Distance: To and from therapy apartment and around unit   Pt had no LOB but did become distracted at times with poor placement of RW to self at times. ADDITIONAL ACTIVITIES:  .Patient identified a personal goal to increase UB strength and improve overall endurance so they can complete their toilet & shower transfers; skilled edu on UE strengthening and patient completed BUE strengthening exercises x10 reps x1 set this date with a minimal resistive band  in all joints and all planes. Patient tolerated in sitting , requiring short  rest breaks. Pt required min  cues for technique and focus on task. .Patient identified one of their personal goals is to be able to sustain functional standing positions in order to complete various ADL and IADL skills in standing position, such as sinkside grooming or washing dishes. Dynamic standing task was then facilitated to challenge 1 hand release with reacher to get items off floor at RW. Patient required CGA , and demo'ed an endurance of 5 minutes. ASSESSMENT:     Activity Tolerance:  Patient tolerance of  treatment: good. Discharge Recommendations: 24 hour supervision or assist, Home with Home health OT, Home with nursing aide   Equipment Recommendations: Equipment Needed: Yes  Other: Pt may benefit from a BSC, shower chair, grab bars in the shower . Need to assess for LHAE. BSC ordered on 9-15.   Plan: Times per week: 5xs week x 90 min, 1 x week x 30 min  Current Treatment Recommendations: Endurance Training, Functional Mobility Training, Self-Care / ADL, Equipment Evaluation, Education, & procurement, Patient/Caregiver Education & Training, Home Management Training, Safety Education & Training    Patient Education  Patient Education: IADL's, Home Exercise Program and Importance of Increasing Activity    Goals  Short term goals  Time Frame for Short term goals: 1 week  Short term goal 1: PT will complete ADL routine with 0-1 cues for organization, problem solving adapted tech and Setup with LHAE PRN to increase independence in self care tasks  Short term goal 2: Pt will complete simple grooming tasks with  S while using 1-2 hand release to increase her independence with toileting routine. Short term goal 3: PT will complete functional mobility to/ from the BR as well as around obstacles with S  and 0-1 cues for safe tech to increase independence in toileting tasks at home  Short term goal 4: Pt will complete self feeding tasks with AE without cues to increase ability to eat soft foods  Short term goal 5: Pt will complete 1 step homemaking tasks iwth no > min cues for safe tech to increase independence in retrieving a snack  Long term goals  Time Frame for Long term goals : 2-3 weeks  Long term goal 1: Pt will complete ADL routine with no cues for safe and adapted tech to increase independence in self care tasks  Long term goal 2: Pt will complete 2 step homemaking tasks with no cues for safe tech and S for problem solving to increase independence in light homemaking tasks    Following session, patient left in safe position with all fall risk precautions in place.

## 2020-09-17 NOTE — PROGRESS NOTES
[facility]  INPATIENT PHYSICAL THERAPY  DAILY NOTE  [address]    Time In: 1030  Time Out: 1130  Timed Code Treatment Minutes: 60 Minutes  Minutes: 60          Date: 2020  Patient Name: Akanksha Contreras,  Gender:  female        MRN: [id number]  : 1950  (65 y.o.)     Referring Practitioner: DAVID Allen MD  Diagnosis: subcapital fracture of left femur s/p repair  Additional Pertinent Hx: Per H&P, pt is a 65 y.o. female with a past medical history of diabetes and prior MI who presents to the emergency department for evaluation of syncope. Patient states that she was at the grocery store this morning shortly prior to arrival, and upon leaving the grocery store she felt acutely short of breath, and lost consciousness. She reports that she fell onto her left side, and landed on her elbow and the back of her head. She endorses losing consciousness after she fell, and bystanders report that she was dazed. She regained consciousness shortly later, however she reports that she has not been ambulatory since her fall. In the emergency department she is complaining of head pain, shoulder pain, elbow pain, and left hip pain. She is additionally complaining of swelling to her left posterior scalp. Pt is now s/p HIP PINNING on 2020 by Dr Daysi Bartholomew. To IPR on      Prior Level of Function:  Lives With: Alone  Type of Home: Apartment  Home Layout: One level  Home Access: Level entry  Home Equipment: Quad cane   Bathroom Shower/Tub: Tub/Shower unit, Walk-in shower(Pt prefers the walk in shower.)  Bathroom Toilet: Standard(uses a countertop on Right side.)  Bathroom Accessibility: Accessible    Receives Help From: Friend(s)  ADL Assistance: Independent  Homemaking Assistance: Independent  Homemaking Responsibilities: Yes  Ambulation Assistance: Independent  Transfer Assistance: Independent  Active :  Yes  Additional Comments: Pt did not use any AD CGA, patient instructed on descending ramp slower using min vc's to ensure patient safety. Stairs:  Stairs:  4\" steps. X 6 using Bilateral Handrails and Contact Guard Assistance. Min vc's to keep hands on handrails. Patient required mod. vc's for foot sequence during completion, improved foot sequence remembrance while descending stairs. Balance:  Dynamic Standing Balance: Contact Guard Assistance  - Pod Taps: 4\" step, BLE's using Bilateral handrails for balance support at first, progressed to using one handrail. Mod vc's for hand placement on rails while standing. Patient showed no unsteadiness during completion.   - Bathroom tasks and rajan-care, no unsteadiness or LOB present. Mod vc's for hand placement to ensure patient safety. Exercise:  Patient was guided in 1 set(s) 10-15 reps of exercise to both lower extremities. Standing heel/toe raises, Standing marches and Mini squats. Patient demonstrated correct form during completion. Exercises were completed for increased independence with functional mobility. Functional Outcome Measures: Not completed       ASSESSMENT:  Assessment: Patient progressing toward established goals. Patient is progressing well towards goals at this time, tolerated session well. Several rest breaks required throughout session due to fatigue. Shows limitations due to being easily distracted and generalized BLE weakness/pain. This affects the patient's overall functional mobility. Additional skilled PT would be beneficial to patient to increase BLE strength/stability, which would assist in improving patient's bilateral step length while ambulating. Further step/gait training would benefit patient by improving foot sequence while ascending/descending stairs, and increase patient safety while ambulating with a RW. This will improve patient's safe functional mobility while completing future tasks. Activity Tolerance:  Patient tolerance of  treatment: good.       Equipment Recommendations:Equipment Needed: Yes(RW)  Discharge Recommendations:  Continue to assess pending progress    Plan: Times per week: 5x/wk 90 min, 1x/wk 30 min  Current Treatment Recommendations: Strengthening, ROM, Balance Training, Functional Mobility Training, Gait Training, Transfer Training, Patient/Caregiver Education & Training, Safety Education & Training, Home Exercise Program, Endurance Training, Stair training, Equipment Evaluation, Education, & procurement, Wheelchair Mobility Training, ADL/Self-care Training    Patient Education  Patient Education: Plan of Care, Transfers, Gait, Stairs, Car Transfers, Education Related to Prevention of Recurrence of Impairment/Illness/Injury, Health Promotion and Wellness Education, Home Safety Education,  - Patient Verbalized Understanding, - Patient Requires Continued Education    Goals:  Patient goals : go home  Short term goals  Time Frame for Short term goals: 1 week  Short term goal 1: pt to complete supine to/from sit at SBA to get in and out of bed  MET, see LTG  Short term goal 2: pt to complete sit to/from stand at SBA to rise from bed or chair  MET, see LTG  Short term goal 3: pt to ambulate >75ft with RW at SBA for mobility in the home  Short term goal 4: pt to complete car transfer at [facility] for safe transport to home  MET, see LTG  Short term goal 5: pt score on Tinetti balance test will improve to >=20 for improved balance and decreased fall risk at home  Long term goals  Time Frame for Long term goals : 2 weeks  Long term goal 1: pt to complete supine to/from sit at Mod I to get in and out of bed  Long term goal 2: pt to complete sit to/from stand at Mod I to rise from bed or chair  Long term goal 3: pt to ambulate >50ft with RW at Mod I, >125ft with RW at S for mobility in the home and community  Long term goal 4: pt to complete car transfer at S for safe transport to home  Long term goal 5: pt to negotiate 6\" platform step with RW at S for curb negotiation in the community  Long term goal 6: pt score on Tinetti balance test will improve to >=24 for improved balance and decreased fall risk at home    Following session, patient left in safe position with all fall risk precautions in place.     Treatment session and note completed by ROJELIO Michele, under supervision of signing therapist.

## 2020-09-17 NOTE — PROGRESS NOTES
[address] [facility] THERAPY  [facility]  DAILY NOTE    TIME  SLP Individual Minutes  Time In: 3002  Time Out: 1000  Minutes: 27  Timed Code Treatment Minutes: 27 Minutes       Date: 2020  Patient Name: Judah Cadena      CSN: [id number]   : 1950  (65 y.o.)  Gender: female   Referring Physician:  Dr. Joaquin Mcintosh    Diagnosis: Left femoral neck impacted subcapital fracture  Secondary Diagnosis: Cognition  Precautions: Fall risk  Current Diet: Regular diet, thin liquids  Swallowing Strategies: Standard Universal Swallow Precautions  Date of Last MBS: Not Applicable    Pain:  No pain reported. Subjective:  Pt sitting upright in chair, pleasant and cooperative. Short-Term Goals:  SHORT TERM GOAL #1:  Goal 1: Pt will complete orientation, immediate/delayed recall and working memory tasks with 80% accuracy given mod cues to improve retention of new and functional information. INTERVENTIONS:   Functional recall: Recall of orientation details   -Date including month, day, year: 3/3 indep  -Time of day (morning, afternoon, evening, night):  indep  -Time:  indep, referenced clock indep to determine time  -Current location (building name and city):  indep    SHORT TERM GOAL #2:  Goal 2: Pt will complete functional sequencing and thought organizational tasks with 70% accuracy given mod cues to improve overall executive functioning skills. INTERVENTIONS: *See Goal 3 for details on thought organizational skills. SHORT TERM GOAL #3:  Goal 3: Patient will complete functional problem solving, reasoning and basic computational tasks with 70% accuracy given mod cues to improve overall management of ADLs.   INTERVENTIONS:   Medication management task (utilized patient's actual medication regimen) : Organizing 4 medications into a pill box with a total of 6 errors  -Rx 1: Medication was to be taken 3x daily with meals, required mod cues to reference Rx information, thought organization cues for where to place medication in the pill box  -Rx 2: Medication was to be taken 1x daily, required min cues to reference Rx information, thought organization cues for where to place medication in the pill box  -Rx 3: Medication was to be taken 1x daily, required min cues to reference Rx information  -Rx 4: Medication was to be taken 2x daily with meals, required mod cues to reference Rx information, thought organization cues for placement of medication in pill box, and frequent recall cues    *Patient verbalized that activity is helpful. *Patient was observed to have increased difficulty when medication included complex intake instructions, required frequent recall cues as Patient often forgot which slots in the pill box she had already filled and Rx instructions. Patient showed improved success when instructed to refer to Rx information before continuing to fill pill box, as well as moderate support for correct organization of medication. Overall, Patient completed medication management task with minimal success- frequent redirections and cues required to complete task suggest that supervised medication management WILL be necessary upon discharge. Plan to provide education regarding needs to [address] during family education. Long-Term Goals:  Timeframe for Long-term Goals: 3 weeks    LONG TERM GOAL #1:  Goal 1: Pt will improve cognitive linguistic skills to a min assist level in order to improve level of independence in the home environment.       Comprehension: 4 - Patient understands basic needs 75-90%+ of the time  Expression: 5 - Expresses basic ideas/needs only (hungry/hot/pain)  Social Interaction: 5 - Patient is appropriate with supervision/cues  Problem Solving: 3 - Patient solves simple/routine tasks 50%-74%  Memory: 2 - Patient remembers 25%-49% of the time         EDUCATION:  Learner: Patient  Education:  Reviewed recommendations for follow-up, Education Related to Potential Risks and Complications Due to Impairment/Illness/Injury, Education Related to [employer] and Wellness and Home Safety Education  Evaluation of Education: [employer] understanding    ASSESSMENT/PLAN:  Activity Tolerance:  Patient tolerance of treatment: Good     Assessment/Plan: Patient progressing toward established goals. Continues to require skilled care of licensed speech pathologist to progress toward achievement of established goals and plan of care. Plan for Next Session:  Immediate and Delayed Recall with focus on compensatory strategies, Hospital navigation      [place], [place], Speech Intern  Florentin Bailon.  [address] [facility] [address], [address]

## 2020-09-17 NOTE — PROGRESS NOTES
[address]  Occupational Therapy  Daily Note  Time:    Time In: 0740  Time Out: 0840  Timed Code Treatment Minutes: 60 Minutes  Minutes: 60          Date: 2020  Patient Name: Lyubov Mcarthur,   Gender: female      Room: [facility]/054-A  MRN: [id number]  : 1950  (65 y.o.)  Referring Practitioner: Dr. Felipa Cardoso  Diagnosis: subcapital fracture of the left femur , s/p repair  Additional Pertinent Hx: She was admitted 2020 after being brought into the emergency department by [address] [facility] for injuries sustained after a syncopal event. The patient had a fall that occurred while she was walking in the grocery store when she blacked out and when she regained consciousness she found herself lying on the floor with people standing around her. She had left-sided hip pain and also abrasions on her left arm along with a left-sided superficial hematoma on her parietal scalp. Her initial x-rays showed an acute slightly impacted subcapital fracture of the left femoral neck. She underwent surgical correction with a closed reduction and percutaneous screw fixation of the left hip impacted subcapital fracture of the left femoral neck by Dr. Loree Gutierrez on 2020. Restrictions/Precautions:  Restrictions/Precautions: Weight Bearing, General Precautions, Fall Risk  Left Lower Extremity Weight Bearing: Weight Bearing As Tolerated      SUBJECTIVE: cooperative. Reports sleeping on and off. Soreness/ achiness in hip    PAIN: 3/10: in hip. COGNITION: Slow Processing, Decreased Recall, Decreased Insight and Impaired Memory    ADL:   Feeding: with set-up.  to open coffee container. trialed a swivel spoon/spork for pancakes with fair success. Better control noted when utilizeing a weighted spoon/ fork. WIll continue to address. Grooming: Stand By Assistance. 1 cue for stepping closer to the sink and not twisting to wash hands. Toileting: Minimal Assistance.   PT wanting to remove depends. min cues for problem solving to don a pad vs depends. SBA for pt to pul pants up. Toilet Transfer: Stand By Assistance. min cues for RW safety. Cayden Keyes BALANCE:  Sitting Balance:  Independent. Standing Balance: Stand By Assistance. BED MOBILITY:  Not Tested    TRANSFERS:  Sit to Stand:  Contact Guard Assistance. 3 cues during session to push up from the chair  Stand to Sit: [employer]. fair awareness to control descent. FUNCTIONAL MOBILITY:  Assistive Device: Rolling Walker  Assist Level:  Stand By Assistance. Distance: To and from bathroom and To and from therapy apartment   min cues for staying within RW and to keep it closer to her with mobility. ADDITIONAL ACTIVITIES:  Pt ambulated throughout the grocery store to retrieve items from various shelves. Min cues throughout to stay near the grocery cart and for proper body mechanics throughout. MIn cues for problem solving and attention to task as well as recall of where items were located within the store upon returning to shelves. Standing and mobility x 8 min x 1, 5 min x 1 prior to seated rest breaks. SBA for standing balance throughout. ASSESSMENT:     Activity Tolerance:  Patient tolerance of  treatment: good. Discharge Recommendations: 24 hour supervision or assist, Home with Home health OT, Home with nursing aide    Equipment Recommendations: Equipment Needed: Yes  Other: Pt may benefit from a BSC, shower chair, grab bars in the shower . Need to assess for LHAE. BSC ordered on 9-15. Plan: Times per week: 5xs week x 90 min, 1 x week x 30 min  Current Treatment Recommendations: Endurance Training, Functional Mobility Training, Self-Care / ADL, Equipment Evaluation, Education, & procurement, Patient/Caregiver Education & Training, Home Management Training, Safety Education & Training    Patient Education  Patient Education: Equipment Education, IADL safety with RW.        Goals  Short term goals  Time Frame for Short term goals: 1 week  Short term goal 1: PT will complete ADL routine with 0-1 cues for organization, problem solving adapted tech and Setup with LHAE PRN to increase independence in self care tasks  Short term goal 2: Pt will complete simple grooming tasks with  S while using 1-2 hand release to increase her independence with toileting routine. Short term goal 3: PT will complete functional mobility to/ from the BR as well as around obstacles with S  and 0-1 cues for safe tech to increase independence in toileting tasks at home  Short term goal 4: Pt will complete self feeding tasks with AE without cues to increase ability to eat soft foods  Short term goal 5: Pt will complete 1 step homemaking tasks iwth no > min cues for safe tech to increase independence in retrieving a snack  Long term goals  Time Frame for Long term goals : 2-3 weeks  Long term goal 1: Pt will complete ADL routine with no cues for safe and adapted tech to increase independence in self care tasks  Long term goal 2: Pt will complete 2 step homemaking tasks with no cues for safe tech and S for problem solving to increase independence in light homemaking tasks    Following session, patient left in safe position with all fall risk precautions in place.

## 2020-09-17 NOTE — PROGRESS NOTES
Physical Medicine & Rehabilitation  Progress Note    Chief Complaint:  left femoral neck impacted subcapital fracture    Subjective:  Pain level ranges from 6-8/10 with activity; patient requires encouragement to take her pain medications. She continues making excellent improvements with her gait and today demonstrated multiple steps. She feels that she is making good progress towards her discharge next week. She has no other concerns or questions at this time. Rehabilitation:   Physical Therapy:  OBJECTIVE:  Bed Mobility:  Supine to Sit: Stand By Assistance  Sit to Supine: Stand By Assistance      Transfers:  Sit to Stand: Stand By Assistance  Stand to Sit:Stand By Assistance  Cues for hand placement ~ 75% of the time     Ambulation:  Stand By Assistance  Distance: 150x2  Surface: Level Tile  Device:rolling walker   Gait Deviations:  slow lelo cues for posture and noted decreased left TKE at stance phase which did improve with cues, also noted decreased stance time at left LE     Exercise:  Patient was guided in 1 set(s) 10 reps of exercise to both lower extremities. Ankle pumps, Glut sets, Quad sets, Heelslides, Short arc quads, Hip abduction/adduction and with decreased knee flexion tolerated at left LE, pt needed cues to stay on task and assist to keep count. Exercises were completed for increased independence with functional mobility.     Functional Outcome Measures: Not completed    ASSESSMENT:  Assessment: Patient progressing toward established goals. Activity Tolerance:  Patient tolerance of  treatment: fair.         Equipment Recommendations:Equipment Needed: Yes(RW)  Discharge Recommendations:   Continue to assess pending progress     Plan: Times per week: 5x/wk 90 min, 1x/wk 30 min  Current Treatment Recommendations: Strengthening, ROM, Balance Training, Functional Mobility Training, Gait Training, Transfer Training, Patient/Caregiver Education & Training, Safety Education & Training, Home Exercise Program, Endurance Training, Stair training, Equipment Evaluation, Education, & procurement, Wheelchair Mobility Training, ADL/Self-care Training     Occupational Therapy:  COGNITION: Slow Processing, Decreased Insight, Inattention, Decreased Problem Solving and Decreased Safety Awareness     ADL:   No ADL's completed this session. .     BALANCE:  Standing Balance: Contact Guard Assistance, Minimal Assistance, with cues for safety, with increased time for completion. at [facility] for greater then 1-2 min      BED MOBILITY:  Not Tested     TRANSFERS:  Sit to Stand:  Contact Guard Assistance. from w/c and arm chair   Stand to Sit: [employer]. to arm chair and recliner      FUNCTIONAL MOBILITY:  Assistive Device: Rolling Walker  Assist Level:  Contact Guard Assistance and with verbal cues . Distance: To and from therapy apartment and around unit   Pt had no LOB but did become distracted at times with poor placement of RW to self at times.         ADDITIONAL ACTIVITIES:  Patient identified a personal goal to increase UB strength and improve overall endurance so they can complete their toilet & shower transfers; skilled edu on UE strengthening and patient completed BUE strengthening exercises x10 reps x1 set this date with a minimal resistive band  in all joints and all planes. Patient tolerated in sitting , requiring short  rest breaks. Pt required min  cues for technique and focus on task.        Patient identified one of their personal goals is to be able to sustain functional standing positions in order to complete various ADL and IADL skills in standing position, such as sinkside grooming or washing dishes. Dynamic standing task was then facilitated to challenge 1 hand release with reacher to get items off floor at RW. Patient required CGA , and demo'ed an endurance of 5 minutes. ASSESSMENT:     Activity Tolerance:  Patient tolerance of  treatment: good.        Discharge Recommendations: 24 hour Interaction: 5 - Patient is appropriate with supervision/cues  Problem Solving: 3 - Patient solves simple/routine tasks 50%-74%  Memory: 2 - Patient remembers 25%-49% of the time     Review of Systems:  Review of Systems   Constitutional: Positive for activity change. Negative for appetite change, fatigue and fever. HENT: Negative for trouble swallowing and voice change. Eyes: Negative. Respiratory: Negative for cough and shortness of breath. Cardiovascular: Negative for leg swelling. Gastrointestinal: Negative for constipation, diarrhea and nausea. Endocrine: Negative for cold intolerance. Genitourinary: Positive for urgency. Negative for frequency. Musculoskeletal: Positive for arthralgias, gait problem and myalgias. Skin: Negative for pallor. Allergic/Immunologic: Negative. Neurological: Positive for tremors and weakness. Negative for dizziness, light-headedness and headaches. Hematological: Negative. Psychiatric/Behavioral: Positive for decreased concentration. Negative for confusion and dysphoric mood. The patient is not nervous/anxious. All other systems reviewed and are negative. Objective:  BP (!) 103/53   Pulse 78   Temp 98 °F (36.7 °C) (Oral)   Resp 19   Ht 5' 7\" (1.702 m)   Wt 149 lb 1.6 oz (67.6 kg)   SpO2 91%   BMI 23.35 kg/m²   CURRENT VITALS:  height is 5' 7\" (1.702 m) and weight is 149 lb 1.6 oz (67.6 kg). Her oral temperature is 98 °F (36.7 °C). Her blood pressure is 103/53 (abnormal) and her pulse is 78. Her respiration is 19 and oxygen saturation is 91%. Body mass index is 23.35 kg/m².   Temperature Range (24h):Temp: 98 °F (36.7 °C) Temp  Av °F (36.7 °C)  Min: 98 °F (36.7 °C)  Max: 98 °F (36.7 °C)  BP Range (56N): Systolic (52SMF), GAT:745 , Min:103 , MARY:159     Diastolic (42ZXG), QPJ:83, Min:53, Max:64    Pulse Range (24h): Pulse  Av.5  Min: 78  Max: 89  Respiration Range (24h): Resp  Av.5  Min: 18  Max: 19  Current Pulse Ox (24h): SpO2: 91 %  Pulse Ox Range (24h):  SpO2  Av %  Min: 91 %  Max: 93 %  Oxygen Amount and Delivery:      awake  Orientation:   person, place, time, situation  Mood: within normal limits  Affect: calm  General appearance: in no acute distress, up in therapy gym walking up staircase  Memory:   normal,   Attention/Concentration: normal  Language:  normal     ROM:  abnormal - left hip surgery  Motor Exam:  Motor exam is 4+ out of 5 all extremities     Sensory:  Sensory intact  Coordination:   normal  Deep Tendon Reflexes:  Reflexes are intact and symmetrical bilaterally     Skin: warm and dry, no rash or erythema  Peripheral vascular: Pulses: Normal upper and lower extremity pulses; Edema: 1+    Diagnostics:   Recent Results (from the past 24 hour(s))   POCT glucose    Collection Time: 20  7:35 AM   Result Value Ref Range    POC Glucose 86 70 - 108 mg/dl     Labs Renal Latest Ref Rng & Units 2020 9/15/2020 2020 9/10/2020 2020   BUN 7 - 22 mg/dL 25(H) 25(H) 29(H) 25(H) 19   Cr 0.4 - 1.2 mg/dL 1.3(H) 1. 3(H) 1. 3(H) 1.2 1.1   K 3.5 - 5.2 meq/L 4.6 5.1 5.4(H) 4.2 4.6   Na 135 - 145 meq/L 141 142 140 137 142      Recent Labs     20  1055 09/10/20  0617 20  0809   WBC 7.5 4.9 6.6   HGB 10.5* 9.7* 11.1*   HCT 35.1* 32.1* 35.7*   MCV 95.1 94.7 92.5    186 227      Impression:  1. Ground-level fall after syncopal event. 2. Acute slightly impacted subcapital fracture of the left femoral neck. 3. Status post closed reduction and percutaneous screw fixation of the left hip impacted subcapital fracture of the left femoral neck by Dr. Lula Watkins, D.O. on 2020. EBL minimal.  4. Gait instability. 5. Mild TBI. 6. Iron deficiency anemia. 7. Vitamin D deficiency. 8. Insulin-dependent type 2 diabetes, controlled with hyperglycemia. Last hemoglobin A1c was 5.7 on 2020.  9. Essential tremor.   10. History of prior myocardial infarction in 2011.  11. Hypertension. 12. Hyperlipidemia. 13. Coronary artery disease. 14. History of prior myocardial infarction. 15. CKD 3.  16. 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 17. Osteoporosis. 18. Moderate to severe cognitive deficits. (MOCA) version 8.2 completed. Patient scored 10/30.  19. Hyperkalemia. 20. Acute kidney injury. 21. Hypercalcemia. 22. Tachycardia.     Plan:      Medical management: Per primary team and Dr. Patrick Freeman, Internal Medicine, Physical Medicine     Narcotic usage: Tramadol last 24-hour usage 50 mg. Stable. Last BM:   Stool Amount: Small (09/16/20 1011)     FUNCTIONAL OUTCOMES TOOLS:    GODINEZ -      Tinetti - Balance Score: 10  Gait Score: 6  Tinetti Total Score: 16    TUG -       Acute/Rehabilitation Problems:  1. Ground-level fall after syncopal event. 2. Acute slightly impacted subcapital fracture of the left femoral neck. 1. Addressed by surgery below. 3. Status post closed reduction and percutaneous screw fixation of the left hip impacted subcapital fracture of the left femoral neck by Dr. Jorge Snyder, D.O. on 09/06/2020. EBL minimal.  1. WBAT. 2. Wound care. 3. Pain control. 1. PRN and scheduled Tylenol. 2. Lidoderm patches. 3. Tramadol. Patient still requires encouragement to take her pain medications prior to her therapy sessions. 4. Gait instability. 1. PT/OT. 2. Tinetti 16/28. Risk Indicators: Less than/equal to 18 = high risk; 19-23 Moderate risk; Greater than/equal to 24 = low risk. 5. Mild TBI/Moderate to severe cognitive deficits. (MOCA) version 8.2 completed. Patient scored 10/30. 1. SLP. 2. Low stimulation TBI guidelines if deemed necessary. 3. As of 9/15 the patient was felt to have a decline in her cognition; her Reida Delfina was queried as to how he perceived her cognition and he stated this is her baseline. 4. Urinalysis was negative for signs of a UTI on 9/15. 6. Iron deficiency anemia.   1. Iron 25 and ferritin 120. Abnormal/normal.  2. Ferrous sulfate and vitamin C twice daily with meals. 7. Tachycardia. 1. Patient had an episode of tachycardia on 9/16 with a heart rate of 130 for which an EKG was ordered that showed sinus tachycardia. After receiving on propranolol her heart rate did come down to less than 100 bpm.  Resolved. 8. Nutrition:  Consultation to dietician for nutritional counseling and recommendations. 1. Protein 5.6 and albumin 2.8 on 9/10/2020. Abnormal.  2. Vitamin 25OH level of 14 on 9/9/2020.  3. Cholecalciferol 5000 IU 3 times daily with meals and 500 mg calcium twice daily with meals. 9. Electrolytes. 1. Normal on 9/14 with exception of.  1. Hyperkalemia with a potassium of 5.4 on 9/14. Potassium decreased to 5.1 on 9/15. 1. Kayexalate 15 mg x 2 ordered on 9/14. 2. Hypercalcemia. 1. Hold supplemental calcium on 9/15. Calcium normal on 9/15 at 10.3. Calcium elevated again at 11.0 on 9/16. 2. PTH normal at 17.4 on 9/16. 10. Bladder: No issues  11. Bowel: Senna, colace, MOM  12. Rehabilitation nursing will be involved for bowel, bladder, skin, and pain management. Nursing will also provide education and training to patient and family. 13. Prophylaxis:  DVT: Plavix and aspirin as directed by Orthopedic Surgery. GI: None. .  14.  and case management consultations for coordination of care and discharge planning.     Chronic Problems:  1. Insulin-dependent type 2 diabetes, controlled with hyperglycemia. Last hemoglobin A1c was 5.7 on 9/9/2020.  1. Lantus 20 units every morning. Lantus increased to 24 units starting on 9/16 to improve blood glucose control. 2. Metformin 1000 mg twice daily with meals. 2. Essential tremor. 1. Continue home primidone and propranolol. 3. History of prior myocardial infarction in 2011.  1. ASA 81 mg.  2. Plavix. 4. Hypertension. 1. Propranolol. 5. Hyperlipidemia. 1. Lipitor.   6. Coronary artery disease/History of prior

## 2020-09-17 NOTE — PROGRESS NOTES
Patient: Marcio Kaba  Unit/Bed: 5R-09/432-W  YOB: 1950  MRN: [id number] Acct: [de-identified]   Admitting Diagnosis: Subcapital fracture of left femur s/p repair  Admit Date:  9/9/2020  Hospital Day: 7    Assessment:     Active Problems:    Limb tremor    Syncope and collapse    Subcapital fracture of femur, left, closed, with routine healing, subsequent encounter    Type 2 diabetes mellitus without complication, with long-term current use of insulin ([facility])    Vitamin D deficiency    Age-related osteoporosis with current pathological fracture with routine healing  Resolved Problems:    * No resolved hospital problems. *      Plan:     Await result of the follow up BMP  Increase the lantus for the elevated blood sugars        Subjective:     Patient has no complaint of CP, SOB, GI upset or voiding troubles. .   Medication side effects: none    Scheduled Meds:   insulin glargine  24 Units Subcutaneous QAM    therapeutic multivitamin-minerals  1 tablet Oral Daily    sennosides-docusate sodium  1 tablet Oral BID    aspirin  81 mg Oral Daily    atorvastatin  80 mg Oral Daily    clopidogrel  75 mg Oral Daily    ferrous sulfate  325 mg Oral BID WC    And    vitamin C  250 mg Oral BID WC    primidone  200 mg Oral Daily    primidone  50 mg Oral Nightly    propranolol  80 mg Oral Daily    acetaminophen  650 mg Oral 3 times per day    Vitamin D  5,000 Units Oral TID WC    And    [Held by provider] calcium elemental  500 mg Oral BID WC    lidocaine  2 patch Transdermal QAM AC    metFORMIN  1,000 mg Oral BID WC     Continuous Infusions:  PRN Meds:zinc oxide, magnesium hydroxide, polyethylene glycol, glucagon (rDNA), glucose, acetaminophen, traMADol **OR** traMADol    Review of Systems  Pertinent items are noted in HPI. Objective:     No data found. I/O last 3 completed shifts: In: 819 [P.O.:675]  Out: -   No intake/output data recorded.     /68   Pulse 84   Temp 98.1 °F (36.7 °C) (Oral) Resp 18   Ht 5' 7\" (1.702 m)   Wt 161 lb 9.6 oz (73.3 kg)   SpO2 96%   BMI 25.31 kg/m²     /68   Pulse 84   Temp 98.1 °F (36.7 °C) (Oral)   Resp 18   Ht 5' 7\" (1.702 m)   Wt 161 lb 9.6 oz (73.3 kg)   SpO2 96%   BMI 25.31 kg/m²   General appearance: alert, appears stated age and cooperative  Head: Normocephalic, without obvious abnormality, atraumatic  Lungs: clear to auscultation bilaterally  Heart: regular rate and rhythm, S1, S2 normal, no murmur, click, rub or gallop  Abdomen: soft, non-tender; bowel sounds normal; no masses,  no organomegaly  Extremities: extremities normal, atraumatic, no cyanosis or edema  Skin: Skin color, texture, turgor normal. No rashes or lesions  Neurologic: weak    Data Review:   Results for Lakisha Woods (MRN [id number]) as of 9/16/2020 21:04   Ref.  Range 9/14/2020 10:55   Sodium Latest Ref Range: 135 - 145 meq/L 140   Potassium Latest Ref Range: 3.5 - 5.2 meq/L 5.4 (H)   Chloride Latest Ref Range: 98 - 111 meq/L 100   CO2 Latest Ref Range: 23 - 33 meq/L 28   BUN Latest Ref Range: 7 - 22 mg/dL 29 (H)   Creatinine Latest Ref Range: 0.4 - 1.2 mg/dL 1.3 (H)   Anion Gap Latest Ref Range: 8.0 - 16.0 meq/L 12.0   Est, Glom Filt Rate Latest Units: ml/min/1.73m2 40 (A)   Glucose Latest Ref Range: 70 - 108 mg/dL 116 (H)   Calcium Latest Ref Range: 8.5 - 10.5 mg/dL 10.9 (H)   WBC Latest Ref Range: 4.8 - 10.8 thou/mm3 7.5   RBC Latest Ref Range: 4.20 - 5.40 mill/mm3 3.69 (L)   Hemoglobin Quant Latest Ref Range: 12.0 - 16.0 gm/dl 10.5 (L)   Hematocrit Latest Ref Range: 37.0 - 47.0 % 35.1 (L)   MCV Latest Ref Range: 81.0 - 99.0 fL 95.1   MCH Latest Ref Range: 26.0 - 33.0 pg 28.5   MCHC Latest Ref Range: 32.2 - 35.5 gm/dl 29.9 (L)   MPV Latest Ref Range: 9.4 - 12.4 fL 9.9   RDW-CV Latest Ref Range: 11.5 - 14.5 % 13.3   RDW-SD Latest Ref Range: 35.0 - 45.0 fL 45.3 (H)   Platelet Count Latest Ref Range: 130 - 400 thou/mm3 281   Electronically signed by Densi Santamaria MD on 9/16/2020 at 9:02 PM

## 2020-09-17 NOTE — PROGRESS NOTES
[address] [place] [facility]ERAP[facility]  [address] [facility]  DAILY NOTE    TIME  SLP Individual Minutes  Time In: 1140  Time Out: 7735  Minutes: 23  Timed Code Treatment Minutes: 23 Minutes       Date: 2020  Patient Name: Seven Cedillo      CSN: [id number]   : 1950  (65 y.o.)  Gender: female   Referring Physician:  Dr. Geni Jacobs    Diagnosis: Left femoral neck impacted subcapital fracture  Secondary Diagnosis: Cognition  Precautions: Fall risk  Current Diet: Regular diet, thin liquids  Swallowing Strategies: Standard Universal Swallow Precautions  Date of Last MBS: Not Applicable    Pain:  No pain reported. Subjective:  Pt sitting upright in chair for duration of session, pleasant and attentive. Short-Term Goals:  SHORT TERM GOAL #1:  Goal 1: Pt will complete orientation, immediate/delayed recall and working memory tasks with 80% accuracy given mod cues to improve retention of new and functional information. INTERVENTIONS:   Functional recall: Provided patient with 3 pieces of information about the therapist to recall. Discussed use of repetition and association making to improve retention of information.   -Immediate recall: 3/3 indep  -Recall following a 10 minute delay- 2/3 indep, 1/3 with mod cues  -Recall following a 20 minute delay- 2/3 indep, 1/3 with mod cues    SHORT TERM GOAL #2:  Goal 2: Pt will complete functional sequencing and thought organizational tasks with 70% accuracy given mod cues to improve overall executive functioning skills. INTERVENTIONS: Did not address due to focus on other goals. SHORT TERM GOAL #3:  Goal 3: Patient will complete functional problem solving, reasoning and basic computational tasks with 70% accuracy given mod cues to improve overall management of ADLs. INTERVENTIONS:  Reasoning for prescription label: 6/10 indep, 2/10 with increased time to locate answers, 2/10 with max cues .    *Cueing assist needed for comprehension of information on the labels and reasoning through problems that may arise. Navigation of a grocery ad: 4/7 indep, 2/7 with min cues, 1/7 with mod cues. *Cueing needed for selective attention and attention to details. Long-Term Goals:  Timeframe for Long-term Goals: 3 weeks    LONG TERM GOAL #1:  Goal 1: Pt will improve cognitive linguistic skills to a min assist level in order to improve level of independence in the home environment. Comprehension: 4 - Patient understands basic needs 75-90%+ of the time  Expression: 5 - Expresses basic ideas/needs only (hungry/hot/pain)  Social Interaction: 5 - Patient is appropriate with supervision/cues  Problem Solving: 3 - Patient solves simple/routine tasks 50%-74%  Memory: 2 - Patient remembers 25%-49% of the time         EDUCATION:  Learner: Patient  Education:  Reviewed recommendations for follow-up, Education Related to Potential Risks and Complications Due to Impairment/Illness/Injury, Education Related to [employer] and Wellness and Home Safety Education  Evaluation of Education: Verbalizes understanding    ASSESSMENT/PLAN:  Activity Tolerance:  Patient tolerance of treatment: Good     Assessment/Plan: Patient progressing toward established goals. Continues to require skilled care of licensed speech pathologist to progress toward achievement of established goals and plan of care. Plan for Next Session:  Immediate and Delayed Recall with focus on compensatory strategies, Hospital navigation      [facility].  [address] [facility], [facility], [address]

## 2020-09-17 NOTE — PROGRESS NOTES
[address] [address]  Recreational Therapy  Daily Note  [address]    Time Spent with Patient: 10 minutes    Date:  9/17/2020       Patient Name: Pratik aGn      MRN: [id number]      YOB: 1950 (65 y.o.)       Gender: female  Diagnosis: subcapital fracture of the left femur , s/p repair  Referring Practitioner: Dr. Echeverria Settler    RESTRICTIONS/PRECAUTIONS:  Restrictions/Precautions: Weight Bearing, General Precautions, Fall Risk  Vision: Impaired  Hearing: Within functional limits    PAIN: 0-no c/o pain     SUBJECTIVE:  I need to use the bathroom     OBJECTIVE:  Passing pt's room her alarm was going off and she was up by herself pushing a chair to the bathroom as her walker was across the room-assisted pt to bathroom with RW and CGA-able to doff and don underwear and pants with CGA-sit to stand from RTS with CGA-stood at sink to wash hands with CGA-ambulated back to chair  with RW and CGA-states she is not sure how she is going to manage everything at home going-does not want to go to a nursing home -encouraged her to use her call light when she has to get up from chair-         Patient Education  New Education Provided: Importance of Leisure, Veronica Loud Safety    Electronically signed by: NISHA Serrano  Date: 9/17/2020

## 2020-09-17 NOTE — PROGRESS NOTES
[facility]  INPATIENT PHYSICAL THERAPY  DAILY NOTE  [address] [facility] - 7E-54/[id number]      Time In: 1330  Time Out: 1400  Timed Code Treatment Minutes: 30 Minutes  Minutes: 30          Date: 2020  Patient Name: Ramakrishna Patel,  Gender:  female        MRN: [id number]  : 1950  (65 y.o.)     Referring Practitioner: DAVID Lewis MD  Diagnosis: subcapital fracture of left femur s/p repair  Additional Pertinent Hx: Per H&P, pt is a 65 y.o. female with a past medical history of diabetes and prior MI who presents to the emergency department for evaluation of syncope. Patient states that she was at the grocery store this morning shortly prior to arrival, and upon leaving the grocery store she felt acutely short of breath, and lost consciousness. She reports that she fell onto her left side, and landed on her elbow and the back of her head. She endorses losing consciousness after she fell, and bystanders report that she was dazed. She regained consciousness shortly later, however she reports that she has not been ambulatory since her fall. In the emergency department she is complaining of head pain, shoulder pain, elbow pain, and left hip pain. She is additionally complaining of swelling to her left posterior scalp. Pt is now s/p HIP PINNING on 2020 by Dr Silvia Hollis. To IPR on      Prior Level of Function:  Lives With: Alone  Type of Home: Apartment  Home Layout: One level  Home Access: Level entry  Home Equipment: Quad cane   Bathroom Shower/Tub: Tub/Shower unit, Walk-in shower(Pt prefers the walk in shower.)  Bathroom Toilet: Standard(uses a countertop on Right side.)  Bathroom Accessibility: Accessible    Receives Help From: Friend(s)  ADL Assistance: Independent  Homemaking Assistance: Independent  Homemaking Responsibilities: Yes  Ambulation Assistance: Independent  Transfer Assistance: Independent  Active :  Yes  Additional Comments: Pt did not use any AD for ambulating. She did her own homemaking and cooking. reports walking daily PTA. Has a  1 x month    Restrictions/Precautions:  Restrictions/Precautions: Weight Bearing, General Precautions, Fall Risk  Left Lower Extremity Weight Bearing: Weight Bearing As Tolerated    SUBJECTIVE: pt cooperative noted edema at left LE we discused getting ice to LE following her next therapy session     PAIN: 5/10: at left LE however she reported that it was feeling a little better after session     OBJECTIVE:  Bed Mobility:  Supine to Sit: Stand By Assistance  Sit to Supine: Stand By Assistance     Transfers:  Sit to Stand: Stand By Assistance  Stand to Sit:Stand By Assistance  Cues for hand placement ~ 75% of the time   Ambulation:  Stand By Assistance  Distance: 150x2  Surface: Level Tile  Device:rolling walker   Gait Deviations:  slow lelo cues for posture and noted decreased left TKE at stance phase which did improve with cues, also noted decreased stance time at left LE         Exercise:  Patient was guided in 1 set(s) 10 reps of exercise to both lower extremities. Ankle pumps, Glut sets, Quad sets, Heelslides, Short arc quads, Hip abduction/adduction and with decreased knee flexion tolerated at left LE, pt needed cues to stay on task and assist to keep count. Exercises were completed for increased independence with functional mobility. Functional Outcome Measures: Not completed       ASSESSMENT:  Assessment: Patient progressing toward established goals. Activity Tolerance:  Patient tolerance of  treatment: fair.         Equipment Recommendations:Equipment Needed: Yes(RW)  Discharge Recommendations:    Continue to assess pending progress    Plan: Times per week: 5x/wk 90 min, 1x/wk 30 min  Current Treatment Recommendations: Strengthening, ROM, Balance Training, Functional Mobility Training, Gait Training, Transfer Training, Patient/Caregiver Education & Training, Safety Education & Training, Home Exercise Program, Endurance Training, Stair training, Equipment Evaluation, Education, & procurement, Wheelchair Mobility Training, ADL/Self-care Training    Patient Education  Patient Education: Plan of Care    Goals:  Patient goals : go home  Short term goals  Time Frame for Short term goals: 1 week  Short term goal 1: pt to complete supine to/from sit at SBA to get in and out of bed  MET, see LTG  Short term goal 2: pt to complete sit to/from stand at SBA to rise from bed or chair  MET, see LTG  Short term goal 3: pt to ambulate >75ft with RW at [facility] for mobility in the home  Short term goal 4: pt to complete car transfer at [facility] for safe transport to home  MET, see LTG  Short term goal 5: pt score on Tinetti balance test will improve to >=20 for improved balance and decreased fall risk at home  Long term goals  Time Frame for Long term goals : 2 weeks  Long term goal 1: pt to complete supine to/from sit at Mod I to get in and out of bed  Long term goal 2: pt to complete sit to/from stand at Mod I to rise from bed or chair  Long term goal 3: pt to ambulate >50ft with RW at Mod I, >125ft with RW at S for mobility in the home and community  Long term goal 4: pt to complete car transfer at S for safe transport to home  Long term goal 5: pt to negotiate 6\" platform step with RW at S for curb negotiation in the community  Long term goal 6: pt score on Tinetti balance test will improve to >=24 for improved balance and decreased fall risk at home    Following session, patient left in safe position with all fall risk precautions in place.

## 2020-09-18 LAB
ANION GAP SERPL CALCULATED.3IONS-SCNC: 10 MEQ/L (ref 8–16)
BUN BLDV-MCNC: 25 MG/DL (ref 7–22)
CALCIUM SERPL-MCNC: 10 MG/DL (ref 8.5–10.5)
CHLORIDE BLD-SCNC: 103 MEQ/L (ref 98–111)
CO2: 26 MEQ/L (ref 23–33)
CREAT SERPL-MCNC: 1.3 MG/DL (ref 0.4–1.2)
GFR SERPL CREATININE-BSD FRML MDRD: 40 ML/MIN/1.73M2
GLUCOSE BLD-MCNC: 100 MG/DL (ref 70–108)
GLUCOSE BLD-MCNC: 86 MG/DL (ref 70–108)
POTASSIUM SERPL-SCNC: 4.9 MEQ/L (ref 3.5–5.2)
SODIUM BLD-SCNC: 139 MEQ/L (ref 135–145)

## 2020-09-18 PROCEDURE — 97530 THERAPEUTIC ACTIVITIES: CPT

## 2020-09-18 PROCEDURE — 80048 BASIC METABOLIC PNL TOTAL CA: CPT

## 2020-09-18 PROCEDURE — 97110 THERAPEUTIC EXERCISES: CPT

## 2020-09-18 PROCEDURE — 6370000000 HC RX 637 (ALT 250 FOR IP): Performed by: FAMILY MEDICINE

## 2020-09-18 PROCEDURE — 6370000000 HC RX 637 (ALT 250 FOR IP): Performed by: PHYSICAL MEDICINE & REHABILITATION

## 2020-09-18 PROCEDURE — 97116 GAIT TRAINING THERAPY: CPT

## 2020-09-18 PROCEDURE — 97130 THER IVNTJ EA ADDL 15 MIN: CPT

## 2020-09-18 PROCEDURE — 97535 SELF CARE MNGMENT TRAINING: CPT

## 2020-09-18 PROCEDURE — 6370000000 HC RX 637 (ALT 250 FOR IP): Performed by: INTERNAL MEDICINE

## 2020-09-18 PROCEDURE — 1180000000 HC REHAB R&B

## 2020-09-18 PROCEDURE — 82948 REAGENT STRIP/BLOOD GLUCOSE: CPT

## 2020-09-18 PROCEDURE — 97129 THER IVNTJ 1ST 15 MIN: CPT

## 2020-09-18 PROCEDURE — 36415 COLL VENOUS BLD VENIPUNCTURE: CPT

## 2020-09-18 RX ORDER — SENNA AND DOCUSATE SODIUM 50; 8.6 MG/1; MG/1
2 TABLET, FILM COATED ORAL 2 TIMES DAILY
Status: DISCONTINUED | OUTPATIENT
Start: 2020-09-18 | End: 2020-09-24 | Stop reason: HOSPADM

## 2020-09-18 RX ADMIN — Medication 5000 UNITS: at 08:45

## 2020-09-18 RX ADMIN — Medication 5000 UNITS: at 12:09

## 2020-09-18 RX ADMIN — MAGNESIUM HYDROXIDE 30 ML: 2400 SUSPENSION ORAL at 05:32

## 2020-09-18 RX ADMIN — PRIMIDONE 200 MG: 50 TABLET ORAL at 08:46

## 2020-09-18 RX ADMIN — CLOPIDOGREL BISULFATE 75 MG: 75 TABLET ORAL at 08:45

## 2020-09-18 RX ADMIN — PRIMIDONE 50 MG: 50 TABLET ORAL at 21:26

## 2020-09-18 RX ADMIN — INSULIN GLARGINE 24 UNITS: 100 INJECTION, SOLUTION SUBCUTANEOUS at 08:44

## 2020-09-18 RX ADMIN — Medication 250 MG: at 16:51

## 2020-09-18 RX ADMIN — METFORMIN HYDROCHLORIDE 1000 MG: 500 TABLET ORAL at 16:51

## 2020-09-18 RX ADMIN — Medication 5000 UNITS: at 16:51

## 2020-09-18 RX ADMIN — ACETAMINOPHEN 650 MG: 325 TABLET ORAL at 13:54

## 2020-09-18 RX ADMIN — MULTIPLE VITAMINS W/ MINERALS TAB 1 TABLET: TAB at 08:46

## 2020-09-18 RX ADMIN — Medication 250 MG: at 08:45

## 2020-09-18 RX ADMIN — DOCUSATE SODIUM 50 MG AND SENNOSIDES 8.6 MG 1 TABLET: 8.6; 5 TABLET, FILM COATED ORAL at 08:47

## 2020-09-18 RX ADMIN — ACETAMINOPHEN 650 MG: 325 TABLET ORAL at 05:32

## 2020-09-18 RX ADMIN — PROPRANOLOL HYDROCHLORIDE 80 MG: 80 CAPSULE, EXTENDED RELEASE ORAL at 08:46

## 2020-09-18 RX ADMIN — ATORVASTATIN CALCIUM 80 MG: 80 TABLET, FILM COATED ORAL at 21:26

## 2020-09-18 RX ADMIN — METFORMIN HYDROCHLORIDE 1000 MG: 500 TABLET ORAL at 08:46

## 2020-09-18 RX ADMIN — ASPIRIN 81 MG: 81 TABLET ORAL at 08:45

## 2020-09-18 ASSESSMENT — PAIN SCALES - GENERAL
PAINLEVEL_OUTOF10: 0
PAINLEVEL_OUTOF10: 0
PAINLEVEL_OUTOF10: 4
PAINLEVEL_OUTOF10: 0

## 2020-09-18 ASSESSMENT — ENCOUNTER SYMPTOMS
COUGH: 0
ALLERGIC/IMMUNOLOGIC NEGATIVE: 1
NAUSEA: 0
VOICE CHANGE: 0
SHORTNESS OF BREATH: 0
CONSTIPATION: 0
EYES NEGATIVE: 1
TROUBLE SWALLOWING: 0
DIARRHEA: 0

## 2020-09-18 ASSESSMENT — PAIN - FUNCTIONAL ASSESSMENT: PAIN_FUNCTIONAL_ASSESSMENT: ACTIVITIES ARE NOT PREVENTED

## 2020-09-18 NOTE — PROGRESS NOTES
[address] [facility] THERAPY  [address] [facility]  DAILY NOTE    TIME  SLP Individual Minutes  Time In: 9026  Time Out: 1005  Minutes: 31  Timed Code Treatment Minutes: 31 Minutes       Date: 2020  Patient Name: Reji Mooney      CSN: [id number]   : 1950  (65 y.o.)  Gender: female   Referring Physician:  Dr. Kirit Angel    Diagnosis: Left femoral neck impacted subcapital fracture  Secondary Diagnosis: Cognition  Precautions: Fall risk  Current Diet: Regular diet, thin liquids  Swallowing Strategies: Standard Universal Swallow Precautions  Date of Last MBS: Not Applicable    Pain:  No pain reported. Subjective:  Pt sitting upright in chair for duration of session, pleasant and attentive. POSELVIN Lanier Means present throughout. Discussion regarding family education was performed with Pt and POA. Topics of family education included:memory assistance, safety precautions, organization of household tasks, importance of cognitively stimulating games/puzzles, and return to driving. POA verbalized agreement to assist Pt with writing important details about events, doctors' appointments, birthdays, etc by writing details into calendar because of Pt's illegible handwriting due to hand tremors. The use of an auditory device (Mohini, Echo Dot) was suggested to make lists and set reminders. POA reported that Pt does not have Internet connection at her home which would not allow an auditory device to be used. Further research into auditory devices that do not require Internet access were planned to be investigated. Discussed use of memory strategies including repeating important information multiple times, making association between details, and picturing the event that is to be remembered. Discussed having a list of emergency contacts  easily accessible for the Pt at home and the smoke detector should be in working function.  Encouraged use of a pillbox to organize medication with recommendation for POA to provide assistance when filling pill box . It is recommended that Pt refrain from driving for at least 4-6 weeks or until doctor clearance. A driving evaluation was suggested when the Pt feels that she can return to driving- inform Pt and POA that Dr. Rosi Pinzon will be consulted to determine the return to driving. Discussed the importance of completing brain/cognitive games/puzzles with Pt encouraged to do these games and puzzles to maintain cognitive sharpness. Pt was also encouraged to continue socializing with friends and family. A list of medications that Pt will be prescribed upon discharge that included medication name, dosage, time of day for intake, reason for medication, special considerations about each medication was provided to Pt and POA. POA and Pt appeared receptive to family education and did not ask questions about presented information. Written family education guide with above details was provided to POA. Short-Term Goals:  SHORT TERM GOAL #1:  Goal 1: Pt will complete orientation, immediate/delayed recall and working memory tasks with 80% accuracy given mod cues to improve retention of new and functional information. INTERVENTIONS:   Functional recall: Provided patient with 3 pieces of information about the therapist to recall in yesterday's session.    -Recall following a 1 day delay- 1/3 indep, 2/3 with min cues    SHORT TERM GOAL #2:  Goal 2: Pt will complete functional sequencing and thought organizational tasks with 70% accuracy given mod cues to improve overall executive functioning skills. INTERVENTIONS: Did not address due to focus on other goals. SHORT TERM GOAL #3:  Goal 3: Patient will complete functional problem solving, reasoning and basic computational tasks with 70% accuracy given mod cues to improve overall management of ADLs.   INTERVENTIONS:  Provide solution to functional household problems: 4/4 min cues  *Pt often provides some aspects to

## 2020-09-18 NOTE — PROGRESS NOTES
[address] [facility]  [address] [facility]  DAILY NOTE    TIME  SLP Individual Minutes  Time In: 0806  Time Out: 1400  Minutes: 25  Timed Code Treatment Minutes: 25 Minutes       Date: 2020  Patient Name: Jeromy Parks      CSN: [id number]   : 1950  (65 y.o.)  Gender: female   Referring Physician:  Dr. Magdiel Escalona    Diagnosis: Left femoral neck impacted subcapital fracture  Secondary Diagnosis: Cognition  Precautions: Fall risk  Current Diet: Regular diet, thin liquids  Swallowing Strategies: Standard Universal Swallow Precautions  Date of Last MBS: Not Applicable    Pain:  No pain reported. Subjective:  Pt sitting upright in chair for duration of session, pleasant and attentive. Pt made frequent comments about cognitive decline and that she was \"stupid. \" Pt was offered encouragement and reassurance that she is progressing well. Pt appeared grateful and receptive to therapists' comments. Short-Term Goals:  SHORT TERM GOAL #1:  Goal 1: Pt will complete orientation, immediate/delayed recall and working memory tasks with 80% accuracy given mod cues to improve retention of new and functional information. INTERVENTIONS:   Functional recall: Provided patient with 4 grocery items to recall. Discussed use of repetition, association making, mental imagery to improve retention of information.   -Immediate recall: 2/3 min cues, 1/3 mod cues  -Recall following a 10 minute delay: 2/2 mod cues  *Patient appears to encode and store information well, but continues to have difficulty with retrieval. Pt benefits from being provided multiple choice options when unable to retrieve missing item. SHORT TERM GOAL #2:  Goal 2: Pt will complete functional sequencing and thought organizational tasks with 70% accuracy given mod cues to improve overall executive functioning skills. INTERVENTIONS: Did not address due to focus on other goals.      SHORT TERM GOAL #3:  Goal 3: Patient will complete functional problem solving, reasoning and basic computational tasks with 70% accuracy given mod cues to improve overall management of ADLs. INTERVENTIONS:  [facility] advertisement navigation: 1/3 indep, 2/3 mod cues    *Cueing assist needed for comprehension of information on the advertisement, reasoning through pertinent information, comparing sets of information and navigating placement of information on advertisement. *Pt expressed frustration when task questions became more complex or included multiple elements. Long-Term Goals:  Timeframe for Long-term Goals: 3 weeks    LONG TERM GOAL #1:  Goal 1: Pt will improve cognitive linguistic skills to a min assist level in order to improve level of independence in the home environment. Comprehension: 4 - Patient understands basic needs 75-90%+ of the time  Expression: 5 - Expresses basic ideas/needs only (hungry/hot/pain)  Social Interaction: 5 - Patient is appropriate with supervision/cues  Problem Solving: 3 - Patient solves simple/routine tasks 50%-74%  Memory: 2 - Patient remembers 25%-49% of the time         EDUCATION:  Learner: Patient  Education:  Reviewed recommendations for follow-up, Education Related to Potential Risks and Complications Due to Impairment/Illness/Injury, Education Related to [employer] and Wellness and Home Safety Education  Evaluation of Education: Verbalizes understanding    ASSESSMENT/PLAN:  Activity Tolerance:  Patient tolerance of treatment: Good     Assessment/Plan: Patient progressing toward established goals. Continues to require skilled care of licensed speech pathologist to progress toward achievement of established goals and plan of care. Plan for Next Session:  Immediate and Delayed Recall with focus on compensatory strategies, Thought organization. KASSIDY Singh, Speech Intern  Garc[facility] Ambrose.  [address] [facility], [facility] [address], [address]

## 2020-09-18 NOTE — PROGRESS NOTES
Patient: Shamar Roy  Unit/Bed: 5C-36/119-W  YOB: 1950  MRN: [id number] Acct: [de-identified]   Admitting Diagnosis: Subcapital fracture of left femur s/p repair  Admit Date:  9/9/2020  Hospital Day: 8    Assessment:     Active Problems:    Limb tremor    Syncope and collapse    Subcapital fracture of femur, left, closed, with routine healing, subsequent encounter    Type 2 diabetes mellitus without complication, with long-term current use of insulin ([facility])    Vitamin D deficiency    Age-related osteoporosis with current pathological fracture with routine healing  Resolved Problems:    * No resolved hospital problems. *      Plan:     Continue following the CS        Subjective:     Patient has no complaint of CP, SOB, GI upset or dysuria. .   Medication side effects: none    Scheduled Meds:   insulin glargine  24 Units Subcutaneous QAM    therapeutic multivitamin-minerals  1 tablet Oral Daily    sennosides-docusate sodium  1 tablet Oral BID    aspirin  81 mg Oral Daily    atorvastatin  80 mg Oral Daily    clopidogrel  75 mg Oral Daily    ferrous sulfate  325 mg Oral BID WC    And    vitamin C  250 mg Oral BID WC    primidone  200 mg Oral Daily    primidone  50 mg Oral Nightly    propranolol  80 mg Oral Daily    acetaminophen  650 mg Oral 3 times per day    Vitamin D  5,000 Units Oral TID WC    And    [Held by provider] calcium elemental  500 mg Oral BID WC    lidocaine  2 patch Transdermal QAM AC    metFORMIN  1,000 mg Oral BID WC     Continuous Infusions:  PRN Meds:zinc oxide, magnesium hydroxide, polyethylene glycol, glucagon (rDNA), glucose, acetaminophen, traMADol **OR** traMADol    Review of Systems  Pertinent items are noted in HPI. Objective:     Patient Vitals for the past 8 hrs:   BP Temp Temp src Pulse Resp SpO2 Weight   09/17/20 2030 (!) 103/53 98 °F (36.7 °C) Oral 78 19 91 % 149 lb 1.6 oz (67.6 kg)     No intake/output data recorded.   I/O this shift:  In: 250 [P.O.:250]  Out: -     BP (!) 103/53   Pulse 78   Temp 98 °F (36.7 °C) (Oral)   Resp 19   Ht 5' 7\" (1.702 m)   Wt 149 lb 1.6 oz (67.6 kg)   SpO2 91%   BMI 23.35 kg/m²     BP (!) 103/53   Pulse 78   Temp 98 °F (36.7 °C) (Oral)   Resp 19   Ht 5' 7\" (1.702 m)   Wt 149 lb 1.6 oz (67.6 kg)   SpO2 91%   BMI 23.35 kg/m²   General appearance: alert, appears stated age and cooperative  Head: Normocephalic, without obvious abnormality, atraumatic  Lungs: clear to auscultation bilaterally  Heart: regular rate and rhythm, S1, S2 normal, no murmur, click, rub or gallop  Abdomen: soft, non-tender; bowel sounds normal; no masses,  no organomegaly  Extremities: extremities normal, atraumatic, no cyanosis or edema  Skin: Skin color, texture, turgor normal. No rashes or lesions  Neurologic: Grossly normal    Data Review:   Results for [facility] (MRN [id number]) as of 9/17/2020 22:06   Ref.  Range 9/15/2020 10:33 9/15/2020 11:20 9/16/2020 08:03 9/16/2020 11:30 9/16/2020 13:02 9/17/2020 07:35   Sodium Latest Ref Range: 135 - 145 meq/L 142   141     Potassium Latest Ref Range: 3.5 - 5.2 meq/L 5.1   4.6     Chloride Latest Ref Range: 98 - 111 meq/L 103   100     CO2 Latest Ref Range: 23 - 33 meq/L 33   31     BUN Latest Ref Range: 7 - 22 mg/dL 25 (H)   25 (H)     Creatinine Latest Ref Range: 0.4 - 1.2 mg/dL 1.3 (H)   1.3 (H)     Anion Gap Latest Ref Range: 8.0 - 16.0 meq/L 6.0 (L)   10.0     Est, Glom Filt Rate Latest Units: ml/min/1.73m2 40 (A)   40 (A)     Glucose Latest Ref Range: 70 - 108 mg/dL 180 (H)   114 (H)     POC Glucose Latest Ref Range: 70 - 108 mg/dl   125 (H)   86   Calcium Latest Ref Range: 8.5 - 10.5 mg/dL 10.3   11.0 (H)     Pth Intact Latest Ref Range: 15.0 - 65.0 pg/mL     17.4    Color, UA Latest Ref Range: STRAW-YELLOW   YELLOW       Glucose, UA Latest Ref Range: NEGATIVE mg/dl  NEGATIVE       Bilirubin, Urine Latest Ref Range: NEGATIVE   NEGATIVE       Ketones, Urine Latest Ref Range: NEGATIVE NEGATIVE       Blood, Urine Latest Ref Range: NEGATIVE   NEGATIVE       pH, UA Latest Ref Range: 5.0 - 9.0   8.5       Protein, UA Latest Ref Range: NEGATIVE   NEGATIVE       Urobilinogen, Urine Latest Ref Range: 0.0 - 1.0 eu/dl  0.2       Nitrite, Urine Latest Ref Range: NEGATIVE   NEGATIVE       Leukocyte Esterase, Urine Latest Ref Range: NEGATIVE   NEGATIVE       Character, Urine Latest Ref Range: CLEAR-SL CLOUD   CLEAR       URINE RT REFLEX TO CULTURE Unknown  Rpt       Specific Gravity, Urine Latest Ref Range: 1.002 - 1.030   1.018           Electronically signed by Bethany Boyer MD on 9/17/2020 at 10:05 PM

## 2020-09-18 NOTE — PROGRESS NOTES
[facility]  INPATIENT PHYSICAL THERAPY  DAILY NOTE  [address] [facility] - [id number]    Time In: 1030  Time Out: 1145  Timed Code Treatment Minutes: 75 Minutes  Minutes: 75          Date: 2020  Patient Name: Lyubov Mcarthur,  Gender:  female        MRN: [id number]  : 1950  (65 y.o.)     Referring Practitioner: DAVID Henriquez MD  Diagnosis: subcapital fracture of left femur s/p repair  Additional Pertinent Hx: Per H&P, pt is a 65 y.o. female with a past medical history of diabetes and prior MI who presents to the emergency department for evaluation of syncope. Patient states that she was at the grocery store this morning shortly prior to arrival, and upon leaving the grocery store she felt acutely short of breath, and lost consciousness. She reports that she fell onto her left side, and landed on her elbow and the back of her head. She endorses losing consciousness after she fell, and bystanders report that she was dazed. She regained consciousness shortly later, however she reports that she has not been ambulatory since her fall. In the emergency department she is complaining of head pain, shoulder pain, elbow pain, and left hip pain. She is additionally complaining of swelling to her left posterior scalp. Pt is now s/p HIP PINNING on 2020 by Dr Ara Espana. To IPR on      Prior Level of Function:  Lives With: Alone  Type of Home: Apartment  Home Layout: One level  Home Access: Level entry  Home Equipment: Quad cane   Bathroom Shower/Tub: Tub/Shower unit, Walk-in shower(Pt prefers the walk in shower.)  Bathroom Toilet: Standard(uses a countertop on Right side.)  Bathroom Accessibility: Accessible    Receives Help From: Friend(s)  ADL Assistance: Independent  Homemaking Assistance: Independent  Homemaking Responsibilities: Yes  Ambulation Assistance: Independent  Transfer Assistance: Independent  Active :  Yes  Additional Comments: Pt did not use any AD for ambulating. She did her own homemaking and cooking. reports walking daily PTA. Has a  1 x month    Restrictions/Precautions:  Restrictions/Precautions: Weight Bearing, General Precautions, Fall Risk  Left Lower Extremity Weight Bearing: Weight Bearing As Tolerated    SUBJECTIVE: Patient in w/c upon arrival, agreed and cooperative during session. Friend present for family education. Nurse stated patient's pressure ulcer is getting worse on L bottom, wants patient in bed more often. Several rest breaks required throughout session due to fatigue. Patient had slight incontinence and voided at end of session. Patient stated L hip was hurting bad throughout session, but slightly decreased after session. Patient placed in bed with L hip propped on pillows after session was over. Friend stated she wouldn't be at patient's home to help all the time. Asked about patient being placed in SNF, PTA stated that it's a cognitive issue and that it won't change after being discharged from SNF. Friend worried about patient's safety being alone due to decreased cognition. PAIN: 7/10: L hip prior to session; 6/10 L hip after session    OBJECTIVE:  Bed Mobility:  Rolling to Right: Minimal Assistance, X 1, with verbal cues to bend and move legs   Supine to Sit: Minimal Assistance, X 1, with verbal cues for hand placement  Sit to Supine: Minimal Assistance, X 2 to move torso and legs with verbal cues for hand placement. - Completed transfers twice due to patient needing to use restroom. Min Assist required to guide legs and move torso. Mod vc's required for leg and hand placement throughout to ensure patient safety during completion. Transfers:  Sit to Stand: Contact Guard Assistance  Stand to [place] [id number]  - Transfers x1 using sofa to simulate home environment. - Max vc's for hand placement required throughout to increase patient safety.   Car:Contact Guard Assistance  - Patient educated on not using car door for hand placement while completing car transfer to increase patient safety. Patient showed understanding after given explanation. Friend instructed on how to do car transfers with patient, showed understanding.  - Required mod vc's to square up to seat with all transfers. Ambulation:  Contact Guard Assistance, with verbal cues   Distance: 80 ft x1; 35 ft x1; 50 ft x1; small distance in apartment room  Surface: Level Tile, Carpet and Ramp  Device:Rolling Walker  Gait Deviations:  Slow Susanna and Decreased Step Length Bilaterally, would not stay in RW TIFFANIE while turning. Patient did step-to gait pattern occasionally. Patient would either walk too close to RW, or walk outside RW TIFFANIE, took hands off RW several times, Patient instructed to keep head up while ambulating with RW, which improved patient's gait pattern, and improved RW advancement.   - Required mod vc's to turn with walker, keep hands on RW, and to keep head up. - Ramp:  x1; CGA to simulate community environment, patient was steady during activity. - Friend showed understanding of proper guarding technique. Stairs:  Stairs:  6\" steps. X 4 using Bilateral Handrails and Contact Guard Assistance. Patient required mod vc's for foot sequence and hand placement to ensure patient safety. Patient instructed to do non-reciprocal pattern while ascending/descending. Platform:  6\" platform X 1 using Rolling Walker and Contact Guard Assistance, with verbal cues. Patient required mod vc's to advance RW onto platform and for proper sequencing. Balance:  Dynamic Standing Balance: Contact Guard Assistance, with verbal cues    - Required mod vc's for hand placement and to turn with RW while completing rajan-care and bathroom tasks. Functional Outcome Measures: Not completed     ASSESSMENT:  Assessment: Patient progressing toward established goals. Patient is progressing well towards goals at this time, tolerated session well.  Several rest breaks required throughout session due to fatigue. Patient got distracted throughout session. Shows limitations due to generalized BLE weakness and decreased cognition, which affects functional mobility. Additional skilled PT would benefit patient to increase BLE strength/stability, which would improve patient's gait pattern. Further step/platform training would also benefit patient to improve foot sequence remembrance and improve patient safety while out in the community. This would increase patient's safe functional mobility while completing future tasks. Friend showed understanding throughout session and is worried about patient's safety after discharge, asked if SNF would be helpful with constant patient supervision. PTA stated that after discharge from SNF, patient would still be at baseline cognitively and wouldn't see much benefit from going to SNF. Will continue to monitor. Activity Tolerance:  Patient tolerance of  treatment: good. Several rest breaks due to fatigue.      Equipment Recommendations:Equipment Needed: Yes(RW)  Discharge Recommendations:  Continue to assess pending progress    Plan: Times per week: 5x/wk 90 min, 1x/wk 30 min  Current Treatment Recommendations: Strengthening, ROM, Balance Training, Functional Mobility Training, Gait Training, Transfer Training, Patient/Caregiver Education & Training, Safety Education & Training, Home Exercise Program, Endurance Training, Stair training, Equipment Evaluation, Education, & procurement, Wheelchair Mobility Training, ADL/Self-care Training    Patient Education  Patient Education: Plan of Care, Precautions/Restrictions, Family Education, [employer], Transfers, Gait, Stairs, Car Transfers, Verbal Exercise Instruction, Education Related to Prevention of Recurrence of Impairment/Illness/Injury, Health Promotion and Wellness Education, Home Safety Education,  - Patient Verbalized Understanding, - Patient Requires Continued Education    Goals:  Patient goals : go home  Short term goals  Time Frame for Short term goals: 1 week  Short term goal 1: pt to complete supine to/from sit at SBA to get in and out of bed  MET, see LTG  Short term goal 2: pt to complete sit to/from stand at SBA to rise from bed or chair  MET, see LTG  Short term goal 3: pt to ambulate >75ft with RW at SBA for mobility in the home  Short term goal 4: pt to complete car transfer at [facility] for safe transport to home  MET, see LTG  Short term goal 5: pt score on Tinetti balance test will improve to >=20 for improved balance and decreased fall risk at home  Long term goals  Time Frame for Long term goals : 2 weeks  Long term goal 1: pt to complete supine to/from sit at Mod I to get in and out of bed  Long term goal 2: pt to complete sit to/from stand at Mod I to rise from bed or chair  Long term goal 3: pt to ambulate >50ft with RW at Mod I, >125ft with RW at S for mobility in the home and community  Long term goal 4: pt to complete car transfer at S for safe transport to home  Long term goal 5: pt to negotiate 6\" platform step with RW at S for curb negotiation in the community  Long term goal 6: pt score on Tinetti balance test will improve to >=24 for improved balance and decreased fall risk at home    Following session, patient left in safe position with all fall risk precautions in place.     Treatment session and note completed by ROJELIO Bedolla, under supervision of signing therapist.

## 2020-09-18 NOTE — PROGRESS NOTES
[facility]  INPATIENT PHYSICAL THERAPY  DAILY NOTE  [address] [facility] - [id number]    Time In: 1400  Time Out: 1430  Timed Code Treatment Minutes: 30 Minutes  Minutes: 30          Date: 2020  Patient Name: Christ Johnson,  Gender:  female        MRN: [id number]  : 1950  (65 y.o.)     Referring Practitioner: DAVID Quintanilla MD  Diagnosis: subcapital fracture of left femur s/p repair  Additional Pertinent Hx: Per H&P, pt is a 65 y.o. female with a past medical history of diabetes and prior MI who presents to the emergency department for evaluation of syncope. Patient states that she was at the grocery store this morning shortly prior to arrival, and upon leaving the grocery store she felt acutely short of breath, and lost consciousness. She reports that she fell onto her left side, and landed on her elbow and the back of her head. She endorses losing consciousness after she fell, and bystanders report that she was dazed. She regained consciousness shortly later, however she reports that she has not been ambulatory since her fall. In the emergency department she is complaining of head pain, shoulder pain, elbow pain, and left hip pain. She is additionally complaining of swelling to her left posterior scalp. Pt is now s/p HIP PINNING on 2020 by Dr Will Rincon. To IPR on      Prior Level of Function:  Lives With: Alone  Type of Home: Apartment  Home Layout: One level  Home Access: Level entry  Home Equipment: Quad cane   Bathroom Shower/Tub: Tub/Shower unit, Walk-in shower(Pt prefers the walk in shower.)  Bathroom Toilet: Standard(uses a countertop on Right side.)  Bathroom Accessibility: Accessible    Receives Help From: Friend(s)  ADL Assistance: Independent  Homemaking Assistance: Independent  Homemaking Responsibilities: Yes  Ambulation Assistance: Independent  Transfer Assistance: Independent  Active :  Yes  Additional Comments: Pt did not use any AD for ambulating. She did her own homemaking and cooking. reports walking daily PTA. Has a  1 x month    Restrictions/Precautions:  Restrictions/Precautions: Weight Bearing, General Precautions, Fall Risk  Left Lower Extremity Weight Bearing: Weight Bearing As Tolerated    SUBJECTIVE: Pt up in bedside chair at arrival, pleasant and agreeable to session. PAIN: 4-5/10:     OBJECTIVE:  Bed Mobility:  Rolling to Left: Minimal Assistance, X 1   Sit to Supine: Minimal Assistance, X 1, with verbal cues , with increased time for completion   Scooting: Dependent, use of hercules mattress    Transfers:  Sit to Stand: Contact Guard Assistance  Stand to [employer] Assistance    Ambulation:  Contact Guard Assistance, with cues for safety, with verbal cues   Distance: [id number], with mult standing restbreaks  Surface: Level Tile  Device:Rolling Walker  Gait Deviations:  Slow Ussanna, Decreased Step Length Bilaterally and pt need cues to stay within walker abraham with turns, also to stay within center of walk pt has tend. To drift to R of walker    Balance:  Static Sitting Balance:  Stand By Assistance  Dynamic Sitting Balance: Stand By Assistance  Static Standing Balance: Contact Guard Assistance    Exercise:  Patient was guided in seated set(s) 10 reps of exercise to both lower extremities. Glut sets, Quad sets, Heelslides, Hip abduction/adduction, Straight leg raises, Seated marches, Seated heel/toe raises and Long arc quads. Exercises were completed for increased independence with functional mobility. Functional Outcome Measures: Completed       ASSESSMENT:  Assessment: Patient progressing toward established goals. Activity Tolerance:  Patient tolerance of  treatment: good.       Equipment Recommendations:Equipment Needed: Yes(RW)  Discharge Recommendations:  Continue to assess pending progress    Plan: Times per week: 5x/wk 90 min, 1x/wk 30 min  Current Treatment Recommendations: Strengthening, ROM, Balance Training, Functional Mobility Training, Gait Training, Transfer Training, Patient/Caregiver Education & Training, Safety Education & Training, Home Exercise Program, Endurance Training, Stair training, Equipment Evaluation, Education, & procurement, Wheelchair Mobility Training, ADL/Self-care Training    Patient Education  Patient Education: Plan of Care    Goals:  Patient goals : go home  Short term goals  Time Frame for Short term goals: 1 week  Short term goal 1: pt to complete supine to/from sit at SBA to get in and out of bed  MET, see LTG  Short term goal 2: pt to complete sit to/from stand at SBA to rise from bed or chair  MET, see LTG  Short term goal 3: pt to ambulate >75ft with RW at [place] [id number] for mobility in the home  Short term goal 4: pt to complete car transfer at [facility] for safe transport to home  MET, see LTG  Short term goal 5: pt score on Tinetti balance test will improve to >=20 for improved balance and decreased fall risk at home  Long term goals  Time Frame for Long term goals : 2 weeks  Long term goal 1: pt to complete supine to/from sit at Mod I to get in and out of bed  Long term goal 2: pt to complete sit to/from stand at Mod I to rise from bed or chair  Long term goal 3: pt to ambulate >50ft with RW at Mod I, >125ft with RW at S for mobility in the home and community  Long term goal 4: pt to complete car transfer at S for safe transport to home  Long term goal 5: pt to negotiate 6\" platform step with RW at S for curb negotiation in the community  Long term goal 6: pt score on Tinetti balance test will improve to >=24 for improved balance and decreased fall risk at home    Following session, patient left in safe position with all fall risk precautions in place.

## 2020-09-18 NOTE — PROGRESS NOTES
cue for recall to wash bottom. Upper Extremity Dressing: Moderate Assistance. ModA donning bra for fasteners. Lower Extremity Dressing: Stand By Assistance. SBA to doff/don socks onto B feet and thread BLE into undergarment/pants with increased time and min cues for problem solving. Assist for [employer] Transfer: Stand By Assistance. Walk-in to/from TTB. min cues for improved safety with walker placement and use of grab bars with good recall exiting shower. BALANCE:  Sitting Balance:  Modified Independent. Standing Balance: Stand By Assistance. x1-2 minutes within ADL task. BED MOBILITY:  Not Tested    TRANSFERS:  Sit to Stand:  Stand By Assistance. Stand to Sit: Stand By Assistance. Comment: Mod v/c to refer to visual aide to increase recall of proper hand placement- pt demos poor follow through without cueing. FUNCTIONAL MOBILITY:  Assistive Device: Rolling Walker  Assist Level:  Stand By Assistance. Distance: Completed functional mobility to/from shower room, BR, and within pt room for clothing retrieval tasks at slow pace, no LOB noted. Pt requires min cues for walker safety to maneuver within tight spaces- seated rest break after trial of mobility, min fatigue noted. ADDITIONAL ACTIVITIES:  Pt requesting to do laundry this session to progress towards goal of increasing independence with homemaking tasks. Pt participated in IADL task to ambulate within therapy apartment with use of RW to reach into various planes at high/low levels to retrieve items for laundry tasks. Pt required SBA for balance and min cues for problem solving washer controls. Completed to increase kitchen safety and facilitate functional reaching required for simple meal prep tasks. Completed BUE exercises x10 reps x1 sets using min resistance band in all joints/planes to increase strength and endurance required for ADLs.  Pt required rest break between each exercise and mod v/c for proper technique. ASSESSMENT:     Activity Tolerance:  Patient tolerance of  treatment: good. Discharge Recommendations: 24 hour supervision or assist, Home with Home health OT, Home with nursing aide   Equipment Recommendations: Equipment Needed: Yes  Other: Pt may benefit from a BSC, shower chair, grab bars in the shower . Need to assess for LHAE. BSC ordered on 9-15. Plan: Times per week: 5xs week x 90 min, 1 x week x 30 min  Current Treatment Recommendations: Endurance Training, Functional Mobility Training, Self-Care / ADL, Equipment Evaluation, Education, & procurement, Patient/Caregiver Education & Training, Home Management Training, Safety Education & Training    Patient Education  Patient Education: ADL's, IADL's, Home Exercise Program, Home Safety, Assistive Device Safety and Safety with transfers and mobility. Goals  Short term goals  Time Frame for Short term goals: 1 week  Short term goal 1: PT will complete ADL routine with 0-1 cues for organization, problem solving adapted tech and Setup with LHAE PRN to increase independence in self care tasks  Short term goal 2: Pt will complete simple grooming tasks with  S while using 1-2 hand release to increase her independence with toileting routine.   Short term goal 3: PT will complete functional mobility to/ from the BR as well as around obstacles with S  and 0-1 cues for safe tech to increase independence in toileting tasks at home  Short term goal 4: Pt will complete self feeding tasks with AE without cues to increase ability to eat soft foods  Short term goal 5: Pt will complete 1 step homemaking tasks iwth no > min cues for safe tech to increase independence in retrieving a snack  Long term goals  Time Frame for Long term goals : 2-3 weeks  Long term goal 1: Pt will complete ADL routine with no cues for safe and adapted tech to increase independence in self care tasks  Long term goal 2: Pt will complete 2 step homemaking tasks with no cues for safe tech and S for problem solving to increase independence in light homemaking tasks    Following session, patient left in safe position with all fall risk precautions in place.

## 2020-09-18 NOTE — PROGRESS NOTES
[facility]  Recreational Therapy  Daily Note  [address]    Time Spent with Patient: 10 minutes    Date:  9/18/2020       Patient Name: Pratik Gan      MRN: [id number]      YOB: 1950 (65 y.o.)       Gender: female  Diagnosis: subcapital fracture of the left femur , s/p repair  Referring Practitioner: Dr. Echeverria Settler    RESTRICTIONS/PRECAUTIONS:  Restrictions/Precautions: Weight Bearing, General Precautions, Fall Risk  Vision: Impaired  Hearing: Within functional limits    PAIN: 0-no c/o pain     SUBJECTIVE:  Oh great     OBJECTIVE:  Pt's friend and POA in this am and brought in money for pt to get a [place] for lunch today-pleasant and appreciative          Patient Education  New Education Provided: Importance of Leisure,     Electronically signed by: NISHA Serrano  Date: 9/18/2020

## 2020-09-18 NOTE — PROGRESS NOTES
Pt. Awake and up in recliner at 0700. Applied pink salve to coccyx which is now open especially on the right side. Encouraged to not sit up all day and to lay on side. Waffle cushion in place. Left hip incision efren and approx.

## 2020-09-18 NOTE — PLAN OF CARE
Care plan reviewed with patient and  verbalize understanding of the plan of care and contribute to goal setting.       Problem: Injury - Risk of, Physical Injury:  Goal: Will remain free from falls  Description: Will remain free from falls  Outcome: Met This Shift  Note: Needs cues to use call light     Problem: Confusion - Acute:  Goal: Mental status will be restored to baseline  Description: Mental status will be restored to baseline  Outcome: Met This Shift  Note: Alert and oriented but working with speech therapy     Problem: Confusion - Acute:  Goal: Absence of continued neurological deterioration signs and symptoms  Description: Absence of continued neurological deterioration signs and symptoms  Outcome: Met This Shift  Note: Alert and oriented     Problem: Falls - Risk of:  Goal: Absence of physical injury  Description: Absence of physical injury  Outcome: Met This Shift  Note: No new     Problem: Falls - Risk of:  Goal: Will remain free from falls  Description: Will remain free from falls  Outcome: Met This Shift  Note: Needs cues to use call light     Problem: Skin Integrity:  Goal: Absence of new skin breakdown  Description: Absence of new skin breakdown  Outcome: Not Met This Shift  Note: Coccyx especially to right side is open and pink     Problem: IP BLADDER/VOIDING  Goal: LTG - patient will complete bladder elimination  Outcome: Met This Shift  Note: Ambulates to bathroom with supervision     Problem: IP BLADDER/VOIDING  Goal: LTG - Patient will utilize adaptive techniques/equipment to complete bladder elimination  Outcome: Completed

## 2020-09-19 LAB
GLUCOSE BLD-MCNC: 59 MG/DL (ref 70–108)
GLUCOSE BLD-MCNC: 75 MG/DL (ref 70–108)

## 2020-09-19 PROCEDURE — 97110 THERAPEUTIC EXERCISES: CPT

## 2020-09-19 PROCEDURE — 97530 THERAPEUTIC ACTIVITIES: CPT

## 2020-09-19 PROCEDURE — 97129 THER IVNTJ 1ST 15 MIN: CPT

## 2020-09-19 PROCEDURE — 6370000000 HC RX 637 (ALT 250 FOR IP): Performed by: INTERNAL MEDICINE

## 2020-09-19 PROCEDURE — 97116 GAIT TRAINING THERAPY: CPT

## 2020-09-19 PROCEDURE — 6370000000 HC RX 637 (ALT 250 FOR IP): Performed by: FAMILY MEDICINE

## 2020-09-19 PROCEDURE — 1180000000 HC REHAB R&B

## 2020-09-19 PROCEDURE — 6370000000 HC RX 637 (ALT 250 FOR IP): Performed by: PHYSICAL MEDICINE & REHABILITATION

## 2020-09-19 PROCEDURE — 82948 REAGENT STRIP/BLOOD GLUCOSE: CPT

## 2020-09-19 PROCEDURE — 97130 THER IVNTJ EA ADDL 15 MIN: CPT

## 2020-09-19 RX ADMIN — Medication 5000 UNITS: at 10:07

## 2020-09-19 RX ADMIN — CLOPIDOGREL BISULFATE 75 MG: 75 TABLET ORAL at 10:06

## 2020-09-19 RX ADMIN — Medication 250 MG: at 17:45

## 2020-09-19 RX ADMIN — PROPRANOLOL HYDROCHLORIDE 80 MG: 80 CAPSULE, EXTENDED RELEASE ORAL at 10:07

## 2020-09-19 RX ADMIN — Medication 250 MG: at 02:50

## 2020-09-19 RX ADMIN — MULTIPLE VITAMINS W/ MINERALS TAB 1 TABLET: TAB at 10:08

## 2020-09-19 RX ADMIN — ASPIRIN 81 MG: 81 TABLET ORAL at 10:06

## 2020-09-19 RX ADMIN — PRIMIDONE 200 MG: 50 TABLET ORAL at 10:07

## 2020-09-19 RX ADMIN — ACETAMINOPHEN 650 MG: 325 TABLET ORAL at 17:43

## 2020-09-19 RX ADMIN — MAGNESIUM HYDROXIDE 30 ML: 2400 SUSPENSION ORAL at 17:50

## 2020-09-19 RX ADMIN — Medication 500 MG: at 10:07

## 2020-09-19 RX ADMIN — Medication: at 20:30

## 2020-09-19 RX ADMIN — Medication 500 MG: at 17:46

## 2020-09-19 RX ADMIN — DOCUSATE SODIUM 50 MG AND SENNOSIDES 8.6 MG 2 TABLET: 8.6; 5 TABLET, FILM COATED ORAL at 10:06

## 2020-09-19 RX ADMIN — FERROUS SULFATE TAB 325 MG (65 MG ELEMENTAL FE) 325 MG: 325 (65 FE) TAB at 17:44

## 2020-09-19 RX ADMIN — ATORVASTATIN CALCIUM 80 MG: 80 TABLET, FILM COATED ORAL at 20:20

## 2020-09-19 RX ADMIN — FERROUS SULFATE TAB 325 MG (65 MG ELEMENTAL FE) 325 MG: 325 (65 FE) TAB at 10:08

## 2020-09-19 RX ADMIN — METFORMIN HYDROCHLORIDE 1000 MG: 500 TABLET ORAL at 17:45

## 2020-09-19 RX ADMIN — Medication 5000 UNITS: at 17:44

## 2020-09-19 RX ADMIN — METFORMIN HYDROCHLORIDE 1000 MG: 500 TABLET ORAL at 10:07

## 2020-09-19 RX ADMIN — INSULIN GLARGINE 24 UNITS: 100 INJECTION, SOLUTION SUBCUTANEOUS at 10:12

## 2020-09-19 RX ADMIN — PRIMIDONE 50 MG: 50 TABLET ORAL at 20:20

## 2020-09-19 RX ADMIN — ACETAMINOPHEN 650 MG: 325 TABLET ORAL at 05:47

## 2020-09-19 RX ADMIN — DOCUSATE SODIUM 50 MG AND SENNOSIDES 8.6 MG 2 TABLET: 8.6; 5 TABLET, FILM COATED ORAL at 20:20

## 2020-09-19 ASSESSMENT — PAIN SCALES - GENERAL
PAINLEVEL_OUTOF10: 0
PAINLEVEL_OUTOF10: 0
PAINLEVEL_OUTOF10: 5
PAINLEVEL_OUTOF10: 5

## 2020-09-19 NOTE — PROGRESS NOTES
[address] [facility] THERAPY  [facility]  DAILY NOTE    TIME  SLP Individual Minutes  Time In: 4221  Time Out: 1230  Minutes: 25  Timed Code Treatment Minutes: 25 Minutes       Date: 2020  Patient Name: Kiki North      CSN: [id number]   : 1950  (65 y.o.)  Gender: female   Referring Physician:  Dr. Ana Johnson    Diagnosis: Left femoral neck impacted subcapital fracture  Secondary Diagnosis: Cognition  Precautions: Fall risk  Current Diet: Regular diet, thin liquids  Swallowing Strategies: Standard Universal Swallow Precautions  Date of Last MBS: Not Applicable    Pain:  No pain reported. Subjective:  Pt seen at bedside following PT session; patient pleasant and highly motivated to participate within cognitive tasks. Patient with phone call midway through session; politely requested friend to return call later and return to [address] [place] [address] tasks. No family present     Short-Term Goals:  SHORT TERM GOAL #1:  Goal 1: Pt will complete orientation, immediate/delayed recall and working memory tasks with 80% accuracy given mod cues to improve retention of new and functional information. INTERVENTIONS:   Orientation: *Patient with overt difficulty locating calendar within room; ST then provided patient with calendar in hand prior to completing all orientation questions. Following location of calendar + direct use patient indep identified correct month, date, DUSTY and year. Provided additional time + care board patient indep identified correct location and city as well as anticipated discharge date    Delayed recall:   Task 1: Patient cued to generate x4 locations to \"run errands to\"; cued to recall throughout therapy session. ST assisted with written list of information, patient cued to \"recall as much as patient could without list and refer to list as needed. \"  Immediate recall: 3/4 indep, 1 independent reference to list  20 minute delay:  3/4 indep, 1/4 mod-max cues to locate list; with use of list, mod cues provided to identify final location    Task 2: Patient presented with x6 visual images; cued to utilized compensatory strategies (association, visualization, chunking/grouping) to immediately recall images. Immediate recall:  2/6 indep, 3/6 max cues, 1/6 unable to recall despite max cues    SHORT TERM GOAL #2:  Goal 2: Pt will complete functional sequencing and thought organizational tasks with 70% accuracy given mod cues to improve overall executive functioning skills. INTERVENTIONS: DNT secondary to focus on other goals     SHORT TERM GOAL #3:  Goal 3: Patient will complete functional problem solving, reasoning and basic computational tasks with 70% accuracy given mod cues to improve overall management of ADLs. INTERVENTIONS:  Reasoning via concrete category exclusion provided FO4 choices: 14/15 indp, 1/15 mod-max cues     Long-Term Goals:  Timeframe for Long-term Goals: 3 weeks    LONG TERM GOAL #1:  Goal 1: Pt will improve cognitive linguistic skills to a min assist level in order to improve level of independence in the home environment. Comprehension: 4 - Patient understands basic needs 75-90%+ of the time  Expression: 5 - Expresses basic ideas/needs only (hungry/hot/pain)  Social Interaction: 5 - Patient is appropriate with supervision/cues  Problem Solving: 3 - Patient solves simple/routine tasks 50%-74%  Memory: 2 - Patient remembers 25%-49% of the time         EDUCATION:  Learner: Patient  Education:  Reviewed recommendations for follow-up, Education Related to Potential Risks and Complications Due to Impairment/Illness/Injury, Education Related to [employer] and Wellness and Home Safety Education  Evaluation of Education: Verbalizes understanding    ASSESSMENT/PLAN:  Activity Tolerance:  Patient tolerance of treatment: Good     Assessment/Plan: Patient progressing toward established goals.   Continues to require skilled care of licensed speech pathologist to progress toward achievement of established goals and plan of care.      Plan for Next Session:  Working memory, problem solving/reasoning, recall     Dat Meade M.S. Fredo 23

## 2020-09-19 NOTE — PROGRESS NOTES
Patient: Immanuel Batista  Unit/Bed: 5D-04/184-K  YOB: 1950  MRN: [id number] Acct: [de-identified]   Admitting Diagnosis: Subcapital fracture of left femur s/p repair  Admit Date:  9/9/2020  Hospital Day: 10    Assessment:     Active Problems:    Limb tremor    Syncope and collapse    Subcapital fracture of femur, left, closed, with routine healing, subsequent encounter    Type 2 diabetes mellitus without complication, with long-term current use of insulin ([facility])    Vitamin D deficiency    Age-related osteoporosis with current pathological fracture with routine healing  Resolved Problems:    * No resolved hospital problems. *      Plan:     Follow the CS for now        Subjective:     Patient has no complaint of CP, SOB, GI upset or voiding troubles. .   Medication side effects: none    Scheduled Meds:   sennosides-docusate sodium  2 tablet Oral BID    insulin glargine  24 Units Subcutaneous QAM    therapeutic multivitamin-minerals  1 tablet Oral Daily    aspirin  81 mg Oral Daily    atorvastatin  80 mg Oral Daily    clopidogrel  75 mg Oral Daily    ferrous sulfate  325 mg Oral BID WC    And    vitamin C  250 mg Oral BID WC    primidone  200 mg Oral Daily    primidone  50 mg Oral Nightly    propranolol  80 mg Oral Daily    acetaminophen  650 mg Oral 3 times per day    Vitamin D  5,000 Units Oral TID WC    And    calcium elemental  500 mg Oral BID WC    lidocaine  2 patch Transdermal QAM AC    metFORMIN  1,000 mg Oral BID WC     Continuous Infusions:  PRN Meds:zinc oxide, magnesium hydroxide, polyethylene glycol, glucagon (rDNA), glucose, acetaminophen, traMADol **OR** traMADol    Review of Systems  Pertinent items are noted in HPI. Objective:     No data found. I/O last 3 completed shifts: In: 180 [P.O.:180]  Out: -   No intake/output data recorded.     /70   Pulse 84   Temp 98.2 °F (36.8 °C) (Oral)   Resp 18   Ht 5' 7\" (1.702 m)   Wt 143 lb 1.6 oz (64.9 kg)   SpO2 92%   BMI 22.41 kg/m²     /70   Pulse 84   Temp 98.2 °F (36.8 °C) (Oral)   Resp 18   Ht 5' 7\" (1.702 m)   Wt 143 lb 1.6 oz (64.9 kg)   SpO2 92%   BMI 22.41 kg/m²   General appearance: alert, appears stated age and cooperative  Head: Normocephalic, without obvious abnormality, atraumatic  Lungs: clear to auscultation bilaterally  Heart: regular rate and rhythm, S1, S2 normal, no murmur, click, rub or gallop  Abdomen: soft, non-tender; bowel sounds normal; no masses,  no organomegaly  Extremities: extremities normal, atraumatic, no cyanosis or edema  Skin: Skin color, texture, turgor normal. No rashes or lesions  Neurologic: weak    Data Review:   Results for Lakisha Woods (MRN [id number]) as of 9/19/2020 19:46   Ref.  Range 9/17/2020 07:35 9/18/2020 05:46 9/18/2020 08:34 9/19/2020 07:35 9/19/2020 08:24   Sodium Latest Ref Range: 135 - 145 meq/L  139      Potassium Latest Ref Range: 3.5 - 5.2 meq/L  4.9      Chloride Latest Ref Range: 98 - 111 meq/L  103      CO2 Latest Ref Range: 23 - 33 meq/L  26      BUN Latest Ref Range: 7 - 22 mg/dL  25 (H)      Creatinine Latest Ref Range: 0.4 - 1.2 mg/dL  1.3 (H)      Anion Gap Latest Ref Range: 8.0 - 16.0 meq/L  10.0      Est, Glom Filt Rate Latest Units: ml/min/1.73m2  40 (A)      Glucose Latest Ref Range: 70 - 108 mg/dL  86      POC Glucose Latest Ref Range: 70 - 108 mg/dl 86  100 59 (L) 75   Calcium Latest Ref Range: 8.5 - 10.5 mg/dL  10.0          Electronically signed by Denis Santamaria MD on 9/19/2020 at 7:42 PM

## 2020-09-19 NOTE — PROGRESS NOTES
[address]  Occupational Therapy  Daily Note  Time:   Time In: 930  Time Out: 1000  Timed Code Treatment Minutes: 30 Minutes  Minutes: 30          Date: 2020  Patient Name: Ramakrishna Patel,   Gender: female      Room: -54/054-A  MRN: [id number]  : 1950  (65 y.o.)  Referring Practitioner: Dr. Aurelia Adams  Diagnosis: subcapital fracture of the left femur , s/p repair  Additional Pertinent Hx: She was admitted 2020 after being brought into the emergency department by [address] [facility] for injuries sustained after a syncopal event. The patient had a fall that occurred while she was walking in the grocery store when she blacked out and when she regained consciousness she found herself lying on the floor with people standing around her. She had left-sided hip pain and also abrasions on her left arm along with a left-sided superficial hematoma on her parietal scalp. Her initial x-rays showed an acute slightly impacted subcapital fracture of the left femoral neck. She underwent surgical correction with a closed reduction and percutaneous screw fixation of the left hip impacted subcapital fracture of the left femoral neck by Dr. Devora Langston on 2020. Restrictions/Precautions:  Restrictions/Precautions: Weight Bearing, General Precautions, Fall Risk  Left Lower Extremity Weight Bearing: Weight Bearing As Tolerated     SUBJECTIVE: Patient seated in recliner    PAIN: No complaints      ADL:   Grooming: Contact Guard Assistance, X 1 and with set-up. Patient completing standing at sink with oral care and hair care. BALANCE:  Sitting Balance:  Stand By Assistance. Standing Balance: Stand By Assistance. TRANSFERS:  Sit to Stand:  Contact Guard Assistance. Stand to Sit: Contact Guard Assistance. To and from recliner and wheel chair    Patient X 1 event unlocking breaks in attempt to prepare for standing.  When asked to ambulate to the sink in the apartment. The patient took time to feel around and find the breaks on the wheel chair which were already locked and unlocked the breaks. The patient then began to push the wheel chair backwards. The therapist asked the patient to lock the breaks and ambulate to the sink. The patient did not lock the breaks but was preparing to transfer. The therapist asked the patient to wait and the therapist locked the wheel chair breaks for safety. Education given for locking and unlock wheel chair breaks and the break knob position for safety. FUNCTIONAL MOBILITY:  Assistive Device: Rolling Walker  Assist Level:  Contact Guard Assistance. Distance: To and from therapy apartment with slow pace and No LOB. The patient required verbal cues for  pathfinding back to room. The patient was ambulating passed her room. .        ASSESSMENT:     Activity Tolerance:  Patient tolerance of  treatment: good. Discharge Recommendations: 24 hour supervision or assist, , Home with nursing aide   Equipment Recommendations: Equipment Needed: Yes  Other: Pt may benefit from a BSC, shower chair, grab bars in the shower . Need to assess for LHAE. BSC ordered on 9-15. Plan: Times per week: 5xs week x 90 min, 1 x week x 30 min  Current Treatment Recommendations: Endurance Training, Functional Mobility Training, Self-Care / ADL, Equipment Evaluation, Education, & procurement, Patient/Caregiver Education & Training, Home Management Training, Safety Education & Training    Patient Education  Patient Education: Plan of Care safety with breaks on wheel chair.     Goals  Short term goals  Time Frame for Short term goals: 1 week  Short term goal 1: PT will complete ADL routine with 0-1 cues for organization, problem solving adapted tech and Setup with LHAE PRN to increase independence in self care tasks  Short term goal 2: Pt will complete simple grooming tasks with  S while using 1-2 hand release to increase her independence with toileting routine. Short term goal 3: PT will complete functional mobility to/ from the BR as well as around obstacles with S  and 0-1 cues for safe tech to increase independence in toileting tasks at home  Short term goal 4: Pt will complete self feeding tasks with AE without cues to increase ability to eat soft foods  Short term goal 5: Pt will complete 1 step homemaking tasks iwth no > min cues for safe tech to increase independence in retrieving a snack  Long term goals  Time Frame for Long term goals : 2-3 weeks  Long term goal 1: Pt will complete ADL routine with no cues for safe and adapted tech to increase independence in self care tasks  Long term goal 2: Pt will complete 2 step homemaking tasks with no cues for safe tech and S for problem solving to increase independence in light homemaking tasks    Following session, patient left in safe position with all fall risk precautions in place.

## 2020-09-19 NOTE — PLAN OF CARE
I have reviewed the patient's plan of care and based on this review, the patient treatment plan has been updated. Problem: Falls - Risk of:  Goal: Will remain free from falls  Description: Will remain free from falls  9/19/2020 1546 by Lela Guzman RN  Outcome: Ongoing  9/19/2020 0447 by Mami Wiggins RN  Outcome: Ongoing  Goal: Absence of physical injury  Description: Absence of physical injury  9/19/2020 1546 by Lela Guzman RN  Outcome: Ongoing  Note: Patient verbalizes understanding of fall precautions, uses call light appropriately. 9/19/2020 0447 by Mami Wiggins RN  Outcome: Ongoing     Problem: Confusion - Acute:  Goal: Absence of continued neurological deterioration signs and symptoms  Description: Absence of continued neurological deterioration signs and symptoms  Outcome: Ongoing  Goal: Mental status will be restored to baseline  Description: Mental status will be restored to baseline  Outcome: Ongoing  Note: Working toward goal of returning to baseline. Problem: Discharge Planning:  Goal: Ability to perform activities of daily living will improve  Description: Ability to perform activities of daily living will improve  Outcome: Ongoing  Goal: Participates in care planning  Description: Participates in care planning  Outcome: Ongoing  Note: Working toward goal of completing ADL's  safely and independently. Problem: Injury - Risk of, Physical Injury:  Goal: Will remain free from falls  Description: Will remain free from falls  9/19/2020 1546 by Lela Guzman RN  Outcome: Ongoing  9/19/2020 0447 by Mami Wiggins RN  Outcome: Ongoing  Goal: Absence of physical injury  Description: Absence of physical injury  9/19/2020 1546 by Lela Guzman RN  Outcome: Ongoing  Note: Patient verbalizes understanding of fall precautions, uses call light appropriately.    9/19/2020 0447 by Mami Wiggins RN  Outcome: Ongoing     Problem: Mood - Altered:  Goal: Mood stable  Description: Mood stable  Outcome: Met This Shift  Note: Moods remains stable. Problem: Sensory Perception - Impaired:  Goal: Demonstrations of improved sensory functioning will increase  Description: Demonstrations of improved sensory functioning will increase  Outcome: Met This Shift  Goal: Decrease in sensory misperception frequency  Description: Decrease in sensory misperception frequency  Outcome: Met This Shift  Goal: Able to refrain from responding to false sensory perceptions  Description: Able to refrain from responding to false sensory perceptions  Outcome: Met This Shift  Goal: Demonstrates accurate environmental perceptions  Description: Demonstrates accurate environmental perceptions  Outcome: Met This Shift  Goal: Able to distinguish between reality-based and nonreality-based thinking  Description: Able to distinguish between reality-based and nonreality-based thinking  Outcome: Met This Shift  Goal: Able to interrupt nonreality-based thinking  Description: Able to interrupt nonreality-based thinking  Outcome: Met This Shift  Note: Working toward goal of returning to baseline. Problem: Sleep Pattern Disturbance:  Goal: Appears well-rested  Description: Appears well-rested  Outcome: Ongoing  Note: Patient states she is sleeping well at night. Problem: Skin Integrity:  Goal: Will show no infection signs and symptoms  Description: Will show no infection signs and symptoms  Outcome: Ongoing  Goal: Absence of new skin breakdown  Description: Absence of new skin breakdown  Outcome: Ongoing  Note: Skin integrity healing. Problem: IP BLADDER/VOIDING  Goal: LTG - patient will complete bladder elimination  Outcome: Met This Shift  Goal: LTG - patient will achieve acceptable level of continence  Outcome: Met This Shift  Goal: STG - Patient demonstrates no accidents  Outcome: Met This Shift  Note: Continent of bladder this shift.      Problem: DISCHARGE BARRIERS  Goal: Patient's continuum of care needs are met  Outcome: Ongoing  Note: Working toward goal of discharge to home. Problem: Pain:  Goal: Pain level will decrease  Description: Pain level will decrease  Outcome: Met This Shift  Goal: Control of acute pain  Description: Control of acute pain  Outcome: Met This Shift  Note: Denies pain     Problem: Nutrition  Goal: Optimal nutrition therapy  Outcome: Ongoing  Note: Eating % of most meals.

## 2020-09-19 NOTE — PROGRESS NOTES
[facility]  INPATIENT PHYSICAL THERAPY  DAILY NOTE  [address]      Time In: 1130  Time Out: 1200  Timed Code Treatment Minutes: 30 Minutes  Minutes: 30          Date: 2020  Patient Name: Maribel Banks,  Gender:  female        MRN: [id number]  : 1950  (65 y.o.)     Referring Practitioner: DAVID Pederson MD  Diagnosis: subcapital fracture of left femur s/p repair  Additional Pertinent Hx: Per H&P, pt is a 65 y.o. female with a past medical history of diabetes and prior MI who presents to the emergency department for evaluation of syncope. Patient states that she was at the grocery store this morning shortly prior to arrival, and upon leaving the grocery store she felt acutely short of breath, and lost consciousness. She reports that she fell onto her left side, and landed on her elbow and the back of her head. She endorses losing consciousness after she fell, and bystanders report that she was dazed. She regained consciousness shortly later, however she reports that she has not been ambulatory since her fall. In the emergency department she is complaining of head pain, shoulder pain, elbow pain, and left hip pain. She is additionally complaining of swelling to her left posterior scalp. Pt is now s/p HIP PINNING on 2020 by Dr Cristóbal Sanchez. To IPR on      Prior Level of Function:  Lives With: Alone  Type of Home: Apartment  Home Layout: One level  Home Access: Level entry  Home Equipment: Quad cane   Bathroom Shower/Tub: Tub/Shower unit, Walk-in shower(Pt prefers the walk in shower.)  Bathroom Toilet: Standard(uses a countertop on Right side.)  Bathroom Accessibility: Accessible    Receives Help From: Friend(s)  ADL Assistance: Independent  Homemaking Assistance: Independent  Homemaking Responsibilities: Yes  Ambulation Assistance: Independent  Transfer Assistance: Independent  Active :  Yes  Additional Comments: Pt did not use any Training, Home Exercise Program, Endurance Training, Stair training, Equipment Evaluation, Education, & procurement, Wheelchair Mobility Training, ADL/Self-care Training    Patient Education  Patient Education: Plan of Care, Transfers    Goals:  Patient goals : go home  Short term goals  Time Frame for Short term goals: 1 week  Short term goal 1: pt to complete supine to/from sit at SBA to get in and out of bed  MET, see LTG  Short term goal 2: pt to complete sit to/from stand at SBA to rise from bed or chair  MET, see LTG  Short term goal 3: pt to ambulate >75ft with RW at [facility] for mobility in the home  Short term goal 4: pt to complete car transfer at [facility] for safe transport to home  MET, see LTG  Short term goal 5: pt score on Tinetti balance test will improve to >=20 for improved balance and decreased fall risk at home  Long term goals  Time Frame for Long term goals : 2 weeks  Long term goal 1: pt to complete supine to/from sit at Mod I to get in and out of bed  Long term goal 2: pt to complete sit to/from stand at Mod I to rise from bed or chair  Long term goal 3: pt to ambulate >50ft with RW at Mod I, >125ft with RW at S for mobility in the home and community  Long term goal 4: pt to complete car transfer at S for safe transport to home  Long term goal 5: pt to negotiate 6\" platform step with RW at S for curb negotiation in the community  Long term goal 6: pt score on Tinetti balance test will improve to >=24 for improved balance and decreased fall risk at home    Following session, patient left in safe position with all fall risk precautions in place.

## 2020-09-19 NOTE — PLAN OF CARE
Problem: Falls - Risk of:  Goal: Will remain free from falls  Description: Will remain free from falls  Outcome: Ongoing  Goal: Absence of physical injury  Description: Absence of physical injury  Outcome: Ongoing   Uses the call light appropriately and waits for staff assistance. Bed Alarm also in place.

## 2020-09-20 LAB — GLUCOSE BLD-MCNC: 75 MG/DL (ref 70–108)

## 2020-09-20 PROCEDURE — 6370000000 HC RX 637 (ALT 250 FOR IP): Performed by: FAMILY MEDICINE

## 2020-09-20 PROCEDURE — 6370000000 HC RX 637 (ALT 250 FOR IP): Performed by: PHYSICAL MEDICINE & REHABILITATION

## 2020-09-20 PROCEDURE — 1180000000 HC REHAB R&B

## 2020-09-20 PROCEDURE — 94760 N-INVAS EAR/PLS OXIMETRY 1: CPT

## 2020-09-20 PROCEDURE — 82948 REAGENT STRIP/BLOOD GLUCOSE: CPT

## 2020-09-20 PROCEDURE — 6370000000 HC RX 637 (ALT 250 FOR IP): Performed by: INTERNAL MEDICINE

## 2020-09-20 RX ADMIN — ACETAMINOPHEN 650 MG: 325 TABLET ORAL at 21:27

## 2020-09-20 RX ADMIN — PRIMIDONE 200 MG: 50 TABLET ORAL at 09:16

## 2020-09-20 RX ADMIN — Medication 250 MG: at 09:14

## 2020-09-20 RX ADMIN — PROPRANOLOL HYDROCHLORIDE 80 MG: 80 CAPSULE, EXTENDED RELEASE ORAL at 09:16

## 2020-09-20 RX ADMIN — MULTIPLE VITAMINS W/ MINERALS TAB 1 TABLET: TAB at 09:17

## 2020-09-20 RX ADMIN — TRAMADOL HYDROCHLORIDE 50 MG: 50 TABLET, FILM COATED ORAL at 12:14

## 2020-09-20 RX ADMIN — Medication 500 MG: at 09:13

## 2020-09-20 RX ADMIN — TRAMADOL HYDROCHLORIDE 50 MG: 50 TABLET, FILM COATED ORAL at 17:35

## 2020-09-20 RX ADMIN — Medication 5000 UNITS: at 09:11

## 2020-09-20 RX ADMIN — ASPIRIN 81 MG: 81 TABLET ORAL at 09:11

## 2020-09-20 RX ADMIN — ACETAMINOPHEN 650 MG: 325 TABLET ORAL at 02:34

## 2020-09-20 RX ADMIN — PRIMIDONE 50 MG: 50 TABLET ORAL at 21:27

## 2020-09-20 RX ADMIN — Medication 250 MG: at 17:39

## 2020-09-20 RX ADMIN — INSULIN GLARGINE 24 UNITS: 100 INJECTION, SOLUTION SUBCUTANEOUS at 09:21

## 2020-09-20 RX ADMIN — FERROUS SULFATE TAB 325 MG (65 MG ELEMENTAL FE) 325 MG: 325 (65 FE) TAB at 09:13

## 2020-09-20 RX ADMIN — FERROUS SULFATE TAB 325 MG (65 MG ELEMENTAL FE) 325 MG: 325 (65 FE) TAB at 17:38

## 2020-09-20 RX ADMIN — Medication 500 MG: at 17:40

## 2020-09-20 RX ADMIN — CLOPIDOGREL BISULFATE 75 MG: 75 TABLET ORAL at 09:12

## 2020-09-20 RX ADMIN — METFORMIN HYDROCHLORIDE 1000 MG: 500 TABLET ORAL at 09:12

## 2020-09-20 RX ADMIN — ATORVASTATIN CALCIUM 80 MG: 80 TABLET, FILM COATED ORAL at 21:27

## 2020-09-20 RX ADMIN — Medication 5000 UNITS: at 17:37

## 2020-09-20 RX ADMIN — DOCUSATE SODIUM 50 MG AND SENNOSIDES 8.6 MG 2 TABLET: 8.6; 5 TABLET, FILM COATED ORAL at 21:27

## 2020-09-20 RX ADMIN — METFORMIN HYDROCHLORIDE 1000 MG: 500 TABLET ORAL at 17:39

## 2020-09-20 ASSESSMENT — PAIN SCALES - GENERAL
PAINLEVEL_OUTOF10: 9
PAINLEVEL_OUTOF10: 3
PAINLEVEL_OUTOF10: 0
PAINLEVEL_OUTOF10: 2
PAINLEVEL_OUTOF10: 7
PAINLEVEL_OUTOF10: 0
PAINLEVEL_OUTOF10: 0

## 2020-09-20 ASSESSMENT — ENCOUNTER SYMPTOMS
VOICE CHANGE: 0
ALLERGIC/IMMUNOLOGIC NEGATIVE: 1
EYES NEGATIVE: 1
CONSTIPATION: 0
COUGH: 0
TROUBLE SWALLOWING: 0
NAUSEA: 0
SHORTNESS OF BREATH: 0

## 2020-09-20 NOTE — PROGRESS NOTES
Physical Medicine & Rehabilitation  Progress Note    Chief Complaint:  left femoral neck impacted subcapital fracture    Subjective:  She notes that \"I still have a lot of pain in my backside\" while pointing to her right hip. She did not take any of her as needed tramadol today. She does have diarrhea today which appears black, likely from her iron tablets. Her labs were discussed with her including her significantly improved vitamin D level as well as her stable decreased renal function at this time. With therapy the patient presented with a very flat affect this morning with expression of suicidal ideation secondary to her perceived cognitive impairments and losing her independence as well as grief over her  that passed away about 6 years ago. With discussion later in the day the patient stated that she was okay and did not have any feelings of wanting to harm herself. She had no other concerns or questions at this time.     Rehabilitation:   Physical Therapy:  OBJECTIVE:  Bed Mobility:  Supine to Sit: Modified Independent  Sit to Supine: Modified Independent   On mat table and then into bed at end of session     Transfers:  Sit to Stand: Stand By Assistance to supervision   Stand to Sit:Stand By Assistance to supervision   From bedside chair, mat table and bed she cont to require verbal and tactile cues for safe hand placement > 50% of the time   Car:Stand By Assistance, with cues for safe hand placement     Ambulation:  Stand By Assistance  Distance: 150x2, 80x1  Surface: Level Tile  Device:Rolling Walker  Gait Deviations:  Pt required cues for proper posture with gait and did require frequent reminders along with staying closer to her walker, also noted decreased left TKE at stance phase with a shorter step length at right LE and decreased heel strike needing many cues for heel strike     Stairs:  Stand By Assistance  Number of Steps: 1  Height: 6\" step with Rolling Walker pt needed cues to place Rolling Walker  Assist Level:  Stand By Assistance. Distance: To and from therapy apartment  fatigued. ASSESSMENT:  Assessment: Tiffany Hathaway is making steady gains with ADLs and safe mobility meeting 4/5 STGs. She currently requires min A for LE ADLs, SBA for safe transfers and  ambulation with the RW. She continues to require minimal cueing for safe hand placement with transfers as well as RW safety with functional ambulation even with external visual and auditary aides. She requires minimal to  moderate cues for problem solving RW safety with IADL tasks . THis remains a decline from being independent with ADLs, and homemaking tasks . Pt would benefit from additional skilled OT services to address increasing independence in self care and functional mobility to return home as independently as possibe to reduce incidence of falls and caregiver burdon. Activity Tolerance:  Patient tolerance of  treatment: good. Discharge Recommendations: 24 hour supervision or assist    Equipment Recommendations: Equipment Needed: Yes  Other: Pt may benefit from a BSC, shower chair, grab bars in the shower . Need to assess for LHAE. BSC ordered on 9-15. Speech Therapy:  Short-Term Goals:  SHORT TERM GOAL #1:  Goal 1: Pt will complete orientation, immediate/delayed recall and working memory tasks with 80% accuracy given mod cues to improve retention of new and functional information. Goal not met- continue  INTERVENTIONS:   Orientation:   Name: IND  : IND  Age: Max A  Place: IND  Event: IND  Month: IND with external cues  Year: IND with external cues   Date:  Max with external cues       Short-Term Memory:  Immediate Recall:               Trial: 2/5 IND, 1/5 min A, 1/5 mod A, 1/5 Max A              Trial 2: 4/5 min a, 1/5 Max A  Delayed Recall:   (5 minutes) 1/5 IND, 2/5 min A, 1/5 mod A, 0/5 max, 1/5 total A  (20 minutes) 4/5 min A, 1/5 max A     Completed review of STM strategies using the acronym WRAP--Write it down, Repeat it, Associate it, and Pictures it.   -W: indep, R: max A, A: mod A, P: max A     SHORT TERM GOAL #2:  Goal 2: Pt will complete functional sequencing and thought organizational tasks with 70% accuracy given mod cues to improve overall executive functioning skills. Goal not met- continue  INTERVENTIONS:  Organizational Naming:  -WPM   (Animals) 6 Wpm, mod verbal cues, mod A for attention  (Fruit) 7 WPM, minus 1 repetition, mod to max verbal cues   *poor attention, disorganized thought processing, tangential, perseverative      Calender organization/location:  -Which dates do ou exercise: 2/10 IND, 4/10 min A, 4/10 mod A  -How many hours exercised in 3rd week of June: Max A  -How many birthdays are there in June: 2/3 min A, 1/3 total A  *cues to [place] response then count/. Pt circled 3 additional date unrelated to the target.   -What date/time/DUSTY is your hair appointment: 1/3 min A (DUSTY), 2/3 max A (date/time)  *increased difficulties visual scanning to locate   *required written/visual cues  (circled target)     Verbal reasoning on how to determine if there are missing card in the deck: max A to complete verbal reasoning to sort by suit, numbers, or order     SHORT TERM GOAL #3:  Goal 3: Patient will complete functional problem solving, reasoning and basic computational tasks with 70% accuracy given mod cues to improve overall management of ADLs. Goal partially met- continue  INTERVENTIONS:   Problem solving/Reasoning:   (Banana/Orange)        Similar: 1/2 IND, 1/2 mod A                Different: 2/2 min A  (Fiona/Jenny)          Similar: 1/2 IND, 1/2 min A                 Different: 2/2 IND   (Watch/Ruler)  Similar: 2/2 Max A                               Different: 2/2 max A  (Cup/pop Can)            Similar: 2/2 IND                                   Different: 1/2 mod I additional time, 1/2 min A  *poor reasoning and inhibition       Safe to go home alone:   Independent to realize she can NOT go home without assistance. *hurt self, get into trouble IND  *Meds: min A  *Drive: IND   *fall risk: IND  *Call light: min to locate, IND to press     Long-Term Goals:  Timeframe for Long-term Goals: 3 weeks     LONG TERM GOAL #1:  Goal 1: Pt will improve cognitive linguistic skills to a min assist level in order to improve level of independence in the home environment. Goal partially met -Continue      Comprehension: 5 - Patient understands basic needs (hungry/hot/pain)  Expression: 5 - Expresses basic ideas/needs only (hungry/hot/pain)  Social Interaction: 4 - Patient appropriate 75-90%+ of the time  Problem Solving: 3 - Patient solves simple/routine tasks 50%-74%  Memory: 2 - Patient remembers 25%-49% of the time      ST FIM ASSESSMENT:       Admission score Current score Goal Status   COMMUNICATION 4 - Patient understands basic needs 75-90%+ of the time    5 - Patient understands basic needs (hungry/hot/pain)    Progressing   EXPRESSION 5 - Expresses basic ideas/needs only (hungry/hot/pain)       5 - Expresses basic ideas/needs only (hungry/hot/pain)    Stable    SOCIAL INTERACTION 5 - Patient is appropriate with supervision/cues    4 - Patient appropriate 75-90%+ of the time    Not Progressing   PROBLEM SOLVING 3 - Patient solves simple/routine tasks 50%-74%    3 - Patient solves simple/routine tasks 50%-74%    stable    MEMORY 2 - Patient remembers 25%-49% of the time    2 - Patient remembers 25%-49% of the time    stable       PROGRESS NOTE:  Patient partially met 1/3 STG, and partially met 1/1 LTG. The pt demonstrated improved insight and awareness of cognitive linguistic deficits, able to express agreement and understanding of current recommendations for 24 hour SUP.  The pt continues to present with moderate to severe cognitive linguistic deficits in the domains of orientation, requiring external aid + cues, BASIC STM (immediate/delayed/working), attention, visual scanning, verbal/visual reasoning, functional problem solving, sequencing, organization, and executive functioning. Attention and STM deficits remain a significant barrier for progress. Speech, voice, receptive/expressive language remain in tact for basic and mildly complex communication, while thought process and language become disorganized as complexity increases. Recommended 24 hour SUP and direct 1:1 assist to safety return to home setting. Pt is at a high risk of re-admission without recommended level of SUP.          EDUCATION:  Learner: Patient  Education:  Reviewed recommendations for follow-up, Education Related to Potential Risks and Complications Due to Impairment/Illness/Injury, Education Related to [employer] and Wellness and Home Safety Education  Evaluation of Education: [employer] understanding     ASSESSMENT/PLAN:  Activity Tolerance:  Patient tolerance of treatment: Good     Assessment/Plan: Patient progressing toward established goals. Continues to require skilled care of licensed speech pathologist to progress toward achievement of established goals and plan of care. Plan for Next Session:  Working memory, problem solving/reasoning, recall     Review of Systems:  Review of Systems   Constitutional: Negative for activity change, appetite change, fatigue and fever. HENT: Negative for trouble swallowing and voice change. Eyes: Negative. Respiratory: Negative for cough and shortness of breath. Cardiovascular: Negative for leg swelling. Gastrointestinal: Positive for diarrhea. Negative for constipation and nausea. Endocrine: Negative for cold intolerance. Genitourinary: Positive for urgency. Negative for frequency. Musculoskeletal: Positive for arthralgias and gait problem. Negative for myalgias. Skin: Negative for pallor. Allergic/Immunologic: Negative. Neurological: Positive for tremors and weakness. Negative for dizziness, light-headedness and headaches. Hematological: Negative. 9.6 (L) 12.0 - 16.0 gm/dl    Hematocrit 31.8 (L) 37.0 - 47.0 %    MCV 97.0 81.0 - 99.0 fL    MCH 29.3 26.0 - 33.0 pg    MCHC 30.2 (L) 32.2 - 35.5 gm/dl    RDW-CV 14.1 11.5 - 14.5 %    RDW-SD 48.7 (H) 35.0 - 45.0 fL    Platelets 108 611 - 459 thou/mm3    MPV 9.6 9.4 - 12.4 fL   Comprehensive Metabolic Panel    Collection Time: 09/21/20  5:34 AM   Result Value Ref Range    Glucose 85 70 - 108 mg/dL    CREATININE 1.3 (H) 0.4 - 1.2 mg/dL    BUN 26 (H) 7 - 22 mg/dL    Sodium 138 135 - 145 meq/L    Potassium 5.1 3.5 - 5.2 meq/L    Chloride 102 98 - 111 meq/L    CO2 26 23 - 33 meq/L    Calcium 10.0 8.5 - 10.5 mg/dL    AST 39 5 - 40 U/L    Alkaline Phosphatase 117 38 - 126 U/L    Total Protein 5.5 (L) 6.1 - 8.0 g/dL    Alb 3.1 (L) 3.5 - 5.1 g/dL    Total Bilirubin 0.2 (L) 0.3 - 1.2 mg/dL    ALT 21 11 - 66 U/L   Vitamin D 25 Hydroxy    Collection Time: 09/21/20  5:34 AM   Result Value Ref Range    Vit D, 25-Hydroxy 28 (L) 30 - 100 ng/ml   Anion Gap    Collection Time: 09/21/20  5:34 AM   Result Value Ref Range    Anion Gap 10.0 8.0 - 16.0 meq/L   Glomerular Filtration Rate, Estimated    Collection Time: 09/21/20  5:34 AM   Result Value Ref Range    Est, Glom Filt Rate 40 (A) ml/min/1.73m2   POCT glucose    Collection Time: 09/21/20  7:33 AM   Result Value Ref Range    POC Glucose 77 70 - 108 mg/dl     Labs Renal Latest Ref Rng & Units 9/21/2020 9/18/2020 9/16/2020 9/15/2020 9/14/2020   BUN 7 - 22 mg/dL 26(H) 25(H) 25(H) 25(H) 29(H)   Cr 0.4 - 1.2 mg/dL 1.3(H) 1. 3(H) 1. 3(H) 1. 3(H) 1. 3(H)   K 3.5 - 5.2 meq/L 5.1 4.9 4.6 5.1 5.4(H)   Na 135 - 145 meq/L 138 139 141 142 140      Recent Labs     09/21/20  0534 09/14/20  1055 09/10/20  0617   WBC 6.2 7.5 4.9   HGB 9.6* 10.5* 9.7*   HCT 31.8* 35.1* 32.1*   MCV 97.0 95.1 94.7    281 186      Impression:  1. Ground-level fall after syncopal event. 2. Acute slightly impacted subcapital fracture of the left femoral neck.   3. Status post closed reduction and percutaneous screw risk.  5. Mild TBI/Moderate to severe cognitive deficits. (MOCA) version 8.2 completed. Patient scored 10/30. 1. SLP. 2. Low stimulation TBI guidelines if deemed necessary. 3. As of 9/15 the patient was felt to have a decline in her cognition; her Reuben Pineda was queried as to how he perceived her cognition and he stated this is her baseline. 4. Urinalysis was negative for signs of a UTI on 9/15. 6. Iron deficiency anemia. 1. Iron 25 and ferritin 120. Abnormal/normal.  2. Ferrous sulfate and vitamin C twice daily with meals. 3. Hemoglobin remained stable at 9.6 on 9/21.  7. Tachycardia. 1. Patient had an episode of tachycardia on 9/16 with a heart rate of 130 for which an EKG was ordered that showed sinus tachycardia. After receiving on propranolol her heart rate did come down to less than 100 bpm.  Resolved. 8. Nutrition:  Consultation to dietician for nutritional counseling and recommendations. 1. Protein 5.6 and albumin 2.8 on 9/10/2020. Abnormal.  2. Vitamin 25OH level of 14 on 9/9/2020.  3. Cholecalciferol 5000 IU 3 times daily with meals and 500 mg calcium twice daily with meals. Vitamin D on 9/21 has improved to 28. Anticipate patient going home on 3000 IU daily to maintain a normal vitamin D level. 9. Electrolytes. 1. Normal on 9/21 with exception of.  1. Hyperkalemia with a potassium of 5.4 on 9/14. Potassium decreased to 5.1 on 9/15. Potassium stable at 5.1 on 9/21. 1. Kayexalate 15 mg x 2 ordered on 9/14. 2. Hypercalcemia. 1. Hold supplemental calcium on 9/15. Calcium normal on 9/15 at 10.3. Calcium elevated again at 11.0 on 9/16. Normal at 10.0 on 9/18. Calcium supplementation resumed. Calcium 10.0 on 9/21. Normal.  2. PTH normal at 17.4 on 9/16. 10. Bladder: No issues  11. Bowel: Senna, colace, MOM  12. Rehabilitation nursing will be involved for bowel, bladder, skin, and pain management. Nursing will also provide education and training to patient and family. 13. Prophylaxis:  DVT: Plavix and aspirin as directed by Orthopedic Surgery. GI: None. .  14.  and case management consultations for coordination of care and discharge planning.     Chronic Problems:  1. Insulin-dependent type 2 diabetes, controlled with hyperglycemia. Last hemoglobin A1c was 5.7 on 9/9/2020.  1. Lantus 20 units every morning. Lantus increased to 24 units starting on 9/16 to improve blood glucose control. 2. Metformin 1000 mg twice daily with meals. 2. Essential tremor. 1. Continue home primidone and propranolol. 3. History of prior myocardial infarction in 2011.  1. ASA 81 mg.  2. Plavix. 4. Hypertension. 1. Propranolol. 5. Hyperlipidemia. 1. Lipitor. 6. Coronary artery disease/History of prior myocardial infarction. 1. ASA 81 mg.  2. Plavix. 3. Lipitor. 7. ISAURA superimposed on CKD 3.  1. Cr/BUN/GFR on 9/9 was 1.1/19/49 which is stable over 9/7 with labs of 1.1/18/49. Mild decrease to 1.2/25/44 on 9/10. Further worsened from 1.3/29/40 on 9/14. Renal function stable over prior at 1.3/25/40 on 9/15. Remains elevated at 1.3/25/40 on 9/18. 2. Encourage fluids. 8. 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 9. Osteoporosis. 1. See plan above for vitamin D and calcium supplementation.     Labs reviewed on:  1. 9/8.  2. 9/9.  3. 9/10.  4. 9/14.  5. 9/15.  6. 9/16.  7. 9/18.  8. 9/21.     Infectious Disease:  1. N/A     Missed Therapy Time:  None     DME:  Prema Johnson requires a Bedside Commode to complete bathing, toileting, dressing and grooming tasks. Patient requires a Bedside Commode due to Upper Extremity/Lower Extremity Weakness and limited ambulation and is unable to walk to the bathroom at home. Without the Bedside Commode, Prema Johnson is at increased risk for falls and would require increased assistance for ADL's and mobility.     Discharge Plan:  Estimated Discharge Date: 9/24   Destination: home health and discharge home with supervision  Services at Discharge: [address] [place] [address] Rd, Occupational Therapy, Speech Therapy, Nursing and an aide 3x week  Is patient appropriate for an outpatient driving evaluation? yes, when appropriate  Equipment at Discharge: RW, BSC, shower chair, grab bars    Greater than 50% of 30 minutes was spent with the patient reviewing her labs from today including her improved vitamin D level, stable renal function, and stable hemoglobin; and addressing her previously noted suicidal ideation that was endorsed by speech therapy today, and reviewing her good progress with therapies towards her discharge date this week.     Michelle Mckeon MD

## 2020-09-20 NOTE — PLAN OF CARE
Problem: Falls - Risk of:  Goal: Will remain free from falls  Description: Will remain free from falls  9/20/2020 0148 by Mami Wiggins RN  Outcome: Ongoing  9/20/2020 0145 by Mami Wiggins RN  Outcome: Ongoing  9/19/2020 1546 by Lela Guzman RN  Outcome: Ongoing  Goal: Absence of physical injury  Description: Absence of physical injury  9/20/2020 0148 by Mami Wiggins RN  Outcome: Ongoing  9/20/2020 0145 by Mami Wiggins RN  Outcome: Ongoing  9/19/2020 1546 by Lela Guzman RN  Outcome: Ongoing  Note: Patient verbalizes understanding of fall precautions, uses call light appropriately.

## 2020-09-20 NOTE — PLAN OF CARE
Problem: Falls - Risk of:  Goal: Will remain free from falls  Description: Will remain free from falls  9/20/2020 0145 by Rhoda Beltran RN  Outcome: Ongoing  9/19/2020 1546 by Ragini Marc RN  Outcome: Ongoing  Goal: Absence of physical injury  Description: Absence of physical injury  9/20/2020 0145 by Rhoda Beltran RN  Outcome: Ongoing  9/19/2020 1546 by Ragini Marc RN  Outcome: Ongoing  Note: Patient verbalizes understanding of fall precautions, uses call light appropriately.

## 2020-09-21 LAB
ALBUMIN SERPL-MCNC: 3.1 G/DL (ref 3.5–5.1)
ALP BLD-CCNC: 117 U/L (ref 38–126)
ALT SERPL-CCNC: 21 U/L (ref 11–66)
ANION GAP SERPL CALCULATED.3IONS-SCNC: 10 MEQ/L (ref 8–16)
AST SERPL-CCNC: 39 U/L (ref 5–40)
BILIRUB SERPL-MCNC: 0.2 MG/DL (ref 0.3–1.2)
BUN BLDV-MCNC: 26 MG/DL (ref 7–22)
CALCIUM SERPL-MCNC: 10 MG/DL (ref 8.5–10.5)
CHLORIDE BLD-SCNC: 102 MEQ/L (ref 98–111)
CO2: 26 MEQ/L (ref 23–33)
CREAT SERPL-MCNC: 1.3 MG/DL (ref 0.4–1.2)
ERYTHROCYTE [DISTWIDTH] IN BLOOD BY AUTOMATED COUNT: 14.1 % (ref 11.5–14.5)
ERYTHROCYTE [DISTWIDTH] IN BLOOD BY AUTOMATED COUNT: 48.7 FL (ref 35–45)
GFR SERPL CREATININE-BSD FRML MDRD: 40 ML/MIN/1.73M2
GLUCOSE BLD-MCNC: 77 MG/DL (ref 70–108)
GLUCOSE BLD-MCNC: 85 MG/DL (ref 70–108)
HCT VFR BLD CALC: 31.8 % (ref 37–47)
HEMOGLOBIN: 9.6 GM/DL (ref 12–16)
MCH RBC QN AUTO: 29.3 PG (ref 26–33)
MCHC RBC AUTO-ENTMCNC: 30.2 GM/DL (ref 32.2–35.5)
MCV RBC AUTO: 97 FL (ref 81–99)
PLATELET # BLD: 288 THOU/MM3 (ref 130–400)
PMV BLD AUTO: 9.6 FL (ref 9.4–12.4)
POTASSIUM SERPL-SCNC: 5.1 MEQ/L (ref 3.5–5.2)
RBC # BLD: 3.28 MILL/MM3 (ref 4.2–5.4)
SODIUM BLD-SCNC: 138 MEQ/L (ref 135–145)
TOTAL PROTEIN: 5.5 G/DL (ref 6.1–8)
VITAMIN D 25-HYDROXY: 28 NG/ML (ref 30–100)
WBC # BLD: 6.2 THOU/MM3 (ref 4.8–10.8)

## 2020-09-21 PROCEDURE — 80053 COMPREHEN METABOLIC PANEL: CPT

## 2020-09-21 PROCEDURE — 1180000000 HC REHAB R&B

## 2020-09-21 PROCEDURE — 36415 COLL VENOUS BLD VENIPUNCTURE: CPT

## 2020-09-21 PROCEDURE — 97129 THER IVNTJ 1ST 15 MIN: CPT

## 2020-09-21 PROCEDURE — 97530 THERAPEUTIC ACTIVITIES: CPT

## 2020-09-21 PROCEDURE — 97035 APP MDLTY 1+ULTRASOUND EA 15: CPT

## 2020-09-21 PROCEDURE — 6370000000 HC RX 637 (ALT 250 FOR IP): Performed by: PHYSICAL MEDICINE & REHABILITATION

## 2020-09-21 PROCEDURE — 97116 GAIT TRAINING THERAPY: CPT

## 2020-09-21 PROCEDURE — 6370000000 HC RX 637 (ALT 250 FOR IP): Performed by: INTERNAL MEDICINE

## 2020-09-21 PROCEDURE — 85027 COMPLETE CBC AUTOMATED: CPT

## 2020-09-21 PROCEDURE — 97130 THER IVNTJ EA ADDL 15 MIN: CPT

## 2020-09-21 PROCEDURE — 82948 REAGENT STRIP/BLOOD GLUCOSE: CPT

## 2020-09-21 PROCEDURE — 97110 THERAPEUTIC EXERCISES: CPT

## 2020-09-21 PROCEDURE — 6370000000 HC RX 637 (ALT 250 FOR IP): Performed by: FAMILY MEDICINE

## 2020-09-21 PROCEDURE — 82306 VITAMIN D 25 HYDROXY: CPT

## 2020-09-21 PROCEDURE — 97535 SELF CARE MNGMENT TRAINING: CPT

## 2020-09-21 RX ADMIN — METFORMIN HYDROCHLORIDE 1000 MG: 500 TABLET ORAL at 16:40

## 2020-09-21 RX ADMIN — Medication 500 MG: at 16:40

## 2020-09-21 RX ADMIN — ASPIRIN 81 MG: 81 TABLET ORAL at 08:18

## 2020-09-21 RX ADMIN — Medication 5000 UNITS: at 16:38

## 2020-09-21 RX ADMIN — CLOPIDOGREL BISULFATE 75 MG: 75 TABLET ORAL at 08:18

## 2020-09-21 RX ADMIN — MULTIPLE VITAMINS W/ MINERALS TAB 1 TABLET: TAB at 08:19

## 2020-09-21 RX ADMIN — Medication 250 MG: at 16:38

## 2020-09-21 RX ADMIN — Medication 250 MG: at 08:18

## 2020-09-21 RX ADMIN — ACETAMINOPHEN 650 MG: 325 TABLET ORAL at 20:07

## 2020-09-21 RX ADMIN — PROPRANOLOL HYDROCHLORIDE 80 MG: 80 CAPSULE, EXTENDED RELEASE ORAL at 08:19

## 2020-09-21 RX ADMIN — PRIMIDONE 50 MG: 50 TABLET ORAL at 20:06

## 2020-09-21 RX ADMIN — FERROUS SULFATE TAB 325 MG (65 MG ELEMENTAL FE) 325 MG: 325 (65 FE) TAB at 16:38

## 2020-09-21 RX ADMIN — PRIMIDONE 200 MG: 50 TABLET ORAL at 08:19

## 2020-09-21 RX ADMIN — INSULIN GLARGINE 24 UNITS: 100 INJECTION, SOLUTION SUBCUTANEOUS at 08:16

## 2020-09-21 RX ADMIN — Medication 5000 UNITS: at 12:38

## 2020-09-21 RX ADMIN — Medication 5000 UNITS: at 08:18

## 2020-09-21 RX ADMIN — FERROUS SULFATE TAB 325 MG (65 MG ELEMENTAL FE) 325 MG: 325 (65 FE) TAB at 08:18

## 2020-09-21 RX ADMIN — METFORMIN HYDROCHLORIDE 1000 MG: 500 TABLET ORAL at 08:18

## 2020-09-21 RX ADMIN — DOCUSATE SODIUM 50 MG AND SENNOSIDES 8.6 MG 2 TABLET: 8.6; 5 TABLET, FILM COATED ORAL at 08:18

## 2020-09-21 RX ADMIN — ACETAMINOPHEN 650 MG: 325 TABLET ORAL at 05:43

## 2020-09-21 RX ADMIN — Medication 500 MG: at 08:18

## 2020-09-21 RX ADMIN — ATORVASTATIN CALCIUM 80 MG: 80 TABLET, FILM COATED ORAL at 20:06

## 2020-09-21 RX ADMIN — ACETAMINOPHEN 650 MG: 325 TABLET ORAL at 12:38

## 2020-09-21 ASSESSMENT — ENCOUNTER SYMPTOMS: DIARRHEA: 1

## 2020-09-21 ASSESSMENT — PAIN SCALES - GENERAL
PAINLEVEL_OUTOF10: 0
PAINLEVEL_OUTOF10: 1
PAINLEVEL_OUTOF10: 0

## 2020-09-21 ASSESSMENT — PAIN - FUNCTIONAL ASSESSMENT: PAIN_FUNCTIONAL_ASSESSMENT: ACTIVITIES ARE NOT PREVENTED

## 2020-09-21 NOTE — PROGRESS NOTES
[facility]  INPATIENT PHYSICAL THERAPY  DAILY NOTE  [address] [facility] - [id number]    Time In: 1330  Time Out: 1400  Timed Code Treatment Minutes: 30 Minutes  Minutes: 30          Date: 2020  Patient Name: Mac Noguera,  Gender:  female        MRN: [id number]  : 1950  (65 y.o.)     Referring Practitioner: DAVID Coker MD  Diagnosis: subcapital fracture of left femur s/p repair  Additional Pertinent Hx: Per H&P, pt is a 65 y.o. female with a past medical history of diabetes and prior MI who presents to the emergency department for evaluation of syncope. Patient states that she was at the grocery store this morning shortly prior to arrival, and upon leaving the grocery store she felt acutely short of breath, and lost consciousness. She reports that she fell onto her left side, and landed on her elbow and the back of her head. She endorses losing consciousness after she fell, and bystanders report that she was dazed. She regained consciousness shortly later, however she reports that she has not been ambulatory since her fall. In the emergency department she is complaining of head pain, shoulder pain, elbow pain, and left hip pain. She is additionally complaining of swelling to her left posterior scalp. Pt is now s/p HIP PINNING on 2020 by Dr Dotty Stacy. To IPR on      Prior Level of Function:  Lives With: Alone  Type of Home: Apartment  Home Layout: One level  Home Access: Level entry  Home Equipment: Quad cane   Bathroom Shower/Tub: Tub/Shower unit, Walk-in shower(Pt prefers the walk in shower.)  Bathroom Toilet: Standard(uses a countertop on Right side.)  Bathroom Accessibility: Accessible    Receives Help From: Friend(s)  ADL Assistance: Independent  Homemaking Assistance: Independent  Homemaking Responsibilities: Yes  Ambulation Assistance: Independent  Transfer Assistance: Independent  Active :  Yes  Additional Comments: Pt did not use any AD for ambulating. She did her own homemaking and cooking. reports walking daily PTA. Has a  1 x month    Restrictions/Precautions:  Restrictions/Precautions: Weight Bearing, General Precautions, Fall Risk  Left Lower Extremity Weight Bearing: Weight Bearing As Tolerated    SUBJECTIVE: Pt seated in recliner. Pleasant and cooperative. PAIN: not rated. When questioned, pt did not recall when she got Tylenol. Therapist checked and pt received it at 12:38. It was recommended to pt to record taking meds when at home. OBJECTIVE:    Transfers:  Sit to Stand: Stand By Assistance  Stand to [address] By Assistance to [address]. Numerous trials performed to various chairs in the apartment. 100% of sit to stand trials required supplemental verbal cues in addition to pt reading the written directions. Going to sit, occasionally pt will attempt to walk legs back to the chair and not follow with the RW or reach back with 1 UE only and not the other showing decreased control with sitting. Ambulation:  Stand By Assistance  Distance: short distances on carpet/tile to destinations. Gait with Tinetti  Device:Rolling Walker  Gait Deviations:  Slow Susanna, Decreased Step Length on Right, Decreased Weight Shift Left and Decreased Gait Speed    EXERCISES:  The following exercises were completed to improve functional mobility: seated- long arc quads x10 reps BLEs     Tinetti:   Balance Score: 11  Gait Score: 7  Tinetti Total Score: 18  Risk Indicators:  Less than/equal to 18 = high risk  19-23 Moderate risk  Greater than/equal to 24 = low risk     ASSESSMENT:  Assessment: Patient progressing toward established goals. Activity Tolerance:  Patient tolerance of  treatment: good.       Equipment Recommendations:Equipment Needed: Yes(RW)  Discharge Recommendations:  Continue to assess pending progress    Plan: Times per week: 5x/wk 90 min, 1x/wk 30 min  Current Treatment

## 2020-09-21 NOTE — PLAN OF CARE
Problem: Nutrition  Goal: Optimal nutrition therapy  9/21/2020 1514 by Annie Moon RD, LD  Outcome: Ongoing   Nutrition Problem #1: Increased nutrient needs  Intervention: Food and/or Nutrient Delivery: Continue Current Diet, Modify Oral Nutrition Supplement, Vitamin Supplement  Nutritional Goals: Pt will consume 75% or more of meals to aid in healing or weight loss

## 2020-09-21 NOTE — PROGRESS NOTES
[address] NOTE    Conference Date: 2020  Admit Date:  2020 11:56 AM  Patient Name: Immanuel Batista    MRN: [id number]    : 1950  (65 y.o.)  Rehabilitation Admitting Diagnosis:  Subcapital fracture of left femur s/p repair  Referring Practitioner: DAVID Dubon MD    CASE MANAGEMENT  Current issues/needs regarding patient and family discharge status: Patient has progressed with PT/OT/ST towards goal of returning home. Plan for discharge home on Thursday, 2020. Patient's friend, Kaitlin Land, completed family education on Friday, 2020, in preparation for discharge. Home health care services for RN/PT/OT/ST/HHA arranged through [facility]. SW to follow and maintain involvement in discharge planning. PHYSICAL THERAPY  Assessment: Patient progressing toward established goals, meeting 4/5 short term goals and 2/6 long term goals. Pt cont to require several verbal and tactile and even written cues throughout session for safety  as she is easily distracted. Pt did show a 2 point gain on her Tinetti, showing improved balance,  though at 18 still showing high risk for falls. She would benefit from cont skilled therapy at discharge     Equipment Needed: Yes(RW ordered from [facility])    SPEECH THERAPY  Patient partially met 1/3 STG, and partially met 1/1 LTG. The pt demonstrated improved insight and awareness of cognitive linguistic deficits, able to express agreement and understanding of current recommendations for 24 hour SUP. The pt continues to present with moderate to severe cognitive linguistic deficits in the domains of orientation, requiring external aid + cues, BASIC STM (immediate/delayed/working), attention, visual scanning, verbal/visual reasoning, functional problem solving, sequencing, organization, and executive functioning. Attention and STM deficits remain a significant barrier for progress.  Speech, voice, receptive/expressive language remain in tact for basic and mildly complex communication, while thought process and language become disorganized as complexity increases. Recommended 24 hour SUP and direct 1:1 assist to safety return to home setting. Pt is at a high risk of re-admission without recommended level of SUP. [address] is making steady gains with ADLs and safe mobility meeting 4/5 STGs. She currently requires min A for LE ADLs, SBA for safe transfers and  ambulation with the RW. She continues to require minimal cueing for safe hand placement with transfers as well as RW safety with functional ambulation even with external visual and auditary aides. She requires minimal to  moderate cues for problem solving RW safety with IADL tasks . THis remains a decline from being independent with ADLs, and homemaking tasks . Pt would benefit from additional skilled OT services to address increasing independence in self care and functional mobility to return home as independently as possibe to reduce incidence of falls and caregiver burdon. Equipment Needed: Yes  Other: Pt may benefit from a BSC, shower chair, grab bars in the shower . BSC ordered on 9-15. RECREATIONAL THERAPY  Patient has been offered participation in recreational therapy activities and participates as able. Pt came to [employer] with O.T. where she and RT batted a balloon back and forth -affect bright and socialHelped pt call her POA to bring in money this am for her to get her hair done by our beautician-sit to stand from recliner with CGA-ambulated 3 ft to w/c with RW and CGA-via w/c took her to get her hair done by our beautician-affect bright and social-enjoyed getting her hair done -Passing pt's room her alarm was going off and she was up by herself pushing a chair to the bathroom as her walker was across the room-assisted pt to bathroom with RW and CGA-able to doff and don underwear and pants with CGA-sit to stand from RTS with CGA-stood at sink to wash hands with CGA-ambulated back to chair  with RW and CGA-states she is not sure how she is going to manage everything at home going-does not want to go to a nursing home -encouraged her to use her call light when she has to get up from chair- Pt's friend and POA in this am and brought in money for pt to get a [place] for lunch today-pleasant and appreciative      NUTRITION  Weight: 149 lb 3.2 oz (67.7 kg) / Body mass index is 23.37 kg/m². Current diet: DIET CARB CONTROL; Dietary Nutrition Supplements: Diabetic Oral Supplement  Dietary Nutrition Supplements: Renal Oral Supplement  Please see nutrition note for details.     NURSING  Continent of Bowel and Bladder: No, bladder at night  Pain is Managed:  Yes  Signs and Symptoms of Infection:  No  Signs and Symptoms of Skin Breakdown:  Yes, right side of coccyx  Injury and Fall Free during Inpatient Rehabilitation Admission: Yes    Consultations/Labs/X-rays: Orthopedic Surgery, Internal Medicine, Physical Medicine    Recent Labs     09/20/20  0753 09/21/20  0733 09/22/20  0659   POCGLU 75 77 102     Lab Results   Component Value Date    LDLCALC 116 09/03/2012       Vitals:    09/20/20 2122 09/21/20 0725 09/21/20 2000 09/22/20 0655   BP: (!) 114/57 133/79 107/60 111/60   Pulse: 97 78 89 89   Resp: 14 20 19 16   Temp: 98 °F (36.7 °C) 98.7 °F (37.1 °C) 98.1 °F (36.7 °C) 97.9 °F (36.6 °C)   TempSrc: Oral Oral Oral Oral   SpO2: 98% 97% 94% 94%   Weight: 149 lb 3.2 oz (67.7 kg)      Height:           Family Education: Family available and participating in education   Fall Risk:  Falling star program initiated  Is the patient appropriate for a stay in the functional apartment? no    Discharge Plan   Estimated Discharge Date: 9/24   Destination: home health and discharge home with supervision  Services at Discharge: [address] [place] [address] Rd, Occupational Therapy, Speech Therapy, , Nursing and an aide 3x week  Is patient

## 2020-09-21 NOTE — PROGRESS NOTES
Reported per therapist pt. Feeling like she wants to die. When asked pt. Denies wanting to kill self. Does report feeling depressed but denies actually wanting to commit suicide. Dr. Perez Kras in after lunch and updated on above.

## 2020-09-21 NOTE — PROGRESS NOTES
Pt. Awake at 0730 and up to bathroom and then therapy. When returned from therapy voices has been feeling off with some dizziness. Therapist reports checking b/p and within  Normal limits. Encouraged good intake due to cs being 77. After breakfast pt. Voices feeling much better. Left hip incision approx. And efren. Just right of coccyx still has a purple open area with edges gray. Pink salve applied.

## 2020-09-21 NOTE — PLAN OF CARE
Problem: Mood - Altered:  Goal: Mood stable  Description: Mood stable  9/21/2020 0038 by Jovon Johnson  Outcome: Ongoing  Note: Patient is calm and cooperative this shift. Patient seems to be in good spirits.

## 2020-09-21 NOTE — PROGRESS NOTES
[address] [address]  Inpatient Rehabilitation  Occupational Therapy  Progress Note  Time:  Time In: 2414  Time Out: 1741  Timed Code Treatment Minutes: 60 Minutes  Minutes: 60          Date: 2020  Patient Name: Tyshawn Cruz,   Gender: female      Room: [facility]/054-A  MRN: [id number]  : 1950  (65 y.o.)  Referring Practitioner: Dr. Kemal Narayan  Diagnosis: subcapital fracture of the left femur , s/p repair  Additional Pertinent Hx: She was admitted 2020 after being brought into the emergency department by [address] [facility] for injuries sustained after a syncopal event. The patient had a fall that occurred while she was walking in the grocery store when she blacked out and when she regained consciousness she found herself lying on the floor with people standing around her. She had left-sided hip pain and also abrasions on her left arm along with a left-sided superficial hematoma on her parietal scalp. Her initial x-rays showed an acute slightly impacted subcapital fracture of the left femoral neck. She underwent surgical correction with a closed reduction and percutaneous screw fixation of the left hip impacted subcapital fracture of the left femoral neck by Dr. Alpa Mejia on 2020. Restrictions/Precautions:  Restrictions/Precautions: Weight Bearing, General Precautions, Fall Risk  Left Lower Extremity Weight Bearing: Weight Bearing As Tolerated    SUBJECTIVE: cooperative. Reports not feeling as well this AM. Dizziness with mobility. BP taken, 111/68 HR 85 BPM sitting after c/o dizziness, upon standing 107/67 HR 95- with a symbol of irrigular HR. Nursing notified. Nursing then reported pt's blood sugar was low prior to session 77. PAIN: no/10: denied pain. COGNITION: Decreased Recall, Decreased Insight and Impaired Memory    ADL:   Feeding: Modified Independent. pt with tremoring throughout. Pt utilizing mouth to open small pkgs, syrup.  Discussed with pt and her caregiver options trialed for adapted silverware. pt reports an understanding however reports that they haven't been very effective for her and tremoring. Grooming: Supervision. standing to wash hands in apt. Kim Neal BALANCE:  Sitting Balance:  Modified Independent. Standing Balance: Stand By Assistance. BED MOBILITY:  Not Tested    TRANSFERS:  Sit to stand. SBA with 2 cues for safe hand placement. Stand to Sit: Stand By Assistance. min cues for reaching for surface x 2 trials. FUNCTIONAL MOBILITY:  Assistive Device: Rolling Walker  Assist Level:  Stand By Assistance. Distance: To and from therapy apartment   upon entering the apartment and returning to room. Pt c/o dizziness. Nursing notified. ADDITIONAL ACTIVITIES:  Pt completed kitchen tasks in standing x 7 min then x 5 min for retrieving items to make a simple breakfast meal. PT required minimal cues for RW safety and mod cues for safe  And adapted tech to bend to the floor to  items dropped. Pt tends to hold the RW vs countertop. 3 trials and mod cues for safe completion. Discussed option for increased safety to utilize a reacher. Pt verbalized an understanding . PT reports not feeling  well this AM and requested to return to room for breakfast.     ASSESSMENT:  Activity Tolerance:  Patient tolerance of  treatment: good. Assessment: Assessment: Jos Traore is making steady gains with ADLs and safe mobility meeting 3/5 STGs. She currently requires min A for LE ADLs, CGA for safe transfers and  ambulation with the RW. She requires minimal cueing for safe hand placement with transfers as well as RW safety with functional ambulation. She requires moderate cues to max cues at times for problem solving RW safety with IADL tasks . THis remains a decline from being independent with ADLs, and homemaking tasks .  Pt would benefit from additional skilled OT services to address increasing independence in self care and functional mobility to return home as independently as possibe to reduce incidence of falls and caregiver burdon. Discharge Recommendations: 24 hour supervision or assist  Equipment Recommendations: Equipment Needed: Yes  Other: Pt may benefit from a BSC, shower chair, grab bars in the shower . Need to assess for LHAE. BSC ordered on 9-15. Plan: Times per week: 5xs week x 90 min, 1 x week x 30 min  Current Treatment Recommendations: Endurance Training, Functional Mobility Training, Self-Care / ADL, Equipment Evaluation, Education, & procurement, Patient/Caregiver Education & Training, Home Management Training, Safety Education & Training    Patient Education  Patient Education: Plan of Care, ADL's and IADL's    Goals  Short term goals  Time Frame for Short term goals: 1 week  Short term goal 1: PT will complete ADL routine with 0-1 cues for organization, problem solving adapted tech and Setup with LHAE PRN to increase independence in self care tasks MET REVISE  Short term goal 2: Pt will complete simple grooming tasks with  S while using 1-2 hand release to increase her independence with toileting routine. MET REVISE  Short term goal 3: PT will complete functional mobility to/ from the BR as well as around obstacles with S  and 0-1 cues for safe tech to increase independence in toileting tasks at home NOT MET CONTINUE  Short term goal 4: Pt will complete self feeding tasks with AE without cues to increase ability to eat soft foods MET DISCONTINUE  Short term goal 5: Pt will complete 1 step homemaking tasks iwth no > min cues for safe tech to increase independence in retrieving a snack NOT MET CONTINUE  Long term goals  Time Frame for Long term goals : 2-3 weeks  Long term goal 1: Pt will complete ADL routine with no cues for safe and adapted tech to increase independence in self care tasks.  NOT MET CONTINUE  Long term goal 2: Pt will complete 2 step homemaking tasks with no cues for safe tech and S for problem solving to increase independence in light homemaking tasks NOT MET CONTINUE    Following session, patient left in safe position with all fall risk precautions in place.     Revised Short-Term Goals  Short term goals  Time Frame for Short term goals: 1 week  Short term goal 1: PT will complete ADL routine with 0-1 cues for organization, problem solving adapted tech and S with LHAE PRN to increase independence in self care tasks  Short term goal 2: Pt will complete  grooming/ toilteting tasks with MI  while using 1-2 hand release to increase her independence with [place] ADL routine  Short term goal 3: PT will complete functional mobility to/ from the BR as well as around obstacles with S  and 0-1 cues for safe tech to increase independence in toileting tasks at home  Short term goal 4: Pt will complete self feeding tasks with AE without cues to increase ability to eat soft foods  Short term goal 5: Pt will complete 1 step homemaking tasks with no > min cues for safe tech safely  to increase independence in retrieving a snack  Long term goals  Time Frame for Long term goals : 1 week  Long term goal 1: Pt will complete ADL routine with no cues for safe and adapted tech to increase independence in self care tasks  Long term goal 2: Pt will complete 2 step homemaking tasks with no cues for safe tech and S for problem solving to increase independence in light homemaking tasks

## 2020-09-21 NOTE — PROGRESS NOTES
[facility]  INPATIENT PHYSICAL THERAPY  PROGRESS NOTE  [facility]- [address]      Time In: 6258  Time Out: 6284  Timed Code Treatment Minutes: 60 Minutes  Minutes: 60          Date: 2020  Patient Name: Lyubov Mcarthur,  Gender:  female        MRN: [id number]  : 1950  (65 y.o.)     Referring Practitioner: DAVID Henriquez MD  Diagnosis: subcapital fracture of left femur s/p repair  Additional Pertinent Hx: Per H&P, pt is a 65 y.o. female with a past medical history of diabetes and prior MI who presents to the emergency department for evaluation of syncope. Patient states that she was at the grocery store this morning shortly prior to arrival, and upon leaving the grocery store she felt acutely short of breath, and lost consciousness. She reports that she fell onto her left side, and landed on her elbow and the back of her head. She endorses losing consciousness after she fell, and bystanders report that she was dazed. She regained consciousness shortly later, however she reports that she has not been ambulatory since her fall. In the emergency department she is complaining of head pain, shoulder pain, elbow pain, and left hip pain. She is additionally complaining of swelling to her left posterior scalp. Pt is now s/p HIP PINNING on 2020 by Dr Ara Espana. To IPR on      Prior Level of Function:  Lives With: Alone  Type of Home: Apartment  Home Layout: One level  Home Access: Level entry  Home Equipment: Quad cane   Bathroom Shower/Tub: Tub/Shower unit, Walk-in shower(Pt prefers the walk in shower.)  Bathroom Toilet: Standard(uses a countertop on Right side.)  Bathroom Accessibility: Accessible    Receives Help From: Friend(s)  ADL Assistance: Independent  Homemaking Assistance: Independent  Homemaking Responsibilities: Yes  Ambulation Assistance: Independent  Transfer Assistance: Independent  Active :  Yes  Additional Comments: Pt did not use any AD for ambulating. She did her own homemaking and cooking. reports walking daily PTA. Has a  1 x month    Restrictions/Precautions:  Restrictions/Precautions: Weight Bearing, General Precautions, Fall Risk  Left Lower Extremity Weight Bearing: Weight Bearing As Tolerated    SUBJECTIVE: pt stated that she wasn't feeling great earlier today and was having BP issues did check her BP it was 119/62 nursing clarified that it was her blood sugar that was a little low and not having any BP issues, pt very talkative and needed many cues to stay on task throughout session, I encouraged her to return to bed following session due to a sore on her buttocks and need for pressure relief       PAIN: 4/10: at left hip     OBJECTIVE:  Bed Mobility:  Supine to Sit: Modified Independent  Sit to Supine: Modified Independent   On mat table and then into bed at end of session   Transfers:  Sit to Stand: Stand By Assistance to supervision   Stand to Sit:Stand By Assistance to supervision   From bedside chair, mat table and bed she cont to require verbal and tactile cues for safe hand placement > 50% of the time   Car:Stand By Assistance, with cues for safe hand placement       Ambulation:  Stand By Assistance  Distance: 150x2, 80x1  Surface: Level Tile  Device:Rolling Walker  Gait Deviations:  Pt required cues for proper posture with gait and did require frequent reminders along with staying closer to her walker, also noted decreased left TKE at stance phase with a shorter step length at right LE and decreased heel strike needing many cues for heel strike   Stairs:  Stand By Assistance  Number of Steps: 1  Height: 6\" step with Rolling Walker pt needed cues to place both UEs on the walker she was trying one on walker and one on the post discussed that w/ only holding onto walker on one side it can tip over       Exercise:  Patient was guided in 1 set(s) 10-15 reps of exercise to both lower extremities.   Ankle pumps, Glut sets, [employer], Heelslides, Short arc quads, Hip abduction/adduction, Straight leg raises, Long arc quads and issued pt with written instructions, pt cont to require cues for proper technique and to keep count she gets distracted throughout ex cues given to focus on her activity . Exercises were completed for increased independence with functional mobility. Functional Outcome Measures: Not completed       ASSESSMENT:  Assessment: Patient progressing toward established goals, meeting 4/5 short term goals and 2/6 long term goals. Pt cont to require several verbal and tactile and even written cues throughout session for safety  as she is easily distracted. Pt did show a 2 point gain on her Tinetti, showing improved balance,  though at 18 still showing high risk for falls. She would benefit from cont skilled therapy at discharge   Activity Tolerance:  Patient tolerance of  treatment: fair.         Equipment Recommendations:Equipment Needed: Yes(RW)  Discharge Recommendations:    Continue to assess pending progress    Plan: Times per week: 5x/wk 90 min, 1x/wk 30 min  Current Treatment Recommendations: Strengthening, ROM, Balance Training, Functional Mobility Training, Gait Training, Transfer Training, Patient/Caregiver Education & Training, Safety Education & Training, Home Exercise Program, Endurance Training, Stair training, Equipment Evaluation, Education, & procurement, Wheelchair Mobility Training, ADL/Self-care Training    Patient Education  Patient Education: Plan of Care, Home Exercise Program, Transfers, Gait, Stairs    Goals:  Patient goals : go home  Short term goals  Time Frame for Short term goals: 1 week  Short term goal 1: pt to complete supine to/from sit at SBA to get in and out of bed  MET, see LTG  Short term goal 2: pt to complete sit to/from stand at SBA to rise from bed or chair  MET, see LTG  Short term goal 3: pt to ambulate >75ft with RW at [facility] for mobility in the home MET  Short term goal 4: pt to complete car transfer at [facility] for safe transport to home  MET, see LTG  Short term goal 5: pt score on Tinetti balance test will improve to >=20 for improved balance and decreased fall risk at home- NOT MET, continue  Long term goals  Time Frame for Long term goals : 2 weeks  Long term goal 1: pt to complete supine to/from sit at Mod I to get in and out of bed-MET  Long term goal 2: pt to complete sit to/from stand at Mod I to rise from bed or chair NOT MET  Long term goal 3: pt to ambulate >50ft with RW at Mod I, >125ft with RW at S for mobility in the home and community NOT MET  Long term goal 4: pt to complete car transfer at S for safe transport to home- MET  Long term goal 5: pt to negotiate 6\" platform step with RW at S for curb negotiation in the community- NOT MET  Long term goal 6: pt score on Tinetti balance test will improve to >=24 for improved balance and decreased fall risk at home   NOT MET    Following session, patient left in safe position with all fall risk precautions in place. Treatment session and note completed by Tori Amin PTA.   Assessment and goal revision of Progress Note completed by Song Luna, PT.

## 2020-09-21 NOTE — PROGRESS NOTES
[address] [facility] THERAPY  [address]  PROGRESS NOTE    TIME  SLP Individual Minutes  Time In: 8578  Time Out: 8955  Minutes: 59  Timed Code Treatment Minutes: 61 Minutes       Date: 2020  Patient Name: Radha iFnley      CSN: [id number]   : 1950  (65 y.o.)  Gender: female   Referring Physician:  Dr. Kelly Bush    Diagnosis: Left femoral neck impacted subcapital fracture  Secondary Diagnosis: Cognition  Precautions: Fall risk  Current Diet: Regular diet, thin liquids  Swallowing Strategies: Standard Universal Swallow Precautions  Date of Last MBS: Not Applicable    Pain:  No pain reported. Subjective:  Patient seen at bedside with RN permission on this date. The pt was alert, disoriented without cues, and appeared very flat on this date. At the end of ST session, the pt expressed suicidal ideation. The pt expressed sadness with her cognitive impairment, upset about poor safety awareness and loosing her independence, as well as, expressing sadness with the loss of her  (~6 years ago). *RN Salinas Cameron notified of comments/feelings. *MD and SW notified. Short-Term Goals:  SHORT TERM GOAL #1:  Goal 1: Pt will complete orientation, immediate/delayed recall and working memory tasks with 80% accuracy given mod cues to improve retention of new and functional information. Goal not met- continue  INTERVENTIONS:   Orientation:   Name: IND  : IND  Age: Max A  Place: IND  Event: IND  Month: IND with external cues  Year: IND with external cues   Date:  Max with external cues      Short-Term Memory:  Immediate Recall:    Trial: 2/5 IND, 1/5 min A, 1/5 mod A, 1/5 Max A   Trial 2: 4/5 min a, 1/5 Max A  Delayed Recall:   (5 minutes) 1/5 IND, 2/5 min A, 1/5 mod A, 0/5 max, 1/5 total A  (20 minutes) 4/5 min A, 1/5 max A    Completed review of STM strategies using the acronym WRAP--Write it down, Repeat it, Associate it, and Pictures it.   -W: indep, R: max A, A: mod A, P: max A      SHORT TERM GOAL #2:  Goal 2: Pt will complete functional sequencing and thought organizational tasks with 70% accuracy given mod cues to improve overall executive functioning skills. Goal not met- continue  INTERVENTIONS:  Organizational Naming:  -WPM   (Animals) 6 Wpm, mod verbal cues, mod A for attention  (Fruit) 7 WPM, minus 1 repetition, mod to max verbal cues   *poor attention, disorganized thought processing, tangential, perseverative     Calender organization/location:  -Which dates do ou exercise: 2/10 IND, 4/10 min A, 4/10 mod A  -How many hours exercised in 3rd week of June: Max A  -How many birthdays are there in June: 2/3 min A, 1/3 total A  *cues to [place] response then count/. Pt circled 3 additional date unrelated to the target.   -What date/time/DUSTY is your hair appointment: 1/3 min A (DUSTY), 2/3 max A (date/time)  *increased difficulties visual scanning to locate   *required written/visual cues  (circled target)    Verbal reasoning on how to determine if there are missing card in the deck: max A to complete verbal reasoning to sort by suit, numbers, or order    SHORT TERM GOAL #3:  Goal 3: Patient will complete functional problem solving, reasoning and basic computational tasks with 70% accuracy given mod cues to improve overall management of ADLs. Goal partially met- continue  INTERVENTIONS:   Problem solving/Reasoning:   (Banana/Orange)  Similar: 1/2 IND, 1/2 mod A  Different: 2/2 min A  (Fiona/Jenny)  Similar: 1/2 IND, 1/2 min A  Different: 2/2 IND   (Watch/Ruler)  Similar: 2/2 Max A   Different: 2/2 max A  (Cup/pop Can) Similar: 2/2 IND    Different: 1/2 mod I additional time, 1/2 min A  *poor reasoning and inhibition      Safe to go home alone: Independent to realize she can NOT go home without assistance.    *hurt self, get into trouble IND  *Meds: min A  *Drive: IND   *fall risk: IND  *Call light: min to locate, IND to press       Long-Term Goals: Timeframe for Long-term Goals: 3 weeks    LONG TERM GOAL #1:  Goal 1: Pt will improve cognitive linguistic skills to a min assist level in order to improve level of independence in the home environment. Goal partially met -Continue     Comprehension: 5 - Patient understands basic needs (hungry/hot/pain)  Expression: 5 - Expresses basic ideas/needs only (hungry/hot/pain)  Social Interaction: 4 - Patient appropriate 75-90%+ of the time  Problem Solving: 3 - Patient solves simple/routine tasks 50%-74%  Memory: 2 - Patient remembers 25%-49% of the time     ST FIM ASSESSMENT:     Admission score Current score Goal Status   COMMUNICATION 4 - Patient understands basic needs 75-90%+ of the time   5 - Patient understands basic needs (hungry/hot/pain)   Progressing   EXPRESSION 5 - Expresses basic ideas/needs only (hungry/hot/pain)     5 - Expresses basic ideas/needs only (hungry/hot/pain)   Stable    SOCIAL INTERACTION 5 - Patient is appropriate with supervision/cues   4 - Patient appropriate 75-90%+ of the time   Not Progressing   PROBLEM SOLVING 3 - Patient solves simple/routine tasks 50%-74%   3 - Patient solves simple/routine tasks 50%-74%   stable    MEMORY 2 - Patient remembers 25%-49% of the time   2 - Patient remembers 25%-49% of the time   stable      PROGRESS NOTE:  Patient partially met 1/3 STG, and partially met 1/1 LTG. The pt demonstrated improved insight and awareness of cognitive linguistic deficits, able to express agreement and understanding of current recommendations for 24 hour SUP. The pt continues to present with moderate to severe cognitive linguistic deficits in the domains of orientation, requiring external aid + cues, BASIC STM (immediate/delayed/working), attention, visual scanning, verbal/visual reasoning, functional problem solving, sequencing, organization, and executive functioning. Attention and STM deficits remain a significant barrier for progress.  Speech, voice, receptive/expressive language remain in tact for basic and mildly complex communication, while thought process and language become disorganized as complexity increases. Recommended 24 hour SUP and direct 1:1 assist to safety return to home setting. Pt is at a high risk of re-admission without recommended level of SUP. EDUCATION:  Learner: Patient  Education:  Reviewed recommendations for follow-up, Education Related to Potential Risks and Complications Due to Impairment/Illness/Injury, Education Related to [employer] and Wellness and Home Safety Education  Evaluation of Education: Sharad Howe understanding    ASSESSMENT/PLAN:  Activity Tolerance:  Patient tolerance of treatment: Good     Assessment/Plan: Patient progressing toward established goals. Continues to require skilled care of licensed speech pathologist to progress toward achievement of established goals and plan of care. Plan for Next Session:  Working memory, problem solving/reasoning, recall     Reena Layne MA., [id number]

## 2020-09-21 NOTE — PROGRESS NOTES
Comprehensive Nutrition Assessment    Type and Reason for Visit:  Reassess    Nutrition Recommendations/Plan:   Continue current diet. Send Glucerna twice/day. Send Nepro daily. Consider MVI. Nutrition Assessment:     Pt. with no improvement from a nutritional standpoint AEB wt loss noted (see below). Remains at risk for further nutritional compromise r/t increased nutrient needs for wound healing s/p percutaneous screw fixation of left hip  closed reduction (9/6/20) and underlying medical condition (Hx CAD, CKD, DM, HTN, HLD, Osteoporosis). Nutrition recommendations/interventions as per above. Malnutrition Assessment:  Malnutrition Status: At risk for malnutrition (Comment)    Context:  Acute Illness     Findings of the 6 clinical characteristics of malnutrition:  Energy Intake:  Mild decrease in energy intake (Comment)(51-75%)  Weight Loss:  (8.5% wt loss in 16 days)     Body Fat Loss:  No significant body fat loss     Muscle Mass Loss:  No significant muscle mass loss    Fluid Accumulation:  1 - Mild Extremities   Strength:  Not Performed    Estimated Daily Nutrient Needs:  Energy (kcal):  3882-9024 kcal/day (25-30); Weight Used for Energy Requirements:  (70.6 kg on 9/9)     Protein (g):  85+ g/day (1.4+); Weight Used for Protein Requirements:  (IBW 61.4 kg)          Nutrition Related Findings:  Pt seen ~ 1015 appears lethargic, states she likes Glucerna & drinks ~50% of it. States good appetite. She denies chewing/swallowing difficulty. Labs: (9/21) BUN 26, Creatinine 1.5, Hemoglobin 9.6. Last BM documented was (9/15) however Vicente Reading mentions pt had multiple BMs on (9/18). Meds Milk of Magnesia, Glycolax, Vitamin D, Oscal, Iron 325, Vitamin C, Lantus, Senokot S. Wounds:  (coccyx mid purple/pink area, incision hip left)       Current Nutrition Therapies:    DIET CARB CONTROL; Dietary Nutrition Supplements: Diabetic Oral Supplement  Nepro daily to start.   Anthropometric Measures:  · Height: 5' 7\" (170.2 cm)  · Current Body Weight: 149 lb 3.2 oz (67.7 kg)   · Admission Body Weight: 155 lb 9.6 oz (70.6 kg)(9/9; +1 LLE edema)    · Usual Body Weight: 160 lb (72.6 kg)(per pt report. Per EMR: 163 lb on 9/5/20)     · Ideal Body Weight: 135 lbs; BMI: 23.4  ·       · BMI Categories: Normal Weight (BMI 22.0 to 24.9) age over 72       Nutrition Diagnosis:   · Increased nutrient needs related to inadequate protein-energy intake as evidenced by wounds      Nutrition Interventions:   Food and/or Nutrient Delivery:  Continue Current Diet, Modify Oral Nutrition Supplement, Vitamin Supplement  Nutrition Education/Counseling:  Education initiated(Encourage dgood oral intake best effort)   Coordination of Nutrition Care:  Continued Inpatient Monitoring    Goals:  Pt will consume 75% or more of meals to aid in healing or weight loss       Nutrition Monitoring and Evaluation:     Food/Nutrient Intake Outcomes:  Food and Nutrient Intake, Supplement Intake, Vitamin/Mineral Intake  Physical Signs/Symptoms Outcomes:  Biochemical Data, Fluid Status or Edema, Weight, Skin, Nutrition Focused Physical Findings     Discharge Planning:     Too soon to determine     Electronically signed by Eliud Cadena RD, LD on 9/21/20 at 3:15 PM EDT    Contact: [phone]

## 2020-09-21 NOTE — PROGRESS NOTES
RECREATIONAL THERAPY  MISSED TREATMENT    []Transitional Care Unit  [x]Inpatient Rehabilitation    Date:  9/21/2020            Patient Name: Gallo Charles           MRN: [id number]  Acct: [de-identified]          YOB: 1950 (65 y.o.)       Gender: female   Diagnosis: subcapital fracture of the left femur , s/p repair  Physician: Referring Practitioner: Dr. Amarilis Cobos:    []Hold treatment per nursing request  []Patient refusal  []Family declined treatment  []Patient at testing and/or off the floor  []Patient unavailable, with PT/OT/nursing, etc.  [x]Other pt not interested in coming to our craft group this afternoon-no RT  Gerard Cabrera, CTRS    9/21/2020

## 2020-09-21 NOTE — PROGRESS NOTES
[address]  Occupational Therapy  Daily Note  Time:    Time In: 1430  Time Out: 1500  Timed Code Treatment Minutes: 30 Minutes  Minutes: 30          Date: 2020  Patient Name: Mac Noguera,   Gender: female      Room: [facility]54/054-A  MRN: [id number]  : 1950  (65 y.o.)  Referring Practitioner: Dr. French Barkley  Diagnosis: subcapital fracture of the left femur , s/p repair  Additional Pertinent Hx: She was admitted 2020 after being brought into the emergency department by [address] [place] [facility] for injuries sustained after a syncopal event. The patient had a fall that occurred while she was walking in the grocery store when she blacked out and when she regained consciousness she found herself lying on the floor with people standing around her. She had left-sided hip pain and also abrasions on her left arm along with a left-sided superficial hematoma on her parietal scalp. Her initial x-rays showed an acute slightly impacted subcapital fracture of the left femoral neck. She underwent surgical correction with a closed reduction and percutaneous screw fixation of the left hip impacted subcapital fracture of the left femoral neck by Dr. Sherryle Pae on 2020. Restrictions/Precautions:  Restrictions/Precautions: Weight Bearing, General Precautions, Fall Risk  Left Lower Extremity Weight Bearing: Weight Bearing As Tolerated      SUBJECTIVE: cooperative, fatigued,     PAIN: 4/10: in hip    COGNITION: Slow Processing, Decreased Recall, Impaired Memory and Decreased Problem Solving    ADL:   Grooming: Stand By Assistance. min cues for safety to turn the RW close to the sinks  Toileting: Stand By Assistance. Toilet Transfer: Stand By Assistance. Barrera Sarkar BALANCE:  Sitting Balance:  Supervision. Standing Balance: Stand By Assistance. BED MOBILITY:  Sit to Supine: Stand By Assistance      TRANSFERS:  Sit to Stand:  Stand By Assistance.   1 cue for transfer to push up. Stand to Sit: Contact Guard Assistance. min cues for stepping closer to the bed. FUNCTIONAL MOBILITY:  Assistive Device: Rolling Walker  Assist Level:  Stand By Assistance. Distance: To and from therapy apartment   fatigued. ASSESSMENT:  Assessment: Elias Barry is making steady gains with ADLs and safe mobility meeting 4/5 STGs. She currently requires min A for LE ADLs, SBA for safe transfers and  ambulation with the RW. She continues to require minimal cueing for safe hand placement with transfers as well as RW safety with functional ambulation even with external visual and auditary aides. She requires minimal to  moderate cues for problem solving RW safety with IADL tasks . THis remains a decline from being independent with ADLs, and homemaking tasks . Pt would benefit from additional skilled OT services to address increasing independence in self care and functional mobility to return home as independently as possibe to reduce incidence of falls and caregiver burdon. Activity Tolerance:  Patient tolerance of  treatment: good. Discharge Recommendations: 24 hour supervision or assist    Equipment Recommendations: Equipment Needed: Yes  Other: Pt may benefit from a BSC, shower chair, grab bars in the shower . Need to assess for LHAE. BSC ordered on 9-15.   Plan: Times per week: 5xs week x 90 min, 1 x week x 30 min  Current Treatment Recommendations: Endurance Training, Functional Mobility Training, Self-Care / ADL, Equipment Evaluation, Education, & procurement, Patient/Caregiver Education & Training, Home Management Training, Safety Education & Training    Patient Education  Patient Education: ADL's and IADL's    Goals  Short term goals  Time Frame for Short term goals: 1 week  Short term goal 1: PT will complete ADL routine with 0-1 cues for organization, problem solving adapted tech and S with LHAE PRN to increase independence in self care tasks  Short term goal 2: Pt will complete  grooming/ toilteting tasks with MI  while using 1-2 hand release to increase her independence with [place] ADL routine  Short term goal 3: PT will complete functional mobility to/ from the BR as well as around obstacles with S  and 0-1 cues for safe tech to increase independence in toileting tasks at home  Short term goal 4: Pt will complete self feeding tasks with AE without cues to increase ability to eat soft foods  Short term goal 5: Pt will complete 1 step homemaking tasks with no > min cues for safe tech safely  to increase independence in retrieving a snack  Long term goals  Time Frame for Long term goals : 1 week  Long term goal 1: Pt will complete ADL routine with no cues for safe and adapted tech to increase independence in self care tasks  Long term goal 2: Pt will complete 2 step homemaking tasks with no cues for safe tech and S for problem solving to increase independence in light homemaking tasks    Following session, patient left in safe position with all fall risk precautions in place.

## 2020-09-22 LAB
GLUCOSE BLD-MCNC: 102 MG/DL (ref 70–108)
IRON: 46 UG/DL (ref 50–170)

## 2020-09-22 PROCEDURE — 6370000000 HC RX 637 (ALT 250 FOR IP): Performed by: PHYSICAL MEDICINE & REHABILITATION

## 2020-09-22 PROCEDURE — 97530 THERAPEUTIC ACTIVITIES: CPT

## 2020-09-22 PROCEDURE — 36415 COLL VENOUS BLD VENIPUNCTURE: CPT

## 2020-09-22 PROCEDURE — 82948 REAGENT STRIP/BLOOD GLUCOSE: CPT

## 2020-09-22 PROCEDURE — 97130 THER IVNTJ EA ADDL 15 MIN: CPT

## 2020-09-22 PROCEDURE — 6370000000 HC RX 637 (ALT 250 FOR IP): Performed by: INTERNAL MEDICINE

## 2020-09-22 PROCEDURE — 97535 SELF CARE MNGMENT TRAINING: CPT

## 2020-09-22 PROCEDURE — 1180000000 HC REHAB R&B

## 2020-09-22 PROCEDURE — 6370000000 HC RX 637 (ALT 250 FOR IP): Performed by: FAMILY MEDICINE

## 2020-09-22 PROCEDURE — 97129 THER IVNTJ 1ST 15 MIN: CPT

## 2020-09-22 PROCEDURE — 97116 GAIT TRAINING THERAPY: CPT

## 2020-09-22 PROCEDURE — 97110 THERAPEUTIC EXERCISES: CPT

## 2020-09-22 PROCEDURE — 83540 ASSAY OF IRON: CPT

## 2020-09-22 RX ORDER — BENZONATATE 100 MG/1
100 CAPSULE ORAL 3 TIMES DAILY PRN
Status: DISCONTINUED | OUTPATIENT
Start: 2020-09-22 | End: 2020-09-24 | Stop reason: HOSPADM

## 2020-09-22 RX ORDER — FLUOXETINE 10 MG/1
10 CAPSULE ORAL DAILY
Status: DISCONTINUED | OUTPATIENT
Start: 2020-09-22 | End: 2020-09-24 | Stop reason: HOSPADM

## 2020-09-22 RX ADMIN — FLUOXETINE HYDROCHLORIDE 10 MG: 10 CAPSULE ORAL at 14:11

## 2020-09-22 RX ADMIN — METFORMIN HYDROCHLORIDE 1000 MG: 500 TABLET ORAL at 16:58

## 2020-09-22 RX ADMIN — Medication 5000 UNITS: at 16:58

## 2020-09-22 RX ADMIN — ACETAMINOPHEN 650 MG: 325 TABLET ORAL at 20:12

## 2020-09-22 RX ADMIN — PRIMIDONE 50 MG: 50 TABLET ORAL at 20:14

## 2020-09-22 RX ADMIN — METFORMIN HYDROCHLORIDE 1000 MG: 500 TABLET ORAL at 08:02

## 2020-09-22 RX ADMIN — Medication 500 MG: at 08:02

## 2020-09-22 RX ADMIN — ACETAMINOPHEN 650 MG: 325 TABLET ORAL at 05:37

## 2020-09-22 RX ADMIN — FERROUS SULFATE TAB 325 MG (65 MG ELEMENTAL FE) 325 MG: 325 (65 FE) TAB at 16:58

## 2020-09-22 RX ADMIN — Medication 5000 UNITS: at 12:13

## 2020-09-22 RX ADMIN — FERROUS SULFATE TAB 325 MG (65 MG ELEMENTAL FE) 325 MG: 325 (65 FE) TAB at 08:02

## 2020-09-22 RX ADMIN — ATORVASTATIN CALCIUM 80 MG: 80 TABLET, FILM COATED ORAL at 20:14

## 2020-09-22 RX ADMIN — PROPRANOLOL HYDROCHLORIDE 80 MG: 80 CAPSULE, EXTENDED RELEASE ORAL at 08:02

## 2020-09-22 RX ADMIN — Medication 5000 UNITS: at 08:02

## 2020-09-22 RX ADMIN — Medication 250 MG: at 08:02

## 2020-09-22 RX ADMIN — ASPIRIN 81 MG: 81 TABLET ORAL at 08:01

## 2020-09-22 RX ADMIN — PRIMIDONE 200 MG: 50 TABLET ORAL at 08:02

## 2020-09-22 RX ADMIN — Medication 500 MG: at 16:58

## 2020-09-22 RX ADMIN — MULTIPLE VITAMINS W/ MINERALS TAB 1 TABLET: TAB at 08:02

## 2020-09-22 RX ADMIN — CLOPIDOGREL BISULFATE 75 MG: 75 TABLET ORAL at 08:01

## 2020-09-22 RX ADMIN — INSULIN GLARGINE 24 UNITS: 100 INJECTION, SOLUTION SUBCUTANEOUS at 08:00

## 2020-09-22 RX ADMIN — Medication 250 MG: at 16:58

## 2020-09-22 RX ADMIN — ACETAMINOPHEN 650 MG: 325 TABLET ORAL at 12:13

## 2020-09-22 ASSESSMENT — ENCOUNTER SYMPTOMS
TROUBLE SWALLOWING: 0
ALLERGIC/IMMUNOLOGIC NEGATIVE: 1
COUGH: 0
SHORTNESS OF BREATH: 0
EYES NEGATIVE: 1
VOICE CHANGE: 0
CONSTIPATION: 0
NAUSEA: 0

## 2020-09-22 ASSESSMENT — PAIN SCALES - GENERAL
PAINLEVEL_OUTOF10: 0
PAINLEVEL_OUTOF10: 2
PAINLEVEL_OUTOF10: 2
PAINLEVEL_OUTOF10: 0
PAINLEVEL_OUTOF10: 0
PAINLEVEL_OUTOF10: 2

## 2020-09-22 ASSESSMENT — PAIN DESCRIPTION - FREQUENCY: FREQUENCY: INTERMITTENT

## 2020-09-22 ASSESSMENT — PAIN DESCRIPTION - DESCRIPTORS: DESCRIPTORS: ACHING

## 2020-09-22 ASSESSMENT — PAIN DESCRIPTION - ONSET: ONSET: ON-GOING

## 2020-09-22 ASSESSMENT — PAIN - FUNCTIONAL ASSESSMENT: PAIN_FUNCTIONAL_ASSESSMENT: ACTIVITIES ARE NOT PREVENTED

## 2020-09-22 ASSESSMENT — PAIN DESCRIPTION - PAIN TYPE: TYPE: ACUTE PAIN

## 2020-09-22 ASSESSMENT — PAIN DESCRIPTION - LOCATION: LOCATION: HIP

## 2020-09-22 ASSESSMENT — PAIN DESCRIPTION - ORIENTATION: ORIENTATION: LEFT

## 2020-09-22 NOTE — PLAN OF CARE
Problem: Mood - Altered:  Goal: Mood stable  Description: Mood stable  Outcome: Ongoing  Note: Patient seems depressed and upset but denies that she is.

## 2020-09-22 NOTE — PROGRESS NOTES
[address] [place] THERAPY  [address] [facility]  DAILY NOTE    TIME  SLP Individual Minutes  Time In: 1140  Time Out: 1200  Minutes: 20  Timed Code Treatment Minutes: 20 Minutes       Date: 2020  Patient Name: Seven Cedillo      CSN: [id number]   : 1950  (65 y.o.)  Gender: female   Referring Physician:  Dr. Geni Jacobs    Diagnosis: Left femoral neck impacted subcapital fracture  Secondary Diagnosis: Cognition  Precautions: Fall risk  Current Diet: Regular diet, thin liquids  Swallowing Strategies: Standard Universal Swallow Precautions  Date of Last MBS: Not Applicable    Pain:  No pain reported. Subjective:  Patient seated upright in bed, pleasant and attentive. Patient apologized for yesterday's behavior and expressed guilt for having [place] attitude. \" Encouragement and reassurance offered regarding apology to which patient was receptive. Short-Term Goals:  SHORT TERM GOAL #1:  Goal 1: Pt will complete orientation, immediate/delayed recall and working memory tasks with 80% accuracy given mod cues to improve retention of new and functional information. INTERVENTIONS: Did not address d/t focus on other goals. SHORT TERM GOAL #2:  Goal 2: Pt will complete functional sequencing and thought organizational tasks with 70% accuracy given mod cues to improve overall executive functioning skills. INTERVENTIONS: Did not address d/t focus on other goals. SHORT TERM GOAL #3:  Goal 3: Patient will complete functional problem solving, reasoning and basic computational tasks with 70% accuracy given mod cues to improve overall management of ADLs. INTERVENTIONS: Medication management task with 2 medications from discharge medication list:  Rx 1 (1x daily): 2 errors with min cues. Cues required to notice errors, refer to Rx label, place pills in pill box correctly   Rx 2 (3x daily with meals): 3 errors with mod cues.  Cues required to determine where to place pills in pill box, refer to Rx label, identify errors. Due to hand tremors, therapist placed pills in box based on pt's instructions. *Pt showed improved success when directing therapist to place pills in pill box. Pt requires frequent cues to refer to Rx instructions when determining where to place pills in pill box and requires cues to remember where she has already placed pills in pill box. Rx instructions with increasing complexity that include special instructions or multiple intake times continue to be more difficult than basic instructions. Long-Term Goals:  Timeframe for Long-term Goals: 3 weeks    LONG TERM GOAL #1:  Goal 1: Pt will improve cognitive linguistic skills to a min assist level in order to improve level of independence in the home environment. Comprehension: 5 - Patient understands basic needs (hungry/hot/pain)  Expression: 5 - Expresses basic ideas/needs only (hungry/hot/pain)  Social Interaction: 4 - Patient appropriate 75-90%+ of the time  Problem Solving: 3 - Patient solves simple/routine tasks 50%-74%  Memory: 2 - Patient remembers 25%-49% of the time       EDUCATION:  Learner: Patient  Education:  Reviewed recommendations for follow-up and Home Safety Education  Evaluation of Education: Verbalizes understanding    ASSESSMENT/PLAN:  Activity Tolerance:  Patient tolerance of treatment: Good     Assessment/Plan: Patient progressing toward established goals. Continues to require skilled care of licensed speech pathologist to progress toward achievement of established goals and plan of care. Plan for Next Session:  Hospital navigation task    [place], [place], Speech Intern  Justina Cancino.  [address], [facility] [address], [address]

## 2020-09-22 NOTE — PROGRESS NOTES
[address]  Occupational Therapy  Daily Note  Time:   Time In: 1330  Time Out: 1400  Timed Code Treatment Minutes: 30 Minutes  Minutes: 30          Date: 2020  Patient Name: Immanuel Batista,   Gender: female      Room: [facility]54/054-A  MRN: [id number]  : 1950  (65 y.o.)  Referring Practitioner: Dr. Fanny Villafana  Diagnosis: subcapital fracture of the left femur , s/p repair  Additional Pertinent Hx: She was admitted 2020 after being brought into the emergency department by [address] [place] [facility] for injuries sustained after a syncopal event. The patient had a fall that occurred while she was walking in the grocery store when she blacked out and when she regained consciousness she found herself lying on the floor with people standing around her. She had left-sided hip pain and also abrasions on her left arm along with a left-sided superficial hematoma on her parietal scalp. Her initial x-rays showed an acute slightly impacted subcapital fracture of the left femoral neck. She underwent surgical correction with a closed reduction and percutaneous screw fixation of the left hip impacted subcapital fracture of the left femoral neck by Dr. Viviane Cedillo on 2020. Restrictions/Precautions:  Restrictions/Precautions: Weight Bearing, General Precautions, Fall Risk  Left Lower Extremity Weight Bearing: Weight Bearing As Tolerated     SUBJECTIVE: Patient in chair upon arrival agreeable to OT treatment     PAIN: no     COGNITION: Decreased Recall and Decreased Insight    ADL:   Grooming: Stand By Assistance. standing at sink to complete oral care . BALANCE:  Sitting Balance:  Modified Independent. Standing Balance: Stand By Assistance. BED MOBILITY:  Not Tested    TRANSFERS:  Sit to Stand:  Stand By Assistance. Stand to Sit: Stand By Assistance. FUNCTIONAL MOBILITY:  Assistive Device: Rolling Walker  Assist Level:  Stand By Assistance.    Distance: greenhouse to therapy gym      ADDITIONAL ACTIVITIES:  Patient completed IADL tasks in therapy [place] [employer] this date. Patient completed standing at table watering plants with 1UE release from support with verbal cues for safe technique as patient would abandon RW, leaning outside of base of support and min impulsive throughout tasks. Min unsteady no LOB. Seated rest break at end of tasks. Activity completed to increase safety awareness and endurance for IADL tasks upon arrival home. ASSESSMENT:     Activity Tolerance:  Patient tolerance of  treatment: fair. Discharge Recommendations: Home with nursing aide, Home with Home health OT, 24 hour supervision or assist   Equipment Recommendations: Equipment Needed: Yes  Other: Pt may benefit from a BSC, shower chair, grab bars in the shower . BSC ordered on 9-15.   Plan: Times per week: 5xs week x 90 min, 1 x week x 30 min  Current Treatment Recommendations: Endurance Training, Functional Mobility Training, Self-Care / ADL, Equipment Evaluation, Education, & procurement, Patient/Caregiver Education & Training, Home Management Training, Safety Education & Training    Patient Education  Patient Education: IADL's    Goals  Short term goals  Time Frame for Short term goals: 1 week  Short term goal 1: PT will complete ADL routine with 0-1 cues for organization, problem solving adapted tech and S with LHAE PRN to increase independence in self care tasks  Short term goal 2: Pt will complete  grooming/ toilteting tasks with MI  while using 1-2 hand release to increase her independence with [place] ADL routine  Short term goal 3: PT will complete functional mobility to/ from the BR as well as around obstacles with S  and 0-1 cues for safe tech to increase independence in toileting tasks at home  Short term goal 4: Pt will complete self feeding tasks with AE without cues to increase ability to eat soft foods  Short term goal 5: Pt will complete 1 step homemaking tasks with

## 2020-09-22 NOTE — PLAN OF CARE
Care plan reviewed with patient and  verbalize understanding of the plan of care and contribute to goal setting. Problem: Injury - Risk of, Physical Injury:  Goal: Will remain free from falls  Description: Will remain free from falls  Outcome: Met This Shift  Note: Uses call light     Problem: Confusion - Acute:  Goal: Mental status will be restored to baseline  Description: Mental status will be restored to baseline  Outcome: Ongoing  Note: Working with therapy.  Ongoing cognition issues     Problem: Confusion - Acute:  Goal: Absence of continued neurological deterioration signs and symptoms  Description: Absence of continued neurological deterioration signs and symptoms  Outcome: Met This Shift  Note: No changes     Problem: Falls - Risk of:  Goal: Absence of physical injury  Description: Absence of physical injury  Outcome: Met This Shift  Note: No injuries     Problem: Falls - Risk of:  Goal: Will remain free from falls  Description: Will remain free from falls  Outcome: Met This Shift  Note: Uses call light

## 2020-09-22 NOTE — PROGRESS NOTES
Dressing: Stand By Assistance. Toileting: Stand By Assistance. Toilet Transfer: Stand By Assistance. Shower Transfer: Stand By Assistance. .  Patient prepared for shower by gathering items needed with BUE release from RW, vcs for safe technique     BALANCE:  Sitting Balance:  Modified Independent. Standing Balance: Stand By Assistance. BED MOBILITY:  Supine to Sit: Modified Independent    Scooting: Modified Independent      TRANSFERS:  Sit to Stand:  Stand By Assistance. Stand to Sit: Stand By Assistance. Still requires verbal cues for hand placement     FUNCTIONAL MOBILITY:  Assistive Device: Rolling Walker  Assist Level:  Stand By Assistance. Distance: To and from bathroom and To and from shower room     ASSESSMENT:     Activity Tolerance:  Patient tolerance of  treatment: fair. Discharge Recommendations: Home with nursing aide, Home with Home health OT, 24 hour supervision or assist   Equipment Recommendations: Equipment Needed: Yes  Other: Pt may benefit from a BSC, shower chair, grab bars in the shower . BSC ordered on 9-15.   Plan: Times per week: 5xs week x 90 min, 1 x week x 30 min  Current Treatment Recommendations: Endurance Training, Functional Mobility Training, Self-Care / ADL, Equipment Evaluation, Education, & procurement, Patient/Caregiver Education & Training, Home Management Training, Safety Education & Training    Patient Education  Patient Education: ADL's    Goals  Short term goals  Time Frame for Short term goals: 1 week  Short term goal 1: PT will complete ADL routine with 0-1 cues for organization, problem solving adapted tech and S with LHAE PRN to increase independence in self care tasks  Short term goal 2: Pt will complete  grooming/ toilteting tasks with MI  while using 1-2 hand release to increase her independence with [place] ADL routine  Short term goal 3: PT will complete functional mobility to/ from the BR as well as around obstacles with S  and 0-1 cues for safe tech to increase independence in toileting tasks at home  Short term goal 4: Pt will complete self feeding tasks with AE without cues to increase ability to eat soft foods  Short term goal 5: Pt will complete 1 step homemaking tasks with no > min cues for safe tech safely  to increase independence in retrieving a snack  Long term goals  Time Frame for Long term goals : 1 week  Long term goal 1: Pt will complete ADL routine with no cues for safe and adapted tech to increase independence in self care tasks  Long term goal 2: Pt will complete 2 step homemaking tasks with no cues for safe tech and S for problem solving to increase independence in light homemaking tasks    Following session, patient left in safe position with all fall risk precautions in place.

## 2020-09-22 NOTE — PROGRESS NOTES
111/60   Pulse 89   Temp 97.9 °F (36.6 °C) (Oral)   Resp 16   Ht 5' 7\" (1.702 m)   Wt 149 lb 3.2 oz (67.7 kg)   SpO2 94%   BMI 23.37 kg/m²     /60   Pulse 89   Temp 97.9 °F (36.6 °C) (Oral)   Resp 16   Ht 5' 7\" (1.702 m)   Wt 149 lb 3.2 oz (67.7 kg)   SpO2 94%   BMI 23.37 kg/m²   General appearance: alert, appears stated age and cooperative  Head: Normocephalic, without obvious abnormality, atraumatic  Lungs: clear to auscultation bilaterally  Heart: regular rate and rhythm, S1, S2 normal, no murmur, click, rub or gallop  Abdomen: soft, non-tender; bowel sounds normal; no masses,  no organomegaly  Extremities: extremities normal, atraumatic, no cyanosis or edema  Skin: Skin color, texture, turgor normal. No rashes or lesions  Neurologic: weak    Data Review:   Results for Jarek Pickering (MRN [id number]) as of 9/22/2020 12:36   Ref.  Range 9/21/2020 05:34 9/21/2020 07:33 9/22/2020 06:59   Sodium Latest Ref Range: 135 - 145 meq/L 138     Potassium Latest Ref Range: 3.5 - 5.2 meq/L 5.1     Chloride Latest Ref Range: 98 - 111 meq/L 102     CO2 Latest Ref Range: 23 - 33 meq/L 26     BUN Latest Ref Range: 7 - 22 mg/dL 26 (H)     Creatinine Latest Ref Range: 0.4 - 1.2 mg/dL 1.3 (H)     Anion Gap Latest Ref Range: 8.0 - 16.0 meq/L 10.0     Est, Glom Filt Rate Latest Units: ml/min/1.73m2 40 (A)     Glucose Latest Ref Range: 70 - 108 mg/dL 85     POC Glucose Latest Ref Range: 70 - 108 mg/dl  77 102   Calcium Latest Ref Range: 8.5 - 10.5 mg/dL 10.0     Total Protein Latest Ref Range: 6.1 - 8.0 g/dL 5.5 (L)     Albumin Latest Ref Range: 3.5 - 5.1 g/dL 3.1 (L)     Alk Phos Latest Ref Range: 38 - 126 U/L 117     ALT Latest Ref Range: 11 - 66 U/L 21     AST Latest Ref Range: 5 - 40 U/L 39     Bilirubin Latest Ref Range: 0.3 - 1.2 mg/dL 0.2 (L)     Vit D, 25-Hydroxy Latest Ref Range: 30 - 100 ng/ml 28 (L)       Electronically signed by Robbi Arias MD on 9/22/2020 at 12:35 PM

## 2020-09-22 NOTE — PROGRESS NOTES
[address] [facility] THERAPY  [address] [facility]  DAILY NOTE    TIME  SLP Individual Minutes  Time In:   Time Out: 1520  Minutes: 48  Timed Code Treatment Minutes: 48 Minutes       Date: 2020  Patient Name: Leandro Schuster      CSN: [id number]   : 1950  (65 y.o.)  Gender: female   Referring Physician:  Dr. Hiren Ferguson    Diagnosis: Left femoral neck impacted subcapital fracture  Secondary Diagnosis: Cognition  Precautions: Fall risk  Current Diet: Regular diet, thin liquids  Swallowing Strategies: Standard Universal Swallow Precautions  Date of Last MBS: Not Applicable    Pain:  No pain reported. Subjective:  Patient seated upright in wheelchair, pleasant and attentive. Short-Term Goals:  SHORT TERM GOAL #1:  Goal 1: Pt will complete orientation, immediate/delayed recall and working memory tasks with 80% accuracy given mod cues to improve retention of new and functional information. INTERVENTIONS: *See Goal 3 for details on memory (immediate, delayed, working). SHORT TERM GOAL #2:  Goal 2: Pt will complete functional sequencing and thought organizational tasks with 70% accuracy given mod cues to improve overall executive functioning skills. INTERVENTIONS: *See Goal 3 for details on thought organization. SHORT TERM GOAL #3:  Goal 3: Patient will complete functional problem solving, reasoning and basic computational tasks with 70% accuracy given mod cues to improve overall management of ADLs. INTERVENTIONS: Hospital navigation task that included 20 steps: Patient completed hospital navigation task with minimal success and required mod to max cues to redirect to task, recall instructions/steps, sequence steps correctly, and complete all steps within task. Patient frequently made errors in sequencing steps by advancing to future steps before completing current ones.  Compensatory strategy was suggested to manually cross off completed steps though this was not maintained past the first 3 steps. Patient often required mod cues to visually scan areas for signage but also required min cues for interpretation of signage. When identifying the name of the Rev at the [facility], patient did not scan the various signage and required max cues to identify location of name. Patient was frequently instructed to refer to list of steps before initiating completion of each step. Impairments in memory and attention were particularly salient during completion of task. Patient required frequent reminders of current step instructions and exhibited impairments in delayed recall (~1-5 min) of step instructions throughout task. For example, when ordering a drink from [employer], patient required min cues to recall drink name that had been discussed 1 minute prior. Sustained attention and selective attention appeared impaired evident by high distractibility and acknowledgement of irrelevant details encountered during task. When asked to reflect on task success, expressed excitement and content in navigating hospital and did not acknowledge deficits/challenges. Overall, patient completed this task with very minimal success and client's unawareness of deficits is particularly concerning regarding discharge on 9/24. Further Family Education suggestions will be discussed with POA to ensure greater home safety. Patient has performed well with therapy activities when distractions were limited and when targeting isolated tasks but when expected to combine all skill sets to complete this navigation task in functional situations, patient was unable to navigate hospital without mod to max cues. It is recommended that patient and POA investigate options to receive neurological evaluation from primary care physician to pursue etiology for cognitive deficits. Patient with family of history of dementia.        Long-Term Goals:  Timeframe for Long-term Goals: 3 weeks    LONG TERM GOAL #1:  Goal 1: Pt

## 2020-09-22 NOTE — PROGRESS NOTES
Physical Medicine & Rehabilitation  Progress Note    Chief Complaint:  left femoral neck impacted subcapital fracture    Subjective:  Care conference held today with proposed discharge date on 9/24 to home with Home Health Physical Therapy, Occupational Therapy, Speech Therapy, , Nursing and an aide. Family not present. She remains in agreement with plan and timing. During post conference rounding it was discussed at length with the patient her statements that she made yesterday about suicidal ideation and her depressed mode; she states that she has been depressed about losses in her life including the death of her mother and her  6 years ago and was feeling down since she is an only child. She was agreeable to trial an antidepressant and Prozac was offered due to its stimulating benefits since it is noted that the patient has been sleeping quite a bit when not engaged in therapies and she stated she was willing to trial this medication and it was started today. She had no other concerns or questions at this time. Rehabilitation:   Physical Therapy:  PAIN: L hip, pain not rated.     OBJECTIVE:     Transfers:  Sit to Stand: Contact Guard Assistance, cues for hand placement  Stand to [place] [id number], cues for hand placement     Ambulation:  [address], with verbal cues   Distance: 60 ft x1; 10 ft x1 (to destination)  Surface: Level Tile  Device:Rolling Walker  Gait Deviations:  Slow Susanna, Decreased Gait Speed, Decreased Heel Strike Bilaterally, Narrow Base of Support, Mild Path Deviations and Decreased Terminal Knee Extension, improved advancement of RW.  1 vc to keep head up while ambulating.  - Hurdles: CGA, 10 ft x1 with RW, BLE using non-reciprocal technique. Patient required mod vc's for wider TIFFANIE and to step closer to hurdles before attempting them. R knee moved in while stepping over hurdles during swing phase.  Instructed to keep RW closer throughout activity. Demonstration required for completion.  - Ascended/descended curb by car, patient showed no follow through with instructions/cues     Wheelchair Mobility:  Contact Guard Assistance, with verbal cues   Extremities Used: Bilateral Upper Extremities  Type of Chair:Manual  Surface: Level Tile  Distance: 10 ft x1  Quality: Slow Velocity, Short Strokes and Veers Left     Balance:  Dynamic Standing Balance: Contact Guard Assistance   - Reaching for clothespins, placing them on yardstick while standing on blue foam pad with no UE support. Tried having patient place them in a pattern, demonstrated little follow through with instructions. Had to hold yardstick while placing clothespins due to hand shaking.     Functional Outcome Measures: Not completed     ASSESSMENT:  Assessment: Patient progressing toward established goals. Patient is steadily progressing towards goals at this time, tolerated session well. Several rest breaks required throughout session due to fatigue, more so towards end of session. Patient showed improvement remembering hand placements during transfers. Shows limitations due to decreased understanding, generalized BLE weakness, and pain, which affects functional mobility. Additional skilled PT would be beneficial to patient to increase BLE strength/stability, which would improve patient's gait pattern and safety. Further endurance/gait training would also be beneficial to increase patient's endurance and gait technique, which would increase patient's safety while out in the community after discharge. This will overall increase patient's safe functional mobility while completing future tasks. Activity Tolerance:  Patient tolerance of  Treatment: good. Several rest breaks required throughout session due to fatigue/pain.   Equipment Recommendations:Equipment Needed: Yes(RW)  Discharge Recommendations: Home with Home Health PT, recommend 24 hour assist.     Occupational Therapy:  COGNITION: Decreased Recall and Decreased Insight     ADL:   Grooming: Stand By Assistance. standing at sink to complete oral care .     BALANCE:  Sitting Balance:  Modified Independent. Standing Balance: Stand By Assistance.       BED MOBILITY:  Not Tested     TRANSFERS:  Sit to Stand:  Stand By Assistance. Stand to Sit: Stand By Assistance.       FUNCTIONAL MOBILITY:  Assistive Device: Rolling Walker  Assist Level:  Stand By Assistance. Distance: greenhouse to therapy gym       ADDITIONAL ACTIVITIES:  Patient completed IADL tasks in therapy [facility] this date. Patient completed standing at table watering plants with 1UE release from support with verbal cues for safe technique as patient would abandon RW, leaning outside of base of support and min impulsive throughout tasks. Min unsteady no LOB. Seated rest break at end of tasks. Activity completed to increase safety awareness and endurance for IADL tasks upon arrival home.      ASSESSMENT:     Activity Tolerance:  Patient tolerance of  treatment: fair. Discharge Recommendations: Home with nursing aide, Home with Home health OT, 24 hour supervision or assist   Equipment Recommendations: Equipment Needed: Yes  Other: Pt may benefit from a BSC, shower chair, grab bars in the shower . BSC ordered on 9-15. Speech Therapy:  Short-Term Goals:  SHORT TERM GOAL #1:  Goal 1: Pt will complete orientation, immediate/delayed recall and working memory tasks with 80% accuracy given mod cues to improve retention of new and functional information. INTERVENTIONS: *See Goal 3 for details on memory (immediate, delayed, working).     SHORT TERM GOAL #2:  Goal 2: Pt will complete functional sequencing and thought organizational tasks with 70% accuracy given mod cues to improve overall executive functioning skills.    INTERVENTIONS: *See Goal 3 for details on thought organization.     SHORT TERM GOAL #3:  Goal 3: Patient will complete functional problem solving, reasoning and basic computational tasks with 70% accuracy given mod cues to improve overall management of ADLs. INTERVENTIONS: Hospital navigation task that included 20 steps: Patient completed hospital navigation task with minimal success and required mod to max cues to redirect to task, recall instructions/steps, sequence steps correctly, and complete all steps within task. Patient frequently made errors in sequencing steps by advancing to future steps before completing current ones. Compensatory strategy was suggested to manually cross off completed steps though this was not maintained past the first 3 steps. Patient often required mod cues to visually scan areas for signage but also required min cues for interpretation of signage. When identifying the name of the Rev at the [facility], patient did not scan the various signage and required max cues to identify location of name. Patient was frequently instructed to refer to list of steps before initiating completion of each step. Impairments in memory and attention were particularly salient during completion of task. Patient required frequent reminders of current step instructions and exhibited impairments in delayed recall (~1-5 min) of step instructions throughout task. For example, when ordering a drink from [employer], patient required min cues to recall drink name that had been discussed 1 minute prior. Sustained attention and selective attention appeared impaired evident by high distractibility and acknowledgement of irrelevant details encountered during task. When asked to reflect on task success, expressed excitement and content in navigating hospital and did not acknowledge deficits/challenges. Overall, patient completed this task with very minimal success and client's unawareness of deficits is particularly concerning regarding discharge on 9/24. Further Family Education suggestions will be discussed with POA to ensure greater home safety.  Patient has performed well with therapy activities when distractions were limited and when targeting isolated tasks but when expected to combine all skill sets to complete this navigation task in functional situations, patient was unable to navigate hospital without mod to max cues. It is recommended that patient and POA investigate options to receive neurological evaluation from primary care physician to pursue etiology for cognitive deficits. Patient with family of history of dementia.        Long-Term Goals:  Timeframe for Long-term Goals: 3 weeks     LONG TERM GOAL #1:  Goal 1: Pt will improve cognitive linguistic skills to a min assist level in order to improve level of independence in the home environment.         Comprehension: 5 - Patient understands basic needs (hungry/hot/pain)  Expression: 5 - Expresses basic ideas/needs only (hungry/hot/pain)  Social Interaction: 4 - Patient appropriate 75-90%+ of the time  Problem Solving: 3 - Patient solves simple/routine tasks 50%-74%  Memory: 2 - Patient remembers 25%-49% of the time        EDUCATION:  Learner: Patient  Education:  Reviewed recommendations for follow-up, Education Related to Potential Risks and Complications Due to Impairment/Illness/Injury and Home Safety Education  Evaluation of Education: [employer] understanding     ASSESSMENT/PLAN:  Activity Tolerance:  Patient tolerance of treatment: Good     Assessment/Plan: Patient progressing toward established goals. Continues to require skilled care of licensed speech pathologist to progress toward achievement of established goals and plan of care. Plan for Next Session:  Memory recall, Grocery shopping activity      Review of Systems:  Review of Systems   Constitutional: Negative for activity change, appetite change, fatigue and fever. HENT: Negative for trouble swallowing and voice change. Eyes: Negative. Respiratory: Negative for cough and shortness of breath. Cardiovascular: Negative for leg swelling. Gastrointestinal: Negative for constipation, diarrhea and nausea. Endocrine: Negative for cold intolerance. Genitourinary: Positive for urgency. Negative for frequency. Musculoskeletal: Positive for gait problem. Negative for arthralgias and myalgias. Skin: Negative for pallor. Allergic/Immunologic: Negative. Neurological: Positive for tremors and weakness. Negative for dizziness, light-headedness and headaches. Hematological: Negative. Psychiatric/Behavioral: Positive for decreased concentration and dysphoric mood. Negative for confusion. The patient is not nervous/anxious. All other systems reviewed and are negative. Objective:  BP (!) 101/58   Pulse 82   Temp 98.5 °F (36.9 °C) (Oral)   Resp 18   Ht 5' 7\" (1.702 m)   Wt 149 lb 3.2 oz (67.7 kg)   SpO2 95%   BMI 23.37 kg/m²   CURRENT VITALS:  height is 5' 7\" (1.702 m) and weight is 149 lb 3.2 oz (67.7 kg). Her oral temperature is 98.5 °F (36.9 °C). Her blood pressure is 101/58 (abnormal) and her pulse is 82. Her respiration is 18 and oxygen saturation is 95%. Body mass index is 23.37 kg/m².   Temperature Range (24h):Temp: 98.5 °F (36.9 °C) Temp  Av.2 °F (36.8 °C)  Min: 97.9 °F (36.6 °C)  Max: 98.5 °F (36.9 °C)  BP Range (02D): Systolic (40UES), BEY:981 , Min:101 , VOW:829     Diastolic (82FCH), HZV:10, Min:58, Max:60    Pulse Range (24h): Pulse  Av.5  Min: 82  Max: 89  Respiration Range (24h): Resp  Av  Min: 16  Max: 18  Current Pulse Ox (24h):  SpO2: 95 %  Pulse Ox Range (24h):  SpO2  Av.5 %  Min: 94 %  Max: 95 %  Oxygen Amount and Delivery:      awake  Orientation:   person, place, time, situation  Mood: within normal limits  Affect: calm  General appearance: in no acute distress, up in hallway walking with 2 wheeled walker with physical therapist at side    Memory:   normal,   Attention/Concentration: normal  Language:  normal     ROM:  abnormal - left hip surgery  Motor Exam:  Motor exam is 4+ out of 5 all extremities     Sensory:  Sensory intact  Coordination:   normal  Deep Tendon Reflexes:  Reflexes are intact and symmetrical bilaterally     Skin: warm and dry, no rash or erythema  Peripheral vascular: Pulses: Normal upper and lower extremity pulses; Edema: 1+    Diagnostics:   Recent Results (from the past 24 hour(s))   Iron    Collection Time: 09/22/20 12:34 AM   Result Value Ref Range    Iron 46 (L) 50 - 170 ug/dL   POCT glucose    Collection Time: 09/22/20  6:59 AM   Result Value Ref Range    POC Glucose 102 70 - 108 mg/dl     Labs Renal Latest Ref Rng & Units 9/21/2020 9/18/2020 9/16/2020 9/15/2020 9/14/2020   BUN 7 - 22 mg/dL 26(H) 25(H) 25(H) 25(H) 29(H)   Cr 0.4 - 1.2 mg/dL 1.3(H) 1. 3(H) 1. 3(H) 1. 3(H) 1. 3(H)   K 3.5 - 5.2 meq/L 5.1 4.9 4.6 5.1 5.4(H)   Na 135 - 145 meq/L 138 139 141 142 140      Recent Labs     09/21/20  0534 09/14/20  1055 09/10/20  0617   WBC 6.2 7.5 4.9   HGB 9.6* 10.5* 9.7*   HCT 31.8* 35.1* 32.1*   MCV 97.0 95.1 94.7    281 186      Impression:  1. Ground-level fall after syncopal event. 2. Acute slightly impacted subcapital fracture of the left femoral neck. 3. Status post closed reduction and percutaneous screw fixation of the left hip impacted subcapital fracture of the left femoral neck by Dr. Yakov Alberto, D.O. on 09/06/2020. EBL minimal.  4. Gait instability. 5. Mild TBI. 6. Iron deficiency anemia. 7. Vitamin D deficiency. 8. Insulin-dependent type 2 diabetes, controlled with hyperglycemia. Last hemoglobin A1c was 5.7 on 9/9/2020.  9. Essential tremor. 10. History of prior myocardial infarction in 2011. 11. Hypertension. 12. Hyperlipidemia. 13. Coronary artery disease. 14. History of prior myocardial infarction. 15. CKD 3.  16. 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 17. Osteoporosis. 18. Moderate to severe cognitive deficits. (MOCA) version 8.2 completed. Patient scored 10/30.  19. Hyperkalemia.   20. Acute kidney injury. 21. Hypercalcemia. 22. Tachycardia. 23. Depressed mood with suicidal ideation.     Plan:      Medical management: Per primary team and Dr. Jose Corona, Internal Medicine, Physical Medicine     Narcotic usage: Tramadol last 48-hour usage none. Stable. Last BM:   Stool Amount: Small (09/22/20 1905)     FUNCTIONAL OUTCOMES TOOLS:    GODINEZ -      Tinetti - Balance Score: 11  Gait Score: 7  Tinetti Total Score: 18    TUG -       Acute/Rehabilitation Problems:  1. Ground-level fall after syncopal event. 2. Acute slightly impacted subcapital fracture of the left femoral neck. 1. Addressed by surgery below. 3. Status post closed reduction and percutaneous screw fixation of the left hip impacted subcapital fracture of the left femoral neck by Dr. Jazmín lCemens, D.O. on 09/06/2020. EBL minimal.  1. WBAT. 2. Wound care. 3. Pain control. 1. PRN and scheduled Tylenol. 2. Lidoderm patches. 3. Tramadol. Patient still requires encouragement to take her pain medications prior to her therapy sessions. 4. Gait instability. 1. PT/OT. 2. Tinetti 16/28. Improvement to 18/28 on 9/21. Risk Indicators: Less than/equal to 18 = high risk; 19-23 Moderate risk; Greater than/equal to 24 = low risk. 5. Mild TBI/Moderate to severe cognitive deficits. (MOCA) version 8.2 completed. Patient scored 10/30. 1. SLP. 2. Low stimulation TBI guidelines if deemed necessary. 3. As of 9/15 the patient was felt to have a decline in her cognition; her Mendel Gauss was queried as to how he perceived her cognition and he stated this is her baseline. 4. Urinalysis was negative for signs of a UTI on 9/15. 6. Iron deficiency anemia. 1. Iron 25 and ferritin 120. Abnormal/normal.  Iron improved to 46 on 9/22. Anticipate patient discharging with 30 days of continuation on current regimen of iron and vitamin C supplementation. 2. Ferrous sulfate and vitamin C twice daily with meals.   3. Hemoglobin remained stable at 9.6 on 9/21.  7. Tachycardia. 1. Patient had an episode of tachycardia on 9/16 with a heart rate of 130 for which an EKG was ordered that showed sinus tachycardia. After receiving on propranolol her heart rate did come down to less than 100 bpm.  Resolved. 8. Nutrition:  Consultation to dietician for nutritional counseling and recommendations. 1. Protein 5.6 and albumin 2.8 on 9/10/2020. Abnormal.  2. Vitamin 25OH level of 14 on 9/9/2020.  3. Cholecalciferol 5000 IU 3 times daily with meals and 500 mg calcium twice daily with meals. Vitamin D on 9/21 has improved to 28. Anticipate patient going home on 3000 IU daily to maintain a normal vitamin D level. 9. Depressed mood with suicidal ideation. 1. Patient agreeable to trial Prozac with a starting dose of 10 mg ordered and patient endorsing understanding that her primary care provider can adjust the medication after discharge depending upon benefits at current dosing. 10. Electrolytes. 1. Normal on 9/21 with exception of.  1. Hyperkalemia with a potassium of 5.4 on 9/14. Potassium decreased to 5.1 on 9/15. Potassium stable at 5.1 on 9/21. 1. Kayexalate 15 mg x 2 ordered on 9/14. 2. Hypercalcemia. 1. Hold supplemental calcium on 9/15. Calcium normal on 9/15 at 10.3. Calcium elevated again at 11.0 on 9/16. Normal at 10.0 on 9/18. Calcium supplementation resumed. Calcium 10.0 on 9/21. Normal.  2. PTH normal at 17.4 on 9/16. 11. Bladder: No issues  12. Bowel: Senna, colace, MOM  13. Rehabilitation nursing will be involved for bowel, bladder, skin, and pain management. Nursing will also provide education and training to patient and family. 14. Prophylaxis:  DVT: Plavix and aspirin as directed by Orthopedic Surgery. GI: None. .  15.  and case management consultations for coordination of care and discharge planning.     Chronic Problems:  1. Insulin-dependent type 2 diabetes, controlled with hyperglycemia.   Last hemoglobin A1c was 5.7 on 9/9/2020.  1. Lantus 20 units every morning. Lantus increased to 24 units starting on 9/16 to improve blood glucose control. 2. Metformin 1000 mg twice daily with meals. 2. Essential tremor. 1. Continue home primidone and propranolol. 3. History of prior myocardial infarction in 2011.  1. ASA 81 mg.  2. Plavix. 4. Hypertension. 1. Propranolol. 5. Hyperlipidemia. 1. Lipitor. 6. Coronary artery disease/History of prior myocardial infarction. 1. ASA 81 mg.  2. Plavix. 3. Lipitor. 7. ISAURA superimposed on CKD 3.  1. Cr/BUN/GFR on 9/9 was 1.1/19/49 which is stable over 9/7 with labs of 1.1/18/49. Mild decrease to 1.2/25/44 on 9/10. Further worsened from 1.3/29/40 on 9/14. Renal function stable over prior at 1.3/25/40 on 9/15. Remains elevated at 1.3/25/40 on 9/18. 2. Encourage fluids. 8. 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 9. Osteoporosis. 1. See plan above for vitamin D and calcium supplementation.     Labs reviewed on:  1. 9/8.  2. 9/9.  3. 9/10.  4. 9/14.  5. 9/15.  6. 9/16.  7. 9/18.  8. 9/21.     Infectious Disease:  1. N/A     Missed Therapy Time:  None     DME:  Leandor Schuster requires a Bedside Commode to complete bathing, toileting, dressing and grooming tasks. Patient requires a Bedside Commode due to Upper Extremity/Lower Extremity Weakness and limited ambulation and is unable to walk to the bathroom at home. Without the Bedside Commode, Leandro Schuster is at increased risk for falls and would require increased assistance for ADL's and mobility.     Discharge Plan:  Estimated Discharge Date: 9/24   Destination: home health and discharge home with supervision  Services at Discharge: [address], Occupational Therapy, Speech Therapy, Nursing and an aide 3x week  Is patient appropriate for an outpatient driving evaluation? yes, when appropriate  Equipment at Discharge: RW, BSC, shower chair, grab bars    Greater than 50% of 30 minutes was spent with the patient reviewing the plan and recommendations from today's care conference talking with her at length about her depressed mood and the potential benefit of being started on an antidepressant to which she is agreeable to start on Prozac with its anticipated stimulating benefits, her improved iron levels, and her progress with therapies.     Catarino Herzog MD

## 2020-09-22 NOTE — PROGRESS NOTES
for ambulating. She did her own homemaking and cooking. reports walking daily PTA. Has a  1 x month    Restrictions/Precautions:  Restrictions/Precautions: Weight Bearing, General Precautions, Fall Risk  Left Lower Extremity Weight Bearing: Weight Bearing As Tolerated    SUBJECTIVE: Patient in recliner upon arrival, agreed and cooperative during session. Several rest breaks required throughout session due to fatigue. Patient had no pain in L hip prior and throughout session. Patient did void at end of session, placed in bed with L side propped due to pressure sore. Patient showed decreased safety awareness during session.  - Patient required Max vc's for hand placement throughout session to increase patient safety. PAIN: Reported no pain, pain not rated. OBJECTIVE:  Bed Mobility:  Rolling to Left: Contact Guard Assistance, with verbal cues    Rolling to Right: Contact Guard Assistance, with verbal cues    Supine to Sit:  Minimal Assistance, X 1 to get legs off bed, and get torso into sitting position,  with verbal cues   Sit to Supine: Contact Guard Assistance, X 1, with verbal cues    Scooting: Minimal Assistance, X 1, with verbal cues    - Patient required mod vc's throughout to ensure proper technique is being done. - Completed 1 trial using apartment room bed to simulate home environment. Showed difficulty getting into seated position, Min A required due to BUE weakness and inability to understand directions. Transfers:  Sit to Stand: [employer], cues for hand placement, poor carry over   Stand to [place] [id number], cues for hand placement, poor carry over   Car: [employer], cues for hand placement  - Patient required max vc's for hand placement during car, and other transfers. Needed reminded several times not to put hands on car door, which would decrease patient safety during car transfer.   - Completed transfers x2 using sofa to simulate home environment. Ambulation:  Contact Guard Assistance  Distance: 35 ft x1; 100 ft x1  Surface: Level Tile and Carpet  Device:Rolling Walker  Gait Deviations:  Slow Susanna, Decreased Gait Speed, Decreased Heel Strike Bilaterally, Narrow Base of Support, decreased RLE step length and Mild Path Deviations. Would occasionally walk too close to RW, then too far away from RW. Tended to walk with head down, once instructed to keep head up, gait pattern improved. - Required mod vc's to turn with walker, increase step length and TIFFANIE throughout session.  - [facility]: in apartment x1, needed cues to keep head up, showed slight difficulty choosing safest way to walk around w/c, required instructions on how to go around it. Patient tried walking backwards while getting cones, educated on not ambulating like that, which would increase patient's fall risk, and compromise patient safety after discharge. Stairs:  Stairs:  6\" steps. X 4 using Bilateral Handrails and Contact Guard Assistance. Platform:  6\" platform X 2 using Rolling Walker and Contact Guard Assistance, Minimal Assistance, with verbal cues. - Patient showed difficulty remembering foot sequence during completion, demonstration required. Patient showed difficulty understanding where to place RW while ascending/descending platform. Balance:  Dynamic Standing Balance: Contact Guard Assistance, Minimal Assistance, X 1  - Scarlett-care and bathroom tasks, noted no LOB or unsteadiness while completing task. Patient asked to brush teeth several times during activity, stated that OT would do that with her. Kept asking about it even after explanation was given. Functional Outcome Measures: Not completed     ASSESSMENT:  Assessment: Patient progressing toward established goals. Patient is progressing well towards goals at this time, tolerated session well. Several rest breaks required due to fatigue.  Shows limitations due to generalized BLE weakness and decreased understanding due to her cognitive level, which affects functional mobility. Patient got distracted easily, showed difficulty performing safety measures throughout session, which could be due to her level of comprehension. Additional skilled PT would benefit patient to increase BLE strength/stability, which will improve patient's gait pattern. Further stair/platform training would also benefit patient to improve patient remembrance of foot sequence, which would increase patient safety while out in the community. This will overall improve patient's safe functional mobility while completing future tasks. Activity Tolerance:  Patient tolerance of  treatment: good. Several rest breaks required due to fatigue.      Equipment Recommendations:Equipment Needed: Yes(RW)  Discharge Recommendations:  Home with Home Health PT, recommend 24 hour assist.    Plan: Times per week: 5x/wk 90 min, 1x/wk 30 min  Current Treatment Recommendations: Strengthening, ROM, Balance Training, Functional Mobility Training, Gait Training, Transfer Training, Patient/Caregiver Education & Training, Safety Education & Training, Home Exercise Program, Endurance Training, Stair training, Equipment Evaluation, Education, & procurement, Wheelchair Mobility Training, ADL/Self-care Training    Patient Education  Patient Education: Plan of Care, Precautions/Restrictions, [employer], Transfers, Gait, Stairs, Car Transfers, Education Related to Prevention of Recurrence of Impairment/Illness/Injury, Health Promotion and Wellness Education, Home Safety Education,  - Patient Verbalized Understanding, - Patient Requires Continued Education    Goals:  Patient goals : go home  Short term goals  Time Frame for Short term goals: 1 week  Short term goal 1: pt to complete supine to/from sit at SBA to get in and out of bed  MET, see LTG  Short term goal 2: pt to complete sit to/from stand at SBA to rise from bed or chair  MET, see LTG  Short term goal 3: pt to

## 2020-09-22 NOTE — PROGRESS NOTES
Pt. Awakened at [address] and voices slept well. Hip incision healed. Pink salve to still slightly open purple with gray edges area to right of coccyx.

## 2020-09-22 NOTE — PROGRESS NOTES
[address] [address]  INPATIENT PHYSICAL THERAPY  DAILY NOTE  [facility] - [id number]    Time In: 1400  Time Out: 1430  Timed Code Treatment Minutes: 30 Minutes  Minutes: 30          Date: 2020  Patient Name: Kyle Krishna,  Gender:  female        MRN: [id number]  : 1950  (65 y.o.)     Referring Practitioner: DAVID Pinzon MD  Diagnosis: subcapital fracture of left femur s/p repair  Additional Pertinent Hx: Per H&P, pt is a 65 y.o. female with a past medical history of diabetes and prior MI who presents to the emergency department for evaluation of syncope. Patient states that she was at the grocery store this morning shortly prior to arrival, and upon leaving the grocery store she felt acutely short of breath, and lost consciousness. She reports that she fell onto her left side, and landed on her elbow and the back of her head. She endorses losing consciousness after she fell, and bystanders report that she was dazed. She regained consciousness shortly later, however she reports that she has not been ambulatory since her fall. In the emergency department she is complaining of head pain, shoulder pain, elbow pain, and left hip pain. She is additionally complaining of swelling to her left posterior scalp. Pt is now s/p HIP PINNING on 2020 by Dr Marta Jorge. To IPR on      Prior Level of Function:  Lives With: Alone  Type of Home: Apartment  Home Layout: One level  Home Access: Level entry  Home Equipment: Quad cane   Bathroom Shower/Tub: Tub/Shower unit, Walk-in shower(Pt prefers the walk in shower.)  Bathroom Toilet: Standard(uses a countertop on Right side.)  Bathroom Accessibility: Accessible    Receives Help From: Friend(s)  ADL Assistance: Independent  Homemaking Assistance: Independent  Homemaking Responsibilities: Yes  Ambulation Assistance: Independent  Transfer Assistance: Independent  Active :  Yes  Additional Comments: Pt did not use any AD for ambulating. She did her own homemaking and cooking. reports walking daily PTA. Has a  1 x month    Restrictions/Precautions:  Restrictions/Precautions: Weight Bearing, General Precautions, Fall Risk  Left Lower Extremity Weight Bearing: Weight Bearing As Tolerated    SUBJECTIVE: Patient in chair in therapy gym upon arrival, agreed and cooperative during session. Several rest breaks required throughout session due to fatigue. Patient reported no pain in L hip until end of session. Never rated pain, but stated she couldn't ascend/descend platform because of it. Nurse gave patient medication during session, but not sure if it was pain medication. Min vc's for hand placement during session. PAIN: L hip, pain not rated. OBJECTIVE:    Transfers:  Sit to Stand: Contact Guard Assistance, cues for hand placement  Stand to [place] [id number], cues for hand placement    Ambulation:  Contact Guard Assistance, with verbal cues   Distance: 60 ft x1; 10 ft x1 (to destination)  Surface: Level Tile  Device:Rolling Walker  Gait Deviations:  Slow Susanna, Decreased Gait Speed, Decreased Heel Strike Bilaterally, Narrow Base of Support, Mild Path Deviations and Decreased Terminal Knee Extension, improved advancement of RW.  1 vc to keep head up while ambulating.  - Hurdles: CGA, 10 ft x1 with RW, BLE using non-reciprocal technique. Patient required mod vc's for wider TIFFANIE and to step closer to hurdles before attempting them. R knee moved in while stepping over hurdles during swing phase. Instructed to keep RW closer throughout activity.  Demonstration required for completion.  - Ascended/descended curb by car, patient showed no follow through with instructions/cues    Wheelchair Mobility:  [employer], with verbal cues   Extremities Used: Bilateral Upper Extremities  Type of Chair:Manual  Surface: Level Tile  Distance: 10 ft x1  Quality: Slow Velocity, Short Strokes and Veers Left    Balance:  Dynamic Standing Balance: Contact Guard Assistance   - Reaching for clothespins, placing them on yardstick while standing on blue foam pad with no UE support. Tried having patient place them in a pattern, demonstrated little follow through with instructions. Had to hold yardstick while placing clothespins due to hand shaking. Functional Outcome Measures: Not completed     ASSESSMENT:  Assessment: Patient progressing toward established goals. Patient is steadily progressing towards goals at this time, tolerated session well. Several rest breaks required throughout session due to fatigue, more so towards end of session. Patient showed improvement remembering hand placements during transfers. Shows limitations due to decreased understanding, generalized BLE weakness, and pain, which affects functional mobility. Additional skilled PT would be beneficial to patient to increase BLE strength/stability, which would improve patient's gait pattern and safety. Further endurance/gait training would also be beneficial to increase patient's endurance and gait technique, which would increase patient's safety while out in the community after discharge. This will overall increase patient's safe functional mobility while completing future tasks. Activity Tolerance:  Patient tolerance of  Treatment: good. Several rest breaks required throughout session due to fatigue/pain.   Equipment Recommendations:Equipment Needed: Yes(RW)  Discharge Recommendations: Home with Home Health PT, recommend 24 hour assist.    Plan: Times per week: 5x/wk 90 min, 1x/wk 30 min  Current Treatment Recommendations: Strengthening, ROM, Balance Training, Functional Mobility Training, Gait Training, Transfer Training, Patient/Caregiver Education & Training, Safety Education & Training, Home Exercise Program, Endurance Training, Stair training, Equipment Evaluation, Education, & procurement, Wheelchair Mobility Training, ADL/Self-care Training    Patient Education  Patient Education: Plan of Care, Precautions/Restrictions, Transfers, Gait, Education Related to Prevention of Recurrence of Impairment/Illness/Injury, Health Promotion and Wellness Education, Home Safety Education,  - Patient Verbalized Understanding, - Patient Requires Continued Education    Goals:  Patient goals : go home  Short term goals  Time Frame for Short term goals: 1 week  Short term goal 1: pt to complete supine to/from sit at SBA to get in and out of bed  MET, see LTG  Short term goal 2: pt to complete sit to/from stand at SBA to rise from bed or chair  MET, see LTG  Short term goal 3: pt to ambulate >75ft with RW at SBA for mobility in the home MET, see LTG  Short term goal 4: pt to complete car transfer at [facility] for safe transport to home  MET, see LTG  Short term goal 5: pt score on Tinetti balance test will improve to >=20 for improved balance and decreased fall risk at home  Long term goals  Time Frame for Long term goals : 2 weeks  Long term goal 1: pt to complete supine to/from sit at Mod I to get in and out of bed MET, continue  Long term goal 2: pt to complete sit to/from stand at Mod I to rise from bed or chair  Long term goal 3: pt to ambulate >50ft with RW at Mod I, >125ft with RW at S for mobility in the home and community  Long term goal 4: pt to complete car transfer at S for safe transport to home  MET, continue  Long term goal 5: pt to negotiate 6\" platform step with RW at S for curb negotiation in the community  Long term goal 6: pt score on Tinetti balance test will improve to >=24 for improved balance and decreased fall risk at home    Following session, patient left in safe position with all fall risk precautions in place.     Treatment session and note completed by ROJELIO Almeida, under supervision of signing therapist.

## 2020-09-23 LAB — GLUCOSE BLD-MCNC: 93 MG/DL (ref 70–108)

## 2020-09-23 PROCEDURE — 6370000000 HC RX 637 (ALT 250 FOR IP): Performed by: FAMILY MEDICINE

## 2020-09-23 PROCEDURE — 97130 THER IVNTJ EA ADDL 15 MIN: CPT | Performed by: SPEECH-LANGUAGE PATHOLOGIST

## 2020-09-23 PROCEDURE — 6370000000 HC RX 637 (ALT 250 FOR IP): Performed by: INTERNAL MEDICINE

## 2020-09-23 PROCEDURE — 97530 THERAPEUTIC ACTIVITIES: CPT

## 2020-09-23 PROCEDURE — 6370000000 HC RX 637 (ALT 250 FOR IP): Performed by: PHYSICAL MEDICINE & REHABILITATION

## 2020-09-23 PROCEDURE — 1180000000 HC REHAB R&B

## 2020-09-23 PROCEDURE — 97110 THERAPEUTIC EXERCISES: CPT

## 2020-09-23 PROCEDURE — 97129 THER IVNTJ 1ST 15 MIN: CPT | Performed by: SPEECH-LANGUAGE PATHOLOGIST

## 2020-09-23 PROCEDURE — 97116 GAIT TRAINING THERAPY: CPT

## 2020-09-23 PROCEDURE — 82948 REAGENT STRIP/BLOOD GLUCOSE: CPT

## 2020-09-23 PROCEDURE — 97535 SELF CARE MNGMENT TRAINING: CPT

## 2020-09-23 RX ORDER — TRAMADOL HYDROCHLORIDE 50 MG/1
50 TABLET ORAL EVERY 6 HOURS PRN
Qty: 14 TABLET | Refills: 0 | Status: SHIPPED | OUTPATIENT
Start: 2020-09-23 | End: 2020-09-30

## 2020-09-23 RX ORDER — FERROUS SULFATE 325(65) MG
325 TABLET ORAL 2 TIMES DAILY WITH MEALS
Qty: 42 TABLET | Refills: 0 | Status: SHIPPED | OUTPATIENT
Start: 2020-09-24 | End: 2020-11-09

## 2020-09-23 RX ORDER — ASCORBIC ACID 250 MG
250 TABLET ORAL 2 TIMES DAILY WITH MEALS
Qty: 42 TABLET | Refills: 0 | Status: ON HOLD | OUTPATIENT
Start: 2020-09-24 | End: 2021-02-24 | Stop reason: HOSPADM

## 2020-09-23 RX ORDER — FLUOXETINE 10 MG/1
10 CAPSULE ORAL DAILY
Qty: 30 CAPSULE | Refills: 0 | Status: ON HOLD | OUTPATIENT
Start: 2020-09-24 | End: 2022-06-07 | Stop reason: HOSPADM

## 2020-09-23 RX ADMIN — Medication 5000 UNITS: at 12:26

## 2020-09-23 RX ADMIN — FLUOXETINE HYDROCHLORIDE 10 MG: 10 CAPSULE ORAL at 08:55

## 2020-09-23 RX ADMIN — Medication 5000 UNITS: at 17:13

## 2020-09-23 RX ADMIN — Medication 250 MG: at 17:14

## 2020-09-23 RX ADMIN — TRAMADOL HYDROCHLORIDE 50 MG: 50 TABLET, FILM COATED ORAL at 11:48

## 2020-09-23 RX ADMIN — PRIMIDONE 200 MG: 50 TABLET ORAL at 08:54

## 2020-09-23 RX ADMIN — Medication 500 MG: at 17:14

## 2020-09-23 RX ADMIN — PRIMIDONE 50 MG: 50 TABLET ORAL at 21:39

## 2020-09-23 RX ADMIN — DOCUSATE SODIUM 50 MG AND SENNOSIDES 8.6 MG 2 TABLET: 8.6; 5 TABLET, FILM COATED ORAL at 21:38

## 2020-09-23 RX ADMIN — BENZONATATE 100 MG: 100 CAPSULE ORAL at 05:17

## 2020-09-23 RX ADMIN — Medication 250 MG: at 08:55

## 2020-09-23 RX ADMIN — MULTIPLE VITAMINS W/ MINERALS TAB 1 TABLET: TAB at 08:55

## 2020-09-23 RX ADMIN — DOCUSATE SODIUM 50 MG AND SENNOSIDES 8.6 MG 2 TABLET: 8.6; 5 TABLET, FILM COATED ORAL at 09:02

## 2020-09-23 RX ADMIN — Medication 5000 UNITS: at 08:55

## 2020-09-23 RX ADMIN — ATORVASTATIN CALCIUM 80 MG: 80 TABLET, FILM COATED ORAL at 21:38

## 2020-09-23 RX ADMIN — Medication 500 MG: at 08:55

## 2020-09-23 RX ADMIN — FERROUS SULFATE TAB 325 MG (65 MG ELEMENTAL FE) 325 MG: 325 (65 FE) TAB at 09:05

## 2020-09-23 RX ADMIN — METFORMIN HYDROCHLORIDE 1000 MG: 500 TABLET ORAL at 08:54

## 2020-09-23 RX ADMIN — ACETAMINOPHEN 650 MG: 325 TABLET ORAL at 12:26

## 2020-09-23 RX ADMIN — Medication: at 21:41

## 2020-09-23 RX ADMIN — METFORMIN HYDROCHLORIDE 1000 MG: 500 TABLET ORAL at 17:14

## 2020-09-23 RX ADMIN — ASPIRIN 81 MG: 81 TABLET ORAL at 09:02

## 2020-09-23 RX ADMIN — INSULIN GLARGINE 24 UNITS: 100 INJECTION, SOLUTION SUBCUTANEOUS at 08:55

## 2020-09-23 RX ADMIN — FERROUS SULFATE TAB 325 MG (65 MG ELEMENTAL FE) 325 MG: 325 (65 FE) TAB at 17:14

## 2020-09-23 RX ADMIN — PROPRANOLOL HYDROCHLORIDE 80 MG: 80 CAPSULE, EXTENDED RELEASE ORAL at 08:54

## 2020-09-23 RX ADMIN — ACETAMINOPHEN 650 MG: 325 TABLET ORAL at 05:16

## 2020-09-23 RX ADMIN — CLOPIDOGREL BISULFATE 75 MG: 75 TABLET ORAL at 08:57

## 2020-09-23 ASSESSMENT — ENCOUNTER SYMPTOMS
DIARRHEA: 0
SHORTNESS OF BREATH: 0
TROUBLE SWALLOWING: 0
CONSTIPATION: 0
VOICE CHANGE: 0
ALLERGIC/IMMUNOLOGIC NEGATIVE: 1
COUGH: 0
EYES NEGATIVE: 1
NAUSEA: 0

## 2020-09-23 ASSESSMENT — PAIN SCALES - GENERAL
PAINLEVEL_OUTOF10: 0
PAINLEVEL_OUTOF10: 2
PAINLEVEL_OUTOF10: 4
PAINLEVEL_OUTOF10: 6

## 2020-09-23 NOTE — PROGRESS NOTES
as around obstacles with S  and 0-1 cues for safe tech to increase independence in toileting tasks at home  Short term goal 4: Pt will complete self feeding tasks with AE without cues to increase ability to eat soft foods  Short term goal 5: Pt will complete 1 step homemaking tasks with no > min cues for safe tech safely  to increase independence in retrieving a snack  Long term goals  Time Frame for Long term goals : 1 week  Long term goal 1: Pt will complete ADL routine with no cues for safe and adapted tech to increase independence in self care tasks  Long term goal 2: Pt will complete 2 step homemaking tasks with no cues for safe tech and S for problem solving to increase independence in light homemaking tasks    Following session, patient left in safe position with all fall risk precautions in place.

## 2020-09-23 NOTE — PLAN OF CARE
Problem: Falls - Risk of:  Goal: Will remain free from falls  Description: Will remain free from falls  9/23/2020 0741 by Kathryn Hoff RN  Outcome: Ongoing  9/23/2020 0319 by Bienvenido Warner RN  Outcome: Ongoing  Goal: Absence of physical injury  Description: Absence of physical injury  9/23/2020 0741 by Kathryn Hoff RN  Outcome: Ongoing  9/23/2020 0319 by Bienvenido Warner RN  Outcome: Ongoing     Patient will remain free from falls during this admission as evidenced by no falls during this admission patient and staff will utilize safety devices and techniques for transfer     Problem: Confusion - Acute:  Goal: Absence of continued neurological deterioration signs and symptoms  Description: Absence of continued neurological deterioration signs and symptoms  Outcome: Ongoing  Goal: Mental status will be restored to baseline  Description: Mental status will be restored to baseline  Outcome: Ongoing    Patient continues to require redirection for confusion. Problem: Discharge Planning:  Goal: Ability to perform activities of daily living will improve  Description: Ability to perform activities of daily living will improve  Outcome: Ongoing  Goal: Participates in care planning  Description: Participates in care planning  Outcome: Ongoing     Patient will participate in discharge planning during this admission as evidenced by patient will continue to work with nursing and therapy for home going education. Problem: Mood - Altered:  Goal: Mood stable  Description: Mood stable  Outcome: Ongoing     Patient continues to work with staff on altered mood is currently stable.       Problem: Skin Integrity:  Goal: Will show no infection signs and symptoms  Description: Will show no infection signs and symptoms  Outcome: Ongoing  Goal: Absence of new skin breakdown  Description: Absence of new skin breakdown  Outcome: Ongoing     Patient will participate in skin integrity promotion during this admission as patient offloading weight while in bed and in chair. Patient and family will alert staff to any concerns he has related to break in intact skin.

## 2020-09-23 NOTE — PROGRESS NOTES
[address] [address]  Recreational Therapy  Daily Note  [address]    Time Spent with Patient: 15 minutes    Date:  9/23/2020       Patient Name: Tone Membreno      MRN: [id number]      YOB: 1950 (65 y.o.)       Gender: female  Diagnosis: subcapital fracture of the left femur , s/p repair  Referring Practitioner: Dr. Carmen Montgomery    RESTRICTIONS/PRECAUTIONS:  Restrictions/Precautions: Weight Bearing, General Precautions, Fall Risk  Vision: Impaired  Hearing: Within functional limits    PAIN: 0-no c/o pain     SUBJECTIVE:  I am ready    OBJECTIVE:  Sit to stand from recliner with CGA-ambulated 5 ft to w/c with RW and CGA-took pt to our [employer] to get her hair washed and styled before going home tomorrow -looking forward to going home tomorrow with her friend Rudy Burt checking in on her-pleasant-talked about her  and how good he was to her and that they were  16 yrs and how they use to travel-affect bright -sit to stand from w/c with CGA-ambulated into bathroom with RW and CGA-able to doff underwear and pants with SBA and nurse in to give medication          Patient Education  New Education Provided: Importance of Leisure,     Electronically signed by: NISHA Soria  Date: 9/23/2020

## 2020-09-23 NOTE — PLAN OF CARE
Problem: DISCHARGE BARRIERS  Goal: Patient's continuum of care needs are met  Note: Team conference held Tuesday, 2020. Recommendations of the team were explained to the patient by WANG Barrera, and Dr. Magdiel Escalona. SW additionally contacted patient's friend/POAAshley, to provide team conference update. Team is recommending that patient continue on acute inpatient rehab for two more days, with expected discharge date of Thursday, 2020. Patient's friend, Isac Moseley, completed family education on Friday, 2020, in preparation for discharge. Following discharge, team is recommending home health care services for RN/PT/OT/ST/HHA/SW. Home health care services arranged through [facility]. Nathan Urena, to provide increased support for patient at discharge. Care plan reviewed with patient and friend/Ashley DE. Patient and friend/Ashley DE, verbalized understanding of the plan of care and contributed to goal setting. SW and physician additionally discussed with patient concerns regarding statements made during ST session regarding suicide on Monday, 2020. Patient indicates feeling remorseful and embarrassed for making statements about suicide. SW provided emotional support regarding statements and encouraged patient that if she truly felt suicidal there are resources available to help, but that as healthcare workers, we take statements about suicide very seriously and have to follow up with patient. Patient reports that is was a very bad day and that she was thinking about her parents and  (all of which are ) a lot and how much she was missing them. Patient denies any current suicidal thoughts and reports prior to yesterday, had never had suicidal thoughts. SW again provided support and encouragement. Patient appreciative of conversation. Patient verbalizes feeling depressed with increased feelings due to hospitalization.  Physician offered to start patient on medication for increased symptoms. Patient agreeable to start low dose of Prozac. SW to follow and maintain involvement in discharge planning.

## 2020-09-23 NOTE — PLAN OF CARE
Problem: Falls - Risk of:  Goal: Will remain free from falls  Description: Will remain free from falls  Outcome: Ongoing  Goal: Absence of physical injury  Description: Absence of physical injury  Outcome: Ongoing   Does not use the call light appropriately. Frequent checks done this shift and Bed Alarm in place.

## 2020-09-23 NOTE — PROGRESS NOTES
[facility]  INPATIENT PHYSICAL THERAPY  DAILY NOTE  [address] [facility] - 7E-54/054-A    Time In: 1000  Time Out: 1100  Timed Code Treatment Minutes: 60 Minutes  Minutes: 60          Date: 2020  Patient Name: Judah Cadena,  Gender:  female        MRN: [id number]  : 1950  (65 y.o.)     Referring Practitioner: DAVID Mcintosh MD  Diagnosis: subcapital fracture of left femur s/p repair  Additional Pertinent Hx: Per H&P, pt is a 65 y.o. female with a past medical history of diabetes and prior MI who presents to the emergency department for evaluation of syncope. Patient states that she was at the grocery store this morning shortly prior to arrival, and upon leaving the grocery store she felt acutely short of breath, and lost consciousness. She reports that she fell onto her left side, and landed on her elbow and the back of her head. She endorses losing consciousness after she fell, and bystanders report that she was dazed. She regained consciousness shortly later, however she reports that she has not been ambulatory since her fall. In the emergency department she is complaining of head pain, shoulder pain, elbow pain, and left hip pain. She is additionally complaining of swelling to her left posterior scalp. Pt is now s/p HIP PINNING on 2020 by Dr Archana Lantigua. To IPR on      Prior Level of Function:  Lives With: Alone  Type of Home: Apartment  Home Layout: One level  Home Access: Level entry  Home Equipment: Quad cane   Bathroom Shower/Tub: Tub/Shower unit, Walk-in shower(Pt prefers the walk in shower.)  Bathroom Toilet: Standard(uses a countertop on Right side.)  Bathroom Accessibility: Accessible    Receives Help From: Friend(s)  ADL Assistance: Independent  Homemaking Assistance: Independent  Homemaking Responsibilities: Yes  Ambulation Assistance: Independent  Transfer Assistance: Independent  Active :  Yes  Additional Comments: Pt did not use any AD for ambulating. She did her own homemaking and cooking. reports walking daily PTA. Has a  1 x month    Restrictions/Precautions:  Restrictions/Precautions: Weight Bearing, General Precautions, Fall Risk  Left Lower Extremity Weight Bearing: Weight Bearing As Tolerated    SUBJECTIVE: Pt seated in recliner. Pleasant and cooperative. PAIN: 0/10:     OBJECTIVE:  Bed Mobility:  Rolling to Left: Modified Independent   Rolling to Right: Modified Independent   Supine to Sit: Modified Independent  Sit to Supine: Modified Independent     Transfers:  Sit to Stand: Modified Independent. Pt would maintain hands on the RW  when going up to sit approx 50% of trials, but was able to attain upright with adequate balance. Stand to Sit:Stand By Assistance with cues needed to reach to armrests approx 50% of trials for controlled and safe maneuver. Stand Pivot:Modified Independent  Car:Modified Independent    Ambulation:  Modified Independent  Distance: 13 ft, 65 ft, 165 ft, 8 ft ramp  Surface: Level Tile, Carpet and Ramp  Device:Rolling Walker  Gait Deviations:  Slow Susanna, Decreased Step Length Bilaterally and Decreased Gait Speed    Balance:  Dynamic Standing Balance: Modified Independent using long handled reacher and RW support to  item from floor  Standing with RW support, using modified bowling ball to bowl x6 reps. CGA to SBA    Stairs:  Stand By Assistance  Number of Steps: 1  Height: 6\" step with Rolling Walker   Stairs:  Stand By Assistance  Number of Steps: 4  Height: 6\" step with Bilateral Handrails . Cues to use non reciprocal pattern. ASSESSMENT:  Assessment: Patient progressing toward established goals. Activity Tolerance:  Patient tolerance of  treatment: good.       Equipment Recommendations:Equipment Needed: Yes(RW)  Discharge Recommendations:  Continue to assess pending progress    Plan: Times per week: 5x/wk 90 min, 1x/wk 30 min  Current Treatment Recommendations: Strengthening, ROM, Balance Training, Functional Mobility Training, Gait Training, Transfer Training, Patient/Caregiver Education & Training, Safety Education & Training, Home Exercise Program, Endurance Training, Stair training, Equipment Evaluation, Education, & procurement, Wheelchair Mobility Training, ADL/Self-care Training    Patient Education  Patient Education: Transfers, Gait, Stairs, Car Transfers,  - Patient Verbalized Understanding, - Patient Requires Continued Education    Goals:  Patient goals : go home  Short term goals  Time Frame for Short term goals: 1 week  Short term goal 1: pt to complete supine to/from sit at SBA to get in and out of bed  MET, see LTG  Short term goal 2: pt to complete sit to/from stand at SBA to rise from bed or chair  MET, see LTG  Short term goal 3: pt to ambulate >75ft with RW at SBA for mobility in the home MET, see LTG  Short term goal 4: pt to complete car transfer at [facility] for safe transport to home  MET, see LTG  Short term goal 5: pt score on Tinetti balance test will improve to >=20 for improved balance and decreased fall risk at home  Long term goals  Time Frame for Long term goals : 2 weeks  Long term goal 1: pt to complete supine to/from sit at Mod I to get in and out of bed MET, continue  Long term goal 2: pt to complete sit to/from stand at Mod I to rise from bed or chair  Long term goal 3: pt to ambulate >50ft with RW at Mod I, >125ft with RW at S for mobility in the home and community  MET  Long term goal 4: pt to complete car transfer at S for safe transport to home  MET, continue  Long term goal 5: pt to negotiate 6\" platform step with RW at S for curb negotiation in the community MET  Long term goal 6: pt score on Tinetti balance test will improve to >=24 for improved balance and decreased fall risk at home    Following session, patient left in safe position with all fall risk precautions in place.

## 2020-09-23 NOTE — PROGRESS NOTES
[address] [address]  INPATIENT PHYSICAL THERAPY  DAILY NOTE  [address] [facility] - [id number]-[id number]    Time In: 1430  Time Out: 1500  Timed Code Treatment Minutes: 60 Minutes  Minutes: 30          Date: 2020  Patient Name: Suki Adkins,  Gender:  female        MRN: [id number]  : 1950  (65 y.o.)     Referring Practitioner: DAVID Gan MD  Diagnosis: subcapital fracture of left femur s/p repair  Additional Pertinent Hx: Per H&P, pt is a 65 y.o. female with a past medical history of diabetes and prior MI who presents to the emergency department for evaluation of syncope. Patient states that she was at the grocery store this morning shortly prior to arrival, and upon leaving the grocery store she felt acutely short of breath, and lost consciousness. She reports that she fell onto her left side, and landed on her elbow and the back of her head. She endorses losing consciousness after she fell, and bystanders report that she was dazed. She regained consciousness shortly later, however she reports that she has not been ambulatory since her fall. In the emergency department she is complaining of head pain, shoulder pain, elbow pain, and left hip pain. She is additionally complaining of swelling to her left posterior scalp. Pt is now s/p HIP PINNING on 2020 by Dr Arielle Mcpherson. To IPR on      Prior Level of Function:  Lives With: Alone  Type of Home: Apartment  Home Layout: One level  Home Access: Level entry  Home Equipment: Quad cane   Bathroom Shower/Tub: Tub/Shower unit, Walk-in shower(Pt prefers the walk in shower.)  Bathroom Toilet: Standard(uses a countertop on Right side.)  Bathroom Accessibility: Accessible    Receives Help From: Friend(s)  ADL Assistance: Independent  Homemaking Assistance: Independent  Homemaking Responsibilities: Yes  Ambulation Assistance: Independent  Transfer Assistance: Independent  Active :  Yes  Additional Comments: Pt did not use any AD for ambulating. She did her own homemaking and cooking. reports walking daily PTA. Has a  1 x month    Restrictions/Precautions:  Restrictions/Precautions: Weight Bearing, General Precautions, Fall Risk  Left Lower Extremity Weight Bearing: Weight Bearing As Tolerated    SUBJECTIVE: Pt seated in recliner. Friend observed the session. PAIN: 0/10:     OBJECTIVE:    Transfers:  Sit to Stand: Modified Independent  Stand to Sit:Stand By Assistance  Trials from recliner, w/c and park bench. Cues to sit close to armrest when sitting on bench. Cue to keep focused on task as pt would get distracted and then forget hand placement, abraham when going to sit. Stand Pivot:Modified Independent with RW    Ambulation:  Stand By Assistance  Distance: 150 ft on brick/sidewalk, 75 ft on sidewalk, 10 ft x2 on tile. Device:Rolling Walker  Gait Deviations:  Slow Susanna and Decreased Gait Speed. Cues and demo to be observant with sidewalk cracks. Pt showed safe technique on sidewalk and brick surfaces. ASSESSMENT:  Assessment: Patient progressing toward established goals. Activity Tolerance:  Patient tolerance of  treatment: good.       Equipment Recommendations:Equipment Needed: Yes(RW)  Discharge Recommendations:  Continue to assess pending progress    Plan: Times per week: 5x/wk 90 min, 1x/wk 30 min  Current Treatment Recommendations: Strengthening, ROM, Balance Training, Functional Mobility Training, Gait Training, Transfer Training, Patient/Caregiver Education & Training, Safety Education & Training, Home Exercise Program, Endurance Training, Stair training, Equipment Evaluation, Education, & procurement, Wheelchair Mobility Training, ADL/Self-care Training    Patient Education  Patient Education: Transfers, Gait    Goals:  Patient goals : go home  Short term goals  Time Frame for Short term goals: 1 week  Short term goal 1: pt to complete supine to/from sit at SBA to get in and out of bed  MET, see LTG  Short term goal 2: pt to complete sit to/from stand at SBA to rise from bed or chair  MET, see LTG  Short term goal 3: pt to ambulate >75ft with RW at [facility] for mobility in the home MET, see LTG  Short term goal 4: pt to complete car transfer at [facility] for safe transport to home  MET, see LTG  Short term goal 5: pt score on Tinetti balance test will improve to >=20 for improved balance and decreased fall risk at home  Long term goals  Time Frame for Long term goals : 2 weeks  Long term goal 1: pt to complete supine to/from sit at Mod I to get in and out of bed MET, continue  Long term goal 2: pt to complete sit to/from stand at Mod I to rise from bed or chair  Long term goal 3: pt to ambulate >50ft with RW at Mod I, >125ft with RW at S for mobility in the home and community  MET  Long term goal 4: pt to complete car transfer at S for safe transport to home  MET, continue  Long term goal 5: pt to negotiate 6\" platform step with RW at S for curb negotiation in the community MET  Long term goal 6: pt score on Tinetti balance test will improve to >=24 for improved balance and decreased fall risk at home    Following session, patient left in safe position with all fall risk precautions in place.

## 2020-09-23 NOTE — DISCHARGE INSTR - DIET
DIET CARB CONTROL     Good nutrition is important when healing from an illness, injury, or surgery. Follow any nutrition recommendations given to you during your hospital stay.  If you were given an oral nutrition supplement while in the hospital, continue to take this supplement at home. You can take it with meals, in-between meals, and/or before bedtime. These supplements can be purchased at most local grocery stores, pharmacies, and chain super-stores.  If you have any questions about your diet or nutrition, call the hospital and ask for the dietitian.

## 2020-09-23 NOTE — PROGRESS NOTES
[address]  Occupational Therapy  Daily Note  Time:    Time In: 0700  Time Out: 0730  Timed Code Treatment Minutes: 30 Minutes  Minutes: 30    Date: 2020  Patient Name: Lizette Will,   Gender: female      Room: [facility]54/054-A  MRN: [id number]  : 1950  (65 y.o.)  Referring Practitioner: Dr. Esthela Marroquin  Diagnosis: subcapital fracture of the left femur , s/p repair  Additional Pertinent Hx: She was admitted 2020 after being brought into the emergency department by [facility] for injuries sustained after a syncopal event. The patient had a fall that occurred while she was walking in the grocery store when she blacked out and when she regained consciousness she found herself lying on the floor with people standing around her. She had left-sided hip pain and also abrasions on her left arm along with a left-sided superficial hematoma on her parietal scalp. Her initial x-rays showed an acute slightly impacted subcapital fracture of the left femoral neck. She underwent surgical correction with a closed reduction and percutaneous screw fixation of the left hip impacted subcapital fracture of the left femoral neck by Dr. Lizeth Means on 2020. Restrictions/Precautions:  Restrictions/Precautions: Weight Bearing, General Precautions, Fall Risk  Left Lower Extremity Weight Bearing: Weight Bearing As Tolerated      SUBJECTIVE: Pleasant and cooperative, agreeable to OTAlma Amezcua PAIN: 0/10: denied pain    COGNITION: Slow Processing, some decreased recall     ADL:     ORAL HYGIENE:Independent. Standing sinkside x 8 min for oral care, hand care. CARE Score: 6. TOILETING HYGIENE:Independent. CARE Score: 6. TOILET TRANSFER: Independent  using an ETS with armrests. CARE Score: 6       BALANCE:  Sitting Balance:  Independent. Standing Balance: Modified Independent     BED MOBILITY:  Not Tested    TRANSFERS:  Sit to Stand:  Modified Independent. Stand to Sit: Supervision min cues for hand placement     FUNCTIONAL MOBILITY:  Assistive Device: Walker  Assist Level:  Supervision. Distance: To and from bathroom     ADDITIONAL TASK:   Pt completed IADL homemaking task of clothing retrieval out of cupboards and drawers, completing with mod I for balance but did require cue'ing for safety with use of RW. Pt completed BUE strengthening ex x10 reps x1 set with a medium resistive band - tolerated fair + - mod cues for tech, recall - completed to improve UE tolerance & overall strength for ADLs. ASSESSMENT:     Activity Tolerance:  Patient tolerance of  treatment: good. Discharge Recommendations: Home with nursing aide, Home with Home health OT, 24 hour supervision or assist    Equipment Recommendations: Equipment Needed: Yes  Other: Pt may benefit from a BSC, shower chair, grab bars in the shower . BSC ordered on 9-15.   Plan: Times per week: 5xs week x 90 min, 1 x week x 30 min  Current Treatment Recommendations: Endurance Training, Functional Mobility Training, Self-Care / ADL, Equipment Evaluation, Education, & procurement, Patient/Caregiver Education & Training, Home Management Training, Safety Education & Training    Patient Education  Patient Education: Assistive Device Safety, ADL, HEP, IADL, fall risk prevention    Goals  Short term goals  Time Frame for Short term goals: 1 week  Short term goal 1: PT will complete ADL routine with 0-1 cues for organization, problem solving adapted tech and S with LHAE PRN to increase independence in self care tasks  Short term goal 2: Pt will complete  grooming/ toilteting tasks with MI  while using 1-2 hand release to increase her independence with [place] ADL routine  Short term goal 3: PT will complete functional mobility to/ from the BR as well as around obstacles with S  and 0-1 cues for safe tech to increase independence in toileting tasks at home  Short term goal 4: Pt will complete self feeding tasks with AE without cues to increase ability to eat soft foods  Short term goal 5: Pt will complete 1 step homemaking tasks with no > min cues for safe tech safely  to increase independence in retrieving a snack  Long term goals  Time Frame for Long term goals : 1 week  Long term goal 1: Pt will complete ADL routine with no cues for safe and adapted tech to increase independence in self care tasks  Long term goal 2: Pt will complete 2 step homemaking tasks with no cues for safe tech and S for problem solving to increase independence in light homemaking tasks    Following session, patient left in safe position with all fall risk precautions in place.

## 2020-09-23 NOTE — PROGRESS NOTES
Physical Medicine & Rehabilitation  Progress Note    Chief Complaint:  left femoral neck impacted subcapital fracture    Subjective: Patient was able to recall going on a scavenger hunt with Speech Therapy today. She notes that she is not having any initial side effects from having been started on the Prozac tomorrow; her mood appears more positive today and she is quite animated with her description of the scavenger hunt. She denies any current pain at this time and has taken only 1 dose of the tramadol today. She is excited about being able to go home tomorrow and Reny Bussing my own TV. \"  She had no other concerns or questions at this time. Rehabilitation:   Physical Therapy:  OBJECTIVE:     Transfers:  Sit to Stand: Modified Independent  Stand to Sit:Stand By Assistance  Trials from recliner, w/c and park bench. Cues to sit close to armrest when sitting on bench. Cue to keep focused on task as pt would get distracted and then forget hand placement, abraham when going to sit. Stand Pivot:Modified Independent with RW     Ambulation:  Stand By Assistance  Distance: 150 ft on brick/sidewalk, 75 ft on sidewalk, 10 ft x2 on tile. Device:Rolling Walker  Gait Deviations:  Slow Susanna and Decreased Gait Speed. Cues and demo to be observant with sidewalk cracks. Pt showed safe technique on sidewalk and brick surfaces. ASSESSMENT:  Assessment: Patient progressing toward established goals. Activity Tolerance:  Patient tolerance of  treatment: good. Equipment Recommendations:Equipment Needed: Yes(RW)  Discharge Recommendations:  Continue to assess pending progress     Occupational Therapy:  COGNITION: Slow Processing, some decreased recall      ADL:      ORAL HYGIENE:Independent. Standing sinkside x 8 min for oral care, hand care. CARE Score: 6. TOILETING HYGIENE:Independent. CARE Score: 6. TOILET TRANSFER: Independent  using an ETS with armrests.   CARE Score: 6        BALANCE:  Sitting Balance: Independent. Standing Balance: Modified Independent      BED MOBILITY:  Not Tested     TRANSFERS:  Sit to Stand:  Modified Independent. Stand to Sit: Supervision min cues for hand placement      FUNCTIONAL MOBILITY:  Assistive Device: Walker  Assist Level:  Supervision. Distance: To and from bathroom     ADDITIONAL TASK:   Pt completed IADL homemaking task of clothing retrieval out of cupboards and drawers, completing with mod I for balance but did require cue'ing for safety with use of RW. Pt completed BUE strengthening ex x10 reps x1 set with a medium resistive band - tolerated fair + - mod cues for tech, recall - completed to improve UE tolerance & overall strength for ADLs.    ASSESSMENT:     Activity Tolerance:  Patient tolerance of  treatment: good. Discharge Recommendations: Home with nursing aide, Home with Home health OT, 24 hour supervision or assist    Equipment Recommendations: Equipment Needed: Yes  Other: Pt may benefit from a BSC, shower chair, grab bars in the shower . BSC ordered on 9-15. Speech Therapy:  Short-Term Goals:  SHORT TERM GOAL #1:  Goal 1: Pt will complete orientation, immediate/delayed recall and working memory tasks with 80% accuracy given mod cues to improve retention of new and functional information. INTERVENTIONS: Orientation:   -Month- min cues to external aids  -Date- Indep  -[address]- Multiple choice cues  -TOD- Indep     Functional recall of events from previous ST/OT sessions- 1/3 indep, 2/3 with mod cues, indicating appropriate encoding of information, but poor retrieval.      SHORT TERM GOAL #2:  Goal 2: Pt will complete functional sequencing and thought organizational tasks with 70% accuracy given mod cues to improve overall executive functioning skills. INTERVENTIONS: Pre-filled calendar navigation- 2/6 indep, 1/6 with min cues, 2/6 with mod cues.    *Mod cues needed for working memory to maintain focus on targets, patient easily distracted by other information listed on the calendar. *Improved success with visual scanning and selective attention when focused on in isolation. *Mod cues needed for basic math computation tasks     [place] divergent naming: (target =11+)  -Animals- 8 indep in one minute, 2 with min cues and additional time  -Fruit- 7 in one minute indep, 3 with mod cues and additional time  -Body parts- 13 in one minute independently      SHORT TERM GOAL #3:  Goal 3: Patient will complete functional problem solving, reasoning and basic computational tasks with 70% accuracy given mod cues to improve overall management of ADLs. INTERVENTIONS: Basic functional math tasks: 1/4 indep, 3/4 with total assist.   *Total assist needed for math computation and determining appropriate math function.      Navigation of menu- 4/5 indep, 1/5 with mod cues. *Cues needed for selective attention when focusing on more than 2 units of information.      Reasoning for comprehension of information in newspaper (weather forecast)- 5/5 indep  *Good success with scanning for specific information.      Problem solving given hypothetical situations- 4/6 indep, 1/6 with min cues (1/6 with total assist). *Good success with recall of emergency number and when to utilize. *Patient did not readily verbalize that she would leave the house if she smelled and visibly saw smoke, instead responding \"I would open a window. \"   *Despite overt success with responding to hypothetical questions, concern exists regarding thought processing and reasoning within actual situations.  Recommend continued close monitoring of functioning in the home via the [facility] services.      **Spoke with SANTINO Ramirez on the phone last evening at length, reviewing recommendations for home set up strategies, recommendations that patient will likely NOT return to driving in the future, and concerns regarding potential continued cognitive breath. Cardiovascular: Negative for leg swelling. Gastrointestinal: Negative for constipation, diarrhea and nausea. Endocrine: Negative for cold intolerance. Genitourinary: Positive for urgency. Negative for frequency. Musculoskeletal: Positive for gait problem. Negative for arthralgias and myalgias. Skin: Negative for pallor. Allergic/Immunologic: Negative. Neurological: Positive for tremors and weakness. Negative for dizziness, light-headedness and headaches. Hematological: Negative. Psychiatric/Behavioral: Positive for decreased concentration. Negative for confusion and dysphoric mood. The patient is not nervous/anxious. All other systems reviewed and are negative. Objective:  /60   Pulse 90   Temp 99.1 °F (37.3 °C) (Oral)   Resp 18   Ht 5' 7\" (1.702 m)   Wt 149 lb 3.2 oz (67.7 kg)   SpO2 92%   BMI 23.37 kg/m²   CURRENT VITALS:  height is 5' 7\" (1.702 m) and weight is 149 lb 3.2 oz (67.7 kg). Her oral temperature is 99.1 °F (37.3 °C). Her blood pressure is 109/60 and her pulse is 90. Her respiration is 18 and oxygen saturation is 92%. Body mass index is 23.37 kg/m².   Temperature Range (24h):Temp: 99.1 °F (37.3 °C) Temp  Av.9 °F (37.2 °C)  Min: 98.7 °F (37.1 °C)  Max: 99.1 °F (37.3 °C)  BP Range (36Q): Systolic (50AQS), TWD:285 , Min:105 , EA     Diastolic (81QBQ), TYY:80, Min:60, Max:68    Pulse Range (24h): Pulse  Av.7  Min: 85  Max: 100  Respiration Range (24h): Resp  Av  Min: 18  Max: 20  Current Pulse Ox (24h):  SpO2: 92 %  Pulse Ox Range (24h):  SpO2  Av %  Min: 92 %  Max: 92 %  Oxygen Amount and Delivery:      awake  Orientation:   person, place, time, situation  Mood: within normal limits  Affect: calm  General appearance: in no acute distress, up in hallway walking with 2 wheeled walker with physical therapist at side    Memory:   normal,   Attention/Concentration: normal  Language:  normal     ROM:  abnormal - left hip surgery  Motor Exam:  Motor exam is 4+ out of 5 all extremities     Sensory:  Sensory intact  Coordination:   normal  Deep Tendon Reflexes:  Reflexes are intact and symmetrical bilaterally     Skin: warm and dry, no rash or erythema  Peripheral vascular: Pulses: Normal upper and lower extremity pulses; Edema: 1+    Diagnostics:   Recent Results (from the past 24 hour(s))   POCT glucose    Collection Time: 09/23/20  7:47 AM   Result Value Ref Range    POC Glucose 93 70 - 108 mg/dl     Labs Renal Latest Ref Rng & Units 9/21/2020 9/18/2020 9/16/2020 9/15/2020 9/14/2020   BUN 7 - 22 mg/dL 26(H) 25(H) 25(H) 25(H) 29(H)   Cr 0.4 - 1.2 mg/dL 1.3(H) 1. 3(H) 1. 3(H) 1. 3(H) 1. 3(H)   K 3.5 - 5.2 meq/L 5.1 4.9 4.6 5.1 5.4(H)   Na 135 - 145 meq/L 138 139 141 142 140      Recent Labs     09/21/20  0534 09/14/20  1055 09/10/20  0617   WBC 6.2 7.5 4.9   HGB 9.6* 10.5* 9.7*   HCT 31.8* 35.1* 32.1*   MCV 97.0 95.1 94.7    281 186      Impression:  1. Ground-level fall after syncopal event. 2. Acute slightly impacted subcapital fracture of the left femoral neck. 3. Status post closed reduction and percutaneous screw fixation of the left hip impacted subcapital fracture of the left femoral neck by Dr. Sherryle Pae, D.O. on 09/06/2020. EBL minimal.  4. Gait instability. 5. Mild TBI. 6. Iron deficiency anemia. 7. Vitamin D deficiency. 8. Insulin-dependent type 2 diabetes, controlled with hyperglycemia. Last hemoglobin A1c was 5.7 on 9/9/2020.  9. Essential tremor. 10. History of prior myocardial infarction in 2011. 11. Hypertension. 12. Hyperlipidemia. 13. Coronary artery disease. 14. History of prior myocardial infarction. 15. CKD 3.  16. 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 17. Osteoporosis. 18. Moderate to severe cognitive deficits. (MOCA) version 8.2 completed. Patient scored 10/30.  19. Hyperkalemia. 20. Acute kidney injury. 21. Hypercalcemia. 22. Tachycardia.   23. Depressed mood with suicidal ideation.     Plan:    Medical management: Per primary team and Dr. Manav Storey, Internal Medicine, Physical Medicine     Narcotic usage: Tramadol last 24-hour usage 50mg. Stable. Last BM:   Stool Amount: Medium (09/23/20 0937)     FUNCTIONAL OUTCOMES TOOLS:    GODINEZ -      Tinetti - Balance Score: 11  Gait Score: 7  Tinetti Total Score: 18    TUG -       Acute/Rehabilitation Problems:  1. Ground-level fall after syncopal event. 2. Acute slightly impacted subcapital fracture of the left femoral neck. 1. Addressed by surgery below. 3. Status post closed reduction and percutaneous screw fixation of the left hip impacted subcapital fracture of the left femoral neck by Dr. Viviane Cedillo D.O. on 09/06/2020. EBL minimal.  1. WBAT. 2. Wound care. 3. Pain control. 1. PRN and scheduled Tylenol. 2. Lidoderm patches. 3. Tramadol. Patient still requires encouragement to take her pain medications prior to her therapy sessions. 4. Gait instability. 1. PT/OT. 2. Tinetti 16/28. Improvement to 18/28 on 9/21. Risk Indicators: Less than/equal to 18 = high risk; 19-23 Moderate risk; Greater than/equal to 24 = low risk. 5. Mild TBI/Moderate to severe cognitive deficits. (MOCA) version 8.2 completed. Patient scored 10/30. 1. SLP. 2. Low stimulation TBI guidelines if deemed necessary. 3. As of 9/15 the patient was felt to have a decline in her cognition; her Creig Post was queried as to how he perceived her cognition and he stated this is her baseline. 4. Urinalysis was negative for signs of a UTI on 9/15. 6. Iron deficiency anemia. 1. Iron 25 and ferritin 120. Abnormal/normal.  Iron improved to 46 on 9/22. Anticipate patient discharging with 30 days of continuation on current regimen of iron and vitamin C supplementation. 2. Ferrous sulfate and vitamin C twice daily with meals. 3. Hemoglobin remained stable at 9.6 on 9/21.  7. Tachycardia.   1. Patient had an episode of tachycardia on 9/16 with a current level of pain control, and the plan to discharge her tomorrow in the early afternoon as well is the equipment that she will be receiving.     Rose Win MD

## 2020-09-23 NOTE — PROGRESS NOTES
[address] [place] [facility]  [address] [facility]  DAILY NOTE    TIME  SLP Individual Minutes  Time In: 1107  Time Out: 1200  Minutes: 53  Timed Code Treatment Minutes: 48 Minutes       Date: 2020  Patient Name: Prema Johnson      CSN: [id number]   : 1950  (65 y.o.)  Gender: female   Referring Physician:  Dr. Kavon Bingham    Diagnosis: Left femoral neck impacted subcapital fracture  Secondary Diagnosis: Cognition  Precautions: Fall risk  Current Diet: Regular diet, thin liquids  Swallowing Strategies: Standard Universal Swallow Precautions  Date of Last MBS: Not Applicable    Pain:  No pain reported. Subjective:  Patient sitting upright in chair, pleasant and attentive. Short-Term Goals:  SHORT TERM GOAL #1:  Goal 1: Pt will complete orientation, immediate/delayed recall and working memory tasks with 80% accuracy given mod cues to improve retention of new and functional information. INTERVENTIONS: Orientation:   -Month- min cues to external aids  -Date- Indep  -[address]- Multiple choice cues  -TOD- Indep    Functional recall of events from previous ST/OT sessions- /3 indep, 2/3 with mod cues, indicating appropriate encoding of information, but poor retrieval.     SHORT TERM GOAL #2:  Goal 2: Pt will complete functional sequencing and thought organizational tasks with 70% accuracy given mod cues to improve overall executive functioning skills. INTERVENTIONS: Pre-filled calendar navigation-  indep, / with min cues, 2/6 with mod cues. *Mod cues needed for working memory to maintain focus on targets, patient easily distracted by other information listed on the calendar. *Improved success with visual scanning and selective attention when focused on in isolation.    *Mod cues needed for basic math computation tasks    [place] divergent naming: (target =11+)  -Animals- 8 indep in one minute, 2 Alert, Protestant friends visiting more frequently, Meals on wheels, etc) to increase supervision efforts and allow for patient to remain in her home as long as safely possible. Long-Term Goals:  Timeframe for Long-term Goals: 3 weeks    LONG TERM GOAL #1:  Goal 1: Pt will improve cognitive linguistic skills to a min assist level in order to improve level of independence in the home environment. Comprehension: 5 - Patient understands basic needs (hungry/hot/pain)  Expression: 5 - Expresses basic ideas/needs only (hungry/hot/pain)  Social Interaction: 5 - Patient is appropriate with supervision/cues  Problem Solving: 3 - Patient solves simple/routine tasks 50%-74%  Memory: 3 - Patient remembers 50%-74% of the time       EDUCATION:  Learner: Patient  Education:  Reviewed recommendations for follow-up, Education Related to Potential Risks and Complications Due to Impairment/Illness/Injury and Home Safety Education  Evaluation of Education: Verbalizes understanding    ASSESSMENT/PLAN:  Activity Tolerance:  Patient tolerance of treatment: Good     Assessment/Plan: Patient progressing toward established goals. Continues to require skilled care of licensed speech pathologist to progress toward achievement of established goals and plan of care. Plan for Next Session:  Grocery store task, Functional memory, Discharge review     Raul Postin.  [address] [facility], [facility] [address], [address]

## 2020-09-24 VITALS
RESPIRATION RATE: 16 BRPM | OXYGEN SATURATION: 92 % | HEIGHT: 67 IN | WEIGHT: 149.2 LBS | TEMPERATURE: 98.8 F | DIASTOLIC BLOOD PRESSURE: 59 MMHG | BODY MASS INDEX: 23.42 KG/M2 | SYSTOLIC BLOOD PRESSURE: 107 MMHG | HEART RATE: 90 BPM

## 2020-09-24 LAB
ANION GAP SERPL CALCULATED.3IONS-SCNC: 8 MEQ/L (ref 8–16)
BUN BLDV-MCNC: 26 MG/DL (ref 7–22)
CALCIUM SERPL-MCNC: 10.6 MG/DL (ref 8.5–10.5)
CHLORIDE BLD-SCNC: 103 MEQ/L (ref 98–111)
CO2: 28 MEQ/L (ref 23–33)
CREAT SERPL-MCNC: 1.4 MG/DL (ref 0.4–1.2)
ERYTHROCYTE [DISTWIDTH] IN BLOOD BY AUTOMATED COUNT: 14.7 % (ref 11.5–14.5)
ERYTHROCYTE [DISTWIDTH] IN BLOOD BY AUTOMATED COUNT: 50.6 FL (ref 35–45)
GFR SERPL CREATININE-BSD FRML MDRD: 37 ML/MIN/1.73M2
GLUCOSE BLD-MCNC: 78 MG/DL (ref 70–108)
GLUCOSE BLD-MCNC: 85 MG/DL (ref 70–108)
HCT VFR BLD CALC: 32.3 % (ref 37–47)
HEMOGLOBIN: 9.9 GM/DL (ref 12–16)
MCH RBC QN AUTO: 29.3 PG (ref 26–33)
MCHC RBC AUTO-ENTMCNC: 30.7 GM/DL (ref 32.2–35.5)
MCV RBC AUTO: 95.6 FL (ref 81–99)
PLATELET # BLD: 250 THOU/MM3 (ref 130–400)
PMV BLD AUTO: 9.7 FL (ref 9.4–12.4)
POTASSIUM SERPL-SCNC: 5.1 MEQ/L (ref 3.5–5.2)
RBC # BLD: 3.38 MILL/MM3 (ref 4.2–5.4)
SODIUM BLD-SCNC: 139 MEQ/L (ref 135–145)
WBC # BLD: 5.1 THOU/MM3 (ref 4.8–10.8)

## 2020-09-24 PROCEDURE — 97129 THER IVNTJ 1ST 15 MIN: CPT

## 2020-09-24 PROCEDURE — 97535 SELF CARE MNGMENT TRAINING: CPT

## 2020-09-24 PROCEDURE — 6370000000 HC RX 637 (ALT 250 FOR IP): Performed by: FAMILY MEDICINE

## 2020-09-24 PROCEDURE — 97130 THER IVNTJ EA ADDL 15 MIN: CPT

## 2020-09-24 PROCEDURE — 82948 REAGENT STRIP/BLOOD GLUCOSE: CPT

## 2020-09-24 PROCEDURE — 36415 COLL VENOUS BLD VENIPUNCTURE: CPT

## 2020-09-24 PROCEDURE — 97110 THERAPEUTIC EXERCISES: CPT

## 2020-09-24 PROCEDURE — 6370000000 HC RX 637 (ALT 250 FOR IP): Performed by: INTERNAL MEDICINE

## 2020-09-24 PROCEDURE — 97530 THERAPEUTIC ACTIVITIES: CPT

## 2020-09-24 PROCEDURE — 80048 BASIC METABOLIC PNL TOTAL CA: CPT

## 2020-09-24 PROCEDURE — 85027 COMPLETE CBC AUTOMATED: CPT

## 2020-09-24 PROCEDURE — 6370000000 HC RX 637 (ALT 250 FOR IP): Performed by: PHYSICAL MEDICINE & REHABILITATION

## 2020-09-24 RX ADMIN — FERROUS SULFATE TAB 325 MG (65 MG ELEMENTAL FE) 325 MG: 325 (65 FE) TAB at 07:58

## 2020-09-24 RX ADMIN — DOCUSATE SODIUM 50 MG AND SENNOSIDES 8.6 MG 2 TABLET: 8.6; 5 TABLET, FILM COATED ORAL at 07:57

## 2020-09-24 RX ADMIN — METFORMIN HYDROCHLORIDE 1000 MG: 500 TABLET ORAL at 07:57

## 2020-09-24 RX ADMIN — INSULIN GLARGINE 24 UNITS: 100 INJECTION, SOLUTION SUBCUTANEOUS at 11:13

## 2020-09-24 RX ADMIN — PRIMIDONE 200 MG: 50 TABLET ORAL at 07:57

## 2020-09-24 RX ADMIN — Medication 500 MG: at 07:56

## 2020-09-24 RX ADMIN — ASPIRIN 81 MG: 81 TABLET ORAL at 07:58

## 2020-09-24 RX ADMIN — PROPRANOLOL HYDROCHLORIDE 80 MG: 80 CAPSULE, EXTENDED RELEASE ORAL at 07:57

## 2020-09-24 RX ADMIN — MULTIPLE VITAMINS W/ MINERALS TAB 1 TABLET: TAB at 07:56

## 2020-09-24 RX ADMIN — Medication 250 MG: at 07:58

## 2020-09-24 RX ADMIN — Medication 5000 UNITS: at 07:58

## 2020-09-24 RX ADMIN — FLUOXETINE HYDROCHLORIDE 10 MG: 10 CAPSULE ORAL at 07:58

## 2020-09-24 RX ADMIN — CLOPIDOGREL BISULFATE 75 MG: 75 TABLET ORAL at 07:58

## 2020-09-24 RX ADMIN — ACETAMINOPHEN 650 MG: 325 TABLET ORAL at 05:21

## 2020-09-24 RX ADMIN — Medication 5000 UNITS: at 11:16

## 2020-09-24 ASSESSMENT — PAIN SCALES - GENERAL
PAINLEVEL_OUTOF10: 2
PAINLEVEL_OUTOF10: 0

## 2020-09-24 NOTE — PLAN OF CARE
Care plan reviewed with patient. Patient verbalize understanding of the plan of care and contribute to goal setting. Problem: Falls - Risk of:  Goal: Will remain free from falls  Description: Will remain free from falls  9/24/2020 1131 by Ceasar Mosley LPN  Outcome: Completed  Note: Up with assist of one with walker and gait belt. Tolerates well. Problem: Falls - Risk of:  Goal: Absence of physical injury  Description: Absence of physical injury  9/24/2020 1131 by Ceasar Mosley LPN  Outcome: Completed     Problem: Confusion - Acute:  Goal: Absence of continued neurological deterioration signs and symptoms  Description: Absence of continued neurological deterioration signs and symptoms  Outcome: Completed     Problem: Confusion - Acute:  Goal: Mental status will be restored to baseline  Description: Mental status will be restored to baseline  Outcome: Completed     Problem: Discharge Planning:  Goal: Ability to perform activities of daily living will improve  Description: Ability to perform activities of daily living will improve  Outcome: Completed     Problem: Discharge Planning:  Goal: Participates in care planning  Description: Participates in care planning  Outcome: Completed     Problem: Injury - Risk of, Physical Injury:  Goal: Will remain free from falls  Description: Will remain free from falls  9/24/2020 1131 by Ceasar Mosley LPN  Outcome: Completed  Note: Up with assist of one with walker and gait belt. Tolerates well.       Problem: Injury - Risk of, Physical Injury:  Goal: Absence of physical injury  Description: Absence of physical injury  9/24/2020 1131 by Ceasar Mosley LPN  Outcome: Completed     Problem: Mood - Altered:  Goal: Mood stable  Description: Mood stable  Outcome: Completed

## 2020-09-24 NOTE — PROGRESS NOTES
[address] [address]  Inpatient Rehabilitation  Occupational Therapy  Discharge Note  Time:  Time In: 08  Time Out: 0845  Timed Code Treatment Minutes: 15 Minutes  Minutes: 15    Date: 2020  Patient Name: Fatemeh Chavez,   Gender: female      Room: [facility]/054-A  MRN: [id number]  : 1950  (65 y.o.)  Referring Practitioner: Dr. Samir Jimenez  Diagnosis: subcapital fracture of the left femur , s/p repair  Additional Pertinent Hx: She was admitted 2020 after being brought into the emergency department by [address] [facility] for injuries sustained after a syncopal event. The patient had a fall that occurred while she was walking in the grocery store when she blacked out and when she regained consciousness she found herself lying on the floor with people standing around her. She had left-sided hip pain and also abrasions on her left arm along with a left-sided superficial hematoma on her parietal scalp. Her initial x-rays showed an acute slightly impacted subcapital fracture of the left femoral neck. She underwent surgical correction with a closed reduction and percutaneous screw fixation of the left hip impacted subcapital fracture of the left femoral neck by Dr. Bill Morrissey on 2020. Restrictions/Precautions:  Restrictions/Precautions: Weight Bearing, General Precautions, Fall Risk  Left Lower Extremity Weight Bearing: Weight Bearing As Tolerated    SUBJECTIVE: Pt was found up in room upon arrival without RW without assist. CORNELIUS assited patient back to safe area and assisted with ADLs      PAIN: No c/o/     COGNITION: Decreased Recall and Decreased Safety Awareness    ADL:   Upper Extremity Dressing: Modified Independent. Lower Extremity Dressing: Modified Independent. with increased time to problem solve surgical donning technique . BALANCE:  Sitting Balance:  Modified Independent. Standing Balance: Modified Independent.       BED MOBILITY:  Not Tested    TRANSFERS:  Sit to Stand:  Modified Independent. EOB  Stand to Sit: Modified Independent. FUNCTIONAL MOBILITY:  Assistive Device: Rolling Walker  Assist Level:  Modified Independent. Distance: within room     ASSESSMENT:  Activity Tolerance:  Patient tolerance of  treatment: good. Assessment: Assessment: Darrel Velásquez has made steady gains on IP rehab. Pt is mod I with rote ADL tasks but does require supervision for standing and mobility tasks within IADLs due to poor safety awareness. She is limited by safety awareness and recall with her daily routines. She requires cont skilled OT services on a [facility] basis to progress with safety during BADL and IADL and to decrease risk of fall / future injury. Discharge Recommendations: Home with nursing aide, Home with Home health OT, 24 hour supervision or assist  Equipment Recommendations: Equipment Needed: Yes  Other: Pt may benefit from a BSC, shower chair, grab bars in the shower . BSC ordered on 9-15.   Plan: Discharge home with [facility] OT     Patient Education  Patient Education: ADL's    Goals  Short term goals  Time Frame for Short term goals: 1 week  Short term goal 1: PT will complete ADL routine with 0-1 cues for organization, problem solving adapted tech and S with LHAE PRN to increase independence in self care tasks GOAL MET   Short term goal 2: Pt will complete  grooming/ toilteting tasks with MI  while using 1-2 hand release to increase her independence with [place] ADL routine GOAL MET   Short term goal 3: PT will complete functional mobility to/ from the BR as well as around obstacles with S  and 0-1 cues for safe tech to increase independence in toileting tasks at home GOAL MET   Short term goal 4: Pt will complete self feeding tasks with AE without cues to increase ability to eat soft foods GOAL MET   Short term goal 5: Pt will complete 1 step homemaking tasks with no > min cues for safe tech safely  to increase independence in retrieving a snack GOAL NOT MET CONSISTENTLY   Long term goals  Time Frame for Long term goals : 1 week  Long term goal 1: Pt will complete ADL routine with no cues for safe and adapted tech to increase independence in self care tasks GOAL NOT MET   Long term goal 2: Pt will complete 2 step homemaking tasks with no cues for safe tech and S for problem solving to increase independence in light homemaking tasks GOAL NOT MET     Following session, patient left in safe position with all fall risk precautions in place.

## 2020-09-24 NOTE — PROGRESS NOTES
Plan remains for discharge home today, 09/24/2020. [facility] contacted [facility] with discharge notification of today, 09/24/2020. Information provided to Ruchi. Discharge instructions, face to face encounter, updated therapy notes, and physician progress note faxed to agency for continuity of care.

## 2020-09-24 NOTE — PROGRESS NOTES
AD for ambulating. She did her own homemaking and cooking. reports walking daily PTA. Has a  1 x month    Restrictions/Precautions:  Restrictions/Precautions: Weight Bearing, General Precautions, Fall Risk  Left Lower Extremity Weight Bearing: Weight Bearing As Tolerated    SUBJECTIVE: pt in recliner upon arrival, pleasant and cooperative for therapy. Pt very talkative during session    PAIN: no c/o pain    OBJECTIVE:  Bed Mobility:  Supine to Sit: Modified Independent  Sit to Supine: Modified Independent   Scooting: Modified Independent    Transfers:  Sit to Stand: Stand By Assistance, cues for hand placement  Stand to Sit:Stand By Assistance, cues for hand placement    Ambulation:  Supervision  Distance: 12ftx2  Surface: Level Tile  Device:Rolling Walker  Gait Deviations:  Grossly steady, short steps lengths    Exercise:  Patient was guided in 1 set(s) 10 reps of exercise to both lower extremities. Ankle pumps, Glut sets, Quad sets, Heelslides, Short arc quads and Hip abduction/adduction. Exercises were completed for increased independence with functional mobility. reviewed and completed HEP, pt needing cues for appropriate completion    Functional Outcome Measures: Completed       ASSESSMENT:  Assessment: Patient progressing toward established goals, meeting 4/5 short term goals and 3 long term goals. Written information/instructions given to pt to reinforce safest technique for transfers as pt is easily distracted and will perform the transfer, but not position hands consistently. Pt will benefit from continued skilled PT in the discharge setting to reinforce safe functional mobility, progress gait by improving strength and endurance. Activity Tolerance:  Patient tolerance of  treatment: good. Pt tolerated session well. Pt demos decreased strength, endurance, balance and safety with mobility. Pt needing cues for safety with mobility and transfers.       Equipment Recommendations:Equipment

## 2020-09-24 NOTE — PROGRESS NOTES
[address] [place] [address] THERAPY  [address]  DISCHARGE NOTE    TIME  SLP Individual Minutes  Time In: 8835  Time Out: 8900  Minutes: 29  Timed Code Treatment Minutes: 29 Minutes       Date: 2020  Patient Name: Natividad Schwab      CSN: [id number]   : 1950  (65 y.o.)  Gender: female   Referring Physician:  Dr. Marli Salvador    Diagnosis: Left femoral neck impacted subcapital fracture  Secondary Diagnosis: Cognition  Precautions: Fall risk  Current Diet: Regular diet, thin liquids  Swallowing Strategies: Standard Universal Swallow Precautions  Date of Last MBS: Not Applicable    Pain:  No pain reported. Subjective:  Patient sitting upright in chair, pleasant and attentive. Pt was provided handouts that included details about discharge medications, included a schedule of when each medication is to be taken. Handouts were also provided for POA Ashley with the same medication information. Pt was informed that [place] would be assisting her with medication management upon discharge. Short-Term Goals:  SHORT TERM GOAL #1:  Goal 1: Pt will complete orientation, immediate/delayed recall and working memory tasks with 80% accuracy given mod cues to improve retention of new and functional information. - GOAL MET  INTERVENTIONS: Did not address d/t focus on other goals:  PREVIOUS SESSION:  Orientation:   -Month- min cues to external aids  -Date- Indep  -[address]- Multiple choice cues  -TOD- Indep    Functional recall of events from previous ST/OT sessions- 1/3 indep, 2/3 with mod cues, indicating appropriate encoding of information, but poor retrieval.     SHORT TERM GOAL #2:  Goal 2: Pt will complete functional sequencing and thought organizational tasks with 70% accuracy given mod cues to improve overall executive functioning skills.  - GOAL MET  INTERVENTIONS: Compile list of items to complete task and sequence steps to complete task:   -Compile 4 items needed to complete task: 4/8 indep, 2/8 min cues, 2/8 mod cues  -Sequence steps: 4/4 min cues  *Cues required to address all aspects of task and items needed to complete task    SHORT TERM GOAL #3:  Goal 3: Patient will complete functional problem solving, reasoning and basic computational tasks with 70% accuracy given mod cues to improve overall management of ADLs. - GOAL MET  INTERVENTIONS: Grocery store ad navigation: 2/4 indep, 2/4 mod cues  *Cues required to analyze, navigate multiple areas on ad to answer question  *Poor success with basic math computation. Long-Term Goals:  Timeframe for Long-term Goals: 3 weeks    LONG TERM GOAL #1:  Goal 1: Pt will improve cognitive linguistic skills to a min assist level in order to improve level of independence in the home environment. - GOAL NOT MET    Comprehension: 5 - Patient understands basic needs (hungry/hot/pain)  Expression: 5 - Expresses basic ideas/needs only (hungry/hot/pain)  Social Interaction: 5 - Patient is appropriate with supervision/cues  Problem Solving: 3 - Patient solves simple/routine tasks 50%-74%  Memory: 3 - Patient remembers 50%-74% of the time       EDUCATION:  Learner: Patient  Education:  Education Related to Potential Risks and Complications Due to Impairment/Illness/Injury, Education Related to [employer] and Wellness and Home Safety Education  Evaluation of Education: Verbalizes understanding    ASSESSMENT/PLAN:  SUMMARY: Patient met 3/3 STG and partially met 1/1 LTG. Patient continues to present with a moderate cognitive impairment with deficits in the following areas: functional problem solving and reasoning, memory (immediate, delayed, working), thought organization, basic computational tasks, and sequencing. Based on these deficits, home safety concerns are present when completing home activities (cooking, laundry, cleaning, medication management).  Patient's POA Ashley is aware of deficits and was present for family education session on 9/17. Ashley will assist in management of home activities. Recommendations that patient will likely NOT return to driving in the future, and concerns regarding potential continued cognitive decline have been discussed. An additional discussion with Ashley was conducted on 9/23 that reviewed how typically patients do function best in their home environment, but that with repeated instruction and visual/verbal cues from staff during her rehab stay, patient continues to demonstrate unsafe transfer techniques which place her at increased risk for falls. POA planning to look into further resources ([employer], Sikh friends visiting more frequently, Meals on wheels, etc) to increase supervision efforts and allow for patient to remain in her home as long as safely possible. Recommend further  home health services to address cognitive deficits to improve overall home safety. Activity Tolerance:  Patient tolerance of treatment: Good     Assessment/Plan: Patient discharged from Speech Therapy at this time due to completion of inpatient rehab stay . Discharge Disposition: Home with supervision/assistance from Maye Owens . Continued Speech Therapy Services recommended: Yes [id number], BA, Speech Intern   Wang Santiago.  [address], [facility] [address], [address]

## 2020-09-24 NOTE — PLAN OF CARE
Problem: Falls - Risk of:  Goal: Will remain free from falls  Description: Will remain free from falls  Outcome: Ongoing  Goal: Absence of physical injury  Description: Absence of physical injury  Outcome: Ongoing   Does not consistently use the call light for assistance. Bed Alarm remains in place.

## 2020-09-24 NOTE — PROGRESS NOTES
[facility]  Recreational Therapy   Discharge Note  Inpatient Rehabilitation Unit           Date:  9/24/2020       Patient Name: Mily Peres      MRN: [id number]       YOB: 1950 (65 y.o.)       Gender: female  Diagnosis: subcapital fracture of the left femur , s/p repair  Referring Practitioner: Dr. Sharla Aguila    Patient discharged from Recreational Therapy at this time. See recreational therapy notes for details.     Electronically signed by: NISHA Soto  Date: 9/24/2020

## 2020-09-24 NOTE — PROGRESS NOTES
Patient: Marcio Kaba  Unit/Bed: 2U-68/952-L  YOB: 1950  MRN: [id number] Acct: [de-identified]   Admitting Diagnosis: Subcapital fracture of left femur s/p repair  Admit Date:  9/9/2020  Hospital Day: 15    Assessment:     Active Problems:    Limb tremor    Syncope and collapse    Subcapital fracture of femur, left, closed, with routine healing, subsequent encounter    Type 2 diabetes mellitus without complication, with long-term current use of insulin ([facility])    Vitamin D deficiency    Age-related osteoporosis with current pathological fracture with routine healing  Resolved Problems:    * No resolved hospital problems. *      Plan:     Labs stable  CS acceptable   Medically ready for discharge        Subjective:     Patient has no complaint of CP, SOB, or GI upset. .   Medication side effects: none    Scheduled Meds:   FLUoxetine  10 mg Oral Daily    sennosides-docusate sodium  2 tablet Oral BID    insulin glargine  24 Units Subcutaneous QAM    therapeutic multivitamin-minerals  1 tablet Oral Daily    aspirin  81 mg Oral Daily    atorvastatin  80 mg Oral Daily    clopidogrel  75 mg Oral Daily    ferrous sulfate  325 mg Oral BID WC    And    vitamin C  250 mg Oral BID WC    primidone  200 mg Oral Daily    primidone  50 mg Oral Nightly    propranolol  80 mg Oral Daily    acetaminophen  650 mg Oral 3 times per day    Vitamin D  5,000 Units Oral TID WC    And    calcium elemental  500 mg Oral BID WC    lidocaine  2 patch Transdermal QAM AC    metFORMIN  1,000 mg Oral BID WC     Continuous Infusions:  PRN Meds:benzonatate, zinc oxide, magnesium hydroxide, polyethylene glycol, glucagon (rDNA), glucose, acetaminophen, traMADol **OR** traMADol    Review of Systems  Pertinent items are noted in HPI. Objective:     Patient Vitals for the past 8 hrs:   BP Temp Temp src Pulse Resp SpO2   09/24/20 0752 (!) 107/59 98.8 °F (37.1 °C) Oral 90 16 92 %     I/O last 3 completed shifts:   In: 600 [P.O.:600]  Out: -   I/O this shift:  In: 100 [P.O.:100]  Out: -     BP (!) 107/59   Pulse 90   Temp 98.8 °F (37.1 °C) (Oral)   Resp 16   Ht 5' 7\" (1.702 m)   Wt 149 lb 3.2 oz (67.7 kg)   SpO2 92%   BMI 23.37 kg/m²     BP (!) 107/59   Pulse 90   Temp 98.8 °F (37.1 °C) (Oral)   Resp 16   Ht 5' 7\" (1.702 m)   Wt 149 lb 3.2 oz (67.7 kg)   SpO2 92%   BMI 23.37 kg/m²   General appearance: alert, appears stated age and cooperative  Head: Normocephalic, without obvious abnormality, atraumatic  Lungs: clear to auscultation bilaterally  Heart: regular rate and rhythm, S1, S2 normal, no murmur, click, rub or gallop  Abdomen: soft, non-tender; bowel sounds normal; no masses,  no organomegaly  Extremities: extremities normal, atraumatic, no cyanosis or edema  Skin: Skin color, texture, turgor normal. No rashes or lesions  Neurologic: Grossly normal     Data Review:     Results for Niyah Manzano (MRN [id number]) as of 9/24/2020 09:18   Ref.  Range 9/23/2020 07:47 9/24/2020 06:11 9/24/2020 07:54   Sodium Latest Ref Range: 135 - 145 meq/L  139    Potassium Latest Ref Range: 3.5 - 5.2 meq/L  5.1    Chloride Latest Ref Range: 98 - 111 meq/L  103    CO2 Latest Ref Range: 23 - 33 meq/L  28    BUN Latest Ref Range: 7 - 22 mg/dL  26 (H)    Creatinine Latest Ref Range: 0.4 - 1.2 mg/dL  1.4 (H)    Anion Gap Latest Ref Range: 8.0 - 16.0 meq/L  8.0    Est, Glom Filt Rate Latest Units: ml/min/1.73m2  37 (A)    Glucose Latest Ref Range: 70 - 108 mg/dL  78    POC Glucose Latest Ref Range: 70 - 108 mg/dl 93  85   Calcium Latest Ref Range: 8.5 - 10.5 mg/dL  10.6 (H)    WBC Latest Ref Range: 4.8 - 10.8 thou/mm3  5.1    RBC Latest Ref Range: 4.20 - 5.40 mill/mm3  3.38 (L)    Hemoglobin Quant Latest Ref Range: 12.0 - 16.0 gm/dl  9.9 (L)    Hematocrit Latest Ref Range: 37.0 - 47.0 %  32.3 (L)    MCV Latest Ref Range: 81.0 - 99.0 fL  95.6    MCH Latest Ref Range: 26.0 - 33.0 pg  29.3    MCHC Latest Ref Range: 32.2 - 35.5 gm/dl  30.7 (L)    MPV Latest Ref Range: 9.4 - 12.4 fL  9.7    RDW-CV Latest Ref Range: 11.5 - 14.5 %  14.7 (H)    RDW-SD Latest Ref Range: 35.0 - 45.0 fL  50.6 (H)    Platelet Count Latest Ref Range: 130 - 400 thou/mm3  250      Electronically signed by Joselin Grady MD on 9/24/2020 at 9:17 AM

## 2020-09-28 ENCOUNTER — CARE COORDINATION (OUTPATIENT)
Dept: CASE MANAGEMENT | Age: 70
End: 2020-09-28

## 2020-10-07 ENCOUNTER — CARE COORDINATION (OUTPATIENT)
Dept: CASE MANAGEMENT | Age: 70
End: 2020-10-07

## 2020-10-07 NOTE — CARE COORDINATION
[facility] Transitions Initial Follow Up Call    Call within 2 business days of discharge: No    Patient: Loco Fuller Patient : 1950   MRN: [id number]  Reason for Admission: Left femur fx  Discharge Date: 20 RARS: Readmission Risk Score: 19      Last Discharge [facility]       Complaint Diagnosis Description Type Department Provider    20  Subcapital fracture of femur, left, closed, with routine healing, subsequent encounter Admission (Discharged) Javon Brown MD           Spoke with: Sherly Leblanc, patient    Facility: [address] [place] [address] 24 Hour Call    Do you have any ongoing symptoms?:  No  Do you have a copy of your discharge instructions?:  Yes  Do you have all of your prescriptions and are they filled?:  Yes  Have you been contacted by a [address]?:  No  Have you scheduled your follow up appointment?:  Yes  How are you going to get to your appointment?:  Car - family or friend to transport  Were you discharged with any Home Care or Post Acute Services:  Yes  Post Acute Services:  Home Health  Do you feel like you have everything you need to keep you well at home?:  Yes  Care Transitions Interventions         Follow Up:  Contacted patient for initial BPCI-A follow up. Introduced self and provided information about the BPCI-A bundle and 90 day follow up. Patient in agreement to receive follow up calls. Heather De León stated she is doing great. She is ambulating around the home with a walker. She is working with therapy. She denies having any pain or signs/symptoms of infection. Reports site looks very good. She is eating and drinking fluids w/o issues. No urinary or bowel issues. Patient unable to review medication list because her friend takes care of her medications. She stated she has all of her medications. Patient had appointment with PCP. She reports she has an appointment with the surgeon tomorrow 10/8/20.   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   I recognize that during the surgical operation/procedure, unforeseen conditions may necessitate additional or different procedures than those listed above.  I, therefore, further authorize and request that the above-named surgeon, assistants, or designees perform such procedures as are, in their judgment, necessary and desirable.    3.   My surgeon/physician has discussed prior to my surgery the potential benefits, risks and side effects of this procedure; the likelihood of achieving goals; and potential problems that might occur during recuperation.  They also discussed reasonable alternatives to the procedure, including risks, benefits, and side effects related to the alternatives and risks related to not receiving this procedure.  I have had all my questions answered and I acknowledge that no guarantee has been made as to the result that may be obtained.    4.   Should the need arise during my operation/procedure, which includes change of level of care prior to discharge, I also consent to the administration of blood and/or blood products.  Further, I understand that despite careful testing and screening of blood or blood products by collecting agencies, I may still be subject to ill effects as a result of receiving a blood transfusion and/or blood products.  The following are some, but not all, of the potential risks that can occur: fever  and allergic reactions, hemolytic reactions, transmission of diseases such as Hepatitis, AIDS and Cytomegalovirus (CMV) and fluid overload.  In the event that I wish to have an autologous transfusion of my own blood, or a directed donor transfusion, I will discuss this with my physician.  Check only if Refusing Blood or Blood Products  I understand refusal of blood or blood products as deemed necessary by my physician may have serious consequences to my condition to include possible death. I hereby assume responsibility for my refusal and release the hospital, its personnel, and my physicians from any responsibility for the consequences of my refusal.    o  Refuse   5.   I authorize the use of any specimen, organs, tissues, body parts or foreign objects that may be removed from my body during the operation/procedure for diagnosis, research or teaching purposes and their subsequent disposal by hospital authorities.  I also authorize the release of specimen test results and/or written reports to my treating physician on the hospital medical staff or other referring or consulting physicians involved in my care, at the discretion of the Pathologist or my treating physician.    6.   I consent to the photographing or videotaping of the operations or procedures to be performed, including appropriate portions of my body for medical, scientific, or educational purposes, provided my identity is not revealed by the pictures or by descriptive texts accompanying them.  If the procedure has been photographed/videotaped, the surgeon will obtain the original picture, image, videotape or CD.  The hospital will not be responsible for storage, release or maintenance of the picture, image, tape or CD.    7.   I consent to the presence of a  or observers in the operating room as deemed necessary by my physician or their designees.    8.   I recognize that in the event my procedure results in extended X-Ray/fluoroscopy  and when to contact MD with any new or worsening symptoms. She verbalized understanding. No questions or concerns at this time. Will continue to follow. No future appointments.     Justin Barnes RN time, I may develop a skin reaction.    9. If I have a Do Not Attempt Resuscitation (DNAR) order in place, that status will be suspended while in the operating room, procedural suite, and during the recovery period unless otherwise explicitly stated by me (or a person authorized to consent on my behalf). The surgeon or my attending physician will determine when the applicable recovery period ends for purposes of reinstating the DNAR order.  10. Patients having a sterilization procedure: I understand that if the procedure is successful the results will be permanent and it will therefore be impossible for me to inseminate, conceive, or bear children.  I also understand that the procedure is intended to result in sterility, although the result has not been guaranteed.   11. I acknowledge that my physician has explained sedation/analgesia administration to me including the risk and benefits I consent to the administration of sedation/analgesia as may be necessary or desirable in the judgment of my physician.    I CERTIFY THAT I HAVE READ AND FULLY UNDERSTAND THE ABOVE CONSENT TO OPERATION and/or OTHER PROCEDURE.     ____________________________________  _________________________________        ______________________________  Signature of Patient    Signature of Responsible Person                Printed Name of Responsible Person                                      ____________________________________  _____________________________                ________________________________  Signature of Witness        Date  Time         Relationship to Patient    STATEMENT OF PHYSICIAN My signature below affirms that prior to the time of the procedure; I have explained to the patient and/or his/her legal representative, the risks and benefits involved in the proposed treatment and any reasonable alternative to the proposed treatment. I have also explained the risks and benefits involved in refusal of the proposed treatment and  alternatives to the proposed treatment and have answered the patient's questions. If I have a significant financial interest in a co-management agreement or a significant financial interest in any product or implant, or other significant relationship used in this procedure/surgery, I have disclosed this and had a discussion with my patient.     _____________________________________________________              _____________________________  (Signature of Physician)                                                                                         (Date)                                   (Time)  Patient Name: Nicol Dewitt      : 1984      Printed: 1/3/2025     Medical Record #: [id number]                                      Page 1 of 1

## 2020-10-14 ENCOUNTER — CARE COORDINATION (OUTPATIENT)
Dept: CASE MANAGEMENT | Age: 70
End: 2020-10-14

## 2020-10-14 NOTE — CARE COORDINATION
services?:  Home Health  Do you have any needs or concerns that I can assist you with?:  No  Identified Barriers:  None  Care Transitions Interventions  Other Interventions: Follow Up:  Contacted patient for BPCI-A follow up. Spoke with patient's friend/SANTINO Ramirez whose phone number is the primary number listed. Ashley stated Shari Moss is doing okay. Reports home health will be discharging patient from services on 10/21/20. Ashley stated Shari Moss is still unsteady when walking. She is ambulating with a walker. She had a follow up appointment with the orthopedic surgeon last Thursday and everything looked fine. No signs/symptoms of infection. Ashley stated Shair Moss does not c/o pain and has not had to take any pain medication recently. Shari Moss is eating but Ashley stated she may forget to eat at times. She and home health nurse is encouraging Shari Moss to drink more fluids. Ashley reports Shari Moss will be having a driving simulation test on 12/7/20. Discussed signs/symptoms and when to contact MD with any new or worsening symptoms. She verbalized understanding. No questions or concerns at this time. Will continue to follow.       Future Appointments   Date Time Provider Dannielle Mejía   12/11/2020 10:15 AM Fawad Cameron, [address] [facility]       Governor Ranjana RN

## 2020-10-28 ENCOUNTER — CARE COORDINATION (OUTPATIENT)
Dept: CASE MANAGEMENT | Age: 70
End: 2020-10-28

## 2020-10-28 NOTE — CARE COORDINATION
[facility] Transitions Follow Up Call    10/28/2020    Patient: Cherry Murray  Patient : 1950   MRN: [id number]  Reason for Admission:   Discharge Date: 20 RARS: Readmission Risk Score: 19    Follow Up: Attempted to contact patient for BPCI-A follow up. Unable to reach patient. Left message with contact information and request for call back.       Future Appointments   Date Time Provider Dannielle Mejía   2020 10:15 AM Geovanna Perez, [address] [place] [address]       Isa Benites, HEMA

## 2020-10-29 ENCOUNTER — CARE COORDINATION (OUTPATIENT)
Dept: CASE MANAGEMENT | Age: 70
End: 2020-10-29

## 2020-10-29 NOTE — CARE COORDINATION
[facility] 45 Transitions Follow Up Call    10/29/2020    Patient: Elda Lara  Patient : 1950   MRN: [id number]  Reason for Admission:   Discharge Date: 20 RARS: Readmission Risk Score: 23         Spoke with: Michelle Fatima, patient's friend (POA)    Care Transitions Subsequent and Final Call    Subsequent and Final Calls  Have your medications changed?:  No  Do you have any questions related to your medications?:  No  Do you currently have any active services?:  Yes  Are you currently active with any services?:  Home Health  Do you have any needs or concerns that I can assist you with?:  No  Identified Barriers:  None  Care Transitions Interventions  Other Interventions: Follow Up: Received return phone call from Michelle Fatima, patient's POA. Left message stating she is returning call from yesterday. CTN contacted Ashley for BPCI-A follow up. Ashley stated Yolanda Faye is doing okay. She is getting around but tends to forget to use her walker. When asked about falls, Ashley stated Yolanda Faye fell out of bed because she didn't put up her side rail. She denies Yolanda Faye having any injuries. She stated the issue with Yolanda Faye is her mental status. MD is aware of the change in her mental status. Ashley stated Yolanda Faye is forgetting things quite frequently now. She has a personal caregiver who makes visits everyday in the morning. Ashley typically will go over in the evenings to ensure that Yolanda Faye is eating. Ashley will be hiring two more caregivers to assist with Ilda's care. She stated she has looked into assisted living/nursing homes but she does not want to put her in a facility right now d/t COVID-19. She stated it would restrict visitors and she cannot do that to Yolanda Faye. Home health nurse will be making her last visit next week. Discussed signs/symptoms and when to contact MD with any new or worsening symptoms. She verbalized understanding.   She stated all the caregivers have experience working in nursing homes and aware of when to contact MD as well. No questions or concerns at this time. Will continue to follow.       Future Appointments   Date Time Provider Dannielle Mejía   12/11/2020 10:15 AM Tatiana Cole, [address] [place] [address]       Jose Armas RN

## 2020-11-09 ENCOUNTER — APPOINTMENT (OUTPATIENT)
Dept: CT IMAGING | Age: 70
DRG: 884 | End: 2020-11-09
Payer: MEDICARE

## 2020-11-09 ENCOUNTER — HOSPITAL ENCOUNTER (INPATIENT)
Age: 70
LOS: 2 days | Discharge: NURSING, REHAB OR LONG-TERM CARE | DRG: 884 | End: 2020-11-13
Attending: EMERGENCY MEDICINE | Admitting: FAMILY MEDICINE
Payer: MEDICARE

## 2020-11-09 ENCOUNTER — APPOINTMENT (OUTPATIENT)
Dept: GENERAL RADIOLOGY | Age: 70
DRG: 884 | End: 2020-11-09
Payer: MEDICARE
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
## 2020-11-09 PROBLEM — S00.03XA SCALP HEMATOMA: Status: ACTIVE | Noted: 2020-11-09

## 2020-11-09 PROBLEM — R29.6 RECURRENT FALLS: Status: ACTIVE | Noted: 2020-11-09

## 2020-11-09 LAB
ABO: NORMAL
ALBUMIN SERPL-MCNC: 3.8 G/DL (ref 3.5–5.1)
ALP BLD-CCNC: 80 U/L (ref 38–126)
ALT SERPL-CCNC: 17 U/L (ref 11–66)
AMPHETAMINE+METHAMPHETAMINE URINE SCREEN: NEGATIVE
ANION GAP SERPL CALCULATED.3IONS-SCNC: 11 MEQ/L (ref 8–16)
ANION GAP SERPL CALCULATED.3IONS-SCNC: 9 MEQ/L (ref 8–16)
ANTIBODY SCREEN: NORMAL
APTT: 27 SECONDS (ref 22–38)
AST SERPL-CCNC: 29 U/L (ref 5–40)
BACTERIA: ABNORMAL /HPF
BARBITURATE QUANTITATIVE URINE: POSITIVE
BASOPHILS # BLD: 0.6 %
BASOPHILS ABSOLUTE: 0 THOU/MM3 (ref 0–0.1)
BENZODIAZEPINE QUANTITATIVE URINE: NEGATIVE
BILIRUB SERPL-MCNC: 0.2 MG/DL (ref 0.3–1.2)
BILIRUBIN URINE: NEGATIVE
BLOOD, URINE: NEGATIVE
BUN BLDV-MCNC: 25 MG/DL (ref 7–22)
BUN BLDV-MCNC: 29 MG/DL (ref 7–22)
CALCIUM SERPL-MCNC: 9.1 MG/DL (ref 8.5–10.5)
CALCIUM SERPL-MCNC: 9.5 MG/DL (ref 8.5–10.5)
CANNABINOID QUANTITATIVE URINE: NEGATIVE
CASTS 2: ABNORMAL /LPF
CASTS UA: ABNORMAL /LPF
CHARACTER, URINE: CLEAR
CHLORIDE BLD-SCNC: 103 MEQ/L (ref 98–111)
CHLORIDE BLD-SCNC: 104 MEQ/L (ref 98–111)
CO2: 22 MEQ/L (ref 23–33)
CO2: 25 MEQ/L (ref 23–33)
COCAINE METABOLITE QUANTITATIVE URINE: NEGATIVE
COLOR: YELLOW
CREAT SERPL-MCNC: 1.1 MG/DL (ref 0.4–1.2)
CREAT SERPL-MCNC: 1.2 MG/DL (ref 0.4–1.2)
CRYSTALS, UA: ABNORMAL
EKG ATRIAL RATE: 80 BPM
EKG P AXIS: 83 DEGREES
EKG P-R INTERVAL: 108 MS
EKG Q-T INTERVAL: 356 MS
EKG QRS DURATION: 72 MS
EKG QTC CALCULATION (BAZETT): 410 MS
EKG R AXIS: -12 DEGREES
EKG T AXIS: 46 DEGREES
EKG VENTRICULAR RATE: 80 BPM
EOSINOPHIL # BLD: 4.4 %
EOSINOPHILS ABSOLUTE: 0.2 THOU/MM3 (ref 0–0.4)
EPITHELIAL CELLS, UA: ABNORMAL /HPF
ERYTHROCYTE [DISTWIDTH] IN BLOOD BY AUTOMATED COUNT: 13.9 % (ref 11.5–14.5)
ERYTHROCYTE [DISTWIDTH] IN BLOOD BY AUTOMATED COUNT: 48.1 FL (ref 35–45)
ETHYL ALCOHOL, SERUM: < 0.01 %
GFR SERPL CREATININE-BSD FRML MDRD: 44 ML/MIN/1.73M2
GFR SERPL CREATININE-BSD FRML MDRD: 49 ML/MIN/1.73M2
GLUCOSE BLD-MCNC: 103 MG/DL (ref 70–108)
GLUCOSE BLD-MCNC: 200 MG/DL (ref 70–108)
GLUCOSE BLD-MCNC: 67 MG/DL (ref 70–108)
GLUCOSE BLD-MCNC: 80 MG/DL (ref 70–108)
GLUCOSE BLD-MCNC: 89 MG/DL (ref 70–108)
GLUCOSE URINE: NEGATIVE MG/DL
HCT VFR BLD CALC: 38.6 % (ref 37–47)
HEMOGLOBIN: 12.1 GM/DL (ref 12–16)
IMMATURE GRANS (ABS): 0.02 THOU/MM3 (ref 0–0.07)
IMMATURE GRANULOCYTES: 0.4 %
INR BLD: 0.97 (ref 0.85–1.13)
KETONES, URINE: 15
LACTIC ACID: 0.7 MMOL/L (ref 0.5–2.2)
LEUKOCYTE ESTERASE, URINE: ABNORMAL
LYMPHOCYTES # BLD: 28.8 %
LYMPHOCYTES ABSOLUTE: 1.5 THOU/MM3 (ref 1–4.8)
MCH RBC QN AUTO: 29.8 PG (ref 26–33)
MCHC RBC AUTO-ENTMCNC: 31.3 GM/DL (ref 32.2–35.5)
MCV RBC AUTO: 95.1 FL (ref 81–99)
MISCELLANEOUS 2: ABNORMAL
MONOCYTES # BLD: 10.9 %
MONOCYTES ABSOLUTE: 0.6 THOU/MM3 (ref 0.4–1.3)
NITRITE, URINE: NEGATIVE
NUCLEATED RED BLOOD CELLS: 0 /100 WBC
OPIATES, URINE: NEGATIVE
OSMOLALITY CALCULATION: 281 MOSMOL/KG (ref 275–300)
OXYCODONE: NEGATIVE
PH UA: 5 (ref 5–9)
PHENCYCLIDINE QUANTITATIVE URINE: NEGATIVE
PLATELET # BLD: 195 THOU/MM3 (ref 130–400)
PMV BLD AUTO: 9.8 FL (ref 9.4–12.4)
POTASSIUM REFLEX MAGNESIUM: 5.2 MEQ/L (ref 3.5–5.2)
POTASSIUM REFLEX MAGNESIUM: 5.4 MEQ/L (ref 3.5–5.2)
PROTEIN UA: NEGATIVE
RBC # BLD: 4.06 MILL/MM3 (ref 4.2–5.4)
RBC URINE: ABNORMAL /HPF
RENAL EPITHELIAL, UA: ABNORMAL
RH FACTOR: NORMAL
SEG NEUTROPHILS: 54.9 %
SEGMENTED NEUTROPHILS ABSOLUTE COUNT: 2.9 THOU/MM3 (ref 1.8–7.7)
SODIUM BLD-SCNC: 136 MEQ/L (ref 135–145)
SODIUM BLD-SCNC: 138 MEQ/L (ref 135–145)
SPECIFIC GRAVITY, URINE: 1.02 (ref 1–1.03)
TOTAL CK: 34 U/L (ref 30–135)
TOTAL PROTEIN: 5.6 G/DL (ref 6.1–8)
TROPONIN T: < 0.01 NG/ML
UROBILINOGEN, URINE: 1 EU/DL (ref 0–1)
WBC # BLD: 5.2 THOU/MM3 (ref 4.8–10.8)
WBC UA: ABNORMAL /HPF
YEAST: ABNORMAL

## 2020-11-09 PROCEDURE — 99220 PR INITIAL OBSERVATION CARE/DAY 70 MINUTES: CPT | Performed by: NURSE PRACTITIONER

## 2020-11-09 PROCEDURE — 71045 X-RAY EXAM CHEST 1 VIEW: CPT

## 2020-11-09 PROCEDURE — G0378 HOSPITAL OBSERVATION PER HR: HCPCS

## 2020-11-09 PROCEDURE — 73060 X-RAY EXAM OF HUMERUS: CPT

## 2020-11-09 PROCEDURE — 93010 ELECTROCARDIOGRAM REPORT: CPT | Performed by: NUCLEAR MEDICINE

## 2020-11-09 PROCEDURE — 80053 COMPREHEN METABOLIC PANEL: CPT

## 2020-11-09 PROCEDURE — 6360000002 HC RX W HCPCS: Performed by: NURSE PRACTITIONER

## 2020-11-09 PROCEDURE — 93005 ELECTROCARDIOGRAM TRACING: CPT | Performed by: EMERGENCY MEDICINE

## 2020-11-09 PROCEDURE — 72072 X-RAY EXAM THORAC SPINE 3VWS: CPT

## 2020-11-09 PROCEDURE — APPSS180 APP SPLIT SHARED TIME > 60 MINUTES: Performed by: NURSE PRACTITIONER

## 2020-11-09 PROCEDURE — 96372 THER/PROPH/DIAG INJ SC/IM: CPT

## 2020-11-09 PROCEDURE — 86850 RBC ANTIBODY SCREEN: CPT

## 2020-11-09 PROCEDURE — 73564 X-RAY EXAM KNEE 4 OR MORE: CPT

## 2020-11-09 PROCEDURE — 82948 REAGENT STRIP/BLOOD GLUCOSE: CPT

## 2020-11-09 PROCEDURE — 72170 X-RAY EXAM OF PELVIS: CPT

## 2020-11-09 PROCEDURE — 81001 URINALYSIS AUTO W/SCOPE: CPT

## 2020-11-09 PROCEDURE — 36415 COLL VENOUS BLD VENIPUNCTURE: CPT

## 2020-11-09 PROCEDURE — 85025 COMPLETE CBC W/AUTO DIFF WBC: CPT

## 2020-11-09 PROCEDURE — G0480 DRUG TEST DEF 1-7 CLASSES: HCPCS

## 2020-11-09 PROCEDURE — 84484 ASSAY OF TROPONIN QUANT: CPT

## 2020-11-09 PROCEDURE — 83605 ASSAY OF LACTIC ACID: CPT

## 2020-11-09 PROCEDURE — 86900 BLOOD TYPING SEROLOGIC ABO: CPT

## 2020-11-09 PROCEDURE — 99283 EMERGENCY DEPT VISIT LOW MDM: CPT | Performed by: SURGERY

## 2020-11-09 PROCEDURE — 85610 PROTHROMBIN TIME: CPT

## 2020-11-09 PROCEDURE — 86901 BLOOD TYPING SEROLOGIC RH(D): CPT

## 2020-11-09 PROCEDURE — 2580000003 HC RX 258: Performed by: NURSE PRACTITIONER

## 2020-11-09 PROCEDURE — 3209999900

## 2020-11-09 PROCEDURE — 85730 THROMBOPLASTIN TIME PARTIAL: CPT

## 2020-11-09 PROCEDURE — 6370000000 HC RX 637 (ALT 250 FOR IP): Performed by: NURSE PRACTITIONER

## 2020-11-09 PROCEDURE — 72100 X-RAY EXAM L-S SPINE 2/3 VWS: CPT

## 2020-11-09 PROCEDURE — 99285 EMERGENCY DEPT VISIT HI MDM: CPT

## 2020-11-09 PROCEDURE — 6820000002 HC L2 INJURY CALL ACTIVATION: Performed by: SURGERY

## 2020-11-09 PROCEDURE — 72125 CT NECK SPINE W/O DYE: CPT

## 2020-11-09 PROCEDURE — 80307 DRUG TEST PRSMV CHEM ANLYZR: CPT

## 2020-11-09 PROCEDURE — 82550 ASSAY OF CK (CPK): CPT

## 2020-11-09 PROCEDURE — 70450 CT HEAD/BRAIN W/O DYE: CPT

## 2020-11-09 RX ORDER — ONDANSETRON 2 MG/ML
4 INJECTION INTRAMUSCULAR; INTRAVENOUS EVERY 6 HOURS PRN
Status: DISCONTINUED | OUTPATIENT
Start: 2020-11-09 | End: 2020-11-13 | Stop reason: HOSPADM

## 2020-11-09 RX ORDER — NICOTINE POLACRILEX 4 MG
15 LOZENGE BUCCAL PRN
Status: DISCONTINUED | OUTPATIENT
Start: 2020-11-09 | End: 2020-11-13 | Stop reason: HOSPADM

## 2020-11-09 RX ORDER — FLUOXETINE 10 MG/1
10 CAPSULE ORAL DAILY
Status: DISCONTINUED | OUTPATIENT
Start: 2020-11-09 | End: 2020-11-13 | Stop reason: HOSPADM

## 2020-11-09 RX ORDER — PROMETHAZINE HYDROCHLORIDE 25 MG/1
12.5 TABLET ORAL EVERY 6 HOURS PRN
Status: DISCONTINUED | OUTPATIENT
Start: 2020-11-09 | End: 2020-11-13 | Stop reason: HOSPADM

## 2020-11-09 RX ORDER — ASPIRIN 81 MG/1
81 TABLET ORAL DAILY
Status: DISCONTINUED | OUTPATIENT
Start: 2020-11-09 | End: 2020-11-13 | Stop reason: HOSPADM

## 2020-11-09 RX ORDER — POTASSIUM CHLORIDE 20 MEQ/1
40 TABLET, EXTENDED RELEASE ORAL PRN
Status: DISCONTINUED | OUTPATIENT
Start: 2020-11-09 | End: 2020-11-13 | Stop reason: HOSPADM

## 2020-11-09 RX ORDER — LISINOPRIL 2.5 MG/1
2.5 TABLET ORAL DAILY
Status: DISCONTINUED | OUTPATIENT
Start: 2020-11-09 | End: 2020-11-13 | Stop reason: HOSPADM

## 2020-11-09 RX ORDER — VITAMIN B COMPLEX
3000 TABLET ORAL DAILY
Status: DISCONTINUED | OUTPATIENT
Start: 2020-11-09 | End: 2020-11-13 | Stop reason: HOSPADM

## 2020-11-09 RX ORDER — POTASSIUM CHLORIDE 7.45 MG/ML
10 INJECTION INTRAVENOUS PRN
Status: DISCONTINUED | OUTPATIENT
Start: 2020-11-09 | End: 2020-11-13 | Stop reason: HOSPADM

## 2020-11-09 RX ORDER — EZETIMIBE 10 MG/1
10 TABLET ORAL DAILY
Status: DISCONTINUED | OUTPATIENT
Start: 2020-11-09 | End: 2020-11-09 | Stop reason: CLARIF

## 2020-11-09 RX ORDER — DEXTROSE MONOHYDRATE 50 MG/ML
100 INJECTION, SOLUTION INTRAVENOUS PRN
Status: DISCONTINUED | OUTPATIENT
Start: 2020-11-09 | End: 2020-11-13 | Stop reason: HOSPADM

## 2020-11-09 RX ORDER — PROPRANOLOL HCL 60 MG
60 CAPSULE, EXTENDED RELEASE 24HR ORAL DAILY
Status: DISCONTINUED | OUTPATIENT
Start: 2020-11-09 | End: 2020-11-13 | Stop reason: HOSPADM

## 2020-11-09 RX ORDER — ATORVASTATIN CALCIUM 80 MG/1
80 TABLET, FILM COATED ORAL DAILY
Status: DISCONTINUED | OUTPATIENT
Start: 2020-11-09 | End: 2020-11-13 | Stop reason: HOSPADM

## 2020-11-09 RX ORDER — ACETAMINOPHEN 650 MG/1
650 SUPPOSITORY RECTAL EVERY 6 HOURS PRN
Status: DISCONTINUED | OUTPATIENT
Start: 2020-11-09 | End: 2020-11-13 | Stop reason: HOSPADM

## 2020-11-09 RX ORDER — ACETAMINOPHEN 325 MG/1
650 TABLET ORAL EVERY 6 HOURS PRN
Status: DISCONTINUED | OUTPATIENT
Start: 2020-11-09 | End: 2020-11-13 | Stop reason: HOSPADM

## 2020-11-09 RX ORDER — SODIUM CHLORIDE 0.9 % (FLUSH) 0.9 %
10 SYRINGE (ML) INJECTION PRN
Status: DISCONTINUED | OUTPATIENT
Start: 2020-11-09 | End: 2020-11-13 | Stop reason: HOSPADM

## 2020-11-09 RX ORDER — ALOGLIPTIN 25 MG/1
25 TABLET, FILM COATED ORAL DAILY
Status: DISCONTINUED | OUTPATIENT
Start: 2020-11-09 | End: 2020-11-13 | Stop reason: HOSPADM

## 2020-11-09 RX ORDER — PRIMIDONE 50 MG/1
50 TABLET ORAL NIGHTLY
Status: DISCONTINUED | OUTPATIENT
Start: 2020-11-09 | End: 2020-11-09 | Stop reason: ALTCHOICE

## 2020-11-09 RX ORDER — PRIMIDONE 50 MG/1
100 TABLET ORAL NIGHTLY
Status: DISCONTINUED | OUTPATIENT
Start: 2020-11-09 | End: 2020-11-13 | Stop reason: HOSPADM

## 2020-11-09 RX ORDER — CLOPIDOGREL BISULFATE 75 MG/1
75 TABLET ORAL DAILY
Status: DISCONTINUED | OUTPATIENT
Start: 2020-11-09 | End: 2020-11-13 | Stop reason: HOSPADM

## 2020-11-09 RX ORDER — DEXTROSE MONOHYDRATE 25 G/50ML
12.5 INJECTION, SOLUTION INTRAVENOUS PRN
Status: DISCONTINUED | OUTPATIENT
Start: 2020-11-09 | End: 2020-11-13 | Stop reason: HOSPADM

## 2020-11-09 RX ORDER — SODIUM CHLORIDE 0.9 % (FLUSH) 0.9 %
10 SYRINGE (ML) INJECTION EVERY 12 HOURS SCHEDULED
Status: DISCONTINUED | OUTPATIENT
Start: 2020-11-09 | End: 2020-11-13 | Stop reason: HOSPADM

## 2020-11-09 RX ORDER — PRIMIDONE 50 MG/1
200 TABLET ORAL EVERY MORNING
Status: DISCONTINUED | OUTPATIENT
Start: 2020-11-09 | End: 2020-11-13 | Stop reason: HOSPADM

## 2020-11-09 RX ORDER — MAGNESIUM SULFATE IN WATER 40 MG/ML
2 INJECTION, SOLUTION INTRAVENOUS PRN
Status: DISCONTINUED | OUTPATIENT
Start: 2020-11-09 | End: 2020-11-13 | Stop reason: HOSPADM

## 2020-11-09 RX ADMIN — LISINOPRIL 2.5 MG: 2.5 TABLET ORAL at 20:49

## 2020-11-09 RX ADMIN — FLUOXETINE HYDROCHLORIDE 10 MG: 10 CAPSULE ORAL at 13:45

## 2020-11-09 RX ADMIN — ASPIRIN 81 MG: 81 TABLET, COATED ORAL at 13:45

## 2020-11-09 RX ADMIN — PRIMIDONE 200 MG: 50 TABLET ORAL at 13:45

## 2020-11-09 RX ADMIN — ENOXAPARIN SODIUM 40 MG: 40 INJECTION SUBCUTANEOUS at 13:44

## 2020-11-09 RX ADMIN — ATORVASTATIN CALCIUM 80 MG: 80 TABLET, FILM COATED ORAL at 20:49

## 2020-11-09 RX ADMIN — METFORMIN HYDROCHLORIDE 1000 MG: 500 TABLET ORAL at 17:00

## 2020-11-09 RX ADMIN — Medication 10 ML: at 20:49

## 2020-11-09 RX ADMIN — METFORMIN HYDROCHLORIDE 1000 MG: 500 TABLET ORAL at 13:45

## 2020-11-09 RX ADMIN — CLOPIDOGREL BISULFATE 75 MG: 75 TABLET ORAL at 13:45

## 2020-11-09 RX ADMIN — PRIMIDONE 100 MG: 50 TABLET ORAL at 20:49

## 2020-11-09 RX ADMIN — Medication 3000 UNITS: at 13:44

## 2020-11-09 RX ADMIN — Medication 10 ML: at 13:54

## 2020-11-09 ASSESSMENT — ENCOUNTER SYMPTOMS
WHEEZING: 0
NAUSEA: 0
CHOKING: 0
FACIAL SWELLING: 0
COUGH: 0
RHINORRHEA: 0
APNEA: 0
COLOR CHANGE: 0
DIARRHEA: 0
EYE PAIN: 0
TROUBLE SWALLOWING: 0
STRIDOR: 0
VOICE CHANGE: 0
ABDOMINAL DISTENTION: 0
SINUS PRESSURE: 0
CONSTIPATION: 0
PHOTOPHOBIA: 0
SHORTNESS OF BREATH: 0
CHEST TIGHTNESS: 0
VOMITING: 0
BACK PAIN: 0
SORE THROAT: 0
ABDOMINAL PAIN: 0
SINUS PAIN: 0
EYE DISCHARGE: 0
EYE ITCHING: 0
BLOOD IN STOOL: 0
EYE REDNESS: 0

## 2020-11-09 NOTE — CARE COORDINATION
DISCHARGE/PLANNING EVALUATION  11/9/20, 3:26 PM EST    Reason for Referral: discharge needs  Mental Status: alert and oriented to name and place  Decision Making: friend Mitesh Meredith is POA  Family/Social/Home Environment: Patient discharged from rehab this past September to home with services through [facility]. Patient was brought into the ER after a fall. Patient states that she and Ashley reside together-when questioned further, patient states that they live in the same apartment complex and \"sorta share the same apartment\". Patient states that she knows that she will be going to a nursing home when she is discharged as [facility] \"wants to get rid of me\". Spoke with Ashley who states that patient resides alone. She was being followed by [facility]  until last week. She has someone from [facility]) that goes to patient's home each morning (M-F) to make sure she takes her medications and gets something to eat. Ashley states that she does it on the weekends. She had someone lined up to do evenings however, they are unable to do so due to personal reasons. Ashley states that patient has been falling and was told by her that next time she falls, she would need to go to a nursing home. She is also considering assisted living as a possible option later. Current Services including food security, transportation and housekeeping: see above  Current Equipment: walker, shower chair  Payment Source: medicare with secondary  Concerns or Barriers to Discharge: patient is unable to reside alone  Post acute provider list with quality measures, geographic area and applicable managed care information provided. Questions regarding selection process answered: Ashley is requesting Padmaja Murry has been left in room for her to review this evening should [facility] not have availability.      Teach Back Method used with patient and POA regarding care plan   Patient and POA verbalize understanding of the plan of care and contribute to

## 2020-11-09 NOTE — LETTER
Beneficiary Notification Letter  BPCI Advanced     Your Doctor or Tomy,    We wanted to let you know that your health care provider, Kelin Stephenson, has volunteered to take part in our Centers for Medicare & Medicaid Services (CMS) Bundled Payments for [facility] (BPCI Advanced). This doesnt change your Medicare rights or benefits and you dont need to do anything. What are bundled payments? A bundled payment combines, or bundles together, payments that Medicare makes to your health care providers for the many different kinds of medical services you might get in a specific time period. In BPCI Advanced, this time period could include a hospital inpatient stay or outpatient procedure, plus 90 days. Why would Medicare bundle payments? Bundled payments are thought of as a value-based way to pay because health care providers are responsible for both the quality and cost of medical care they give. This is a relatively new way of paying health care providers compared to thefee-for-service way Medicare has traditionally paid, where providers are paid separately for each service they provide. Bundled payments encourage these providers to work together to provide better, more coordinated care during your hospital stay, or outpatient procedure, and through your recovery. What does BPCI Advance mean for me? Youre more likely to get even better care when hospitals, doctors, and other health care providers work together. In BPCI Advanced, hospitals, doctors, and other health care providers may be rewarded for providing better, more coordinated health care. Medicare will watch BPCI Advanced participants closely to make sure that you and other patients keep getting efficient, high quality care. What do I need to know about BPCI Advanced? Whats most important for you to know is that your Medicare rights and benefits wont change because your health care provider is participating in [address] [place]. Medicare will keep covering all of your medically necessary services. Even though Medicare will pay your doctor in a different way under BPCI Advanced, how much you have to pay wont change. Health care providers and suppliers who are enrolled in Medicare will submit their Medicare claims like they always have. Youll have all the same Medicare rights and protections, including the right to choose which hospital, doctor, or other health care provider you see. If you dont want to get care from a health care provider whos participating in [address] [place], then youll have to choose a different health care provider whos not participating in the Model. How can I give feedback about my health care? Medicare might ask you to take a voluntary survey about the services and care you received from Kelin Stephenson during your hospital stay or outpatient procedure and for a specific period of time afterwards. You can decide whether you want to take the voluntary survey, but if you do, itll help Medicare make BPCI Advanced and the care of other Medicare patients better. If you have concerns or complaints about your care, you can:   · Talk to your doctor or health care provider. · Contact your Beneficiary and Family Centered Care Quality Improvement   Organization [facility]). You can get your BFCC-QIOs phone number  at  Medicare.gov/contacts or by calling 1-800-MEDICARE. TTY users can call  2-385.713.4157. Where can I learn more about BPCI Advanced? Learn more about BPCI Advanced at https://innovation.cms.gov/initiatives/bpci-advanced/:  · A list of all the hospitals and physician group practices in the country participating in [address] [place].   · All of the inpatient and outpatient Clinical Episodes that are currently included under BPCI Advanced. A Clinical Episode is a grouping of medical conditions or diagnoses that are included in the [address] [facility].

## 2020-11-09 NOTE — CARE COORDINATION
11/9/20, 3:03 PM EST  DISCHARGE PLANNING EVALUATION:    Malathi George       Admitted from: ER, patient presented after falling at home. 11/9/2020/ [address] day: 0   Location: -21/021-A Reason for admit: Recurrent falls [R29.6] Status: Obs. Admit order signed?: yes  PMH:  has a past medical history of Acute kidney injury ([facility] [address] Arthritis, CAD (coronary artery disease), CKD (chronic kidney disease) stage 3, GFR 30-59 ml/min, Diabetes mellitus, insulin dependent (IDDM), controlled, Escherichia coli septicemia ([facility] [address] [facility] Essential tremor, History of blood transfusion, Hyperlipidemia, Hypertension, Hypoxemic respiratory failure, chronic ([facility] [address] [facility] Normocytic anemia, and Osteoporosis. Procedure: N/A  Pertinent abnormal Imaging:X-rays of chest, left and right humerus, pelvis, thoracic and lumbar spine, all without acute findings. Medications:  Scheduled Meds:   aspirin  81 mg Oral Daily    atorvastatin  80 mg Oral Daily    Vitamin D  3,000 Units Oral Daily    clopidogrel  75 mg Oral Daily    FLUoxetine  10 mg Oral Daily    alogliptin  25 mg Oral Daily    lisinopril  2.5 mg Oral Daily    metFORMIN  1,000 mg Oral BID WC    primidone  100 mg Oral Nightly    primidone  200 mg Oral QAM    propranolol  60 mg Oral Daily    sodium chloride flush  10 mL Intravenous 2 times per day    enoxaparin  40 mg Subcutaneous Q24H    insulin lispro  0-12 Units Subcutaneous TID WC    insulin lispro  0-6 Units Subcutaneous Nightly     Continuous Infusions:   dextrose        Pertinent Info/Orders/Treatment Plan:  Lovenox, DM management, prn Tylenol, Phenergan or Zofran, Potassium and Magnesium replacement protocol, CBC in a.m., daily weights, orthostatic vital signs, PT/OT, up with assistance. Diet: DIET LOW SODIUM 2 GM;   Smoking status:  reports that she has never smoked.  She has never used smokeless tobacco.   PCP: Latosha Bowens MD  Readmission 30 days or less: No   %  Patient Goals/Plan/Treatment Preferences: Joseph Pressley is from home. She has a POA named Sheree Zarco that is reported to be living with her. SNF is planned for rehab due to re-current falls at home. SS following. She has insurance and a PCP. Transportation/Food Security/Housekeeping Addressed:  No issues identified.     Evaluation: yes

## 2020-11-09 NOTE — LETTER
Beneficiary Notification Letter  BPCI Advanced     Your Doctor or [address],    We wanted to let you know that your health care provider, Kelin Stephenson, has volunteered to take part in our Centers for Medicare & Medicaid Services (CMS) Bundled Payments for [facility] (BPCI Advanced). This doesnt change your Medicare rights or benefits and you dont need to do anything. What are bundled payments? A bundled payment combines, or bundles together, payments that Medicare makes to your health care providers for the many different kinds of medical services you might get in a specific time period. In BPCI Advanced, this time period could include a hospital inpatient stay or outpatient procedure, plus 90 days. Why would Medicare bundle payments? Bundled payments are thought of as a value-based way to pay because health care providers are responsible for both the quality and cost of medical care they give. This is a relatively new way of paying health care providers compared to thefee-for-service way Medicare has traditionally paid, where providers are paid separately for each service they provide. Bundled payments encourage these providers to work together to provide better, more coordinated care during your hospital stay, or outpatient procedure, and through your recovery. What does BPCI Advance mean for me? Youre more likely to get even better care when hospitals, doctors, and other health care providers work together. In BPCI Advanced, hospitals, doctors, and other health care providers may be rewarded for providing better, more coordinated health care. Medicare will watch BPCI Advanced participants closely to make sure that you and other patients keep getting efficient, high quality care. What do I need to know about BPCI Advanced? Whats most important for you to know is that your Medicare rights and benefits wont change because your health care provider is participating in [address] [place]. Medicare will keep covering all of your medically necessary services. Even though Medicare will pay your doctor in a different way under BPCI Advanced, how much you have to pay wont change. Health care providers and suppliers who are enrolled in Medicare will submit their Medicare claims like they always have. Youll have all the same Medicare rights and protections, including the right to choose which hospital, doctor, or other health care provider you see. If you dont want to get care from a health care provider whos participating in [address] [place], then youll have to choose a different health care provider whos not participating in the Model. How can I give feedback about my health care? Medicare might ask you to take a voluntary survey about the services and care you received from Kelin Johnson De Winston during your hospital stay or outpatient procedure and for a specific period of time afterwards. You can decide whether you want to take the voluntary survey, but if you do, itll help Medicare make BPCI Advanced and the care of other Medicare patients better. If you have concerns or complaints about your care, you can:   · Talk to your doctor or health care provider. · Contact your Beneficiary and Family Centered Care Quality Improvement   Organization [employer]). You can get your BFCC-QIOs phone number  at  Medicare.gov/contacts or by calling 1-800-MEDICARE. TTY users can call  3-754.137.6425. Where can I learn more about BPCI Advanced? Learn more about BPCI Advanced at https://innovation.cms.gov/initiatives/bpci-advanced/:  · A list of all the hospitals and physician group practices in the country participating in [address] [place].   · All of the inpatient and outpatient Clinical Episodes that are currently

## 2020-11-09 NOTE — H&P
History & Physical        Patient:  Toya Barajas  YOB: 1950    MRN: [id number]     Acct: [de-identified]    PCP: Alexandra Coker MD    Date of Admission: 11/9/2020    Date of Service: Pt seen/examined on 11/09/20  and Admitted to Inpatient with expected LOS greater than two midnights due to medical therapy. ASSESSMENT/PLAN:    1. Fall at home--PT/OT/social work evaluation  2. Possible syncopal episode--check orthostatic vital signs  3. Hyperkalemia--recheck now  4. Diabetes mellitus type 2, uncontrolled--on Nesina, Glucophage; and Accu-Cheks along with hypoglycemia protocol; monitor  5. Essential hypertension, uncontrolled--on lisinopril and Inderal; monitor  6. CAD--on aspirin and Plavix    Chief Complaint: Fall at home    History Of Present Illness:    65 y.o. female who presented to [address] [place] [address] with having a fall at home; patient was discharged from rehabilitation on September 24 after she fell and had a left femoral neck fracture; she lives at home alone; patient presented after another fall after she got dizzy and fell striking her head; life alert was activated; he really cannot recall much of the event; she was seen by trauma services and cleared for syncopal work-up; she is on Plavix/ASA at home; currently she is fully awake however only able to tell me the year is 2020; denies any complaints and wants to go home.     Past Medical History:          Diagnosis Date    Acute kidney injury ([facility] [address]     Arthritis     CAD (coronary artery disease)     CKD (chronic kidney disease) stage 3, GFR 30-59 ml/min     Diabetes mellitus, insulin dependent (IDDM), controlled     Escherichia coli septicemia (HCC)     Essential tremor     History of blood transfusion     Hyperlipidemia     Hypertension     Hypoxemic respiratory failure, chronic (HCC)     secondary to sepsis and possibly pneumonia    MI (myocardial infarction) ([facility] [address] 2011    Normocytic anemia     Osteoporosis Past Surgical History:          Procedure Laterality Date    CYSTOSCOPY  12/7/12    with stent insertion    HIP SURGERY Left 9/6/2020    HIP PINNING performed by Uzma Anna DO at [address] [place] [address] LITHOTRIPSY  12/18/2012    right       Medications Prior to Admission:      Prior to Admission medications    Medication Sig Start Date End Date Taking? Authorizing Provider   linagliptin (TRADJENTA) 5 MG tablet Take 5 mg by mouth daily   Yes Historical Provider, MD   FLUoxetine (PROZAC) 10 MG capsule Take 1 capsule by mouth daily 9/24/20  Yes Rehana Anne MD   Cholecalciferol 75 MCG (3000 UT) TABS Take 1 tablet by mouth daily 9/23/20  Yes Rehana Anne MD   primidone (MYSOLINE) 50 MG tablet Take 100 mg by mouth nightly   Yes Historical Provider, MD   atorvastatin (LIPITOR) 80 MG tablet Take 80 mg by mouth daily   Yes Historical Provider, MD   lisinopril (PRINIVIL;ZESTRIL) 2.5 MG tablet Take 2.5 mg by mouth daily   Yes Historical Provider, MD   ezetimibe (ZETIA) 10 MG tablet Take 10 mg by mouth daily   Yes Historical Provider, MD   primidone (MYSOLINE) 50 MG tablet Take 1 tablet by mouth nightly  Patient taking differently: Take 200 mg by mouth daily  4/5/18  Yes Eda Mccann MD   clopidogrel (PLAVIX) 75 MG tablet Take 1 tablet by mouth daily. 3/31/14  Yes Renae Rangel, APRN - CNP   metformin (GLUCOPHAGE) 500 MG tablet Take 1,000 mg by mouth 2 times daily (with meals). Yes Historical Provider, MD   propranolol (INDERAL LA) 80 MG CR capsule Take 60 mg by mouth daily    Yes Historical Provider, MD   Acetaminophen (TYLENOL PO) Take  by mouth as needed. Yes Historical Provider, MD   aspirin 81 MG EC tablet Take 81 mg by mouth daily.      Yes Historical Provider, MD   vitamin C (VITAMIN C) 250 MG tablet Take 1 tablet by mouth 2 times daily (with meals) for 21 days 9/24/20 10/15/20  Rehana Anne MD   insulin detemir (LEVEMIR) 100 UNIT/ML injection Inject 20 Units into the skin daily     Historical Provider, MD   pantoprazole (PROTONIX) 40 MG tablet Take 40 mg by mouth daily     Historical Provider, MD       Allergies:  Patient has no known allergies. Social History:   reports that she has never smoked. She has never used smokeless tobacco. She reports current alcohol use. She reports that she does not use drugs. Family History:      Positive as follows:        Problem Relation Age of Onset    Cancer Mother     Heart Disease Mother        REVIEW OF SYSTEMS:     Constitutional: ROS: negative for - chills or fever  Head: no headache, no head injury, no migraine   Eyes ROS: denies blurred/double vision  Ears ROS: no hearing difficulty, no tinnitus  Mouth and Throat ROS: no ulceration, dysphagia, dental caries  Psychological ROS: no depression, no anxiety, no panic attacks, denies suicide/homicide ideation  Endocrine ROS: denies polyuria, polydypsia, no heat or cold intolerance  Respiratory ROS: no cough, shortness of breath, or wheezing  Cardiovascular ROS: no chest pain or dyspnea on exertion  Gastrointestinal ROS: no abdominal pain, change in bowel habits, or black or bloody stools  Genito-Urinary ROS: denies dysuria, frequency, urgency; denies hematuria  Musculoskeletal ROS: negative  Neurological ROS: no syncope, no seizures, no numbness or tingling of hands, no numbness or tingling of feet, no paresis  Dermatology: no skin rash, no eczema  Endocrine: no polyuria, polydypsia, no heat/cold intolerance  Hematology: denies bruising easily, denies bleeding problems, denies clotting disorders    PHYSICAL EXAM:    /61   Pulse 92   Temp 97.6 °F (36.4 °C) (Oral)   Resp 18   Ht 5' 6\" (1.676 m)   Wt 141 lb (64 kg)   SpO2 96%   BMI 22.76 kg/m²     General appearance:  No apparent distress, appears older than stated age and cooperative. HEENT:  Normal cephalic, atraumatic without obvious deformity. Pupils equal, round, and reactive to light. Conjunctivae/corneas clear. Neck: Supple, with full range of motion. No jugular venous distention. Trachea midline. Respiratory:  Normal respiratory effort. Clear to auscultation, bilaterally without Rales/Wheezes/Rhonchi. Cardiovascular:  Regular rate and rhythm with normal S1/S2 without murmurs, rubs or gallops. Abdomen: Soft, non-tender, non-distended with normal bowel sounds. Musculoskeletal:  No clubbing, cyanosis or edema bilaterally. Full range of motion without deformity. Skin: Skin color, texture, turgor normal.    Neurologic:  Neurovascularly intact without any focal sensory/motor deficits. Cranial nerves: II-XII intact, grossly non-focal.  Psychiatric:  Alert and oriented to year only, thought content not appropriate  Capillary Refill: Brisk,< 3 seconds   Peripheral Pulses: +2 palpable, equal bilaterally       Labs:     Recent Labs     11/09/20  0302   WBC 5.2   HGB 12.1   HCT 38.6        Recent Labs     11/09/20 0302      K 5.4*      CO2 25   BUN 29*   CREATININE 1.2   CALCIUM 9.5     Recent Labs     11/09/20  0302   AST 29   ALT 17   BILITOT 0.2*   ALKPHOS 80     Recent Labs     11/09/20  0302   INR 0.97     Recent Labs     11/09/20  0302   CKTOTAL 34   TROPONINT < 0.010     Procalcitonin:  No results for input(s): PROCAL in the last 72 hours. Lactic Acid:   Recent Labs     11/09/20  0315   LACTA 0.7        Urinalysis:      Lab Results   Component Value Date    NITRU NEGATIVE 11/09/2020    WBCUA 0-2 11/09/2020    WBCUA >200W/CLUMPS 10/20/2011    BACTERIA NONE SEEN 11/09/2020    RBCUA 3-5 11/09/2020    BLOODU NEGATIVE 11/09/2020    GLUCOSEU NEGATIVE 11/09/2020       Radiology:     Xr Thoracic Spine (3 Views)    Result Date: 11/9/2020  3 views thoracic spine Comparison:  CT  - CT THORAX LUNGS W/WO CONTR  - 03/30/2014 10:16 AM EDT Findings: Vertebral body heights are preserved. There 12 rib articulating thoracic type vertebral bodies.   Minimal scoliosis apex left centered at T4. No fracture or destructive process. Intact pedicles. Preserved disc spaces. No paraspinal mass. Impression: 1. Minimal scoliosis otherwise negative. This document has been electronically signed by: Kenan Petty MD on 11/09/2020 04:39 AM     Xr Lumbar Spine (2-3 Views)    Result Date: 11/9/2020  2 views lumbar spine Comparison:  CT  - CT ABDOMEN /T/ PELVIS WITH CONTRAST  - 12/05/2012 01:23 PM EST Findings: There are 5 lumbar type vertebral bodies with preserved heights and pedicles. Mild the space narrowing and anterior osteophytosis at L2-L3, moderate at L4-L5. Moderate facet arthropathy at lower lumbar levels. Sacrum is preserved. Mild SI joint arthropathy bilaterally. Triangular 15-16mm calcifications project over the region of the kidneys. Impression: Moderate spondylosis. No acute fracture. Bilateral moderate sized kidney stones. This document has been electronically signed by: Kenan Petty MD on 11/09/2020 04:40 AM     Xr Pelvis (1-2 Views)    Result Date: 11/9/2020  AP pelvis Comparison: 9/6/20 Findings: Bone density appears within normal limits. No acute fracture dislocation, 3 left femoral neck fixation screws are present. No acetabular dysplasia or hip joint arthritis. Preserved sacrum. Mild SI joint arthropathy bilaterally. No SI joint diastasis. Impression: No acute fracture or dislocation. No significant change. This document has been electronically signed by: Kenan Petty MD on 11/09/2020 04:28 AM     Xr Humerus Left (min 2 Views)    Result Date: 11/9/2020  2 views left humerus Comparison: None Findings: Osteoporosis. No fractures or dislocations. No significant arthritic change. No radiopaque foreign body or significant soft tissue swelling. Impression: 1.  Normal left humerus This document has been electronically signed by: Kenan Petty MD on 11/09/2020 04:24 AM     Xr Humerus Right (min 2 Views)    Result Date: 11/9/2020  2 views right humerus Comparison: None Findings: Osteoporosis. No fractures or dislocations. No significant arthritic change. No radiopaque foreign body or significant soft tissue swelling. Impression: 1. Normal right humerus This document has been electronically signed by: Kaushik Tomlinson MD on 11/09/2020 04:26 AM     Xr Knee Left (min 4 Views)    Result Date: 11/9/2020  Exam: 4-view left knee. Comparison: None Findings: Osteoporosis. No acute fracture or dislocation. Severe osteoarthritis of the medial knee compartment. Unremarkable laterally compartment. Moderate patellofemoral joint osteoarthritis. Trace knee joint effusion. No significant soft tissue swelling. No loose intra-articular body or meniscal calcifications. Impression: No acute fracture. Severe osteoarthritis of the medial knee compartment. Trace effusion. This document has been electronically signed by: Kaushik Tomlinson MD on 11/09/2020 04:27 AM     Ct Head Wo Contrast    Result Date: 11/9/2020  CT head without contrast Comparison:  CT  - CT HEAD WO CONTRAST  - 09/05/2020 10:32 AM EDT Findings: Age-appropriate sulcation. No intracranial mass, midline shift, hydrocephalus, acute infarct or hemorrhage. There there are minimal air-fluid levels within the maxillary and sphenoid sinuses. Mastoid air cells normal. Orbits unremarkable. No skull fracture     Impression: 1. Negative for acute intracranial abnormality. 2.  Minimal acute sinusitis versus posttraumatic changes. 3.  High right parietal scalp soft tissue swelling. No underlying acute fracture. 4.  Stable intracranial exam.  Air-fluid level was present before in a central sphenoid sinus, remaining air-fluid levels or new. This document has been electronically signed by: Kaushik Tomlinson MD on 11/09/2020 03:57 AM All CT scans at this facility use dose modulation, iterative reconstruction, and/or weight-based dosing when appropriate to reduce radiation dose to as low as reasonably achievable.      Ct Cervical Spine Wo Contrast    Result Date: SR HR 80      Thank you Ratna Hawley MD for the opportunity to be involved in this patient's care.     Electronically signed by JOANNA Jarvis CNP on 11/9/2020 at 1:04 PM

## 2020-11-09 NOTE — CONSULTS
Sergio Nagy     Patient:  Rona Wolf  Admit date: 11/9/2020   YOB: 1950 Date of Evaluation: 11/9/2020  MRN: [id number]  Acct: [de-identified]    Injury Date:11/9/2020  Injury time:PTA  PCP: Joelle Mtz MD   Referring physician: ER provider    Time of Trauma Surgeon Notification:  0300  Time of Trama SUGEY Arrival: 0310  Time of Trauma Surgeon Arrival:  6:25 AM on 5k    Assessment:    Trauma by fall  Plan:    No acute traumatic injuries requiring admission from [facility] admission to the medical service for placement and syncope work up    Activation: []Level I (Trauma Alert) [x]Level II (Injury Call) []Level III (Trauma Consult) []Downgraded  Mode of Arrival: EMS transportation  Referring Facility: N/A   Loss of Consciousness []No [x]Yes of brief duration  Mechanism of Injury:  []Motor Vehicle crash   []Single Vehicle [] []Passenger []Scene Fatality []Front Seat  []Restrained   []Air Bag Deployed   []Ejected []Rollover []Pedestrian []Trapped   Type of vehicle:   Protective Devices:   []Motorcycle  Wearing Helmet []Yes []No  []Bicycle  Wearing Helmet []Yes []No  [x]Fall   Distance - same level   []Assault    Abuse Reported []Yes []No  []Gunshot  []Stabbing  []Work Related  []Burn: []Flame []Scald []Electrical []Chemical []Contact []Inhalation []House Fire  []Other:   Patient Active Problem List   Diagnosis    Pneumonia    Chest pain    Renal stone    Chest pain, atypical    Lung mass    Limb tremor    Numbness and tingling in right hand    Syncope and collapse    Closed fracture of neck of left femur (HCC)    Acute respiratory failure with hypoxia ([address]    Subcapital fracture of femur, left, closed, with routine healing, subsequent encounter    Type 2 diabetes mellitus without complication, with long-term current use of insulin ([address]    Vitamin D deficiency    Age-related osteoporosis with current pathological fracture with routine healing    Fall    Scalp hematoma    Recurrent falls     Subjective   Chief Complaint: Fall    History of Present Illness:  Chely Mcdaniel is a 65year old female presenting to the Emergency Department via EMS for evaluation of potential injuries sustained after a fall this morning. She reports she was up with her walker when she felt lightheaded and dizzy and believes that she had a syncopal episode and woke up on the floor. She reports hitting her head. She takes Plavix. She complains of tenderness at the site of a small hematoma to her right parietal scalp. She also states that she isn't sure why the ambulance asks so many questions en route to the hospital as they had asked her \"who the president is\" and she was not able to answer given current circumstances so she said she answered \"Rl Johnson\". She lives at home alone and when she came to, she was able to press her Life Alert necklace for help. Review of Systems:   Review of Systems   Constitutional: Negative for activity change, appetite change, chills, diaphoresis, fatigue, fever and unexpected weight change. HENT: Negative for congestion, dental problem, drooling, ear discharge, ear pain, facial swelling, hearing loss, mouth sores, nosebleeds, postnasal drip, rhinorrhea, sinus pressure, sneezing, sore throat, tinnitus, trouble swallowing and voice change. Eyes: Negative for photophobia, pain, discharge, redness, itching and visual disturbance. Respiratory: Negative for apnea, cough, choking, chest tightness, shortness of breath, wheezing and stridor. Cardiovascular: Negative for chest pain, palpitations and leg swelling. Gastrointestinal: Negative for abdominal distention, abdominal pain, blood in stool, constipation, diarrhea, nausea and vomiting. Endocrine: Negative for cold intolerance, heat intolerance, polydipsia, polyphagia and polyuria.    Genitourinary: Negative for difficulty urinating, dysuria, flank pain, frequency, hematuria and urgency. Musculoskeletal: Negative for arthralgias, back pain, gait problem, joint swelling, myalgias, neck pain and neck stiffness. Skin: Negative for color change, pallor, rash and wound. Allergic/Immunologic: Negative for environmental allergies, food allergies and immunocompromised state. Neurological: Positive for syncope and headaches. Negative for dizziness, tremors, seizures, facial asymmetry, speech difficulty, weakness, light-headedness and numbness. Hematological: Negative for adenopathy. Does not bruise/bleed easily. Psychiatric/Behavioral: Positive for confusion. Negative for agitation, behavioral problems, decreased concentration, dysphoric mood, hallucinations, self-injury, sleep disturbance and suicidal ideas. The patient is not nervous/anxious and is not hyperactive. Patient has no known allergies.   Past Surgical History:   Procedure Laterality Date    CYSTOSCOPY  12/7/12    with stent insertion    HIP SURGERY Left 9/6/2020    HIP PINNING performed by Alona Griffith DO at [address] [place] [address] LITHOTRIPSY  12/18/2012    right     Past Medical History:   Diagnosis Date    Acute kidney injury ([address]     Arthritis     CAD (coronary artery disease)     CKD (chronic kidney disease) stage 3, GFR 30-59 ml/min     Diabetes mellitus, insulin dependent (IDDM), controlled     Escherichia coli septicemia (HCC)     Essential tremor     History of blood transfusion     Hyperlipidemia     Hypertension     Hypoxemic respiratory failure, chronic (HCC)     secondary to sepsis and possibly pneumonia    MI (myocardial infarction) ([address] 2011    Normocytic anemia     Osteoporosis      Past Surgical History:   Procedure Laterality Date    CYSTOSCOPY  12/7/12    with stent insertion    HIP SURGERY Left 9/6/2020    HIP PINNING performed by Alona Griffith DO at [address] [place] [address] LITHOTRIPSY  12/18/2012    right     Social History (MYSOLINE) 50 MG tablet Take 1 tablet by mouth nightly  Qty: 90 tablet, Refills: 0    Associated Diagnoses: Benign essential tremor; Bilateral hand numbness      clopidogrel (PLAVIX) 75 MG tablet Take 1 tablet by mouth daily. Qty: 30 tablet, Refills: 0    Comments: Home medication--resume      metformin (GLUCOPHAGE) 500 MG tablet Take 1,000 mg by mouth 2 times daily (with meals). propranolol (INDERAL LA) 80 MG CR capsule Take 60 mg by mouth daily       Acetaminophen (TYLENOL PO) Take  by mouth as needed. aspirin 81 MG EC tablet Take 81 mg by mouth daily. vitamin C (VITAMIN C) 250 MG tablet Take 1 tablet by mouth 2 times daily (with meals) for 21 days  Qty: 42 tablet, Refills: 0      insulin detemir (LEVEMIR) 100 UNIT/ML injection Inject 20 Units into the skin daily       Pantoprazole Sodium (PROTONIX) 40 MG PACK packet Take 40 mg by mouth daily. !! - Potential duplicate medications found. Please discuss with provider.           Hospital medications:  Scheduled Meds:  Continuous Infusions:  PRN Meds:  Objective   ED TRIAGE VITALS  BP: 121/72, Temp: 98.1 °F (36.7 °C), Pulse: 81, Resp: 18, SpO2: 98 %  Nada Coma Scale  Eye Opening: Spontaneous  Best Verbal Response: Confused  Best Motor Response: Obeys commands  Anna Coma Scale Score: 14  Results for orders placed or performed during the hospital encounter of 11/09/20   Comprehensive Metabolic Panel w/ Reflex to MG   Result Value Ref Range    Glucose 89 70 - 108 mg/dL    CREATININE 1.2 0.4 - 1.2 mg/dL    BUN 29 (H) 7 - 22 mg/dL    Sodium 138 135 - 145 meq/L    Potassium reflex Magnesium 5.4 (H) 3.5 - 5.2 meq/L    Chloride 104 98 - 111 meq/L    CO2 25 23 - 33 meq/L    Calcium 9.5 8.5 - 10.5 mg/dL    AST 29 5 - 40 U/L    Alkaline Phosphatase 80 38 - 126 U/L    Total Protein 5.6 (L) 6.1 - 8.0 g/dL    Alb 3.8 3.5 - 5.1 g/dL    Total Bilirubin 0.2 (L) 0.3 - 1.2 mg/dL    ALT 17 11 - 66 U/L   CBC Auto Differential   Result Value Ref Range WBC 5.2 4.8 - 10.8 thou/mm3    RBC 4.06 (L) 4.20 - 5.40 mill/mm3    Hemoglobin 12.1 12.0 - 16.0 gm/dl    Hematocrit 38.6 37.0 - 47.0 %    MCV 95.1 81.0 - 99.0 fL    MCH 29.8 26.0 - 33.0 pg    MCHC 31.3 (L) 32.2 - 35.5 gm/dl    RDW-CV 13.9 11.5 - 14.5 %    RDW-SD 48.1 (H) 35.0 - 45.0 fL    Platelets 212 411 - 083 thou/mm3    MPV 9.8 9.4 - 12.4 fL    Seg Neutrophils 54.9 %    Lymphocytes 28.8 %    Monocytes 10.9 %    Eosinophils 4.4 %    Basophils 0.6 %    Immature Granulocytes 0.4 %    Segs Absolute 2.9 1.8 - 7.7 thou/mm3    Lymphocytes Absolute 1.5 1.0 - 4.8 thou/mm3    Monocytes Absolute 0.6 0.4 - 1.3 thou/mm3    Eosinophils Absolute 0.2 0.0 - 0.4 thou/mm3    Basophils Absolute 0.0 0.0 - 0.1 thou/mm3    Immature Grans (Abs) 0.02 0.00 - 0.07 thou/mm3    nRBC 0 /100 wbc   APTT   Result Value Ref Range    aPTT 27.0 22.0 - 38.0 seconds   Lactic Acid, Plasma   Result Value Ref Range    Lactic Acid 0.7 0.5 - 2.2 mmol/L   Protime-INR   Result Value Ref Range    INR 0.97 0.85 - 1.13   CK   Result Value Ref Range    Total CK 34 30 - 135 U/L   Troponin   Result Value Ref Range    Troponin T < 0.010 ng/ml   Anion Gap   Result Value Ref Range    Anion Gap 9.0 8.0 - 16.0 meq/L   Osmolality   Result Value Ref Range    Osmolality Calc 281.0 275.0 - 300.0 mOsmol/kg   Glomerular Filtration Rate, Estimated   Result Value Ref Range    Est, Glom Filt Rate 44 (A) ml/min/1.73m2   Ethanol   Result Value Ref Range    ETHYL ALCOHOL, SERUM < 0.01 0.00 %   Urine Drug Screen   Result Value Ref Range    AMPHETAMINE+METHAMPHETAMINE URINE SCREEN Negative NEGATIVE    Barbiturate Quant, Ur POSITIVE NEGATIVE    Benzodiazepine Quant, Ur Negative NEGATIVE    Cannabinoid Quant, Ur Negative NEGATIVE    Cocaine Metab Quant, Ur Negative NEGATIVE    Opiates, Urine Negative NEGATIVE    Oxycodone Negative NEGATIVE    PCP Quant, Ur Negative NEGATIVE   Urine with Reflexed Micro   Result Value Ref Range    Glucose, Ur NEGATIVE NEGATIVE mg/dl    Bilirubin Urine NEGATIVE NEGATIVE    Ketones, Urine 15 (A) NEGATIVE    Specific Gravity, Urine 1.017 1.002 - 1.030    Blood, Urine NEGATIVE NEGATIVE    pH, UA 5.0 5.0 - 9.0    Protein, UA NEGATIVE NEGATIVE    Urobilinogen, Urine 1.0 0.0 - 1.0 eu/dl    Nitrite, Urine NEGATIVE NEGATIVE    Leukocyte Esterase, Urine TRACE (A) NEGATIVE    Color, UA YELLOW STRAW-YELLOW    Character, Urine CLEAR CLEAR-SL CLOUD    RBC, UA 3-5 0-2/hpf /hpf    WBC, UA 0-2 0-4/hpf /hpf    Epithelial Cells, UA 0-2 3-5/hpf /hpf    Bacteria, UA NONE SEEN FEW/NONE SEEN /hpf    Casts UA 4-8 HYALINE NONE SEEN /lpf    Crystals, UA NONE SEEN NONE SEEN    Renal Epithelial, UA NONE SEEN NONE SEEN    Yeast, UA NONE SEEN NONE SEEN    CASTS 2 NONE SEEN NONE SEEN /lpf    MISCELLANEOUS 2 NONE SEEN    EKG 12 Lead   Result Value Ref Range    Ventricular Rate 80 BPM    Atrial Rate 80 BPM    P-R Interval 108 ms    QRS Duration 72 ms    Q-T Interval 356 ms    QTc Calculation (Bazett) 410 ms    P Axis 83 degrees    R Axis -12 degrees    T Axis 46 degrees   TYPE AND SCREEN   Result Value Ref Range    ABO O     Rh Factor POS     Antibody Screen NEG        Physical Exam:  Patient Vitals for the past 24 hrs:   BP Temp Temp src Pulse Resp SpO2 Height Weight   11/09/20 0545 121/72 98.1 °F (36.7 °C) Oral 81 18 98 % 5' 6\" (1.676 m) 141 lb (64 kg)   11/09/20 0434 100/67 -- -- 81 18 96 % -- --   11/09/20 0405 106/62 -- -- 81 16 96 % -- --   11/09/20 0259 115/74 98.5 °F (36.9 °C) Oral 81 18 96 % -- --   11/09/20 0256 -- -- -- 82 -- -- 5' 7\" (1.702 m) --     Primary Assessment:  Airway: Patent, trachea midline  Breathing: Breath sounds present and equal bilaterally, spontaneous, and unlabored  Circulation: Hemodynamically stable, 2+ central and peripheral pulses. Disability: ALVAREZ x 4, following commands. GCS =14    Secondary Assessment:  General: Alert, NAD. Head: Normocephalic, mid face stable. Small hematoma to the right parietal scalp. Tympanic membranes intact.  Nares patent bilaterally, no epistaxis. Mouth clear of foreign bodies, no lacerations or abrasions. Eyes: PERRLA. EOMI. Nontraumatic. Neurologic: A & O x2. Following commands. CN 2-12 intact  Neck: trachea midline. Cervical spines NTTP midline, without step-offs, crepitus or deformity. Back:TL spines are NTTP midline, without step-offs, crepitus or deformity. No abrasions, contusions, or ecchymosis noted. Lungs: Clear to auscultation bilaterally. Chest Wall: Chest rise symmetrical.  Chest wall without tenderness to palpation. No crepitus, deformities, lacerations, or abrasions. Heart: RRR. Normal S1/S2. No obvious M/G/R. Abdomen:  Soft, NTTP. No guarding. Non-peritoneal.  Pelvis:  NTTP, stable to compression. Femoral pulses 2+. GI/: No blood at the urinary meatus. No gross hematuria. Extremities: No gross deformities. PMS intact. Radial /DP/PT pulses 2+ bilaterally. Skin: Skin warm and dry. Normal for ethnicity. Radiology:     US Ed Fast Abdomen Limited   Final Result      XR LUMBAR SPINE (2-3 VIEWS)   Final Result   Impression:   Moderate spondylosis. No acute fracture. Bilateral moderate sized kidney stones. This document has been electronically signed by: Peace Lee MD on    11/09/2020 04:40 AM         XR THORACIC SPINE (3 VIEWS)   Final Result   Impression:   1. Minimal scoliosis otherwise negative. This document has been electronically signed by: Peace Lee MD on    11/09/2020 04:39 AM         XR HUMERUS RIGHT (MIN 2 VIEWS)   Final Result   Impression:   1. Normal right humerus      This document has been electronically signed by: Peace Lee MD on    11/09/2020 04:26 AM         XR HUMERUS LEFT (MIN 2 VIEWS)   Final Result   Impression:   1. Normal left humerus      This document has been electronically signed by: Peace Lee MD on    11/09/2020 04:24 AM         XR PELVIS (1-2 VIEWS)   Final Result   Impression:   No acute fracture or dislocation. No significant change. This document has been electronically signed by: Irasema Black MD on    11/09/2020 04:28 AM         XR CHEST 1 VIEW   Final Result   Impression:   No active process. Stable elevation of left hemidiaphragm. This document has been electronically signed by: Irasema Black MD on    11/09/2020 04:30 AM         XR KNEE LEFT (MIN 4 VIEWS)   Final Result   Impression:   No acute fracture. Severe osteoarthritis of the medial knee compartment. Trace effusion. This document has been electronically signed by: Irasema Black MD on    11/09/2020 04:27 AM         CT CERVICAL SPINE WO CONTRAST   Final Result   Impression:   1. No acute fracture. 2.  Degenerative disc and facet disease as described. 3.  Small air-fluid levels within the sphenoid and maxillary sinuses. This may reflect acute sinus disease but could be secondary to trauma. This document has been electronically signed by: Irasema Black MD on    11/09/2020 03:55 AM      All CT scans at this facility use dose modulation, iterative    reconstruction, and/or weight-based   dosing when appropriate to reduce radiation dose to as low as reasonably    achievable. CT HEAD WO CONTRAST   Final Result   Impression:   1. Negative for acute intracranial abnormality. 2.  Minimal acute sinusitis versus posttraumatic changes. 3.  High right parietal scalp soft tissue swelling. No underlying acute    fracture. 4.  Stable intracranial exam.  Air-fluid level was present before in a    central sphenoid sinus, remaining air-fluid levels or new. This document has been electronically signed by: Irasema Black MD on    11/09/2020 03:57 AM      All CT scans at this facility use dose modulation, iterative    reconstruction, and/or weight-based   dosing when appropriate to reduce radiation dose to as low as reasonably    achievable.            Fast Exam: Yes - negative    Electronically signed by JOANNA Fournier CNP on 11/9/2020 at 6:14 AM   Patient seen and examined independently by me 11/9/2020    Vitals and Graphics reviewed       Labs, cultures, and radiographs where available were reviewed. I discussed patient concerns with the patient's nurse and instructions were given. Please see our orders if there are any new ones for the updated patient care plan. Above discussed and I agree with Mary Alice Noriega CNP. See my additional comments below for updated orders and plan.      Syncope and fall  On plavix'  Hit head small hematoma  All scans negative  Being admitted to medicine for syncope workup  Cleared by trauma for this

## 2020-11-09 NOTE — ED PROVIDER NOTES
[facility]        CHIEF COMPLAINT  Chief Complaint   Patient presents with    Altered Mental Status       Nurses Notes reviewed and I agree except as noted in the HPI. HPI     Neela Adams is a 65 y.o. female who presents for evaluation of fall and altered mental status. He was recently discharged from physical rehab the 24th after a syncopal episode led to a fall and a left femoral neck fracture. She states she currently lives alone. She stated that she fell today when she was feeling rather dizzy and then fell to the ground she recalls hitting her head during the fall. She denies other symptomatology prior to the fall such as urinary issues, incontinence, dysuria, hematuria, chest pains, shortness of breath, fevers, chills, or diarrhea. She states that she does feel bruised in several places around her body, but does not know of any open bleeding wounds. She was found down on the ground due to life alert being activated by her self. She has little recollection about the event. She states she might have been on the ground for hours, however does not know the time when she fell nor does she know the time when she was found. She is reported to be of altered mental status when found by EMS. REVIEW OF SYSTEMS  Review of Systems   Constitutional: Negative for activity change, chills, fatigue and fever. HENT: Negative for ear discharge, ear pain, hearing loss, rhinorrhea, sinus pressure, sinus pain, sneezing and sore throat. Eyes: Negative for redness, itching and visual disturbance. Respiratory: Negative for cough, chest tightness, shortness of breath and wheezing. Cardiovascular: Negative for chest pain, palpitations and leg swelling. Gastrointestinal: Negative for abdominal pain, constipation, diarrhea, nausea and vomiting. Genitourinary: Negative for dysuria, flank pain, frequency, hematuria and urgency. (INDERAL LA) 80 MG CR CAPSULE    Take 60 mg by mouth daily     VITAMIN C (VITAMIN C) 250 MG TABLET    Take 1 tablet by mouth 2 times daily (with meals) for 21 days       ALLERGIES  has No Known Allergies. FAMILY HISTORY  She indicated that her mother is alive. She indicated that her father is alive. family history includes Cancer in her mother; Heart Disease in her mother. SOCIAL HISTORY   reports that she has never smoked. She has never used smokeless tobacco. She reports current alcohol use. She reports that she does not use drugs. PHYSICAL EXAM     INITIAL VITALS: /67   Pulse 81   Temp 98.5 °F (36.9 °C) (Oral)   Resp 18   Ht 5' 7\" (1.702 m)   SpO2 96%   BMI 23.37 kg/m² Estimated body mass index is 23.37 kg/m² as calculated from the following:    Height as of this encounter: 5' 7\" (1.702 m). Weight as of 9/20/20: 149 lb 3.2 oz (67.7 kg). Physical Exam  Vitals signs and nursing note reviewed. Constitutional:       General: She is not in acute distress. Appearance: She is well-developed and normal weight. She is not ill-appearing, toxic-appearing or diaphoretic. HENT:      Head: Normocephalic and atraumatic. Mouth/Throat:      Mouth: Mucous membranes are moist.      Pharynx: Oropharynx is clear. Eyes:      General: No visual field deficit or scleral icterus. Extraocular Movements: Extraocular movements intact. Right eye: No nystagmus. Left eye: No nystagmus. Pupils: Pupils are equal, round, and reactive to light. Pupils are equal.   Neck:      Musculoskeletal: Normal range of motion and neck supple. No neck rigidity. Cardiovascular:      Rate and Rhythm: Normal rate and regular rhythm. Heart sounds: Normal heart sounds. No murmur. No friction rub. No gallop. Pulmonary:      Effort: Pulmonary effort is normal.      Breath sounds: Normal breath sounds. No wheezing, rhonchi or rales.    Abdominal:      General: Bowel sounds are normal. Palpations: Abdomen is soft. There is no mass. Tenderness: There is no abdominal tenderness. Musculoskeletal: Normal range of motion. General: Swelling (mild swelling in bilateral lower limbs) present. No tenderness. Lymphadenopathy:      Cervical: No cervical adenopathy. Skin:     General: Skin is warm and dry. Capillary Refill: Capillary refill takes less than 2 seconds. Coloration: Skin is not cyanotic. Findings: No rash. Comments: Ecchymosis over right shoulder   Neurological:      Mental Status: She is alert. She is confused. GCS: GCS eye subscore is 4. GCS verbal subscore is 5. GCS motor subscore is 6. Cranial Nerves: No cranial nerve deficit, dysarthria or facial asymmetry. Sensory: No sensory deficit. Motor: No weakness. Deep Tendon Reflexes: Reflexes normal.      Comments: Oriented to Person and Place, not oriented to Time   Psychiatric:         Mood and Affect: Mood normal.         Speech: Speech normal.         Behavior: Behavior normal.         MEDICAL DECISION MAKING  Review of past medical records show recent discharge from physical rehab for a femur fracture. The fracture was caused by a fall from syncope. She lives at home alone, and her family refuses nursing home placement. Initial assessment: Stable, pleasant, but confused elderly female. She does not appear acutely injured, she is conscious. Plan: Altered Mental Status following a fall at home.    Lactic Acid (0.7)   Ethanol (<0.01%)   Troponin (<0.010)   Total CK (34)   APTT (27.0)   CMP (BUN 29, K 5.4, Bili 0.2, TP 5.6)   AG (9.0)   INR (0.97)   CBC (WBC normal, HgB normal)   UA (Trace LE, no WBC or Bacteria)   UDS (Positive Barbiturate)     US Fast exam (no free fluid seen, negative exam)   CT Head/Cervical (No fracture or intracranial bleeding, acute sinusitis versus postraumatic changes, parietal scalp soft tissue swelling)   CXR (no acute process)   XR Right Humerus (no This document has been electronically signed by: Kenan Petty MD on    11/09/2020 04:30 AM         XR KNEE LEFT (MIN 4 VIEWS)   Final Result   Impression:   No acute fracture. Severe osteoarthritis of the medial knee compartment. Trace effusion. This document has been electronically signed by: Kenan Petty MD on    11/09/2020 04:27 AM         CT CERVICAL SPINE WO CONTRAST   Final Result   Impression:   1. No acute fracture. 2.  Degenerative disc and facet disease as described. 3.  Small air-fluid levels within the sphenoid and maxillary sinuses. This may reflect acute sinus disease but could be secondary to trauma. This document has been electronically signed by: Kenan Petty MD on    11/09/2020 03:55 AM      All CT scans at this facility use dose modulation, iterative    reconstruction, and/or weight-based   dosing when appropriate to reduce radiation dose to as low as reasonably    achievable. CT HEAD WO CONTRAST   Final Result   Impression:   1. Negative for acute intracranial abnormality. 2.  Minimal acute sinusitis versus posttraumatic changes. 3.  High right parietal scalp soft tissue swelling. No underlying acute    fracture. 4.  Stable intracranial exam.  Air-fluid level was present before in a    central sphenoid sinus, remaining air-fluid levels or new. This document has been electronically signed by: Kenan Petty MD on    11/09/2020 03:57 AM      All CT scans at this facility use dose modulation, iterative    reconstruction, and/or weight-based   dosing when appropriate to reduce radiation dose to as low as reasonably    achievable.              LABS:  Labs Reviewed   COMPREHENSIVE METABOLIC PANEL W/ REFLEX TO MG FOR LOW K - Abnormal; Notable for the following components:       Result Value    BUN 29 (*)     Potassium reflex Magnesium 5.4 (*)     Total Protein 5.6 (*)     Total Bilirubin 0.2 (*)     All other components within normal limits   CBC WITH further work-up and treatment of AMS. Dr. Deana Jacobs talked to the admitting Hospitalist, and they are okay for admission. Controlled Substances Monitoring:       CRITICAL CARE:  None. CONSULTS:  None. PROCEDURES:  None. FINAL IMPRESSION:  1. Altered mental status, unspecified altered mental status type    2. Syncope and collapse    3. Multiple falls        DISPOSITION/PLAN:  PATIENT REFERRED TO:  No follow-up provider specified. DISCHARGE MEDICATIONS:  New Prescriptions    No medications on file         (Please note that portions of this note were completed with a voice recognition program and electronically transcribed. Efforts were made to edit the dictations but occasionally words are mis-transcribed. This transcription may contain errors not detected in proofreading.  This transcription was electronically signed.)    Electronically signed by Jessica Hernández DO on 11/9/2020 at 5:11 AM     Jessica Hernández DO  Resident  11/09/20 9635

## 2020-11-09 NOTE — ED PROVIDER NOTES
Annika Clement DO,  personally performed and participated in key or critical portions of the evaluation and management including personally performing the exam and medical decision making. I verify the the accuracy of the documentation by the medical student and resident. Patient states today she fell down because she felt dizzy. Patient is slightly altered today. Patient had a syncopal event earlier and had a femur fracture with surgical fixation. Currently the patient not complaining of any pain or problems at bedside. EKG revealed normal sinus rhythm, left axis deviation, ventricular rate of 80 MD interval of 108 QRS duration is 72 QT interval 356 QTC of 410. Labs Reviewed   COMPREHENSIVE METABOLIC PANEL W/ REFLEX TO MG FOR LOW K - Abnormal; Notable for the following components:       Result Value    BUN 29 (*)     Potassium reflex Magnesium 5.4 (*)     Total Protein 5.6 (*)     Total Bilirubin 0.2 (*)     All other components within normal limits   CBC WITH AUTO DIFFERENTIAL - Abnormal; Notable for the following components:    RBC 4.06 (*)     MCHC 31.3 (*)     RDW-SD 48.1 (*)     All other components within normal limits   GLOMERULAR FILTRATION RATE, ESTIMATED - Abnormal; Notable for the following components:    Est, Glom Filt Rate 44 (*)     All other components within normal limits   URINE WITH REFLEXED MICRO - Abnormal; Notable for the following components:    Ketones, Urine 15 (*)     Leukocyte Esterase, Urine TRACE (*)     All other components within normal limits   APTT   LACTIC ACID, PLASMA   PROTIME-INR   CK   TROPONIN   ANION GAP   OSMOLALITY   ETHANOL   URINE DRUG SCREEN   TYPE AND SCREEN     US Ed Fast Abdomen Limited   Final Result      XR LUMBAR SPINE (2-3 VIEWS)   Final Result   Impression:   Moderate spondylosis. No acute fracture. Bilateral moderate sized kidney stones.       This document has been electronically signed by: Kaushik Tomlinson MD on    11/09/2020 04:40 AM         XR THORACIC SPINE (3 VIEWS)   Final Result   Impression:   1. Minimal scoliosis otherwise negative. This document has been electronically signed by: Emeterio Das MD on    11/09/2020 04:39 AM         XR HUMERUS RIGHT (MIN 2 VIEWS)   Final Result   Impression:   1. Normal right humerus      This document has been electronically signed by: Emeterio Das MD on    11/09/2020 04:26 AM         XR HUMERUS LEFT (MIN 2 VIEWS)   Final Result   Impression:   1. Normal left humerus      This document has been electronically signed by: Emeterio Das MD on    11/09/2020 04:24 AM         XR PELVIS (1-2 VIEWS)   Final Result   Impression:   No acute fracture or dislocation. No significant change. This document has been electronically signed by: Emeterio Das MD on    11/09/2020 04:28 AM         XR CHEST 1 VIEW   Final Result   Impression:   No active process. Stable elevation of left hemidiaphragm. This document has been electronically signed by: Emeterio Das MD on    11/09/2020 04:30 AM         XR KNEE LEFT (MIN 4 VIEWS)   Final Result   Impression:   No acute fracture. Severe osteoarthritis of the medial knee compartment. Trace effusion. This document has been electronically signed by: Emeterio Das MD on    11/09/2020 04:27 AM         CT CERVICAL SPINE WO CONTRAST   Final Result   Impression:   1. No acute fracture. 2.  Degenerative disc and facet disease as described. 3.  Small air-fluid levels within the sphenoid and maxillary sinuses. This may reflect acute sinus disease but could be secondary to trauma. This document has been electronically signed by: Emeterio Das MD on    11/09/2020 03:55 AM      All CT scans at this facility use dose modulation, iterative    reconstruction, and/or weight-based   dosing when appropriate to reduce radiation dose to as low as reasonably    achievable. CT HEAD WO CONTRAST   Final Result   Impression:   1.   Negative for acute intracranial abnormality. 2.  Minimal acute sinusitis versus posttraumatic changes. 3.  High right parietal scalp soft tissue swelling. No underlying acute    fracture. 4.  Stable intracranial exam.  Air-fluid level was present before in a    central sphenoid sinus, remaining air-fluid levels or new. This document has been electronically signed by: David Hayden MD on    11/09/2020 03:57 AM      All CT scans at this facility use dose modulation, iterative    reconstruction, and/or weight-based   dosing when appropriate to reduce radiation dose to as low as reasonably    achievable. US ED fast: No fluid in Morison's pouch, no fluid at the splenorenal angle, no fluid in the around the urinary bladder, no fluid around the heart essentially negative FAST exam      I seen this patient with the resident Dr. Natalie Larson, and the nurse practitioner Esha Gutiérrez and agree with their assessment and plan.     Electronically signed by Pro Andersen DO on 11/9/2020 at 6:39 AM          Tonette Nageotte, DO  11/09/20 6957

## 2020-11-10 ENCOUNTER — APPOINTMENT (OUTPATIENT)
Dept: INTERVENTIONAL RADIOLOGY/VASCULAR | Age: 70
DRG: 884 | End: 2020-11-10
Payer: MEDICARE

## 2020-11-10 LAB
ANION GAP SERPL CALCULATED.3IONS-SCNC: 15 MEQ/L (ref 8–16)
BASOPHILS # BLD: 0.7 %
BASOPHILS ABSOLUTE: 0 THOU/MM3 (ref 0–0.1)
BUN BLDV-MCNC: 21 MG/DL (ref 7–22)
CALCIUM SERPL-MCNC: 10.4 MG/DL (ref 8.5–10.5)
CHLORIDE BLD-SCNC: 103 MEQ/L (ref 98–111)
CO2: 22 MEQ/L (ref 23–33)
CREAT SERPL-MCNC: 1.3 MG/DL (ref 0.4–1.2)
EOSINOPHIL # BLD: 4 %
EOSINOPHILS ABSOLUTE: 0.2 THOU/MM3 (ref 0–0.4)
ERYTHROCYTE [DISTWIDTH] IN BLOOD BY AUTOMATED COUNT: 14 % (ref 11.5–14.5)
ERYTHROCYTE [DISTWIDTH] IN BLOOD BY AUTOMATED COUNT: 50 FL (ref 35–45)
GFR SERPL CREATININE-BSD FRML MDRD: 40 ML/MIN/1.73M2
GLUCOSE BLD-MCNC: 100 MG/DL (ref 70–108)
GLUCOSE BLD-MCNC: 112 MG/DL (ref 70–108)
GLUCOSE BLD-MCNC: 118 MG/DL (ref 70–108)
GLUCOSE BLD-MCNC: 128 MG/DL (ref 70–108)
GLUCOSE BLD-MCNC: 198 MG/DL (ref 70–108)
GLUCOSE BLD-MCNC: 88 MG/DL (ref 70–108)
HCT VFR BLD CALC: 34.1 % (ref 37–47)
HEMOGLOBIN: 10.4 GM/DL (ref 12–16)
IMMATURE GRANS (ABS): 0.01 THOU/MM3 (ref 0–0.07)
IMMATURE GRANULOCYTES: 0.2 %
LYMPHOCYTES # BLD: 33.9 %
LYMPHOCYTES ABSOLUTE: 1.4 THOU/MM3 (ref 1–4.8)
MCH RBC QN AUTO: 29.9 PG (ref 26–33)
MCHC RBC AUTO-ENTMCNC: 30.5 GM/DL (ref 32.2–35.5)
MCV RBC AUTO: 98 FL (ref 81–99)
MONOCYTES # BLD: 13.2 %
MONOCYTES ABSOLUTE: 0.5 THOU/MM3 (ref 0.4–1.3)
NUCLEATED RED BLOOD CELLS: 0 /100 WBC
PLATELET # BLD: 167 THOU/MM3 (ref 130–400)
PMV BLD AUTO: 9.9 FL (ref 9.4–12.4)
POTASSIUM REFLEX MAGNESIUM: 5.2 MEQ/L (ref 3.5–5.2)
RBC # BLD: 3.48 MILL/MM3 (ref 4.2–5.4)
SEG NEUTROPHILS: 48 %
SEGMENTED NEUTROPHILS ABSOLUTE COUNT: 1.9 THOU/MM3 (ref 1.8–7.7)
SODIUM BLD-SCNC: 140 MEQ/L (ref 135–145)
WBC # BLD: 4 THOU/MM3 (ref 4.8–10.8)

## 2020-11-10 PROCEDURE — 96372 THER/PROPH/DIAG INJ SC/IM: CPT

## 2020-11-10 PROCEDURE — 6370000000 HC RX 637 (ALT 250 FOR IP): Performed by: NURSE PRACTITIONER

## 2020-11-10 PROCEDURE — 93880 EXTRACRANIAL BILAT STUDY: CPT

## 2020-11-10 PROCEDURE — 85025 COMPLETE CBC W/AUTO DIFF WBC: CPT

## 2020-11-10 PROCEDURE — 2580000003 HC RX 258: Performed by: PHYSICIAN ASSISTANT

## 2020-11-10 PROCEDURE — 82948 REAGENT STRIP/BLOOD GLUCOSE: CPT

## 2020-11-10 PROCEDURE — 36415 COLL VENOUS BLD VENIPUNCTURE: CPT

## 2020-11-10 PROCEDURE — 94761 N-INVAS EAR/PLS OXIMETRY MLT: CPT

## 2020-11-10 PROCEDURE — 99232 SBSQ HOSP IP/OBS MODERATE 35: CPT | Performed by: NURSE PRACTITIONER

## 2020-11-10 PROCEDURE — 97112 NEUROMUSCULAR REEDUCATION: CPT

## 2020-11-10 PROCEDURE — 6360000002 HC RX W HCPCS: Performed by: NURSE PRACTITIONER

## 2020-11-10 PROCEDURE — G0378 HOSPITAL OBSERVATION PER HR: HCPCS

## 2020-11-10 PROCEDURE — 2580000003 HC RX 258: Performed by: NURSE PRACTITIONER

## 2020-11-10 PROCEDURE — 97116 GAIT TRAINING THERAPY: CPT

## 2020-11-10 PROCEDURE — 80048 BASIC METABOLIC PNL TOTAL CA: CPT

## 2020-11-10 PROCEDURE — 97162 PT EVAL MOD COMPLEX 30 MIN: CPT

## 2020-11-10 RX ORDER — 0.9 % SODIUM CHLORIDE 0.9 %
1000 INTRAVENOUS SOLUTION INTRAVENOUS ONCE
Status: COMPLETED | OUTPATIENT
Start: 2020-11-10 | End: 2020-11-10

## 2020-11-10 RX ADMIN — ALOGLIPTIN 25 MG: 25 TABLET, FILM COATED ORAL at 09:33

## 2020-11-10 RX ADMIN — ATORVASTATIN CALCIUM 80 MG: 80 TABLET, FILM COATED ORAL at 20:23

## 2020-11-10 RX ADMIN — Medication 10 ML: at 20:23

## 2020-11-10 RX ADMIN — ENOXAPARIN SODIUM 40 MG: 40 INJECTION SUBCUTANEOUS at 13:46

## 2020-11-10 RX ADMIN — ASPIRIN 81 MG: 81 TABLET, COATED ORAL at 09:32

## 2020-11-10 RX ADMIN — SODIUM CHLORIDE 1000 ML: 9 INJECTION, SOLUTION INTRAVENOUS at 06:46

## 2020-11-10 RX ADMIN — METFORMIN HYDROCHLORIDE 1000 MG: 500 TABLET ORAL at 09:32

## 2020-11-10 RX ADMIN — ACETAMINOPHEN 650 MG: 325 TABLET ORAL at 21:07

## 2020-11-10 RX ADMIN — CLOPIDOGREL BISULFATE 75 MG: 75 TABLET ORAL at 09:32

## 2020-11-10 RX ADMIN — FLUOXETINE HYDROCHLORIDE 10 MG: 10 CAPSULE ORAL at 09:33

## 2020-11-10 RX ADMIN — Medication 3000 UNITS: at 09:33

## 2020-11-10 RX ADMIN — SODIUM CHLORIDE 1000 ML: 9 INJECTION, SOLUTION INTRAVENOUS at 05:28

## 2020-11-10 ASSESSMENT — PAIN SCALES - GENERAL
PAINLEVEL_OUTOF10: 4
PAINLEVEL_OUTOF10: 3
PAINLEVEL_OUTOF10: 0

## 2020-11-10 NOTE — FLOWSHEET NOTE
11/10/20 0518   Provider Notification   Reason for Communication Review case   Provider Name [facility], [address]   Provider Notification Physician   Method of Communication Secure Message   Response See orders   Notification Time 0518 0518: RN secure messaged [facility], [address] regarding patient BP. Patient BP was 82/46 manual HR 96. Patient is asymptomatic.     6404: 1000ml bolus ordered over 1 hour. 0990: Recheck of patient BP was 80/54 manual.     0640: [facility] is at bedside. Patient is asymptomatic. Patient walked to bathroom with no complaints, no dizziness, no issues. 5: [facility] ordered 1000ml bolus over 2 hours. Patient remains asymptomatic at this time. Primidone, Propanolol, and lisinopril held by [facility] at this time.

## 2020-11-10 NOTE — PROGRESS NOTES
with someone, but this conflicts with SW note)  Type of Home: House  Home Layout: One level  Home Access: Level entry  Home Equipment: (2 [employer])      Ambulation Assistance: Independent  Transfer Assistance: Independent    Active : No     OBJECTIVE:  Range of Motion:  Bilateral Lower Extremity: WFL    Strength:  Bilateral Lower Extremity: WFL    Balance:  Static Sitting Balance:  Independent  Dynamic Sitting Balance: Supervision  Static Standing Balance: Supervision  Dynamic Standing Balance: Contact Guard Assistance for all dynamic balance activities   Gait with horizontal and vertical head turns: pt tends to veer right, while keeping walker straight (edging towards the outside of walker frame), bumps foot into walker  Gait with change in speed & sudden stop: no unsteadiness   Picking up object from floor: decreased safety awareness (object was outside of walker frame, but pt reached through, underneath the bottom of walker - posing a fall risk), no unsteadiness  Stepping over object: no unsteadiness  Changing gown in standing: SBA/CGA with no LOB    Bed Mobility:  Not Tested    Transfers:  Sit to Stand: Supervision  Stand to Sit:Supervision, cues for hand placement    Ambulation:  Contact Guard Assistance, X 1, with cues for safety, with verbal cues   Distance: 36' + distance for dynamic balance activities   Surface: Level Tile  Device:2 [employer]  Gait Deviations:  Decreased Step Length Bilaterally, Decreased Gait Speed, Narrow Base of Support, Mild Path Deviations and Unsteady Gait, about to run into objects until PT says to watch for them  Cues for staying within walker frame, and watching out for objects while walking.      Functional Outcome Measures: Completed  Balance Score: 11  Gait Score: 8  Tinetti Total Score: 19, indicating 20-39% disability     AM-PAC Inpatient Mobility Raw Score : 20  AM-PAC Inpatient T-Scale Score : 47.67  Stairs & bed mobility not assessed; scores based on clinical judgement    ASSESSMENT:  Activity Tolerance:  Patient tolerance of  treatment: fair. Pt did have to take rest breaks while walking due to some lightheadedness/dizziness. Symptoms resolved with ~30 seconds of rest.       Treatment Initiated: Treatment and education initiated within context of evaluation. Evaluation time included review of current medical information, gathering information related to past medical, social and functional history, completion of standardized testing, formal and informal observation of tasks, assessment of data and development of plan of care and goals. Treatment time included skilled education and facilitation of tasks to increase safety and independence with functional mobility for improved independence and quality of life. Assessment: Body structures, Functions, Activity limitations: Decreased balance, Decreased safe awareness, Decreased functional mobility   Assessment: Pt is below PLOF with dynamic tasks due to decreased balance. Pt will benefit from continued skilled physical therapy to address balance deficits. Prognosis: Fair    REQUIRES PT FOLLOW UP: Yes    Discharge Recommendations:  Discharge Recommendations: Continue to assess pending progress. Pt is hesitant about going to ECF. She states she really wants to go home, but she knows ECF is a consideration. Pt would benefit from continued physical therapy to address balance deficits: SNF, ECF, or IPR. Due to poor safety awareness, she is not safe to return home at this time, as she is at a high fall risk.      Patient Education:  PT Education: Goals, PT Role, Plan of Care, Gait Training, General Safety, Adaptive Device Training, Energy Conservation    Equipment Recommendations:   none    Plan:  Times per week: 3-5x GM  Times per day: Daily  Specific instructions for Next Treatment: balance training  Current Treatment Recommendations: Balance Training, Functional Mobility Training, Neuromuscular Re-education, Home Exercise Program, Cognitive/Perceptual Training, Safety Education & Training, Endurance Training, Patient/Caregiver Education & Training    Goals:  Patient goals : discharge from hospital  Short term goals  Time Frame for Short term goals: by hospital discharge  Short term goal 1: Bed mobility with independence for ease of bed mobility. Short term goal 2: Supine to/from sit transfer with independence for ease of transfers at discharge site. Short term goal 3: Ambulate 80' with modified independence for community distance ambulation. Short term goal 4: Tinetti goal  Long term goals  Time Frame for Long term goals : N/A due to short ELOS. Following session, patient left in safe position with all fall risk precautions in place, chair alarm on, call light in reach.     Michelle Cruz, PT, DPT

## 2020-11-10 NOTE — PROGRESS NOTES
Patient alert to name but is disoriented to place and time. Patient states she lives with someone but cannot tell me the name of who she lives with. Patient lives alone according to Binta Srivastava and has had multiple falls at home and is incontinent of stool and urine at home. Patient is alert but seems to get confused when asked questions regarding the safety of her home and a poor historian about past medical history. Notified doctor.
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
## 2020-11-11 LAB
ANION GAP SERPL CALCULATED.3IONS-SCNC: 12 MEQ/L (ref 8–16)
BASOPHILS # BLD: 0.2 %
BASOPHILS ABSOLUTE: 0 THOU/MM3 (ref 0–0.1)
BUN BLDV-MCNC: 18 MG/DL (ref 7–22)
CALCIUM SERPL-MCNC: 9.5 MG/DL (ref 8.5–10.5)
CHLORIDE BLD-SCNC: 107 MEQ/L (ref 98–111)
CO2: 20 MEQ/L (ref 23–33)
CREAT SERPL-MCNC: 1.2 MG/DL (ref 0.4–1.2)
EOSINOPHIL # BLD: 2.3 %
EOSINOPHILS ABSOLUTE: 0.1 THOU/MM3 (ref 0–0.4)
ERYTHROCYTE [DISTWIDTH] IN BLOOD BY AUTOMATED COUNT: 14.1 % (ref 11.5–14.5)
ERYTHROCYTE [DISTWIDTH] IN BLOOD BY AUTOMATED COUNT: 51.3 FL (ref 35–45)
GFR SERPL CREATININE-BSD FRML MDRD: 44 ML/MIN/1.73M2
GLUCOSE BLD-MCNC: 101 MG/DL (ref 70–108)
GLUCOSE BLD-MCNC: 106 MG/DL (ref 70–108)
GLUCOSE BLD-MCNC: 110 MG/DL (ref 70–108)
GLUCOSE BLD-MCNC: 96 MG/DL (ref 70–108)
GLUCOSE BLD-MCNC: 96 MG/DL (ref 70–108)
HCT VFR BLD CALC: 38 % (ref 37–47)
HEMOGLOBIN: 11.3 GM/DL (ref 12–16)
IMMATURE GRANS (ABS): 0.01 THOU/MM3 (ref 0–0.07)
IMMATURE GRANULOCYTES: 0.2 %
LYMPHOCYTES # BLD: 27 %
LYMPHOCYTES ABSOLUTE: 1.4 THOU/MM3 (ref 1–4.8)
MCH RBC QN AUTO: 29.9 PG (ref 26–33)
MCHC RBC AUTO-ENTMCNC: 29.7 GM/DL (ref 32.2–35.5)
MCV RBC AUTO: 100.5 FL (ref 81–99)
MONOCYTES # BLD: 13 %
MONOCYTES ABSOLUTE: 0.7 THOU/MM3 (ref 0.4–1.3)
NUCLEATED RED BLOOD CELLS: 0 /100 WBC
PLATELET # BLD: 176 THOU/MM3 (ref 130–400)
PMV BLD AUTO: 10 FL (ref 9.4–12.4)
POTASSIUM REFLEX MAGNESIUM: 4.7 MEQ/L (ref 3.5–5.2)
RBC # BLD: 3.78 MILL/MM3 (ref 4.2–5.4)
SEG NEUTROPHILS: 57.3 %
SEGMENTED NEUTROPHILS ABSOLUTE COUNT: 3 THOU/MM3 (ref 1.8–7.7)
SODIUM BLD-SCNC: 139 MEQ/L (ref 135–145)
WBC # BLD: 5.2 THOU/MM3 (ref 4.8–10.8)

## 2020-11-11 PROCEDURE — 2580000003 HC RX 258: Performed by: NURSE PRACTITIONER

## 2020-11-11 PROCEDURE — 82948 REAGENT STRIP/BLOOD GLUCOSE: CPT

## 2020-11-11 PROCEDURE — 99232 SBSQ HOSP IP/OBS MODERATE 35: CPT | Performed by: NURSE PRACTITIONER

## 2020-11-11 PROCEDURE — 6370000000 HC RX 637 (ALT 250 FOR IP): Performed by: NURSE PRACTITIONER

## 2020-11-11 PROCEDURE — 1200000000 HC SEMI PRIVATE

## 2020-11-11 PROCEDURE — 92523 SPEECH SOUND LANG COMPREHEN: CPT

## 2020-11-11 PROCEDURE — 85025 COMPLETE CBC W/AUTO DIFF WBC: CPT

## 2020-11-11 PROCEDURE — 6360000002 HC RX W HCPCS: Performed by: NURSE PRACTITIONER

## 2020-11-11 PROCEDURE — 97166 OT EVAL MOD COMPLEX 45 MIN: CPT

## 2020-11-11 PROCEDURE — 80048 BASIC METABOLIC PNL TOTAL CA: CPT

## 2020-11-11 PROCEDURE — G0378 HOSPITAL OBSERVATION PER HR: HCPCS

## 2020-11-11 PROCEDURE — 97535 SELF CARE MNGMENT TRAINING: CPT

## 2020-11-11 PROCEDURE — 94760 N-INVAS EAR/PLS OXIMETRY 1: CPT

## 2020-11-11 PROCEDURE — 97129 THER IVNTJ 1ST 15 MIN: CPT

## 2020-11-11 PROCEDURE — 36415 COLL VENOUS BLD VENIPUNCTURE: CPT

## 2020-11-11 RX ORDER — MIDODRINE HYDROCHLORIDE 5 MG/1
5 TABLET ORAL
Status: DISCONTINUED | OUTPATIENT
Start: 2020-11-11 | End: 2020-11-12

## 2020-11-11 RX ORDER — MIDODRINE HYDROCHLORIDE 5 MG/1
5 TABLET ORAL
Qty: 90 TABLET | Refills: 0
Start: 2020-11-11 | End: 2020-11-13 | Stop reason: HOSPADM

## 2020-11-11 RX ADMIN — ASPIRIN 81 MG: 81 TABLET, COATED ORAL at 08:28

## 2020-11-11 RX ADMIN — ALOGLIPTIN 25 MG: 25 TABLET, FILM COATED ORAL at 08:21

## 2020-11-11 RX ADMIN — METFORMIN HYDROCHLORIDE 1000 MG: 500 TABLET ORAL at 16:37

## 2020-11-11 RX ADMIN — Medication 3000 UNITS: at 08:21

## 2020-11-11 RX ADMIN — ENOXAPARIN SODIUM 40 MG: 40 INJECTION SUBCUTANEOUS at 12:13

## 2020-11-11 RX ADMIN — Medication 10 ML: at 08:23

## 2020-11-11 RX ADMIN — MIDODRINE HYDROCHLORIDE 5 MG: 5 TABLET ORAL at 16:37

## 2020-11-11 RX ADMIN — FLUOXETINE HYDROCHLORIDE 10 MG: 10 CAPSULE ORAL at 08:21

## 2020-11-11 RX ADMIN — Medication 10 ML: at 20:38

## 2020-11-11 RX ADMIN — METFORMIN HYDROCHLORIDE 1000 MG: 500 TABLET ORAL at 08:22

## 2020-11-11 RX ADMIN — ACETAMINOPHEN 650 MG: 325 TABLET ORAL at 12:31

## 2020-11-11 RX ADMIN — ATORVASTATIN CALCIUM 80 MG: 80 TABLET, FILM COATED ORAL at 20:37

## 2020-11-11 RX ADMIN — CLOPIDOGREL BISULFATE 75 MG: 75 TABLET ORAL at 08:28

## 2020-11-11 ASSESSMENT — PAIN SCALES - GENERAL
PAINLEVEL_OUTOF10: 3
PAINLEVEL_OUTOF10: 0
PAINLEVEL_OUTOF10: 2

## 2020-11-11 NOTE — CARE COORDINATION
Phone call received from [address] [place] [address], [address] [place] [address] ([facility]), wants patient to go to [facility] at discharge. They may have a bed available tomorrow if not then on Friday. Update to JOANNA Chester, CNP.  Electronically signed by Keith Felton RN on 11/11/20 at 3:03 PM EST

## 2020-11-11 NOTE — DISCHARGE INSTR - COC
Continuity of Care Form    Patient Name: Brynn Meraz   :  1950  MRN:  [id number]    Admit date:  2020  Discharge date:     Code Status Order: Full Code   Advance Directives:   [facility] Documentation       Date/Time Healthcare Directive Type of Healthcare Directive Copy in [address] Agent's Name Healthcare Agent's Phone Number    [phone]  No, patient does not have an advance directive for healthcare treatment -- -- -- -- --            Admitting Physician:  Eduardo Iqbal MD  PCP: Norm Evans MD    Discharging Nurse: Jennifer Mijares RN  [address] Unit/Room#: 9A-86/720-U  Discharging Unit Phone Number: [phone]    Emergency Contact:   Extended Emergency Contact Information  Primary Emergency Contact: [address] [place] [address] Phone: [phone]  Relation: Other    Past Surgical History:  Past Surgical History:   Procedure Laterality Date    CYSTOSCOPY  12    with stent insertion    HIP SURGERY Left 2020    HIP PINNING performed by Willie Baer DO at [address] [place] [address] LITHOTRIPSY  2012    right       Immunization History: There is no immunization history for the selected administration types on file for this patient.     Active Problems:  Patient Active Problem List   Diagnosis Code    Pneumonia J18.9    Chest pain R07.9    Renal stone N20.0    Chest pain, atypical R07.89    Lung mass R91.8    Limb tremor R25.1    Numbness and tingling in right hand R20.0, R20.2    Syncope and collapse R55    Closed fracture of neck of left femur ([facility]) S72.002A    Acute respiratory failure with hypoxia ([facility]) J96.01    Subcapital fracture of femur, left, closed, with routine healing, subsequent encounter S72.012D    Type 2 diabetes mellitus without complication, with long-term current use of insulin ([facility]) E11.9, Z79.4    Vitamin D deficiency E55.9    Age-related osteoporosis with current pathological fracture with routine healing M80.00XD    Fall W19. XXXA    Scalp hematoma S00. 03XA    Recurrent falls R29.6    Fall at home, initial encounter [address] [facility], Y92.009       Isolation/Infection:   Isolation            No Isolation          Patient Infection Status       Infection Onset Added Last Indicated Last Indicated By Review Planned Expiration Resolved Resolved By    None active    Resolved    COVID-19 Rule Out 09/05/20 09/05/20 09/05/20 COVID-19 (Ordered)   09/05/20 Rule-Out Test Resulted            Nurse Assessment:  Last Vital Signs: BP (!) 89/52   Pulse 85   Temp 98.1 °F (36.7 °C) (Oral)   Resp 16   Ht 5' 6\" (1.676 m)   Wt 146 lb 11.2 oz (66.5 kg)   SpO2 94%   BMI 23.68 kg/m²     Last documented pain score (0-10 scale): Pain Level: 2  Last Weight:   Wt Readings from Last 1 Encounters:   11/11/20 146 lb 11.2 oz (66.5 kg)     Mental Status: alert and oriented x 1-2     IV Access:  - None    Nursing Mobility/ADLs:  Walking   Assisted  Transfer  Assisted  Bathing  Assisted  Dressing  Assisted  Toileting  Assisted  Feeding  Independent  Med Admin  Dependent  Med Delivery   whole    Wound Care Documentation and Therapy:  Wound 09/07/20 Coccyx Mid purple/pink area (Active)   Number of days: 64        Elimination:  Continence:   · Bowel: Yes  · Bladder: Yes  Urinary Catheter: None   Colostomy/Ileostomy/Ileal Conduit: No       Date of Last BM: 11-    Intake/Output Summary (Last 24 hours) at 11/11/2020 1428  Last data filed at 11/11/2020 0823  Gross per 24 hour   Intake 470 ml   Output 0 ml   Net 470 ml     I/O last 3 completed shifts: In: 0 [P.O.:690;  I.V.:10]  Out: 0     Safety Concerns:     History of Falls (last 30 days)    Impairments/Disabilities:      None    Nutrition Therapy:  Current Nutrition Therapy:   - Oral Diet:  General    Routes of Feeding: Oral  Liquids: No Restrictions  Daily Fluid Restriction: no  Last Modified Barium Swallow with Video (Video Swallowing Test): not done    Treatments at the Time of Hospital Discharge:   Respiratory Treatments:   Oxygen Therapy:  is not on home oxygen therapy. Ventilator:    - No ventilator support    Rehab Therapies: Physical Therapy  Weight Bearing Status/Restrictions: No weight bearing restirctions  Other Medical Equipment (for information only, NOT a DME order):  walker  Other Treatments:     Patient's personal belongings (please select all that are sent with patient):  None    RN SIGNATURE:  Anali Toribio RN     CASE MANAGEMENT/SOCIAL WORK SECTION    Inpatient Status Date:  11/11/2020    Readmission Risk Assessment Score:  Readmission Risk              Risk of Unplanned Readmission:        17           Discharging to Facility/ Agency   · Name:  [facility]  · Address:  [address] [place] [address] [facility] [address] [place] [address]  · Phone:  [phone]  · Fax:  [phone]    Dialysis Facility (if applicable)   · Name:  · Address:  · Dialysis Schedule:  · Phone:  · Fax:    / signature: Electronically signed by WANG Navarro on 11/11/20 at 3:06 PM EST    PHYSICIAN SECTION    Prognosis: Good    Condition at Discharge: Stable    Rehab Potential (if transferring to Rehab): Good    Recommended Labs or Other Treatments After Discharge: Start Midodrine    Physician Certification: I certify the above information and transfer of [place]  is necessary for the continuing treatment of the diagnosis listed and that she requires [facility] for greater 30 days.      Update Admission H&P: No change in H&P    PHYSICIAN SIGNATURE:  Electronically signed by Antonio Persaud MD on 11/13/20 at 1030 PM EST

## 2020-11-11 NOTE — PROGRESS NOTES
Hospitalist Progress Note      Patient:  Will Course    Unit/Bed:5K-21/021-A  YOB: 1950  MRN: [id number]   Acct: [de-identified]   PCP: Kati Torres MD  Date of Admission: 11/9/2020    Assessment/Plan:    1. Fall at home--most likely secondary to dehydration as well as physical deconditioning and advancement of cognitive disorder. SLP consulted to perform MMSE, appreciate assistance. PT/OT/social work following. 2. Possible syncopal episode--check orthostatic vital signs  3. Hyperkalemia--Resolved. Reassess chemistry in the morning. 4. Diabetes mellitus type 2, uncontrolled--on Nesina, Glucophage;  continue Accu-Cheks along with hypoglycemia protocol; monitor. Maintain carb controlled diet. 5. Essential hypertension, uncontrolled--continue on lisinopril and Inderal;  maintain telemetry  6. CAD--continue aspirin and Plavix  7. Malnutrition: BMI 22.76. Dietitian consulted for oral nutrition supplement recommendations as well as malnutrition assessment, appreciate assistance. Chief Complaint: Fall    Initial H and P:-    **Per Chart Review:  \"69 y.o. female who presented to [facility] with having a fall at home; patient was discharged from rehabilitation on September 24 after she fell and had a left femoral neck fracture; she lives at home alone; patient presented after another fall after she got dizzy and fell striking her head; life alert was activated; he really cannot recall much of the event; she was seen by trauma services and cleared for syncopal work-up; she is on Plavix/ASA at home; currently she is fully awake however only able to tell me the year is 2020; denies any complaints and wants to go home. \"    Subjective (past 24 hours):   Patient sitting up in the chair at the time of the interview. Alert and appropriate in conversation but intermittently confused. Patient's power of  is at the bedside as well. Patient currently denies any physical complaints, was updated on the plan of care, she verbalized her understanding, and had no other needs or questions at this time. It is of note that the patient's POA is not family but assists substantially in the home environment. The patient's medical POA states that it is getting extremely difficult for her to take care of the patient and was seeking help. Therapeutic reassurance was provided. Past medical history, family history, social history and allergies reviewed again and is unchanged since admission. ROS (14 point review of systems completed. Pertinent positives noted. Otherwise ROS is negative) :  GENERAL: No fever,chills, or night sweats. SKIN: No lesions or rashes. HEAD: No headaches or recent injury. EYES: No acute changes in vision, no diplopia or blurred vision. EARS: No hearing loss, no tinnitus. NOSE/THROAT: No rhinorrhea or pharyngitis, no nasal drainage. NECK: No lumps or unusual neck stiffness. PULMONARY: Respirations easy and non-labored, no acute distress. CARDIAC: No chest pain, pressure, Negative for lower leg edema. GI: Abdomen is soft and non-tender, non-distended. PERIPHERAL VASCULAR: No intermittent claudication or unusual leg cramps. MUSCULOSKELETAL: Occasional arthralgias, myalgias. NEUROLOGICAL: Denies any headache, near syncope, seizures or syncope. HEMATOLOGIC:  No unusual bruising or bleeding. PSYCH: Denies any homicidal or suicidial ideations.     Medications:  Reviewed    Infusion Medications    dextrose       Scheduled Medications    aspirin  81 mg Oral Daily    atorvastatin  80 mg Oral Daily    Vitamin D  3,000 Units Oral Daily    clopidogrel  75 mg Oral Daily    FLUoxetine  10 mg Oral Daily    alogliptin  25 mg Oral Daily    [Held by provider] lisinopril  2.5 mg Oral Daily    metFORMIN  1,000 mg Oral BID WC    [Held by provider] primidone  100 mg Oral Nightly    [Held by provider] primidone  200 mg Oral QAM  [Held by provider] propranolol  60 mg Oral Daily    sodium chloride flush  10 mL Intravenous 2 times per day    enoxaparin  40 mg Subcutaneous Q24H    insulin lispro  0-12 Units Subcutaneous TID WC    insulin lispro  0-6 Units Subcutaneous Nightly     PRN Meds: sodium chloride flush, acetaminophen **OR** acetaminophen, promethazine **OR** ondansetron, potassium chloride **OR** potassium alternative oral replacement **OR** potassium chloride, magnesium sulfate, bisacodyl, glucose, dextrose, glucagon (rDNA), dextrose      Intake/Output Summary (Last 24 hours) at 11/10/2020 2343  Last data filed at 11/10/2020 2023  Gross per 24 hour   Intake 500 ml   Output 0 ml   Net 500 ml       Diet:  DIET LOW SODIUM 2 GM; Exam:  /75   Pulse 123   Temp 97.8 °F (36.6 °C) (Oral)   Resp 18   Ht 5' 6\" (1.676 m)   Wt 141 lb (64 kg)   SpO2 96%   BMI 22.76 kg/m²     General appearance: Alert and confused, pleasant geriatric female. No apparent distress, appears stated age and cooperative. HEENT: Pupils equal, round, and reactive to light. Conjunctivae/corneas clear. Neck: Supple, with full range of motion. No jugular venous distention. Trachea midline. Respiratory:  Normal respiratory effort. Clear to auscultation, bilaterally without Rales/Wheezes/Rhonchi. Cardiovascular: Regular rate and rhythm with normal S1/S2 without murmurs, rubs or gallops. Abdomen: Soft, non-tender, non-distended with normal bowel sounds. Musculoskeletal: Passive and active ROM x 4 extremities. Skin: Skin color, texture, turgor normal.  No rashes or lesions. Neurologic:  Neurovascularly intact without any focal sensory/motor deficits. Cranial nerves: II-XII intact, grossly non-focal.  Psychiatric: Alert and appropriate in conversation.  Thought content intermittently scattered, subpar insight  Capillary Refill: Brisk,< 3 seconds   Peripheral Pulses: +2 palpable, equal bilaterally     Labs:   Recent Labs     11/09/20  0302 11/10/20  0636   WBC 5.2 4.0*   HGB 12.1 10.4*   HCT 38.6 34.1*    167     Recent Labs     11/09/20  0302 11/09/20  1359 11/10/20  2020    136 140   K 5.4* 5.2 5.2    103 103   CO2 25 22* 22*   BUN 29* 25* 21   CREATININE 1.2 1.1 1.3*   CALCIUM 9.5 9.1 10.4     Recent Labs     11/09/20  0302   AST 29   ALT 17   BILITOT 0.2*   ALKPHOS 80     Recent Labs     11/09/20 0302   INR 0.97     Recent Labs     11/09/20 0302   CKTOTAL 29       Microbiology:    Blood culture #1: No results found for: BC    Blood culture #2:No results found for: Chris Montaño    Organism:No results found for: ORG    No results found for: LABGRAM    MRSA culture only:No results found for: [facility]    Urine culture: No results found for: LABURIN    Respiratory culture: No results found for: CULTRESP    Aerobic and Anaerobic :  No results found for: LABAERO  No results found for: LABANAE    Urinalysis:      Lab Results   Component Value Date    NITRU NEGATIVE 11/09/2020    WBCUA 0-2 11/09/2020    WBCUA >200W/CLUMPS 10/20/2011    BACTERIA NONE SEEN 11/09/2020    RBCUA 3-5 11/09/2020    BLOODU NEGATIVE 11/09/2020    GLUCOSEU NEGATIVE 11/09/2020       Radiology:  VL DUP CAROTID BILATERAL   Final Result      1. RIGHT ICA . Clenton Reas Clenton Reas Minimal soft plaque, no appreciable stenosis. ..    2. RIGHT ECA. . Minimal soft plaque, no appreciable stenosis. ..... Clenton Reas RIGHT CCA. Clenton Reas Unremarkable . Clenton Reas 3. RIGHT VERTEBRAL. Clenton Reas Antegrade flow . .. ... LEFT VERTEBRAL. .. Antegrade flow. ..      4. LEFT ICA. .... Mild soft plaque, mild stenosis, less than 50%. ..   5. LEFT ECA. .. Mild soft plaque, mild stenosis. ..... LEFT CCA. .. Unremarkable. **This report has been created using voice recognition software. It may contain minor errors which are inherent in voice recognition technology. **      Final report electronically signed by Dr. Dottie Mustafa on 11/10/2020 12:57 PM      XR LUMBAR SPINE (2-3 VIEWS)   Final Result   Impression:   Moderate spondylosis.   No acute fracture. Bilateral moderate sized kidney stones. This document has been electronically signed by: Anthony Mcintosh MD on    11/09/2020 04:40 AM         XR THORACIC SPINE (3 VIEWS)   Final Result   Impression:   1. Minimal scoliosis otherwise negative. This document has been electronically signed by: Anthony Mcintosh MD on    11/09/2020 04:39 AM         XR HUMERUS RIGHT (MIN 2 VIEWS)   Final Result   Impression:   1. Normal right humerus      This document has been electronically signed by: Anthony Mcintosh MD on    11/09/2020 04:26 AM         XR HUMERUS LEFT (MIN 2 VIEWS)   Final Result   Impression:   1. Normal left humerus      This document has been electronically signed by: Anthony Mcintosh MD on    11/09/2020 04:24 AM         XR PELVIS (1-2 VIEWS)   Final Result   Impression:   No acute fracture or dislocation. No significant change. This document has been electronically signed by: Anthony Mcintosh MD on    11/09/2020 04:28 AM         XR CHEST 1 VIEW   Final Result   Impression:   No active process. Stable elevation of left hemidiaphragm. This document has been electronically signed by: Anthony Mcintosh MD on    11/09/2020 04:30 AM         XR KNEE LEFT (MIN 4 VIEWS)   Final Result   Impression:   No acute fracture. Severe osteoarthritis of the medial knee compartment. Trace effusion. This document has been electronically signed by: Anthony Mcintosh MD on    11/09/2020 04:27 AM         CT CERVICAL SPINE WO CONTRAST   Final Result   Impression:   1. No acute fracture. 2.  Degenerative disc and facet disease as described. 3.  Small air-fluid levels within the sphenoid and maxillary sinuses. This may reflect acute sinus disease but could be secondary to trauma.       This document has been electronically signed by: Anthony Mcintosh MD on    11/09/2020 03:55 AM      All CT scans at this facility use dose modulation, iterative    reconstruction, and/or weight-based   dosing when appropriate to reduce radiation dose to as low as reasonably    achievable. CT HEAD WO CONTRAST   Final Result   Impression:   1. Negative for acute intracranial abnormality. 2.  Minimal acute sinusitis versus posttraumatic changes. 3.  High right parietal scalp soft tissue swelling. No underlying acute    fracture. 4.  Stable intracranial exam.  Air-fluid level was present before in a    central sphenoid sinus, remaining air-fluid levels or new. This document has been electronically signed by: Bulmaro James MD on    11/09/2020 03:57 AM      All CT scans at this facility use dose modulation, iterative    reconstruction, and/or weight-based   dosing when appropriate to reduce radiation dose to as low as reasonably    achievable. Xr Thoracic Spine (3 Views)    Result Date: 11/9/2020  3 views thoracic spine Comparison:  CT  - CT THORAX LUNGS W/WO CONTR  - 03/30/2014 10:16 AM EDT Findings: Vertebral body heights are preserved. There 12 rib articulating thoracic type vertebral bodies. Minimal scoliosis apex left centered at T4. No fracture or destructive process. Intact pedicles. Preserved disc spaces. No paraspinal mass. Impression: 1. Minimal scoliosis otherwise negative. This document has been electronically signed by: Bulmaro James MD on 11/09/2020 04:39 AM     Xr Lumbar Spine (2-3 Views)    Result Date: 11/9/2020  2 views lumbar spine Comparison:  CT  - CT ABDOMEN /T/ PELVIS WITH CONTRAST  - 12/05/2012 01:23 PM EST Findings: There are 5 lumbar type vertebral bodies with preserved heights and pedicles. Mild the space narrowing and anterior osteophytosis at L2-L3, moderate at L4-L5. Moderate facet arthropathy at lower lumbar levels. Sacrum is preserved. Mild SI joint arthropathy bilaterally. Triangular 15-16mm calcifications project over the region of the kidneys. Impression: Moderate spondylosis. No acute fracture. Bilateral moderate sized kidney stones.  This document has been electronically signed by: Allen Harris MD on 11/09/2020 04:40 AM     Xr Pelvis (1-2 Views)    Result Date: 11/9/2020  AP pelvis Comparison: 9/6/20 Findings: Bone density appears within normal limits. No acute fracture dislocation, 3 left femoral neck fixation screws are present. No acetabular dysplasia or hip joint arthritis. Preserved sacrum. Mild SI joint arthropathy bilaterally. No SI joint diastasis. Impression: No acute fracture or dislocation. No significant change. This document has been electronically signed by: Allen Harris MD on 11/09/2020 04:28 AM     Xr Humerus Left (min 2 Views)    Result Date: 11/9/2020  2 views left humerus Comparison: None Findings: Osteoporosis. No fractures or dislocations. No significant arthritic change. No radiopaque foreign body or significant soft tissue swelling. Impression: 1. Normal left humerus This document has been electronically signed by: Allen Harris MD on 11/09/2020 04:24 AM     Xr Humerus Right (min 2 Views)    Result Date: 11/9/2020  2 views right humerus Comparison: None Findings: Osteoporosis. No fractures or dislocations. No significant arthritic change. No radiopaque foreign body or significant soft tissue swelling. Impression: 1. Normal right humerus This document has been electronically signed by: Allen Harris MD on 11/09/2020 04:26 AM     Xr Knee Left (min 4 Views)    Result Date: 11/9/2020  Exam: 4-view left knee. Comparison: None Findings: Osteoporosis. No acute fracture or dislocation. Severe osteoarthritis of the medial knee compartment. Unremarkable laterally compartment. Moderate patellofemoral joint osteoarthritis. Trace knee joint effusion. No significant soft tissue swelling. No loose intra-articular body or meniscal calcifications. Impression: No acute fracture. Severe osteoarthritis of the medial knee compartment. Trace effusion.  This document has been electronically signed by: Allen Harris MD on 11/09/2020 04:27 AM Ct Head Wo Contrast    Result Date: 11/9/2020  CT head without contrast Comparison:  CT  - CT HEAD WO CONTRAST  - 09/05/2020 10:32 AM EDT Findings: Age-appropriate sulcation. No intracranial mass, midline shift, hydrocephalus, acute infarct or hemorrhage. There there are minimal air-fluid levels within the maxillary and sphenoid sinuses. Mastoid air cells normal. Orbits unremarkable. No skull fracture     Impression: 1. Negative for acute intracranial abnormality. 2.  Minimal acute sinusitis versus posttraumatic changes. 3.  High right parietal scalp soft tissue swelling. No underlying acute fracture. 4.  Stable intracranial exam.  Air-fluid level was present before in a central sphenoid sinus, remaining air-fluid levels or new. This document has been electronically signed by: Maria Solorio MD on 11/09/2020 03:57 AM All CT scans at this facility use dose modulation, iterative reconstruction, and/or weight-based dosing when appropriate to reduce radiation dose to as low as reasonably achievable. Ct Cervical Spine Wo Contrast    Result Date: 11/9/2020  CT cervical spine without contrast: Comparison:  CT  - CT CERVICAL SPINE WO CONTRAST  - 09/05/2020 10:32 AM EDT Findings: No acute fracture or traumatic listhesis. No significant prevertebral soft tissue swelling. Flat and cervical lordosis. Severe degenerative disc disease at C6-C7, moderate at C5-C6. Severe multilevel facet arthropathy bilaterally worse on the left. No significant central canal or neuroforaminal stenosis. Craniocervical junction and C1-C2 articulations are without abnormal alignment/asymmetry. Dens is intact. Severe degenerative narrowing of the atlantodens interval. Normal limited view of the intracranial contents. Small air-fluid levels within the sphenoid sinuses and maxillary sinuses. Neck soft tissues are unremarkable for age. Lung apices without consolidation, pneumothorax, or mass. Impression: 1. No acute fracture.  2. soft plaque, mild stenosis. ..... LEFT CCA. .. Unremarkable. **This report has been created using voice recognition software. It may contain minor errors which are inherent in voice recognition technology. ** Final report electronically signed by Dr. Kathy Nunez on 11/10/2020 12:57 PM      **This report has been created using voice recognition software. It may contain minor errors which are inherent in voice recognition technology. **  Electronically signed by JOANNA Hinton CNP on 11/10/2020 at 11:43 PM

## 2020-11-11 NOTE — PROGRESS NOTES
Hospitalist Progress Note      Patient:  Gem Salvador    Unit/Bed:5K-21/021-A  YOB: 1950  MRN: [id number]   Acct: [de-identified]   PCP: Aurelio Segovia MD  Date of Admission: 11/9/2020    Assessment/Plan:    1. Fall at home--most likely multifactorial secondary to dehydration, hypotension  as well as physical deconditioning and advancement of cognitive disorder. PT/OT/SLP. social work following to help with placement. .  2. Possible syncopal episode--Most probable due to hypotension. Continue to hold Lisinopril, Mysoline and Inderal. Add Midodrine and MARNIE hose. Carotid dopplers non acute. CT head non acute. 3. Hyperkalemia--Resolved. 4. Diabetes mellitus type 2, uncontrolled-- Continue to hold Lantus. on Sai [place]; Accu-Cheks along with hypoglycemia protocol; monitor. Maintain carb controlled diet. 5. Essential hypertension, uncontrolled--continue on lisinopril and Inderal;  maintain telemetry  6. CAD--continue aspirin and Plavix  7. Malnutrition: BMI 22.76. Dietitian consulted for oral nutrition supplement recommendations as well as malnutrition assessment, appreciate assistance. 8. CKD. Creatinine at baseline. Chief Complaint: Fall    Initial H and P:-      **Per Chart Review:  \"69 y.o. female who presented to [facility] with having a fall at home; patient was discharged from rehabilitation on September 24 after she fell and had a left femoral neck fracture; she lives at home alone; patient presented after another fall after she got dizzy and fell striking her head; life alert was activated; he really cannot recall much of the event; she was seen by trauma services and cleared for syncopal work-up; she is on Plavix/ASA at home; currently she is fully awake however only able to tell me the year is 2020; denies any complaints and wants to go home. \"    Subjective (past 24 hours): Reports her knee gave out today when ambulating. No dizziness today. BP has been marginal.       Medications:  Reviewed    Infusion Medications    dextrose       Scheduled Medications    midodrine  5 mg Oral TID WC    aspirin  81 mg Oral Daily    atorvastatin  80 mg Oral Daily    Vitamin D  3,000 Units Oral Daily    clopidogrel  75 mg Oral Daily    FLUoxetine  10 mg Oral Daily    alogliptin  25 mg Oral Daily    [Held by provider] lisinopril  2.5 mg Oral Daily    metFORMIN  1,000 mg Oral BID WC    [Held by provider] primidone  100 mg Oral Nightly    [Held by provider] primidone  200 mg Oral QAM    [Held by provider] propranolol  60 mg Oral Daily    sodium chloride flush  10 mL Intravenous 2 times per day    enoxaparin  40 mg Subcutaneous Q24H    insulin lispro  0-12 Units Subcutaneous TID WC    insulin lispro  0-6 Units Subcutaneous Nightly     PRN Meds: sodium chloride flush, acetaminophen **OR** acetaminophen, promethazine **OR** ondansetron, potassium chloride **OR** potassium alternative oral replacement **OR** potassium chloride, magnesium sulfate, bisacodyl, glucose, dextrose, glucagon (rDNA), dextrose      Intake/Output Summary (Last 24 hours) at 11/11/2020 1502  Last data filed at 11/11/2020 2576  Gross per 24 hour   Intake 470 ml   Output 0 ml   Net 470 ml       Diet:  DIET LOW SODIUM 2 GM; Exam:  BP (!) 89/52   Pulse 85   Temp 98.1 °F (36.7 °C) (Oral)   Resp 16   Ht 5' 6\" (1.676 m)   Wt 146 lb 11.2 oz (66.5 kg)   SpO2 94%   BMI 23.68 kg/m²     General appearance: Alert and confused, pleasant geriatric female. No apparent distress, appears stated age and cooperative. HEENT: Pupils equal, round, and reactive to light. Conjunctivae/corneas clear. Neck: Supple, with full range of motion. No jugular venous distention. Trachea midline. Respiratory:  Normal respiratory effort. Clear to auscultation, bilaterally without Rales/Wheezes/Rhonchi.   Cardiovascular: Regular rate and rhythm with normal S1/S2 without murmurs, rubs or gallops. Abdomen: Soft, non-tender, non-distended with normal bowel sounds. Musculoskeletal: Passive and active ROM x 4 extremities. Skin: Skin color, texture, turgor normal.  No rashes or lesions. Neurologic:  Neurovascularly intact without any focal sensory/motor deficits. Cranial nerves: II-XII intact, grossly non-focal.  Psychiatric: Alert and appropriate in conversation. Thought content intermittently scattered, subpar insight  Capillary Refill: Brisk,< 3 seconds   Peripheral Pulses: +2 palpable, equal bilaterally     Labs:   Recent Labs     11/09/20  0302 11/10/20  0636 11/11/20  0657   WBC 5.2 4.0* 5.2   HGB 12.1 10.4* 11.3*   HCT 38.6 34.1* 38.0    167 176     Recent Labs     11/09/20  1359 11/10/20  2020 11/11/20  0657    140 139   K 5.2 5.2 4.7    103 107   CO2 22* 22* 20*   BUN 25* 21 18   CREATININE 1.1 1.3* 1.2   CALCIUM 9.1 10.4 9.5     Recent Labs     11/09/20  0302   AST 29   ALT 17   BILITOT 0.2*   ALKPHOS 80     Recent Labs     11/09/20  0302   INR 0.97     Recent Labs     11/09/20  0302   CKTOTAL 34       Microbiology:    Blood culture #1: No results found for: BC    Blood culture #2:No results found for: Mayte Knott    Organism:No results found for: ORG    No results found for: LABGRAM    MRSA culture only:No results found for: [facility]    Urine culture: No results found for: LABURIN    Respiratory culture: No results found for: CULTRESP    Aerobic and Anaerobic :  No results found for: LABAERO  No results found for: LABANAE    Urinalysis:      Lab Results   Component Value Date    NITRU NEGATIVE 11/09/2020    WBCUA 0-2 11/09/2020    WBCUA >200W/CLUMPS 10/20/2011    BACTERIA NONE SEEN 11/09/2020    RBCUA 3-5 11/09/2020    BLOODU NEGATIVE 11/09/2020    GLUCOSEU NEGATIVE 11/09/2020       Radiology:  VL DUP CAROTID BILATERAL   Final Result      1. RIGHT ICA . Jesse Ra Jesse Ra Minimal soft plaque, no appreciable stenosis. ..    2. RIGHT ECA. . Minimal soft plaque, no appreciable stenosis. Juan Antonio Rathke Juan Antonio Rathke Juan Antonio Rathke Juan Antonio Rathke Juan Antonio Rathke Juan Antonio Rathke RIGHT CCA. Juan Antonio Rathke Unremarkable . Juan Antonio Rathke 3. RIGHT VERTEBRAL. Juan Antonio Rathke Antegrade flow . .. ... LEFT VERTEBRAL. .. Antegrade flow. ..      4. LEFT ICA. .... Mild soft plaque, mild stenosis, less than 50%. ..   5. LEFT ECA. .. Mild soft plaque, mild stenosis. ..... LEFT CCA. .. Unremarkable. **This report has been created using voice recognition software. It may contain minor errors which are inherent in voice recognition technology. **      Final report electronically signed by Dr. Cha Montana on 11/10/2020 12:57 PM      XR LUMBAR SPINE (2-3 VIEWS)   Final Result   Impression:   Moderate spondylosis. No acute fracture. Bilateral moderate sized kidney stones. This document has been electronically signed by: Bharath Zimmerman MD on    11/09/2020 04:40 AM         XR THORACIC SPINE (3 VIEWS)   Final Result   Impression:   1. Minimal scoliosis otherwise negative. This document has been electronically signed by: Bharath Zimmerman MD on    11/09/2020 04:39 AM         XR HUMERUS RIGHT (MIN 2 VIEWS)   Final Result   Impression:   1. Normal right humerus      This document has been electronically signed by: Bharath Zimmerman MD on    11/09/2020 04:26 AM         XR HUMERUS LEFT (MIN 2 VIEWS)   Final Result   Impression:   1. Normal left humerus      This document has been electronically signed by: Bharath Zimmerman MD on    11/09/2020 04:24 AM         XR PELVIS (1-2 VIEWS)   Final Result   Impression:   No acute fracture or dislocation. No significant change. This document has been electronically signed by: Bharath Zimmerman MD on    11/09/2020 04:28 AM         XR CHEST 1 VIEW   Final Result   Impression:   No active process. Stable elevation of left hemidiaphragm. This document has been electronically signed by: Bharath Zimmerman MD on    11/09/2020 04:30 AM         XR KNEE LEFT (MIN 4 VIEWS)   Final Result   Impression:   No acute fracture. Severe osteoarthritis of the medial knee compartment. Trace effusion.       This document has been electronically signed by: Irasema Black MD on    11/09/2020 04:27 AM         CT CERVICAL SPINE WO CONTRAST   Final Result   Impression:   1. No acute fracture. 2.  Degenerative disc and facet disease as described. 3.  Small air-fluid levels within the sphenoid and maxillary sinuses. This may reflect acute sinus disease but could be secondary to trauma. This document has been electronically signed by: Irasema Black MD on    11/09/2020 03:55 AM      All CT scans at this facility use dose modulation, iterative    reconstruction, and/or weight-based   dosing when appropriate to reduce radiation dose to as low as reasonably    achievable. CT HEAD WO CONTRAST   Final Result   Impression:   1. Negative for acute intracranial abnormality. 2.  Minimal acute sinusitis versus posttraumatic changes. 3.  High right parietal scalp soft tissue swelling. No underlying acute    fracture. 4.  Stable intracranial exam.  Air-fluid level was present before in a    central sphenoid sinus, remaining air-fluid levels or new. This document has been electronically signed by: Irasema Black MD on    11/09/2020 03:57 AM      All CT scans at this facility use dose modulation, iterative    reconstruction, and/or weight-based   dosing when appropriate to reduce radiation dose to as low as reasonably    achievable. Xr Thoracic Spine (3 Views)    Result Date: 11/9/2020  3 views thoracic spine Comparison:  CT  - CT THORAX LUNGS W/WO CONTR  - 03/30/2014 10:16 AM EDT Findings: Vertebral body heights are preserved. There 12 rib articulating thoracic type vertebral bodies. Minimal scoliosis apex left centered at T4. No fracture or destructive process. Intact pedicles. Preserved disc spaces. No paraspinal mass. Impression: 1. Minimal scoliosis otherwise negative.  This document has been electronically signed by: Irasema Black MD on 11/09/2020 04:39 AM     Xr Lumbar Spine (2-3 Views)    Result Date: 11/9/2020  2 views lumbar spine Comparison:  CT  - CT ABDOMEN /T/ PELVIS WITH CONTRAST  - 12/05/2012 01:23 PM EST Findings: There are 5 lumbar type vertebral bodies with preserved heights and pedicles. Mild the space narrowing and anterior osteophytosis at L2-L3, moderate at L4-L5. Moderate facet arthropathy at lower lumbar levels. Sacrum is preserved. Mild SI joint arthropathy bilaterally. Triangular 15-16mm calcifications project over the region of the kidneys. Impression: Moderate spondylosis. No acute fracture. Bilateral moderate sized kidney stones. This document has been electronically signed by: Toño Larson MD on 11/09/2020 04:40 AM     Xr Pelvis (1-2 Views)    Result Date: 11/9/2020  AP pelvis Comparison: 9/6/20 Findings: Bone density appears within normal limits. No acute fracture dislocation, 3 left femoral neck fixation screws are present. No acetabular dysplasia or hip joint arthritis. Preserved sacrum. Mild SI joint arthropathy bilaterally. No SI joint diastasis. Impression: No acute fracture or dislocation. No significant change. This document has been electronically signed by: Toño Larson MD on 11/09/2020 04:28 AM     Xr Humerus Left (min 2 Views)    Result Date: 11/9/2020  2 views left humerus Comparison: None Findings: Osteoporosis. No fractures or dislocations. No significant arthritic change. No radiopaque foreign body or significant soft tissue swelling. Impression: 1. Normal left humerus This document has been electronically signed by: Toño Larson MD on 11/09/2020 04:24 AM     Xr Humerus Right (min 2 Views)    Result Date: 11/9/2020  2 views right humerus Comparison: None Findings: Osteoporosis. No fractures or dislocations. No significant arthritic change. No radiopaque foreign body or significant soft tissue swelling. Impression: 1.  Normal right humerus This document has been electronically signed by: Toño Larson MD on 11/09/2020 04:26 AM Xr Knee Left (min 4 Views)    Result Date: 11/9/2020  Exam: 4-view left knee. Comparison: None Findings: Osteoporosis. No acute fracture or dislocation. Severe osteoarthritis of the medial knee compartment. Unremarkable laterally compartment. Moderate patellofemoral joint osteoarthritis. Trace knee joint effusion. No significant soft tissue swelling. No loose intra-articular body or meniscal calcifications. Impression: No acute fracture. Severe osteoarthritis of the medial knee compartment. Trace effusion. This document has been electronically signed by: Toño Larson MD on 11/09/2020 04:27 AM     Ct Head Wo Contrast    Result Date: 11/9/2020  CT head without contrast Comparison:  CT  - CT HEAD WO CONTRAST  - 09/05/2020 10:32 AM EDT Findings: Age-appropriate sulcation. No intracranial mass, midline shift, hydrocephalus, acute infarct or hemorrhage. There there are minimal air-fluid levels within the maxillary and sphenoid sinuses. Mastoid air cells normal. Orbits unremarkable. No skull fracture     Impression: 1. Negative for acute intracranial abnormality. 2.  Minimal acute sinusitis versus posttraumatic changes. 3.  High right parietal scalp soft tissue swelling. No underlying acute fracture. 4.  Stable intracranial exam.  Air-fluid level was present before in a central sphenoid sinus, remaining air-fluid levels or new. This document has been electronically signed by: Toño Larson MD on 11/09/2020 03:57 AM All CT scans at this facility use dose modulation, iterative reconstruction, and/or weight-based dosing when appropriate to reduce radiation dose to as low as reasonably achievable. Ct Cervical Spine Wo Contrast    Result Date: 11/9/2020  CT cervical spine without contrast: Comparison:  CT  - CT CERVICAL SPINE WO CONTRAST  - 09/05/2020 10:32 AM EDT Findings: No acute fracture or traumatic listhesis. No significant prevertebral soft tissue swelling. Flat and cervical lordosis.  Severe degenerative disc disease at C6-C7, moderate at C5-C6. Severe multilevel facet arthropathy bilaterally worse on the left. No significant central canal or neuroforaminal stenosis. Craniocervical junction and C1-C2 articulations are without abnormal alignment/asymmetry. Dens is intact. Severe degenerative narrowing of the atlantodens interval. Normal limited view of the intracranial contents. Small air-fluid levels within the sphenoid sinuses and maxillary sinuses. Neck soft tissues are unremarkable for age. Lung apices without consolidation, pneumothorax, or mass. Impression: 1. No acute fracture. 2.  Degenerative disc and facet disease as described. 3.  Small air-fluid levels within the sphenoid and maxillary sinuses. This may reflect acute sinus disease but could be secondary to trauma. This document has been electronically signed by: Meredith Tolentino MD on 11/09/2020 03:55 AM All CT scans at this facility use dose modulation, iterative reconstruction, and/or weight-based dosing when appropriate to reduce radiation dose to as low as reasonably achievable. Xr Chest 1 View    Result Date: 11/9/2020  1 view chest x-ray. Comparison:  CR  - XR CHEST PORTABLE  - 09/05/2020 10:44 AM EDT Findings: Moderate elevation of the left hemidiaphragm redemonstrated. Heart size is normal. No vascular congestion. Resolved interstitial prominence present previously. No consolidation. No pleural effusion or pneumothorax. No acute fracture. Impression: No active process. Stable elevation of left hemidiaphragm. This document has been electronically signed by: Meredith Tolentino MD on 11/09/2020 04:30 AM     Vl Dup Carotid Bilateral    Result Date: 11/10/2020  PROCEDURE: VL DUP CAROTID BILATERAL CLINICAL INFORMATION: possible CVA COMPARISON: No prior study. TECHNIQUE: Multiple grayscale and color flow images of both carotid systems and both vertebral arteries were obtained.  Spectral Doppler waveforms were generated and velocity measurements were obtained. RIGHT PSV/EDV DIST CCA-------->68/22cm/s PROX ICA-------->52/21cm/s ECA---------------->76/27cm/s VERT-------------->45/11cm/s LEFT PSV/EDV DIST CCA-------->72/19cm/s PROX ICA-------->61/18cm/s ECA---------------->67/11cm/s VERT-------------->63/21cm/s     1. RIGHT ICA . Kathrene Bolk Kathrene Bolk Minimal soft plaque, no appreciable stenosis. .. 2. RIGHT ECA. . Minimal soft plaque, no appreciable stenosis. ..... Kathrene Bolk RIGHT CCA. Kathrene Bolk Unremarkable . Kathrene Bolk 3. RIGHT VERTEBRAL. Kathrene Bolk Antegrade flow . .. ... LEFT VERTEBRAL. .. Antegrade flow. .. 4. LEFT ICA. .... Mild soft plaque, mild stenosis, less than 50%. .. 5. LEFT ECA. .. Mild soft plaque, mild stenosis. ..... LEFT CCA. .. Unremarkable. **This report has been created using voice recognition software. It may contain minor errors which are inherent in voice recognition technology. ** Final report electronically signed by Dr. Carlos Alberto Yusuf on 11/10/2020 12:57 PM      **This report has been created using voice recognition software. It may contain minor errors which are inherent in voice recognition technology. **  Electronically signed by JOANNA Alvares CNP on 11/11/2020 at 3:02 PM

## 2020-11-11 NOTE — PROGRESS NOTES
Hebert R[address] 58  INPATIENT OCCUPATIONAL THERAPY  [facility] ONC MED 5K  EVALUATION    Time In: 4682  Time Out: 5732  Timed Code Treatment Minutes: 25 Minutes  Minutes: 39          Date: 2020  Patient Name: Dima Steen,   Gender: female      MRN: [id number]  : 1950  (65 y.o.)  Referring Practitioner: Lavelle Goodpasture, APRN - CNP  Diagnosis: Recurrent Falls  Additional Pertinent Hx: \"69 y.o. female who presented to [facility] with having a fall at home; patient was discharged from rehabilitation on  after she fell and had a left femoral neck fracture; she lives at home alone; patient presented after another fall after she got dizzy and fell striking her head; life alert was activated; he really cannot recall much of the event; she was seen by trauma services and cleared for syncopal work-up; she is on Plavix/ASA at home; currently she is fully awake however only able to tell me the year is ; denies any complaints and wants to go home. \"    Restrictions/Precautions:  Restrictions/Precautions: General Precautions, Fall Risk    Subjective  Chart Reviewed: Yes, Orders, Progress Notes  Patient assessed for rehabilitation services?: Yes  Family / Caregiver Present: No    Subjective: RN okayed session. Pt was cooperative and agreeable to OT. Pt was pleasant.     Pain:  Pain Assessment  Patient Currently in Pain: Denies    Social/Functional History:  Lives With: Alone  Type of Home: House  Home Layout: One level  Home Access: Level entry  Home Equipment: Rolling walker   Bathroom Shower/Tub: Walk-in shower  Bathroom Toilet: Handicap height  Bathroom Equipment: Built-in shower seat, Grab bars in shower, Grab bars around toilet       ADL Assistance: Independent(Pt did have someone supervising baths in the past, not recently)  Homemaking Assistance: Needs assistance(Pt had someone come in to assist with completion)  Homemaking Responsibilities: No  Ambulation Assistance: Independent  Transfer Assistance: Independent    Active : No          Cognition/Orientation:  Overall Orientation Status: Within Functional Limits  Overall Cognitive Status: Exceptions  Cognition Comment: Pt with some confusion throughout session. Decreased safety judgement and impulsivity was noted throughout    ADL's:  Grooming: Contact guard assistance  LE Dressing: Minimal assistance  Toileting: Stand by assistance       Functional Mobility:  Bed mobility  Sit to Supine: Stand by assistance  Scooting: Stand by assistance    Functional Mobility  Functional - Mobility Device: Rolling Walker  Activity: To/from bathroom  Assist Level: Contact guard assistance  Functional Mobility Comments: Pt required additional time. No LOB. Pt required 1 rest break during functional mobility. Balance:  Balance  Sitting Balance: Supervision  Standing Balance: Contact guard assistance  Standing Balance  Time: ~4 min  Activity: oral care and washing face  Comment: Pt with some decreased balance during dynamic standing. Pt with B hand release. Decreased safety awareness with RW placement. Transfers:  Sit to stand: (from EOB)  Stand to sit: Stand by assistance  Toilet Transfers  Toilet - Technique: Ambulating  Equipment Used: Grab bars  Toilet Transfer: Contact guard assistance  Toilet Transfers Comments: Pt demoed good safety with utilization of grab bars during toilet t/f    Upper Extremity Assessment:   LUE AROM : WFL  RUE AROM : WFL    LUE Strength  LUE Strength Comment: Grossly 4/5  RUE Strength  RUE Strength Comment: Grossly 4/5    Sensation  Overall Sensation Status: WFL  RUE Tone: Normotonic  LUE Tone: Normotonic       Activity Tolerance: Patient Tolerated treatment well       Assessment:  Assessment: Pt with overall decrease in I with ADLs and IADLs on this date. Pt required some assist for LB ADLs. Pt from home alone, but has some help.  Pt would benefit from continued OT services to increase strength and endurance for increased ease with ADL tasks. Performance deficits / Impairments: Decreased functional mobility , Decreased safe awareness, Decreased balance, Decreased ADL status, Decreased endurance, Decreased high-level IADLs, Decreased strength  Prognosis: Fair  REQUIRES OT FOLLOW UP: Yes  Decision Making: Medium Complexity  Safety Devices in place: Yes    Treatment Initiated: Treatment and education initiated within context of evaluation. Evaluation time included review of current medical information, gathering information related to past medical, social and functional history, completion of standardized testing, formal and informal observation of tasks, assessment of data and development of plan of care and goals. Treatment time included skilled education and facilitation of tasks to increase safety and independence with ADL's for improved functional independence and quality of life.     Discharge Recommendations:  Continue to assess pending progress, ECF with OT, Patient would benefit from continued therapy after discharge    Patient Education:  OT Education: OT Role, Plan of Care, ADL Adaptive Strategies, Transfer Training, Energy Conservation, IADL Safety    Equipment Recommendations:  Equipment Needed: No    Plan:  Times per week: 3-5x  Times per day: Daily  Current Treatment Recommendations: Strengthening, Safety Education & Training, Balance Training, Patient/Caregiver Education & Training, Self-Care / ADL, Functional Mobility Training, Home Management Training, Endurance Training    Goals:  Patient goals : to go home  Short term goals  Time Frame for Short term goals: by d/c  Short term goal 1: Pt will complete functional mobility HH distances with SBA and <1 rest break to increase ease with homemaking tasks  Short term goal 2: Pt will tolerate dynamic standing with B hand release with SBA for >5 min to increase indep with grooming  Short term goal 3: Pt will complete UB strengthening HEP to increase ease with all ADLs  Short term goal 4: Pt will complete BADL routine with SBA and <2 rest breaks to maximize indep with bathing  Long term goals  Time Frame for Long term goals : no LTG d/t short ELOS  See long-term goal time frame for expected duration of plan of care. If no long-term goals established, a short length of stay is anticipated. Following session, patient left in safe position with all fall risk precautions in place.

## 2020-11-11 NOTE — PROGRESS NOTES
Arrangements: Pt was previously living at home with RN care  Work History: Retired  Education Level: 12th grade   Driving Status: Does not drive  Finance Management: Assistance Required  Medication Management: Dependent/Unable  ADL's: Assistance Required. Hobbies: Pt use to enjoy playing piano   Vision Status: Glasses   Hearing: WFL in quiet setting   Type of Home: House  Home Layout: One level  Home Access: Level entry  Home Equipment: Rolling walker    ORAL MOTOR:  Facial / Labial WFL    Lingual Not Tested    Dentition Not Tested    Velum Not Tested    Vocal Quality WFL    Sensation Not Tested    Cough Not Tested      SPEECH / VOICE:  Speech and Voice appear to be grossly intact for basic and complex daily communication    LANGUAGE:  Receptive:  Receptive language skills appear to be grossly intact for basic and complex daily communication. Expressive:  Expressive language skills appear to be grossly intact for basic and complex daily communication. COGNITION:  [facility]) version 7.1 completed. Pt scored modified d/t pt unable to complete written portion of [address] [facility] . Normal is greater than or equal to /. Inclusion of +1 point given highest level of education achieved less than/equal to 12th grade or GED with limited-0 post-secondary schooling   Orientation: 4/6 indep   Immediate Recall: Trial 1: 2/5, Trial 2: 4/5   Short-Term Recall: 0/5 indep, 1/5 min A via cateegory cue, 3/5 max A   Divergent Naming: 3 WPM   Problem Solvin/2   Reasonin/2   Sequencing: poor   Thought Organization: poor   Insight: fair   Attention: fair   Math Computation: 0/5 indep   Executive Functionin/1 with verbal completion of number/letter sequence. SWALLOWING:  Current Diet: General solids  Not Tested         RECOMMENDATIONS/ASSESSMENT:  DIAGNOSTIC IMPRESSIONS:  Patient seen with RN permission on this date.  The patient presented with SEVERE cognitive linguistic deficits in the domains of attention, BASIC organizational naming d/t impaired processing speed and lexical retrieval, poor problem solving and safety awareness, compounded with decreased insight and verbal/visual reasoning skills. Recommended 24 hour SUP for safety upon d/c from inpatient setting. Pt is a high risk of re-admission without recommended level of SUP. Pt would benefit from continued skilled ST upon d/c.    Rehabilitation Potential: good    EDUCATION:  Learner: Patient  Education:  Reviewed results and recommendations of this evaluation, Reviewed ST goals and Plan of Care and Reviewed recommendations for follow-up  Evaluation of Education: Demonstrates with assistance, Needs further instruction and Family not present    PLAN:  Skilled SLP intervention on acute care 3-5 x per week or until goals met and/or pt plateaus in function. Specific interventions for next session may include: problem solving. PATIENT GOAL:    Did not state. Will further assess during treatment. SHORT TERM GOALS:  Short-term Goals  Timeframe for Short-term Goals: 2 weeks  Goal 1: Patient will complete orientation and BASIC STM w/ 2-3 units (immediate/delayed/working) with 60% accuracy given MAX A to improve retention of personal and newly learned information. Goal 2: Patient will complete basic problem solving, safety awareness, and BASIC executive functioning tasks with 60% accuracy given MAX A to improve indep with completion of ADLs/IADLs. Goal 3: Patient will complete BASIC organizational naming and sequencing with 80% accuracy given MAX A to improve processing speed and lexical retrieval.    INTERVENTION:  Completed skilled education regarding the results of the cognitive evaluation, interpretation of her score/severity, compounded with skilled education regarding follow-up recommendations.  Recommendations included STRICT 24 hour SUP for safety, assistantance with  Medication, finances, ADL's (sequencing, organization, etc), and recommended continued skilled ST to improve independence for safety and completion of ADLs/IADLs. LONG TERM GOALS:  No LTM Goals recommended, due to anticipated short ELOS. [facility]) Jordy Lundberg MA., CCC-SLP

## 2020-11-12 PROBLEM — E43 SEVERE MALNUTRITION (HCC): Status: ACTIVE | Noted: 2020-11-12

## 2020-11-12 LAB
ANION GAP SERPL CALCULATED.3IONS-SCNC: 10 MEQ/L (ref 8–16)
BASOPHILS # BLD: 0.3 %
BASOPHILS ABSOLUTE: 0 THOU/MM3 (ref 0–0.1)
BUN BLDV-MCNC: 21 MG/DL (ref 7–22)
CALCIUM SERPL-MCNC: 9.3 MG/DL (ref 8.5–10.5)
CHLORIDE BLD-SCNC: 108 MEQ/L (ref 98–111)
CO2: 24 MEQ/L (ref 23–33)
CREAT SERPL-MCNC: 1.3 MG/DL (ref 0.4–1.2)
EOSINOPHIL # BLD: 4.6 %
EOSINOPHILS ABSOLUTE: 0.2 THOU/MM3 (ref 0–0.4)
ERYTHROCYTE [DISTWIDTH] IN BLOOD BY AUTOMATED COUNT: 14.3 % (ref 11.5–14.5)
ERYTHROCYTE [DISTWIDTH] IN BLOOD BY AUTOMATED COUNT: 50.4 FL (ref 35–45)
GFR SERPL CREATININE-BSD FRML MDRD: 40 ML/MIN/1.73M2
GLUCOSE BLD-MCNC: 105 MG/DL (ref 70–108)
GLUCOSE BLD-MCNC: 81 MG/DL (ref 70–108)
GLUCOSE BLD-MCNC: 82 MG/DL (ref 70–108)
GLUCOSE BLD-MCNC: 86 MG/DL (ref 70–108)
GLUCOSE BLD-MCNC: 96 MG/DL (ref 70–108)
HCT VFR BLD CALC: 33.1 % (ref 37–47)
HEMOGLOBIN: 10.1 GM/DL (ref 12–16)
IMMATURE GRANS (ABS): 0.01 THOU/MM3 (ref 0–0.07)
IMMATURE GRANULOCYTES: 0.3 %
LYMPHOCYTES # BLD: 31.4 %
LYMPHOCYTES ABSOLUTE: 1.1 THOU/MM3 (ref 1–4.8)
MCH RBC QN AUTO: 29.6 PG (ref 26–33)
MCHC RBC AUTO-ENTMCNC: 30.5 GM/DL (ref 32.2–35.5)
MCV RBC AUTO: 97.1 FL (ref 81–99)
MONOCYTES # BLD: 11.8 %
MONOCYTES ABSOLUTE: 0.4 THOU/MM3 (ref 0.4–1.3)
NUCLEATED RED BLOOD CELLS: 0 /100 WBC
PLATELET # BLD: 166 THOU/MM3 (ref 130–400)
PMV BLD AUTO: 10.3 FL (ref 9.4–12.4)
POTASSIUM REFLEX MAGNESIUM: 5.8 MEQ/L (ref 3.5–5.2)
POTASSIUM SERPL-SCNC: 5.5 MEQ/L (ref 3.5–5.2)
RBC # BLD: 3.41 MILL/MM3 (ref 4.2–5.4)
REASON FOR REJECTION: NORMAL
REJECTED TEST: NORMAL
SARS-COV-2, PCR: NOT DETECTED
SEG NEUTROPHILS: 51.6 %
SEGMENTED NEUTROPHILS ABSOLUTE COUNT: 1.8 THOU/MM3 (ref 1.8–7.7)
SODIUM BLD-SCNC: 142 MEQ/L (ref 135–145)
WBC # BLD: 3.5 THOU/MM3 (ref 4.8–10.8)

## 2020-11-12 PROCEDURE — 97130 THER IVNTJ EA ADDL 15 MIN: CPT

## 2020-11-12 PROCEDURE — 6370000000 HC RX 637 (ALT 250 FOR IP): Performed by: NURSE PRACTITIONER

## 2020-11-12 PROCEDURE — 97129 THER IVNTJ 1ST 15 MIN: CPT

## 2020-11-12 PROCEDURE — 36415 COLL VENOUS BLD VENIPUNCTURE: CPT

## 2020-11-12 PROCEDURE — 80048 BASIC METABOLIC PNL TOTAL CA: CPT

## 2020-11-12 PROCEDURE — U0002 COVID-19 LAB TEST NON-CDC: HCPCS

## 2020-11-12 PROCEDURE — 85025 COMPLETE CBC W/AUTO DIFF WBC: CPT

## 2020-11-12 PROCEDURE — 1200000000 HC SEMI PRIVATE

## 2020-11-12 PROCEDURE — 84132 ASSAY OF SERUM POTASSIUM: CPT

## 2020-11-12 PROCEDURE — 82948 REAGENT STRIP/BLOOD GLUCOSE: CPT

## 2020-11-12 PROCEDURE — 99232 SBSQ HOSP IP/OBS MODERATE 35: CPT | Performed by: NURSE PRACTITIONER

## 2020-11-12 PROCEDURE — 6360000002 HC RX W HCPCS: Performed by: NURSE PRACTITIONER

## 2020-11-12 PROCEDURE — 2580000003 HC RX 258: Performed by: NURSE PRACTITIONER

## 2020-11-12 RX ORDER — LIDOCAINE 4 G/G
2 PATCH TOPICAL DAILY
Status: DISCONTINUED | OUTPATIENT
Start: 2020-11-12 | End: 2020-11-13 | Stop reason: HOSPADM

## 2020-11-12 RX ORDER — HYDROCODONE BITARTRATE AND ACETAMINOPHEN 5; 325 MG/1; MG/1
1 TABLET ORAL EVERY 4 HOURS PRN
Status: DISCONTINUED | OUTPATIENT
Start: 2020-11-12 | End: 2020-11-13 | Stop reason: HOSPADM

## 2020-11-12 RX ORDER — MIDODRINE HYDROCHLORIDE 10 MG/1
10 TABLET ORAL
Status: DISCONTINUED | OUTPATIENT
Start: 2020-11-12 | End: 2020-11-13 | Stop reason: HOSPADM

## 2020-11-12 RX ORDER — HYDROCODONE BITARTRATE AND ACETAMINOPHEN 5; 325 MG/1; MG/1
2 TABLET ORAL EVERY 4 HOURS PRN
Status: DISCONTINUED | OUTPATIENT
Start: 2020-11-12 | End: 2020-11-13 | Stop reason: HOSPADM

## 2020-11-12 RX ADMIN — ACETAMINOPHEN 650 MG: 325 TABLET ORAL at 09:19

## 2020-11-12 RX ADMIN — MIDODRINE HYDROCHLORIDE 5 MG: 5 TABLET ORAL at 08:16

## 2020-11-12 RX ADMIN — ALOGLIPTIN 25 MG: 25 TABLET, FILM COATED ORAL at 09:19

## 2020-11-12 RX ADMIN — Medication 3000 UNITS: at 09:19

## 2020-11-12 RX ADMIN — FLUOXETINE HYDROCHLORIDE 10 MG: 10 CAPSULE ORAL at 09:19

## 2020-11-12 RX ADMIN — Medication 10 ML: at 09:19

## 2020-11-12 RX ADMIN — Medication 10 ML: at 20:47

## 2020-11-12 RX ADMIN — MIDODRINE HYDROCHLORIDE 10 MG: 10 TABLET ORAL at 16:13

## 2020-11-12 RX ADMIN — ATORVASTATIN CALCIUM 80 MG: 80 TABLET, FILM COATED ORAL at 20:47

## 2020-11-12 RX ADMIN — METFORMIN HYDROCHLORIDE 1000 MG: 500 TABLET ORAL at 16:13

## 2020-11-12 RX ADMIN — METFORMIN HYDROCHLORIDE 1000 MG: 500 TABLET ORAL at 08:16

## 2020-11-12 RX ADMIN — CLOPIDOGREL BISULFATE 75 MG: 75 TABLET ORAL at 09:19

## 2020-11-12 RX ADMIN — MIDODRINE HYDROCHLORIDE 10 MG: 10 TABLET ORAL at 12:17

## 2020-11-12 RX ADMIN — ASPIRIN 81 MG: 81 TABLET, COATED ORAL at 09:19

## 2020-11-12 RX ADMIN — HYDROCODONE BITARTRATE AND ACETAMINOPHEN 2 TABLET: 5; 325 TABLET ORAL at 18:23

## 2020-11-12 RX ADMIN — ENOXAPARIN SODIUM 40 MG: 40 INJECTION SUBCUTANEOUS at 13:07

## 2020-11-12 ASSESSMENT — PAIN SCALES - GENERAL
PAINLEVEL_OUTOF10: 0
PAINLEVEL_OUTOF10: 8
PAINLEVEL_OUTOF10: 6
PAINLEVEL_OUTOF10: 0
PAINLEVEL_OUTOF10: 6

## 2020-11-12 NOTE — CONSULTS
Comprehensive Nutrition Assessment    Type and Reason for Visit:  Initial, Consult(Malnutrition Assessment)    Nutrition Recommendations/Plan:   *Recommend a Multivitamin w/minerals daily. *Started Glucerna TID. *Add Low Potassium restriction to diet order. Nutrition Assessment: Pt. severely malnourished AEB criteria listed below. At risk for further nutritional compromise r/t admit d/t fall, underlying medical condition (hx CAD, CKD, DM, HTN, HLD, Osteoporosis) and need for nutrition support. Nutrition recommendations/interventions as per above. Malnutrition Assessment:  Malnutrition Status:  Severe malnutrition    Context:  Acute Illness     Findings of the 6 clinical characteristics of malnutrition:  Energy Intake:  7 - 50% or less of estimated energy requirements for 5 or more days  Weight Loss:  (-9.8% weight loss in 2 months)     Body Fat Loss:  7 - Moderate body fat loss Orbital   Muscle Mass Loss:  7 - Moderate muscle mass loss Temples (temporalis)  Fluid Accumulation:  No significant fluid accumulation Extremities   Strength:  Not Performed    Estimated Daily Nutrient Needs:  Energy (kcal):  4948-7236 kcal/day (25-30 kcal/kg); Weight Used for Energy Requirements:  (67 kg on 11/12)     Protein (g):  80+ g/day (1.2+ g/kg ); Weight Used for Protein Requirements:  (67 kg on 11/12)        Fluid (ml/day):  per MD    Nutrition Related Findings:  admit d/t fall; pt seen; she reports fair appetite consuming ~50% of meals over the past week and since admission; pt denies N/V or difficulty chewing/swallowing food; last BM x2 on 11/10. Labs; K+ 5/8, Glucose 81. HgA1C 5.7% on 9/9/20. Rx includes: Humalog, Metformin, Vitamin D      Wounds:  None       Current Nutrition Therapies:    DIET LOW SODIUM 2 GM;     Anthropometric Measures:  · Height: 5' 6\" (167.6 cm)  · Current Body Weight: 147 lb 12.8 oz (67 kg)(11/12; no edema noted)   · Admission Body Weight: 147 lb 12.8 oz (67 kg)(11/11; no edema noted) · Usual Body Weight: (Per EMR: 149 lb 3.2 oz on 9/9/20; 163 lb on 9/5/20)     · Ideal Body Weight: 130 lbs  · BMI: 23.9  · BMI Categories: Normal Weight (BMI 22.0 to 24.9) age over 72       Nutrition Diagnosis:   · Severe malnutrition, In context of acute illness or injury related to inadequate protein-energy intake as evidenced by moderate loss of subcutaneous fat, moderate muscle loss, poor intake prior to admission(-9.8% weight loss in 2 months)    Nutrition Interventions:   Food and/or Nutrient Delivery:  Continue Current Diet, Vitamin Supplement, Start Oral Nutrition Supplement  Nutrition Education/Counseling:  Education initiated(Encouraged po intake of meals and ONS at best effort during LOS)   Coordination of Nutrition Care:  Continue to monitor while inpatient    Goals:  Pt will consume 75% or more of meals during LOS       Nutrition Monitoring and Evaluation:   Behavioral-Environmental Outcomes:  None Identified   Food/Nutrient Intake Outcomes:  Diet Advancement/Tolerance, Food and Nutrient Intake, Supplement Intake, Vitamin/Mineral Intake  Physical Signs/Symptoms Outcomes:  Biochemical Data, Weight, Skin, Fluid Status or Edema     Discharge Planning:     Too soon to determine     Electronically signed by Yulia Isaacs RD, LD on 11/12/20 at 12:08 PM EST    Contact: [phone]

## 2020-11-12 NOTE — PLAN OF CARE
Problem: Nutrition  Goal: Optimal nutrition therapy  Outcome: Ongoing   Nutrition Problem #1: Severe malnutrition, In context of acute illness or injury  Intervention: Food and/or Nutrient Delivery: Continue Current Diet, Vitamin Supplement, Start Oral Nutrition Supplement  Nutritional Goals: Pt will consume 75% or more of meals during LOS

## 2020-11-12 NOTE — PROGRESS NOTES
Physician Progress Note      Nuha Soto  CSN #:                  [id number]  :                       1950  ADMIT DATE:       2020 2:52 AM  DISCH DATE:  RESPONDING  PROVIDER #:        Hitesh Dobson CNP          QUERY TEXT:    Attending,    Pt admitted s/p fall at home most likely multifactorial secondary to   dehydration, hypotension, physical deconditioning, and advancement of   cognitive disorder. If possible, please respond below and document in the   progress notes and discharge summary if you are evaluating and / or treating   any of the following: The medical record reflects the following:  Risk Factors: advanced age, cognitive disorder  Clinical Indicators: s/p fall at home most likely multifactorial secondary to   dehydration, hypotension, physical deconditioning, and advancement of   cognitive disorder  Treatment: PT/OT,  helping with placement, IVF    Thank you! Fani Ramso, RN, BSN, RHIT, CCDS, [facility]  RN Clinical   [phone]  Options provided:  -- Physical deconditioning due to Age Related Physical Debility  -- Physical deconditioning unrelated to Age Related Physical Debility  -- Other - I will add my own diagnosis  -- Disagree - Not applicable / Not valid  -- Disagree - Clinically unable to determine / Unknown  -- Refer to Clinical Documentation Reviewer    PROVIDER RESPONSE TEXT:    This patient has physical deconditioning due to age related physical debility.     Query created by: Gurdeep De La Cruz on 2020 9:53 AM      Electronically signed by:  Connie Dobson CNP 2020 12:11   PM

## 2020-11-12 NOTE — PROGRESS NOTES
Hospitalist Progress Note      Patient:  Cherry Murray    Unit/Bed:5K-21/021-A  YOB: 1950  MRN: [id number]   Acct: [de-identified]   PCP: Leoncio Patel MD  Date of Admission: 11/9/2020    Assessment/Plan:    1. Fall at home--most likely multifactorial secondary to dehydration, hypotension  as well as physical deconditioning and advancement of cognitive disorder. PT/OT/SLP. social work following to help with placement. Likely to be discharged in AM.  2. Possible syncopal episode--Most probable due to hypotension, improved. Dizziness resolved. Repeat orthostatics. Continue to hold Lisinopril, Mysoline and Inderal. Midodrine added 11/11, increase dose today. Spoke with nursing to apply the MARNIE hose ordered yesterday. Carotid dopplers non acute. CT head non acute. Echo 9/2020 EF 55% with grade 1 diastolic dysfunction. ACS ruled out. 3. Hyperkalemia, mild. Repeat ? hemolyzed specimen. Plans will follow lab. .    4. Diabetes mellitus type 2, uncontrolled-- BS trend has been acceptable. Continue to hold Lantus on discharge. On Nesina, Glucophage and scale. Accu-Cheks along with hypoglycemia protocol. Maintain carb controlled diet. 5. Essential hypertension, uncontrolled with hypotension--continue on lisinopril and Inderal;  maintain telemetry  6. CAD--continue aspirin and Plavix  7. Malnutrition: BMI 23.86. Dietitian consulted. 8. CKD. Creatinine at baseline.      Chief Complaint: Fall    Initial H and P:-      **Per Chart Review:  \"69 y.o. female who presented to [address] [place] [address] with having a fall at home; patient was discharged from rehabilitation on September 24 after she fell and had a left femoral neck fracture; she lives at home alone; patient presented after another fall after she got dizzy and fell striking her head; life alert was activated; he really cannot recall much of the event; she was seen by trauma services and cleared for syncopal work-up; she is on Plavix/ASA at home; currently she is fully awake however only able to tell me the year is 2020; denies any complaints and wants to go home. \"    Subjective (past 24 hours): Up in chair. Complaints of mild right hip pain that is improved with Tylenol. No dizziness today when up. Medications:  Reviewed    Infusion Medications    dextrose       Scheduled Medications    midodrine  10 mg Oral TID WC    aspirin  81 mg Oral Daily    atorvastatin  80 mg Oral Daily    Vitamin D  3,000 Units Oral Daily    clopidogrel  75 mg Oral Daily    FLUoxetine  10 mg Oral Daily    alogliptin  25 mg Oral Daily    [Held by provider] lisinopril  2.5 mg Oral Daily    metFORMIN  1,000 mg Oral BID WC    [Held by provider] primidone  100 mg Oral Nightly    [Held by provider] primidone  200 mg Oral QAM    [Held by provider] propranolol  60 mg Oral Daily    sodium chloride flush  10 mL Intravenous 2 times per day    enoxaparin  40 mg Subcutaneous Q24H    insulin lispro  0-12 Units Subcutaneous TID WC    insulin lispro  0-6 Units Subcutaneous Nightly     PRN Meds: sodium chloride flush, acetaminophen **OR** acetaminophen, promethazine **OR** ondansetron, potassium chloride **OR** potassium alternative oral replacement **OR** potassium chloride, magnesium sulfate, bisacodyl, glucose, dextrose, glucagon (rDNA), dextrose      Intake/Output Summary (Last 24 hours) at 11/12/2020 1015  Last data filed at 11/12/2020 0423  Gross per 24 hour   Intake 210 ml   Output --   Net 210 ml       Diet:  DIET LOW SODIUM 2 GM; Exam:  BP (!) 92/52   Pulse 82   Temp 98.1 °F (36.7 °C) (Oral)   Resp 16   Ht 5' 6\" (1.676 m)   Wt 147 lb 12.8 oz (67 kg)   SpO2 95%   BMI 23.86 kg/m²     General appearance: Alert and confused, pleasant geriatric female. No apparent distress, appears stated age and cooperative. HEENT: Pupils equal, round, and reactive to light. Conjunctivae/corneas clear.   Neck: Supple, with full range of motion. No jugular venous distention. Trachea midline. Respiratory:  Normal respiratory effort. Clear to auscultation, bilaterally without Rales/Wheezes/Rhonchi. Cardiovascular: Regular rate and rhythm with normal S1/S2 without murmurs, rubs or gallops. Abdomen: Soft, non-tender, non-distended with normal bowel sounds. Musculoskeletal: Passive and active ROM x 4 extremities. Skin: Skin color, texture, turgor normal.  No rashes or lesions. Neurologic:  Neurovascularly intact without any focal sensory/motor deficits. Cranial nerves: II-XII intact, grossly non-focal.  Psychiatric: Alert and appropriate in conversation. Thought content intermittently scattered, subpar insight  Capillary Refill: Brisk,< 3 seconds   Peripheral Pulses: +2 palpable, equal bilaterally     Labs:   Recent Labs     11/10/20  0636 11/11/20  0657 11/12/20  0731   WBC 4.0* 5.2 3.5*   HGB 10.4* 11.3* 10.1*   HCT 34.1* 38.0 33.1*    176 166     Recent Labs     11/10/20  2020 11/11/20  0657 11/12/20  0731    139 142   K 5.2 4.7 5.8*    107 108   CO2 22* 20* 24   BUN 21 18 21   CREATININE 1.3* 1.2 1.3*   CALCIUM 10.4 9.5 9.3     No results for input(s): AST, ALT, BILIDIR, BILITOT, ALKPHOS in the last 72 hours. No results for input(s): INR in the last 72 hours. No results for input(s): Zettie Binet in the last 72 hours.     Microbiology:    Blood culture #1: No results found for: BC    Blood culture #2:No results found for: Jasmyne Dubois    Organism:No results found for: ORG    No results found for: LABGRAM    MRSA culture only:No results found for: [facility]    Urine culture: No results found for: LABURIN    Respiratory culture: No results found for: CULTRESP    Aerobic and Anaerobic :  No results found for: LABAERO  No results found for: LABANAE    Urinalysis:      Lab Results   Component Value Date    NITRU NEGATIVE 11/09/2020    WBCUA 0-2 11/09/2020    WBCUA >200W/CLUMPS 10/20/2011    BACTERIA NONE SEEN 11/09/2020 RBCUA 3-5 11/09/2020    BLOODU NEGATIVE 11/09/2020    GLUCOSEU NEGATIVE 11/09/2020       Radiology:  VL DUP CAROTID BILATERAL   Final Result      1. RIGHT ICA . Cherl Spinner Cherl Spinner Minimal soft plaque, no appreciable stenosis. ..    2. RIGHT ECA. . Minimal soft plaque, no appreciable stenosis. ..... Cherl Spinner RIGHT CCA. Cherl Spinner Unremarkable . Cherl Spinner 3. RIGHT VERTEBRAL. Cherl Spinner Antegrade flow . .. ... LEFT VERTEBRAL. .. Antegrade flow. ..      4. LEFT ICA. .... Mild soft plaque, mild stenosis, less than 50%. ..   5. LEFT ECA. .. Mild soft plaque, mild stenosis. ..... LEFT CCA. .. Unremarkable. **This report has been created using voice recognition software. It may contain minor errors which are inherent in voice recognition technology. **      Final report electronically signed by Dr. Lydia Leon on 11/10/2020 12:57 PM      XR LUMBAR SPINE (2-3 VIEWS)   Final Result   Impression:   Moderate spondylosis. No acute fracture. Bilateral moderate sized kidney stones. This document has been electronically signed by: Damaris De Dios MD on    11/09/2020 04:40 AM         XR THORACIC SPINE (3 VIEWS)   Final Result   Impression:   1. Minimal scoliosis otherwise negative. This document has been electronically signed by: Damaris De Dios MD on    11/09/2020 04:39 AM         XR HUMERUS RIGHT (MIN 2 VIEWS)   Final Result   Impression:   1. Normal right humerus      This document has been electronically signed by: Damaris De Dios MD on    11/09/2020 04:26 AM         XR HUMERUS LEFT (MIN 2 VIEWS)   Final Result   Impression:   1. Normal left humerus      This document has been electronically signed by: Damaris De Dios MD on    11/09/2020 04:24 AM         XR PELVIS (1-2 VIEWS)   Final Result   Impression:   No acute fracture or dislocation. No significant change. This document has been electronically signed by: Damaris De Dios MD on    11/09/2020 04:28 AM         XR CHEST 1 VIEW   Final Result   Impression:   No active process.   Stable elevation of left hemidiaphragm. This document has been electronically signed by: Pam Patel MD on    11/09/2020 04:30 AM         XR KNEE LEFT (MIN 4 VIEWS)   Final Result   Impression:   No acute fracture. Severe osteoarthritis of the medial knee compartment. Trace effusion. This document has been electronically signed by: Pam Patel MD on    11/09/2020 04:27 AM         CT CERVICAL SPINE WO CONTRAST   Final Result   Impression:   1. No acute fracture. 2.  Degenerative disc and facet disease as described. 3.  Small air-fluid levels within the sphenoid and maxillary sinuses. This may reflect acute sinus disease but could be secondary to trauma. This document has been electronically signed by: Pam Patel MD on    11/09/2020 03:55 AM      All CT scans at this facility use dose modulation, iterative    reconstruction, and/or weight-based   dosing when appropriate to reduce radiation dose to as low as reasonably    achievable. CT HEAD WO CONTRAST   Final Result   Impression:   1. Negative for acute intracranial abnormality. 2.  Minimal acute sinusitis versus posttraumatic changes. 3.  High right parietal scalp soft tissue swelling. No underlying acute    fracture. 4.  Stable intracranial exam.  Air-fluid level was present before in a    central sphenoid sinus, remaining air-fluid levels or new. This document has been electronically signed by: Pam Patel MD on    11/09/2020 03:57 AM      All CT scans at this facility use dose modulation, iterative    reconstruction, and/or weight-based   dosing when appropriate to reduce radiation dose to as low as reasonably    achievable. Xr Thoracic Spine (3 Views)    Result Date: 11/9/2020  3 views thoracic spine Comparison:  CT  - CT THORAX LUNGS W/WO CONTR  - 03/30/2014 10:16 AM EDT Findings: Vertebral body heights are preserved. There 12 rib articulating thoracic type vertebral bodies.   Minimal scoliosis apex left centered at T4. No fracture or destructive process. Intact pedicles. Preserved disc spaces. No paraspinal mass. Impression: 1. Minimal scoliosis otherwise negative. This document has been electronically signed by: Zenobia Kam MD on 11/09/2020 04:39 AM     Xr Lumbar Spine (2-3 Views)    Result Date: 11/9/2020  2 views lumbar spine Comparison:  CT  - CT ABDOMEN /T/ PELVIS WITH CONTRAST  - 12/05/2012 01:23 PM EST Findings: There are 5 lumbar type vertebral bodies with preserved heights and pedicles. Mild the space narrowing and anterior osteophytosis at L2-L3, moderate at L4-L5. Moderate facet arthropathy at lower lumbar levels. Sacrum is preserved. Mild SI joint arthropathy bilaterally. Triangular 15-16mm calcifications project over the region of the kidneys. Impression: Moderate spondylosis. No acute fracture. Bilateral moderate sized kidney stones. This document has been electronically signed by: Zenobia Kam MD on 11/09/2020 04:40 AM     Xr Pelvis (1-2 Views)    Result Date: 11/9/2020  AP pelvis Comparison: 9/6/20 Findings: Bone density appears within normal limits. No acute fracture dislocation, 3 left femoral neck fixation screws are present. No acetabular dysplasia or hip joint arthritis. Preserved sacrum. Mild SI joint arthropathy bilaterally. No SI joint diastasis. Impression: No acute fracture or dislocation. No significant change. This document has been electronically signed by: Zenobia Kam MD on 11/09/2020 04:28 AM     Xr Humerus Left (min 2 Views)    Result Date: 11/9/2020  2 views left humerus Comparison: None Findings: Osteoporosis. No fractures or dislocations. No significant arthritic change. No radiopaque foreign body or significant soft tissue swelling. Impression: 1.  Normal left humerus This document has been electronically signed by: Zenobia Kam MD on 11/09/2020 04:24 AM     Xr Humerus Right (min 2 Views)    Result Date: 11/9/2020  2 views right humerus Comparison: None Findings: Osteoporosis. No fractures or dislocations. No significant arthritic change. No radiopaque foreign body or significant soft tissue swelling. Impression: 1. Normal right humerus This document has been electronically signed by: Anthony Mcintosh MD on 11/09/2020 04:26 AM     Xr Knee Left (min 4 Views)    Result Date: 11/9/2020  Exam: 4-view left knee. Comparison: None Findings: Osteoporosis. No acute fracture or dislocation. Severe osteoarthritis of the medial knee compartment. Unremarkable laterally compartment. Moderate patellofemoral joint osteoarthritis. Trace knee joint effusion. No significant soft tissue swelling. No loose intra-articular body or meniscal calcifications. Impression: No acute fracture. Severe osteoarthritis of the medial knee compartment. Trace effusion. This document has been electronically signed by: Anthony Mcintosh MD on 11/09/2020 04:27 AM     Ct Head Wo Contrast    Result Date: 11/9/2020  CT head without contrast Comparison:  CT  - CT HEAD WO CONTRAST  - 09/05/2020 10:32 AM EDT Findings: Age-appropriate sulcation. No intracranial mass, midline shift, hydrocephalus, acute infarct or hemorrhage. There there are minimal air-fluid levels within the maxillary and sphenoid sinuses. Mastoid air cells normal. Orbits unremarkable. No skull fracture     Impression: 1. Negative for acute intracranial abnormality. 2.  Minimal acute sinusitis versus posttraumatic changes. 3.  High right parietal scalp soft tissue swelling. No underlying acute fracture. 4.  Stable intracranial exam.  Air-fluid level was present before in a central sphenoid sinus, remaining air-fluid levels or new. This document has been electronically signed by: Anthony Mcintosh MD on 11/09/2020 03:57 AM All CT scans at this facility use dose modulation, iterative reconstruction, and/or weight-based dosing when appropriate to reduce radiation dose to as low as reasonably achievable.      Ct Cervical Spine Wo Contrast    Result Date: DUP CAROTID BILATERAL CLINICAL INFORMATION: possible CVA COMPARISON: No prior study. TECHNIQUE: Multiple grayscale and color flow images of both carotid systems and both vertebral arteries were obtained. Spectral Doppler waveforms were generated and velocity measurements were obtained. RIGHT PSV/EDV DIST CCA-------->68/22cm/s PROX ICA-------->52/21cm/s ECA---------------->76/27cm/s VERT-------------->45/11cm/s LEFT PSV/EDV DIST CCA-------->72/19cm/s PROX ICA-------->61/18cm/s ECA---------------->67/11cm/s VERT-------------->63/21cm/s     1. RIGHT ICA . Crystal Nena Crystal Nena Minimal soft plaque, no appreciable stenosis. .. 2. RIGHT ECA. . Minimal soft plaque, no appreciable stenosis. ..... Crystal Nena RIGHT CCA. Crystal Nena Unremarkable . Crystal Nena 3. RIGHT VERTEBRAL. Crystal Nena Antegrade flow . .. ... LEFT VERTEBRAL. .. Antegrade flow. .. 4. LEFT ICA. .... Mild soft plaque, mild stenosis, less than 50%. .. 5. LEFT ECA. .. Mild soft plaque, mild stenosis. ..... LEFT CCA. .. Unremarkable. **This report has been created using voice recognition software. It may contain minor errors which are inherent in voice recognition technology. ** Final report electronically signed by Dr. Lucita Canavan on 11/10/2020 12:57 PM      **This report has been created using voice recognition software. It may contain minor errors which are inherent in voice recognition technology. **  Electronically signed by JOANNA Noble CNP on 11/12/2020 at 10:15 AM

## 2020-11-12 NOTE — PROGRESS NOTES
[address] [address]  INPATIENT SPEECH THERAPY  [facility] MED 5K  DAILY NOTE    TIME   SLP Individual Minutes  Time In: 1500  Time Out: 1008  Minutes: 24  Timed Code Treatment Minutes: 24 Minutes       Date: 2020  Patient Name: Manish Martin      CSN: [id number]   : 1950  (65 y.o.)  Gender: female   Referring Physician:  JOANNA Hinton CNP   Diagnosis: Recurrent falls   Secondary Diagnosis: Cognitive deficits   Precautions: Fall Risk   Current Diet: Regular diet with thin liquids   Swallowing Strategies: Standard Universal Swallow Precautions  Date of Last MBS: Not Applicable    Pain:  No pain reported. Subjective:  Patient seen sitting upright in chair, alert and cooperative. No family present. *Recommend 24/hour supervision at discharge     Short-Term Goals:  SHORT TERM GOAL #1:  Goal 1: Patient will complete orientation and BASIC STM w/ 2-3 units (immediate/delayed/working) with 60% accuracy given MAX A to improve retention of personal and newly learned information. INTERVENTIONS:  Orientation: x9 indep, x1 correct provided FO2  *patient with excellent success indep referring to calendar within room    Recall newly presented information (5K-21): ST provided verbal cue to locate information/external aid within room to recall patient's room number. Immediate recall: Indep, 2 minute delay: indep, 5 minute delay: indep  *patient with excellent reference to external aid     SHORT TERM GOAL #2:  Goal 2: Patient will complete basic problem solving, safety awareness, and BASIC executive functioning tasks with 60% accuracy given MAX A to improve indep with completion of ADLs/IADLs. INTERVENTIONS:   Call light: patient with no awareness to call light; required max cues to locate, generate rational and appropriate reasons to use call light.  ST provided direct detailed education and explanation of call light    TV remote: patient required at least moderate level of cues to locate correct button to turn television off     Cee Ga7 #3:  Goal 3: Patient will complete BASIC organizational naming and sequencing with 80% accuracy given MAX A to improve processing speed and lexical retrieval.  INTERVENTIONS:  Divergent naming: Target 5 members:   Trial 1 Restaurants: 2 indep, 1 mod cues, 2 max cues  Trial 2 Items in a women's purse: 1 indep, 3 mod cues, 1 max cues  Trial 3 Vegetable: 1 indep, 4 max cues    Sequencing/orgnaization via stating \"items needed to complete ADL task\":  Trial 1 Washing windows: 2 mod cues  Trial 2 Grilled cheese: 2 min cues, 1 max cues  Trial 3 Spaghetti: 3 indep, 2 max cues       Long-Term Goals: No LTGs due to short ELOS          EDUCATION:  Learner: Patient  Education:  Reviewed results and recommendations of this evaluation, Reviewed ST goals and Plan of Care, Reviewed recommendations for follow-up and Education Related to Potential Risks and Complications Due to Impairment/Illness/Injury  Evaluation of Education: Verbalizes understanding, Needs further instruction, No evidence of learning and Family not present    ASSESSMENT/PLAN:  Activity Tolerance:  Patient tolerance of  treatment: good. Assessment/Plan: Patient progressing toward established goals. Continues to require skilled care of licensed speech pathologist to progress toward achievement of established goals and plan of care. .     Plan for Next Session: Cognitive tx     SPRING Carrasco 23

## 2020-11-13 VITALS
BODY MASS INDEX: 23.75 KG/M2 | RESPIRATION RATE: 17 BRPM | WEIGHT: 147.8 LBS | DIASTOLIC BLOOD PRESSURE: 68 MMHG | TEMPERATURE: 99.1 F | HEIGHT: 66 IN | HEART RATE: 98 BPM | OXYGEN SATURATION: 96 % | SYSTOLIC BLOOD PRESSURE: 103 MMHG

## 2020-11-13 LAB
ANION GAP SERPL CALCULATED.3IONS-SCNC: 12 MEQ/L (ref 8–16)
BASOPHILS # BLD: 0.4 %
BASOPHILS ABSOLUTE: 0 THOU/MM3 (ref 0–0.1)
BUN BLDV-MCNC: 21 MG/DL (ref 7–22)
CALCIUM SERPL-MCNC: 9.7 MG/DL (ref 8.5–10.5)
CHLORIDE BLD-SCNC: 105 MEQ/L (ref 98–111)
CO2: 22 MEQ/L (ref 23–33)
CREAT SERPL-MCNC: 1.2 MG/DL (ref 0.4–1.2)
EOSINOPHIL # BLD: 4.4 %
EOSINOPHILS ABSOLUTE: 0.2 THOU/MM3 (ref 0–0.4)
ERYTHROCYTE [DISTWIDTH] IN BLOOD BY AUTOMATED COUNT: 14.2 % (ref 11.5–14.5)
ERYTHROCYTE [DISTWIDTH] IN BLOOD BY AUTOMATED COUNT: 50.6 FL (ref 35–45)
GFR SERPL CREATININE-BSD FRML MDRD: 44 ML/MIN/1.73M2
GLUCOSE BLD-MCNC: 86 MG/DL (ref 70–108)
GLUCOSE BLD-MCNC: 88 MG/DL (ref 70–108)
GLUCOSE BLD-MCNC: 90 MG/DL (ref 70–108)
HCT VFR BLD CALC: 36.5 % (ref 37–47)
HEMOGLOBIN: 11.1 GM/DL (ref 12–16)
IMMATURE GRANS (ABS): 0.02 THOU/MM3 (ref 0–0.07)
IMMATURE GRANULOCYTES: 0.4 %
LYMPHOCYTES # BLD: 34 %
LYMPHOCYTES ABSOLUTE: 1.6 THOU/MM3 (ref 1–4.8)
MCH RBC QN AUTO: 29.7 PG (ref 26–33)
MCHC RBC AUTO-ENTMCNC: 30.4 GM/DL (ref 32.2–35.5)
MCV RBC AUTO: 97.6 FL (ref 81–99)
MONOCYTES # BLD: 11 %
MONOCYTES ABSOLUTE: 0.5 THOU/MM3 (ref 0.4–1.3)
NUCLEATED RED BLOOD CELLS: 0 /100 WBC
PLATELET # BLD: 182 THOU/MM3 (ref 130–400)
PMV BLD AUTO: 10.1 FL (ref 9.4–12.4)
POTASSIUM REFLEX MAGNESIUM: 4.9 MEQ/L (ref 3.5–5.2)
RBC # BLD: 3.74 MILL/MM3 (ref 4.2–5.4)
SEG NEUTROPHILS: 49.8 %
SEGMENTED NEUTROPHILS ABSOLUTE COUNT: 2.3 THOU/MM3 (ref 1.8–7.7)
SODIUM BLD-SCNC: 139 MEQ/L (ref 135–145)
WBC # BLD: 4.6 THOU/MM3 (ref 4.8–10.8)

## 2020-11-13 PROCEDURE — 82948 REAGENT STRIP/BLOOD GLUCOSE: CPT

## 2020-11-13 PROCEDURE — 6370000000 HC RX 637 (ALT 250 FOR IP): Performed by: NURSE PRACTITIONER

## 2020-11-13 PROCEDURE — 2580000003 HC RX 258: Performed by: NURSE PRACTITIONER

## 2020-11-13 PROCEDURE — 36415 COLL VENOUS BLD VENIPUNCTURE: CPT

## 2020-11-13 PROCEDURE — 80048 BASIC METABOLIC PNL TOTAL CA: CPT

## 2020-11-13 PROCEDURE — 99239 HOSP IP/OBS DSCHRG MGMT >30: CPT | Performed by: NURSE PRACTITIONER

## 2020-11-13 PROCEDURE — 85025 COMPLETE CBC W/AUTO DIFF WBC: CPT

## 2020-11-13 RX ORDER — MIDODRINE HYDROCHLORIDE 10 MG/1
10 TABLET ORAL
Qty: 30 TABLET | Refills: 0 | Status: ON HOLD
Start: 2020-11-13 | End: 2022-06-07 | Stop reason: SDUPTHER

## 2020-11-13 RX ORDER — LIDOCAINE 4 G/G
2 PATCH TOPICAL DAILY
Qty: 1 BOX | Refills: 0 | Status: ON HOLD
Start: 2020-11-14 | End: 2022-06-07 | Stop reason: HOSPADM

## 2020-11-13 RX ADMIN — MIDODRINE HYDROCHLORIDE 10 MG: 10 TABLET ORAL at 12:32

## 2020-11-13 RX ADMIN — FLUOXETINE HYDROCHLORIDE 10 MG: 10 CAPSULE ORAL at 09:15

## 2020-11-13 RX ADMIN — MIDODRINE HYDROCHLORIDE 10 MG: 10 TABLET ORAL at 09:15

## 2020-11-13 RX ADMIN — Medication 10 ML: at 09:16

## 2020-11-13 RX ADMIN — ALOGLIPTIN 25 MG: 25 TABLET, FILM COATED ORAL at 09:15

## 2020-11-13 RX ADMIN — Medication 3000 UNITS: at 09:15

## 2020-11-13 RX ADMIN — CLOPIDOGREL BISULFATE 75 MG: 75 TABLET ORAL at 09:15

## 2020-11-13 RX ADMIN — METFORMIN HYDROCHLORIDE 1000 MG: 500 TABLET ORAL at 09:15

## 2020-11-13 RX ADMIN — ASPIRIN 81 MG: 81 TABLET, COATED ORAL at 09:15

## 2020-11-13 ASSESSMENT — PAIN SCALES - GENERAL
PAINLEVEL_OUTOF10: 0
PAINLEVEL_OUTOF10: 0

## 2020-11-13 NOTE — CARE COORDINATION
11/13/20, 12:32 PM EST    DISCHARGE PLANNING EVALUATION    [facility] is being [facility]. She will be skilled under her medicare benefit. Spoke with SANTINO Ramirez-advised her of discharge-she will be here at 2 pm to transport patient to facility. AVS, last dose MAR and results of COVID test faxed to facility. Call to [facility] with update. Number provided to RN for report. No other needs at this time. 11/13/20, 12:35 PM EST    Patient goals/plan/ treatment preferences discussed by  and . Patient goals/plan/ treatment preferences reviewed with patient/ family. Patient/ family verbalize understanding of discharge plan and are in agreement with goal/plan/treatment preferences. Understanding was demonstrated using the teach back method. AVS provided by RN at time of discharge, which includes all necessary medical information pertaining to the patients current course of illness, treatment, post-discharge goals of care, and treatment preferences.     Services After Discharge  Services At/After Discharge: Skilled Therapy, Nursing Services, Aide Services([facility])   IMM Letter  IMM Letter given to Patient/Family/Significant other/Guardian/POA/by[de-identified] Judy  IMM Letter date given[de-identified] 11/11/20  IMM Letter time given[de-identified] 18  Observation Status Letter date given[de-identified] 11/09/20  Observation Status Letter time given[de-identified] 0543  Observation Status Letter given to Patient/Family/Significant other/Guardian/POA/by[de-identified] staff

## 2020-11-13 NOTE — PROGRESS NOTES
Patient assisted via wheelchair to private transportation, accompanied by Ashley Tovar). Alert, without complaints of pain, and personal belongings/packet for SNF in possession.

## 2020-11-13 NOTE — PROGRESS NOTES
Physician Progress Note      Lamont Vaz  CSN #:                  [id number]  :                       1950  ADMIT DATE:       2020 2:52 AM  DISCH DATE:  RESPONDING  PROVIDER #:        Denney Habermann PAPHANCHITH APRN - CNP          QUERY TEXT:    Pt admitted s/p fall at home most likely multifactorial secondary to   dehydration, hypotension, age related physical debility, and advancement of   cognitive disorder. Noted documentation of malnutrition by the Hospitalist.    Per dietitian, pt meets AND/ASPEN criteria for severe malnutrition. If   possible, please respond below and document in the progress notes and   discharge summary:    The medical record reflects the following:  Risk Factors: DM, CKD 3, HTN, cognitive disorder, advanced age  Clinical Indicators: meets AND/ASPEN criteria for severe malnutrition: Energy   Intake: 1) 50% or less of estimated energy requirements for 5 or more days,    2) 9.8% weight loss in 2 months,  3) Moderate body fat loss of orbitals,  4)    Moderate muscle mass loss of temples; BMI 23; dehydration; falls  Treatment: dietitian consult, Glucerna TID, IVF, Vit D, labs    Thank you! Leila Ortega, RN, BSN, RHIT, CCDS, [facility]  RN Clinical   [phone]  Options provided:  -- Severe malnutrition confirmed  -- Severe malnutrition ruled out  -- Other - I will add my own diagnosis  -- Disagree - Not applicable / Not valid  -- Disagree - Clinically unable to determine / Unknown  -- Refer to Clinical Documentation Reviewer    PROVIDER RESPONSE TEXT:    The diagnosis of severe malnutrition was confirmed.     Query created by: Socorro Gilliam on 2020 9:04 AM      Electronically signed by:  Asia Dobson CNP 2020 9:41   AM

## 2020-11-13 NOTE — DISCHARGE SUMMARY
Hospital Medicine Discharge Summary      Patient Identification:   Toya Barajas   : 1950  MRN: [id number]   Account: [de-identified]      Patient's PCP: Alexandra Coker MD    Admit Date: 2020     Discharge Date:   2020    Admitting Physician: No admitting provider for patient encounter. Discharge Physician: Ruth Barnhart APRN - CNP     Discharge Diagnoses and Hospital course:    Patient presented to the ED with complaints of a fall at home. Recently admitted with left formal neck fracture at another facility s/p surgery. See H&P for details. 1. Fall at home--most likely multifactorial secondary to dehydration, hypotension  as well as physical deconditioning following surgery and advancement of cognitive disorder. PT/OT/SLP followed. Recs were given for ECF placement. Patient was agreeable. Social work followed to help with placement. 2. Possible syncopal episode--Most probable due to hypotension, improved. Dizziness resolved. Repeat orthostatics negative. Continue to hold Lisinopril, Mysoline and Inderal on discharge. Midodrine added , increased . BP today stable. MARNIE hose. Carotid dopplers non acute. CT head non acute. Echo 2020 EF 55% with grade 1 diastolic dysfunction. ACS ruled out. 3. Hyperkalemia, mild, resolved. 4. Diabetes mellitus type 2, uncontrolled-- BS trend has been acceptable. Continue to hold Lantus on discharge. On Nesina, Glucophage and scale.  Accu-Cheks along with hypoglycemia protocol. Maintain carb controlled diet. 5. Essential hypertension, uncontrolled with hypotension--continue on lisinopril and Inderal.  6. CAD--continue aspirin and Plavix  7. Severe Malnutrition: BMI 23.86. Dietitian followed. 8. CKD. Creatinine at baseline.      Orthostatic hypotension has resolved. She remains deconditioned. Agreeable for ECF placement for rehab. Bed is ready today. Patient is medically stable.      The patient was seen and examined on day of discharge and this discharge summary is in conjunction with any daily progress note from day of discharge. Exam:     Vitals:  Vitals:    11/12/20 1459 11/12/20 2044 11/13/20 0058 11/13/20 0800   BP: (!) 104/59 108/68 129/61 126/65   Pulse: 80 76 90 92   Resp: 16 16 16 16   Temp: 97.7 °F (36.5 °C) 97.7 °F (36.5 °C) 97.9 °F (36.6 °C) 98.4 °F (36.9 °C)   TempSrc: Oral Oral Oral Oral   SpO2: 96% 95% 93% 96%   Weight:       Height:         Weight: Weight: 147 lb 12.8 oz (67 kg)     24 hour intake/output:    Intake/Output Summary (Last 24 hours) at 11/13/2020 1040  Last data filed at 11/12/2020 1346  Gross per 24 hour   Intake 240 ml   Output 0 ml   Net 240 ml         General appearance:  No apparent distress, appears stated age and cooperative. HEENT:  Normal cephalic, atraumatic without obvious deformity. Pupils equal, round, and reactive to light. Extra ocular muscles intact. Conjunctivae/corneas clear. Neck: Supple, with full range of motion. No jugular venous distention. Trachea midline. Respiratory:  Normal respiratory effort. Clear to auscultation, bilaterally without Rales/Wheezes/Rhonchi. Cardiovascular:  Regular rate and rhythm with normal S1/S2 without murmurs, rubs or gallops. Abdomen: Soft, non-tender, non-distended with normal bowel sounds. Musculoskeletal:  No clubbing, cyanosis or edema bilaterally. Full range of motion without deformity. Skin: Skin color, texture, turgor normal.  No rashes or lesions. Neurologic:  Neurovascularly intact without any focal sensory/motor deficits. Cranial nerves: II-XII intact, grossly non-focal.  Psychiatric:  Alert and oriented, thought content appropriate, normal insight  Capillary Refill: Brisk,< 3 seconds   Peripheral Pulses: +2 palpable, equal bilaterally       Labs:  For convenience and continuity at follow-up the following most recent labs are provided:      CBC:    Lab Results   Component Value Date    WBC 4.6 11/13/2020    HGB 11.1 11/13/2020 HCT 36.5 11/13/2020     11/13/2020       Renal:    Lab Results   Component Value Date     11/13/2020    K 4.9 11/13/2020     11/13/2020    CO2 22 11/13/2020    BUN 21 11/13/2020    CREATININE 1.2 11/13/2020    CALCIUM 9.7 11/13/2020    PHOS 4.4 10/24/2011         Significant Diagnostic Studies    Radiology:   VL DUP CAROTID BILATERAL   Final Result      1. RIGHT ICA . Elizabeth Zachary Elizabeth Zachary Minimal soft plaque, no appreciable stenosis. ..    2. RIGHT ECA. . Minimal soft plaque, no appreciable stenosis. ..... Elizabeth Zachary RIGHT CCA. Elizabeth Zachary Unremarkable . Elizabeth Zachary 3. RIGHT VERTEBRAL. Elizabeth Zachary Antegrade flow . .. ... LEFT VERTEBRAL. .. Antegrade flow. ..      4. LEFT ICA. .... Mild soft plaque, mild stenosis, less than 50%. ..   5. LEFT ECA. .. Mild soft plaque, mild stenosis. ..... LEFT CCA. .. Unremarkable. **This report has been created using voice recognition software. It may contain minor errors which are inherent in voice recognition technology. **      Final report electronically signed by Dr. Kathy Nunez on 11/10/2020 12:57 PM      XR LUMBAR SPINE (2-3 VIEWS)   Final Result   Impression:   Moderate spondylosis. No acute fracture. Bilateral moderate sized kidney stones. This document has been electronically signed by: Benjamin Bass MD on    11/09/2020 04:40 AM         XR THORACIC SPINE (3 VIEWS)   Final Result   Impression:   1. Minimal scoliosis otherwise negative. This document has been electronically signed by: Benjamin Bass MD on    11/09/2020 04:39 AM         XR HUMERUS RIGHT (MIN 2 VIEWS)   Final Result   Impression:   1. Normal right humerus      This document has been electronically signed by: Benjamin Bass MD on    11/09/2020 04:26 AM         XR HUMERUS LEFT (MIN 2 VIEWS)   Final Result   Impression:   1.  Normal left humerus      This document has been electronically signed by: Benjamin Bass MD on    11/09/2020 04:24 AM         XR PELVIS (1-2 VIEWS)   Final Result   Impression:   No acute fracture or dislocation. No significant change. This document has been electronically signed by: Kain Beavers MD on    11/09/2020 04:28 AM         XR CHEST 1 VIEW   Final Result   Impression:   No active process. Stable elevation of left hemidiaphragm. This document has been electronically signed by: Kain Beavers MD on    11/09/2020 04:30 AM         XR KNEE LEFT (MIN 4 VIEWS)   Final Result   Impression:   No acute fracture. Severe osteoarthritis of the medial knee compartment. Trace effusion. This document has been electronically signed by: Kain Beavers MD on    11/09/2020 04:27 AM         CT CERVICAL SPINE WO CONTRAST   Final Result   Impression:   1. No acute fracture. 2.  Degenerative disc and facet disease as described. 3.  Small air-fluid levels within the sphenoid and maxillary sinuses. This may reflect acute sinus disease but could be secondary to trauma. This document has been electronically signed by: Kain Beavers MD on    11/09/2020 03:55 AM      All CT scans at this facility use dose modulation, iterative    reconstruction, and/or weight-based   dosing when appropriate to reduce radiation dose to as low as reasonably    achievable. CT HEAD WO CONTRAST   Final Result   Impression:   1. Negative for acute intracranial abnormality. 2.  Minimal acute sinusitis versus posttraumatic changes. 3.  High right parietal scalp soft tissue swelling. No underlying acute    fracture. 4.  Stable intracranial exam.  Air-fluid level was present before in a    central sphenoid sinus, remaining air-fluid levels or new. This document has been electronically signed by: Kain Beavers MD on    11/09/2020 03:57 AM      All CT scans at this facility use dose modulation, iterative    reconstruction, and/or weight-based   dosing when appropriate to reduce radiation dose to as low as reasonably    achievable.                 Consults:     IP CONSULT TO SPIRITUAL SERVICES  IP CONSULT TO SOCIAL WORK  IP CONSULT TO DIETITIAN    Disposition: Luis  Condition at Discharge: Stable    Code Status:  Full Code     Patient Instructions: Activity: activity as tolerated  Diet: DIET LOW SODIUM 2 GM; Low Potassium  Dietary Nutrition Supplements: Diabetic Oral Supplement      Follow-up visits:   [facility] [address]  [address] [facility]  [phone]             Discharge Medications:      Brian Hernandez   [place] Medication Instructions NVQ:[id number]    Printed on:11/13/20 1040   Medication Information                      Acetaminophen (TYLENOL PO)  Take  by mouth as needed. aspirin 81 MG EC tablet  Take 81 mg by mouth daily. atorvastatin (LIPITOR) 80 MG tablet  Take 80 mg by mouth daily             Cholecalciferol 75 MCG (3000 UT) TABS  Take 1 tablet by mouth daily             clopidogrel (PLAVIX) 75 MG tablet  Take 1 tablet by mouth daily. ezetimibe (ZETIA) 10 MG tablet  Take 10 mg by mouth daily             FLUoxetine (PROZAC) 10 MG capsule  Take 1 capsule by mouth daily             insulin lispro (HUMALOG) 100 UNIT/ML injection vial  Inject 0-12 Units into the skin 3 times daily (with meals)             insulin lispro (HUMALOG) 100 UNIT/ML injection vial  Inject 0-6 Units into the skin nightly             lidocaine 4 % external patch  Place 2 patches onto the skin daily             linagliptin (TRADJENTA) 5 MG tablet  Take 5 mg by mouth daily             metformin (GLUCOPHAGE) 500 MG tablet  Take 1,000 mg by mouth 2 times daily (with meals).                midodrine (PROAMATINE) 10 MG tablet  Take 1 tablet by mouth 3 times daily (with meals)             pantoprazole (PROTONIX) 40 MG tablet  Take 40 mg by mouth daily              vitamin C (VITAMIN C) 250 MG tablet  Take 1 tablet by mouth 2 times daily (with meals) for 21 days                 Time Spent on discharge is more than 45 minutes in the examination, evaluation, counseling and review of medications and discharge plan. Signed: Thank you Leoncio Patel MD for the opportunity to be involved in this patient's care.     Electronically signed by JOANNA Ceja CNP on 11/13/2020 at 10:40 AM

## 2020-11-16 ENCOUNTER — CARE COORDINATION (OUTPATIENT)
Dept: CASE MANAGEMENT | Age: 70
End: 2020-11-16

## 2020-11-17 NOTE — CARE COORDINATION
Received email from [employer] with [employer]. They are attempting to follow patient while at [facility]. [facility] requested clinicals from SNF yesterday. CTN signing off.     Donna Root, RN  Care Transition Nurse
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
## 2021-01-19 NOTE — ED NOTES
Bed: 023A  Expected date: 11/9/20  Expected time: 2:46 AM  Means of arrival: [facility]  Comments:     Esteban Lovelace RN  11/09/20 0991
ED to inpatient nurses report    Chief Complaint   Patient presents with    Altered Mental Status      Present to ED from home  LOC: alert and orientated to name and place  Vital signs   Vitals:    11/09/20 0256 11/09/20 0259 11/09/20 0405 11/09/20 0434   BP:  115/74 106/62 100/67   Pulse: 82 81 81 81   Resp:  18 16 18   Temp:  98.5 °F (36.9 °C)     TempSrc:  Oral     SpO2:  96% 96% 96%   Height: 5' 7\" (1.702 m)         Oxygen Baseline room air    Current needs required room air Bipap/Cpap No  LDAs:   Peripheral IV 11/09/20 Right Antecubital (Active)   Site Assessment Clean;Dry; Intact 11/09/20 0449   Line Status Normal saline locked 11/09/20 0449   Dressing Status Clean;Dry; Intact 11/09/20 0449   Dressing Intervention New 11/09/20 0358     Mobility: Requires assistance * 2  Pending ED orders: none  Present condition: pt resting on cot with unlabored respirations.      Electronically signed by Nilsa Bishop RN on 11/9/2020 at 5:21 AM       Francoise Mckeon RN  11/09/20 0522
FAST exam negative.       Margarito Wayne RN  11/09/20 0883
Pt POA left at this time. Pt phone with [facility].       Jamaal Wayne RN  11/09/20 4666
Pt arrives to ED after her medical alarm went off. Pt was found on the ground of her apartment. Pt states she remembers being dizzy before the fall. Pt states she hit her head. Pt states that she laid on the floor all night. Pt is alert to self, place and situation. Pt shows no signs of distress. EKG complete. Monitor applied.       Yakov Wayne RN  11/09/20 7596
Pt resting on cot with POA at bedside. Pt respirations unlabored. Pt voiced no concerns at this time.       Rich Wayne RN  11/09/20 1088
Pt straight cathed and urine sample obtained at this time. Pt tolerated well. Pt respirations unlabored.       Imani Wayne RN  11/09/20 8561
Pt to CT scan with this RN at this time. Farrah Elizalde SLVFZK, RN  11/09/20 8637
Pt to Xray at this time from CT with this RN in stable condition.       Kelly Wayne RN  11/09/20 9793
present

## 2021-01-30 ENCOUNTER — HOSPITAL ENCOUNTER (EMERGENCY)
Age: 71
Discharge: HOME | End: 2021-01-30
Attending: EMERGENCY MEDICINE
Payer: MEDICARE

## 2021-01-30 ENCOUNTER — APPOINTMENT (OUTPATIENT)
Dept: CT IMAGING | Age: 71
End: 2021-01-30
Payer: MEDICARE

## 2021-01-30 VITALS
SYSTOLIC BLOOD PRESSURE: 132 MMHG | TEMPERATURE: 97.8 F | HEART RATE: 85 BPM | WEIGHT: 140 LBS | DIASTOLIC BLOOD PRESSURE: 95 MMHG | RESPIRATION RATE: 18 BRPM | BODY MASS INDEX: 22.6 KG/M2 | OXYGEN SATURATION: 97 %
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
## 2021-01-30 DIAGNOSIS — S09.90XA INJURY OF HEAD, INITIAL ENCOUNTER: ICD-10-CM

## 2021-01-30 LAB
ANION GAP SERPL CALCULATED.3IONS-SCNC: 13 MEQ/L (ref 8–16)
APTT: 27.1 SECONDS (ref 22–38)
BASOPHILS # BLD: 0.6 %
BASOPHILS ABSOLUTE: 0 THOU/MM3 (ref 0–0.1)
BUN BLDV-MCNC: 31 MG/DL (ref 7–22)
CALCIUM SERPL-MCNC: 10.3 MG/DL (ref 8.5–10.5)
CHLORIDE BLD-SCNC: 99 MEQ/L (ref 98–111)
CO2: 27 MEQ/L (ref 23–33)
CREAT SERPL-MCNC: 1.8 MG/DL (ref 0.4–1.2)
EKG ATRIAL RATE: 96 BPM
EKG P AXIS: 53 DEGREES
EKG P-R INTERVAL: 122 MS
EKG Q-T INTERVAL: 350 MS
EKG QRS DURATION: 68 MS
EKG QTC CALCULATION (BAZETT): 442 MS
EKG R AXIS: 0 DEGREES
EKG T AXIS: 58 DEGREES
EKG VENTRICULAR RATE: 96 BPM
EOSINOPHIL # BLD: 5.5 %
EOSINOPHILS ABSOLUTE: 0.3 THOU/MM3 (ref 0–0.4)
ERYTHROCYTE [DISTWIDTH] IN BLOOD BY AUTOMATED COUNT: 12.8 % (ref 11.5–14.5)
ERYTHROCYTE [DISTWIDTH] IN BLOOD BY AUTOMATED COUNT: 43.6 FL (ref 35–45)
GFR SERPL CREATININE-BSD FRML MDRD: 28 ML/MIN/1.73M2
GLUCOSE BLD-MCNC: 86 MG/DL (ref 70–108)
HCT VFR BLD CALC: 33.8 % (ref 37–47)
HEMOGLOBIN: 10.5 GM/DL (ref 12–16)
IMMATURE GRANS (ABS): 0.01 THOU/MM3 (ref 0–0.07)
IMMATURE GRANULOCYTES: 0.2 %
INR BLD: 0.97 (ref 0.85–1.13)
LYMPHOCYTES # BLD: 29.2 %
LYMPHOCYTES ABSOLUTE: 1.4 THOU/MM3 (ref 1–4.8)
MCH RBC QN AUTO: 29.2 PG (ref 26–33)
MCHC RBC AUTO-ENTMCNC: 31.1 GM/DL (ref 32.2–35.5)
MCV RBC AUTO: 94.2 FL (ref 81–99)
MONOCYTES # BLD: 11.3 %
MONOCYTES ABSOLUTE: 0.5 THOU/MM3 (ref 0.4–1.3)
NUCLEATED RED BLOOD CELLS: 0 /100 WBC
OSMOLALITY CALCULATION: 283.4 MOSMOL/KG (ref 275–300)
PLATELET # BLD: 201 THOU/MM3 (ref 130–400)
PMV BLD AUTO: 10.4 FL (ref 9.4–12.4)
POTASSIUM SERPL-SCNC: 4.8 MEQ/L (ref 3.5–5.2)
RBC # BLD: 3.59 MILL/MM3 (ref 4.2–5.4)
SEG NEUTROPHILS: 53.2 %
SEGMENTED NEUTROPHILS ABSOLUTE COUNT: 2.6 THOU/MM3 (ref 1.8–7.7)
SODIUM BLD-SCNC: 139 MEQ/L (ref 135–145)
WBC # BLD: 4.8 THOU/MM3 (ref 4.8–10.8)

## 2021-01-30 PROCEDURE — 85025 COMPLETE CBC W/AUTO DIFF WBC: CPT

## 2021-01-30 PROCEDURE — 85610 PROTHROMBIN TIME: CPT

## 2021-01-30 PROCEDURE — 99284 EMERGENCY DEPT VISIT MOD MDM: CPT

## 2021-01-30 PROCEDURE — 93005 ELECTROCARDIOGRAM TRACING: CPT | Performed by: EMERGENCY MEDICINE

## 2021-01-30 PROCEDURE — 72125 CT NECK SPINE W/O DYE: CPT

## 2021-01-30 PROCEDURE — 36415 COLL VENOUS BLD VENIPUNCTURE: CPT

## 2021-01-30 PROCEDURE — 70450 CT HEAD/BRAIN W/O DYE: CPT

## 2021-01-30 PROCEDURE — 85730 THROMBOPLASTIN TIME PARTIAL: CPT

## 2021-01-30 PROCEDURE — 80048 BASIC METABOLIC PNL TOTAL CA: CPT

## 2021-01-30 ASSESSMENT — ENCOUNTER SYMPTOMS
SINUS PAIN: 0
SINUS PRESSURE: 0
BACK PAIN: 0
EYE PAIN: 0
SHORTNESS OF BREATH: 0
NAUSEA: 0
CONSTIPATION: 0
ABDOMINAL PAIN: 0
DIARRHEA: 0
CHEST TIGHTNESS: 0
COUGH: 0

## 2021-01-30 ASSESSMENT — PAIN DESCRIPTION - ORIENTATION: ORIENTATION: RIGHT

## 2021-01-30 ASSESSMENT — PAIN DESCRIPTION - FREQUENCY: FREQUENCY: CONTINUOUS

## 2021-01-30 ASSESSMENT — PAIN DESCRIPTION - LOCATION: LOCATION: HEAD

## 2021-01-30 NOTE — ED NOTES
Pt presents to ED via [address] [facility] from [facility] for c/o fall x2 with head injury. Pt reports receiving the 2nd dose of Covid vaccine x3 days ago and has been dizzy since. Today, pt became dizzy, falling straight down onto buttocks and then striking head on the wall. Pt presents to ED A&Ox4, unsure of year or month. Pt knows name, , president, situation and place. Pt presents to ED with a golf ball sized hematoma to the R, back side of head, pain in the R buttocks, and an approx 1/2\" skin tear to the R forearm, all bleeding controlled. EKG completed upon arrival. IV initiated with blood drawn and sent to lab. Notified pt of need for urine specimen. Voiced understanding. Monitor in place and VSS. Awaiting provider assessment. Will monitor.      Gerri Doran RN  [phone]

## 2021-01-30 NOTE — ED PROVIDER NOTES
[facility] ENCOUNTER          Pt Name: Viridiana Castillo  MRN: [id number]  [place] 1950  Date of evaluation: 1/30/2021  Physician: Robyn Martin MD, Eugene Rand    CHIEF COMPLAINT       Chief Complaint   Patient presents with    Fall    Head Injury     History obtained from chart review and the patient. HISTORY OF PRESENT ILLNESS    HPI  Viridiana Castillo is a 65 y.o. female who presents to the emergency department for evaluation of fall. Patient states she has been feeling a little weak since she had her second dose of COVID-19 vaccine 3 or 4 days ago. States she was eating pretzels at the assisted living facility and the back fell, she leaned forward to pick it up lost her balance and fell, hitting the occipital area on a bookshelf. Denies LOC and states she currently feels well and remembers all events. The patient has no other acute complaints at this time. REVIEW OF SYSTEMS   Review of Systems   Constitutional: Negative for chills, fever and unexpected weight change. HENT: Negative for congestion, ear pain, nosebleeds, sinus pressure and sinus pain. Eyes: Negative for pain. Respiratory: Negative for cough, chest tightness and shortness of breath. Cardiovascular: Negative for chest pain. Gastrointestinal: Negative for abdominal pain, constipation, diarrhea and nausea. Endocrine: Negative for polyuria. Genitourinary: Negative for dysuria and flank pain. Musculoskeletal: Negative for arthralgias, back pain, myalgias and neck pain. Skin: Negative for rash. Neurological: Negative for weakness and headaches. All other systems reviewed and are negative.         PAST MEDICAL AND SURGICAL HISTORY     Past Medical History:   Diagnosis Date    Acute kidney injury ([facility] [address]     Arthritis     CAD (coronary artery disease)     CKD (chronic kidney disease) stage 3, GFR 30-59 ml/min     Diabetes mellitus, insulin dependent (IDDM), controlled     Escherichia coli septicemia (HCC)     Essential tremor     History of blood transfusion     Hyperlipidemia     Hypertension     Hypoxemic respiratory failure, chronic (HCC)     secondary to sepsis and possibly pneumonia    MI (myocardial infarction) ([facility] [address] 2011    Normocytic anemia     Osteoporosis      Past Surgical History:   Procedure Laterality Date    CYSTOSCOPY  12/7/12    with stent insertion    HIP SURGERY Left 9/6/2020    HIP PINNING performed by Yari Rodriguez DO at [address] [place] [address] LITHOTRIPSY  12/18/2012    right         MEDICATIONS   No current facility-administered medications for this encounter. Current Outpatient Medications:     lidocaine 4 % external patch, Place 2 patches onto the skin daily, Disp: 1 box, Rfl: 0    midodrine (PROAMATINE) 10 MG tablet, Take 1 tablet by mouth 3 times daily (with meals), Disp: 30 tablet, Rfl: 0    insulin lispro (HUMALOG) 100 UNIT/ML injection vial, Inject 0-12 Units into the skin 3 times daily (with meals), Disp: 1 vial, Rfl: 0    insulin lispro (HUMALOG) 100 UNIT/ML injection vial, Inject 0-6 Units into the skin nightly, Disp: 1 vial, Rfl: 0    linagliptin (TRADJENTA) 5 MG tablet, Take 5 mg by mouth daily, Disp: , Rfl:     FLUoxetine (PROZAC) 10 MG capsule, Take 1 capsule by mouth daily, Disp: 30 capsule, Rfl: 0    vitamin C (VITAMIN C) 250 MG tablet, Take 1 tablet by mouth 2 times daily (with meals) for 21 days, Disp: 42 tablet, Rfl: 0    Cholecalciferol 75 MCG (3000 UT) TABS, Take 1 tablet by mouth daily, Disp: 90 tablet, Rfl: 3    atorvastatin (LIPITOR) 80 MG tablet, Take 80 mg by mouth daily, Disp: , Rfl:     ezetimibe (ZETIA) 10 MG tablet, Take 10 mg by mouth daily, Disp: , Rfl:     clopidogrel (PLAVIX) 75 MG tablet, Take 1 tablet by mouth daily. , Disp: 30 tablet, Rfl: 0    metformin (GLUCOPHAGE) 500 MG tablet, Take 1,000 mg by mouth 2 times daily (with meals).   , Disp: , Rfl:     Acetaminophen (TYLENOL PO), Take  by mouth as needed. , Disp: , Rfl:     pantoprazole (PROTONIX) 40 MG tablet, Take 40 mg by mouth daily , Disp: , Rfl:     aspirin 81 MG EC tablet, Take 81 mg by mouth daily. , Disp: , Rfl:       SOCIAL HISTORY     Social History     Social History Narrative    Not on file     Social History     Tobacco Use    Smoking status: Never Smoker    Smokeless tobacco: Never Used   Substance Use Topics    Alcohol use: Yes     Comment: social    Drug use: No         ALLERGIES   No Known Allergies      FAMILY HISTORY     Family History   Problem Relation Age of Onset    Cancer Mother     Heart Disease Mother          PREVIOUS RECORDS   Previous records reviewed:   Seen on September 5, 2020 for left femoral head fracture, admitted at the time of the trauma service orthopedic surgery. PHYSICAL EXAM     ED Triage Vitals [01/30/21 1608]   BP Temp Temp Source Pulse Resp SpO2 Height Weight   (!) 132/95 97.8 °F (36.6 °C) Oral 85 18 97 % -- 140 lb (63.5 kg)     Initial vital signs and nursing assessment reviewed and abnormal from Mild hypertension. Body mass index is 22.6 kg/m². Pulsoximetry is normal per my interpretation. Additional Vital Signs:  Vitals:    01/30/21 1608   BP: (!) 132/95   Pulse: 85   Resp: 18   Temp: 97.8 °F (36.6 °C)   SpO2: 97%       Physical Exam  Vitals signs and nursing note reviewed. Constitutional:       General: She is not in acute distress. Appearance: Normal appearance. She is well-developed. HENT:      Head: Normocephalic. Comments: Round contusion on the right occipital area, no active bleeding, no skin break     Right Ear: External ear normal.      Left Ear: External ear normal.      Nose: Nose normal.      Mouth/Throat:      Mouth: Mucous membranes are moist.   Eyes:      Conjunctiva/sclera: Conjunctivae normal.      Pupils: Pupils are equal, round, and reactive to light. Neck:      Musculoskeletal: Neck supple. Vascular: No JVD. Cardiovascular:      Rate and Rhythm: Normal rate and regular rhythm. Heart sounds: Normal heart sounds. No murmur. No friction rub. No gallop. Pulmonary:      Effort: Pulmonary effort is normal.      Breath sounds: Normal breath sounds. No stridor. No wheezing or rales. Abdominal:      General: Abdomen is flat. Palpations: Abdomen is soft. Tenderness: There is no abdominal tenderness. Musculoskeletal:      Comments: No other visible injuries. Skin:     General: Skin is warm and dry. Neurological:      Mental Status: She is alert and oriented to person, place, and time. Psychiatric:         Behavior: Behavior normal.             MEDICAL DECISION MAKING   Initial Assessment: Fall, occipital head injury, rule out intracranial hemorrhage. Plan: Labs ordered by nurse per protocol, will add brain and C-spine imaging. Likely discharge home if work-up is normal.        ED RESULTS   Laboratory results:  Labs Reviewed   CBC WITH AUTO DIFFERENTIAL - Abnormal; Notable for the following components:       Result Value    RBC 3.59 (*)     Hemoglobin 10.5 (*)     Hematocrit 33.8 (*)     MCHC 31.1 (*)     All other components within normal limits   BASIC METABOLIC PANEL - Abnormal; Notable for the following components:    BUN 31 (*)     CREATININE 1.8 (*)     All other components within normal limits   GLOMERULAR FILTRATION RATE, ESTIMATED - Abnormal; Notable for the following components:    Est, Glom Filt Rate 28 (*)     All other components within normal limits   APTT   PROTIME-INR   ANION GAP   OSMOLALITY       Radiologic studies results:  CT Head WO Contrast   Final Result   Right posterior parietal scalp hematoma. No other acute process. **This report has been created using voice recognition software. It may contain minor errors which are inherent in voice recognition technology. **      Final report electronically signed by Dr. Elmer Best on 1/30/2021 5:38 PM CT Cervical Spine WO Contrast   Final Result   No acute osseous abnormality. Partially imaged nodule right apex nonemergent follow-up chest CT is recommended            **This report has been created using voice recognition software. It may contain minor errors which are inherent in voice recognition technology. **      Final report electronically signed by Dr. Lanre Lew on 1/30/2021 5:43 PM                ED COURSE   ED Medications administered this visit: Medications - No data to display       Strict return precautions and follow up instructions were discussed with the patient prior to discharge, with which the patient agrees. MEDICATION CHANGES     New Prescriptions    No medications on file         FINAL DISPOSITION     Final diagnoses:   Fall, initial encounter   Injury of head, initial encounter     Condition: condition: good  Dispo: Discharge to nursing home      This transcription was electronically signed. It was dictated by use of voice recognition software and electronically transcribed. The transcription may contain errors not detected in proofreading.        Tabitha Roberts MD  01/30/21 5398

## 2021-02-19 ENCOUNTER — APPOINTMENT (OUTPATIENT)
Dept: CT IMAGING | Age: 71
End: 2021-02-19
Payer: MEDICARE

## 2021-02-19 ENCOUNTER — HOSPITAL ENCOUNTER (EMERGENCY)
Age: 71
Discharge: HOME | End: 2021-02-20
Attending: FAMILY MEDICINE
Payer: MEDICARE

## 2021-02-19 VITALS
DIASTOLIC BLOOD PRESSURE: 76 MMHG | SYSTOLIC BLOOD PRESSURE: 109 MMHG | BODY MASS INDEX: 22.6 KG/M2 | OXYGEN SATURATION: 95 % | HEART RATE: 80 BPM | WEIGHT: 140 LBS | RESPIRATION RATE: 16 BRPM | TEMPERATURE: 98.3 F

## 2021-02-19 DIAGNOSIS — H02.401 DROOPY EYELID, RIGHT: ICD-10-CM
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
## 2021-02-19 DIAGNOSIS — S09.90XA CLOSED HEAD INJURY, INITIAL ENCOUNTER: Primary | ICD-10-CM

## 2021-02-19 LAB
AMMONIA: 12 UMOL/L (ref 11–60)
ANION GAP SERPL CALCULATED.3IONS-SCNC: 12 MEQ/L (ref 8–16)
BACTERIA: ABNORMAL
BASOPHILS # BLD: 0.4 %
BASOPHILS ABSOLUTE: 0 THOU/MM3 (ref 0–0.1)
BILIRUBIN URINE: NEGATIVE
BLOOD, URINE: NEGATIVE
BUN BLDV-MCNC: 27 MG/DL (ref 7–22)
CALCIUM SERPL-MCNC: 10.2 MG/DL (ref 8.5–10.5)
CASTS: ABNORMAL /LPF
CASTS: ABNORMAL /LPF
CHARACTER, URINE: CLEAR
CHLORIDE BLD-SCNC: 100 MEQ/L (ref 98–111)
CO2: 28 MEQ/L (ref 23–33)
COLOR: YELLOW
CREAT SERPL-MCNC: 1.4 MG/DL (ref 0.4–1.2)
CRYSTALS: ABNORMAL
EKG ATRIAL RATE: 79 BPM
EKG P AXIS: 64 DEGREES
EKG P-R INTERVAL: 124 MS
EKG Q-T INTERVAL: 362 MS
EKG QRS DURATION: 66 MS
EKG QTC CALCULATION (BAZETT): 415 MS
EKG R AXIS: 19 DEGREES
EKG T AXIS: 69 DEGREES
EKG VENTRICULAR RATE: 79 BPM
EOSINOPHIL # BLD: 2.8 %
EOSINOPHILS ABSOLUTE: 0.1 THOU/MM3 (ref 0–0.4)
EPITHELIAL CELLS, UA: ABNORMAL /HPF
ERYTHROCYTE [DISTWIDTH] IN BLOOD BY AUTOMATED COUNT: 13 % (ref 11.5–14.5)
ERYTHROCYTE [DISTWIDTH] IN BLOOD BY AUTOMATED COUNT: 44.5 FL (ref 35–45)
ETHYL ALCOHOL, SERUM: < 0.01 %
GFR SERPL CREATININE-BSD FRML MDRD: 37 ML/MIN/1.73M2
GLUCOSE BLD-MCNC: 79 MG/DL (ref 70–108)
GLUCOSE, URINE: NEGATIVE MG/DL
HCT VFR BLD CALC: 35.1 % (ref 37–47)
HEMOGLOBIN: 10.6 GM/DL (ref 12–16)
IMMATURE GRANS (ABS): 0.01 THOU/MM3 (ref 0–0.07)
IMMATURE GRANULOCYTES: 0.2 %
KETONES, URINE: NEGATIVE
LEUKOCYTE EST, POC: ABNORMAL
LYMPHOCYTES # BLD: 26.7 %
LYMPHOCYTES ABSOLUTE: 1.2 THOU/MM3 (ref 1–4.8)
MCH RBC QN AUTO: 28.5 PG (ref 26–33)
MCHC RBC AUTO-ENTMCNC: 30.2 GM/DL (ref 32.2–35.5)
MCV RBC AUTO: 94.4 FL (ref 81–99)
MISCELLANEOUS LAB TEST RESULT: ABNORMAL
MONOCYTES # BLD: 12 %
MONOCYTES ABSOLUTE: 0.6 THOU/MM3 (ref 0.4–1.3)
NITRITE, URINE: NEGATIVE
NUCLEATED RED BLOOD CELLS: 0 /100 WBC
OSMOLALITY CALCULATION: 283.4 MOSMOL/KG (ref 275–300)
PH UA: 6.5 (ref 5–9)
PLATELET # BLD: 203 THOU/MM3 (ref 130–400)
PMV BLD AUTO: 10 FL (ref 9.4–12.4)
POTASSIUM SERPL-SCNC: 5.4 MEQ/L (ref 3.5–5.2)
PROTEIN UA: NEGATIVE MG/DL
RBC # BLD: 3.72 MILL/MM3 (ref 4.2–5.4)
RBC URINE: ABNORMAL /HPF
RENAL EPITHELIAL, UA: ABNORMAL
SEG NEUTROPHILS: 57.9 %
SEGMENTED NEUTROPHILS ABSOLUTE COUNT: 2.7 THOU/MM3 (ref 1.8–7.7)
SODIUM BLD-SCNC: 140 MEQ/L (ref 135–145)
SPECIFIC GRAVITY UA: 1.01 (ref 1–1.03)
UROBILINOGEN, URINE: 0.2 EU/DL (ref 0–1)
WBC # BLD: 4.6 THOU/MM3 (ref 4.8–10.8)
WBC UA: ABNORMAL /HPF
YEAST: ABNORMAL

## 2021-02-19 PROCEDURE — 99285 EMERGENCY DEPT VISIT HI MDM: CPT

## 2021-02-19 PROCEDURE — 36415 COLL VENOUS BLD VENIPUNCTURE: CPT

## 2021-02-19 PROCEDURE — 82077 ASSAY SPEC XCP UR&BREATH IA: CPT

## 2021-02-19 PROCEDURE — 85025 COMPLETE CBC W/AUTO DIFF WBC: CPT

## 2021-02-19 PROCEDURE — 82140 ASSAY OF AMMONIA: CPT

## 2021-02-19 PROCEDURE — 72125 CT NECK SPINE W/O DYE: CPT

## 2021-02-19 PROCEDURE — 93005 ELECTROCARDIOGRAM TRACING: CPT | Performed by: FAMILY MEDICINE

## 2021-02-19 PROCEDURE — 80048 BASIC METABOLIC PNL TOTAL CA: CPT

## 2021-02-19 PROCEDURE — 70450 CT HEAD/BRAIN W/O DYE: CPT

## 2021-02-19 PROCEDURE — 81001 URINALYSIS AUTO W/SCOPE: CPT

## 2021-02-19 NOTE — ED PROVIDER NOTES
EMERGENCY DEPARTMENT ENCOUNTER     CHIEF COMPLAINT   Chief Complaint   Patient presents with    Fall        HPI   Bartolo Ayala is a 65 y.o. female from [facility] who presents after a fall. The onset was prior to arrival. The reason why the patient fell was unknown, but she's been having multiple falls (2-3 times daily) for past several weeks, with endorsement of a head injury, urinary incontinence and confusion. The patient complains of headache. The quality is throbbing. The patient has no associated syncope, chest pain, vision change or sob. EMS notes mentioned possible right eyelid droop. REVIEW OF SYSTEMS   Neurologic: +head injury; negative LOC   Cardiac: No Chest Pain, No syncope   Respiratory: Nodifficulty breathing   GI:No abdominal pain or vomiting   See history of present illness regarding frequent falls  All other review of systems were reviewed and are otherwise negative.      PAST MEDICAL & SURGICAL HISTORY   Past Medical History:   Diagnosis Date    Acute kidney injury ([facility]     Arthritis     CAD (coronary artery disease)     CKD (chronic kidney disease) stage 3, GFR 30-59 ml/min     Diabetes mellitus, insulin dependent (IDDM), controlled     Escherichia coli septicemia (HCC)     Essential tremor     History of blood transfusion     Hyperlipidemia     Hypertension     Hypoxemic respiratory failure, chronic (HCC)     secondary to sepsis and possibly pneumonia    MI (myocardial infarction) ([facility] 2011    Normocytic anemia     Osteoporosis       Past Surgical History:   Procedure Laterality Date    CYSTOSCOPY  12/7/12    with stent insertion    HIP SURGERY Left 9/6/2020    HIP PINNING performed by Pearl Kimble DO at [address] [place] [address] LITHOTRIPSY  12/18/2012    right        CURRENT MEDICATIONS   Current Outpatient Rx   Medication Sig Dispense Refill    lidocaine 4 % external patch Place 2 patches onto the skin daily 1 box 0    midodrine (PROAMATINE) 10 MG tablet Take 1 tablet by mouth 3 times daily (with meals) 30 tablet 0    insulin lispro (HUMALOG) 100 UNIT/ML injection vial Inject 0-12 Units into the skin 3 times daily (with meals) 1 vial 0    insulin lispro (HUMALOG) 100 UNIT/ML injection vial Inject 0-6 Units into the skin nightly 1 vial 0    linagliptin (TRADJENTA) 5 MG tablet Take 5 mg by mouth daily      FLUoxetine (PROZAC) 10 MG capsule Take 1 capsule by mouth daily 30 capsule 0    vitamin C (VITAMIN C) 250 MG tablet Take 1 tablet by mouth 2 times daily (with meals) for 21 days 42 tablet 0    Cholecalciferol 75 MCG (3000 UT) TABS Take 1 tablet by mouth daily 90 tablet 3    atorvastatin (LIPITOR) 80 MG tablet Take 80 mg by mouth daily      ezetimibe (ZETIA) 10 MG tablet Take 10 mg by mouth daily      clopidogrel (PLAVIX) 75 MG tablet Take 1 tablet by mouth daily. 30 tablet 0    metformin (GLUCOPHAGE) 500 MG tablet Take 1,000 mg by mouth 2 times daily (with meals).  Acetaminophen (TYLENOL PO) Take  by mouth as needed.  pantoprazole (PROTONIX) 40 MG tablet Take 40 mg by mouth daily       aspirin 81 MG EC tablet Take 81 mg by mouth daily. ALLERGIES   No Known Allergies     SOCIAL & FAMILY HISTORY   Social History     Socioeconomic History    Marital status:       Spouse name: None    Number of children: None    Years of education: None    Highest education level: None   Occupational History    None   Social Needs    Financial resource strain: None    Food insecurity     Worry: None     Inability: None    Transportation needs     Medical: None     Non-medical: None   Tobacco Use    Smoking status: Never Smoker    Smokeless tobacco: Never Used   Substance and Sexual Activity    Alcohol use: Yes     Comment: social    Drug use: No    Sexual activity: Not Currently   Lifestyle    Physical activity     Days per week: None     Minutes per session: None    Stress: None   Relationships    Social connections     Talks on sphenoid sinus. **This report has been created using voice recognition software. It may contain minor errors which are inherent in voice recognition technology. **      Final report electronically signed by DR Leonard Tyson on 2/19/2021 6:44 PM      CT Cervical Spine WO Contrast   Final Result       1. Stable CT scan of the cervical spine, no interval change since previous study dated 30 January 2021.   2. Straightening of the normal cervical lordosis and degenerative change along the inferior endplate of C6.   3. No acute fracture noted. .               **This report has been created using voice recognition software. It may contain minor errors which are inherent in voice recognition technology. **      Final report electronically signed by DR Leonard Tyson on 2/19/2021 6:54 PM           Labs Reviewed   CBC WITH AUTO DIFFERENTIAL - Abnormal; Notable for the following components:       Result Value    WBC 4.6 (*)     RBC 3.72 (*)     Hemoglobin 10.6 (*)     Hematocrit 35.1 (*)     MCHC 30.2 (*)     All other components within normal limits   BASIC METABOLIC PANEL - Abnormal; Notable for the following components:    Potassium 5.4 (*)     BUN 27 (*)     CREATININE 1.4 (*)     All other components within normal limits   GLOMERULAR FILTRATION RATE, ESTIMATED - Abnormal; Notable for the following components:    Est, Glom Filt Rate 37 (*)     All other components within normal limits   MICROSCOPIC URINALYSIS - Abnormal; Notable for the following components:    Leukocytes, UA TRACE (*)     All other components within normal limits   AMMONIA   ETHANOL   ANION GAP   OSMOLALITY        ED COURSE & MEDICAL DECISION MAKING   Pertinent Labs & Imaging studies reviewed and interpreted. (See chart for details)   Vitals:    02/19/21 1810   BP: (!) 126/93   Pulse: 82   Resp: 18   Temp:    SpO2: 93%        Differential Diagnosis: Cardiac Arrhythmia, Electrolyte imbalance, UTI, Anemia, Fracture, Dislocation, Dehydration, other. FINAL IMPRESSION   1. Closed head injury, initial encounter    2. Fall, initial encounter    3. Droopy eyelid, right         PLAN   MDM - pt was very congenial and speaks in complete smooth sentences. She has no real focal neurologic stroke like symptoms. She does have mild ptosis of her right eyelid without any facial droop, vesicular rash or slurred speech. She was able to ambulate without any obvious concerns. She does have some old bruising on her upper back and the hematoma. I did not see any concerning findings as her Hemoglobin and other labs are within her baseline. All in all, I believe pt will benefit from physical therapy, will be advised to return if her condition worsens.    Electronically signed by: David Banks, 2/19/2021 7:35 PM         Arcadio Bonner MD  02/19/21 Sobia Moody

## 2021-02-19 NOTE — ED TRIAGE NOTES
Pt presents to the ED via EMS from [facility] for frequent falls over the last 3 days. They state pt is confused and has a hematoma on the posterior of her head. Upon arrival to the ED pt is alert to self and location. Pt able to hold a conversation. Pt denies pain. Pt states that she remembers falling and hitting her head on the floor. Pt states she lost consciousness.

## 2021-02-19 NOTE — ED NOTES
Attempted to walk pt. Pt able to stand but states she is scared to walk. Pt placed back in bed. Pt called out and stated that she needs to urinate. Urine sample collected and sent to lab.        Shawn Matson RN  02/19/21 [id number]

## 2021-02-19 NOTE — ED NOTES
Bed: 001A  Expected date: 2/19/21  Expected time: 4:57 PM  Means of arrival: [facility]  Comments:     Deb Hood RN  02/19/21 [id number]

## 2021-02-20 ENCOUNTER — HOSPITAL ENCOUNTER (INPATIENT)
Age: 71
LOS: 1 days | Discharge: NURSING, REHAB OR LONG-TERM CARE | DRG: 884 | End: 2021-02-24
Attending: FAMILY MEDICINE | Admitting: FAMILY MEDICINE
Payer: MEDICARE

## 2021-02-20 ENCOUNTER — APPOINTMENT (OUTPATIENT)
Dept: CT IMAGING | Age: 71
DRG: 884 | End: 2021-02-20
Payer: MEDICARE

## 2021-02-20 DIAGNOSIS — R41.0 CONFUSION: ICD-10-CM
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
## 2021-02-20 DIAGNOSIS — R41.82 ALTERED MENTAL STATUS, UNSPECIFIED ALTERED MENTAL STATUS TYPE: ICD-10-CM

## 2021-02-20 LAB
ALBUMIN SERPL-MCNC: 4.1 G/DL (ref 3.5–5.1)
ALP BLD-CCNC: 72 U/L (ref 38–126)
ALT SERPL-CCNC: 15 U/L (ref 11–66)
AMMONIA: 12 UMOL/L (ref 11–60)
ANION GAP SERPL CALCULATED.3IONS-SCNC: 6 MEQ/L (ref 8–16)
AST SERPL-CCNC: 28 U/L (ref 5–40)
BACTERIA: ABNORMAL
BASOPHILS # BLD: 0.5 %
BASOPHILS ABSOLUTE: 0 THOU/MM3 (ref 0–0.1)
BILIRUB SERPL-MCNC: 0.3 MG/DL (ref 0.3–1.2)
BILIRUBIN URINE: NEGATIVE
BLOOD, URINE: ABNORMAL
BUN BLDV-MCNC: 27 MG/DL (ref 7–22)
CALCIUM SERPL-MCNC: 10.1 MG/DL (ref 8.5–10.5)
CASTS: ABNORMAL /LPF
CASTS: ABNORMAL /LPF
CHARACTER, URINE: CLEAR
CHLORIDE BLD-SCNC: 100 MEQ/L (ref 98–111)
CO2: 31 MEQ/L (ref 23–33)
COLOR: YELLOW
CREAT SERPL-MCNC: 1.5 MG/DL (ref 0.4–1.2)
CRYSTALS: ABNORMAL
EKG ATRIAL RATE: 85 BPM
EKG P AXIS: 60 DEGREES
EKG P-R INTERVAL: 126 MS
EKG Q-T INTERVAL: 362 MS
EKG QRS DURATION: 68 MS
EKG QTC CALCULATION (BAZETT): 430 MS
EKG R AXIS: 18 DEGREES
EKG T AXIS: 44 DEGREES
EKG VENTRICULAR RATE: 85 BPM
EOSINOPHIL # BLD: 2.1 %
EOSINOPHILS ABSOLUTE: 0.1 THOU/MM3 (ref 0–0.4)
EPITHELIAL CELLS, UA: ABNORMAL /HPF
ERYTHROCYTE [DISTWIDTH] IN BLOOD BY AUTOMATED COUNT: 13.2 % (ref 11.5–14.5)
ERYTHROCYTE [DISTWIDTH] IN BLOOD BY AUTOMATED COUNT: 45.1 FL (ref 35–45)
GFR SERPL CREATININE-BSD FRML MDRD: 34 ML/MIN/1.73M2
GLUCOSE BLD-MCNC: 103 MG/DL (ref 70–108)
GLUCOSE, URINE: NEGATIVE MG/DL
HCT VFR BLD CALC: 34.1 % (ref 37–47)
HEMOGLOBIN: 10.4 GM/DL (ref 12–16)
IMMATURE GRANS (ABS): 0.01 THOU/MM3 (ref 0–0.07)
IMMATURE GRANULOCYTES: 0.2 %
KETONES, URINE: NEGATIVE
LEUKOCYTE EST, POC: ABNORMAL
LYMPHOCYTES # BLD: 24.7 %
LYMPHOCYTES ABSOLUTE: 1.4 THOU/MM3 (ref 1–4.8)
MCH RBC QN AUTO: 28.5 PG (ref 26–33)
MCHC RBC AUTO-ENTMCNC: 30.5 GM/DL (ref 32.2–35.5)
MCV RBC AUTO: 93.4 FL (ref 81–99)
MISCELLANEOUS LAB TEST RESULT: ABNORMAL
MONOCYTES # BLD: 12.7 %
MONOCYTES ABSOLUTE: 0.7 THOU/MM3 (ref 0.4–1.3)
NITRITE, URINE: NEGATIVE
NUCLEATED RED BLOOD CELLS: 0 /100 WBC
OSMOLALITY CALCULATION: 279.2 MOSMOL/KG (ref 275–300)
PH UA: 7.5 (ref 5–9)
PLATELET # BLD: 180 THOU/MM3 (ref 130–400)
PMV BLD AUTO: 10.1 FL (ref 9.4–12.4)
POTASSIUM REFLEX MAGNESIUM: 5.7 MEQ/L (ref 3.5–5.2)
PROTEIN UA: NEGATIVE MG/DL
RBC # BLD: 3.65 MILL/MM3 (ref 4.2–5.4)
RBC URINE: ABNORMAL /HPF
REASON FOR REJECTION: NORMAL
REJECTED TEST: NORMAL
RENAL EPITHELIAL, UA: ABNORMAL
SEG NEUTROPHILS: 59.8 %
SEGMENTED NEUTROPHILS ABSOLUTE COUNT: 3.4 THOU/MM3 (ref 1.8–7.7)
SODIUM BLD-SCNC: 137 MEQ/L (ref 135–145)
SPECIFIC GRAVITY UA: 1.01 (ref 1–1.03)
TOTAL PROTEIN: 6.9 G/DL (ref 6.1–8)
UROBILINOGEN, URINE: 0.2 EU/DL (ref 0–1)
WBC # BLD: 5.7 THOU/MM3 (ref 4.8–10.8)
WBC UA: ABNORMAL /HPF
YEAST: ABNORMAL

## 2021-02-20 PROCEDURE — 96361 HYDRATE IV INFUSION ADD-ON: CPT

## 2021-02-20 PROCEDURE — 80053 COMPREHEN METABOLIC PANEL: CPT

## 2021-02-20 PROCEDURE — 81001 URINALYSIS AUTO W/SCOPE: CPT

## 2021-02-20 PROCEDURE — 85025 COMPLETE CBC W/AUTO DIFF WBC: CPT

## 2021-02-20 PROCEDURE — 36415 COLL VENOUS BLD VENIPUNCTURE: CPT

## 2021-02-20 PROCEDURE — 93005 ELECTROCARDIOGRAM TRACING: CPT | Performed by: FAMILY MEDICINE

## 2021-02-20 PROCEDURE — 70450 CT HEAD/BRAIN W/O DYE: CPT

## 2021-02-20 PROCEDURE — 93010 ELECTROCARDIOGRAM REPORT: CPT | Performed by: INTERNAL MEDICINE

## 2021-02-20 PROCEDURE — 99285 EMERGENCY DEPT VISIT HI MDM: CPT

## 2021-02-20 PROCEDURE — 82140 ASSAY OF AMMONIA: CPT

## 2021-02-20 PROCEDURE — 2580000003 HC RX 258: Performed by: FAMILY MEDICINE

## 2021-02-20 RX ORDER — 0.9 % SODIUM CHLORIDE 0.9 %
1000 INTRAVENOUS SOLUTION INTRAVENOUS ONCE
Status: COMPLETED | OUTPATIENT
Start: 2021-02-20 | End: 2021-02-21

## 2021-02-20 RX ADMIN — SODIUM CHLORIDE 1000 ML: 9 INJECTION, SOLUTION INTRAVENOUS at 23:04

## 2021-02-20 ASSESSMENT — PAIN SCALES - GENERAL: PAINLEVEL_OUTOF10: 6

## 2021-02-20 ASSESSMENT — PAIN DESCRIPTION - LOCATION
LOCATION: BUTTOCKS
LOCATION: BUTTOCKS

## 2021-02-20 NOTE — ED NOTES
This RN called [facility] at this time and edward is still planning to pick pt up.       Mirna Judge RN  02/19/21 [id number]

## 2021-02-20 NOTE — ED NOTES
This RN called and spoke with Jennifer Perry the aid at [facility] in regards to pt status and provide report. Pt aware of POC and is going to make RN in morning aware of pt situation and visit to ER.       Elaine Sol, RN  02/20/21 [id number]

## 2021-02-20 NOTE — ED NOTES
ED nurse-to-nurse bedside report    Chief Complaint   Patient presents with    Fall      LOC: alert to self and place  Vital signs   Vitals:    02/19/21 1710 02/19/21 1810   BP: 119/89 (!) 126/93   Pulse: 77 82   Resp: 18 18   Temp: 98.3 °F (36.8 °C)    TempSrc: Oral    SpO2: 94% 93%      Pain:    Pain Interventions: NA  Pain Goal: 0/10  Oxygen: No    Current needs required room air   Telemetry: Yes  LDAs:   Peripheral IV 02/19/21 Left Antecubital (Active)   Site Assessment Clean; Intact;Dry 02/19/21 1810   Line Status Normal saline locked 02/19/21 1810   Dressing Status Clean;Dry; Intact 02/19/21 1810     Continuous Infusions:   Mobility: Requires assistance * 1  Cooley Fall Risk Score: No flowsheet data found.   Fall Interventions: call light side rails, posey  Report given to: Manuel Radford RN  02/19/21 [id number]

## 2021-02-20 NOTE — ED NOTES
Upon first contact with patient this RN receives bedside shift report from [facility]. Pt resting comfortably in cot. Pt breathing easy and unlabored. Pt alert and oriented upon arrival with a neuro assessment WNL. Pt requests bedpan upon entry into room. This RN and [facility], RN place pt on bedpan and wipe pt. Pt continues to be on Posey for pt safety. This RN to call [facility] in regards to pt transportation home. VS updated.         Elaine Sol RN  02/19/21 2029

## 2021-02-20 NOTE — ED NOTES
Pt placed on bed pan and urinated without complications. Pt alert and orineted. Pt breathing easy and unlabored. Pt has POA at bedside who was made aware of POC to transport back to [place]. Will continue to monitor pt.       Brandi Maloney RN  02/19/21 8730

## 2021-02-20 NOTE — ED NOTES
Pt resting comfortably in cot. Pt breathing easy and unlabored with eyes closed. VS updated. Will continue to monitor pt until arrival of [facility].       Marito Lopez RN  02/19/21 8585

## 2021-02-21 PROBLEM — R41.82 ALTERED MENTAL STATUS: Status: ACTIVE | Noted: 2021-02-21

## 2021-02-21 LAB
ABSOLUTE RETIC #: 51 THOU/MM3 (ref 20–115)
ALBUMIN SERPL-MCNC: 3.8 G/DL (ref 3.5–5.1)
ALP BLD-CCNC: 73 U/L (ref 38–126)
ALT SERPL-CCNC: 13 U/L (ref 11–66)
ANION GAP SERPL CALCULATED.3IONS-SCNC: 9 MEQ/L (ref 8–16)
AST SERPL-CCNC: 36 U/L (ref 5–40)
AVERAGE GLUCOSE: 81 MG/DL (ref 70–126)
AVERAGE GLUCOSE: 90 MG/DL (ref 70–126)
BASOPHILS # BLD: 0.4 %
BASOPHILS ABSOLUTE: 0 THOU/MM3 (ref 0–0.1)
BILIRUB SERPL-MCNC: 0.3 MG/DL (ref 0.3–1.2)
BUN BLDV-MCNC: 24 MG/DL (ref 7–22)
CALCIUM SERPL-MCNC: 9.7 MG/DL (ref 8.5–10.5)
CHLORIDE BLD-SCNC: 105 MEQ/L (ref 98–111)
CO2: 27 MEQ/L (ref 23–33)
CREAT SERPL-MCNC: 1.4 MG/DL (ref 0.4–1.2)
EOSINOPHIL # BLD: 2.4 %
EOSINOPHILS ABSOLUTE: 0.1 THOU/MM3 (ref 0–0.4)
ERYTHROCYTE [DISTWIDTH] IN BLOOD BY AUTOMATED COUNT: 13.1 % (ref 11.5–14.5)
ERYTHROCYTE [DISTWIDTH] IN BLOOD BY AUTOMATED COUNT: 45.1 FL (ref 35–45)
FOLATE: 16.6 NG/ML (ref 4.8–24.2)
GFR SERPL CREATININE-BSD FRML MDRD: 37 ML/MIN/1.73M2
GLUCOSE BLD-MCNC: 102 MG/DL (ref 70–108)
HBA1C MFR BLD: 4.7 % (ref 4.4–6.4)
HBA1C MFR BLD: 5 % (ref 4.4–6.4)
HCT VFR BLD CALC: 33.6 % (ref 37–47)
HEMOGLOBIN: 10 GM/DL (ref 12–16)
IMMATURE GRANS (ABS): 0.01 THOU/MM3 (ref 0–0.07)
IMMATURE GRANULOCYTES: 0.2 %
IMMATURE RETIC FRACT: 5.4 % (ref 3–15.9)
IRON SATURATION: 29 % (ref 20–50)
IRON: 85 UG/DL (ref 50–170)
LYMPHOCYTES # BLD: 29.9 %
LYMPHOCYTES ABSOLUTE: 1.4 THOU/MM3 (ref 1–4.8)
MAGNESIUM: 1.5 MG/DL (ref 1.6–2.4)
MCH RBC QN AUTO: 28.5 PG (ref 26–33)
MCHC RBC AUTO-ENTMCNC: 29.8 GM/DL (ref 32.2–35.5)
MCV RBC AUTO: 95.7 FL (ref 81–99)
MONOCYTES # BLD: 13.4 %
MONOCYTES ABSOLUTE: 0.6 THOU/MM3 (ref 0.4–1.3)
NUCLEATED RED BLOOD CELLS: 0 /100 WBC
PHOSPHORUS: 3.6 MG/DL (ref 2.4–4.7)
PLATELET # BLD: 163 THOU/MM3 (ref 130–400)
PMV BLD AUTO: 10.3 FL (ref 9.4–12.4)
POTASSIUM SERPL-SCNC: 4.4 MEQ/L (ref 3.5–5.2)
PREALBUMIN: 19.4 MG/DL (ref 20–40)
RBC # BLD: 3.51 MILL/MM3 (ref 4.2–5.4)
RETIC HEMOGLOBIN: 32.5 PG (ref 28.2–35.7)
RETICULOCYTE ABSOLUTE COUNT: 1.5 % (ref 0.5–2)
SEG NEUTROPHILS: 53.7 %
SEGMENTED NEUTROPHILS ABSOLUTE COUNT: 2.5 THOU/MM3 (ref 1.8–7.7)
SODIUM BLD-SCNC: 141 MEQ/L (ref 135–145)
TOTAL CK: 42 U/L (ref 30–135)
TOTAL IRON BINDING CAPACITY: 293 UG/DL (ref 171–450)
TOTAL PROTEIN: 6.6 G/DL (ref 6.1–8)
VITAMIN B-12: 287 PG/ML (ref 211–911)
WBC # BLD: 4.6 THOU/MM3 (ref 4.8–10.8)

## 2021-02-21 PROCEDURE — 82607 VITAMIN B-12: CPT

## 2021-02-21 PROCEDURE — 6370000000 HC RX 637 (ALT 250 FOR IP): Performed by: STUDENT IN AN ORGANIZED HEALTH CARE EDUCATION/TRAINING PROGRAM

## 2021-02-21 PROCEDURE — 83735 ASSAY OF MAGNESIUM: CPT

## 2021-02-21 PROCEDURE — G0378 HOSPITAL OBSERVATION PER HR: HCPCS

## 2021-02-21 PROCEDURE — 96366 THER/PROPH/DIAG IV INF ADDON: CPT

## 2021-02-21 PROCEDURE — 82746 ASSAY OF FOLIC ACID SERUM: CPT

## 2021-02-21 PROCEDURE — 2580000003 HC RX 258: Performed by: FAMILY MEDICINE

## 2021-02-21 PROCEDURE — 84100 ASSAY OF PHOSPHORUS: CPT

## 2021-02-21 PROCEDURE — 83550 IRON BINDING TEST: CPT

## 2021-02-21 PROCEDURE — 93010 ELECTROCARDIOGRAM REPORT: CPT | Performed by: INTERNAL MEDICINE

## 2021-02-21 PROCEDURE — 85046 RETICYTE/HGB CONCENTRATE: CPT

## 2021-02-21 PROCEDURE — 99223 1ST HOSP IP/OBS HIGH 75: CPT | Performed by: FAMILY MEDICINE

## 2021-02-21 PROCEDURE — 82550 ASSAY OF CK (CPK): CPT

## 2021-02-21 PROCEDURE — 80053 COMPREHEN METABOLIC PANEL: CPT

## 2021-02-21 PROCEDURE — 84134 ASSAY OF PREALBUMIN: CPT

## 2021-02-21 PROCEDURE — 96365 THER/PROPH/DIAG IV INF INIT: CPT

## 2021-02-21 PROCEDURE — 36415 COLL VENOUS BLD VENIPUNCTURE: CPT

## 2021-02-21 PROCEDURE — 83036 HEMOGLOBIN GLYCOSYLATED A1C: CPT

## 2021-02-21 PROCEDURE — 85025 COMPLETE CBC W/AUTO DIFF WBC: CPT

## 2021-02-21 PROCEDURE — 83540 ASSAY OF IRON: CPT

## 2021-02-21 PROCEDURE — 6360000002 HC RX W HCPCS: Performed by: STUDENT IN AN ORGANIZED HEALTH CARE EDUCATION/TRAINING PROGRAM

## 2021-02-21 PROCEDURE — 2580000003 HC RX 258: Performed by: STUDENT IN AN ORGANIZED HEALTH CARE EDUCATION/TRAINING PROGRAM

## 2021-02-21 RX ORDER — ASPIRIN 81 MG/1
81 TABLET ORAL DAILY
Status: DISCONTINUED | OUTPATIENT
Start: 2021-02-21 | End: 2021-02-24 | Stop reason: HOSPADM

## 2021-02-21 RX ORDER — ACETAMINOPHEN 325 MG/1
650 TABLET ORAL EVERY 6 HOURS PRN
Status: DISCONTINUED | OUTPATIENT
Start: 2021-02-21 | End: 2021-02-24 | Stop reason: HOSPADM

## 2021-02-21 RX ORDER — FLUOXETINE 10 MG/1
10 CAPSULE ORAL DAILY
Status: DISCONTINUED | OUTPATIENT
Start: 2021-02-21 | End: 2021-02-24 | Stop reason: HOSPADM

## 2021-02-21 RX ORDER — PANTOPRAZOLE SODIUM 40 MG/1
40 TABLET, DELAYED RELEASE ORAL DAILY
Status: DISCONTINUED | OUTPATIENT
Start: 2021-02-21 | End: 2021-02-24 | Stop reason: HOSPADM

## 2021-02-21 RX ORDER — MIDODRINE HYDROCHLORIDE 10 MG/1
10 TABLET ORAL
Status: DISCONTINUED | OUTPATIENT
Start: 2021-02-21 | End: 2021-02-24 | Stop reason: HOSPADM

## 2021-02-21 RX ORDER — SODIUM CHLORIDE 9 MG/ML
INJECTION, SOLUTION INTRAVENOUS CONTINUOUS
Status: DISCONTINUED | OUTPATIENT
Start: 2021-02-21 | End: 2021-02-23

## 2021-02-21 RX ORDER — EZETIMIBE 10 MG/1
10 TABLET ORAL DAILY
Status: DISCONTINUED | OUTPATIENT
Start: 2021-02-21 | End: 2021-02-21 | Stop reason: CLARIF

## 2021-02-21 RX ORDER — POLYETHYLENE GLYCOL 3350 17 G/17G
17 POWDER, FOR SOLUTION ORAL DAILY PRN
Status: DISCONTINUED | OUTPATIENT
Start: 2021-02-21 | End: 2021-02-24 | Stop reason: HOSPADM

## 2021-02-21 RX ORDER — ATORVASTATIN CALCIUM 20 MG/1
20 TABLET, FILM COATED ORAL DAILY
Status: DISCONTINUED | OUTPATIENT
Start: 2021-02-21 | End: 2021-02-24 | Stop reason: HOSPADM

## 2021-02-21 RX ORDER — ONDANSETRON 2 MG/ML
4 INJECTION INTRAMUSCULAR; INTRAVENOUS EVERY 6 HOURS PRN
Status: DISCONTINUED | OUTPATIENT
Start: 2021-02-21 | End: 2021-02-24 | Stop reason: HOSPADM

## 2021-02-21 RX ORDER — PROMETHAZINE HYDROCHLORIDE 25 MG/1
12.5 TABLET ORAL EVERY 6 HOURS PRN
Status: DISCONTINUED | OUTPATIENT
Start: 2021-02-21 | End: 2021-02-24 | Stop reason: HOSPADM

## 2021-02-21 RX ORDER — ATORVASTATIN CALCIUM 80 MG/1
80 TABLET, FILM COATED ORAL DAILY
Status: DISCONTINUED | OUTPATIENT
Start: 2021-02-21 | End: 2021-02-21

## 2021-02-21 RX ORDER — SODIUM CHLORIDE 0.9 % (FLUSH) 0.9 %
10 SYRINGE (ML) INJECTION PRN
Status: DISCONTINUED | OUTPATIENT
Start: 2021-02-21 | End: 2021-02-24 | Stop reason: HOSPADM

## 2021-02-21 RX ORDER — ACETAMINOPHEN 650 MG/1
650 SUPPOSITORY RECTAL EVERY 6 HOURS PRN
Status: DISCONTINUED | OUTPATIENT
Start: 2021-02-21 | End: 2021-02-24 | Stop reason: HOSPADM

## 2021-02-21 RX ORDER — VITAMIN B COMPLEX
1 TABLET ORAL DAILY
Status: DISCONTINUED | OUTPATIENT
Start: 2021-02-21 | End: 2021-02-24 | Stop reason: HOSPADM

## 2021-02-21 RX ORDER — SODIUM CHLORIDE 0.9 % (FLUSH) 0.9 %
10 SYRINGE (ML) INJECTION EVERY 12 HOURS SCHEDULED
Status: DISCONTINUED | OUTPATIENT
Start: 2021-02-21 | End: 2021-02-24 | Stop reason: HOSPADM

## 2021-02-21 RX ADMIN — MIDODRINE HYDROCHLORIDE 10 MG: 10 TABLET ORAL at 16:59

## 2021-02-21 RX ADMIN — MIDODRINE HYDROCHLORIDE 10 MG: 10 TABLET ORAL at 08:19

## 2021-02-21 RX ADMIN — ATORVASTATIN CALCIUM 20 MG: 20 TABLET, FILM COATED ORAL at 22:19

## 2021-02-21 RX ADMIN — PANTOPRAZOLE SODIUM 40 MG: 40 TABLET, DELAYED RELEASE ORAL at 07:48

## 2021-02-21 RX ADMIN — MIDODRINE HYDROCHLORIDE 10 MG: 10 TABLET ORAL at 12:23

## 2021-02-21 RX ADMIN — Medication 1000 UNITS: at 09:57

## 2021-02-21 RX ADMIN — MAGNESIUM SULFATE HEPTAHYDRATE 3000 MG: 500 INJECTION, SOLUTION INTRAMUSCULAR; INTRAVENOUS at 09:53

## 2021-02-21 RX ADMIN — SODIUM CHLORIDE: 9 INJECTION, SOLUTION INTRAVENOUS at 03:04

## 2021-02-21 RX ADMIN — ASPIRIN 81 MG: 81 TABLET, COATED ORAL at 07:48

## 2021-02-21 RX ADMIN — FLUOXETINE HYDROCHLORIDE 10 MG: 10 CAPSULE ORAL at 08:19

## 2021-02-21 ASSESSMENT — PAIN SCALES - GENERAL
PAINLEVEL_OUTOF10: 0
PAINLEVEL_OUTOF10: 0

## 2021-02-21 NOTE — ED NOTES
Bed: 009A  Expected date: 2/20/21  Expected time: 6:58 PM  Means of arrival: [facility]  Comments:     Christine Acosta RN  02/20/21 2943

## 2021-02-21 NOTE — H&P
History & Physical        Patient:  Bartolo Ayala  YOB: 1950    MRN: [id number]     Acct: [de-identified]    PCP: Jumana Dennison MD    Date of Admission: 2/20/2021    Date of Service: Pt seen/examined on 02/21/21  and Admitted to Inpatient with expected LOS greater than two midnights due to medical therapy. Chief Complaint:  Mechanical Fall    ASSESSMENT/PLAN:  Recurrent Mechanical Falls with questionable syncope: Suspect secondary to orthostasis in context of dehydration, and hypoglycemia. Multiple admissions for syncope in the last year w/ hx of hip fracture. Workup as below. Concern for syncope: Suspect orthostasis. CT head and neck negative for acute process, Carotid US (11/10/20) ago shows no sig stenosis, no structural heart disease on last echo (9/7/2020), check orthostatic vitals, IVF, continue oral fluids and encourage PO intake, fall precautions    Chronic Undifferentiated Encephalopathy: Suspect related to long term high intensity statin use. Cannot exclude underlying dementia. Lipitor 20mg (reduced from 80), monitor for electrolyte/metabolic derangement. Check B12, folate, TSH    NIDDMT2 with concern for hypoglycemia: Hemoglobin A1c 5.7 on 9/9/2020. Trend BMP, check A1c in AM.  Suggest permissive A1c goal 8.0 and discontinuation of metformin (on monotherapy per MAR from assisted living facility). Held for ISAURA    Stage I ISAURA on CKD 3: Suspect prerenal in the context of dehydration. IV fluids, avoid nephrotoxic substances, follow BMP    Chronic normocytic anemia: Suspect related to iron deficiency, malnutrition and CKD. Check iron studies, folate, B12, reticulocyte count     Mild Hyperkalemia: suspect secondary to ISAURA. IVF, monitor BMP    CAD: S/p MI (2011) on aspirin daily (plavix document on MAR was not continued outpatient per assisted living high bleeding risk w/ DAPT and recurrent falls) Lipitor 20mg.     GERD: Protonix    Malnutrition: Patient reports low PO intake, check pre-albumin, may consult dietary outpatient    HLD: Zetia and Lipitor, check lipid panel    Depression/anxiety: Fluoxetine    Osteoporosis: Hx of hip fracture. On vitamin D replacement    Essential tremor: Primidone, previous workup neg for Parkinson's. Follows with Neurology    Orthostasis? Chronic hypotension? Unclear why patient started on midodrine resumed on admission    History Of Present Illness:      Alonso Krishna is a 78-year-old female with a past medical history significant for CAD s/p MI, HTN, CKD, IDDMT2 who presents to [facility] on 2/20/2021 for altered mental status and falls. Patient is a a resident at [facility] where staff members report she was \"not acting right\". Patient was evaluated in the ED on the day prior and with negative work-up for ischemic stroke. Patient was noted to have more frequent falls on returning to the facility. Patient is a poor historian on presentation with prolonged word finding. Patient reports 4 falls over the last 3 to 4 days. Patient denies hitting her head on any of these occasions. Reports that the last was while folding folding laundry where she landed on her her backside. Patient reports feeling weakness and dizzyness but denies losing concciousness. Patient has had multiple admissions for similar episodes s/p hip fracture. Patient reports worsening PO intake and dehydration over the past year.  Of note patient has hx of essential tremor with neg workup for Parkinson's with  Dr Naldo Petit.    ED course:  VS: T 98 RR 17 HR 80 /70  CBC: Normocytic anemia WBC, platelets WNL  CMP mild hyperkalemia, stage I ISAURA, liver enzymes WNL, ammonia WNL  UA sterile pyuria  EKG NSR   CT head and neck: No acute process  Patient was given 1 L normal saline bolus and started on IVF at 100 cc/h    Past Medical History:          Diagnosis Date    Acute kidney injury ([facility]     Arthritis     CAD (coronary artery disease)     CKD (chronic kidney disease) stage 3, GFR 30-59 ml/min     Diabetes mellitus, insulin dependent (IDDM), controlled     Escherichia coli septicemia (HCC)     Essential tremor     History of blood transfusion     Hyperlipidemia     Hypertension     Hypoxemic respiratory failure, chronic (HCC)     secondary to sepsis and possibly pneumonia    MI (myocardial infarction) ([facility] [address] [facility] 2011    Normocytic anemia     Osteoporosis        Past Surgical History:          Procedure Laterality Date    CYSTOSCOPY  12/7/12    with stent insertion    HIP SURGERY Left 9/6/2020    HIP PINNING performed by Prasanna Amezcua DO at [address] [place] [address] LITHOTRIPSY  12/18/2012    right       Medications Prior to Admission:      Prior to Admission medications    Medication Sig Start Date End Date Taking?  Authorizing Provider   lidocaine 4 % external patch Place 2 patches onto the skin daily 11/14/20   JOANNA Rocha CNP   midodrine (PROAMATINE) 10 MG tablet Take 1 tablet by mouth 3 times daily (with meals) 11/13/20   JOANNA Rocha CNP   insulin lispro (HUMALOG) 100 UNIT/ML injection vial Inject 0-12 Units into the skin 3 times daily (with meals) 11/11/20   JOANNA Rocha CNP   insulin lispro (HUMALOG) 100 UNIT/ML injection vial Inject 0-6 Units into the skin nightly 11/11/20   JOANNA Mcelroy CNP   linagliptin (TRADJENTA) 5 MG tablet Take 5 mg by mouth daily    Historical Provider, MD   FLUoxetine (PROZAC) 10 MG capsule Take 1 capsule by mouth daily 9/24/20   Sandy Rizvi MD   vitamin C (VITAMIN C) 250 MG tablet Take 1 tablet by mouth 2 times daily (with meals) for 21 days 9/24/20 10/15/20  Sandy Rizvi MD   Cholecalciferol 75 MCG (3000 UT) TABS Take 1 tablet by mouth daily 9/23/20   Sandy Rizvi MD   atorvastatin (LIPITOR) 80 MG tablet Take 80 mg by mouth daily    Historical Provider, MD   ezetimibe (ZETIA) 10 MG tablet Take 10 mg by mouth daily Historical Provider, MD   clopidogrel (PLAVIX) 75 MG tablet Take 1 tablet by mouth daily. 3/31/14   Keri Mo APRN - CNP   metformin (GLUCOPHAGE) 500 MG tablet Take 1,000 mg by mouth 2 times daily (with meals). Historical Provider, MD   Acetaminophen (TYLENOL PO) Take  by mouth as needed. Historical Provider, MD   pantoprazole (PROTONIX) 40 MG tablet Take 40 mg by mouth daily     Historical Provider, MD   aspirin 81 MG EC tablet Take 81 mg by mouth daily. Historical Provider, MD       Allergies:  Patient has no known allergies. Social History:      The patient currently lives at home    TOBACCO:   reports that she has never smoked. She has never used smokeless tobacco.  ETOH:   reports current alcohol use. Family History:      Reviewed in detail and negative for DM, CAD, Cancer, CVA. Positive as follows:        Problem Relation Age of Onset    Cancer Mother     Heart Disease Mother        Diet:  No diet orders on file    REVIEW OF SYSTEMS:   Pertinent positives as noted in the HPI. All other systems reviewed and negative. PHYSICAL EXAM:    BP (!) 143/83   Pulse 74   Temp 97.9 °F (36.6 °C) (Oral)   Resp 18   Ht 5' 6\" (1.676 m)   Wt 135 lb 1.6 oz (61.3 kg)   SpO2 98%   BMI 21.81 kg/m²     General appearance:  Elderly white female in no acute distress  HEENT:  Normal cephalic, atraumatic without obvious deformity. Pupils equal, round, and reactive to light. Extra ocular muscles intact. Conjunctivae/corneas clear. Neck: Supple, with full range of motion. No jugular venous distention. Trachea midline. Respiratory:  Normal respiratory effort. Clear to auscultation, bilaterally without Rales/Wheezes/Rhonchi. Cardiovascular: 2/6 systolic murmur best heard at RUSB. Regular rate and rhythm with normal S1/S2 without murmurs, rubs, gallops, or ectopic beat  Abdomen: Soft, non-tender, non-distended with normal bowel sounds.   Musculoskeletal:  No clubbing, cyanosis or edema bilaterally. Skin: Skin color, texture, turgor normal.  No rashes or lesions. Neurologic:  Cerebellar testing shows delayed finger to nose testing and rapid alternating movements. Cranial nerves: II-XII intact. DTRs 2/4 and symmetric, sensation intact to fine touch and muscle strength 5/5 throughout   Psychiatric:  Difficulty with orientation and word finding, thought and content appropriate  Capillary Refill: Brisk,< 3 seconds   Peripheral Pulses: +3 palpable, equal bilaterally       Labs:     Recent Labs     02/19/21 1733 02/20/21 2050   WBC 4.6* 5.7   HGB 10.6* 10.4*   HCT 35.1* 34.1*    180     Recent Labs     02/19/21 1733 02/20/21 2142    137   K 5.4* 5.7*    100   CO2 28 31   BUN 27* 27*   CREATININE 1.4* 1.5*   CALCIUM 10.2 10.1     Recent Labs     02/20/21 2142   AST 28   ALT 15   BILITOT 0.3   ALKPHOS 72     No results for input(s): INR in the last 72 hours. No results for input(s): [place] in the last 72 hours. Urinalysis:      Lab Results   Component Value Date    NITRU NEGATIVE 02/20/2021    WBCUA 0-2 02/20/2021    WBCUA >200W/CLUMPS 10/20/2011    BACTERIA NONE SEEN 02/20/2021    RBCUA 5-10 02/20/2021    BLOODU SMALL 02/20/2021    SPECGRAV 1.009 02/20/2021    GLUCOSEU NEGATIVE 11/09/2020       Intake & Output:  No intake/output data recorded. No intake/output data recorded. Radiology:   CT Head WO Contrast   Final Result   Impression:   1. No acute intracranial abnormality. Age-appropriate exam.   2.  Stable intracranial exam.      This document has been electronically signed by: Fonda Goldberg, MD on    02/20/2021 10:37 PM      All CTs at this facility use dose modulation techniques and iterative    reconstructions, and/or weight-based dosing   when appropriate to reduce radiation to a low as reasonably achievable.             CT head & neck: No acute process  EKG:  I have reviewed the EKG with the following interpretation: NSR    CT Head WO Contrast   Final Result   Impression:   1. No acute intracranial abnormality. Age-appropriate exam.   2.  Stable intracranial exam.      This document has been electronically signed by: Dwight Hernandez MD on    02/20/2021 10:37 PM      All CTs at this facility use dose modulation techniques and iterative    reconstructions, and/or weight-based dosing   when appropriate to reduce radiation to a low as reasonably achievable. DVT prophylaxis: SCDs    Code Status: Prior      PT/OT Eval Status: ordered    Disposition:assisted living    C/Ash Castañeda 1106 Problems    Diagnosis Date Noted    Altered mental status [R41.82] 02/21/2021               Thank you Heidi Cramer MD for the opportunity to be involved in this patient's care.     Electronically signed by Andreson Leblanc DO on 2/21/2021 at 3:00 AM

## 2021-02-21 NOTE — ED PROVIDER NOTES
EMERGENCY MEDICINE DEPARTMENT ENCOUNTER      CHIEF COMPLAINT    Chief Complaint   Patient presents with    Fall       HPI    Arely Gallo is a 65 y.o. female who presents with generalized acute confusion since the onset last few weeks, was actually discharged from the ED last night after frequent falls and confusion. Her workup last night was wnl, as her gait was stable on RN testing. She was noted to have old bruising on her shoulder and back that confirmed her frequent falls. The nursing home sent her back to the ED tonight due to their ongoing concerns for her behavior, namely confusion and acting off her baseline. The duration has been constant since the onset. The generalized confusion was associated with unknown cause. No aggravating or alleviating factors. REVIEW OF SYSTEMS    Neuro: persistent confusion and behavioral changes  Cardiac: No chest pain or palpitations  Respiratory: No shortness of breath or new cough  General: No fevers; +frequent falls  : No dysuria or hematuria  GI: No vomiting or diarrhea  See HPI for further details. All other systems reviewed and are negative.     PAST MEDICAL OR SURGICAL HISTORY    Past Medical History:   Diagnosis Date    Acute kidney injury ([address]     Arthritis     CAD (coronary artery disease)     CKD (chronic kidney disease) stage 3, GFR 30-59 ml/min     Diabetes mellitus, insulin dependent (IDDM), controlled     Escherichia coli septicemia (HCC)     Essential tremor     History of blood transfusion     Hyperlipidemia     Hypertension     Hypoxemic respiratory failure, chronic (HCC)     secondary to sepsis and possibly pneumonia    MI (myocardial infarction) ([address] 2011    Normocytic anemia     Osteoporosis      Past Surgical History:   Procedure Laterality Date    CYSTOSCOPY  12/7/12    with stent insertion    HIP SURGERY Left 9/6/2020    HIP PINNING performed by Izzy De La Fuente DO at [address] [place] [address] LITHOTRIPSY  12/18/2012 right       CURRENT MEDICATIONS    Current Outpatient Rx   Medication Sig Dispense Refill    lidocaine 4 % external patch Place 2 patches onto the skin daily 1 box 0    midodrine (PROAMATINE) 10 MG tablet Take 1 tablet by mouth 3 times daily (with meals) 30 tablet 0    insulin lispro (HUMALOG) 100 UNIT/ML injection vial Inject 0-12 Units into the skin 3 times daily (with meals) 1 vial 0    insulin lispro (HUMALOG) 100 UNIT/ML injection vial Inject 0-6 Units into the skin nightly 1 vial 0    linagliptin (TRADJENTA) 5 MG tablet Take 5 mg by mouth daily      FLUoxetine (PROZAC) 10 MG capsule Take 1 capsule by mouth daily 30 capsule 0    vitamin C (VITAMIN C) 250 MG tablet Take 1 tablet by mouth 2 times daily (with meals) for 21 days 42 tablet 0    Cholecalciferol 75 MCG (3000 UT) TABS Take 1 tablet by mouth daily 90 tablet 3    atorvastatin (LIPITOR) 80 MG tablet Take 80 mg by mouth daily      ezetimibe (ZETIA) 10 MG tablet Take 10 mg by mouth daily      clopidogrel (PLAVIX) 75 MG tablet Take 1 tablet by mouth daily. 30 tablet 0    metformin (GLUCOPHAGE) 500 MG tablet Take 1,000 mg by mouth 2 times daily (with meals).  Acetaminophen (TYLENOL PO) Take  by mouth as needed.  pantoprazole (PROTONIX) 40 MG tablet Take 40 mg by mouth daily       aspirin 81 MG EC tablet Take 81 mg by mouth daily. ALLERGIES    No Known Allergies    FAMILY OR SOCIAL HISTORY    Family History   Problem Relation Age of Onset    Cancer Mother     Heart Disease Mother      Social History     Socioeconomic History    Marital status:       Spouse name: Not on file    Number of children: Not on file    Years of education: Not on file    Highest education level: Not on file   Occupational History    Not on file   Social Needs    Financial resource strain: Not on file    Food insecurity     Worry: Not on file     Inability: Not on file    Transportation needs     Medical: Not on file Non-medical: Not on file   Tobacco Use    Smoking status: Never Smoker    Smokeless tobacco: Never Used   Substance and Sexual Activity    Alcohol use: Yes     Comment: social    Drug use: No    Sexual activity: Not Currently   Lifestyle    Physical activity     Days per week: Not on file     Minutes per session: Not on file    Stress: Not on file   Relationships    Social connections     Talks on phone: Not on file     Gets together: Not on file     Attends Restoration service: Not on file     Active member of club or organization: Not on file     Attends meetings of clubs or organizations: Not on file     Relationship status: Not on file    Intimate partner violence     Fear of current or ex partner: Not on file     Emotionally abused: Not on file     Physically abused: Not on file     Forced sexual activity: Not on file   Other Topics Concern    Not on file   Social History Narrative    Not on file       PHYSICAL EXAM    VITAL SIGNS: /74   Pulse 86   Temp 98 °F (36.7 °C) (Oral)   Resp 16   Ht 5' 6\" (1.676 m)   Wt 140 lb (63.5 kg)   SpO2 96%   BMI 22.60 kg/m²   Constitutional:  Well developed, well nourished, no acute distress  Eyes:  Pupils equally round and reactive to light, sclera nonicteric  HENT:  atraumatic, moist mucous membranes  NECK: Normal range of motion, no JVD  Respiratory:  No respiratory distress, normal breath sounds, no wheezing   Cardiovascular:  normal rate, normal rhythm, no murmurs   GI:  Soft, nondistended, normal bowel sounds, nontender  Musculoskeletal:  No edema, no acute deformities   Integument:  Skin is warm and dry, no obvious rash    Vascular: Radial and DP pulses 2+ equal bilaterally  Neurologic: awake, alert, oriented to self and place only, not time, handgrip is 5/5 bilaterally, normal finger to nose test bilaterally  Psychiatric:  flat affect, does not appear anxious    Labs Reviewed   CBC WITH AUTO DIFFERENTIAL - Abnormal; Notable for the following components:       Result Value    RBC 3.65 (*)     Hemoglobin 10.4 (*)     Hematocrit 34.1 (*)     MCHC 30.5 (*)     RDW-SD 45.1 (*)     All other components within normal limits   MICROSCOPIC URINALYSIS - Abnormal; Notable for the following components:    Blood, Urine SMALL (*)     Leukocytes, UA TRACE (*)     All other components within normal limits   COMPREHENSIVE METABOLIC PANEL W/ REFLEX TO MG FOR LOW K - Abnormal; Notable for the following components:    CREATININE 1.5 (*)     BUN 27 (*)     Potassium reflex Magnesium 5.7 (*)     All other components within normal limits   ANION GAP - Abnormal; Notable for the following components:    Anion Gap 6.0 (*)     All other components within normal limits   GLOMERULAR FILTRATION RATE, ESTIMATED - Abnormal; Notable for the following components:    Est, Glom Filt Rate 34 (*)     All other components within normal limits   AMMONIA   SPECIMEN REJECTION   OSMOLALITY       EKG  (Interpreted by me)  EKG Interpretation. EKG Interpretation    Interpreted by emergency department physician on 2/20/21 2300    Rhythm: normal sinus   Rate: 85 bpm  Axis: normal  Ectopy: none  Conduction: normal  ST Segments: no acute change  T Waves: no acute change  Q Waves: none    Clinical Impression: non-specific EKG      RADIOLOGY/PROCEDURES    CT Head WO Contrast   Final Result   Impression:   1. No acute intracranial abnormality. Age-appropriate exam.   2.  Stable intracranial exam.      This document has been electronically signed by: Ida Romo MD on    02/20/2021 10:37 PM      All CTs at this facility use dose modulation techniques and iterative    reconstructions, and/or weight-based dosing   when appropriate to reduce radiation to a low as reasonably achievable. ED COURSE & MEDICAL DECISION MAKING    Pertinent Labs & Imaging studies reviewed and interpreted. (See chart for details)  See chart for details of medications given during the ED stay.     Vitals:    02/20/21 1908 02/20/21 2005 02/20/21 2105 02/20/21 2218   BP: 129/70 134/67 131/86 120/74   Pulse: 80 83 89 86   Resp: 17 21 20 16   Temp: 98 °F (36.7 °C)      TempSrc: Oral      SpO2: 98% 94% 93% 96%   Weight: 140 lb (63.5 kg)      Height: 5' 6\" (1.676 m)          Differential diagnosis: Dehydration, potentially/metabolic problem, anemia, infection, hypotension, Stroke, Structural CNS mass lesion, other    FINAL IMPRESSION    1. Fall, initial encounter    2. Confusion    3. Altered mental status, unspecified altered mental status type        PLAN  Admit    Pt has fallen many times in past few months. She has some intentional tremors of both hands with cerebellar exam. I wonder if she may be developing some Parkinsonian or other movement disorder related dementia. Pt's labs were wnl, except K 5.7. Her EKG did not show any peak TW or other anomalies. Repeat  CT neg. Pt will be admitted to hospital medicine.      Rolando Mckeon MD  02/20/21 0643

## 2021-02-21 NOTE — ED NOTES
Pt arrives to ED from assisted living for \"not acting right\" pt was seen yesterday in the ED and worked up for a stroke. Pt was sent back to assisted last evening. Assisted living states that the pt has been falling more frequently. Pt is oriented to name and birthday. Pt is oriented to place. Pt is not oriented to time. Pt states she \"just likes falling\" pt shows no signs of distress. Monitor applied. Posey alarm applied to pt.       Ewa RUSSELL RN  02/20/21 0159

## 2021-02-21 NOTE — PLAN OF CARE
Problem: Falls - Risk of:  Goal: Will remain free from falls  Description: Will remain free from falls  Outcome: Ongoing  Note: Fall precaution in place. Bed alarm/chair alarm. Bed locked in lowest position. Fall band applied if applicable. Call light and overhead table within reach. Will continue to monitor. Goal: Absence of physical injury  Description: Absence of physical injury  Outcome: Ongoing  Note: Absence of physical injury. Problem: Skin Integrity:  Goal: Will show no infection signs and symptoms  Description: Will show no infection signs and symptoms  Outcome: Ongoing  Note: Pt shows no new signs or symptoms of infection on my shift. Will continue to assess. Goal: Absence of new skin breakdown  Description: Absence of new skin breakdown  Outcome: Ongoing  Note: Absence of new skin integrity issues on my shift. Will continue to assess. Problem: Pain:  Goal: Pain level will decrease  Description: Pain level will decrease  Outcome: Ongoing  Note: Pt denies pain on my shift. Non pharmaceutical interventions like ice and repositioning offered before pain medications. Will continue to assess. Goal: Control of acute pain  Description: Control of acute pain  Outcome: Ongoing  Note: Pt denies pain on my shift. Non pharmaceutical interventions like ice and repositioning offered before pain medications. Will continue to assess. Goal: Control of chronic pain  Description: Control of chronic pain  Outcome: Ongoing  Note: Pt denies pain on my shift. Non pharmaceutical interventions like ice and repositioning offered before pain medications. Will continue to assess. Pt verbalizes understanding of care plan. Pt contributes to goal settings.

## 2021-02-21 NOTE — ED NOTES
ED to inpatient nurses report    Chief Complaint   Patient presents with    Fall      Present to ED from nursing home  LOC: alert and orientated to name and place  Vital signs   Vitals:    02/20/21 2005 02/20/21 2105 02/20/21 2218 02/21/21 0010   BP: 134/67 131/86 120/74 114/75   Pulse: 83 89 86 72   Resp: 21 20 16 20   Temp:       TempSrc:       SpO2: 94% 93% 96% 97%   Weight:       Height:          Oxygen Baseline room air    Current needs required room air Bipap/Cpap No  LDAs:   Peripheral IV 02/20/21 Left Forearm (Active)   Site Assessment Clean;Dry; Intact 02/21/21 0014   Line Status Normal saline locked 02/21/21 0014   Dressing Status Clean;Dry; Intact 02/21/21 0014     Mobility: Requires assistance * 2  Pending ED orders: none  Present condition: pt resting on cot with eyes close and easy respirations.      Electronically signed by Kalia Godwin RN on 2/21/2021 at 12:33 AM       [address] Garcai RN  02/21/21 5571

## 2021-02-21 NOTE — PROGRESS NOTES
Short Progress Note    Patient:  Viridiana Castillo      Unit/Bed:8B-23/023-A    YOB: 1950    MRN: [id number]       Acct: [de-identified]     PCP: Fabrizio Varghese MD    Date of Admission: 2/20/2021    Assessment/Plan:    Recurrent falls, syncope vs physical deconditioning vs progressive cognitive decline  Patient lives in an assisted living facility  Orthostatic vital negative  EKG NSR, no arrhythmia captured  Echo and Carotid dopplers in 11/2020 unremarkable and showed no significant stenosis  Rule out infectious etiology, UA unremarkable, obtain chest Xray  PT/OT/SLP  Consider MRI and Neurology referral  Discharge planner and  for possible ECF placement    Chronic severe linguistic cognitive deficit/ Dementia  MOCA score of 11/26 on 11/11/2020  CT head unremarkable, no MRI on record  Iron, B12 and folate normal   Check TSH and Vit D level  Consult SLP    Recurrent hyperkalemia  Patient noted to have intermittent hyperkalemia since 2018  Risks include CKD in combination with hypovolemia/dehydration, inadequate urinary excretion or aldosterone deficiency  Seems to improve with hydration each time  Check for Urine lytes    CKD III  Renal function stable, at baseline  Avoid nephrotoxic agents  Continue to monitor    Benign essential tremor  Patient was on primidone, stopped in 11/2020    DM type II  A1C 5.0  Not on any DM meds  Monitor for possible hypoglycemia    Severe malnutrition  Dietitian consult    Hx of Left femoral neck fracture s/p closed reduction and percutaneous screw fixation on 9/6/2020    Disposition  PT/OT/SLP  Consider ECF placement    Subjective:   Patient seen and examined, appears comfortable in bed, without any signs of cardiorespiratory distress. VS stable, afebrile, saturating well on room air. Orthostatic vitals negative. She is oriented to name, knows that she is in the hospital (unsure which one), but confused about time she/date.  She states that she falls a lot at \"home\" (stays in [place]), admits being unsteady when walking with upper extremity tremors. She has multiple ER visits recently. She denies any other complaints at this time. Patient denies chest pain, palpitations, or shortness of breath. She denies any nausea, vomiting, abdominal pain, diarrhea or constipation. Exam:  /60   Pulse 79   Temp 98 °F (36.7 °C) (Oral)   Resp 17   Ht 5' 6\" (1.676 m)   Wt 135 lb 1.6 oz (61.3 kg)   SpO2 92%   BMI 21.81 kg/m²     General appearance: No apparent distress, appears stated age and cooperative. HEENT: Pupils equal, round, and reactive to light. Conjunctivae/corneas clear. Neck: Supple, with full range of motion. No jugular venous distention. Trachea midline. Respiratory:  Normal respiratory effort. Clear to auscultation, bilaterally without Rales/Wheezes/Rhonchi. Cardiovascular: Regular rate and rhythm with normal S1/S2 without murmurs, rubs or gallops. Abdomen: Soft, non-tender, non-distended with normal bowel sounds. Musculoskeletal: passive and active ROM x 4 extremities. Skin: Skin color, texture, turgor normal.  No rashes or lesions. Neurologic:  Neurovascularly intact without any focal sensory/motor deficits.  Cranial nerves: II-XII intact, grossly non-focal.  Psychiatric: Alert and oriented, thought content appropriate, normal insight  Capillary Refill: Brisk,< 3 seconds   Peripheral Pulses: +2 palpable, equal bilaterally       Diet: DIET GENERAL;    DVT prophylaxis: [] Lovenox                                 [] SCDs                                 [] SQ Heparin                                 [] Encourage ambulation           [] Already on Anticoagulation     Disposition:    [] Home       [] TCU       [] Rehab       [] Psych       [] SNF       [] [facility]       [] Other-    Code Status: DNR-CCA    PT/OT Eval Status: ordered      Electronically signed by Estefany Hodge MD on 2/21/2021 at 5:32 PM

## 2021-02-22 ENCOUNTER — APPOINTMENT (OUTPATIENT)
Dept: GENERAL RADIOLOGY | Age: 71
DRG: 884 | End: 2021-02-22
Payer: MEDICARE

## 2021-02-22 LAB
ANION GAP SERPL CALCULATED.3IONS-SCNC: 7 MEQ/L (ref 8–16)
BUN BLDV-MCNC: 20 MG/DL (ref 7–22)
CALCIUM SERPL-MCNC: 9.3 MG/DL (ref 8.5–10.5)
CHLORIDE BLD-SCNC: 107 MEQ/L (ref 98–111)
CHOLESTEROL, TOTAL: 123 MG/DL (ref 100–199)
CO2: 25 MEQ/L (ref 23–33)
CREAT SERPL-MCNC: 1.3 MG/DL (ref 0.4–1.2)
ERYTHROCYTE [DISTWIDTH] IN BLOOD BY AUTOMATED COUNT: 13.3 % (ref 11.5–14.5)
ERYTHROCYTE [DISTWIDTH] IN BLOOD BY AUTOMATED COUNT: 46.7 FL (ref 35–45)
GFR SERPL CREATININE-BSD FRML MDRD: 40 ML/MIN/1.73M2
GLUCOSE BLD-MCNC: 105 MG/DL (ref 70–108)
GLUCOSE BLD-MCNC: 133 MG/DL (ref 70–108)
GLUCOSE BLD-MCNC: 186 MG/DL (ref 70–108)
GLUCOSE BLD-MCNC: 322 MG/DL (ref 70–108)
HCT VFR BLD CALC: 31.4 % (ref 37–47)
HDLC SERPL-MCNC: 41 MG/DL
HEMOGLOBIN: 9.4 GM/DL (ref 12–16)
LDL CHOLESTEROL CALCULATED: 62 MG/DL
MCH RBC QN AUTO: 28.8 PG (ref 26–33)
MCHC RBC AUTO-ENTMCNC: 29.9 GM/DL (ref 32.2–35.5)
MCV RBC AUTO: 96.3 FL (ref 81–99)
PLATELET # BLD: 169 THOU/MM3 (ref 130–400)
PMV BLD AUTO: 10.2 FL (ref 9.4–12.4)
POTASSIUM SERPL-SCNC: 4 MEQ/L (ref 3.5–5.2)
RBC # BLD: 3.26 MILL/MM3 (ref 4.2–5.4)
SODIUM BLD-SCNC: 139 MEQ/L (ref 135–145)
TRIGL SERPL-MCNC: 98 MG/DL (ref 0–199)
TSH SERPL DL<=0.05 MIU/L-ACNC: 1.42 UIU/ML (ref 0.4–4.2)
VITAMIN D 25-HYDROXY: 39 NG/ML (ref 30–100)
WBC # BLD: 3.8 THOU/MM3 (ref 4.8–10.8)

## 2021-02-22 PROCEDURE — G0378 HOSPITAL OBSERVATION PER HR: HCPCS

## 2021-02-22 PROCEDURE — 6370000000 HC RX 637 (ALT 250 FOR IP): Performed by: STUDENT IN AN ORGANIZED HEALTH CARE EDUCATION/TRAINING PROGRAM

## 2021-02-22 PROCEDURE — 80061 LIPID PANEL: CPT

## 2021-02-22 PROCEDURE — 92523 SPEECH SOUND LANG COMPREHEN: CPT

## 2021-02-22 PROCEDURE — 36415 COLL VENOUS BLD VENIPUNCTURE: CPT

## 2021-02-22 PROCEDURE — 71046 X-RAY EXAM CHEST 2 VIEWS: CPT

## 2021-02-22 PROCEDURE — 85027 COMPLETE CBC AUTOMATED: CPT

## 2021-02-22 PROCEDURE — 6370000000 HC RX 637 (ALT 250 FOR IP): Performed by: NURSE PRACTITIONER

## 2021-02-22 PROCEDURE — 97166 OT EVAL MOD COMPLEX 45 MIN: CPT

## 2021-02-22 PROCEDURE — 99232 SBSQ HOSP IP/OBS MODERATE 35: CPT | Performed by: NURSE PRACTITIONER

## 2021-02-22 PROCEDURE — 80048 BASIC METABOLIC PNL TOTAL CA: CPT

## 2021-02-22 PROCEDURE — 97535 SELF CARE MNGMENT TRAINING: CPT

## 2021-02-22 PROCEDURE — 2580000003 HC RX 258: Performed by: FAMILY MEDICINE

## 2021-02-22 PROCEDURE — 82306 VITAMIN D 25 HYDROXY: CPT

## 2021-02-22 PROCEDURE — 82948 REAGENT STRIP/BLOOD GLUCOSE: CPT

## 2021-02-22 PROCEDURE — 84443 ASSAY THYROID STIM HORMONE: CPT

## 2021-02-22 PROCEDURE — 2580000003 HC RX 258: Performed by: NURSE PRACTITIONER

## 2021-02-22 RX ORDER — DEXTROSE MONOHYDRATE 25 G/50ML
12.5 INJECTION, SOLUTION INTRAVENOUS PRN
Status: DISCONTINUED | OUTPATIENT
Start: 2021-02-22 | End: 2021-02-24 | Stop reason: HOSPADM

## 2021-02-22 RX ORDER — DEXTROSE MONOHYDRATE 50 MG/ML
100 INJECTION, SOLUTION INTRAVENOUS PRN
Status: DISCONTINUED | OUTPATIENT
Start: 2021-02-22 | End: 2021-02-24 | Stop reason: HOSPADM

## 2021-02-22 RX ORDER — NICOTINE POLACRILEX 4 MG
15 LOZENGE BUCCAL PRN
Status: DISCONTINUED | OUTPATIENT
Start: 2021-02-22 | End: 2021-02-24 | Stop reason: HOSPADM

## 2021-02-22 RX ADMIN — PANTOPRAZOLE SODIUM 40 MG: 40 TABLET, DELAYED RELEASE ORAL at 05:21

## 2021-02-22 RX ADMIN — SODIUM CHLORIDE: 9 INJECTION, SOLUTION INTRAVENOUS at 09:27

## 2021-02-22 RX ADMIN — MIDODRINE HYDROCHLORIDE 10 MG: 10 TABLET ORAL at 17:14

## 2021-02-22 RX ADMIN — ATORVASTATIN CALCIUM 20 MG: 20 TABLET, FILM COATED ORAL at 21:16

## 2021-02-22 RX ADMIN — SODIUM CHLORIDE: 9 INJECTION, SOLUTION INTRAVENOUS at 00:26

## 2021-02-22 RX ADMIN — MIDODRINE HYDROCHLORIDE 10 MG: 10 TABLET ORAL at 11:48

## 2021-02-22 RX ADMIN — SODIUM CHLORIDE: 9 INJECTION, SOLUTION INTRAVENOUS at 21:18

## 2021-02-22 RX ADMIN — FLUOXETINE HYDROCHLORIDE 10 MG: 10 CAPSULE ORAL at 09:27

## 2021-02-22 RX ADMIN — INSULIN LISPRO 4 UNITS: 100 INJECTION, SOLUTION INTRAVENOUS; SUBCUTANEOUS at 13:53

## 2021-02-22 RX ADMIN — Medication 1000 UNITS: at 09:27

## 2021-02-22 RX ADMIN — ASPIRIN 81 MG: 81 TABLET, COATED ORAL at 09:27

## 2021-02-22 RX ADMIN — INSULIN LISPRO 1 UNITS: 100 INJECTION, SOLUTION INTRAVENOUS; SUBCUTANEOUS at 17:14

## 2021-02-22 ASSESSMENT — PAIN SCALES - GENERAL: PAINLEVEL_OUTOF10: 0

## 2021-02-22 NOTE — PROGRESS NOTES
[address]  INPATIENT OCCUPATIONAL THERAPY  [facility] MED SURG 8B  EVALUATION    Time:    Time In: 1540  Time Out: 1601  Timed Code Treatment Minutes: 10 Minutes  Minutes: 21          Date: 2021  Patient Name: Esequiel Whalen,   Gender: female      MRN: [id number]  : 1950  (65 y.o.)  Referring Practitioner: Dr. Soo Molina  Diagnosis: altered mental status  Additional Pertinent Hx: 64-year-old female with a past medical history significant for CAD s/p MI, HTN, CKD, IDDMT2 who presents to [facility] on 2021 for altered mental status and falls. Patient is a a resident at [facility] where staff members report she was \"not acting right\". Patient was evaluated in the ED on the day prior and with negative work-up for ischemic stroke. Patient was noted to have more frequent falls on returning to the facility. Patient is a poor historian on presentation with prolonged word finding. Patient reports 4 falls over the last 3 to 4 days. Patient denies hitting her head on any of these occasions. Reports that the last was while folding folding laundry where she landed on her her backside. Patient reports feeling weakness and dizzyness but denies losing concciousness. Patient has had multiple admissions for similar episodes s/p hip fracture. Patient reports worsening PO intake and dehydration over the past year. Of note patient has hx of essential tremor with neg workup for Parkinson's with  Dr Colin Son    Restrictions/Precautions:  Restrictions/Precautions: General Precautions, Fall Risk    Subjective  Chart Reviewed: Yes, Orders, Progress Notes, History and Physical  Patient assessed for rehabilitation services?: Yes    Subjective: Pt very tangential in conversation requiring cues to stay on tasks.     Pain:  Pain Assessment  Patient Currently in Pain: Denies    Social/Functional History:  Lives With: Alone  Type of Home: Assisted living([facility] 2nd floor)  Home Layout: One level   Bathroom Shower/Tub: Walk-in shower  Bathroom Toilet: Handicap height  Bathroom Accessibility: Accessible       ADL Assistance: Independent(except showering)  Ambulation Assistance: Independent  Transfer Assistance: Independent          Additional Comments: Pt with diffiuclty answering questions throughout conversatin. Pt sstating she was indep with her ADL tasks and used a RW. Pt stating \"yes\" when asked if she was assisted wit her showers. VISION:WNL    HEARING:  WNL    COGNITION: Decreased Recall, Decreased Insight, Impaired Memory, Decreased Problem Solving and Decreased Safety Awareness    RANGE OF MOTION:  Bilateral Upper Extremity:  WNL    STRENGTH:  Bilateral Upper Extremity:  bilatearl UE general weakness and deocnditioning throughout    SENSATION:   WFL    ADL:   Grooming: Contact Guard Assistance. standing at sink to wash ahnds with educaiton on walker safety to keep in front of her and then not to forget it  Lower Extremity Dressing: Maximum Assistance. Toileting: Minimal Assistance. fro clothing management]  Toilet Transfer: Minimal Assistance. form low toilet. BALANCE:  Standing Balance: Contact Guard Assistance. BED MOBILITY:  Supine to Sit: Minimal Assistance      TRANSFERS:  Sit to Stand:  Contact Guard Assistance. from EOB with educaiton on hand placement  Stand to Sit: [address] onto EOB with educaiton on hand placement    FUNCTIONAL MOBILITY:  Assistive Device: Rolling Walker  Assist Level:  Contact Guard Assistance. Distance: To and from bathroom and and into hallway  Pt educated on safety with walker after attempting to place off to the side to go into bathroom         Activity Tolerance:  Patient tolerance of  treatment: fair. Assessment:  Assessment: Pt with increased confusion and increased falls at assissted living.  Pt would benefit from further skilled OT services to icnrease her indep with aDL tasks while completing them safely as well as imrpove balance to decrease fall risk during aDL tasks. Performance deficits / Impairments: Decreased safe awareness, Decreased balance, Decreased ADL status, Decreased endurance, Decreased cognition, Decreased strength  Prognosis: Good  REQUIRES OT FOLLOW UP: Yes  Decision Making: Medium Complexity    Treatment Initiated: Treatment and education initiated within context of evaluation. Evaluation time included review of current medical information, gathering information related to past medical, social and functional history, completion of standardized testing, formal and informal observation of tasks, assessment of data and development of plan of care and goals. Treatment time included skilled education and facilitation of tasks to increase safety and independence with ADL's for improved functional independence and quality of life. Discharge Recommendations:  [address], Patient would benefit from continued therapy after discharge    Patient Education:  OT Education: OT Role, Plan of Care  Patient Education: safety with transfers ans use of walker  Barriers to Learning: cognition    Equipment Recommendations:       Plan:  Times per week: 3-5x  Current Treatment Recommendations: Strengthening, Endurance Training, Patient/Caregiver Education & Training, Self-Care / ADL, Balance Training, Safety Education & Training. See long-term goal time frame for expected duration of plan of care. If no long-term goals established, a short length of stay is anticipated.     Goals:  Patient goals : get better and stop falling  Short term goals  Time Frame for Short term goals: by discharge  Short term goal 1: Pt to complete LB dressing tasks with SBA  Short term goal 2: Pt to complete toileting with sBA and no cues for safety  Short term goal 3: Pt to dmeo dyanmic standing with SBA and 0-1 UE support in prep for completing ADL tasks         Following session, patient left in safe position with all fall risk precautions in place.

## 2021-02-22 NOTE — PROGRESS NOTES
Hospitalist Progress Note    Patient:  Rika Duran      Unit/Bed:8B-23/023-A    YOB: 1950    MRN: [id number]       Acct: [de-identified]     PCP: Yaa Whiteside MD    Date of Admission: 2/20/2021    Assessment/Plan:    1. Recurrent mechanical falls w/ possible syncope?: Most likely d/t dehydration, posb hypoglycemia. CT head/neck no acute findings. 11/2020 Carotid US no significant stenosis. 9/2020 Echo no structural dz, EF 55%. Fall precautions. Cont to encourage PO intake. 2. Chronic undifferentiated encephalopathy: Probable underlying dementia, long term high intensity statin. BMP in am. Folate, vit B12, TSH okay. Consult ST for cognitive eval.   3. DM type 2 w/o use of insulin: A1c 5.0. Cont to hold metformin. Hypoglycemic protocol, LDSSI, Accuchecks ACHS ordered. 4. ISAURA superimposed on CKD stage 3: Improving w/ IVF. Decrease IVF to 75 ml/hr. 5. Chronic normocytic anemia: Stable. Iron studies okay. CBC in am.   6. Hyperkalemia: Resolved. BMP in am.   7. CAD: Cont ASA, Lipitor. Plavix on MAR - not cont at assisted living d/t high risk traumatic bleeding d/t recurrent falls. 8. GERD: Cont protonix  9. HLD: Cont Zetia, Lipitor. Lipid panel okay. 10. Depression/anxiety: Cont fluoxetine  11. Osteoporosis: Hx/o hip fracture. Cont vit D. 12. Essential Tremor: Cont primidone. Previous w/o for Parkinson's (-). Dispo: Pt from assisted living, expect she will need additional services upon discharge. Chief Complaint: Mechanical fall    Hospital Course: Per HPI \"Ilda Odom is a 68-year-old female with a past medical history significant for CAD s/p MI, HTN, CKD, IDDMT2 who presents to [facility] on 2/20/2021 for altered mental status and falls. Patient is a a resident at [facility] living facility where staff members report she was \"not acting right\". Patient was evaluated in the ED on the day prior and with negative work-up for ischemic stroke.  Patient was noted to have more frequent falls on returning to the facility. Patient is a poor historian on presentation with prolonged word finding. Patient reports 4 falls over the last 3 to 4 days. Patient denies hitting her head on any of these occasions. Reports that the last was while folding folding laundry where she landed on her her backside. Patient reports feeling weakness and dizzyness but denies losing concciousness. Patient has had multiple admissions for similar episodes s/p hip fracture. Patient reports worsening PO intake and dehydration over the past year. Of note patient has hx of essential tremor with neg workup for Parkinson's with  Dr Nino Martinez.     ED course:  VS: T 98 RR 17 HR 80 /70  CBC: Normocytic anemia WBC, platelets WNL  CMP mild hyperkalemia, stage I ISAURA, liver enzymes WNL, ammonia WNL  UA sterile pyuria  EKG NSR   CT head and neck: No acute process  Patient was given 1 L normal saline bolus and started on IVF at 100 cc/h\"    Subjective (past 24 hours): Pt currently denies CP, lightheadedness/dizziness. Medications:  Reviewed    Infusion Medications    sodium chloride 125 mL/hr at 02/22/21 6504     Scheduled Medications    sodium chloride flush  10 mL Intravenous 2 times per day    aspirin  81 mg Oral Daily    FLUoxetine  10 mg Oral Daily    midodrine  10 mg Oral TID WC    pantoprazole  40 mg Oral Daily    Vitamin D  1 tablet Oral Daily    atorvastatin  20 mg Oral Daily     PRN Meds: sodium chloride flush, promethazine **OR** ondansetron, polyethylene glycol, acetaminophen **OR** acetaminophen      Intake/Output Summary (Last 24 hours) at 2/22/2021 0946  Last data filed at 2/22/2021 0514  Gross per 24 hour   Intake 2769.92 ml   Output --   Net 2769.92 ml       Diet:  DIET GENERAL;    Exam:  /82   Pulse 85   Temp 98.1 °F (36.7 °C) (Oral)   Resp 16   Ht 5' 6\" (1.676 m)   Wt 134 lb (60.8 kg)   SpO2 93%   BMI 21.63 kg/m²     General appearance: No apparent distress. Frail appearing. Cooperative. HEENT: Pupils equal, round, and reactive to light. Conjunctivae/corneas clear. Neck: Supple, with full range of motion. Trachea midline. Respiratory:  Normal respiratory effort. Clear to auscultation, bilaterally without Rales/Wheezes/Rhonchi. Cardiovascular: Regular rate and rhythm with normal S1/S2 without murmurs, rubs or gallops. Abdomen: Soft, non-tender, non-distended with normal bowel sounds. Musculoskeletal: active ROM x 4 extremities. Skin: Skin color, texture, turgor normal.    Neurologic:  Neurovascularly intact without any focal sensory/motor deficits. Cranial nerves: II-XII intact, grossly non-focal.  Psychiatric: Alert and disoriented x3, thought content appropriate  Capillary Refill: Brisk,< 3 seconds   Peripheral Pulses: +1 palpable, equal bilaterally       Labs:   Recent Labs     02/20/21 2050 02/21/21 0321 02/22/21  0548   WBC 5.7 4.6* 3.8*   HGB 10.4* 10.0* 9.4*   HCT 34.1* 33.6* 31.4*    163 169     Recent Labs     02/20/21 2142 02/21/21  0321 02/22/21  0548    141 139   K 5.7* 4.4 4.0    105 107   CO2 31 27 25   BUN 27* 24* 20   CREATININE 1.5* 1.4* 1.3*   CALCIUM 10.1 9.7 9.3   PHOS  --  3.6  --      Recent Labs     02/20/21 2142 02/21/21  0321   AST 28 36   ALT 15 13   BILITOT 0.3 0.3   ALKPHOS 72 73     No results for input(s): INR in the last 72 hours. Recent Labs     02/21/21  0321   CKTOTAL 42     No results for input(s): PROCAL in the last 72 hours.     Microbiology:      Urinalysis:      Lab Results   Component Value Date    NITRU NEGATIVE 02/20/2021    WBCUA 0-2 02/20/2021    WBCUA >200W/CLUMPS 10/20/2011    BACTERIA NONE SEEN 02/20/2021    RBCUA 5-10 02/20/2021    BLOODU SMALL 02/20/2021    SPECGRAV 1.009 02/20/2021    GLUCOSEU NEGATIVE 11/09/2020       Radiology:  Ct Head Wo Contrast    Result Date: 2/20/2021  CT head without: Comparison:  CT,SR  - CT HEAD WO CONTRAST  - 02/19/2021 06:28 PM EST Findings: Age-appropriate sulcation. No intracranial mass, midline shift, hydrocephalus, acute hemorrhage or infarct. Unremarkable orbits. Visualized paranasal sinuses are clear. Mastoid air cells are clear. No skull fracture. No significant scalp soft tissue swelling. Impression: 1. No acute intracranial abnormality. Age-appropriate exam. 2.  Stable intracranial exam. This document has been electronically signed by: Yari Chen MD on 02/20/2021 10:37 PM All CTs at this facility use dose modulation techniques and iterative reconstructions, and/or weight-based dosing when appropriate to reduce radiation to a low as reasonably achievable.          Code Status: DNR-CCA      Electronically signed by JOANNA Eng CNP on 2/22/2021 at 9:46 AM

## 2021-02-22 NOTE — PLAN OF CARE
Problem: Falls - Risk of:  Goal: Will remain free from falls  Description: Will remain free from falls  Outcome: Ongoing  Note: Bed in lowest position and locked. Bed alarm activated. Educated on use of call light when in need of assistance- expressed understanding. Visual checks performed and charted. No falls during shift at this time. Armband and falling star in place. Call light within reach. Transfers with one assist and walker. Problem: Falls - Risk of:  Goal: Absence of physical injury  Description: Absence of physical injury  Outcome: Ongoing  Note: Patient free of accidental injury. Bed alarmed armed. Bed wheels locked. Bedside table in reach. Patient up with assistance when ambulating. Patient verbalizes and demonstrates the use of the call light. Hourly rounding being completed. Problem: Skin Integrity:  Goal: Will show no infection signs and symptoms  Description: Will show no infection signs and symptoms  Outcome: Ongoing  Note: No new signs or symptoms of skin redness or breakdown noted, will continue to monitor. Problem: Skin Integrity:  Goal: Absence of new skin breakdown  Description: Absence of new skin breakdown  Outcome: Ongoing  Note: No new signs or symptoms of skin redness or breakdown noted, will continue to monitor. Problem: Pain:  Goal: Pain level will decrease  Description: Pain level will decrease  Outcome: Ongoing  Note: No complaints of pain this shift, pain goal no pain. Problem: Pain:  Goal: Control of acute pain  Description: Control of acute pain  Outcome: Ongoing  Note: No complaints of pain this shift, pain goal no pain. Problem: Pain:  Goal: Control of chronic pain  Description: Control of chronic pain  Outcome: Ongoing  Note: No complaints of pain this shift, pain goal no pain. Care plan reviewed with patient. Patient verbalizes understanding of the plan of care and contribute to goal setting.

## 2021-02-22 NOTE — CARE COORDINATION
2/22/21, 6:56 AM EST  DISCHARGE PLANNING EVALUATION:    David Jerez       Admitted: 2/20/2021/ [facility] day: 0   Location: -23/023-A Reason for admit: Altered mental status [R41.82]   PMH:  has a past medical history of Acute kidney injury ([facility] [address] Arthritis, CAD (coronary artery disease), CKD (chronic kidney disease) stage 3, GFR 30-59 ml/min, Diabetes mellitus, insulin dependent (IDDM), controlled, Escherichia coli septicemia ([facility] [address] Essential tremor, History of blood transfusion, Hyperlipidemia, Hypertension, Hypoxemic respiratory failure, chronic ([facility] [address] MI (myocardial infarction) ([facility] [address] Normocytic anemia, and Osteoporosis. Procedure: No  Barriers to Discharge: To ER post fall reporting head injury, incontinence and confusion. PT/OT. ST. Neuro checks. Ortho VS.  IVF at 125/hr. SW and Dietitian consulted. PCP: Sierra Ch MD   %    Patient Goals/Plan/Treatment Preferences: Met with pt today. From [facility]. She has no medical services and uses no DME. She dines in [facility]. She has a friend who provides her transportation to appointments. She has a PCP and no issues getting her medications. SW is consulted for continuity of services. Transportation/Food Security/Housekeeping Addressed:  No issues identified.

## 2021-02-22 NOTE — PROGRESS NOTES
controlled     Escherichia coli septicemia (HCC)     Essential tremor     History of blood transfusion     Hyperlipidemia     Hypertension     Hypoxemic respiratory failure, chronic (HCC)     secondary to sepsis and possibly pneumonia    MI (myocardial infarction) ([facility] [address] 2011    Normocytic anemia     Osteoporosis        Pain: No pain reported. Subjective:  Patient is alert, upright in recliner chair. Pleasant and agreeable for cognitive evaluation. SOCIAL HISTORY:   Living Arrangements: Lives in a skilled nursing facility   Work History: Retired  Education Level: 12th grade   Driving Status: Does not drive  Finance Management: Assistance Required  Medication Management: Dependent/Unable  ADL's: Assistance Required. Hobbies: Pt use to enjoy playing piano   Vision Status: Glasses   Hearing: WFL in quiet setting        ORAL MOTOR:  Facial / Labial WFL    Lingual Impaired Mild weakness and lingual tremors    Dentition WFL dentures   Velum WFL    Vocal Quality WFL    Sensation WFL    Cough WFL      SPEECH / VOICE:  Speech and Voice appear to be grossly intact for basic and complex daily communication    LANGUAGE:  Receptive:  2 Step Commands: 2/2 indep   Simple Yes/No Questions: 2/2 indep   Complex Yes/No Questions: 4/5 indep   Identify Objects/Pictures: 3/3 indep   Receptive language skills appear to be grossly intact for basic daily communication. Expressive:  Sentence Completion (automatic): 1-10 indep, RUPERT: 11/12 *stuck at May, but able to continues once given verbal cue  Confrontational Naming: 3/3 indep  Responsive Naming: 3/3 inde  Divergent Naming (abstract): 4 WPM   Conversational Speech: functional   Expressive language skills appear to be grossly intact for basic daily communication. COGNITION:  [facility]) version 7.3 completed. Pt scored 8/30. Normal is greater than or equal to 26/30. (written portion was attempted, but VERY poor fine motor to hold the pen. Inclusion of +1 point given highest level of education achieved less than/equal to 12th grade or GED with limited-0 post-secondary schooling   Orientation: 6   Immediate Recall: Trial 1: 3/5, Trial 2: 4/5   Short-Term Recall: 0/5 indep, 0/5 min A via verbal cues, 5/5 MAX A   Divergent Namin WPM   Reasoning: verbal reasonin/2   Thought Organization: decreased thought organization during conversation   Insight: fair   Attention: 0/1   Math Computation: 0/3 on serial 7's   Executive Functionin/5 on MOCA *fine motor skills remain barriers    SWALLOWING:  Current Diet: general solids w/ thin liquids   Not Tested         RECOMMENDATIONS/ASSESSMENT:  DIAGNOSTIC IMPRESSIONS:  Patient seen with RN permission on this date. Patient has a hx of cognitive impairements with a documented  a score of 11/26 on MOCA (modified scoring) on 2020. On this date the pt was evaluated with informal assessment and the [address] [place] [address], scoring a 8/30, indicative of a Severe cognitive linguistic impairment as evidence by supporting data above. The pt's deficits are in the domains of attention, BASIC orientation, STM (immediate/delayed/working memory), organizational naming, basic problem solving and safety awareness, sequential analysis, and executive functioning. Overall the pt did present with a slight decline in cognitive since previous evaluation. Recommended STRICT 24 hour SUP, direct 1:1 assist with meds, finances, and additional ADLS/IADLs, along with continued skilled ST. Rehabilitation Potential: good    EDUCATION:  Learner: Patient  Education:  Reviewed results and recommendations of this evaluation, Reviewed ST goals and Plan of Care and Reviewed recommendations for follow-up  Evaluation of Education: Demonstrates with assistance, Needs further instruction and Family not present    PLAN:  Skilled SLP intervention on acute care 3-5 x per week or until goals met and/or pt plateaus in function.   Specific interventions for next session may include: orienation, basic problem solving, STM (1-3 units). PATIENT GOAL:    Return to prior level of function. SHORT TERM GOALS:  Short-term Goals  Timeframe for Short-term Goals: 2 weeks  Goal 1: Patient will complete BASIC orientation and BIO and STM of 1-3 units (immediate/delayed/working) with 60% accuracy given MOD A to improve retention of personal and previously learned information. Goal 2: Patient will complete basic problem solving (safety, call light, ect), visual/verbal reasoning and executive functioning with 60% accuracy given MOD A to improve independence with ADLs/IADLs  Goal 3: Patient will complete organizational naming and sequencing tasks with 60% accuracy given MOD A to improve processing speed adn lexical retrieval.    LONG TERM GOALS:  No LTM Goals recommended, due to anticipated short ELOS. Rosie Trujillo MA., CCC-SLP

## 2021-02-23 PROBLEM — G93.40 ENCEPHALOPATHY: Status: ACTIVE | Noted: 2021-02-23

## 2021-02-23 PROBLEM — E43 SEVERE MALNUTRITION (HCC): Chronic | Status: ACTIVE | Noted: 2020-11-12

## 2021-02-23 LAB
GLUCOSE BLD-MCNC: 131 MG/DL (ref 70–108)
GLUCOSE BLD-MCNC: 135 MG/DL (ref 70–108)
GLUCOSE BLD-MCNC: 137 MG/DL (ref 70–108)
GLUCOSE BLD-MCNC: 171 MG/DL (ref 70–108)
GLUCOSE BLD-MCNC: 192 MG/DL (ref 70–108)

## 2021-02-23 PROCEDURE — G0378 HOSPITAL OBSERVATION PER HR: HCPCS

## 2021-02-23 PROCEDURE — 2580000003 HC RX 258: Performed by: STUDENT IN AN ORGANIZED HEALTH CARE EDUCATION/TRAINING PROGRAM

## 2021-02-23 PROCEDURE — 97116 GAIT TRAINING THERAPY: CPT

## 2021-02-23 PROCEDURE — 97163 PT EVAL HIGH COMPLEX 45 MIN: CPT

## 2021-02-23 PROCEDURE — 97129 THER IVNTJ 1ST 15 MIN: CPT

## 2021-02-23 PROCEDURE — 99232 SBSQ HOSP IP/OBS MODERATE 35: CPT | Performed by: NURSE PRACTITIONER

## 2021-02-23 PROCEDURE — 6370000000 HC RX 637 (ALT 250 FOR IP): Performed by: STUDENT IN AN ORGANIZED HEALTH CARE EDUCATION/TRAINING PROGRAM

## 2021-02-23 PROCEDURE — 1200000000 HC SEMI PRIVATE

## 2021-02-23 PROCEDURE — 82948 REAGENT STRIP/BLOOD GLUCOSE: CPT

## 2021-02-23 RX ADMIN — ACETAMINOPHEN 650 MG: 325 TABLET ORAL at 00:11

## 2021-02-23 RX ADMIN — Medication 1000 UNITS: at 09:24

## 2021-02-23 RX ADMIN — PANTOPRAZOLE SODIUM 40 MG: 40 TABLET, DELAYED RELEASE ORAL at 06:11

## 2021-02-23 RX ADMIN — SODIUM CHLORIDE, PRESERVATIVE FREE 10 ML: 5 INJECTION INTRAVENOUS at 19:51

## 2021-02-23 RX ADMIN — FLUOXETINE HYDROCHLORIDE 10 MG: 10 CAPSULE ORAL at 09:24

## 2021-02-23 RX ADMIN — MIDODRINE HYDROCHLORIDE 10 MG: 10 TABLET ORAL at 12:16

## 2021-02-23 RX ADMIN — ASPIRIN 81 MG: 81 TABLET, COATED ORAL at 09:24

## 2021-02-23 RX ADMIN — INSULIN LISPRO 1 UNITS: 100 INJECTION, SOLUTION INTRAVENOUS; SUBCUTANEOUS at 17:54

## 2021-02-23 RX ADMIN — MIDODRINE HYDROCHLORIDE 10 MG: 10 TABLET ORAL at 17:54

## 2021-02-23 RX ADMIN — ATORVASTATIN CALCIUM 20 MG: 20 TABLET, FILM COATED ORAL at 19:51

## 2021-02-23 RX ADMIN — INSULIN LISPRO 1 UNITS: 100 INJECTION, SOLUTION INTRAVENOUS; SUBCUTANEOUS at 12:16

## 2021-02-23 ASSESSMENT — PAIN DESCRIPTION - LOCATION: LOCATION: HEAD

## 2021-02-23 ASSESSMENT — PAIN - FUNCTIONAL ASSESSMENT: PAIN_FUNCTIONAL_ASSESSMENT: ACTIVITIES ARE NOT PREVENTED

## 2021-02-23 ASSESSMENT — PAIN SCALES - GENERAL: PAINLEVEL_OUTOF10: 0

## 2021-02-23 NOTE — CARE COORDINATION
2/23/21, 10:47 AM EST    DISCHARGE ON GOING EVALUATION    Dexter Viviana day: 0  Location: -23/023-A Reason for admit: Altered mental status [R41.82]  Encephalopathy [G93.40]   Procedure: No  Barriers to Discharge: PT/OT following and recommending continued inpt therapy at discharge. ST has seen for cog eval and recommends 24 hr supervision 1:1 for meds, adls, finances etc.   PCP: Candy Madrigal MD   %  Patient Goals/Plan/Treatment Preferences: Pt is agreeable to ECF. SW following for discharge disposition.

## 2021-02-23 NOTE — PLAN OF CARE
Problem: Falls - Risk of:  Goal: Will remain free from falls  Description: Will remain free from falls  Outcome: Ongoing  Note: Fall precaution in place. Bed alarm/chair alarm. Bed locked in lowest position. Fall band applied if applicable. Call light and overhead table within reach. Will continue to monitor. Goal: Absence of physical injury  Description: Absence of physical injury  Outcome: Ongoing  Note: Absence of physical injury. Problem: Skin Integrity:  Goal: Will show no infection signs and symptoms  Description: Will show no infection signs and symptoms  Outcome: Ongoing  Note: Pt shows no new signs or symptoms of infection on my shift. Will continue to assess. Goal: Absence of new skin breakdown  Description: Absence of new skin breakdown  Outcome: Ongoing  Note: Absence of new skin integrity issues on my shift. Will continue to assess. Problem: Pain:  Goal: Pain level will decrease  Description: Pain level will decrease  Outcome: Ongoing  Note: Pt voices pain for a headache on my shift 8/10. Non pharmaceutical interventions like ice and repositioning offered before pain medications. Will continue to assess. Goal: Control of acute pain  Description: Control of acute pain  Outcome: Ongoing  Note: Pt voices pain for a headache on my shift 8/10. Non pharmaceutical interventions like ice and repositioning offered before pain medications. Will continue to assess. Goal: Control of chronic pain  Description: Control of chronic pain  Outcome: Ongoing  Note: Pt voices pain for a headache on my shift 8/10. Non pharmaceutical interventions like ice and repositioning offered before pain medications. Will continue to assess. Problem: DISCHARGE BARRIERS  Goal: Patient's continuum of care needs are met  2/23/2021 0115 by Danilo Henson RN  Outcome: Ongoing  Note: Pt continuum of care needs are being worked out. Pt verbalizes understanding of care plan.  Pt contributes to goal settings.

## 2021-02-23 NOTE — CARE COORDINATION
2/23/21, 3:53 PM EST    DISCHARGE PLANNING EVALUATION    Left message for Rip Diver at [facility], he is out for the day. Asked for return call tomorrow regarding patient admission at [facility]. Patient did walk 250 ft with therapy, however speech therapy cognitive test was 8/30. Spoke with Regan Scanlon, discussed patient's discharge plan. She is agreeable for [facility] as long as Medicare pays for stay. She can not afford both AL and SNF private pay. Ashley is agreeable to [facility] private pay for transport at discharge.

## 2021-02-23 NOTE — PROGRESS NOTES
Hospitalist Progress Note    Patient:  Yovanny Spicer      Unit/Bed:8B-23/023-A    YOB: 1950    MRN: [id number]       Acct: [de-identified]     PCP: Eleazar Monroe MD    Date of Admission: 2/20/2021    Assessment/Plan:    1. Recurrent mechanical falls w/ possible syncope?: Most likely d/t dehydration, posb hypoglycemia. CT head/neck no acute findings. 11/2020 Carotid US no significant stenosis. 9/2020 Echo no structural dz, EF 55%. Fall precautions. Cont to encourage PO intake. 2. Chronic undifferentiated encephalopathy: Probable underlying dementia, long term high intensity statin. BMP in am. Folate, vit B12, TSH okay. Appreciate ST for cognitive eval - severe cognitive linguistic impairment, rec strict 24hr sup, direct 1:1 assist w/ meds, finances, add'l ADLs/IADLs, along w/ cont skilled ST. Pt will need to be placed for ECF. 3. DM type 2 w/o use of insulin: A1c 5.0. Cont to hold metformin. Hypoglycemic protocol, LDSSI, Accuchecks ACHS ordered. 4. ISAURA superimposed on CKD stage 3: Improving w/ IVF. Tolerating PO, discontinue IVF. BMP in am.   5. Chronic normocytic anemia: Stable. Iron studies okay. CBC in am.   6. Hyperkalemia: Resolved. BMP in am.   7. CAD: Cont ASA, Lipitor. Plavix on MAR - not cont at assisted living d/t high risk traumatic bleeding d/t recurrent falls. 8. GERD: Cont protonix  9. HLD: Cont Zetia, Lipitor. Lipid panel okay. 10. Depression/anxiety: Cont fluoxetine  11. Osteoporosis: Hx/o hip fracture. Cont vit D. 12. Essential Tremor: Cont primidone. Previous w/o for Parkinson's (-). Dispo: clear for discharge, awaiting placement for ECF. Chief Complaint: Mechanical fall    Hospital Course: Per HPI \"Ilda Odom is a 25-year-old female with a past medical history significant for CAD s/p MI, HTN, CKD, IDDMT2 who presents to [facility] on 2/20/2021 for altered mental status and falls.  Patient is a a resident at [facility] facility where staff members report she was \"not acting right\". Patient was evaluated in the ED on the day prior and with negative work-up for ischemic stroke. Patient was noted to have more frequent falls on returning to the facility. Patient is a poor historian on presentation with prolonged word finding. Patient reports 4 falls over the last 3 to 4 days. Patient denies hitting her head on any of these occasions. Reports that the last was while folding folding laundry where she landed on her her backside. Patient reports feeling weakness and dizzyness but denies losing concciousness. Patient has had multiple admissions for similar episodes s/p hip fracture. Patient reports worsening PO intake and dehydration over the past year. Of note patient has hx of essential tremor with neg workup for Parkinson's with  Dr Isra Ibarra.     ED course:  VS: T 98 RR 17 HR 80 /70  CBC: Normocytic anemia WBC, platelets WNL  CMP mild hyperkalemia, stage I ISAURA, liver enzymes WNL, ammonia WNL  UA sterile pyuria  EKG NSR   CT head and neck: No acute process  Patient was given 1 L normal saline bolus and started on IVF at 100 cc/h\"    Subjective (past 24 hours): Pt currently denies CP, lightheadedness/dizziness.        Medications:  Reviewed    Infusion Medications    dextrose      sodium chloride 75 mL/hr at 02/22/21 2118     Scheduled Medications    insulin lispro  0-6 Units Subcutaneous TID WC    insulin lispro  0-3 Units Subcutaneous Nightly    sodium chloride flush  10 mL Intravenous 2 times per day    aspirin  81 mg Oral Daily    FLUoxetine  10 mg Oral Daily    midodrine  10 mg Oral TID WC    pantoprazole  40 mg Oral Daily    Vitamin D  1 tablet Oral Daily    atorvastatin  20 mg Oral Daily     PRN Meds: glucose, dextrose, glucagon (rDNA), dextrose, sodium chloride flush, promethazine **OR** ondansetron, polyethylene glycol, acetaminophen **OR** acetaminophen      Intake/Output Summary (Last 24 hours) at 2/23/2021 Pablo Roque filed at 2/23/2021 0114  Gross per 24 hour   Intake 1760 ml   Output 225 ml   Net 1535 ml       Diet:  DIET GENERAL;  Dietary Nutrition Supplements: Standard High Calorie Oral Supplement    Exam:  /76   Pulse 94   Temp 97.6 °F (36.4 °C) (Oral)   Resp 16   Ht 5' 6\" (1.676 m)   Wt 134 lb (60.8 kg)   SpO2 97%   BMI 21.63 kg/m²     General appearance: No apparent distress. Frail appearing. Cooperative. HEENT: Pupils equal, round, and reactive to light. Conjunctivae/corneas clear. Neck: Supple, with full range of motion. Trachea midline. Respiratory:  Normal respiratory effort. Clear to auscultation, bilaterally without Rales/Wheezes/Rhonchi. Cardiovascular: Regular rate and rhythm with normal S1/S2 without murmurs, rubs or gallops. Abdomen: Soft, non-tender, non-distended with normal bowel sounds. Musculoskeletal: active ROM x 4 extremities. Skin: Skin color, texture, turgor normal.    Neurologic:  Neurovascularly intact without any focal sensory/motor deficits. Cranial nerves: II-XII intact, grossly non-focal.  Psychiatric: Alert and disoriented x3, thought content appropriate  Capillary Refill: Brisk,< 3 seconds   Peripheral Pulses: +1 palpable, equal bilaterally       Labs:   Recent Labs     02/20/21 2050 02/21/21 0321 02/22/21  0548   WBC 5.7 4.6* 3.8*   HGB 10.4* 10.0* 9.4*   HCT 34.1* 33.6* 31.4*    163 169     Recent Labs     02/20/21 2142 02/21/21  0321 02/22/21  0548    141 139   K 5.7* 4.4 4.0    105 107   CO2 31 27 25   BUN 27* 24* 20   CREATININE 1.5* 1.4* 1.3*   CALCIUM 10.1 9.7 9.3   PHOS  --  3.6  --      Recent Labs     02/20/21 2142 02/21/21  0321   AST 28 36   ALT 15 13   BILITOT 0.3 0.3   ALKPHOS 72 73     No results for input(s): INR in the last 72 hours. Recent Labs     02/21/21  0321   CKTOTAL 42     No results for input(s): PROCAL in the last 72 hours.     Microbiology:      Urinalysis:      Lab Results   Component Value Date    NITRU NEGATIVE 02/20/2021    WBCUA 0-2 02/20/2021    WBCUA >200W/CLUMPS 10/20/2011    BACTERIA NONE SEEN 02/20/2021    RBCUA 5-10 02/20/2021    BLOODU SMALL 02/20/2021    SPECGRAV 1.009 02/20/2021    GLUCOSEU NEGATIVE 11/09/2020       Radiology:  Ct Head Wo Contrast    Result Date: 2/20/2021  CT head without: Comparison:  CT,SR  - CT HEAD WO CONTRAST  - 02/19/2021 06:28 PM EST Findings: Age-appropriate sulcation. No intracranial mass, midline shift, hydrocephalus, acute hemorrhage or infarct. Unremarkable orbits. Visualized paranasal sinuses are clear. Mastoid air cells are clear. No skull fracture. No significant scalp soft tissue swelling. Impression: 1. No acute intracranial abnormality. Age-appropriate exam. 2.  Stable intracranial exam. This document has been electronically signed by: Erendira Muhammad MD on 02/20/2021 10:37 PM All CTs at this facility use dose modulation techniques and iterative reconstructions, and/or weight-based dosing when appropriate to reduce radiation to a low as reasonably achievable.          Code Status: DNR-CCA      Electronically signed by JOANNA Webster CNP on 2/23/2021 at 1:03 PM

## 2021-02-23 NOTE — PROGRESS NOTES
[address] [address]  INPATIENT SPEECH THERAPY  [facility] MED SURG 8B  DAILY NOTE    TIME   SLP Individual Minutes  Time In: 1526  Time Out: 1540  Minutes: 14  Timed Code Treatment Minutes: 14 Minutes       Date: 2021  Patient Name: Bartolo Ayala      CSN: [id number]   : 1950  (65 y.o.)  Gender: female   Referring Physician:  Jazmyn Kan MD  Diagnosis: Altered Mental Status   Secondary Diagnosis: Cognitive impairment   Precautions: Fall Risk   Current Diet: Regular diet with thin liquids   Swallowing Strategies: Standard Universal Swallow Precautions  Date of Last MBS: Not Applicable    Pain:  No pain reported. Subjective:  Patient seen sitting upright in chair, alert and pleasant. Patient's boyfriend present; supportive. *Recommend 24/hour supervision upon discharge     Short-Term Goals:  SHORT TERM GOAL #1:  Goal 1: Patient will complete BASIC orientation and BIO and STM of 1-3 units (immediate/delayed/working) with 60% accuracy given MOD A to improve retention of personal and previously learned information. INTERVENTIONS:   Orientation: Patient with no awareness to current daily orientation; ST cued patient 'what would you look at or do if you wanted to know what day it was?' Patient responded, \"hand me my phone there. \" ST provided patient with personal cell phone; patient then read \", Tuesday, 2021. \" Patient interpreting digital time to incorrect moth. ST provided direct intervention/explaination. ST reviewed multiple times via verbal presentation \"Today is 2021. \" Patient verbalized understanding. Following 5 minute delay; ST cued patient \"Know what are you supposed to do if you want to know what day it is? \" Patient correctly responded, \"Look at my phone. \"      Patient unable to recall name of current place of living.      SHORT TERM GOAL #2:  Goal 2: Patient will complete basic problem solving (safety, call light, ect), visual/verbal reasoning and executive functioning with 60% accuracy given MOD A to improve independence with ADLs/IADLs  INTERVENTIONS: Hospital Safety; patient with no awareness to call light stating \"I would push the table away, get up,  that bag and move it and get my walker. \" ST provided direct explanation/intervention re: call light, location, rational and functional uses. Patient receptive. Following 5 minute delay; ST cued patient \"Now what would you do if you needed to get up and nobody was in here? \" Patient unable to provide appropriate/safe response despite instruction previously provided. Will continue to target     SHORT TERM GOAL #3:  Goal 3: Patient will complete organizational naming and sequencing tasks with 60% accuracy given MOD A to improve processing speed adn lexical retrieval.  INTERVENTIONS:   [place] divergent naming: Target 5 members  Trial 1: Vegetables; 1 indep, 2 mod cues, 2 max cues    Trial 2: Ice cream flavors; 2 indep, 3 max cues  Trial 3: Transportation; 4 indep, 1 max cues  *patient benefits from presentation of partial word     Long-Term Goals: No LTGs due to short ELOS          EDUCATION:  Learner: Patient  Education:  Reviewed results and recommendations of this evaluation, Reviewed ST goals and Plan of Care, Reviewed recommendations for follow-up and Education Related to Potential Risks and Complications Due to Impairment/Illness/Injury  Evaluation of Education: [employer] understanding, Demonstrates with assistance, Needs further instruction and Patient's significant other present; supportive    ASSESSMENT/PLAN:  Activity Tolerance:  Patient tolerance of  treatment: good. Assessment/Plan: Patient progressing toward established goals. Continues to require skilled care of licensed speech pathologist to progress toward achievement of established goals and plan of care. .     Plan for Next Session: Safety, Recall     SPRING Chao

## 2021-02-23 NOTE — PLAN OF CARE
Problem: Falls - Risk of:  Goal: Will remain free from falls  Description: Will remain free from falls  Outcome: Ongoing  Note: Pt remains free from falls/injury this shift. Wearing nonslip socks, uses call light appropriately, utilizes hourly rounding. Frequently used items within reach. Goal: Absence of physical injury  Description: Absence of physical injury  Outcome: Ongoing  Note: Pt remains free from falls/injury this shift. Wearing nonslip socks, uses call light appropriately, utilizes hourly rounding. Frequently used items within reach. Problem: Skin Integrity:  Goal: Will show no infection signs and symptoms  Description: Will show no infection signs and symptoms  Outcome: Ongoing  Note: Patient has no s/s of infection  Goal: Absence of new skin breakdown  Description: Absence of new skin breakdown  Outcome: Ongoing  Note: Patient free from skin breakdown. Problem: Pain:  Goal: Pain level will decrease  Description: Pain level will decrease  Outcome: Ongoing  Note: Patient denies pain this shift  Goal: Control of acute pain  Description: Control of acute pain  Outcome: Ongoing  Note: Patient denies pain this shift  Goal: Control of chronic pain  Description: Control of chronic pain  Outcome: Ongoing  Note: Patient denies pain this shift. Problem: DISCHARGE BARRIERS  Goal: Patient's continuum of care needs are met  Outcome: Ongoing  Note: Patient plans to go to ecf on discharge. Problem: Musculor/Skeletal Functional Status  Goal: Highest potential functional level  Outcome: Ongoing     Problem: Nutrition  Goal: Optimal nutrition therapy  2/23/2021 1657 by Kandace Hair RN  Outcome: Ongoing  2/23/2021 1118 by Jc Chavez RD, LD  Outcome: Ongoing   Care plan reviewed with patient. Patient verbalize understanding of the plan of care and contribute to goal setting.

## 2021-02-23 NOTE — CONSULTS
Comprehensive Nutrition Assessment    Type and Reason for Visit:  Initial, Consult(Severe Malnutrition)    Nutrition Recommendations/Plan:  *Recommend a Multivitamin w/minerals daily. *Started Ensure Enlive TID. *Continue current diet. Nutrition Assessment: Pt. severely malnourished AEB criteria listed below. At risk for further nutritional compromise r/t admit d/t AMS, variable po intake of meals since admit and underlying medical condition (hx CAD, CKD, DM, HTN, HLD, Osteoporosis) and need for nutrition support. Nutrition recommendations/interventions as per above. Malnutrition Assessment:  Malnutrition Status:  Severe malnutrition    Context:  Chronic Illness     Findings of the 6 clinical characteristics of malnutrition:  Energy Intake:  7 - 75% or less estimated energy requirements for 1 month or longer  Weight Loss:  (-8.8% weight loss in 3 months per EMR)     Body Fat Loss:  7 - Severe body fat loss Orbital, Triceps   Muscle Mass Loss:  7 - Severe muscle mass loss Temples (temporalis), Clavicles (pectoralis & deltoids)  Fluid Accumulation:  No significant fluid accumulation Extremities   Strength:  Not Performed    Estimated Daily Nutrient Needs:  Energy (kcal):  5552-4126 kcal/day (25-30 kcal/kg); Weight Used for Energy Requirements:  (60.8 kg on 2/22)     Protein (g):  72+ g/day (1.2+ g/kg); Weight Used for Protein Requirements:  (60.8 kg on 2/22)      Nutrition Related Findings: admit d/t AMS; pt seen; she reports decreased intake meals over the past month or so with unplanned weight loss; pt denies N/V or chewing/swallowing difficulty with food; last BM x1 on 2/21. Labs: POC Glucose 136. HgA1C 5% on 2/21/21. Rx includes: Lipitor, Humalog, Vitamin D and IVF      Wounds:  None       Current Nutrition Therapies:    DIET GENERAL;     Anthropometric Measures:  · Height: 5' 6\" (167.6 cm)  · Current Body Weight: 134 lb (60.8 kg)(2/22; no edema noted)   · Admission Body Weight: 135 lb 1.6 oz (61.3 kg)(2/21; no edema noted)    · Usual Body Weight: (Per EMR: 147 lb 12.8 oz on 11/9/20; 149 lb 3.2 oz on 9/9/20)     · Ideal Body Weight: 130 lbs  · BMI: 21.6  · BMI Categories: Underweight (BMI less than 22) age over 72       Nutrition Diagnosis:   · Severe malnutrition, In context of chronic illness related to inadequate protein-energy intake as evidenced by poor intake prior to admission, weight loss, severe loss of subcutaneous fat, severe muscle loss    Nutrition Interventions:   Food and/or Nutrient Delivery:  Continue Current Diet, Start Oral Nutrition Supplement, Vitamin Supplement  Nutrition Education/Counseling:  Education initiated(Encouraged po intake of meals and ONS at best effort during LOS)   Coordination of Nutrition Care:  Continue to monitor while inpatient    Goals:  Pt will consume 75% or more of meals during LOS       Nutrition Monitoring and Evaluation:   Behavioral-Environmental Outcomes:  None Identified   Food/Nutrient Intake Outcomes:  Food and Nutrient Intake, Supplement Intake, Vitamin/Mineral Intake  Physical Signs/Symptoms Outcomes:  Biochemical Data, Weight, Skin, Nutrition Focused Physical Findings, GI Status, Fluid Status or Edema     Discharge Planning:    Continue current diet, Continue Oral Nutrition Supplement     Electronically signed by Anum Lorenz RD, LD on 2/23/21 at 11:14 AM EST    Contact: [phone]

## 2021-02-23 NOTE — PLAN OF CARE
Problem: Nutrition  Goal: Optimal nutrition therapy  Outcome: Ongoing   Nutrition Problem #1: Severe malnutrition, In context of chronic illness  Intervention: Food and/or Nutrient Delivery: Continue Current Diet, Start Oral Nutrition Supplement, Vitamin Supplement  Nutritional Goals: Pt will consume 75% or more of meals during LOS

## 2021-02-23 NOTE — PROGRESS NOTES
[address] [address]  INPATIENT PHYSICAL THERAPY  EVALUATION  [facility] MED SURG 8B - 8B-23/023-A    Time In:   Time Out: 1797  Timed Code Treatment Minutes: 10 Minutes  Minutes: 17          Date: 2021  Patient Name: Moe Nichols,  Gender:  female        MRN: [id number]  : 1950  (65 y.o.)      Referring Practitioner: Dr. Tatyana Cisneros  Diagnosis: altered mental status  Additional Pertinent Hx: 20-year-old female with a past medical history significant for CAD s/p MI, HTN, CKD, IDDMT2 who presents to [facility] on 2021 for altered mental status and falls. Patient is a a resident at [facility] living [facility] where staff members report she was \"not acting right\". Patient was evaluated in the ED on the day prior and with negative work-up for ischemic stroke. Patient was noted to have more frequent falls on returning to the facility. Patient is a poor historian on presentation with prolonged word finding. Patient reports 4 falls over the last 3 to 4 days. Patient denies hitting her head on any of these occasions. Reports that the last was while folding folding laundry where she landed on her her backside. Patient reports feeling weakness and dizzyness but denies losing concciousness. Patient has had multiple admissions for similar episodes s/p hip fracture. Patient reports worsening PO intake and dehydration over the past year.  Of note patient has hx of essential tremor with neg workup for Parkinson's with  Dr Raisa Batista     Restrictions/Precautions:  Restrictions/Precautions: General Precautions, Fall Risk    Vitals: pt with lightheadedness at end of long bout of amb with O2 sats at >92% on room air, but HR in 130's with RN aware    Subjective:  Chart Reviewed: Yes  Patient assessed for rehabilitation services?: Yes  Subjective: pleasant and cooperative, per pt has had her essential tremors her whole life    General:    Hearing: Within functional limits    Pain: no c/o pain    Social/Functional History:    Lives With: Alone  Type of Home: Assisted living([facility] 2nd floor)  Home Layout: One level     Bathroom Shower/Tub: Walk-in shower  Bathroom Toilet: Handicap height  Bathroom Accessibility: Accessible       ADL Assistance: Independent(except showering)     Ambulation Assistance: Independent  Transfer Assistance: Independent          Additional Comments: Pt with diffiuclty answering questions throughout conversatin. Pt sstating she was indep with her ADL tasks and used a RW. Pt stating \"yes\" when asked if she was assisted wit her showers.     OBJECTIVE:  Range of Motion:  Bilateral Lower Extremity: WFL    Strength:  Bilateral Lower Extremity: WFL, generalized weakness    Balance:  Static Sitting Balance:  Modified Independent  Dynamic Sitting Balance: Supervision, reaching in TIFFANIE  Static Standing Balance: Contact Guard Assistance, to SBA  Dynamic Standing Balance: Contact Guard Assistance, reaching in TIFFANIE, slow movement and min unsteady    Bed Mobility:  Rolling to Right: Stand By Assistance   Supine to Sit: Stand By Assistance  Scooting: Stand By Workspace [place] time to complete  Transfers:  Sit to Stand: Contact Guard Assistance  Stand to Sit:Minimal Assistance, pt with LOB while turning to chair to sit, inc fall risk and safety concerns    Ambulation:  Contact Guard Assistance, primarily until end of long bout of amb Amber with LOB  Distance: 10'x1, 250'x1  Surface: Level Tile  Device:No Device  Gait Deviations:  Slow Susanna, Mild Path Deviations and pt running into objects x2 with foot on right of pathway but stated was because of her mask and difficult to see down, at end of long bout of amb pt with LOB and Amber to recover for safety, pt c/o lightheadedness with HR in 130's and RN aware        Functional Outcome Measures: Completed  AM-PAC Inpatient Mobility Raw Score : 18  AM-PAC Inpatient T-Scale Score : 43.63    ASSESSMENT:  Activity Tolerance:  Patient tolerance of  treatment: fair. Treatment Initiated: Treatment and education initiated within context of evaluation. Evaluation time included review of current medical information, gathering information related to past medical, social and functional history, completion of standardized testing, formal and informal observation of tasks, assessment of data and development of plan of care and goals. Treatment time included skilled education and facilitation of tasks to increase safety and independence with functional mobility for improved independence and quality of life. Assessment:   Body structures, Functions, Activity limitations: Decreased functional mobility , Decreased balance, Decreased safe awareness, Decreased strength  Assessment: pt with generalized weakness, essential tremors, dec balance, inc fall risk, inc HR and lightheadedness with long bout of amb, inc assist for safe mobility, pt lives alone in [place]., recommend cont PT to inc pt I with functional mobility  Prognosis: Good    REQUIRES PT FOLLOW UP: Yes    Discharge Recommendations:  Discharge Recommendations: Continue to assess pending progress, Subacute/Skilled Nursing Facility, Patient would benefit from continued therapy after discharge    Patient Education:  PT Education: Goals, PT Role, Plan of Care, Functional Mobility Training    Equipment Recommendations:       Plan:  Times per week: 3-5X GM  Times per day: Daily  Specific instructions for Next Treatment: therex, mobility, balance activities    Goals:  Patient goals : return home  Short term goals  Time Frame for Short term goals: by discharge  Short term goal 1: bed mobility with MOD I to get in/out of bed  Short term goal 2: transfer with S to get in/out of chairs  Short term goal 3: amb 75'x1 without AD and SBA to walk safely in room  Long term goals  Time Frame for Long term goals : no LTGs set secondary to short ELOS    Following session, patient left in safe position with all fall risk precautions in place.

## 2021-02-24 VITALS
TEMPERATURE: 98.3 F | WEIGHT: 134 LBS | RESPIRATION RATE: 18 BRPM | HEIGHT: 66 IN | DIASTOLIC BLOOD PRESSURE: 73 MMHG | HEART RATE: 77 BPM | SYSTOLIC BLOOD PRESSURE: 125 MMHG | BODY MASS INDEX: 21.53 KG/M2 | OXYGEN SATURATION: 94 %

## 2021-02-24 LAB
ANION GAP SERPL CALCULATED.3IONS-SCNC: 8 MEQ/L (ref 8–16)
BASOPHILS # BLD: 0.6 %
BASOPHILS ABSOLUTE: 0 THOU/MM3 (ref 0–0.1)
BUN BLDV-MCNC: 17 MG/DL (ref 7–22)
CALCIUM SERPL-MCNC: 9.6 MG/DL (ref 8.5–10.5)
CHLORIDE BLD-SCNC: 110 MEQ/L (ref 98–111)
CO2: 25 MEQ/L (ref 23–33)
CREAT SERPL-MCNC: 1.3 MG/DL (ref 0.4–1.2)
EOSINOPHIL # BLD: 4.7 %
EOSINOPHILS ABSOLUTE: 0.2 THOU/MM3 (ref 0–0.4)
ERYTHROCYTE [DISTWIDTH] IN BLOOD BY AUTOMATED COUNT: 13.6 % (ref 11.5–14.5)
ERYTHROCYTE [DISTWIDTH] IN BLOOD BY AUTOMATED COUNT: 47.8 FL (ref 35–45)
GFR SERPL CREATININE-BSD FRML MDRD: 40 ML/MIN/1.73M2
GLUCOSE BLD-MCNC: 113 MG/DL (ref 70–108)
GLUCOSE BLD-MCNC: 115 MG/DL (ref 70–108)
GLUCOSE BLD-MCNC: 193 MG/DL (ref 70–108)
GLUCOSE BLD-MCNC: 211 MG/DL (ref 70–108)
HCT VFR BLD CALC: 30.7 % (ref 37–47)
HEMOGLOBIN: 9.1 GM/DL (ref 12–16)
IMMATURE GRANS (ABS): 0.01 THOU/MM3 (ref 0–0.07)
IMMATURE GRANULOCYTES: 0.3 %
LYMPHOCYTES # BLD: 30.6 %
LYMPHOCYTES ABSOLUTE: 1 THOU/MM3 (ref 1–4.8)
MCH RBC QN AUTO: 28.7 PG (ref 26–33)
MCHC RBC AUTO-ENTMCNC: 29.6 GM/DL (ref 32.2–35.5)
MCV RBC AUTO: 96.8 FL (ref 81–99)
MONOCYTES # BLD: 11.4 %
MONOCYTES ABSOLUTE: 0.4 THOU/MM3 (ref 0.4–1.3)
NUCLEATED RED BLOOD CELLS: 0 /100 WBC
PLATELET # BLD: 169 THOU/MM3 (ref 130–400)
PMV BLD AUTO: 10.4 FL (ref 9.4–12.4)
POTASSIUM SERPL-SCNC: 4.2 MEQ/L (ref 3.5–5.2)
RBC # BLD: 3.17 MILL/MM3 (ref 4.2–5.4)
SARS-COV-2, NAAT: NOT DETECTED
SEG NEUTROPHILS: 52.4 %
SEGMENTED NEUTROPHILS ABSOLUTE COUNT: 1.8 THOU/MM3 (ref 1.8–7.7)
SODIUM BLD-SCNC: 143 MEQ/L (ref 135–145)
WBC # BLD: 3.4 THOU/MM3 (ref 4.8–10.8)

## 2021-02-24 PROCEDURE — 2580000003 HC RX 258: Performed by: STUDENT IN AN ORGANIZED HEALTH CARE EDUCATION/TRAINING PROGRAM

## 2021-02-24 PROCEDURE — 99239 HOSP IP/OBS DSCHRG MGMT >30: CPT | Performed by: NURSE PRACTITIONER

## 2021-02-24 PROCEDURE — 97535 SELF CARE MNGMENT TRAINING: CPT

## 2021-02-24 PROCEDURE — 80048 BASIC METABOLIC PNL TOTAL CA: CPT

## 2021-02-24 PROCEDURE — 87635 SARS-COV-2 COVID-19 AMP PRB: CPT

## 2021-02-24 PROCEDURE — 36415 COLL VENOUS BLD VENIPUNCTURE: CPT

## 2021-02-24 PROCEDURE — 85025 COMPLETE CBC W/AUTO DIFF WBC: CPT

## 2021-02-24 PROCEDURE — 6370000000 HC RX 637 (ALT 250 FOR IP): Performed by: STUDENT IN AN ORGANIZED HEALTH CARE EDUCATION/TRAINING PROGRAM

## 2021-02-24 PROCEDURE — 82948 REAGENT STRIP/BLOOD GLUCOSE: CPT

## 2021-02-24 RX ADMIN — ACETAMINOPHEN 650 MG: 325 TABLET ORAL at 05:40

## 2021-02-24 RX ADMIN — ASPIRIN 81 MG: 81 TABLET, COATED ORAL at 08:13

## 2021-02-24 RX ADMIN — INSULIN LISPRO 1 UNITS: 100 INJECTION, SOLUTION INTRAVENOUS; SUBCUTANEOUS at 16:55

## 2021-02-24 RX ADMIN — INSULIN LISPRO 2 UNITS: 100 INJECTION, SOLUTION INTRAVENOUS; SUBCUTANEOUS at 13:12

## 2021-02-24 RX ADMIN — PANTOPRAZOLE SODIUM 40 MG: 40 TABLET, DELAYED RELEASE ORAL at 05:34

## 2021-02-24 RX ADMIN — SODIUM CHLORIDE, PRESERVATIVE FREE 10 ML: 5 INJECTION INTRAVENOUS at 08:14

## 2021-02-24 RX ADMIN — FLUOXETINE HYDROCHLORIDE 10 MG: 10 CAPSULE ORAL at 08:13

## 2021-02-24 RX ADMIN — MIDODRINE HYDROCHLORIDE 10 MG: 10 TABLET ORAL at 16:54

## 2021-02-24 RX ADMIN — Medication 1000 UNITS: at 08:13

## 2021-02-24 RX ADMIN — MIDODRINE HYDROCHLORIDE 10 MG: 10 TABLET ORAL at 13:12

## 2021-02-24 RX ADMIN — MIDODRINE HYDROCHLORIDE 10 MG: 10 TABLET ORAL at 08:13

## 2021-02-24 ASSESSMENT — PAIN SCALES - GENERAL: PAINLEVEL_OUTOF10: 0

## 2021-02-24 NOTE — PROGRESS NOTES
Discharge teaching and instructions for diagnosis/procedure of altered mental status completed with patient using teachback method. AVS reviewed. Printed prescriptions given to patient. Patient voiced understanding regarding prescriptions, follow up appointments, and care of self at home.  Discharged in a wheelchair to  skilled nursing per EMS transportation

## 2021-02-24 NOTE — PLAN OF CARE
Problem: Falls - Risk of:  Goal: Will remain free from falls  Description: Will remain free from falls  2/24/2021 0348 by Sal Alfaro RN  Outcome: Ongoing  Note: Fall precaution in place. Bed alarm/chair alarm. Bed locked in lowest position. Fall band applied if applicable. Call light and overhead table within reach. Will continue to monitor. 2/23/2021 1657 by Miguel Maxwell RN  Outcome: Ongoing  Note: Pt remains free from falls/injury this shift. Wearing nonslip socks, uses call light appropriately, utilizes hourly rounding. Frequently used items within reach. Problem: Falls - Risk of:  Goal: Absence of physical injury  Description: Absence of physical injury  2/24/2021 0348 by Sal Alfaro RN  Outcome: Ongoing  Note: Absence of physical injury. Problem: Skin Integrity:  Goal: Will show no infection signs and symptoms  Description: Will show no infection signs and symptoms  2/24/2021 0348 by Sal Alfaro RN  Outcome: Ongoing  Note: Pt shows no new signs or symptoms of infection on my shift. Will continue to assess. Problem: Skin Integrity:  Goal: Absence of new skin breakdown  Description: Absence of new skin breakdown  2/24/2021 0348 by Sal Alfaro RN  Outcome: Ongoing  Note: Absence of new skin integrity issues on my shift. Will continue to assess. Problem: Pain:  Goal: Pain level will decrease  Description: Pain level will decrease  2/24/2021 0348 by Sal Alfaro RN  Outcome: Ongoing  Note: Pt denies pain on my shift. Non pharmaceutical interventions like ice and repositioning offered before pain medications. Will continue to assess. Problem: Pain:  Goal: Control of acute pain  Description: Control of acute pain  2/24/2021 0348 by Sal Alfaro RN  Outcome: Ongoing  Note: Pt denies pain on my shift. Non pharmaceutical interventions like ice and repositioning offered before pain medications. Will continue to assess.        Problem: Pain:  Goal: Control of chronic pain  Description: Control of chronic pain  2/24/2021 0348 by Mita Zuñiga RN  Outcome: Ongoing  Note: Pt denies pain on my shift. Non pharmaceutical interventions like ice and repositioning offered before pain medications. Will continue to assess. Problem: DISCHARGE BARRIERS  Goal: Patient's continuum of care needs are met  2/24/2021 0348 by Mita Zuñiga RN  Outcome: Ongoing  Note: Pt continuum of care needs are being worked out. Problem: Musculor/Skeletal Functional Status  Goal: Highest potential functional level  2/24/2021 0348 by Mita Zuñiga RN  Outcome: Ongoing  Note: Alert and Oriented x3 on my shift. Will continue to assess. Problem: Nutrition  Goal: Optimal nutrition therapy  2/24/2021 0348 by Mita Zuñiga RN  Outcome: Ongoing  Note: General diet. Optimal nutrition status. Pt verbalizes understanding of care plan. Pt contributes to goal settings.

## 2021-02-24 NOTE — PROGRESS NOTES
undergarment, slipper socks  Toilet Transfer: [address] STS.  **noted tremors throughout session    BALANCE:  Sitting Balance:  Stand By Assistance. Standing Balance: Contact Guard Assistance. -SBA    BED MOBILITY:  Not Tested    TRANSFERS:  Sit to Stand:  Stand By Assistance. from bedside chair    FUNCTIONAL MOBILITY:  Assistive Device: Rolling Walker  Assist Level:  Contact Guard Assistance. Distance: To and from bathroom        ASSESSMENT:     Activity Tolerance:  Patient tolerance of  treatment: fair. Discharge Recommendations: [address], Patient would benefit from continued therapy after discharge    Equipment Recommendations:    Plan: Times per week: 3-5x  Current Treatment Recommendations: Strengthening, Endurance Training, Patient/Caregiver Education & Training, Self-Care / ADL, Balance Training, Safety Education & Training    Patient Education  Patient Education: fall prevention with ADL tasks    Goals  Short term goals  Time Frame for Short term goals: by discharge  Short term goal 1: Pt to complete LB dressing tasks with SBA  Short term goal 2: Pt to complete toileting with sBA and no cues for safety  Short term goal 3: Pt to dmeo dyanmic standing with SBA and 0-1 UE support in prep for completing ADL tasks    Following session, patient left in safe position with all fall risk precautions in place.

## 2021-02-24 NOTE — DISCHARGE INSTR - COC
Continuity of Care Form    Patient Name: Tiffany Lund   :  1950  MRN:  [id number]    Admit date:  2021  Discharge date:  2021    Code Status Order: DNR-CCA   Advance Directives:   [facility] Documentation       Date/Time Healthcare Directive Type of Healthcare Directive Copy in [facility] [address] Agent's Name Healthcare Agent's Phone Number    [phone]  Yes, patient has an advance directive for healthcare treatment  --  --  --  --  --            Admitting Physician:  Samia Greco MD  PCP: Justin Judge MD    Discharging Nurse: [facility] Unit/Room#: 8B-23/023-A  Discharging Unit Phone Number: [phone]    Emergency Contact:   Extended Emergency Contact Information  Primary Emergency Contact: [facility] Laurie Patterson  of [address] [place] [address] Phone: [phone]  Relation: Other    Past Surgical History:  Past Surgical History:   Procedure Laterality Date    CYSTOSCOPY  12    with stent insertion    HIP SURGERY Left 2020    HIP PINNING performed by Anayeli Gilmore DO at [address] [place] [address] LITHOTRIPSY  2012    right       Immunization History: There is no immunization history for the selected administration types on file for this patient.     Active Problems:  Patient Active Problem List   Diagnosis Code    Pneumonia J18.9    Chest pain R07.9    Renal stone N20.0    Chest pain, atypical R07.89    Lung mass R91.8    Limb tremor R25.1    Numbness and tingling in right hand R20.0, R20.2    Syncope and collapse R55    Closed fracture of neck of left femur ([facility]) S72.002A    Acute respiratory failure with hypoxia ([facility]) J96.01    Subcapital fracture of femur, left, closed, with routine healing, subsequent encounter S72.012D    Type 2 diabetes mellitus without complication, with long-term current use of insulin ([facility]) E11.9, Z79.4    Vitamin D deficiency E55.9    Age-related

## 2021-02-24 NOTE — FLOWSHEET NOTE
[facility] PROGRESS NOTE      Patient: Khris Diez  Room #: 5Q-44/386-K            YOB: 1950  Age: 65 y.o. Gender: female            Admit Date & Time: 2/20/2021  7:03 PM    Assessment:  Olivia Herrera is a 65year old female who is sitting in her chair on 8b. She spoke of her falling and stated they are trying to figure out why I fall. She started to stand to show me her bruised back. The alarm went off. Olivia Herrera is in good spirits, stated she like to talk about God. Interventions:  She welcomed prayer and had tears in her eyes at the end of the time of prayer stating:  \"I get emotional.\"      Outcomes:  She was encouraged. Plan:  Her chart mentioned, \"Altered Mental status). 1.   Care Plan:  Continue spiritual and emotional care for patient and family. Including prayers.      Electronically signed by Betty Woodruff, on 2/24/2021 at 3:13 PM.  [facility]  [phone]

## 2021-02-24 NOTE — CARE COORDINATION
2/24/21, 12:32 PM EST    DISCHARGE PLANNING EVALUATION      Spoke with [facility] admissions. [facility] shares that they plan to skill under medicare. Call made to Constantine Han, to update. She is in agreement with discharge to [facility] today and requests private pay ambulette. Transport request called and faxed to [facility]. Will need negative covid test before discharge, discussed with RN.       2/24/21, 12:43 PM EST    Patient goals/plan/ treatment preferences discussed by  and . Patient goals/plan/ treatment preferences reviewed with patient/ family. Patient/ family verbalize understanding of discharge plan and are in agreement with goal/plan/treatment preferences. Understanding was demonstrated using the teach back method. AVS provided by RN at time of discharge, which includes all necessary medical information pertaining to the patients current course of illness, treatment, post-discharge goals of care, and treatment preferences. Services After Discharge  Services At/After Discharge: Nursing Services, Skilled Therapy, In [place], [place] Services([facility])   IMM Letter  IMM Letter given to Patient/Family/Significant other/Guardian/POA/by[de-identified] Collette GARRIDO Case Manager.   IMM Letter date given[de-identified] 02/23/21  IMM Letter time given[de-identified] 6882

## 2021-02-24 NOTE — DISCHARGE SUMMARY
Hospital Medicine Discharge Summary      Patient Identification:   Yudy Mast   : 1950  MRN: [id number]   Account: [de-identified]      Patient's PCP: Rosie Valenzuela MD    Admit Date: 2021     Discharge Date:   2021    Admitting Physician: Terrance Wood MD     Discharging Nurse Practitioner: Aung Barahona APRN - CNP     Discharge Diagnoses with Assessment/Plan:  1. Recurrent mechanical falls--PT/OT on the case; on midodrine for orthostatic hypotension  2. 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 CKD stage III--at baseline as baseline creatinine appears that 1.3 area; monitor  4. Diabetes mellitus type 2, controlled  5. Chronic normocytic anemia--stable  6. GERD  7. Hyperlipidemia--treated  8. Depression/anxiety  9. Osteoporosis  10. Essential tremor  11. Severe malnutrition--per dietitian    The patient was seen and examined on day of discharge and this discharge summary is in conjunction with any daily progress note from day of discharge. Hospital Course:   Yudy Mast is a 65 y.o. female admitted to [facility] on 2021 for falls;  Per progress note dated : \"Per HPI \"Ilda Montgomery is a 70-year-old female with a past medical history significant for CAD s/p MI, HTN, CKD, IDDMT2 who presents to [facility] on 2021 for altered mental status and falls. Patient is a a resident at [facility] living [facility] where staff members report she was \"not acting right\". Patient was evaluated in the ED on the day prior and with negative work-up for ischemic stroke. Patient was noted to have more frequent falls on returning to the facility.  Patient is a poor range of motion without deformity. Skin: Skin color, texture, turgor normal.    Neurologic:  Neurovascularly intact without any focal sensory/motor deficits. Cranial nerves: II-XII intact, grossly non-focal.  Psychiatric:  Alert and oriented to name and birthdate however she cannot recall any dates, thought content not entirely appropriate however she is pleasantly confused, this appears her baseline  Capillary Refill: Brisk,< 3 seconds   Peripheral Pulses: +2 palpable, equal bilaterally       Labs: For convenience and continuity at follow-up the following most recent labs are provided:      CBC:    Lab Results   Component Value Date    WBC 3.4 02/24/2021    HGB 9.1 02/24/2021    HCT 30.7 02/24/2021     02/24/2021       Renal:    Lab Results   Component Value Date     02/24/2021    K 4.2 02/24/2021    K 5.7 02/20/2021     02/24/2021    CO2 25 02/24/2021    BUN 17 02/24/2021    CREATININE 1.3 02/24/2021    CALCIUM 9.6 02/24/2021    PHOS 3.6 02/21/2021       Cardiac: No results for input(s): Towana Deborah in the last 72 hours. Significant Diagnostic Studies    Radiology:   XR CHEST (2 VW)   Final Result   Stable radiographic appearance of the chest. No evidence of an acute process. **This report has been created using voice recognition software. It may contain minor errors which are inherent in voice recognition technology. **      Final report electronically signed by Dr. Gil Farmer on 2/22/2021 7:14 AM      CT Head WO Contrast   Final Result   Impression:   1. No acute intracranial abnormality. Age-appropriate exam.   2.  Stable intracranial exam.      This document has been electronically signed by: Brigette Hernández MD on    02/20/2021 10:37 PM      All CTs at this facility use dose modulation techniques and iterative    reconstructions, and/or weight-based dosing   when appropriate to reduce radiation to a low as reasonably achievable.              Consults:     IP CONSULT TO DIETITIAN  IP CONSULT TO SOCIAL WORK    Disposition:    [] Home       [] TCU       [] Rehab       [] Psych       [x] SNF       [] [place]       [] Other-    Condition at Discharge: Stable    Code Status:  DNR-CCA     Pending tests at discharge: none    Patient Instructions:    Discharge lab work: none  Activity: activity as tolerated  Diet: DIET GENERAL;  Dietary Nutrition Supplements: Standard High Calorie Oral Supplement      Follow-up visits:   Miguelina Clemente MD  [address] [place] [address]  [phone]    In 1 week  hospital follow up    [address]  [address]  [phone]             Discharge Medications:      Edmundo Lie   Home Medication Instructions NPI:[id number]    Printed on:02/24/21 7880   Medication Information                      Acetaminophen (TYLENOL PO)  Take  by mouth as needed. aspirin 81 MG EC tablet  Take 81 mg by mouth daily. atorvastatin (LIPITOR) 80 MG tablet  Take 80 mg by mouth daily             Cholecalciferol 75 MCG (3000 UT) TABS  Take 1 tablet by mouth daily             ezetimibe (ZETIA) 10 MG tablet  Take 10 mg by mouth daily             FLUoxetine (PROZAC) 10 MG capsule  Take 1 capsule by mouth daily             lidocaine 4 % external patch  Place 2 patches onto the skin daily             midodrine (PROAMATINE) 10 MG tablet  Take 1 tablet by mouth 3 times daily (with meals)             pantoprazole (PROTONIX) 40 MG tablet  Take 40 mg by mouth daily                  Time Spent on discharge is more than 45 minutes in the examination, evaluation, counseling and review of medications and discharge plan. Signed: Thank you Miguelina Clemente MD for the opportunity to be involved in this patient's care.     Electronically signed by JOANNA Hills CNP on 2/24/2021 at 3:04 PM

## 2021-02-24 NOTE — PROGRESS NOTES
Hospitalist Progress Note    Patient:  Meenu Rodrigez      Unit/Bed:8B-23/023-A    YOB: 1950    MRN: [id number]       Acct: [de-identified]     PCP: Vicky Santana MD    Date of Admission: 2/20/2021    Assessment/Plan:    1. Recurrent mechanical falls--PT/OT on the case; on midodrine for orthostatic hypotension  2. 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 CKD stage III--at baseline as baseline creatinine appears that 1.3 area; monitor  4. Diabetes mellitus type 2, controlled  5. Chronic normocytic anemia--stable  6. GERD  7. Hyperlipidemia--treated  8. Depression/anxiety  9. Osteoporosis  10. Essential tremor  11. Severe malnutrition--per dietitian    Expected discharge date: Hopefully 2/24    Disposition:    [] Home       [] TCU       [] Rehab       [] Psych       [x] SNF       [] [place]       [] Other-    Chief Complaint: [facility] Course: Per progress note dated 2/23: \"Per HPI \"Ilda Odom is a 75-year-old female with a past medical history significant for CAD s/p MI, HTN, CKD, IDDMT2 who presents to [facility] on 2/20/2021 for altered mental status and falls. Patient is a a resident at [facility] living [facility] where staff members report she was \"not acting right\". Patient was evaluated in the ED on the day prior and with negative work-up for ischemic stroke. Patient was noted to have more frequent falls on returning to the facility. Patient is a poor historian on presentation with prolonged word finding.  Patient reports 4 falls over the last 3 to 4 days.  Patient denies hitting her head on any of these occasions.  Reports that the last was while folding folding gallops. Abdomen: Soft, non-tender, non-distended with normal bowel sounds. Musculoskeletal: passive and active ROM x 4 extremities. Skin: Skin color, texture, turgor normal.    Neurologic:  Neurovascularly intact without any focal sensory/motor deficits. Cranial nerves: II-XII intact, grossly non-focal.  Psychiatric: Alert and oriented to name and birthdate, thought content not appropriate; patient is pleasantly confused however  Capillary Refill: Brisk,< 3 seconds   Peripheral Pulses: +2 palpable, equal bilaterally       Labs:   Recent Labs     02/22/21  0548 02/24/21  0625   WBC 3.8* 3.4*   HGB 9.4* 9.1*   HCT 31.4* 30.7*    169     Recent Labs     02/22/21  0548      K 4.0      CO2 25   BUN 20   CREATININE 1.3*   CALCIUM 9.3     Microbiology:    None    Radiology:    Ct Head Wo Contrast    Result Date: 2/20/2021  CT head without: Comparison:  CT,SR  - CT HEAD WO CONTRAST  - 02/19/2021 06:28 PM EST Findings: Age-appropriate sulcation. No intracranial mass, midline shift, hydrocephalus, acute hemorrhage or infarct. Unremarkable orbits. Visualized paranasal sinuses are clear. Mastoid air cells are clear. No skull fracture. No significant scalp soft tissue swelling. Impression: 1. No acute intracranial abnormality. Age-appropriate exam. 2.  Stable intracranial exam. This document has been electronically signed by: Albertina Morrison MD on 02/20/2021 10:37 PM All CTs at this facility use dose modulation techniques and iterative reconstructions, and/or weight-based dosing when appropriate to reduce radiation to a low as reasonably achievable.       DVT prophylaxis: [] Lovenox                                 [x] SCDs                                 [] SQ Heparin                                 [] Encourage ambulation           [] Already on Anticoagulation     Code Status: DNR-CCA    Tele:   [x] yes SR HR 81             [] no    Active Hospital Problems    Diagnosis Date Noted    Encephalopathy

## 2021-02-25 NOTE — PROGRESS NOTES
Physician Progress Note      Ariana Solano  [facility] #:                  [id number]  :                       1950  ADMIT DATE:       2021 7:03 PM  [address] DATE:        2021 6:05 PM  RESPONDING  PROVIDER #:        Ana María MARSHALL - CNP          [facility],    Pt admitted with frequent falls & AMS and noted possible syncope d/t   dehydration, maybe hypoglycemia. Testing negative at this time. Patient with   functional decline, reported reduced physical activity/mobility. Per IM note   : Orthostatic vital negative - Recurrent falls, syncope vs physical   deconditioning vs progressive cognitive decline. Per IM Note : Appreciate   ST for cognitive eval - severe cognitive linguistic impairment, rec strict   24hr sup, direct 1:1 assist w/ meds, finances, add'l ADLs/IADLs, along w/ cont   skilled ST. Pt will need to be placed for ECF. If p    The medical record reflects the following:  Risk Factors: DM, CKD 3, CAD Tremor  Clinical Indicators:  Per IM note : Orthostatic vital negative -   \"Recurrent falls, syncope vs physical deconditioning vs progressive cognitive   decline\". Pt has fallen many times in past few months. Per ED:discharged from   the ED last night after frequent falls and confusion. Treatment: EKG NSR, no arrhythmia captured. Echo and Carotid dopplers in   2020 unremarkable and showed no significant stenosis. Per IM    \"Appreciate ST for cognitive eval - severe cognitive linguistic impairment,   rec strict 24hr sup, direct 1:1 assist w/ meds, finances, add'l ADLs/IADLs,   along w/ cont skilled ST. Pt will need to be placed for ECF.  \"  Options provided:  -- Age Related Physical Debility with continuation of decline as cause of   admission  -- Frailty  -- Weakness/fatigue/falls not pertinent to this admission  -- Other - I will add my own diagnosis  -- Disagree - Not applicable / Not valid -- Disagree - Clinically unable to determine / Unknown  -- Refer to Clinical Documentation Reviewer    PROVIDER RESPONSE TEXT:    This patient has age related physical debility with continuation of decline as   cause of admission. Query created by: Peter Andrews on 2/25/2021 6:57 AM      QUERY Hoda Penaloza,    Pt admitted with AMS. Noted documentation of Chronic undifferentiated   Encephalopathy & Underlying Dementia by IM in NP notes. If possible, please   document in progress notes and discharge summary:    The medical record reflects the following:  Risk Factors: DM, CKD 3, CAD  Clinical Indicators: IM :Chronic undifferentiated Encephalopathy & Underlying   Dementia. Pt arrived with acute confusion since the onset last few weeks. Treatment: 0.9 NS 1000 ml bolus, BMP in am. Folate, vit B12, TSH okay.  Consult   ST for cognitive eval.  Options provided:  -- Chronic undifferentiated encephalopathy (unable to determine type)   confirmed present on admission  -- Metabolic Encephalopathy confirmed present on admission  -- Dementia - no encephalopathy- confirmed present on admission  -- Other - I will add my own diagnosis  -- Disagree - Not applicable / Not valid  -- Disagree - Clinically unable to determine / Unknown  -- Refer to Clinical Documentation Reviewer    PROVIDER RESPONSE TEXT:    Not sure why I got this on 2/25 when in my note 2/24    Query created by: Peter Andrews on 2/25/2021 6:59 AM      Electronically signed by:  Monica Dobson CNP 2/25/2021 9:11 AM

## 2021-03-18 ENCOUNTER — APPOINTMENT (OUTPATIENT)
Dept: GENERAL RADIOLOGY | Age: 71
DRG: 640 | End: 2021-03-18
Payer: MEDICARE

## 2021-03-18 ENCOUNTER — HOSPITAL ENCOUNTER (INPATIENT)
Age: 71
LOS: 3 days | Discharge: HOME | DRG: 640 | End: 2021-03-22
Attending: EMERGENCY MEDICINE | Admitting: INTERNAL MEDICINE
Payer: MEDICARE

## 2021-03-18 ENCOUNTER — APPOINTMENT (OUTPATIENT)
Dept: CT IMAGING | Age: 71
DRG: 640 | End: 2021-03-18
Payer: MEDICARE

## 2021-03-18 DIAGNOSIS — I95.1 ORTHOSTATIC HYPOTENSION: Primary | ICD-10-CM

## 2021-03-18 PROBLEM — S00.83XA CONTUSION OF FACE: Status: ACTIVE | Noted: 2021-03-18

## 2021-03-18 PROBLEM — R55 SYNCOPE: Status: ACTIVE | Noted: 2021-03-18
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
## 2021-03-18 LAB
ALBUMIN SERPL-MCNC: 4.3 G/DL (ref 3.5–5.1)
ALP BLD-CCNC: 89 U/L (ref 38–126)
ALT SERPL-CCNC: 14 U/L (ref 11–66)
AMPHETAMINE+METHAMPHETAMINE URINE SCREEN: NEGATIVE
ANION GAP SERPL CALCULATED.3IONS-SCNC: 10 MEQ/L (ref 8–16)
APTT: 31 SECONDS (ref 22–38)
AST SERPL-CCNC: 22 U/L (ref 5–40)
BACTERIA: ABNORMAL
BARBITURATE QUANTITATIVE URINE: POSITIVE
BASOPHILS # BLD: 0.6 %
BASOPHILS ABSOLUTE: 0 THOU/MM3 (ref 0–0.1)
BENZODIAZEPINE QUANTITATIVE URINE: NEGATIVE
BILIRUB SERPL-MCNC: 0.4 MG/DL (ref 0.3–1.2)
BILIRUBIN DIRECT: < 0.2 MG/DL (ref 0–0.3)
BILIRUBIN URINE: NEGATIVE
BLOOD, URINE: ABNORMAL
BUN BLDV-MCNC: 27 MG/DL (ref 7–22)
CALCIUM SERPL-MCNC: 10.6 MG/DL (ref 8.5–10.5)
CANNABINOID QUANTITATIVE URINE: NEGATIVE
CASTS: ABNORMAL /LPF
CASTS: ABNORMAL /LPF
CHARACTER, URINE: CLEAR
CHLORIDE BLD-SCNC: 104 MEQ/L (ref 98–111)
CO2: 28 MEQ/L (ref 23–33)
COCAINE METABOLITE QUANTITATIVE URINE: NEGATIVE
COLOR: YELLOW
CREAT SERPL-MCNC: 1.5 MG/DL (ref 0.4–1.2)
CRYSTALS: ABNORMAL
EKG ATRIAL RATE: 65 BPM
EKG P AXIS: 67 DEGREES
EKG P-R INTERVAL: 120 MS
EKG Q-T INTERVAL: 424 MS
EKG QRS DURATION: 72 MS
EKG QTC CALCULATION (BAZETT): 440 MS
EKG R AXIS: 18 DEGREES
EKG T AXIS: 55 DEGREES
EKG VENTRICULAR RATE: 65 BPM
EOSINOPHIL # BLD: 3.6 %
EOSINOPHILS ABSOLUTE: 0.1 THOU/MM3 (ref 0–0.4)
EPITHELIAL CELLS, UA: ABNORMAL /HPF
ERYTHROCYTE [DISTWIDTH] IN BLOOD BY AUTOMATED COUNT: 12.7 % (ref 11.5–14.5)
ERYTHROCYTE [DISTWIDTH] IN BLOOD BY AUTOMATED COUNT: 42.8 FL (ref 35–45)
ETHYL ALCOHOL, SERUM: < 0.01 %
GFR SERPL CREATININE-BSD FRML MDRD: 34 ML/MIN/1.73M2
GLUCOSE BLD-MCNC: 142 MG/DL (ref 70–108)
GLUCOSE BLD-MCNC: 98 MG/DL (ref 70–108)
GLUCOSE, URINE: NEGATIVE MG/DL
HCT VFR BLD CALC: 37 % (ref 37–47)
HEMOGLOBIN: 11.5 GM/DL (ref 12–16)
IMMATURE GRANS (ABS): 0.01 THOU/MM3 (ref 0–0.07)
IMMATURE GRANULOCYTES: 0.3 %
INR BLD: 1.06 (ref 0.85–1.13)
KETONES, URINE: NEGATIVE
LEUKOCYTE EST, POC: ABNORMAL
LYMPHOCYTES # BLD: 26.9 %
LYMPHOCYTES ABSOLUTE: 1 THOU/MM3 (ref 1–4.8)
MAGNESIUM: 1.7 MG/DL (ref 1.6–2.4)
MCH RBC QN AUTO: 28.7 PG (ref 26–33)
MCHC RBC AUTO-ENTMCNC: 31.1 GM/DL (ref 32.2–35.5)
MCV RBC AUTO: 92.3 FL (ref 81–99)
MISCELLANEOUS LAB TEST RESULT: ABNORMAL
MONOCYTES # BLD: 8.6 %
MONOCYTES ABSOLUTE: 0.3 THOU/MM3 (ref 0.4–1.3)
NITRITE, URINE: NEGATIVE
NUCLEATED RED BLOOD CELLS: 0 /100 WBC
OPIATES, URINE: NEGATIVE
OSMOLALITY CALCULATION: 290.7 MOSMOL/KG (ref 275–300)
OXYCODONE: NEGATIVE
PH UA: 5 (ref 5–9)
PHENCYCLIDINE QUANTITATIVE URINE: NEGATIVE
PLATELET # BLD: 145 THOU/MM3 (ref 130–400)
PMV BLD AUTO: 9.7 FL (ref 9.4–12.4)
POTASSIUM REFLEX MAGNESIUM: 4.2 MEQ/L (ref 3.5–5.2)
PROTEIN UA: NEGATIVE MG/DL
RBC # BLD: 4.01 MILL/MM3 (ref 4.2–5.4)
RBC URINE: ABNORMAL /HPF
RENAL EPITHELIAL, UA: ABNORMAL
SEG NEUTROPHILS: 60 %
SEGMENTED NEUTROPHILS ABSOLUTE COUNT: 2.2 THOU/MM3 (ref 1.8–7.7)
SODIUM BLD-SCNC: 142 MEQ/L (ref 135–145)
SPECIFIC GRAVITY UA: 1.02 (ref 1–1.03)
TOTAL PROTEIN: 7.3 G/DL (ref 6.1–8)
TROPONIN T: < 0.01 NG/ML
UROBILINOGEN, URINE: 0.2 EU/DL (ref 0–1)
WBC # BLD: 3.6 THOU/MM3 (ref 4.8–10.8)
WBC UA: ABNORMAL /HPF
YEAST: ABNORMAL

## 2021-03-18 PROCEDURE — APPSS180 APP SPLIT SHARED TIME > 60 MINUTES: Performed by: PHYSICIAN ASSISTANT

## 2021-03-18 PROCEDURE — 99220 PR INITIAL OBSERVATION CARE/DAY 70 MINUTES: CPT | Performed by: HOSPITALIST

## 2021-03-18 PROCEDURE — 6820000002 HC L2 INJURY CALL ACTIVATION: Performed by: SURGERY

## 2021-03-18 PROCEDURE — G0378 HOSPITAL OBSERVATION PER HR: HCPCS

## 2021-03-18 PROCEDURE — 36415 COLL VENOUS BLD VENIPUNCTURE: CPT

## 2021-03-18 PROCEDURE — 93005 ELECTROCARDIOGRAM TRACING: CPT | Performed by: EMERGENCY MEDICINE

## 2021-03-18 PROCEDURE — 80307 DRUG TEST PRSMV CHEM ANLYZR: CPT

## 2021-03-18 PROCEDURE — 94760 N-INVAS EAR/PLS OXIMETRY 1: CPT

## 2021-03-18 PROCEDURE — 6360000002 HC RX W HCPCS: Performed by: HOSPITALIST

## 2021-03-18 PROCEDURE — 72170 X-RAY EXAM OF PELVIS: CPT

## 2021-03-18 PROCEDURE — 99285 EMERGENCY DEPT VISIT HI MDM: CPT

## 2021-03-18 PROCEDURE — 2580000003 HC RX 258: Performed by: EMERGENCY MEDICINE

## 2021-03-18 PROCEDURE — 84484 ASSAY OF TROPONIN QUANT: CPT

## 2021-03-18 PROCEDURE — 85610 PROTHROMBIN TIME: CPT

## 2021-03-18 PROCEDURE — 82077 ASSAY SPEC XCP UR&BREATH IA: CPT

## 2021-03-18 PROCEDURE — 2580000003 HC RX 258: Performed by: HOSPITALIST

## 2021-03-18 PROCEDURE — 80048 BASIC METABOLIC PNL TOTAL CA: CPT

## 2021-03-18 PROCEDURE — 71045 X-RAY EXAM CHEST 1 VIEW: CPT

## 2021-03-18 PROCEDURE — 6370000000 HC RX 637 (ALT 250 FOR IP): Performed by: EMERGENCY MEDICINE

## 2021-03-18 PROCEDURE — 85025 COMPLETE CBC W/AUTO DIFF WBC: CPT

## 2021-03-18 PROCEDURE — 70486 CT MAXILLOFACIAL W/O DYE: CPT

## 2021-03-18 PROCEDURE — 82948 REAGENT STRIP/BLOOD GLUCOSE: CPT

## 2021-03-18 PROCEDURE — 99221 1ST HOSP IP/OBS SF/LOW 40: CPT | Performed by: SURGERY

## 2021-03-18 PROCEDURE — 80076 HEPATIC FUNCTION PANEL: CPT

## 2021-03-18 PROCEDURE — 81001 URINALYSIS AUTO W/SCOPE: CPT

## 2021-03-18 PROCEDURE — 6370000000 HC RX 637 (ALT 250 FOR IP): Performed by: HOSPITALIST

## 2021-03-18 PROCEDURE — 70450 CT HEAD/BRAIN W/O DYE: CPT

## 2021-03-18 PROCEDURE — 72125 CT NECK SPINE W/O DYE: CPT

## 2021-03-18 PROCEDURE — 83735 ASSAY OF MAGNESIUM: CPT

## 2021-03-18 PROCEDURE — 85730 THROMBOPLASTIN TIME PARTIAL: CPT

## 2021-03-18 RX ORDER — ATORVASTATIN CALCIUM 80 MG/1
80 TABLET, FILM COATED ORAL DAILY
Status: DISCONTINUED | OUTPATIENT
Start: 2021-03-18 | End: 2021-03-22 | Stop reason: HOSPADM

## 2021-03-18 RX ORDER — SODIUM CHLORIDE 9 MG/ML
INJECTION, SOLUTION INTRAVENOUS CONTINUOUS
Status: DISCONTINUED | OUTPATIENT
Start: 2021-03-18 | End: 2021-03-20

## 2021-03-18 RX ORDER — CLOPIDOGREL BISULFATE 75 MG/1
75 TABLET ORAL DAILY
Status: ON HOLD | COMMUNITY
End: 2022-06-07 | Stop reason: SDUPTHER

## 2021-03-18 RX ORDER — PANTOPRAZOLE SODIUM 40 MG/1
40 TABLET, DELAYED RELEASE ORAL DAILY
Status: DISCONTINUED | OUTPATIENT
Start: 2021-03-19 | End: 2021-03-22 | Stop reason: HOSPADM

## 2021-03-18 RX ORDER — MIDODRINE HYDROCHLORIDE 10 MG/1
10 TABLET ORAL ONCE
Status: COMPLETED | OUTPATIENT
Start: 2021-03-18 | End: 2021-03-18

## 2021-03-18 RX ORDER — CLOPIDOGREL BISULFATE 75 MG/1
75 TABLET ORAL DAILY
Status: DISCONTINUED | OUTPATIENT
Start: 2021-03-19 | End: 2021-03-22 | Stop reason: HOSPADM

## 2021-03-18 RX ORDER — EZETIMIBE 10 MG/1
10 TABLET ORAL DAILY
Status: DISCONTINUED | OUTPATIENT
Start: 2021-03-18 | End: 2021-03-18

## 2021-03-18 RX ORDER — ASPIRIN 81 MG/1
81 TABLET ORAL DAILY
Status: DISCONTINUED | OUTPATIENT
Start: 2021-03-19 | End: 2021-03-22 | Stop reason: HOSPADM

## 2021-03-18 RX ORDER — ACETAMINOPHEN 650 MG/1
650 SUPPOSITORY RECTAL EVERY 6 HOURS PRN
Status: DISCONTINUED | OUTPATIENT
Start: 2021-03-18 | End: 2021-03-22 | Stop reason: HOSPADM

## 2021-03-18 RX ORDER — LIDOCAINE 4 G/G
2 PATCH TOPICAL DAILY
Status: DISCONTINUED | OUTPATIENT
Start: 2021-03-19 | End: 2021-03-22 | Stop reason: HOSPADM

## 2021-03-18 RX ORDER — FLUOXETINE 10 MG/1
10 CAPSULE ORAL DAILY
Status: DISCONTINUED | OUTPATIENT
Start: 2021-03-19 | End: 2021-03-22 | Stop reason: HOSPADM

## 2021-03-18 RX ORDER — POLYETHYLENE GLYCOL 3350 17 G/17G
17 POWDER, FOR SOLUTION ORAL DAILY PRN
Status: DISCONTINUED | OUTPATIENT
Start: 2021-03-18 | End: 2021-03-22 | Stop reason: HOSPADM

## 2021-03-18 RX ORDER — PROMETHAZINE HYDROCHLORIDE 25 MG/1
12.5 TABLET ORAL EVERY 6 HOURS PRN
Status: DISCONTINUED | OUTPATIENT
Start: 2021-03-18 | End: 2021-03-22 | Stop reason: HOSPADM

## 2021-03-18 RX ORDER — ACETAMINOPHEN 325 MG/1
650 TABLET ORAL EVERY 6 HOURS PRN
Status: DISCONTINUED | OUTPATIENT
Start: 2021-03-18 | End: 2021-03-22 | Stop reason: HOSPADM

## 2021-03-18 RX ORDER — VITAMIN B COMPLEX
3 TABLET ORAL DAILY
Status: DISCONTINUED | OUTPATIENT
Start: 2021-03-19 | End: 2021-03-22 | Stop reason: HOSPADM

## 2021-03-18 RX ORDER — ONDANSETRON 2 MG/ML
4 INJECTION INTRAMUSCULAR; INTRAVENOUS EVERY 6 HOURS PRN
Status: DISCONTINUED | OUTPATIENT
Start: 2021-03-18 | End: 2021-03-22 | Stop reason: HOSPADM

## 2021-03-18 RX ORDER — SODIUM CHLORIDE 0.9 % (FLUSH) 0.9 %
10 SYRINGE (ML) INJECTION EVERY 12 HOURS SCHEDULED
Status: DISCONTINUED | OUTPATIENT
Start: 2021-03-18 | End: 2021-03-22 | Stop reason: HOSPADM

## 2021-03-18 RX ORDER — SODIUM CHLORIDE 0.9 % (FLUSH) 0.9 %
10 SYRINGE (ML) INJECTION PRN
Status: DISCONTINUED | OUTPATIENT
Start: 2021-03-18 | End: 2021-03-22 | Stop reason: HOSPADM

## 2021-03-18 RX ORDER — MIDODRINE HYDROCHLORIDE 10 MG/1
10 TABLET ORAL
Status: DISCONTINUED | OUTPATIENT
Start: 2021-03-19 | End: 2021-03-22 | Stop reason: HOSPADM

## 2021-03-18 RX ADMIN — MIDODRINE HYDROCHLORIDE 10 MG: 10 TABLET ORAL at 14:02

## 2021-03-18 RX ADMIN — ACETAMINOPHEN 650 MG: 325 TABLET ORAL at 19:02

## 2021-03-18 RX ADMIN — ENOXAPARIN SODIUM 40 MG: 40 INJECTION SUBCUTANEOUS at 21:12

## 2021-03-18 RX ADMIN — ATORVASTATIN CALCIUM 80 MG: 80 TABLET, FILM COATED ORAL at 21:12

## 2021-03-18 RX ADMIN — SODIUM CHLORIDE, PRESERVATIVE FREE 10 ML: 5 INJECTION INTRAVENOUS at 21:13

## 2021-03-18 RX ADMIN — SODIUM CHLORIDE: 9 INJECTION, SOLUTION INTRAVENOUS at 12:44

## 2021-03-18 ASSESSMENT — ENCOUNTER SYMPTOMS
BACK PAIN: 0
COLOR CHANGE: 1
SHORTNESS OF BREATH: 0
PHOTOPHOBIA: 0
NAUSEA: 0
VOMITING: 0
ABDOMINAL PAIN: 0

## 2021-03-18 ASSESSMENT — PAIN DESCRIPTION - PAIN TYPE: TYPE: ACUTE PAIN

## 2021-03-18 ASSESSMENT — PAIN SCALES - GENERAL
PAINLEVEL_OUTOF10: 0
PAINLEVEL_OUTOF10: 0

## 2021-03-18 ASSESSMENT — PAIN DESCRIPTION - LOCATION: LOCATION: HEAD;NECK

## 2021-03-18 NOTE — PROGRESS NOTES
PHARMACY NOTE  Elbert Sosa was ordered ezetimibe. Per the [address], this medication is non-formulary and not stocked by pharmacy. The medication can be reordered at discharge.      Wynne Opitz, PharmD, BCPS 3/18/2021 6:23 PM

## 2021-03-18 NOTE — H&P
**This is a Medical/ PA/ APRN Student Note and is charted for educational purposes. The non-physician staff attested note is not to be used for billing purposes or to guide patient care. Please see the physician modifications/ attestation for treatment plan/suggestions. This note has been reviewed and feedback has been provided to the student. **      Hospitalist - H&P      Patient: Frannie Carr    Unit/Bed:03/003A  YOB: 1950  MRN: [id number]   Acct: [de-identified]   PCP: Ethel Girard MD    Date of Service: Pt seen/examined on 03/18/21  and Admitted to Inpatient with expected LOS greater than two midnights due to medical therapy. Chief Complaint:  Syncope    Assessment and Plan  1. Syncope: Orthostatics positive, troponin negative, EKG/TSH normal. Carotid doppler 11/10/2020 showed no signs of clinically significant stenosis. Suspect orthostatic hypotension exacerbated by decreased fluid intake. Gentle IVF, compression stockings. 2. CKD stage III: Gentle fluids and monitor volume status. Avoid nephrotoxic agents. 3. Hypomagnesia: Replace and reassess tomorrow. 4. Normocytic anemia: Chronic, anemia w/u normal. Possible prior episode of bleeding vs treated iron deficiency anemia. PCP to consider colonoscopy outpatient. 5. HLD: Continue home medication(s). 6. T2DM: Hold metformin, initiate sliding scale insuline. 7. Essential tremor: Follows w/ neurology. Was on primidone, unknown if currently taking. 8. Deconditioning: PT/OT to see. History Of Present Illness:    Patient is a 71y/o F c hx of HLD, CAD, CKD, orthostatic hypotension, and multiple falls who presented to the ED as a trauma patient after suffering the fall. Per patient, she woke this morning and sat up in bed then started feeling very dizzy. She scooted out of her bed and got to the floor, then the next thing she knew she woke up surrounded by paramedics.  Says over the past few days she has been drinking less fluids because she doesn't like what the facility has. Admits to chronic constipation, body aches, cough, denies N/V/D, chest pain, murmurs, SOB, wheezing. Past Medical History:        Diagnosis Date    Acute kidney injury ([address]     Arthritis     CAD (coronary artery disease)     CKD (chronic kidney disease) stage 3, GFR 30-59 ml/min     Diabetes mellitus, insulin dependent (IDDM), controlled     Escherichia coli septicemia (HCC)     Essential tremor     History of blood transfusion     Hyperlipidemia     Hypertension     Hypoxemic respiratory failure, chronic (HCC)     secondary to sepsis and possibly pneumonia    MI (myocardial infarction) ([facility] [facility] 2011    Normocytic anemia     Osteoporosis        Past Surgical History:        Procedure Laterality Date    CYSTOSCOPY  12/7/12    with stent insertion    HIP SURGERY Left 9/6/2020    HIP PINNING performed by Zackary Olivares DO at [address] [place] [address] LITHOTRIPSY  12/18/2012    right       Home Medications:   No current facility-administered medications on file prior to encounter. Current Outpatient Medications on File Prior to Encounter   Medication Sig Dispense Refill    clopidogrel (PLAVIX) 75 MG tablet Take 75 mg by mouth daily      metFORMIN (GLUCOPHAGE) 500 MG tablet Take 500 mg by mouth 2 times daily (with meals)      midodrine (PROAMATINE) 10 MG tablet Take 1 tablet by mouth 3 times daily (with meals) 30 tablet 0    FLUoxetine (PROZAC) 10 MG capsule Take 1 capsule by mouth daily 30 capsule 0    Cholecalciferol 75 MCG (3000 UT) TABS Take 1 tablet by mouth daily 90 tablet 3    atorvastatin (LIPITOR) 80 MG tablet Take 80 mg by mouth daily      ezetimibe (ZETIA) 10 MG tablet Take 10 mg by mouth daily      pantoprazole (PROTONIX) 40 MG tablet Take 40 mg by mouth daily       aspirin 81 MG EC tablet Take 81 mg by mouth daily.         lidocaine 4 % external patch Place 2 patches onto the skin daily 1 box 0    Acetaminophen (TYLENOL PO) Take  by mouth as needed. Allergies:    Patient has no known allergies. Social History:    reports that she has never smoked. She has never used smokeless tobacco. She reports current alcohol use. She reports that she does not use drugs. Family History:       Problem Relation Age of Onset    Cancer Mother     Heart Disease Mother        Diet:  No diet orders on file    Review of systems:   Pertinent positives as noted in the HPI. All other systems reviewed and negative. PHYSICAL EXAM:  /67   Pulse 70   Temp 98.8 °F (37.1 °C)   Resp 16   Ht 5' 6\" (1.676 m)   Wt 155 lb (70.3 kg)   SpO2 99%   BMI 25.02 kg/m²   General appearance: No apparent distress, appears stated age and cooperative. HEENT: Normal cephalic, without obvious deformity, ecchymosis surrounding the left orbit. Pupils equal, round, and reactive to light. Extra ocular muscles intact. Conjunctivae/corneas clear. Neck: Supple, with full range of motion. No jugular venous distention. Trachea midline. Respiratory:  Normal respiratory effort. Clear to auscultation, bilaterally without Rales/Wheezes/Rhonchi. Cardiovascular: Regular rate and rhythm with normal S1/S2 without murmurs, rubs or gallops. Abdomen: Soft, non-tender, non-distended with normal bowel sounds. Musculoskeletal:  No clubbing, cyanosis or edema bilaterally. Skin: Skin color, texture, turgor normal.  No rashes or lesions. Neurologic:  Neurovascularly intact without any focal sensory/motor deficits.  Cranial nerves: II-XII intact, grossly non-focal.  Psychiatric: Alert and oriented, thought content appropriate, normal insight  Capillary Refill: Brisk,< 3 seconds   Peripheral Pulses: +2 palpable, equal bilaterally     Labs:   Recent Labs     03/18/21 0822   WBC 3.6*   HGB 11.5*   HCT 37.0        Recent Labs     03/18/21 0822      K 4.2      CO2 28   BUN 27*   CREATININE 1.5*   CALCIUM 10.6*     Recent Labs     03/18/21 0822   AST 22 signed by DR Louie Nicole on 3/18/2021 9:11 AM      CT CERVICAL SPINE WO CONTRAST   Final Result    No evidence of acute osseous injury of the cervical spine. **This report has been created using voice recognition software. It may contain minor errors which are inherent in voice recognition technology. **      Final report electronically signed by Dr. Sandra Harden MD on 3/18/2021 9:09 AM          Electronically signed by Gregorio Plaza on 3/18/2021 at 2:06 PM    **This is a Medical/ PA/ APRN Student Note and is charted for educational purposes. The non-physician staff attested note is not to be used for billing purposes or to guide patient care. Please see the physician modifications/ attestation for treatment plan/suggestions. This note has been reviewed and feedback has been provided to the student.  **

## 2021-03-18 NOTE — ACP (ADVANCE CARE PLANNING)
Advance Care Planning     Advance Care Planning Activator (Inpatient)  Conversation Note      Date of ACP Conversation: 3/18/2021    Conversation Conducted with: Patient with Decision Making Capacity    ACP Activator: Fidel Joel Decision Maker:     Current Designated Health Care Decision Maker:     Primary Decision Maker: Santosh Main - Other - [phone]    Secondary Decision Maker: Edelmira Infante - Other - [phone]    Supplemental (Other) Decision Maker: Reina Alvarenga - Other - [phone]    Today we documented Decision Maker(s) consistent with ACP documents on file. Care Preferences    Ventilation: \"If you were in your present state of health and suddenly became very ill and were unable to breathe on your own, what would your preference be about the use of a ventilator (breathing machine) if it were available to you? \"      Would the patient desire the use of ventilator (breathing machine)?: yes    \"If your health worsens and it becomes clear that your chance of recovery is unlikely, what would your preference be about the use of a ventilator (breathing machine) if it were available to you? \"     Would the patient desire the use of ventilator (breathing machine)?: No      Resuscitation  \"CPR works best to restart the heart when there is a sudden event, like a heart attack, in someone who is otherwise healthy. Unfortunately, CPR does not typically restart the heart for people who have serious health conditions or who are very sick. \"    \"In the event your heart stopped as a result of an underlying serious health condition, would you want attempts to be made to restart your heart (answer \"yes\" for attempt to resuscitate) or would you prefer a natural death (answer \"no\" for do not attempt to resuscitate)? \" yes       [] Yes   [x] No   Educated Cata / Gloria Rutledge regarding differences between Advance Directives and portable DNR orders.     Length of ACP Conversation in minutes:  30    Conversation Outcomes:  [x] ACP discussion completed  [x] Existing advance directive reviewed with patient; no changes to patient's previously recorded wishes  [] New Advance Directive completed  [] Portable Do Not Rescitate prepared for Provider review and signature  [] POLST/POST/MOLST/MOST prepared for Provider review and signature      Follow-up plan:    [] Schedule follow-up conversation to continue planning  [] Referred individual to Provider for additional questions/concerns   [] Advised patient/agent/surrogate to review completed ACP document and update if needed with changes in condition, patient preferences or care setting    [] This note routed to one or more involved healthcare providers     Pt had AD on file, but the only name on the pt's chart was the 3rd person listed on the existing AD form. Pt did not desire to change the AD on file. It was explained to the pt that that would mean the other two people would be contacted first if the need ever arose for their decision making. Pt was ok with leaving the forms as they existed. Pt was not interested in completing DNR forms at this time.

## 2021-03-18 NOTE — ED NOTES
Pt returned to rm 03. Continues restful on cart, VSS.  Will monitor     Devon Callaway, HEMA  03/18/21 2740

## 2021-03-18 NOTE — ED NOTES
Pt on/off bedpan. Tolerated well- CCUA collected and sent. Pt continues restful on cart, VSS. CC removed per trauma PA.       Savanna Fatima, RN  03/18/21 9430

## 2021-03-18 NOTE — PROGRESS NOTES
Admit RN calls [place] [phone] ([phone]) with request to send current pt home med list.  Staff will fax to us @ [phone].

## 2021-03-18 NOTE — CONSULTS
I have independently performed an evaluation on Ilda . I have reviewed the above documentation completed by the [facility]. Please see my additional contributions to the HPI, physical exam, assessment/medical decision making. Pleasant 64 yo female who fell at home over bed side rail. On exam she is alert and oriented. She says she just got weak and fell. This has been happening repetatively. On exam brusing and hematoma over left eye and periorbital region. CT without any acute traumatic injury. No traumatic injuries necessitating need for admission, however recommend evaluation by medical service due to recurrent falls. Electronically signed by Ashley Aguayo MD on 3/19/2021 at 8:49 AM      Trauma Consult     Patient:  Estrellita Moody  Admit date: 3/18/2021   YOB: 1950 Date of Evaluation: 3/18/2021  MRN: [id number]  Acct: [de-identified]    Injury Date: 3/18/21  Injury time: Morning  PCP: Ayaz Lovell MD   Referring physician: Dr. Cici Zeng    Time of Trauma Surgeon Notification: 1220   Time of Trama SUGEY Arrival: 8872  Time of Trauma Surgeon Arrival: 1200    Assessment:      Active Problems:    Fall from bed    Contusion of face    Syncope  Resolved Problems:    * No resolved hospital problems. *    Plan:    Patient did not sustain any injuries which warrant admission to the Trauma service. Patient stable from a Trauma perspective. Recommend local wound care for facial contusion and Hospitalist evaluation of syncope and falls. Trauma services will sign off care of this patient. Case discussed with Dr. Cici Zeng and Dr. Misty Morales.     Contusion of face   - CTs negative for any fractures   - Ice PRN   - Pain control    Activation: []Level I (Trauma Alert) [x]Level II (Injury Call) []Level III (Trauma Consult) []Downgraded  Mode of Arrival: EMS transportation  Referring Facility: N/A  Loss of Consciousness []No [x]Yes[]Unknown Unclear Duration(min)  Mechanism of Injury:  []Motor Vehicle crash   []Single Vehicle [] []Passenger []Scene Fatality []Front Seat  []Restrained   []Air Bag Deployed   []Ejected []Rollover []Pedestrian []Trapped   Type of vehicle:   Protective Devices:   []Motorcycle  Wearing Helmet []Yes []No  []Bicycle  Wearing Helmet []Yes []No  [x]Fall   Distance - From bed  []Assault    Abuse Reported []Yes []No  []Gunshot  []Stabbing  []Work Related  []Burn: []Flame []Scald []Electrical []Chemical []Contact []Inhalation []House Fire  []Other:   Patient Active Problem List   Diagnosis    Pneumonia    Chest pain    Renal stone    Chest pain, atypical    Lung mass    Limb tremor    Numbness and tingling in right hand    Syncope and collapse    Closed fracture of neck of left femur (HCC)    Acute respiratory failure with hypoxia (HCC)    Subcapital fracture of femur, left, closed, with routine healing, subsequent encounter    Type 2 diabetes mellitus without complication, with long-term current use of insulin ([address]    Vitamin D deficiency    Age-related osteoporosis with current pathological fracture with routine healing    Scalp hematoma    Recurrent falls    Fall at home, initial encounter    Severe malnutrition ([address]    Altered mental status    Acute kidney injury superimposed on CKD ([address]    Encephalopathy    Fall from bed    Contusion of face    Syncope     Subjective   Chief Complaint: Fall from bed    History of Present Illness: 65year old female patient with a PMH of CAD, MI, DM2, CKD, Orthostatic hypotension on midodrine, Osteoporosis, Tremor, recent syncope-related falls and subsequent hospital admissions, on Plavix, presents to the emergency department via EMS as a Level II Trauma activation after falling at her Assisted Living. Initially on arrival patient told staff she fell from standing while walking to the restroom this morning. On my assessment she stated she remembered falling out of bed while trying to get up and go to the restroom this morning.  She states she felt dizzy, like the room was spinning, and \"blacked out\" prior to her fall. She states she then woke up on the floor next to her bed. She arrived to the emergency department complaining of pain to the back of her head and her neck. She has a contusion forming to the left periorbital and left cheek area. On arrival to the emergency department she was placed in a cervical collar. FAST was negative. Orders for CT head, neck and facial bones as well as Xrays of the chest and pelvis were placed. Imaging and labs reviewed and resulted negative for any traumatic injury which would warrant admission to the Trauma service. Recommend local wound care for facial contusion and Hospitalist evaluation of syncope and falls. Case discussed with Dr. Manda Cohen and Dr. Siobhan Fall. Review of Systems:   Review of Systems   Constitutional: Negative for chills and fever. HENT: Negative for dental problem. Eyes: Negative for photophobia and visual disturbance. Respiratory: Negative for shortness of breath. Gastrointestinal: Negative for abdominal pain, nausea and vomiting. Genitourinary: Negative for dysuria and hematuria. Musculoskeletal: Positive for neck pain. Negative for arthralgias and back pain. Skin: Positive for color change (Contusion of face). Neurological: Positive for dizziness, syncope and headaches. Negative for weakness and numbness. Patient has no known allergies.   Past Surgical History:   Procedure Laterality Date    CYSTOSCOPY  12/7/12    with stent insertion    HIP SURGERY Left 9/6/2020    HIP PINNING performed by Jarred Pendleton DO at [address] [place] [address] LITHOTRIPSY  12/18/2012    right     Past Medical History:   Diagnosis Date    Acute kidney injury ([address]     Arthritis     CAD (coronary artery disease)     CKD (chronic kidney disease) stage 3, GFR 30-59 ml/min     Diabetes mellitus, insulin dependent (IDDM), controlled     Escherichia coli septicemia ([address]     Essential tremor     History of blood transfusion     Hyperlipidemia     Hypertension     Hypoxemic respiratory failure, chronic (HCC)     secondary to sepsis and possibly pneumonia    MI (myocardial infarction) ([facility] [address] 2011    Normocytic anemia     Osteoporosis      Past Surgical History:   Procedure Laterality Date    CYSTOSCOPY  12/7/12    with stent insertion    HIP SURGERY Left 9/6/2020    HIP PINNING performed by Molly Cardona DO at [address] [place] [address] LITHOTRIPSY  12/18/2012    right     Social History     Socioeconomic History    Marital status:       Spouse name: Not on file    Number of children: Not on file    Years of education: Not on file    Highest education level: Not on file   Occupational History    Not on file   Social Needs    Financial resource strain: Not on file    Food insecurity     Worry: Not on file     Inability: Not on file    Transportation needs     Medical: Not on file     Non-medical: Not on file   Tobacco Use    Smoking status: Never Smoker    Smokeless tobacco: Never Used   Substance and Sexual Activity    Alcohol use: Yes     Comment: social    Drug use: No    Sexual activity: Not Currently   Lifestyle    Physical activity     Days per week: Not on file     Minutes per session: Not on file    Stress: Not on file   Relationships    Social connections     Talks on phone: Not on file     Gets together: Not on file     Attends Faith service: Not on file     Active member of club or organization: Not on file     Attends meetings of clubs or organizations: Not on file     Relationship status: Not on file    Intimate partner violence     Fear of current or ex partner: Not on file     Emotionally abused: Not on file     Physically abused: Not on file     Forced sexual activity: Not on file   Other Topics Concern    Not on file   Social History Narrative    Not on file     Family History   Problem Relation Age of Onset    Cancer Mother     Heart Disease Mother      Home medications:    Previous Medications    ACETAMINOPHEN (TYLENOL PO)    Take  by mouth as needed. ASPIRIN 81 MG EC TABLET    Take 81 mg by mouth daily.       ATORVASTATIN (LIPITOR) 80 MG TABLET    Take 80 mg by mouth daily    CHOLECALCIFEROL 75 MCG (3000 UT) TABS    Take 1 tablet by mouth daily    CLOPIDOGREL (PLAVIX) 75 MG TABLET    Take 75 mg by mouth daily    EZETIMIBE (ZETIA) 10 MG TABLET    Take 10 mg by mouth daily    FLUOXETINE (PROZAC) 10 MG CAPSULE    Take 1 capsule by mouth daily    LIDOCAINE 4 % EXTERNAL PATCH    Place 2 patches onto the skin daily    METFORMIN (GLUCOPHAGE) 500 MG TABLET    Take 500 mg by mouth 2 times daily (with meals)    MIDODRINE (PROAMATINE) 10 MG TABLET    Take 1 tablet by mouth 3 times daily (with meals)    PANTOPRAZOLE (PROTONIX) 40 MG TABLET    Take 40 mg by mouth daily      Hospital medications:  Scheduled Meds:  Continuous Infusions:  PRN Meds:  Objective   ED TRIAGE VITALS  BP: 136/72, Temp: 98.8 °F (37.1 °C), Pulse: 61, Resp: 14, SpO2: 97 %  [place] Coma Scale  Eye Opening: Spontaneous  Best Verbal Response: Oriented  Best Motor Response: Obeys commands  [place] Coma Scale Score: 15  Results for orders placed or performed during the hospital encounter of 03/18/21   CBC Auto Differential   Result Value Ref Range    WBC 3.6 (L) 4.8 - 10.8 thou/mm3    RBC 4.01 (L) 4.20 - 5.40 mill/mm3    Hemoglobin 11.5 (L) 12.0 - 16.0 gm/dl    Hematocrit 37.0 37.0 - 47.0 %    MCV 92.3 81.0 - 99.0 fL    MCH 28.7 26.0 - 33.0 pg    MCHC 31.1 (L) 32.2 - 35.5 gm/dl    RDW-CV 12.7 11.5 - 14.5 %    RDW-SD 42.8 35.0 - 45.0 fL    Platelets 802 700 - 635 thou/mm3    MPV 9.7 9.4 - 12.4 fL    Seg Neutrophils 60.0 %    Lymphocytes 26.9 %    Monocytes 8.6 %    Eosinophils 3.6 %    Basophils 0.6 %    Immature Granulocytes 0.3 %    Segs Absolute 2.2 1 - 7 thou/mm3    Lymphocytes Absolute 1.0 1.0 - 4.8 thou/mm3    Monocytes Absolute 0.3 (L) 0.4 - 1.3 thou/mm3    Eosinophils Absolute 0.1 0.0 - 0.4 thou/mm3    Basophils Absolute 0.0 0.0 - 0.1 thou/mm3    Immature Grans (Abs) 0.01 0.00 - 0.07 thou/mm3    nRBC 0 /100 wbc   Basic Metabolic Panel w/ Reflex to MG   Result Value Ref Range    Sodium 142 135 - 145 meq/L    Potassium reflex Magnesium 4.2 3.5 - 5.2 meq/L    Chloride 104 98 - 111 meq/L    CO2 28 23 - 33 meq/L    Glucose 142 (H) 70 - 108 mg/dL    BUN 27 (H) 7 - 22 mg/dL    CREATININE 1.5 (H) 0.4 - 1.2 mg/dL    Calcium 10.6 (H) 8.5 - 10.5 mg/dL   Troponin   Result Value Ref Range    Troponin T < 0.010 ng/ml   APTT   Result Value Ref Range    aPTT 31.0 22.0 - 38.0 seconds   Ethanol   Result Value Ref Range    ETHYL ALCOHOL, SERUM < 0.01 0.00 %   Hepatic Function Panel   Result Value Ref Range    Albumin 4.3 3.5 - 5.1 g/dL    Total Bilirubin 0.4 0.3 - 1.2 mg/dL    Bilirubin, Direct <0.2 0.0 - 0.3 mg/dL    Alkaline Phosphatase 89 38 - 126 U/L    AST 22 5 - 40 U/L    ALT 14 11 - 66 U/L    Total Protein 7.3 6.1 - 8.0 g/dL   Protime-INR   Result Value Ref Range    INR 1.06 0.85 - 1.13   Anion Gap   Result Value Ref Range    Anion Gap 10.0 8.0 - 16.0 meq/L   Osmolality   Result Value Ref Range    Osmolality Calc 290.7 275.0 - 300.0 mOsmol/kg   Glomerular Filtration Rate, Estimated   Result Value Ref Range    Est, Glom Filt Rate 34 (A) ml/min/1.73m2       Physical Exam:  Patient Vitals for the past 24 hrs:   BP Temp Pulse Resp SpO2 Height Weight   03/18/21 0915 136/72 -- 61 14 97 % -- --   03/18/21 0857 129/73 -- 60 16 99 % -- --   03/18/21 0831 132/73 -- 65 16 97 % -- --   03/18/21 0816 -- -- -- -- -- 5' 6\" (1.676 m) 155 lb (70.3 kg)   03/18/21 0815 128/75 98.8 °F (37.1 °C) 69 16 99 % -- --     Primary Assessment:  Airway: Patent, trachea midline. Breathing: Breath sounds present and equal bilaterally, spontaneous, and unlabored. Circulation: Hemodynamically stable, 2+ central and peripheral pulses. Disability: ALVAREZ x 4, following commands. GCS =15. Secondary Assessment:  General: Alert. left sphenoid sinus. 3. No definite fracture or evidence of intraorbital emphysema. .               **This report has been created using voice recognition software. It may contain minor errors which are inherent in voice recognition technology. **      Final report electronically signed by DR Edgardo Nation on 3/18/2021 9:07 AM      CT HEAD WO CONTRAST   Final Result      1. Stable CT scan of the brain, no interval change since previous study dated 2/20/2021. No evidence of intracranial hemorrhage. .               **This report has been created using voice recognition software. It may contain minor errors which are inherent in voice recognition technology. **      Final report electronically signed by DR Edgardo Nation on 3/18/2021 9:11 AM      CT CERVICAL SPINE WO CONTRAST   Final Result    No evidence of acute osseous injury of the cervical spine. **This report has been created using voice recognition software. It may contain minor errors which are inherent in voice recognition technology. **      Final report electronically signed by Dr. Ben Dangelo MD on 3/18/2021 9:09 AM        Fast Exam: Performed with the ED attending. Negative.     Electronically signed by Chinedu Nicolas PA-C on 3/18/2021 at 9:24 AM

## 2021-03-18 NOTE — ED NOTES
Dr Morena Juarez updated on pt status- pt became very dizzy, room spinning while sitting up. Did not attempt to stand her up. Pt states dizziness better once she was laying back down for a few minutes.       Melinda Ray RN  03/18/21 2474

## 2021-03-18 NOTE — ED PROVIDER NOTES
[address] [facility]       Chief Complaint   Patient presents with    Fall    Loss of Consciousness       Nurses Notes reviewed and I agree except as noted in the HPI. HISTORY OF PRESENT ILLNESS    Brielle Renee is a 65 y.o. female who presents with complaint of a fall, patient has history of orthostatic hypotension on midodrine. Patient states that she got up to go to the bathroom this morning, became lightheaded and proceeded to fall. States that she has pain to left face and neck. She takes Plavix. Onset: Acute on chronic  Duration: Prior to arrival  Timing: History of frequent falls  Location of Pain: Left face, neck  Intesity/severity:   Modifying Factors: History of orthostatic hypotension  Relieved by;  Previous Episodes; Tx Before arrival: None  REVIEW OF SYSTEMS      Review of Systems   Constitutional: Negative for fever, chills, diaphoresis and fatigue. HENT: Negative for congestion, drooling, facial swelling and sore throat. Left facial bruising of superior inferior orbital ridge. Eyes: Negative for photophobia, pain and discharge. Respiratory: Negative for cough, shortness of breath, wheezing and stridor. Cardiovascular: Negative for chest pain, palpitations and leg swelling. Frequent falls with history of orthostatic hypotension. Gastrointestinal: Negative for abdominal pain, blood in stool and abdominal distention. Endocrine: Negative for cold intolerance, heat intolerance, polydipsia and polyuria. Genitourinary: Negative for dysuria, urgency, hematuria and difficulty urinating. Musculoskeletal: Negative for gait problem, neck pain and neck stiffness. Skin; No rash, No itching  Neurological: Negative for seizures, weakness and numbness. Psychiatric/Behavioral: Negative for hallucinations, confusion and agitation.      PAST MEDICAL HISTORY    has a past medical history of Acute kidney injury ([facility] [address] Arthritis, CAD (coronary artery disease), CKD (chronic kidney disease) stage 3, GFR 30-59 ml/min, Diabetes mellitus, insulin dependent (IDDM), controlled, Escherichia coli septicemia ([facility] [address] Essential tremor, History of blood transfusion, Hyperlipidemia, Hypertension, Hypoxemic respiratory failure, chronic ([facility] [address] [facility] Normocytic anemia, and Osteoporosis. SURGICAL HISTORY      has a past surgical history that includes Hysterectomy; Cystoscopy (12/7/12); Lithotripsy (12/18/2012); and hip surgery (Left, 9/6/2020). CURRENT MEDICATIONS       Current Discharge Medication List      CONTINUE these medications which have NOT CHANGED    Details   clopidogrel (PLAVIX) 75 MG tablet Take 75 mg by mouth daily      metFORMIN (GLUCOPHAGE) 500 MG tablet Take 500 mg by mouth 2 times daily (with meals)      midodrine (PROAMATINE) 10 MG tablet Take 1 tablet by mouth 3 times daily (with meals)  Qty: 30 tablet, Refills: 0      FLUoxetine (PROZAC) 10 MG capsule Take 1 capsule by mouth daily  Qty: 30 capsule, Refills: 0      Cholecalciferol 75 MCG (3000 UT) TABS Take 1 tablet by mouth daily  Qty: 90 tablet, Refills: 3      atorvastatin (LIPITOR) 80 MG tablet Take 80 mg by mouth daily      ezetimibe (ZETIA) 10 MG tablet Take 10 mg by mouth daily      pantoprazole (PROTONIX) 40 MG tablet Take 40 mg by mouth daily       aspirin 81 MG EC tablet Take 81 mg by mouth daily. lidocaine 4 % external patch Place 2 patches onto the skin daily  Qty: 1 box, Refills: 0      Acetaminophen (TYLENOL PO) Take  by mouth as needed. ALLERGIES     has No Known Allergies. FAMILY HISTORY     She indicated that her mother is alive. She indicated that her father is alive. family history includes Cancer in her mother; Heart Disease in her mother. SOCIAL HISTORY      reports that she has never smoked. She has never used smokeless tobacco. She reports current alcohol use. She reports that she does not use drugs.     PHYSICAL EXAM     INITIAL VITALS:  height is 5' 6\" (1.676 m) and weight is 155 lb (70.3 kg). Her oral temperature is 99.2 °F (37.3 °C). Her blood pressure is 128/67 and her pulse is 60. Her respiration is 18 and oxygen saturation is 99%. Physical Exam   Constitutional:  well-developed and well-nourished. HENT: Head: Normocephalic, atraumatic, Bilateral external ears normal, Oropharynx mosit, No oral exudates, Nose normal.  Abrasion over the upper/lower orbital ridge left side. Eyes: PERRL, EOMI, Conjunctiva normal, No discharge. No scleral icterus  Neck: Normal range of motion, No tenderness, Supple  Cardiovascular: Normal rate, regular rhythm, S1 normal and S2 normal.  Exam reveals no gallop. Pulmonary/Chest: Effort normal and breath sounds normal. No accessory muscle usage or stridor. No respiratory distress. no wheezes. has no rales. exhibits no tenderness. Abdominal: Soft. Bowel sounds are normal.  exhibits no distension. There is no tenderness. There is no rebound and no guarding. Extremities: No edema, no tenderness, no cyanosis, no clubbing. Musculoskeletal: Good range of motion in major joints is observed. No major deformities noted. Neurological: Alert and oriented ×3, normal motor function, normal sensory function, no focal deficits. GCS 15  Skin: Skin is warm, dry and intact. No rash noted. No erythema. Psychiatric: Affect normal, judgment normal, mood normal.  DIFFERENTIAL DIAGNOSIS:       DIAGNOSTIC RESULTS     EKG: All EKG's are interpreted by the Emergency Department Physician who either signs or Co-signs this chart in the absence of a cardiologist.      RADIOLOGY: non-plain film images(s) such as CT, Ultrasound and MRI are read by the radiologist.  Plain radiographic images are visualized and preliminarily interpreted by the emergency physician unless otherwise stated below.       LABS:   Labs Reviewed   CBC WITH AUTO DIFFERENTIAL - Abnormal; Notable for the following components:       Result Value    WBC 3.6 (*)     RBC 4.01 (*)     Hemoglobin 11.5 (*)     MCHC 31.1 (*)     Monocytes Absolute 0.3 (*)     All other components within normal limits   BASIC METABOLIC PANEL W/ REFLEX TO MG FOR LOW K - Abnormal; Notable for the following components:    Glucose 142 (*)     BUN 27 (*)     CREATININE 1.5 (*)     Calcium 10.6 (*)     All other components within normal limits   GLOMERULAR FILTRATION RATE, ESTIMATED - Abnormal; Notable for the following components:    Est, Glom Filt Rate 34 (*)     All other components within normal limits   MICROSCOPIC URINALYSIS - Abnormal; Notable for the following components:    Blood, Urine MODERATE (*)     Leukocytes, UA TRACE (*)     All other components within normal limits   TROPONIN   APTT   URINE DRUG SCREEN   ETHANOL   HEPATIC FUNCTION PANEL   PROTIME-INR   ANION GAP   OSMOLALITY   MAGNESIUM   POCT GLUCOSE   POCT GLUCOSE       EMERGENCY DEPARTMENT COURSE:   Vitals:    Vitals:    03/18/21 1358 03/18/21 1452 03/18/21 1537 03/18/21 1606   BP: 115/67 (!) 151/80 (!) 147/74 128/67   Pulse: 70 67 59 60   Resp: 16 16 18 18   Temp:   97.8 °F (36.6 °C) 99.2 °F (37.3 °C)   TempSrc:   Oral Oral   SpO2: 99% 99% 99% 99%   Weight:       Height:         Patient blood pressure stable while laying on her back, she became profoundly hypotensive and lightheaded upon sitting up. Patient given a midodrine IV fluids, patient will be evaluated by hospitalist for further stabilization. CRITICAL CARE:       CONSULTS:  None    PROCEDURES:  None    FINAL IMPRESSION      1. Orthostatic hypotension          DISPOSITION/PLAN   Admitted    PATIENT REFERRED TO:  No follow-up provider specified.     DISCHARGE MEDICATIONS:  Current Discharge Medication List          (Please note that portions of this note were completed with a voice recognition program.  Efforts were made to edit the dictations but occasionally words are mis-transcribed.)    DO Chelsi Mascorro DO  03/18/21 9466

## 2021-03-18 NOTE — ED NOTES
ED to inpatient nurses report    Chief Complaint   Patient presents with    Fall    Loss of Consciousness      Present to ED from home/assisted living  LOC: alert and orientated to name, place, date  Vital signs   Vitals:    03/18/21 1135 03/18/21 1234 03/18/21 1306 03/18/21 1358   BP: 120/69  122/67 115/67   Pulse: 62 73 65 70   Resp: 16  16 16   Temp:       SpO2: 98%  98% 99%   Weight:       Height:          Oxygen Baseline RA    Current needs required RA  Bipap/Cpap No  LDAs:   Peripheral IV 03/18/21 Left Forearm (Active)   Site Assessment Clean;Dry; Intact 03/18/21 0825   Line Status Flushed 03/18/21 0825   Dressing Status Clean;Dry; Intact 03/18/21 0825   Dressing Intervention New 03/18/21 0825       Peripheral IV 03/18/21 Right Antecubital (Active)   Site Assessment Clean;Dry; Intact 03/18/21 0826   Line Status Flushed;Specimen collected;Brisk blood return 03/18/21 0826   Dressing Status Clean;Dry; Intact 03/18/21 0826   Dressing Intervention New 03/18/21 0826     Mobility: Requires assistance * 1d/t hypotension  Pending ED orders: none  Present condition Restful on cart, asking for something to eat when able    Electronically signed by Cecelia Mobley RN on 3/18/2021 at 2:24 PM       Dafne Bingham RN  03/18/21 4600

## 2021-03-18 NOTE — Clinical Note
Patient Class: Inpatient [101]   REQUIRED: Diagnosis: Orthostatic hypotension [458. 0. ICD-9-CM]   Estimated Length of Stay: Estimated stay of less than 2 midnights   Telemetry Bed Required?: Yes

## 2021-03-19 ENCOUNTER — APPOINTMENT (OUTPATIENT)
Dept: MRI IMAGING | Age: 71
DRG: 640 | End: 2021-03-19
Payer: MEDICARE

## 2021-03-19 LAB
ANION GAP SERPL CALCULATED.3IONS-SCNC: 11 MEQ/L (ref 8–16)
BASOPHILS # BLD: 0.5 %
BASOPHILS ABSOLUTE: 0 THOU/MM3 (ref 0–0.1)
BUN BLDV-MCNC: 26 MG/DL (ref 7–22)
CALCIUM SERPL-MCNC: 9.7 MG/DL (ref 8.5–10.5)
CHLORIDE BLD-SCNC: 110 MEQ/L (ref 98–111)
CO2: 24 MEQ/L (ref 23–33)
CORTISOL COLLECTION INFO: NORMAL
CORTISOL: 12.36 UG/DL
CREAT SERPL-MCNC: 1.7 MG/DL (ref 0.4–1.2)
EOSINOPHIL # BLD: 2.6 %
EOSINOPHILS ABSOLUTE: 0.1 THOU/MM3 (ref 0–0.4)
ERYTHROCYTE [DISTWIDTH] IN BLOOD BY AUTOMATED COUNT: 12.7 % (ref 11.5–14.5)
ERYTHROCYTE [DISTWIDTH] IN BLOOD BY AUTOMATED COUNT: 44.2 FL (ref 35–45)
GFR SERPL CREATININE-BSD FRML MDRD: 29 ML/MIN/1.73M2
GLUCOSE BLD-MCNC: 120 MG/DL (ref 70–108)
GLUCOSE BLD-MCNC: 143 MG/DL (ref 70–108)
GLUCOSE BLD-MCNC: 156 MG/DL (ref 70–108)
GLUCOSE BLD-MCNC: 179 MG/DL (ref 70–108)
HCT VFR BLD CALC: 31.1 % (ref 37–47)
HEMOGLOBIN: 9.4 GM/DL (ref 12–16)
IMMATURE GRANS (ABS): 0.01 THOU/MM3 (ref 0–0.07)
IMMATURE GRANULOCYTES: 0.3 %
LYMPHOCYTES # BLD: 29.7 %
LYMPHOCYTES ABSOLUTE: 1.2 THOU/MM3 (ref 1–4.8)
MAGNESIUM: 1.5 MG/DL (ref 1.6–2.4)
MCH RBC QN AUTO: 28.6 PG (ref 26–33)
MCHC RBC AUTO-ENTMCNC: 30.2 GM/DL (ref 32.2–35.5)
MCV RBC AUTO: 94.5 FL (ref 81–99)
MONOCYTES # BLD: 11.5 %
MONOCYTES ABSOLUTE: 0.4 THOU/MM3 (ref 0.4–1.3)
NUCLEATED RED BLOOD CELLS: 0 /100 WBC
PLATELET # BLD: 129 THOU/MM3 (ref 130–400)
PMV BLD AUTO: 10.9 FL (ref 9.4–12.4)
POTASSIUM SERPL-SCNC: 4.1 MEQ/L (ref 3.5–5.2)
RBC # BLD: 3.29 MILL/MM3 (ref 4.2–5.4)
SEG NEUTROPHILS: 55.4 %
SEGMENTED NEUTROPHILS ABSOLUTE COUNT: 2.2 THOU/MM3 (ref 1.8–7.7)
SODIUM BLD-SCNC: 145 MEQ/L (ref 135–145)
WBC # BLD: 3.9 THOU/MM3 (ref 4.8–10.8)

## 2021-03-19 PROCEDURE — 6370000000 HC RX 637 (ALT 250 FOR IP): Performed by: HOSPITALIST

## 2021-03-19 PROCEDURE — 99226 PR SBSQ OBSERVATION CARE/DAY 35 MINUTES: CPT | Performed by: INTERNAL MEDICINE

## 2021-03-19 PROCEDURE — 94760 N-INVAS EAR/PLS OXIMETRY 1: CPT

## 2021-03-19 PROCEDURE — 6360000002 HC RX W HCPCS: Performed by: INTERNAL MEDICINE

## 2021-03-19 PROCEDURE — 83735 ASSAY OF MAGNESIUM: CPT

## 2021-03-19 PROCEDURE — 82948 REAGENT STRIP/BLOOD GLUCOSE: CPT

## 2021-03-19 PROCEDURE — 36415 COLL VENOUS BLD VENIPUNCTURE: CPT

## 2021-03-19 PROCEDURE — 97535 SELF CARE MNGMENT TRAINING: CPT

## 2021-03-19 PROCEDURE — 97166 OT EVAL MOD COMPLEX 45 MIN: CPT

## 2021-03-19 PROCEDURE — 93307 TTE W/O DOPPLER COMPLETE: CPT

## 2021-03-19 PROCEDURE — 93005 ELECTROCARDIOGRAM TRACING: CPT | Performed by: HOSPITALIST

## 2021-03-19 PROCEDURE — 93005 ELECTROCARDIOGRAM TRACING: CPT | Performed by: INTERNAL MEDICINE

## 2021-03-19 PROCEDURE — 93010 ELECTROCARDIOGRAM REPORT: CPT | Performed by: INTERNAL MEDICINE

## 2021-03-19 PROCEDURE — 80048 BASIC METABOLIC PNL TOTAL CA: CPT

## 2021-03-19 PROCEDURE — G0378 HOSPITAL OBSERVATION PER HR: HCPCS

## 2021-03-19 PROCEDURE — 82533 TOTAL CORTISOL: CPT

## 2021-03-19 PROCEDURE — 2580000003 HC RX 258: Performed by: EMERGENCY MEDICINE

## 2021-03-19 PROCEDURE — 70551 MRI BRAIN STEM W/O DYE: CPT

## 2021-03-19 PROCEDURE — 85025 COMPLETE CBC W/AUTO DIFF WBC: CPT

## 2021-03-19 RX ORDER — MAGNESIUM SULFATE IN WATER 40 MG/ML
2000 INJECTION, SOLUTION INTRAVENOUS PRN
Status: DISCONTINUED | OUTPATIENT
Start: 2021-03-19 | End: 2021-03-22 | Stop reason: HOSPADM

## 2021-03-19 RX ORDER — DEXTROSE MONOHYDRATE 25 G/50ML
12.5 INJECTION, SOLUTION INTRAVENOUS PRN
Status: DISCONTINUED | OUTPATIENT
Start: 2021-03-19 | End: 2021-03-22 | Stop reason: HOSPADM

## 2021-03-19 RX ORDER — NICOTINE POLACRILEX 4 MG
15 LOZENGE BUCCAL PRN
Status: DISCONTINUED | OUTPATIENT
Start: 2021-03-19 | End: 2021-03-22 | Stop reason: HOSPADM

## 2021-03-19 RX ORDER — DEXTROSE MONOHYDRATE 50 MG/ML
100 INJECTION, SOLUTION INTRAVENOUS PRN
Status: DISCONTINUED | OUTPATIENT
Start: 2021-03-19 | End: 2021-03-22 | Stop reason: HOSPADM

## 2021-03-19 RX ADMIN — MIDODRINE HYDROCHLORIDE 10 MG: 10 TABLET ORAL at 09:10

## 2021-03-19 RX ADMIN — PANTOPRAZOLE SODIUM 40 MG: 40 TABLET, DELAYED RELEASE ORAL at 05:38

## 2021-03-19 RX ADMIN — ATORVASTATIN CALCIUM 80 MG: 80 TABLET, FILM COATED ORAL at 21:00

## 2021-03-19 RX ADMIN — FLUOXETINE HYDROCHLORIDE 10 MG: 10 CAPSULE ORAL at 09:10

## 2021-03-19 RX ADMIN — MIDODRINE HYDROCHLORIDE 10 MG: 10 TABLET ORAL at 12:43

## 2021-03-19 RX ADMIN — MAGNESIUM SULFATE HEPTAHYDRATE 2000 MG: 40 INJECTION, SOLUTION INTRAVENOUS at 13:00

## 2021-03-19 RX ADMIN — SODIUM CHLORIDE: 9 INJECTION, SOLUTION INTRAVENOUS at 05:39

## 2021-03-19 RX ADMIN — ASPIRIN 81 MG: 81 TABLET, COATED ORAL at 09:10

## 2021-03-19 RX ADMIN — INSULIN LISPRO 1 UNITS: 100 INJECTION, SOLUTION INTRAVENOUS; SUBCUTANEOUS at 17:14

## 2021-03-19 RX ADMIN — CLOPIDOGREL BISULFATE 75 MG: 75 TABLET ORAL at 09:10

## 2021-03-19 RX ADMIN — INSULIN LISPRO 1 UNITS: 100 INJECTION, SOLUTION INTRAVENOUS; SUBCUTANEOUS at 12:43

## 2021-03-19 RX ADMIN — Medication 3000 UNITS: at 09:10

## 2021-03-19 ASSESSMENT — PAIN SCALES - GENERAL
PAINLEVEL_OUTOF10: 0
PAINLEVEL_OUTOF10: 0

## 2021-03-19 NOTE — PROGRESS NOTES
Reviewed chart for SBIRT per trauma services. Attempted. Pt currently with medical staff. Unable to complete.     Brief Intervention and Referral to Treatment Summary    Patient was provided PHQ-9, AUDIT and DAST Screening:      PHQ-9 Score:   AUDIT Score:    DAST Score:      Patients substance use is considered     Low Risk/Healthy  Moderate Risk  Harmful  Dependent    Patients depression is considered:     Minimal  Mild   Moderate  Moderately Severe  Severe    Brief Education Was Provided    Patient was receptive  Patient was not receptive      Brief Intervention Is Provided (Only for AUDIT or DAST)     Patient reports readiness to decrease and/or stop use and a plan was discussed   Patient denies readiness to decrease and/or stop use and a plan was not discussed      Recommendations/Referrals for Brief and/or Specialized Treatment Provided to Patient

## 2021-03-19 NOTE — PROGRESS NOTES
extremities. Skin: Skin color, texture, turgor normal.  No rashes or lesions. Neurologic:  Neurovascularly intact without any focal sensory/motor deficits. Cranial nerves: II-XII intact, grossly non-focal.  Psychiatric: Alert and oriented, thought content appropriate, normal insight  Capillary Refill: Brisk,< 3 seconds   Peripheral Pulses: +2 palpable, equal bilaterally       Labs:   Recent Labs     03/19/21  0834   WBC 3.9*   HGB 9.4*   HCT 31.1*   *     Recent Labs     03/19/21  0541      K 4.1      CO2 24   BUN 26*   CREATININE 1.7*   CALCIUM 9.7     Recent Labs     03/18/21  0822   AST 22   ALT 14   BILIDIR <0.2   BILITOT 0.4   ALKPHOS 89     Recent Labs     03/18/21  0822   INR 1.06     No results for input(s): Teryl Drown in the last 72 hours. Urinalysis:      Lab Results   Component Value Date    NITRU NEGATIVE 03/18/2021    WBCUA 2-4 03/18/2021    WBCUA >200W/CLUMPS 10/20/2011    BACTERIA NONE SEEN 03/18/2021    RBCUA 10-15 03/18/2021    BLOODU MODERATE 03/18/2021    SPECGRAV 1.017 03/18/2021    GLUCOSEU NEGATIVE 11/09/2020       Radiology:   All imaging reviewed     Diet: DIET CARB CONTROL; No Added Salt (3-4 GM)      Code Status: Full Code            Electronically signed by Tyler Hollingsworth MD on 3/19/2021 at 1:10 PM

## 2021-03-19 NOTE — CARE COORDINATION
3/19/21, 12:06 PM EDT  DISCHARGE PLANNING EVALUATION:    Nadege Hair       Admitted: 3/18/2021/ Gayla 84 day: 1   Location: [facility]/027- Reason for admit: Orthostatic hypotension [I95.1]   PMH:  has a past medical history of Acute kidney injury ([facility] [address] Arthritis, CAD (coronary artery disease), CKD (chronic kidney disease) stage 3, GFR 30-59 ml/min, Diabetes mellitus, insulin dependent (IDDM), controlled, Escherichia coli septicemia ([facility] [address] Essential tremor, History of blood transfusion, Hyperlipidemia, Hypertension, Hypoxemic respiratory failure, chronic ([facility] [address] MI (myocardial infarction) ([facility] [address] Normocytic anemia, Osteoporosis, and Pneumonia. Procedure: No  Barriers to Discharge: To ER after fall. [place]. Lightheadedness. Hx ortho hypotension. Midodrine and IVF administered. SW. PT/OT. Ortho VS. Follow labs. PCP: Jesus Melo MD  Readmission Risk Score: 23%    Patient Goals/Plan/Treatment Preferences: SW is consulted for continuity of services. CM visit deferred today. Transportation/Food Security/Housekeeping Addressed:  No issues identified.

## 2021-03-19 NOTE — DISCHARGE INSTR - COC
Continuity of Care Form    Patient Name: Nadege Hair   :  1950  MRN:  [id number]    Admit date:  3/18/2021  Discharge date:  ***    Code Status Order: Full Code   Advance Directives:   Advance Care Flowsheet Documentation       Date/Time Healthcare Directive Type of Healthcare Directive Copy in [address] Agent's Name Healthcare Agent's Phone Number    21 [phone]  Yes, patient has an advance directive for healthcare treatment  Durable power of  for health care;Living will  Other (Comment) in [address] [place] [address] of   friend José Denton or Zohreh Man or Devoraosalu  [phone], [phone], [phone]            Admitting Physician:  Severo Dade, MD  PCP: Jesus Melo MD    Discharging Nurse: [facility] Unit/Room#: 8B-27/027-A  Discharging Unit Phone Number: ***    Emergency Contact:   Extended Emergency Contact Information  Primary Emergency Contact: Charlotte Anderson  Address: [facility] [address] Phone: [phone]  Relation: Other  Secondary Emergency Contact: Merlin Hall  Address: [address] [place], [address] Home Phone: [phone]  Work Phone: [phone]  Relation: Other    Past Surgical History:  Past Surgical History:   Procedure Laterality Date    CYSTOSCOPY  12    with stent insertion    HIP SURGERY Left 2020    HIP PINNING performed by Shaista Lima DO at [address] [place] [address]      LITHOTRIPSY  2012    right       Immunization History: There is no immunization history for the selected administration types on file for this patient.     Active Problems:  Patient Active Problem List   Diagnosis Code    Pneumonia J18.9    Chest pain R07.9    Renal stone N20.0    Chest pain, atypical R07.89    Lung mass R91.8    Limb tremor R25.1    Numbness and tingling in right hand R20.0, R20.2    Syncope and collapse R55    Closed fracture of neck of left femur ([facility]) S72.002A    Acute respiratory failure with hypoxia ([facility]) J96.01    Subcapital fracture of femur, left, closed, with routine healing, subsequent encounter S72.012D    Type 2 diabetes mellitus without complication, with long-term current use of insulin ([facility]) E11.9, Z79.4    Vitamin D deficiency E55.9    Age-related osteoporosis with current pathological fracture with routine healing M80.00XD    Scalp hematoma S00. 03XA    Recurrent falls R29.6    Fall at home, initial encounter [address] XXXA, Y92.009    Severe malnutrition ([facility] [address] E43    Altered mental status R41.82    Acute kidney injury superimposed on CKD ([facility]) N17.9, N18.9    Encephalopathy G93.40    Fall from bed W06. XXXA    Contusion of face S00.83XA    Syncope R55    Orthostatic hypotension I95.1       Isolation/Infection:   Isolation            No Isolation          Patient Infection Status       Infection Onset Added Last Indicated Last Indicated By Review Planned Expiration Resolved Resolved By    None active    Resolved    COVID-19 Rule Out 11/12/20 11/12/20 11/12/20 COVID-19 (Ordered)   11/12/20 Rule-Out Test Resulted    COVID-19 Rule Out 09/05/20 09/05/20 09/05/20 COVID-19 (Ordered)   09/05/20 Rule-Out Test Resulted            Nurse Assessment:  Last Vital Signs: BP (!) 142/70   Pulse 58   Temp 97.9 °F (36.6 °C) (Oral)   Resp 17   Ht 5' 6\" (1.676 m)   Wt 155 lb (70.3 kg)   SpO2 99%   BMI 25.02 kg/m²     Last documented pain score (0-10 scale): Pain Level: 0  Last Weight:   Wt Readings from Last 1 Encounters:   03/18/21 155 lb (70.3 kg)     Mental Status:  {IP PT MENTAL STATUS:72771}    IV Access:  {[facility] IV ACCESS:737730201}    Nursing Mobility/ADLs:  Walking   {CHP DME IRKC:678575754}  Transfer  {CHP DME QGFL:466625590}  Bathing  {CHP DME CFQH:826464977}  Dressing  {CHP DME TEWA:91948}  Toileting  {CHP DME WHSV:058305302}  Feeding  {ACOSTA WHEAT PIKV:946287260}  Med Admin  {ACOSTA WHEAT RKQC:600766624}  Med Delivery   [address] [email or web address] MED Delivery:647578876}    Wound Care Documentation and Therapy:        Elimination:  Continence: Bowel: {YES / CN:12857}  Bladder: {YES / DB:29248}  Urinary Catheter: {Urinary Catheter:461446410}   Colostomy/Ileostomy/Ileal Conduit: {YES / UP:08620}       Date of Last BM: ***    Intake/Output Summary (Last 24 hours) at 3/19/2021 1759  Last data filed at 3/19/2021 0951  Gross per 24 hour   Intake 300 ml   Output --   Net 300 ml     I/O last 3 completed shifts:   In: 300 [P.O.:300]  Out: -     Safety Concerns:     [address] [email or web address] Safety Concerns:705814163}    Impairments/Disabilities:      [address] [email or web address] Impairments/Disabilities:637368133}    Nutrition Therapy:  Current Nutrition Therapy:   [address] [employer] Diet List:001228715}    Routes of Feeding: {CHP DME Other Feedings:326203536}  Liquids: {Slp liquid thickness:46660}  Daily Fluid Restriction: {CHP DME Yes amt example:292140663}  Last Modified Barium Swallow with Video (Video Swallowing Test): {Done Not Done YDHP:287913277}    Treatments at the Time of Hospital Discharge:   Respiratory Treatments: ***  Oxygen Therapy:  {Therapy; copd oxygen:93074}  Ventilator:    { CC Vent JNKY:989288979}    Rehab Therapies: {THERAPEUTIC INTERVENTION:2473679356}  Weight Bearing Status/Restrictions: [address] [employer]  Weight Bearin}  Other Medical Equipment (for information only, NOT a DME order):  {EQUIPMENT:041579329}  Other Treatments: ***    Patient's personal belongings (please select all that are sent with patient):  {[facility] DME Belongings:777353602}    RN SIGNATURE:  {Esignature:044056156}    CASE MANAGEMENT/SOCIAL WORK SECTION    Inpatient Status Date: Observation    Readmission Risk Assessment Score:  Readmission Risk              Risk of Unplanned Readmission:        30           Discharging to Facility/ Agency   Name: [facility]  Address: [address] [place] [address], [address] [place] [address] Main  Phone: [phone]  Fax: [phone]    Dialysis Facility (if applicable)   Name:  Address:  Dialysis Schedule:  Phone:  Fax:    / signature: Electronically signed by WANG Cat on 3/19/21 at 6:00 PM EDT    PHYSICIAN SECTION    Prognosis: Fair    Condition at Discharge: Stable    Rehab Potential (if transferring to Rehab): Good    Recommended Labs or Other Treatments After Discharge: see above    Physician Certification: I certify the above information and transfer of Kody Lugo  is necessary for the continuing treatment of the diagnosis listed and that she requires [facility] for greater 30 days.      Update Admission H&P: No change in H&P    PHYSICIAN SIGNATURE:  Electronically signed by Pako Anderson MD on 3/22/2021 at 8:26 AM

## 2021-03-19 NOTE — PLAN OF CARE
Problem: Falls - Risk of:  Goal: Will remain free from falls  Description: Will remain free from falls  Outcome: Ongoing  Note: Pt remains free from falls/injury this shift. Wearing nonslip socks, uses call light appropriately, utilizes hourly rounding. Frequently used items within reach. Goal: Absence of physical injury  Description: Absence of physical injury  Outcome: Ongoing  Note: Pt remains free from falls/injury this shift. Wearing nonslip socks, uses call light appropriately, utilizes hourly rounding. Frequently used items within reach. Problem: Skin Integrity:  Goal: Will show no infection signs and symptoms  Description: Will show no infection signs and symptoms  Outcome: Ongoing  Note: Patient absent of s/s of infection  Goal: Absence of new skin breakdown  Description: Absence of new skin breakdown  Outcome: Ongoing  Note: No new skin breakdown noted. Problem: DISCHARGE BARRIERS  Goal: Patient's continuum of care needs are met  3/19/2021 1552 by Abhilash Bush RN  Outcome: Ongoing     Care plan reviewed with patient. Patient verbalize understanding of the plan of care and contribute to goal setting.

## 2021-03-19 NOTE — PROGRESS NOTES
Completed Limited Echo. Dr. Reid Merrill to read.   Changed order to a limited echo per Dr. Lopez Range due to patient having an echo done 9-7-2020

## 2021-03-19 NOTE — CARE COORDINATION
DISCHARGE/PLANNING EVALUATION  3/19/21, 10:56 AM EDT    Reason for Referral: \"[place] [employer] [facility] [place]\"  Mental Status: Patient is alert and oriented  Decision Making: Patient makes own decisions  Family/Social/Home Environment: Spoke with  Patient, assessment completed. Patient has lived at [place] [address] for past three years. They assist her with showering, meals and medication. She does her own laundry. Patients friend is her POA  Current Services including food security, transportation and housekeeping: [place]  Current Equipment: walker  Payment Source: Medicare and [facility]  Concerns or Barriers to Discharge: Discussed return to [place] [address] with outpatient therapy vs [facility] [place] [facility], pending medical course and therapy recommendations. Post acute provider list with quality measures, geographic area and applicable managed care information provided. Questions regarding selection process answered: n/a    Teach Back Method used with patient regarding care plan and discharge planning. Patient  verbalize understanding of the plan of care and contribute to goal setting. Patient goals, treatment preferences and discharge plan: Patient plans return to [place] [address] with outpatient therapy vs [facility] for rehab. SW will continue to follow. Electronically signed by WANG Jamison on 3/19/2021 at 10:56 AM     Spoke with Mohini Kerns, patient is accepted at [facility] [place] [facility] if patient and POA are agreeable. Advised it could be a weekend discharge. Spoke with patient and Cordelia Schafer, they are not happy about return to [facility] [place] [facility]. Do not see how therapy is going to help her dizziness. They want answers to dizziness and return to [email or web address]. Stated that if patient continues with dizziness she is not safe to return to [email or web address]. Waiting on results of tests. Advised that patient is able to go to [facility] [place] [facility] or back to [email or web address] if safe.

## 2021-03-19 NOTE — PROGRESS NOTES
He[facility] R[address] 58  INPATIENT OCCUPATIONAL THERAPY  [facility] SURG 8B  EVALUATION    Time In: 2967  Time Out: 8912  Timed Code Treatment Minutes: 18 Minutes  Minutes: 27          Date: 3/19/2021  Patient Name: Meenu Rodrigez,   Gender: female      MRN: [id number]  : 1950  (65 y.o.)  Referring Practitioner: Daya Ferris MD  Diagnosis: Orthostatic Hypertension  Additional Pertinent Hx: Patient is a 66y/o F c hx of HLD, CAD, CKD, orthostatic hypotension, and multiple falls who presented to the ED as a trauma patient after suffering the fall. Per patient, she woke this morning and sat up in bed then started feeling very dizzy. She scooted out of her bed and got to the floor, then the next thing she knew she woke up surrounded by paramedics. Says over the past few days she has been drinking less fluids because she doesn't like what the facility has. Admits to chronic constipation, body aches, cough, denies N/V/D, chest pain, murmurs, SOB, wheezing. Restrictions/Precautions:  Restrictions/Precautions: General Precautions, Fall Risk    Subjective  Chart Reviewed: Yes, Orders, Progress Notes  Patient assessed for rehabilitation services?: Yes    Subjective: RN okayed session. Pt was preparing to walk to bathroom with RN upon arrival. Pt was pleasant and agreeable to OT.     Pain:  Pain Assessment  Patient Currently in Pain: Denies    Vitals: RN checked orthostatics directlt before session, reporting minimal changes    Social/Functional History:  Lives With: Alone  Type of Home: Assisted living([place])  Home Layout: One level  Home Access: Level entry  Home Equipment: Rolling walker   Bathroom Shower/Tub: Walk-in shower  Bathroom Toilet: Handicap height  Bathroom Equipment: Grab bars in shower, Grab bars around toilet, Shower chair  Bathroom Accessibility: Accessible       ADL Assistance: Needs assistance(requires assist with bathing)  Homemaking Assistance: Needs assistance(staff completes cooking and cleaning)  Homemaking Responsibilities: No  Ambulation Assistance: Independent  Transfer Assistance: Independent               VISION:Corrected    HEARING:  WFL    COGNITION: Decreased Insight, Decreased Problem Solving, Decreased Safety Awareness and Impulsive    RANGE OF MOTION:  Bilateral Upper Extremity:  WFL    STRENGTH:  Bilateral Upper Extremity:  grossly 4/5    SENSATION:   WFL    ADL:   Grooming: Minimal Assistance. for oral care, pt required min A to open denture tablet,  CGA for balance while standing at sink  Bathing: Moderate Assistance. for washing bottom, pt able to wash rajan area while standing at sink, CGA for balance  Upper Extremity Dressing: Minimal Assistance. for gown management  Lower Extremity Dressing: Moderate Assistance. to thread BLE into briefs and pull them up around hips. CGA - Min A for standing balance  Toileting: Stand By Assistance. Toilet Transfer: Minimal Assistance. to control descent, ensure appropriate body positioning. **Pt with notable tremors throughout session, leading to increase difficulty with ADLs    BALANCE:  Sitting Balance:  Stand By Assistance. on EOB in prep for mobility  Standing Balance: [address], Minimal Assistance. Pt with minimal dizziness while standing and making quick movements. Few dizziness episodes leading to minimal LOB requiring Min A for balance. BED MOBILITY:  Sit to Supine: Stand By Assistance    Scooting: Minimal Assistance to scoot up in bed    TRANSFERS:  Sit to Stand:  [address] from EOB, min safety cues, pt noted to demo increased dizziness with quick movements and transitions  Stand to Sit: [address], Minimal Assistance. to control descent, min VC for safe body placement    FUNCTIONAL MOBILITY:  Assistive Device: Rolling Walker  Assist Level:  Contact Guard Assistance and Minimal Assistance. Distance: To and from bathroom  Pt with occasional sway and instability.  No c/o dizziness during mobillity. Pt requiring Min A at times to maintain balance d/t posterior lean. No significant LOB noted. Activity Tolerance:  Patient tolerance of  treatment: good. Assessment:  Assessment: Pt presented with the listed deficits on this date. Pt noted to demo decreased strength, endurance, and balance. Pt with increased dizziness leading to fall risk. Pt would benefit from continued OT to increase strength, endurance, and overall balance to increase ease and indep with ADLs and IADLs. Performance deficits / Impairments: Decreased functional mobility , Decreased ADL status, Decreased ROM, Decreased strength, Decreased endurance, Decreased high-level IADLs, Decreased posture, Decreased balance, Decreased safe awareness  Prognosis: Fair  REQUIRES OT FOLLOW UP: Yes  Decision Making: Medium Complexity  Safety Devices in place: Yes  Type of devices: All fall risk precautions in place, Left in bed, Bed alarm in place, Gait belt, Call light within reach    Treatment Initiated: Treatment and education initiated within context of evaluation. Evaluation time included review of current medical information, gathering information related to past medical, social and functional history, completion of standardized testing, formal and informal observation of tasks, assessment of data and development of plan of care and goals. Treatment time included skilled education and facilitation of tasks to increase safety and independence with ADL's for improved functional independence and quality of life.     Discharge Recommendations:  [address], Patient would benefit from continued therapy after discharge    Patient Education:  OT Education: OT Role, Plan of Care, ADL Adaptive Strategies, Transfer Training, IADL Safety    Equipment Recommendations:  Equipment Needed: No    Plan:  Times per week: 3-5x  Times per day: Daily  Current Treatment Recommendations: Balance Training, Functional Mobility Training, Endurance Training, Safety Education & Training, Patient/Caregiver Education & Training, Self-Care / ADL. See long-term goal time frame for expected duration of plan of care. If no long-term goals established, a short length of stay is anticipated. Goals:  Patient goals : return to [facility] term goals  Time Frame for Short term goals: by discharge  Short term goal 1: Pt will complete functional mobility HH distances with SBA and no LOB or c/o dizziness to increase ease with ADL routine  Short term goal 2: Pt will tolerate dynamic standing x8 min with SBA and no LOB or c/o dizziness to increase ease with grooming  Short term goal 3: Pt will complete BADL routine with no > than min A to increase ease with dressing  Short term goal 4: Pt will complete various t/fs with SBA and no safety cues to increase ease with toileting  Long term goals  Time Frame for Long term goals : no LTG d/t short ELOS         Following session, patient left in safe position with all fall risk precautions in place.

## 2021-03-20 LAB
ANION GAP SERPL CALCULATED.3IONS-SCNC: 7 MEQ/L (ref 8–16)
BASOPHILS # BLD: 0.3 %
BASOPHILS ABSOLUTE: 0 THOU/MM3 (ref 0–0.1)
BUN BLDV-MCNC: 19 MG/DL (ref 7–22)
CALCIUM SERPL-MCNC: 9.5 MG/DL (ref 8.5–10.5)
CHLORIDE BLD-SCNC: 114 MEQ/L (ref 98–111)
CO2: 25 MEQ/L (ref 23–33)
CREAT SERPL-MCNC: 1.4 MG/DL (ref 0.4–1.2)
EKG ATRIAL RATE: 66 BPM
EKG ATRIAL RATE: 74 BPM
EKG P AXIS: 58 DEGREES
EKG P AXIS: 62 DEGREES
EKG P-R INTERVAL: 122 MS
EKG P-R INTERVAL: 124 MS
EKG Q-T INTERVAL: 390 MS
EKG Q-T INTERVAL: 426 MS
EKG QRS DURATION: 66 MS
EKG QRS DURATION: 70 MS
EKG QTC CALCULATION (BAZETT): 432 MS
EKG QTC CALCULATION (BAZETT): 446 MS
EKG R AXIS: -9 DEGREES
EKG R AXIS: 21 DEGREES
EKG T AXIS: 41 DEGREES
EKG T AXIS: 50 DEGREES
EKG VENTRICULAR RATE: 66 BPM
EKG VENTRICULAR RATE: 74 BPM
EOSINOPHIL # BLD: 3.8 %
EOSINOPHILS ABSOLUTE: 0.1 THOU/MM3 (ref 0–0.4)
ERYTHROCYTE [DISTWIDTH] IN BLOOD BY AUTOMATED COUNT: 12.8 % (ref 11.5–14.5)
ERYTHROCYTE [DISTWIDTH] IN BLOOD BY AUTOMATED COUNT: 43 FL (ref 35–45)
GFR SERPL CREATININE-BSD FRML MDRD: 37 ML/MIN/1.73M2
GLUCOSE BLD-MCNC: 120 MG/DL (ref 70–108)
GLUCOSE BLD-MCNC: 120 MG/DL (ref 70–108)
GLUCOSE BLD-MCNC: 141 MG/DL (ref 70–108)
GLUCOSE BLD-MCNC: 147 MG/DL (ref 70–108)
GLUCOSE BLD-MCNC: 169 MG/DL (ref 70–108)
HCT VFR BLD CALC: 31 % (ref 37–47)
HEMOGLOBIN: 9.5 GM/DL (ref 12–16)
IMMATURE GRANS (ABS): 0 THOU/MM3 (ref 0–0.07)
IMMATURE GRANULOCYTES: 0 %
LYMPHOCYTES # BLD: 26.5 %
LYMPHOCYTES ABSOLUTE: 0.8 THOU/MM3 (ref 1–4.8)
MAGNESIUM: 1.8 MG/DL (ref 1.6–2.4)
MCH RBC QN AUTO: 28.3 PG (ref 26–33)
MCHC RBC AUTO-ENTMCNC: 30.6 GM/DL (ref 32.2–35.5)
MCV RBC AUTO: 92.3 FL (ref 81–99)
MONOCYTES # BLD: 11.5 %
MONOCYTES ABSOLUTE: 0.4 THOU/MM3 (ref 0.4–1.3)
NUCLEATED RED BLOOD CELLS: 0 /100 WBC
PLATELET # BLD: 136 THOU/MM3 (ref 130–400)
PMV BLD AUTO: 10.1 FL (ref 9.4–12.4)
POTASSIUM SERPL-SCNC: 4.4 MEQ/L (ref 3.5–5.2)
RBC # BLD: 3.36 MILL/MM3 (ref 4.2–5.4)
SEG NEUTROPHILS: 57.9 %
SEGMENTED NEUTROPHILS ABSOLUTE COUNT: 1.8 THOU/MM3 (ref 1.8–7.7)
SODIUM BLD-SCNC: 146 MEQ/L (ref 135–145)
WBC # BLD: 3.1 THOU/MM3 (ref 4.8–10.8)

## 2021-03-20 PROCEDURE — 93010 ELECTROCARDIOGRAM REPORT: CPT | Performed by: INTERNAL MEDICINE

## 2021-03-20 PROCEDURE — 6360000002 HC RX W HCPCS: Performed by: HOSPITALIST

## 2021-03-20 PROCEDURE — 2580000003 HC RX 258: Performed by: HOSPITALIST

## 2021-03-20 PROCEDURE — 6370000000 HC RX 637 (ALT 250 FOR IP): Performed by: INTERNAL MEDICINE

## 2021-03-20 PROCEDURE — 6370000000 HC RX 637 (ALT 250 FOR IP): Performed by: HOSPITALIST

## 2021-03-20 PROCEDURE — 94760 N-INVAS EAR/PLS OXIMETRY 1: CPT

## 2021-03-20 PROCEDURE — 80048 BASIC METABOLIC PNL TOTAL CA: CPT

## 2021-03-20 PROCEDURE — 83735 ASSAY OF MAGNESIUM: CPT

## 2021-03-20 PROCEDURE — 2580000003 HC RX 258: Performed by: INTERNAL MEDICINE

## 2021-03-20 PROCEDURE — 1200000003 HC TELEMETRY R&B

## 2021-03-20 PROCEDURE — 82948 REAGENT STRIP/BLOOD GLUCOSE: CPT

## 2021-03-20 PROCEDURE — 85025 COMPLETE CBC W/AUTO DIFF WBC: CPT

## 2021-03-20 PROCEDURE — 99232 SBSQ HOSP IP/OBS MODERATE 35: CPT | Performed by: INTERNAL MEDICINE

## 2021-03-20 PROCEDURE — 36415 COLL VENOUS BLD VENIPUNCTURE: CPT

## 2021-03-20 RX ORDER — SODIUM CHLORIDE 450 MG/100ML
INJECTION, SOLUTION INTRAVENOUS CONTINUOUS
Status: DISCONTINUED | OUTPATIENT
Start: 2021-03-20 | End: 2021-03-21

## 2021-03-20 RX ADMIN — MIDODRINE HYDROCHLORIDE 10 MG: 10 TABLET ORAL at 16:35

## 2021-03-20 RX ADMIN — FLUOXETINE HYDROCHLORIDE 10 MG: 10 CAPSULE ORAL at 08:57

## 2021-03-20 RX ADMIN — MIDODRINE HYDROCHLORIDE 10 MG: 10 TABLET ORAL at 11:29

## 2021-03-20 RX ADMIN — POLYETHYLENE GLYCOL 3350 17 G: 17 POWDER, FOR SOLUTION ORAL at 16:35

## 2021-03-20 RX ADMIN — SODIUM CHLORIDE, PRESERVATIVE FREE 10 ML: 5 INJECTION INTRAVENOUS at 20:24

## 2021-03-20 RX ADMIN — SODIUM CHLORIDE: 4.5 INJECTION, SOLUTION INTRAVENOUS at 16:35

## 2021-03-20 RX ADMIN — Medication 3000 UNITS: at 08:56

## 2021-03-20 RX ADMIN — INSULIN LISPRO 1 UNITS: 100 INJECTION, SOLUTION INTRAVENOUS; SUBCUTANEOUS at 13:04

## 2021-03-20 RX ADMIN — INSULIN LISPRO 1 UNITS: 100 INJECTION, SOLUTION INTRAVENOUS; SUBCUTANEOUS at 16:35

## 2021-03-20 RX ADMIN — MIDODRINE HYDROCHLORIDE 10 MG: 10 TABLET ORAL at 08:56

## 2021-03-20 RX ADMIN — CLOPIDOGREL BISULFATE 75 MG: 75 TABLET ORAL at 09:02

## 2021-03-20 RX ADMIN — SODIUM CHLORIDE, PRESERVATIVE FREE 10 ML: 5 INJECTION INTRAVENOUS at 08:57

## 2021-03-20 RX ADMIN — SODIUM CHLORIDE: 9 INJECTION, SOLUTION INTRAVENOUS at 08:57

## 2021-03-20 RX ADMIN — PANTOPRAZOLE SODIUM 40 MG: 40 TABLET, DELAYED RELEASE ORAL at 06:00

## 2021-03-20 RX ADMIN — ENOXAPARIN SODIUM 40 MG: 40 INJECTION SUBCUTANEOUS at 20:24

## 2021-03-20 RX ADMIN — ATORVASTATIN CALCIUM 80 MG: 80 TABLET, FILM COATED ORAL at 20:24

## 2021-03-20 RX ADMIN — ASPIRIN 81 MG: 81 TABLET, COATED ORAL at 09:02

## 2021-03-20 ASSESSMENT — PAIN SCALES - GENERAL
PAINLEVEL_OUTOF10: 0
PAINLEVEL_OUTOF10: 0

## 2021-03-20 NOTE — PLAN OF CARE
Problem: Falls - Risk of:  Goal: Will remain free from falls  Description: Will remain free from falls  Outcome: Ongoing  Goal: Absence of physical injury  Description: Absence of physical injury  Outcome: Ongoing     Problem: Skin Integrity:  Goal: Will show no infection signs and symptoms  Description: Will show no infection signs and symptoms  Outcome: Ongoing  Goal: Absence of new skin breakdown  Description: Absence of new skin breakdown  Outcome: Ongoing     Problem: DISCHARGE BARRIERS  Goal: Patient's continuum of care needs are met  Outcome: Ongoing

## 2021-03-20 NOTE — PROGRESS NOTES
Reviewed    Infusion Medications    dextrose      sodium chloride 100 mL/hr at 03/20/21 0857     Scheduled Medications    aspirin  81 mg Oral Daily    atorvastatin  80 mg Oral Daily    Vitamin D  3 tablet Oral Daily    clopidogrel  75 mg Oral Daily    FLUoxetine  10 mg Oral Daily    lidocaine  2 patch Transdermal Daily    midodrine  10 mg Oral TID WC    pantoprazole  40 mg Oral Daily    sodium chloride flush  10 mL Intravenous 2 times per day    enoxaparin  40 mg Subcutaneous Q24H    insulin lispro  0-6 Units Subcutaneous TID     insulin lispro  0-3 Units Subcutaneous Nightly     PRN Meds: magnesium sulfate, glucose, dextrose, glucagon (rDNA), dextrose, sodium chloride flush, promethazine **OR** ondansetron, polyethylene glycol, acetaminophen **OR** acetaminophen      Intake/Output Summary (Last 24 hours) at 3/20/2021 1514  Last data filed at 3/20/2021 1400  Gross per 24 hour   Intake 4353. 85 ml   Output --   Net 4353. 85 ml       Diet:  DIET CARB CONTROL; No Added Salt (3-4 GM)    Exam:  BP (!) 142/70   Pulse 82   Temp 98.3 °F (36.8 °C) (Oral)   Resp 18   Ht 5' 6\" (1.676 m)   Wt 155 lb (70.3 kg)   SpO2 100%   BMI 25.02 kg/m²     General appearance: No apparent distress, appears stated age and cooperative. HEENT: Pupils equal, round, and reactive to light. Conjunctivae/corneas clear. Neck: Supple, with full range of motion. No jugular venous distention. Trachea midline. Respiratory:  Normal respiratory effort. Clear to auscultation, bilaterally without Rales/Wheezes/Rhonchi. Cardiovascular: Regular rate and rhythm with normal S1/S2 without murmurs, rubs or gallops. Abdomen: Soft, non-tender, non-distended with normal bowel sounds. Musculoskeletal: passive and active ROM x 4 extremities. Skin: Skin color, texture, turgor normal.  No rashes or lesions. Neurologic:  Neurovascularly intact without any focal sensory/motor deficits.  Cranial nerves: II-XII intact, grossly non-focal.  Psychiatric: Alert and oriented, thought content appropriate, normal insight  Capillary Refill: Brisk,< 3 seconds   Peripheral Pulses: +2 palpable, equal bilaterally       Labs:   Recent Labs     03/20/21  0543   WBC 3.1*   HGB 9.5*   HCT 31.0*        Recent Labs     03/20/21  0543   *   K 4.4   *   CO2 25   BUN 19   CREATININE 1.4*   CALCIUM 9.5     Recent Labs     03/18/21  0822   AST 22   ALT 14   BILIDIR <0.2   BILITOT 0.4   ALKPHOS 89     Recent Labs     03/18/21  0822   INR 1.06     No results for input(s): CKTOTAL, TROPONINI in the last 72 hours. Urinalysis:      Lab Results   Component Value Date    NITRU NEGATIVE 03/18/2021    WBCUA 2-4 03/18/2021    WBCUA >200W/CLUMPS 10/20/2011    BACTERIA NONE SEEN 03/18/2021    RBCUA 10-15 03/18/2021    BLOODU MODERATE 03/18/2021    SPECGRAV 1.017 03/18/2021    GLUCOSEU NEGATIVE 11/09/2020       Radiology:   All imaging reviewed     Diet: DIET CARB CONTROL; No Added Salt (3-4 GM)      Code Status: Full Code            Electronically signed by Sandra Bowman MD on 3/20/2021 at 3:14 PM

## 2021-03-21 LAB
ANION GAP SERPL CALCULATED.3IONS-SCNC: 7 MEQ/L (ref 8–16)
BUN BLDV-MCNC: 18 MG/DL (ref 7–22)
CALCIUM SERPL-MCNC: 9.9 MG/DL (ref 8.5–10.5)
CHLORIDE BLD-SCNC: 105 MEQ/L (ref 98–111)
CO2: 26 MEQ/L (ref 23–33)
CREAT SERPL-MCNC: 1.5 MG/DL (ref 0.4–1.2)
GFR SERPL CREATININE-BSD FRML MDRD: 34 ML/MIN/1.73M2
GLUCOSE BLD-MCNC: 132 MG/DL (ref 70–108)
GLUCOSE BLD-MCNC: 139 MG/DL (ref 70–108)
GLUCOSE BLD-MCNC: 148 MG/DL (ref 70–108)
GLUCOSE BLD-MCNC: 181 MG/DL (ref 70–108)
POTASSIUM REFLEX MAGNESIUM: 5.5 MEQ/L (ref 3.5–5.2)
SARS-COV-2, NAAT: NOT DETECTED
SODIUM BLD-SCNC: 138 MEQ/L (ref 135–145)

## 2021-03-21 PROCEDURE — 36415 COLL VENOUS BLD VENIPUNCTURE: CPT

## 2021-03-21 PROCEDURE — 6360000002 HC RX W HCPCS: Performed by: HOSPITALIST

## 2021-03-21 PROCEDURE — 87635 SARS-COV-2 COVID-19 AMP PRB: CPT

## 2021-03-21 PROCEDURE — 94760 N-INVAS EAR/PLS OXIMETRY 1: CPT

## 2021-03-21 PROCEDURE — 82948 REAGENT STRIP/BLOOD GLUCOSE: CPT

## 2021-03-21 PROCEDURE — 99232 SBSQ HOSP IP/OBS MODERATE 35: CPT | Performed by: INTERNAL MEDICINE

## 2021-03-21 PROCEDURE — 2580000003 HC RX 258: Performed by: INTERNAL MEDICINE

## 2021-03-21 PROCEDURE — 1200000003 HC TELEMETRY R&B

## 2021-03-21 PROCEDURE — 80048 BASIC METABOLIC PNL TOTAL CA: CPT

## 2021-03-21 PROCEDURE — 6370000000 HC RX 637 (ALT 250 FOR IP): Performed by: HOSPITALIST

## 2021-03-21 PROCEDURE — 6370000000 HC RX 637 (ALT 250 FOR IP): Performed by: INTERNAL MEDICINE

## 2021-03-21 RX ADMIN — INSULIN LISPRO 1 UNITS: 100 INJECTION, SOLUTION INTRAVENOUS; SUBCUTANEOUS at 08:48

## 2021-03-21 RX ADMIN — FLUOXETINE HYDROCHLORIDE 10 MG: 10 CAPSULE ORAL at 08:45

## 2021-03-21 RX ADMIN — ENOXAPARIN SODIUM 40 MG: 40 INJECTION SUBCUTANEOUS at 22:34

## 2021-03-21 RX ADMIN — ASPIRIN 81 MG: 81 TABLET, COATED ORAL at 08:47

## 2021-03-21 RX ADMIN — PANTOPRAZOLE SODIUM 40 MG: 40 TABLET, DELAYED RELEASE ORAL at 06:05

## 2021-03-21 RX ADMIN — MIDODRINE HYDROCHLORIDE 10 MG: 10 TABLET ORAL at 12:59

## 2021-03-21 RX ADMIN — MIDODRINE HYDROCHLORIDE 10 MG: 10 TABLET ORAL at 08:45

## 2021-03-21 RX ADMIN — ATORVASTATIN CALCIUM 80 MG: 80 TABLET, FILM COATED ORAL at 22:34

## 2021-03-21 RX ADMIN — MIDODRINE HYDROCHLORIDE 10 MG: 10 TABLET ORAL at 17:27

## 2021-03-21 RX ADMIN — SODIUM CHLORIDE: 4.5 INJECTION, SOLUTION INTRAVENOUS at 12:59

## 2021-03-21 RX ADMIN — CLOPIDOGREL BISULFATE 75 MG: 75 TABLET ORAL at 08:47

## 2021-03-21 RX ADMIN — Medication 3000 UNITS: at 08:45

## 2021-03-21 ASSESSMENT — PAIN SCALES - GENERAL: PAINLEVEL_OUTOF10: 0

## 2021-03-21 NOTE — PLAN OF CARE
Problem: Falls - Risk of:  Goal: Will remain free from falls  Description: Will remain free from falls  Outcome: Ongoing  Goal: Absence of physical injury  Description: Absence of physical injury  Outcome: Ongoing     Problem: Skin Integrity:  Goal: Will show no infection signs and symptoms  Description: Will show no infection signs and symptoms  Outcome: Ongoing  Goal: Absence of new skin breakdown  Description: Absence of new skin breakdown  Outcome: Ongoing  Goal: Risk for impaired skin integrity will decrease  Description: Risk for impaired skin integrity will decrease  Outcome: Ongoing     Problem: DISCHARGE BARRIERS  Goal: Patient's continuum of care needs are met  Outcome: Ongoing     Problem:  Activity:  Goal: Risk for activity intolerance will decrease  Description: Risk for activity intolerance will decrease  Outcome: Ongoing     Problem: Coping:  Goal: Ability to adjust to condition or change in health will improve  Description: Ability to adjust to condition or change in health will improve  Outcome: Ongoing     Problem: Fluid Volume:  Goal: Ability to maintain a balanced intake and output will improve  Description: Ability to maintain a balanced intake and output will improve  Outcome: Ongoing     Problem: Health Behavior:  Goal: Ability to identify and utilize available resources and services will improve  Description: Ability to identify and utilize available resources and services will improve  Outcome: Ongoing  Goal: Ability to manage health-related needs will improve  Description: Ability to manage health-related needs will improve  Outcome: Ongoing     Problem: Metabolic:  Goal: Ability to maintain appropriate glucose levels will improve  Description: Ability to maintain appropriate glucose levels will improve  Outcome: Ongoing     Problem: Nutritional:  Goal: Maintenance of adequate nutrition will improve  Description: Maintenance of adequate nutrition will improve  Outcome: Ongoing  Goal: Progress toward achieving an optimal weight will improve  Description: Progress toward achieving an optimal weight will improve  Outcome: Ongoing     Problem: Physical Regulation:  Goal: Complications related to the disease process, condition or treatment will be avoided or minimized  Description: Complications related to the disease process, condition or treatment will be avoided or minimized  Outcome: Ongoing  Goal: Diagnostic test results will improve  Description: Diagnostic test results will improve  Outcome: Ongoing     Problem: Tissue Perfusion:  Goal: Adequacy of tissue perfusion will improve  Description: Adequacy of tissue perfusion will improve  Outcome: Ongoing

## 2021-03-21 NOTE — PROGRESS NOTES
no nausea no vomiting  No other overnight events    Medications:  Reviewed    Infusion Medications    sodium chloride 50 mL/hr at 03/21/21 1259    dextrose       Scheduled Medications    aspirin  81 mg Oral Daily    atorvastatin  80 mg Oral Daily    Vitamin D  3 tablet Oral Daily    clopidogrel  75 mg Oral Daily    FLUoxetine  10 mg Oral Daily    lidocaine  2 patch Transdermal Daily    midodrine  10 mg Oral TID     pantoprazole  40 mg Oral Daily    sodium chloride flush  10 mL Intravenous 2 times per day    enoxaparin  40 mg Subcutaneous Q24H    insulin lispro  0-6 Units Subcutaneous TID     insulin lispro  0-3 Units Subcutaneous Nightly     PRN Meds: magnesium sulfate, glucose, dextrose, glucagon (rDNA), dextrose, sodium chloride flush, promethazine **OR** ondansetron, polyethylene glycol, acetaminophen **OR** acetaminophen      Intake/Output Summary (Last 24 hours) at 3/21/2021 1315  Last data filed at 3/20/2021 1957  Gross per 24 hour   Intake 1587.5 ml   Output 0 ml   Net 1587.5 ml       Diet:  DIET CARB CONTROL; No Added Salt (3-4 GM)    Exam:  /73   Pulse 62   Temp 98.2 °F (36.8 °C) (Oral)   Resp 18   Ht 5' 6\" (1.676 m)   Wt 155 lb (70.3 kg)   SpO2 100%   BMI 25.02 kg/m²     General appearance: No apparent distress, appears stated age and cooperative. HEENT: Pupils equal, round, and reactive to light. Conjunctivae/corneas clear. Neck: Supple, with full range of motion. No jugular venous distention. Trachea midline. Respiratory:  Normal respiratory effort. Clear to auscultation, bilaterally without Rales/Wheezes/Rhonchi. Cardiovascular: Regular rate and rhythm with normal S1/S2 without murmurs, rubs or gallops. Abdomen: Soft, non-tender, non-distended with normal bowel sounds. Musculoskeletal: passive and active ROM x 4 extremities. Skin: Skin color, texture, turgor normal.  No rashes or lesions. Neurologic:  Neurovascularly intact without any focal sensory/motor deficits. Cranial nerves: II-XII intact, grossly non-focal.  Psychiatric: Alert and oriented, thought content appropriate, normal insight  Capillary Refill: Brisk,< 3 seconds   Peripheral Pulses: +2 palpable, equal bilaterally       Labs:   Recent Labs     03/20/21  0543   WBC 3.1*   HGB 9.5*   HCT 31.0*        Recent Labs     03/20/21  0543   *   K 4.4   *   CO2 25   BUN 19   CREATININE 1.4*   CALCIUM 9.5     No results for input(s): AST, ALT, BILIDIR, BILITOT, ALKPHOS in the last 72 hours. No results for input(s): INR in the last 72 hours. No results for input(s): Leoncio Kirk in the last 72 hours. Urinalysis:      Lab Results   Component Value Date    NITRU NEGATIVE 03/18/2021    WBCUA 2-4 03/18/2021    WBCUA >200W/CLUMPS 10/20/2011    BACTERIA NONE SEEN 03/18/2021    RBCUA 10-15 03/18/2021    BLOODU MODERATE 03/18/2021    SPECGRAV 1.017 03/18/2021    GLUCOSEU NEGATIVE 11/09/2020       Radiology:   All imaging reviewed     Diet: DIET CARB CONTROL; No Added Salt (3-4 GM)      Code Status: Full Code            Electronically signed by Adelaida Mack MD on 3/21/2021 at 1:15 PM

## 2021-03-22 VITALS
SYSTOLIC BLOOD PRESSURE: 112 MMHG | BODY MASS INDEX: 24.91 KG/M2 | TEMPERATURE: 97.9 F | RESPIRATION RATE: 18 BRPM | HEART RATE: 89 BPM | HEIGHT: 66 IN | DIASTOLIC BLOOD PRESSURE: 69 MMHG | WEIGHT: 155 LBS | OXYGEN SATURATION: 96 %

## 2021-03-22 LAB
ANION GAP SERPL CALCULATED.3IONS-SCNC: 10 MEQ/L (ref 8–16)
BUN BLDV-MCNC: 21 MG/DL (ref 7–22)
CALCIUM SERPL-MCNC: 10.2 MG/DL (ref 8.5–10.5)
CHLORIDE BLD-SCNC: 110 MEQ/L (ref 98–111)
CO2: 25 MEQ/L (ref 23–33)
CREAT SERPL-MCNC: 1.4 MG/DL (ref 0.4–1.2)
GFR SERPL CREATININE-BSD FRML MDRD: 37 ML/MIN/1.73M2
GLUCOSE BLD-MCNC: 103 MG/DL (ref 70–108)
GLUCOSE BLD-MCNC: 219 MG/DL (ref 70–108)
POTASSIUM REFLEX MAGNESIUM: 4.5 MEQ/L (ref 3.5–5.2)
SODIUM BLD-SCNC: 145 MEQ/L (ref 135–145)

## 2021-03-22 PROCEDURE — 36415 COLL VENOUS BLD VENIPUNCTURE: CPT

## 2021-03-22 PROCEDURE — 97110 THERAPEUTIC EXERCISES: CPT

## 2021-03-22 PROCEDURE — 6370000000 HC RX 637 (ALT 250 FOR IP): Performed by: HOSPITALIST

## 2021-03-22 PROCEDURE — 82948 REAGENT STRIP/BLOOD GLUCOSE: CPT

## 2021-03-22 PROCEDURE — 97535 SELF CARE MNGMENT TRAINING: CPT

## 2021-03-22 PROCEDURE — 80048 BASIC METABOLIC PNL TOTAL CA: CPT

## 2021-03-22 PROCEDURE — 99239 HOSP IP/OBS DSCHRG MGMT >30: CPT | Performed by: INTERNAL MEDICINE

## 2021-03-22 PROCEDURE — 97162 PT EVAL MOD COMPLEX 30 MIN: CPT

## 2021-03-22 PROCEDURE — 2580000003 HC RX 258: Performed by: HOSPITALIST

## 2021-03-22 RX ORDER — GLIMEPIRIDE 1 MG/1
1 TABLET ORAL
Qty: 90 TABLET | Refills: 1 | Status: ON HOLD | OUTPATIENT
Start: 2021-03-22 | End: 2022-06-07 | Stop reason: SDUPTHER

## 2021-03-22 RX ADMIN — INSULIN LISPRO 2 UNITS: 100 INJECTION, SOLUTION INTRAVENOUS; SUBCUTANEOUS at 12:26

## 2021-03-22 RX ADMIN — CLOPIDOGREL BISULFATE 75 MG: 75 TABLET ORAL at 09:23

## 2021-03-22 RX ADMIN — SODIUM CHLORIDE, PRESERVATIVE FREE 10 ML: 5 INJECTION INTRAVENOUS at 09:13

## 2021-03-22 RX ADMIN — Medication 3000 UNITS: at 09:14

## 2021-03-22 RX ADMIN — ASPIRIN 81 MG: 81 TABLET, COATED ORAL at 09:17

## 2021-03-22 RX ADMIN — FLUOXETINE HYDROCHLORIDE 10 MG: 10 CAPSULE ORAL at 09:14

## 2021-03-22 NOTE — DISCHARGE SUMMARY
Hospitalist Progress Note      Patient:  Esequiel Whalen    Unit/Bed:8B-27/027-A  YOB: 1950  MRN: [id number]   Acct: [de-identified]     PCP: Vinay Sawant MD  Date of Admission: 3/18/2021      Assessment/Plan:    1. Syncope likely due to Orthostatic Hypotension: likely due to dehydration an diabetic dysautonomia. Orthostatics positive, troponin negative, EKG/TSH normal. Carotid doppler 11/10/2020 showed no signs of clinically significant stenosis. Suspect orthostatic hypotension exacerbated by decreased fluid intake. Gentle IVF, compression stockings. Cont Midodrine. 2D echo normal  3/20-hypotension resolving dizziness resolving  3/21-is asymptomatic  3/22-ambulating in the room without any dizziness. However has some generalized weakness so planning for nursing home before going to assisted living    2. Recurrent Falls: PT/OT on the case. SW consulted. Going to ECF today    3. ISAURA(resolved) on CKD Stage 3: baseline Cr 1.3 to 1.5. -Currently at baseline  4. Hypomagnesemia: Replace per protocol     5. Normocytic anemia: Chronic, anemia w/u normal. Hb at baseline. PCP to consider colonoscopy outpatient     6. Diabetes mellitus type 2, Controlled: hold Metformin given the higher creatinine with chronic kidney disease stage III,added glimepiride going to nursing home so will monitor. Humalog Sliding Scale. Diabetic Diet. Accuchecks     7. Severe Malnutrition: per dietitian    8. Depression/Anxiety-mood stable    Planning for nursing home today  Stable for discharge PCP in 1 week  Discharge time 35 minutes      Chief Complaint: falls     Hospital Course: Patient is a 71y/o F c hx of HLD, CAD, CKD, orthostatic hypotension, and multiple falls who presented to the ED as a trauma patient after suffering the fall. Per patient, she woke this morning and sat up in bed then started feeling very dizzy.  She scooted out of her bed and got to the floor, then the next thing she knew she woke up surrounded by paramedics. Says over the past few days she has been drinking less fluids because she doesn't like what the facility has. Admits to chronic constipation, body aches, cough, denies N/V/D, chest pain, murmurs, SOB, wheezing. Medication List      START taking these medications    glimepiride 1 MG tablet  Commonly known as: AMARYL  Take 1 tablet by mouth every morning (before breakfast)        CONTINUE taking these medications    aspirin 81 MG EC tablet     atorvastatin 80 MG tablet  Commonly known as: LIPITOR     Cholecalciferol 75 MCG (3000 UT) Tabs  Take 1 tablet by mouth daily     clopidogrel 75 MG tablet  Commonly known as: PLAVIX     ezetimibe 10 MG tablet  Commonly known as: ZETIA     FLUoxetine 10 MG capsule  Commonly known as: PROZAC  Take 1 capsule by mouth daily     lidocaine 4 % external patch  Place 2 patches onto the skin daily     midodrine 10 MG tablet  Commonly known as: PROAMATINE  Take 1 tablet by mouth 3 times daily (with meals)     Protonix 40 MG tablet  Generic drug: pantoprazole     TYLENOL PO        STOP taking these medications    metFORMIN 500 MG tablet  Commonly known as: GLUCOPHAGE           Where to Get Your Medications      These medications were sent to [facility] -  [phone]  [address] [place], [place] [id number]    Phone: [phone]   · glimepiride 1 MG tablet       S  Intake/Output Summary (Last 24 hours) at 3/22/2021 1012  Last data filed at 3/21/2021 1512  Gross per 24 hour   Intake 880 ml   Output --   Net 880 ml       Diet:  DIET CARB CONTROL; No Added Salt (3-4 GM)    Exam:  BP (!) 131/91   Pulse 105   Temp 97.7 °F (36.5 °C) (Oral)   Resp 18   Ht 5' 6\" (1.676 m)   Wt 155 lb (70.3 kg)   SpO2 96%   BMI 25.02 kg/m²     General appearance: No apparent distress, appears stated age and cooperative. HEENT: Pupils equal, round, and reactive to light. Conjunctivae/corneas clear.   Neck: Supple, with full range of motion. No jugular venous distention. Trachea midline. Respiratory:  Normal respiratory effort. Clear to auscultation, bilaterally without Rales/Wheezes/Rhonchi. Cardiovascular: Regular rate and rhythm with normal S1/S2 without murmurs, rubs or gallops. Abdomen: Soft, non-tender, non-distended with normal bowel sounds. Musculoskeletal: passive and active ROM x 4 extremities. Skin: Skin color, texture, turgor normal.  No rashes or lesions. Neurologic:  Neurovascularly intact without any focal sensory/motor deficits. Cranial nerves: II-XII intact, grossly non-focal.  Psychiatric: Alert and oriented, thought content appropriate, normal insight  Capillary Refill: Brisk,< 3 seconds   Peripheral Pulses: +2 palpable, equal bilaterally       Labs:   Recent Labs     03/20/21  0543   WBC 3.1*   HGB 9.5*   HCT 31.0*        Recent Labs     03/22/21  0546      K 4.5      CO2 25   BUN 21   CREATININE 1.4*   CALCIUM 10.2     No results for input(s): AST, ALT, BILIDIR, BILITOT, ALKPHOS in the last 72 hours. No results for input(s): INR in the last 72 hours. No results for input(s): Cornelius Fling in the last 72 hours. Urinalysis:      Lab Results   Component Value Date    NITRU NEGATIVE 03/18/2021    WBCUA 2-4 03/18/2021    WBCUA >200W/CLUMPS 10/20/2011    BACTERIA NONE SEEN 03/18/2021    RBCUA 10-15 03/18/2021    BLOODU MODERATE 03/18/2021    SPECGRAV 1.017 03/18/2021    GLUCOSEU NEGATIVE 11/09/2020       Radiology:   All imaging reviewed     Diet: DIET CARB CONTROL; No Added Salt (3-4 GM)      Code Status: Full Code            Electronically signed by Paresh Morrell MD on 3/22/2021 at 10:12 AM

## 2021-03-22 NOTE — PROGRESS NOTES
[address] [facility] 8B  Occupational Therapy  Daily Note  Time:   Time In: 8635  Time Out: 1128  Timed Code Treatment Minutes: 26 Minutes  Minutes: 26          Date: 3/22/2021  Patient Name: Hilario Botello,   Gender: female      Room: 8B-27/027-A  MRN: [id number]  : 1950  (65 y.o.)  Referring Practitioner: Rosemarie López MD  Diagnosis: Orthostatic Hypertension  Additional Pertinent Hx: Patient is a 66y/o F c hx of HLD, CAD, CKD, orthostatic hypotension, and multiple falls who presented to the ED as a trauma patient after suffering the fall. Per patient, she woke this morning and sat up in bed then started feeling very dizzy. She scooted out of her bed and got to the floor, then the next thing she knew she woke up surrounded by paramedics. Says over the past few days she has been drinking less fluids because she doesn't like what the facility has. Admits to chronic constipation, body aches, cough, denies N/V/D, chest pain, murmurs, SOB, wheezing. Restrictions/Precautions:  Restrictions/Precautions: General Precautions, Fall Risk     SUBJECTIVE: Nurse ok'd session. Patient seated in bedside chair upon arrival. Agreeable to ADL routine     PAIN: Denies    Vitals: Vitals not assessed per clinical judgement, see nursing flowsheet    COGNITION: Decreased Safety Awareness    ADL:   Grooming: with set-up. For hair care and to clean dentures seated in chair  Bathing: Minimal Assistance. To wash B feet. CGA standing to wash rajan area/bottom. Able to wash remaining areas seated in chair with SBA  Upper Extremity Dressing: Minimal Assistance. To don gown seated in chair. Able to doff  Lower Extremity Dressing: Moderate Assistance. To don B MARNIE hose and socks seated in chair  Toileting: [employer]. For clothing management and hygiene  Toilet Transfer: [employer]. To/from STS using grab bar with 1 vc for technique.     BALANCE:  Sitting Balance:  Stand By Assistance. Standing Balance: Contact Guard Assistance. BED MOBILITY:  Not Tested    TRANSFERS:  Sit to Stand:  Contact Guard Assistance. From STS. SBA from bedside chair with increased time and min vcs for hand placement  Stand to Sit: Contact Guard Assistance. To STS. SBA to bedside chair with increased time and min vcs for hand placement    FUNCTIONAL MOBILITY:  Assistive Device: Rolling Walker  Assist Level:  Contact Guard Assistance. Distance: To and from bathroom  Slow pace, no LOB noted. Tremors increased with mobility noted        ASSESSMENT:     Activity Tolerance:  Patient tolerance of  treatment: fair. Discharge Recommendations: [address], Patient would benefit from continued therapy after discharge   Equipment Recommendations: Equipment Needed: No  Plan: Times per week: 3-5x  Times per day: Daily  Current Treatment Recommendations: Balance Training, Functional Mobility Training, Endurance Training, Safety Education & Training, Patient/Caregiver Education & Training, Self-Care / ADL    Patient Education  Patient Education: safety with transfers and mobility, ADL strategies    Goals  Short term goals  Time Frame for Short term goals: by discharge  Short term goal 1: Pt will complete functional mobility HH distances with SBA and no LOB or c/o dizziness to increase ease with ADL routine  Short term goal 2: Pt will tolerate dynamic standing x8 min with SBA and no LOB or c/o dizziness to increase ease with grooming  Short term goal 3: Pt will complete BADL routine with no > than min A to increase ease with dressing  Short term goal 4: Pt will complete various t/fs with SBA and no safety cues to increase ease with toileting  Long term goals  Time Frame for Long term goals : no LTG d/t short ELOS    Following session, patient left in safe position with all fall risk precautions in place.

## 2021-03-22 NOTE — PROGRESS NOTES
[address] [place] [address]  INPATIENT PHYSICAL THERAPY  EVALUATION  STR MED SURG 8B - 8B-27/027-A    Time In: 2809  Time Out: 0804  Timed Code Treatment Minutes: 15 Minutes  Minutes: 23          Date: 3/22/2021  Patient Name: Haley Barber,  Gender:  female        MRN: [id number]  : 1950  (65 y.o.)  Referral Date : 21   Referring Practitioner: Barbara Waters MD  Diagnosis: Orthostatic hypotension  Additional Pertinent Hx: Per H&P:Patient is a 71y/o F c hx of HLD, CAD, CKD, orthostatic hypotension, and multiple falls who presented to the ED as a trauma patient after suffering the fall. Per patient, she woke this morning and sat up in bed then started feeling very dizzy. She scooted out of her bed and got to the floor, then the next thing she knew she woke up surrounded by paramedics. Says over the past few days she has been drinking less fluids because she doesn't like what the facility has. Admits to chronic constipation, body aches, cough, denies N/V/D, chest pain, murmurs, SOB, wheezing. Restrictions/Precautions:  Restrictions/Precautions: General Precautions, Fall Risk    Subjective:  Chart Reviewed: Yes  Patient assessed for rehabilitation services?: Yes  Family / Caregiver Present: No  Subjective: Patient in room in chair, agreeable to PT. Pt excited for potential discharge today. RN okay with PT session. Pt noting no dizziness. General:  Overall Orientation Status: Within Functional Limits    Vision: Impaired  Vision Exceptions: Wears glasses at all times    Hearing: Within functional limits         Pain: 0/10    Vitals: pt with elevated heart rate into 160's with standing exercises, nurse into room. Pt instructed to sit down by PT. Blood pressure taken by nurse and 131/91, O2 96% on room air and heart rate down to 100-110 range.   Heart rate with second attempt with standing exercises 150's on monitor    Social/Functional History:    Lives With: Alone  Type of Home: Assisted living  Home Layout: One level  Home Access: Level entry  Home Equipment: Rolling walker     Bathroom Shower/Tub: Walk-in shower  Bathroom Toilet: Handicap height  Bathroom Equipment: Grab bars in shower, Grab bars around toilet, Shower chair  Bathroom Accessibility: Accessible       ADL Assistance: Needs assistance(assistance with bathing)  Homemaking Assistance: Needs assistance  Homemaking Responsibilities: No  Ambulation Assistance: Independent(with RW)  Transfer Assistance: Independent               OBJECTIVE:  Range of Motion:  Right Lower Extremity: WFL  Left Lower Extremity: WFL    Strength:  Right Lower Extremity: Impaired - hip flexion 4-/5, knee 4/5, ankle 4/5  Left Lower Extremity: Impaired - hip flexion 4-/5, knee 4/5, ankle 4/5    Balance:  Static Sitting Balance:  Independent  Static Standing Balance: Stand By Assistance  Dynamic Standing Balance: Stand By Assistance    Bed Mobility:  Not Tested    Transfers:  Sit to Stand: Stand By Assistance  Stand to Sit:Stand By Assistance   Pt with tremors    Ambulation:  Stand By Assistance  Distance: 150'  Surface: Level Tile  Device:Rolling Walker  Gait Deviations: Forward Flexed Posture, Slow Susanna, Decreased Gait Speed and Decreased Heel Strike Bilaterally  Tremors with ambulation    Exercise:  Patient was guided in 1 set(s) 10 reps of exercise to both lower extremities. Standing: march, heel raise, hip flexion, hip abduction, hamstring curls. Exercises were completed for increased independence with functional mobility. Functional Outcome Measures: Completed  Balance Score: 8  Gait Score: 9  Tinetti Total Score: 17/28  Risk Indicators:  Less than/equal to 18 = high risk  19-23 Moderate risk  Greater than/equal to 24 = low risk    AM-PAC Inpatient Mobility without Stair Climbing Raw Score : 15  AM-PAC Inpatient without Stair Climbing T-Scale Score : 43.03    ASSESSMENT:  Activity Tolerance:  Patient tolerance of  treatment: good.       Treatment Exercise Program, Functional Mobility Training, Stair training, Safety Education & Training    Goals:  Patient goals : return to [facility]  Short term goals  Time Frame for Short term goals: at discharge  Short term goal 1: Patient will complete functional transfers with modified independence to transfer surface to surface safely. Short term goal 2: Patient will ambulate 80' with a RW and modified independence to navigate home safely. Short term goal 3: Patient will ascend/descend 1 step with RW for community entry. Short term goal 4: Patient will improve tinetti to greater than or equal to 21/28 to reduce fall risk  Long term goals  Time Frame for Long term goals : N/A due to short estimated length of stay    Following session, patient left in safe position with all fall risk precautions in place.

## 2022-01-17 NOTE — FLOWSHEET NOTE
[place] [address] PROGRESS NOTE      Patient: Kyle Krishna  Room #: 3Q-71/870-O            YOB: 1950  Age: 65 y.o. Gender: female            Admit Date & Time: 2020 11:56 AM    Assessment:  Claude Castellano had just returned to her room from speech therapy. She is sitting up in a wheel chair feeling rather down. Her POA is in the room with her. Claude Castellano is a 65year old female who stated \"I need to have a talk with God,\"  She is trying to find her purpose in living. She stated her  and her parents have , she is an only child and she is not sure why she is still here. Interventions: This  had active listening skills, and asked a few clarifying questions. Pat's  does come and visit her regularly. She asked where I was from and a big smile came on her face when I told her I was from [facility]. We had conversation and I offered prayer. I prayed that God would reveal to her what her purpose is. We prayed also for her healing of body, mind and spirit. I hope to see her again and talk more about the [facility]. Outcomes: At the end of the conversation and the prayer, Claude Castellano seemed more at ease and a smile was on her face. Plan: 1. Care Plan:  Continue spiritual and emotional care for patient and family. Including prayers.      Electronically signed by Leonard Beckham, on 2020 at 2:33 PM.  [facility]  [phone] ROS limited --     Constitutional Symptoms: No weakness, fevers, chills, weight loss  Eyes: No visual changes, headache, eye pain, double vision  Ears, Nose, Mouth, Throat: No runny nose, sinus pain, ear pain, tinnitus, sore throat, dysphagia, odynophagia  Cardiovascular: No chest pain, palpitations, edema  Respiratory: No cough, wheezing, hemoptysis, shortness of breath  Gastrointestinal: No abdominal pain, dysphagia, anorexia, nausea/vomiting, diarrhea/constipation, hematemesis, BRBPR, melena  Genitourinary: No dysuria, frequency, hematuria  Musculoskeletal: No joint pain, joint swelling, decreased ROM  Skin: No pruritus, rashes, lesions, wounds  Neurologic:  No seizures, headache, paraesthesia, numbness, limb weakness  Psychiatric: No depression, anxiety, difficulty concentrating, anhedonia, lack of energy  Endocrine: No heat/cold intolerance, mood swings, sweats, polydipsia, polyuria  Hematologic/lymphatic: No purpura, petechia, prolonged or excessive bleeding after dental extraction / injury, use of anticoagulant and antiplatelet drugs (including aspirin)  Allergic/Immunologic: No anaphylaxis, allergic response to materials, foods, animals    Positives and pertinent negatives noted and all other systems negative.

## 2022-02-11 ENCOUNTER — HOSPITAL ENCOUNTER (OUTPATIENT)
Dept: WOMENS IMAGING | Age: 72
Discharge: HOME | End: 2022-02-11
Payer: MEDICARE

## 2022-02-11 DIAGNOSIS — Z87.81 HISTORY OF FRACTURE OF FEMUR: ICD-10-CM

## 2022-02-11 DIAGNOSIS — Z12.31 ENCOUNTER FOR SCREENING MAMMOGRAM FOR MALIGNANT NEOPLASM OF BREAST: ICD-10-CM

## 2022-02-11 DIAGNOSIS — Z78.0 POST-MENOPAUSAL: ICD-10-CM

## 2022-02-11 PROCEDURE — 77063 BREAST TOMOSYNTHESIS BI: CPT

## 2022-02-11 PROCEDURE — 77080 DXA BONE DENSITY AXIAL: CPT

## 2022-05-31 ENCOUNTER — APPOINTMENT (OUTPATIENT)
Dept: GENERAL RADIOLOGY | Age: 72
End: 2022-05-31
Payer: MEDICARE

## 2022-05-31 ENCOUNTER — APPOINTMENT (OUTPATIENT)
Dept: CT IMAGING | Age: 72
End: 2022-05-31
Payer: MEDICARE

## 2022-05-31 ENCOUNTER — HOSPITAL ENCOUNTER (EMERGENCY)
Age: 72
Discharge: HOME | End: 2022-05-31
Attending: EMERGENCY MEDICINE
Payer: MEDICARE

## 2022-05-31 VITALS
TEMPERATURE: 98 F | RESPIRATION RATE: 16 BRPM | OXYGEN SATURATION: 96 % | DIASTOLIC BLOOD PRESSURE: 82 MMHG | SYSTOLIC BLOOD PRESSURE: 123 MMHG | HEART RATE: 109 BPM

## 2022-05-31 DIAGNOSIS — R41.0 CONFUSION: Primary | ICD-10-CM

## 2022-05-31 LAB
ANION GAP SERPL CALCULATED.3IONS-SCNC: 13 MEQ/L (ref 8–16)
BACTERIA: ABNORMAL /HPF
BASOPHILS # BLD: 0.7 %
BASOPHILS ABSOLUTE: 0 THOU/MM3 (ref 0–0.1)
BILIRUBIN URINE: NEGATIVE
BLOOD, URINE: ABNORMAL
BUN BLDV-MCNC: 26 MG/DL (ref 7–22)
CALCIUM SERPL-MCNC: 10 MG/DL (ref 8.5–10.5)
CASTS 2: ABNORMAL /LPF
CASTS UA: ABNORMAL /LPF
CHARACTER, URINE: ABNORMAL
CHLORIDE BLD-SCNC: 101 MEQ/L (ref 98–111)
CO2: 23 MEQ/L (ref 23–33)
COLOR: YELLOW
CREAT SERPL-MCNC: 1.4 MG/DL (ref 0.4–1.2)
CRYSTALS, UA: ABNORMAL
EKG Q-T INTERVAL: 318 MS
EKG QRS DURATION: 62 MS
EKG QTC CALCULATION (BAZETT): 434 MS
EKG R AXIS: -44 DEGREES
EKG T AXIS: 57 DEGREES
EKG VENTRICULAR RATE: 112 BPM
EOSINOPHIL # BLD: 3.3 %
EOSINOPHILS ABSOLUTE: 0.2 THOU/MM3 (ref 0–0.4)
EPITHELIAL CELLS, UA: ABNORMAL /HPF
ERYTHROCYTE [DISTWIDTH] IN BLOOD BY AUTOMATED COUNT: 18.6 % (ref 11.5–14.5)
ERYTHROCYTE [DISTWIDTH] IN BLOOD BY AUTOMATED COUNT: 54.9 FL (ref 35–45)
GFR SERPL CREATININE-BSD FRML MDRD: 37 ML/MIN/1.73M2
GLUCOSE BLD-MCNC: 155 MG/DL (ref 70–108)
GLUCOSE URINE: NEGATIVE MG/DL
HCT VFR BLD CALC: 36.3 % (ref 37–47)
HEMOGLOBIN: 10.1 GM/DL (ref 12–16)
HYPOCHROMIA: PRESENT
IMMATURE GRANS (ABS): 0.01 THOU/MM3 (ref 0–0.07)
IMMATURE GRANULOCYTES: 0.2 %
KETONES, URINE: ABNORMAL
LEUKOCYTE ESTERASE, URINE: ABNORMAL
LYMPHOCYTES # BLD: 25.9 %
LYMPHOCYTES ABSOLUTE: 1.6 THOU/MM3 (ref 1–4.8)
MCH RBC QN AUTO: 22.9 PG (ref 26–33)
MCHC RBC AUTO-ENTMCNC: 27.8 GM/DL (ref 32.2–35.5)
MCV RBC AUTO: 82.1 FL (ref 81–99)
MISCELLANEOUS 2: ABNORMAL
MONOCYTES # BLD: 9.2 %
MONOCYTES ABSOLUTE: 0.6 THOU/MM3 (ref 0.4–1.3)
NITRITE, URINE: NEGATIVE
NUCLEATED RED BLOOD CELLS: 0 /100 WBC
OSMOLALITY CALCULATION: 281.7 MOSMOL/KG (ref 275–300)
PH UA: 5.5 (ref 5–9)
PLATELET # BLD: 231 THOU/MM3 (ref 130–400)
PMV BLD AUTO: 9.8 FL (ref 9.4–12.4)
POIKILOCYTES: ABNORMAL
POTASSIUM SERPL-SCNC: 4.9 MEQ/L (ref 3.5–5.2)
PROTEIN UA: 100
RBC # BLD: 4.42 MILL/MM3 (ref 4.2–5.4)
RBC URINE: > 200 /HPF
RENAL EPITHELIAL, UA: ABNORMAL
SCAN OF BLOOD SMEAR: NORMAL
SEG NEUTROPHILS: 60.7 %
SEGMENTED NEUTROPHILS ABSOLUTE COUNT: 3.6 THOU/MM3 (ref 1.8–7.7)
SODIUM BLD-SCNC: 137 MEQ/L (ref 135–145)
SPECIFIC GRAVITY, URINE: 1.02 (ref 1–1.03)
TROPONIN T: < 0.01 NG/ML
UROBILINOGEN, URINE: 1 EU/DL (ref 0–1)
WBC # BLD: 6 THOU/MM3 (ref 4.8–10.8)
WBC UA: ABNORMAL /HPF
YEAST: ABNORMAL

## 2022-05-31 PROCEDURE — 96360 HYDRATION IV INFUSION INIT: CPT

## 2022-05-31 PROCEDURE — 87077 CULTURE AEROBIC IDENTIFY: CPT

## 2022-05-31 PROCEDURE — 80048 BASIC METABOLIC PNL TOTAL CA: CPT

## 2022-05-31 PROCEDURE — 93010 ELECTROCARDIOGRAM REPORT: CPT | Performed by: INTERNAL MEDICINE

## 2022-05-31 PROCEDURE — 87086 URINE CULTURE/COLONY COUNT: CPT

## 2022-05-31 PROCEDURE — 71045 X-RAY EXAM CHEST 1 VIEW: CPT

## 2022-05-31 PROCEDURE — 70450 CT HEAD/BRAIN W/O DYE: CPT

## 2022-05-31 PROCEDURE — 2580000003 HC RX 258: Performed by: STUDENT IN AN ORGANIZED HEALTH CARE EDUCATION/TRAINING PROGRAM

## 2022-05-31 PROCEDURE — 85025 COMPLETE CBC W/AUTO DIFF WBC: CPT

## 2022-05-31 PROCEDURE — 81001 URINALYSIS AUTO W/SCOPE: CPT

## 2022-05-31 PROCEDURE — 99285 EMERGENCY DEPT VISIT HI MDM: CPT

## 2022-05-31 PROCEDURE — 93005 ELECTROCARDIOGRAM TRACING: CPT | Performed by: EMERGENCY MEDICINE

## 2022-05-31 PROCEDURE — 84484 ASSAY OF TROPONIN QUANT: CPT

## 2022-05-31 RX ORDER — 0.9 % SODIUM CHLORIDE 0.9 %
1000 INTRAVENOUS SOLUTION INTRAVENOUS ONCE
Status: COMPLETED | OUTPATIENT
Start: 2022-05-31 | End: 2022-05-31

## 2022-05-31 RX ADMIN — SODIUM CHLORIDE 1000 ML: 9 INJECTION, SOLUTION INTRAVENOUS at 11:48

## 2022-05-31 ASSESSMENT — PAIN - FUNCTIONAL ASSESSMENT: PAIN_FUNCTIONAL_ASSESSMENT: NONE - DENIES PAIN

## 2022-05-31 NOTE — ED NOTES
Patient resting in bed. Respirations easy and unlabored. No distress noted. Call light within reach.        Imelda Alvares RN  05/31/22 0046

## 2022-05-31 NOTE — ED TRIAGE NOTES
Presents to ED with c/o altered mental status for a couple weeks. Patient alert and oriented to self and place at this time. Disoriented to time and situation.

## 2022-05-31 NOTE — ED NOTES
Ambulated patient to bathroom. Respirations easy and unlabored.       Dwayne Blank RN  05/31/22 0531

## 2022-05-31 NOTE — ED PROVIDER NOTES
[facility] ENCOUNTER          Pt Name: Moe Lamas  MRN: [id number]  [place] 1950  Date of evaluation: 5/31/2022  Treating Resident Physician: Makayla Collier MD  Supervising Physician: Dr. Sandra Melendrez       Chief Complaint   Patient presents with    Altered Mental Status     History obtained from chart review and the patient. HISTORY OF PRESENT ILLNESS    HPI  Moe Lamas is a 66 y.o. female with PMHx of encephalopathy, DMII, recurrent falls who presents to the emergency department for evaluation of confusion. She is coming from [facility] living [facility]. She is alert and oriented to name only. States that she does not know why she is here but following commands. Per [facility] progress notes, she has had four episodes of urinary incontinence beginning five days ago and undressing from her waist down. Noted temperature of 96.4F five days ago. She ws also found laying on the floor, stated that she did not fall but not witnessed. The patient has no other acute complaints at this time.            REVIEW OF SYSTEMS   Review of Systems   Unable to perform ROS: Mental status change         PAST MEDICAL AND SURGICAL HISTORY     Past Medical History:   Diagnosis Date    Acute kidney injury ([facility] [address]     Arthritis     CAD (coronary artery disease)     CKD (chronic kidney disease) stage 3, GFR 30-59 ml/min (HCC)     Diabetes mellitus, insulin dependent (IDDM), controlled     Escherichia coli septicemia (HCC)     Essential tremor     History of blood transfusion     Hyperlipidemia     Hypertension     Hypoxemic respiratory failure, chronic (HCC)     secondary to sepsis and possibly pneumonia    MI (myocardial infarction) ([facility] [address] 2011    Normocytic anemia     Osteoporosis     Pneumonia      Past Surgical History:   Procedure Laterality Date    CYSTOSCOPY  12/7/12    with stent insertion    HIP SURGERY Left 9/6/2020    HIP PINNING performed by Juliette Kent DO at [facility] LITHOTRIPSY  12/18/2012    right    OVARY REMOVAL Bilateral     age 38         MEDICATIONS   No current facility-administered medications for this encounter. Current Outpatient Medications:     glimepiride (AMARYL) 1 MG tablet, Take 1 tablet by mouth every morning (before breakfast), Disp: 90 tablet, Rfl: 1    clopidogrel (PLAVIX) 75 MG tablet, Take 75 mg by mouth daily, Disp: , Rfl:     lidocaine 4 % external patch, Place 2 patches onto the skin daily, Disp: 1 box, Rfl: 0    midodrine (PROAMATINE) 10 MG tablet, Take 1 tablet by mouth 3 times daily (with meals), Disp: 30 tablet, Rfl: 0    FLUoxetine (PROZAC) 10 MG capsule, Take 1 capsule by mouth daily, Disp: 30 capsule, Rfl: 0    Cholecalciferol 75 MCG (3000 UT) TABS, Take 1 tablet by mouth daily, Disp: 90 tablet, Rfl: 3    atorvastatin (LIPITOR) 80 MG tablet, Take 80 mg by mouth daily, Disp: , Rfl:     ezetimibe (ZETIA) 10 MG tablet, Take 10 mg by mouth daily, Disp: , Rfl:     Acetaminophen (TYLENOL PO), Take  by mouth as needed. , Disp: , Rfl:     pantoprazole (PROTONIX) 40 MG tablet, Take 40 mg by mouth daily , Disp: , Rfl:     aspirin 81 MG EC tablet, Take 81 mg by mouth daily. , Disp: , Rfl:       SOCIAL HISTORY     Social History     Social History Narrative    Not on file     Social History     Tobacco Use    Smoking status: Never Smoker    Smokeless tobacco: Never Used   Vaping Use    Vaping Use: Never used   Substance Use Topics    Alcohol use: Yes     Comment: rarely    Drug use: No         ALLERGIES   No Known Allergies      FAMILY HISTORY     Family History   Problem Relation Age of Onset    Heart Disease Mother     Breast Cancer Mother [de-identified]         PREVIOUS RECORDS   Previous records reviewed: I reviewed the patient's past medical records including relevant labs, imaging and procedures.           PHYSICAL EXAM     ED Triage Vitals BP Temp Temp src Pulse Resp SpO2 Height Weight   123/82 98F -- 109 16 96% -- --     Initial vital signs and nursing assessment reviewed and abnormal from tachycardia. There is no height or weight on file to calculate BMI. Pulsoximetry is normal per my interpretation. Additional Vital Signs:  Vitals:    05/31/22 1500   BP: 123/82   Pulse:    Resp: 16   Temp:    SpO2: 96%       Physical Exam  Vitals reviewed. Constitutional:       General: She is not in acute distress. Appearance: She is not ill-appearing, toxic-appearing or diaphoretic. HENT:      Head: Normocephalic and atraumatic. Right Ear: External ear normal.      Left Ear: External ear normal.      Mouth/Throat:      Mouth: Mucous membranes are moist.   Eyes:      Extraocular Movements: Extraocular movements intact. Cardiovascular:      Rate and Rhythm: Tachycardia present. Rhythm irregular. Pulses:           Radial pulses are 2+ on the right side and 2+ on the left side. Heart sounds: Normal heart sounds. Pulmonary:      Effort: Pulmonary effort is normal.      Breath sounds: Normal breath sounds. No wheezing, rhonchi or rales. Abdominal:      General: Abdomen is flat. Palpations: Abdomen is soft. Tenderness: There is no abdominal tenderness. Musculoskeletal:      Cervical back: Normal range of motion. Right lower leg: No edema. Left lower leg: No edema. Skin:     General: Skin is warm and dry. Capillary Refill: Capillary refill takes less than 2 seconds. Neurological:      Mental Status: She is alert. GCS: GCS eye subscore is 4. GCS verbal subscore is 5. GCS motor subscore is 6. Cranial Nerves: No cranial nerve deficit or dysarthria. Psychiatric:         Attention and Perception: Attention normal.         Mood and Affect: Mood and affect normal.         Speech: Speech normal.         Behavior: Behavior normal. Behavior is cooperative.          Cognition and Memory: Cognition is impaired. Memory is impaired. Comments: Patient moving all 4 extremities and following commands but when asked what the year is states that she does know the year but will not tell me the year. When asked who the president is she laughs and says that it is Kimberli Forrest, states that she is only making a joke but not able to tell me who the current president is. MEDICAL DECISION MAKING   Initial Assessment: This is a 58-year-old female coming in from assisted living facility due to concerns for altered mental status. She is alert and awake but is oriented only to self. Following commands, moving all 4 extremities. No focal neurologic deficit appreciated. However she is tachycardic with an irregular rhythm on exam.  Has good distal pulses bilaterally, no external signs of trauma no abdominal tenderness or peritoneal signs. Pulmonary auscultation is unremarkable, clear to auscultation bilaterally. Differential diagnosis includes but is not limited to: Ingestion, infectious, trauma, seizure, AMS, electrolytes, encephalopathy, insulin, opiates, uremia, toxins, tumor, thyrotoxicosis, psychiatric, sepsis, stroke, N/V, seizure, headache, elderly age, alcohol / drugs / toxidromes, signs of trauma    Although some of these diagnoses are unlikely they were considered in my medical decision making.     Plan:    IVF bolus  Labs  ACS w/u  CT head  UA        ED RESULTS   Laboratory results:  Labs Reviewed   CBC WITH AUTO DIFFERENTIAL - Abnormal; Notable for the following components:       Result Value    Hemoglobin 10.1 (*)     Hematocrit 36.3 (*)     MCH 22.9 (*)     MCHC 27.8 (*)     RDW-CV 18.6 (*)     RDW-SD 54.9 (*)     All other components within normal limits   BASIC METABOLIC PANEL - Abnormal; Notable for the following components:    Glucose 155 (*)     BUN 26 (*)     CREATININE 1.4 (*)     All other components within normal limits   GLOMERULAR FILTRATION RATE, ESTIMATED - Abnormal; Notable for the following components:    Est, Glom Filt Rate 37 (*)     All other components within normal limits   URINE WITH REFLEXED MICRO - Abnormal; Notable for the following components:    Ketones, Urine TRACE (*)     Blood, Urine LARGE (*)     Protein,  (*)     Leukocyte Esterase, Urine SMALL (*)     Character, Urine CLOUDY (*)     All other components within normal limits   CULTURE, REFLEXED, URINE   TROPONIN   ANION GAP   OSMOLALITY   SCAN OF BLOOD SMEAR   URINE DRUG SCREEN       Radiologic studies results:  XR CHEST 1 VIEW   Final Result   Stable radiographic appearance of the chest. No evidence of an acute process. **This report has been created using voice recognition software. It may contain minor errors which are inherent in voice recognition technology. **      Final report electronically signed by Dr. Lenora Ray MD on 5/31/2022 12:01 PM      CT HEAD WO CONTRAST   Final Result    No evidence of acute intracranial abnormality. **This report has been created using voice recognition software. It may contain minor errors which are inherent in voice recognition technology. **      Final report electronically signed by Dr. Lenora Ray MD on 5/31/2022 11:47 AM          ED Medications administered this visit:   Medications   0.9 % sodium chloride bolus (0 mLs IntraVENous Stopped 5/31/22 1301)         ED COURSE     ED Course as of 05/31/22 1655   Tue May 31, 2022   1207 CT HEAD WO CONTRAST  IMPRESSION:   No evidence of acute intracranial abnormality. [JT]   1207 XR CHEST 1 VIEW  IMPRESSION:  Stable radiographic appearance of the chest. No evidence of an acute process [JT]   1207 CBC with Auto Differential(!):    WBC 6.0   RBC 4.42   Hemoglobin Quant 10. 1(!)   Hematocrit 36.3(!)   MCV 82.1   MCH 22.9(!)   MCHC 27.8(!)   RDW-CV 18.6(!)   RDW-SD 54.9(!)   Platelet Count 878  No white count appreciated.   Does have a normocytic anemia present but is not a significant decrease from prior values. [JT]      ED Course User Index  [JT] Jyoti Hagan MD         MDM:   No immediate etiology of AMS immediately appreciated. Patient alert without signs of delirium or altered mental status. Is not oriented to place or time but this is consistent with patient's baseline. Main concern was possible UTI given several episodes of incontinence at nursing home. No focal neurologic deficits appreciated. UA is equivocal but likely does not represent a UTI causing altered mental status. Satting ok on RA hypoxia unlikely etiology. Rest of workup unremarkable; CT head negative. Did give IVF bolus for tachycardia present on arrival. Patient appears stable at discharge. Strict return precautions and follow up instructions were discussed with the patient prior to discharge, with which the patient agrees. MEDICATION CHANGES     Discharge Medication List as of 5/31/2022  1:50 PM            FINAL DISPOSITION     Final diagnoses:   Confusion     Condition: condition: fair  Dispo: Discharge to nursing home      This transcription was electronically signed. Parts of this transcriptions may have been dictated by use of voice recognition software and electronically transcribed, and parts may have been transcribed with the assistance of an ED scribe. The transcription may contain errors not detected in proofreading. Please refer to my supervising physician's documentation if my documentation differs.     Electronically Signed: Jyoti Hagan MD, 05/31/22, 4:55 PM          Jyoti Hagan MD  Resident  05/31/22 8411

## 2022-05-31 NOTE — ED NOTES
Patient resting in bed. Respirations easy and unlabored. No distress noted. Call light within reach.        Cherry Todd RN  05/31/22 0807

## 2022-05-31 NOTE — ED NOTES
Patient resting in bed. Respirations easy and unlabored. No distress noted. Call light within reach.        Imelda Alvares RN  05/31/22 5147

## 2022-06-01 LAB
ORGANISM: ABNORMAL
URINE CULTURE REFLEX: ABNORMAL

## 2022-06-02 NOTE — PROGRESS NOTES
Pharmacy Note  ED Culture Follow-up    Efrain Tilley is a 66 y.o. female. Allergies: Patient has no known allergies. Labs:  Lab Results   Component Value Date    BUN 26 (H) 05/31/2022    CREATININE 1.4 (H) 05/31/2022    WBC 6.0 05/31/2022     CrCl cannot be calculated (Unknown ideal weight.). Current antimicrobials:   None    ASSESSMENT:  Micro results:   Urine culture: positive for Klebsiella pnneumoniae     PLAN:  Need for intervention: No 2/2 CFU's <10,000  Discussed with: Deliah Kussmaul, MD  Chosen treatment:    No treatment indicated    Patient response:   No need to contact patient    Called/sent in prescription to: Not applicable    Please call with any questions.  Abimael Knightport, [address] [place] [address], PharmD 3:38 PM 6/2/2022

## 2022-06-04 ENCOUNTER — APPOINTMENT (OUTPATIENT)
Dept: GENERAL RADIOLOGY | Age: 72
DRG: 309 | End: 2022-06-04
Payer: MEDICARE

## 2022-06-04 ENCOUNTER — APPOINTMENT (OUTPATIENT)
Dept: CT IMAGING | Age: 72
DRG: 309 | End: 2022-06-04
Payer: MEDICARE

## 2022-06-04 ENCOUNTER — HOSPITAL ENCOUNTER (INPATIENT)
Age: 72
LOS: 3 days | Discharge: NURSING, REHAB OR LONG-TERM CARE | DRG: 309 | End: 2022-06-07
Attending: EMERGENCY MEDICINE | Admitting: INTERNAL MEDICINE
Payer: MEDICARE

## 2022-06-04 DIAGNOSIS — I48.91 NEW ONSET A-FIB (HCC): ICD-10-CM

## 2022-06-04 DIAGNOSIS — S02.40CA CLOSED FRACTURE OF RIGHT SIDE OF MAXILLA, INITIAL ENCOUNTER (HCC): ICD-10-CM
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
## 2022-06-04 DIAGNOSIS — H05.231 PERIORBITAL HEMATOMA OF RIGHT EYE: ICD-10-CM

## 2022-06-04 PROBLEM — R29.6 UNWITNESSED FALL: Status: ACTIVE | Noted: 2020-11-09

## 2022-06-04 PROBLEM — S01.81XA LACERATION OF FACE: Status: ACTIVE | Noted: 2022-06-04

## 2022-06-04 LAB
ALBUMIN SERPL-MCNC: 4.4 G/DL (ref 3.5–5.1)
ALP BLD-CCNC: 51 U/L (ref 38–126)
ALT SERPL-CCNC: < 5 U/L (ref 11–66)
AMPHETAMINE+METHAMPHETAMINE URINE SCREEN: NEGATIVE
ANION GAP SERPL CALCULATED.3IONS-SCNC: 12 MEQ/L (ref 8–16)
AST SERPL-CCNC: 22 U/L (ref 5–40)
BACTERIA: ABNORMAL
BARBITURATE QUANTITATIVE URINE: NEGATIVE
BASOPHILS # BLD: 0.5 %
BASOPHILS ABSOLUTE: 0 THOU/MM3 (ref 0–0.1)
BENZODIAZEPINE QUANTITATIVE URINE: NEGATIVE
BILIRUB SERPL-MCNC: 0.4 MG/DL (ref 0.3–1.2)
BILIRUBIN URINE: NEGATIVE
BLOOD, URINE: ABNORMAL
BUN BLDV-MCNC: 21 MG/DL (ref 7–22)
CALCIUM SERPL-MCNC: 10.1 MG/DL (ref 8.5–10.5)
CANNABINOID QUANTITATIVE URINE: NEGATIVE
CASTS: ABNORMAL /LPF
CASTS: ABNORMAL /LPF
CHARACTER, URINE: ABNORMAL
CHLORIDE BLD-SCNC: 101 MEQ/L (ref 98–111)
CO2: 27 MEQ/L (ref 23–33)
COCAINE METABOLITE QUANTITATIVE URINE: NEGATIVE
COLOR: YELLOW
CREAT SERPL-MCNC: 1.5 MG/DL (ref 0.4–1.2)
CRYSTALS: ABNORMAL
EOSINOPHIL # BLD: 2.1 %
EOSINOPHILS ABSOLUTE: 0.1 THOU/MM3 (ref 0–0.4)
EPITHELIAL CELLS, UA: ABNORMAL /HPF
ERYTHROCYTE [DISTWIDTH] IN BLOOD BY AUTOMATED COUNT: 18.6 % (ref 11.5–14.5)
ERYTHROCYTE [DISTWIDTH] IN BLOOD BY AUTOMATED COUNT: 54.6 FL (ref 35–45)
ETHYL ALCOHOL, SERUM: < 0.01 %
GFR SERPL CREATININE-BSD FRML MDRD: 34 ML/MIN/1.73M2
GLUCOSE BLD-MCNC: 139 MG/DL (ref 70–108)
GLUCOSE BLD-MCNC: 157 MG/DL (ref 70–108)
GLUCOSE BLD-MCNC: 96 MG/DL (ref 70–108)
GLUCOSE, URINE: NEGATIVE MG/DL
HCT VFR BLD CALC: 34.7 % (ref 37–47)
HEMOGLOBIN: 10.2 GM/DL (ref 12–16)
IMMATURE GRANS (ABS): 0.03 THOU/MM3 (ref 0–0.07)
IMMATURE GRANULOCYTES: 0.5 %
KETONES, URINE: ABNORMAL
LEUKOCYTE EST, POC: ABNORMAL
LYMPHOCYTES # BLD: 17.8 %
LYMPHOCYTES ABSOLUTE: 1.1 THOU/MM3 (ref 1–4.8)
MCH RBC QN AUTO: 23.7 PG (ref 26–33)
MCHC RBC AUTO-ENTMCNC: 29.4 GM/DL (ref 32.2–35.5)
MCV RBC AUTO: 80.7 FL (ref 81–99)
MISCELLANEOUS LAB TEST RESULT: ABNORMAL
MONOCYTES # BLD: 10.2 %
MONOCYTES ABSOLUTE: 0.6 THOU/MM3 (ref 0.4–1.3)
NITRITE, URINE: NEGATIVE
NUCLEATED RED BLOOD CELLS: 0 /100 WBC
OPIATES, URINE: NEGATIVE
OSMOLALITY CALCULATION: 284.6 MOSMOL/KG (ref 275–300)
OXYCODONE: NEGATIVE
PH UA: 5 (ref 5–9)
PHENCYCLIDINE QUANTITATIVE URINE: NEGATIVE
PLATELET # BLD: 216 THOU/MM3 (ref 130–400)
PMV BLD AUTO: 10 FL (ref 9.4–12.4)
POTASSIUM SERPL-SCNC: 4.5 MEQ/L (ref 3.5–5.2)
PRO-BNP: 3250 PG/ML (ref 0–900)
PROTEIN UA: 100 MG/DL
RBC # BLD: 4.3 MILL/MM3 (ref 4.2–5.4)
RBC URINE: ABNORMAL /HPF
RENAL EPITHELIAL, UA: ABNORMAL
SEG NEUTROPHILS: 68.9 %
SEGMENTED NEUTROPHILS ABSOLUTE COUNT: 4.2 THOU/MM3 (ref 1.8–7.7)
SODIUM BLD-SCNC: 140 MEQ/L (ref 135–145)
SPECIFIC GRAVITY UA: 1.02 (ref 1–1.03)
TOTAL PROTEIN: 6.6 G/DL (ref 6.1–8)
TROPONIN T: < 0.01 NG/ML
TSH SERPL DL<=0.05 MIU/L-ACNC: 3.37 UIU/ML (ref 0.4–4.2)
UROBILINOGEN, URINE: 1 EU/DL (ref 0–1)
WBC # BLD: 6.1 THOU/MM3 (ref 4.8–10.8)
WBC UA: > 100 /HPF
YEAST: ABNORMAL

## 2022-06-04 PROCEDURE — 80307 DRUG TEST PRSMV CHEM ANLYZR: CPT

## 2022-06-04 PROCEDURE — 90715 TDAP VACCINE 7 YRS/> IM: CPT | Performed by: STUDENT IN AN ORGANIZED HEALTH CARE EDUCATION/TRAINING PROGRAM

## 2022-06-04 PROCEDURE — 6820000002 HC L2 INJURY CALL ACTIVATION: Performed by: SURGERY

## 2022-06-04 PROCEDURE — 1200000003 HC TELEMETRY R&B

## 2022-06-04 PROCEDURE — 36415 COLL VENOUS BLD VENIPUNCTURE: CPT

## 2022-06-04 PROCEDURE — 84484 ASSAY OF TROPONIN QUANT: CPT

## 2022-06-04 PROCEDURE — 93005 ELECTROCARDIOGRAM TRACING: CPT | Performed by: PHYSICIAN ASSISTANT

## 2022-06-04 PROCEDURE — 2500000003 HC RX 250 WO HCPCS

## 2022-06-04 PROCEDURE — 82077 ASSAY SPEC XCP UR&BREATH IA: CPT

## 2022-06-04 PROCEDURE — 2580000003 HC RX 258: Performed by: NURSE PRACTITIONER

## 2022-06-04 PROCEDURE — 6370000000 HC RX 637 (ALT 250 FOR IP): Performed by: NURSE PRACTITIONER

## 2022-06-04 PROCEDURE — 99223 1ST HOSP IP/OBS HIGH 75: CPT | Performed by: NURSE PRACTITIONER

## 2022-06-04 PROCEDURE — 81001 URINALYSIS AUTO W/SCOPE: CPT

## 2022-06-04 PROCEDURE — 2580000003 HC RX 258: Performed by: STUDENT IN AN ORGANIZED HEALTH CARE EDUCATION/TRAINING PROGRAM

## 2022-06-04 PROCEDURE — 83880 ASSAY OF NATRIURETIC PEPTIDE: CPT

## 2022-06-04 PROCEDURE — 93005 ELECTROCARDIOGRAM TRACING: CPT | Performed by: STUDENT IN AN ORGANIZED HEALTH CARE EDUCATION/TRAINING PROGRAM

## 2022-06-04 PROCEDURE — 87186 SC STD MICRODIL/AGAR DIL: CPT

## 2022-06-04 PROCEDURE — 71045 X-RAY EXAM CHEST 1 VIEW: CPT

## 2022-06-04 PROCEDURE — 80053 COMPREHEN METABOLIC PANEL: CPT

## 2022-06-04 PROCEDURE — 70486 CT MAXILLOFACIAL W/O DYE: CPT

## 2022-06-04 PROCEDURE — 87077 CULTURE AEROBIC IDENTIFY: CPT

## 2022-06-04 PROCEDURE — 70450 CT HEAD/BRAIN W/O DYE: CPT

## 2022-06-04 PROCEDURE — 82948 REAGENT STRIP/BLOOD GLUCOSE: CPT

## 2022-06-04 PROCEDURE — 72125 CT NECK SPINE W/O DYE: CPT

## 2022-06-04 PROCEDURE — 90471 IMMUNIZATION ADMIN: CPT | Performed by: STUDENT IN AN ORGANIZED HEALTH CARE EDUCATION/TRAINING PROGRAM

## 2022-06-04 PROCEDURE — 0HQ1XZZ REPAIR FACE SKIN, EXTERNAL APPROACH: ICD-10-PCS | Performed by: STUDENT IN AN ORGANIZED HEALTH CARE EDUCATION/TRAINING PROGRAM

## 2022-06-04 PROCEDURE — 85025 COMPLETE CBC W/AUTO DIFF WBC: CPT

## 2022-06-04 PROCEDURE — 84443 ASSAY THYROID STIM HORMONE: CPT

## 2022-06-04 PROCEDURE — 73552 X-RAY EXAM OF FEMUR 2/>: CPT

## 2022-06-04 PROCEDURE — 99285 EMERGENCY DEPT VISIT HI MDM: CPT

## 2022-06-04 PROCEDURE — 72170 X-RAY EXAM OF PELVIS: CPT

## 2022-06-04 PROCEDURE — 87086 URINE CULTURE/COLONY COUNT: CPT

## 2022-06-04 PROCEDURE — 6360000002 HC RX W HCPCS: Performed by: STUDENT IN AN ORGANIZED HEALTH CARE EDUCATION/TRAINING PROGRAM

## 2022-06-04 PROCEDURE — 99223 1ST HOSP IP/OBS HIGH 75: CPT | Performed by: SURGERY

## 2022-06-04 RX ORDER — INSULIN LISPRO 100 [IU]/ML
0-6 INJECTION, SOLUTION INTRAVENOUS; SUBCUTANEOUS
Status: DISCONTINUED | OUTPATIENT
Start: 2022-06-04 | End: 2022-06-07 | Stop reason: HOSPADM

## 2022-06-04 RX ORDER — INSULIN LISPRO 100 [IU]/ML
0-3 INJECTION, SOLUTION INTRAVENOUS; SUBCUTANEOUS NIGHTLY
Status: DISCONTINUED | OUTPATIENT
Start: 2022-06-04 | End: 2022-06-07 | Stop reason: HOSPADM

## 2022-06-04 RX ORDER — MAGNESIUM SULFATE IN WATER 40 MG/ML
2000 INJECTION, SOLUTION INTRAVENOUS PRN
Status: DISCONTINUED | OUTPATIENT
Start: 2022-06-04 | End: 2022-06-07 | Stop reason: HOSPADM

## 2022-06-04 RX ORDER — MIDODRINE HYDROCHLORIDE 10 MG/1
10 TABLET ORAL
Status: DISCONTINUED | OUTPATIENT
Start: 2022-06-04 | End: 2022-06-07 | Stop reason: HOSPADM

## 2022-06-04 RX ORDER — FLUOXETINE 10 MG/1
10 CAPSULE ORAL DAILY
Status: DISCONTINUED | OUTPATIENT
Start: 2022-06-04 | End: 2022-06-04

## 2022-06-04 RX ORDER — CEFDINIR 300 MG/1
300 CAPSULE ORAL EVERY 12 HOURS SCHEDULED
Status: DISCONTINUED | OUTPATIENT
Start: 2022-06-04 | End: 2022-06-07 | Stop reason: HOSPADM

## 2022-06-04 RX ORDER — POLYETHYLENE GLYCOL 3350 17 G/17G
17 POWDER, FOR SOLUTION ORAL DAILY PRN
Status: DISCONTINUED | OUTPATIENT
Start: 2022-06-04 | End: 2022-06-07 | Stop reason: HOSPADM

## 2022-06-04 RX ORDER — ACETAMINOPHEN 325 MG/1
650 TABLET ORAL EVERY 6 HOURS PRN
Status: DISCONTINUED | OUTPATIENT
Start: 2022-06-04 | End: 2022-06-07 | Stop reason: HOSPADM

## 2022-06-04 RX ORDER — FLUOXETINE HYDROCHLORIDE 20 MG/1
20 CAPSULE ORAL DAILY
Status: DISCONTINUED | OUTPATIENT
Start: 2022-06-04 | End: 2022-06-07 | Stop reason: HOSPADM

## 2022-06-04 RX ORDER — SODIUM CHLORIDE 9 MG/ML
INJECTION, SOLUTION INTRAVENOUS PRN
Status: DISCONTINUED | OUTPATIENT
Start: 2022-06-04 | End: 2022-06-07 | Stop reason: HOSPADM

## 2022-06-04 RX ORDER — ONDANSETRON 2 MG/ML
4 INJECTION INTRAMUSCULAR; INTRAVENOUS EVERY 6 HOURS PRN
Status: DISCONTINUED | OUTPATIENT
Start: 2022-06-04 | End: 2022-06-07 | Stop reason: HOSPADM

## 2022-06-04 RX ORDER — ACETAMINOPHEN 650 MG/1
650 SUPPOSITORY RECTAL EVERY 6 HOURS PRN
Status: DISCONTINUED | OUTPATIENT
Start: 2022-06-04 | End: 2022-06-07 | Stop reason: HOSPADM

## 2022-06-04 RX ORDER — SODIUM CHLORIDE 0.9 % (FLUSH) 0.9 %
5-40 SYRINGE (ML) INJECTION EVERY 12 HOURS SCHEDULED
Status: DISCONTINUED | OUTPATIENT
Start: 2022-06-04 | End: 2022-06-07 | Stop reason: HOSPADM

## 2022-06-04 RX ORDER — ONDANSETRON 4 MG/1
4 TABLET, FILM COATED ORAL EVERY 6 HOURS PRN
Status: ON HOLD | COMMUNITY
End: 2022-06-07 | Stop reason: SDUPTHER

## 2022-06-04 RX ORDER — SODIUM CHLORIDE 0.9 % (FLUSH) 0.9 %
5-40 SYRINGE (ML) INJECTION PRN
Status: DISCONTINUED | OUTPATIENT
Start: 2022-06-04 | End: 2022-06-07 | Stop reason: HOSPADM

## 2022-06-04 RX ORDER — PANTOPRAZOLE SODIUM 40 MG/1
40 TABLET, DELAYED RELEASE ORAL DAILY
Status: DISCONTINUED | OUTPATIENT
Start: 2022-06-05 | End: 2022-06-07 | Stop reason: HOSPADM

## 2022-06-04 RX ORDER — 0.9 % SODIUM CHLORIDE 0.9 %
1000 INTRAVENOUS SOLUTION INTRAVENOUS ONCE
Status: COMPLETED | OUTPATIENT
Start: 2022-06-04 | End: 2022-06-04

## 2022-06-04 RX ORDER — ATORVASTATIN CALCIUM 80 MG/1
80 TABLET, FILM COATED ORAL DAILY
Status: DISCONTINUED | OUTPATIENT
Start: 2022-06-04 | End: 2022-06-07 | Stop reason: HOSPADM

## 2022-06-04 RX ORDER — POTASSIUM CHLORIDE 7.45 MG/ML
10 INJECTION INTRAVENOUS PRN
Status: DISCONTINUED | OUTPATIENT
Start: 2022-06-04 | End: 2022-06-07 | Stop reason: HOSPADM

## 2022-06-04 RX ORDER — POTASSIUM CHLORIDE 20 MEQ/1
40 TABLET, EXTENDED RELEASE ORAL PRN
Status: DISCONTINUED | OUTPATIENT
Start: 2022-06-04 | End: 2022-06-07 | Stop reason: HOSPADM

## 2022-06-04 RX ORDER — DEXTROSE MONOHYDRATE 50 MG/ML
100 INJECTION, SOLUTION INTRAVENOUS PRN
Status: DISCONTINUED | OUTPATIENT
Start: 2022-06-04 | End: 2022-06-07 | Stop reason: HOSPADM

## 2022-06-04 RX ORDER — ONDANSETRON 4 MG/1
4 TABLET, ORALLY DISINTEGRATING ORAL EVERY 8 HOURS PRN
Status: DISCONTINUED | OUTPATIENT
Start: 2022-06-04 | End: 2022-06-07 | Stop reason: HOSPADM

## 2022-06-04 RX ORDER — LIDOCAINE HYDROCHLORIDE AND EPINEPHRINE 10; 10 MG/ML; UG/ML
INJECTION, SOLUTION INFILTRATION; PERINEURAL
Status: COMPLETED
Start: 2022-06-04 | End: 2022-06-04

## 2022-06-04 RX ORDER — VITAMIN B COMPLEX
3000 TABLET ORAL DAILY
Status: DISCONTINUED | OUTPATIENT
Start: 2022-06-05 | End: 2022-06-07 | Stop reason: HOSPADM

## 2022-06-04 RX ORDER — LABETALOL 20 MG/4 ML (5 MG/ML) INTRAVENOUS SYRINGE
10 ONCE
Status: DISCONTINUED | OUTPATIENT
Start: 2022-06-04 | End: 2022-06-04

## 2022-06-04 RX ORDER — LIDOCAINE 4 G/G
2 PATCH TOPICAL DAILY
Status: DISCONTINUED | OUTPATIENT
Start: 2022-06-04 | End: 2022-06-07 | Stop reason: HOSPADM

## 2022-06-04 RX ORDER — EZETIMIBE 10 MG/1
10 TABLET ORAL DAILY
Status: DISCONTINUED | OUTPATIENT
Start: 2022-06-04 | End: 2022-06-04 | Stop reason: RX

## 2022-06-04 RX ORDER — CEFDINIR 300 MG/1
300 CAPSULE ORAL ONCE
Status: DISCONTINUED | OUTPATIENT
Start: 2022-06-04 | End: 2022-06-04

## 2022-06-04 RX ADMIN — TETANUS TOXOID, REDUCED DIPHTHERIA TOXOID AND ACELLULAR PERTUSSIS VACCINE, ADSORBED 0.5 ML: 5; 2.5; 8; 8; 2.5 SUSPENSION INTRAMUSCULAR at 11:59

## 2022-06-04 RX ADMIN — ACETAMINOPHEN 650 MG: 325 TABLET ORAL at 20:25

## 2022-06-04 RX ADMIN — SODIUM CHLORIDE 1000 ML: 9 INJECTION, SOLUTION INTRAVENOUS at 13:38

## 2022-06-04 RX ADMIN — MIDODRINE HYDROCHLORIDE 10 MG: 10 TABLET ORAL at 16:23

## 2022-06-04 RX ADMIN — INSULIN LISPRO 1 UNITS: 100 INJECTION, SOLUTION INTRAVENOUS; SUBCUTANEOUS at 20:24

## 2022-06-04 RX ADMIN — METOPROLOL TARTRATE 12.5 MG: 25 TABLET, FILM COATED ORAL at 16:23

## 2022-06-04 RX ADMIN — LIDOCAINE HYDROCHLORIDE AND EPINEPHRINE: 10; 10 INJECTION, SOLUTION INFILTRATION; PERINEURAL at 13:38

## 2022-06-04 RX ADMIN — FLUOXETINE 20 MG: 20 CAPSULE ORAL at 16:23

## 2022-06-04 RX ADMIN — ATORVASTATIN CALCIUM 80 MG: 80 TABLET, FILM COATED ORAL at 20:25

## 2022-06-04 RX ADMIN — SODIUM CHLORIDE, PRESERVATIVE FREE 10 ML: 5 INJECTION INTRAVENOUS at 20:25

## 2022-06-04 RX ADMIN — CEFDINIR 300 MG: 300 CAPSULE ORAL at 20:25

## 2022-06-04 ASSESSMENT — PAIN SCALES - PAIN ASSESSMENT IN ADVANCED DEMENTIA (PAINAD)
TOTALSCORE: 3
CONSOLABILITY: 0
CONSOLABILITY: 0
FACIALEXPRESSION: 1
BREATHING: 0
BODYLANGUAGE: 1
TOTALSCORE: 3
CONSOLABILITY: 0
NEGVOCALIZATION: 1
BREATHING: 0
NEGVOCALIZATION: 1
FACIALEXPRESSION: 1
FACIALEXPRESSION: 1
NEGVOCALIZATION: 1
BODYLANGUAGE: 1
BREATHING: 0
BREATHING: 0
BODYLANGUAGE: 1
FACIALEXPRESSION: 1
CONSOLABILITY: 0
NEGVOCALIZATION: 1
FACIALEXPRESSION: 1
TOTALSCORE: 3
BREATHING: 0
TOTALSCORE: 3
NEGVOCALIZATION: 1
CONSOLABILITY: 0
TOTALSCORE: 3
BODYLANGUAGE: 1
BODYLANGUAGE: 1

## 2022-06-04 ASSESSMENT — ENCOUNTER SYMPTOMS
ABDOMINAL PAIN: 0
PHOTOPHOBIA: 0
SHORTNESS OF BREATH: 0
BACK PAIN: 0
FACIAL SWELLING: 1
COLOR CHANGE: 0
VOMITING: 0
EYE PAIN: 0
NAUSEA: 0
CHEST TIGHTNESS: 0

## 2022-06-04 ASSESSMENT — PAIN SCALES - GENERAL: PAINLEVEL_OUTOF10: 6

## 2022-06-04 ASSESSMENT — PAIN - FUNCTIONAL ASSESSMENT: PAIN_FUNCTIONAL_ASSESSMENT: 0-10

## 2022-06-04 ASSESSMENT — PAIN DESCRIPTION - LOCATION: LOCATION: HEAD

## 2022-06-04 NOTE — PLAN OF CARE
Problem: Discharge Planning  Goal: Discharge to home or other facility with appropriate resources  Outcome: Progressing  Flowsheets (Taken 6/4/2022 1447)  Discharge to home or other facility with appropriate resources: Identify barriers to discharge with patient and caregiver  Note: Patient from [facility] , patient plan to return to [facility] since she does not qualify for AL      Problem: Pain  Goal: Verbalizes/displays adequate comfort level or baseline comfort level  Outcome: Progressing  Flowsheets (Taken 6/4/2022 1850)  Verbalizes/displays adequate comfort level or baseline comfort level:   Encourage patient to monitor pain and request assistance   Assess pain using appropriate pain scale  Note: Patient denies pain at this time, will continue to monitor     Problem: Safety - Adult  Goal: Free from fall injury  Outcome: Progressing  Flowsheets (Taken 6/4/2022 1850)  Free From Fall Injury: Instruct family/caregiver on patient safety  Note: No falls noted this shift. Continue falling star program. Bed alarm on, bed in low position. Call light and personal belongings in reach. Patient uses call light appropriately.        Problem: ABCDS Injury Assessment  Goal: Absence of physical injury  Outcome: Progressing  Flowsheets (Taken 6/4/2022 1850)  Absence of Physical Injury: Implement safety measures based on patient assessment     Problem: Respiratory - Adult  Goal: Achieves optimal ventilation and oxygenation  Outcome: Progressing  Flowsheets (Taken 6/4/2022 1850)  Achieves optimal ventilation and oxygenation:   Assess for changes in respiratory status   Assess for changes in mentation and behavior     Problem: Cardiovascular - Adult  Goal: Maintains optimal cardiac output and hemodynamic stability  Outcome: Progressing  Flowsheets (Taken 6/4/2022 1850)  Maintains optimal cardiac output and hemodynamic stability: Monitor blood pressure and heart rate     Problem: Skin/Tissue Integrity - Adult  Goal: Skin integrity

## 2022-06-04 NOTE — ED NOTES
Patient to ED via EMS from [facility] after a unwitnessed falling outside on cement. Patient has large laceration to forehead.  Swelling right side of face      Amparo Diana RN  06/04/22 6839

## 2022-06-04 NOTE — FLOWSHEET NOTE
[facility] 88 PROGRESS NOTE      Patient: Naldo Flanagan  Room #: 04/004A            YOB: 1950  Age: 66 y.o. Gender: female            Admit Date & Time: 6/4/2022 11:05 AM    Assessment:   Interventions: Outcomes:      lvl II Trauma called for ER 4;  Friend and POA in ER waiting Miles Art with POA and promised report  Moved to ER 4   Patient awake and interactive;  Spoke to let her know her friend is present. Invited prayer which she accepted. Patient quivering just a bit (possibly / probably with anxiety)   Plan:    1. Support as she is in ER and movng toward admittance or discharge     Electronically signed by Brianda Garcia, on 6/4/2022 at 11:32 AM.  Lamont Chanel  [phone]           06/04/22 1130   Encounter Summary   Service Provided For: Patient;Friend   Referral/Consult From: Nursing Supervisor/Manager   Support System Religious/rodríg[place] community;Friends/neighbors   Last Encounter  06/04/22   Complexity of Encounter High   Begin Time 1110   End Time  1141   Total Time Calculated 31 min   Crisis   Type Trauma   Spiritual/Emotional needs   Type Spiritual Distress; Emotional Distress   Assessment/Intervention/Outcome   Assessment Anxious; Concerns with suffering; Shock   Intervention Active listening;Empowerment;Explored/Affirmed feelings, thoughts, concerns;Explored Coping Skills/Resources;Nurtured Hope;Prayer (assurance of)/[place];Sustaining Presence/Ministry of presence Patient Name: Moni Wallace  : 1943    MRN: [id number]                              Today's Date: 12/10/2021       Admit Date: 2021    Visit Dx:     ICD-10-CM ICD-9-CM   1. Acute abdominal pain  R10.9 789.00     338.19   2. Peritonitis associated with peritoneal dialysis, initial encounter ([facility])  T85.71XA 996.68     567.9   3. Sepsis without acute organ dysfunction, due to unspecified organism ([facility])  A41.9 038.9     995.91   4. Bowel dysfunction  K59.9 564.9     Patient Active Problem List   Diagnosis   • Paroxysmal atrial fibrillation (HCC)   • Essential hypertension   • Type 2 diabetes mellitus (HCC)   • Chronic kidney disease, stage IV (severe) (HCC)   • Anemia of renal disease   • Hyperparathyroidism (HCC)   • Asthma   • Right-sided carotid artery disease (HCC)   • High output HF (heart failure) (CMS/HCC)   • Vitamin D deficiency   • Leukocytosis   • Nonrheumatic aortic valve stenosis   • Ulcer of gastric fundus   • Tubular adenoma   • Macular degeneration   • Hypothyroidism   • Chronic kidney disease   • Screen for colon cancer   • Postoperative anemia due to acute blood loss   • Hyperlipidemia LDL goal <100   • Morbidly obese (HCC)   • Atypical pneumonia   • Acute UTI (urinary tract infection)   • UTI (urinary tract infection)   • Urinary tract infection, site not specified   • Demand ischemia (HCC)   • Peritonitis (HCC)   • Sepsis associated hypotension (HCC)   • Ileus (HCC)   • Hypotension     Past Medical History:   Diagnosis Date   • Adenomatous colon polyp    • Chronic kidney disease    • Clostridium difficile infection 2017   • Diabetes mellitus (HCC)    • Gastric polyps    • Hypothyroidism    • IgA nephropathy, acute    • Kidney transplanted    • Macular degeneration    • Paroxysmal atrial fibrillation (HCC)    • Tubular adenoma     excision    • Ulcer of gastric fundus    • Vitamin D deficiency      Past Surgical History:   Procedure Laterality Date   • BREAST BIOPSY Left    •  CARPAL TUNNEL RELEASE     • CARPAL TUNNEL RELEASE Right    • CATARACT EXTRACTION     • CATARACT EXTRACTION Bilateral    • COLON RESECTION SMALL BOWEL N/A 12/6/2021    Procedure: COLON RESECTION SMALL BOWEL;  Surgeon: Robin Andersen MD;  Location:  [facility];  Service: General;  Laterality: N/A;   • DIAGNOSTIC LAPAROSCOPY     • DIALYSIS FISTULA CREATION     • EXPLORATORY LAPAROTOMY N/A 12/6/2021    Procedure: DIAGNOSTIC LAPAROSCOPY;  Surgeon: Robin Andersen MD;  Location: [facility];  Service: General;  Laterality: N/A;   • HERNIA REPAIR  85 and 86   • INSERTION HEMODIALYSIS CATHETER Right 12/6/2021    Procedure: TUNNELED DIALYSIS CATHETER INSERTION;  Surgeon: Rolando Begum MD;  Location:  [facility];  Service: Vascular;  Laterality: Right;   • KNEE ARTHROSCOPY INCISION AND DRAINAGE OF KNEE Right 5/18/2019    Procedure: KNEE ARTHROSCOPY INCISION AND DRAINAGE OF KNEE;  Surgeon: Paco Lester MD;  Location:  [facility];  Service: Orthopedics   • LAPAROSCOPIC TUBAL LIGATION  1985   • TOTAL KNEE ARTHROPLASTY     • TOTAL KNEE ARTHROPLASTY      Left knee    • TRANSPLANTATION RENAL  2010   • TRANSPLANTATION RENAL Right    • TRIGGER FINGER RELEASE     • TUBAL ABDOMINAL LIGATION     • UPPER GASTROINTESTINAL ENDOSCOPY  05/20/2013   • VENOUS ACCESS DEVICE (PORT) INSERTION N/A 5/23/2019    Procedure: INSERTION GROSHONG;  Surgeon: Rolando Begum MD;  Location:  [facility];  Service: General      General Information     Row Name 12/10/21 0922          Physical Therapy Time and Intention    Document Type therapy note (daily note)  -LM     Mode of Treatment individual therapy; physical therapy  -LM     Row Name 12/10/21 0922          General Information    Patient Profile Reviewed yes  -LM     Existing Precautions/Restrictions fall; other (see comments)  Abd Incision; NG Tube  -LM     Row Name 12/10/21 0922          Cognition    Orientation Status (Cognition) oriented x 4  -LM     Row Name 12/10/21 0922           Safety Issues, Functional Mobility    Safety Issues Affecting Function (Mobility) insight into deficits/self-awareness; problem-solving; safety precaution awareness; safety precautions follow-through/compliance; sequencing abilities  -LM     Impairments Affecting Function (Mobility) balance; endurance/activity tolerance; pain; range of motion (ROM); strength  -LM           User Key  (r) = Recorded By, (t) = Taken By, (c) = Cosigned By    Initials Name Provider Type    LM Gema Nieves, PT Physical Therapist               Mobility     Row Name 12/10/21 0922          Bed Mobility    Bed Mobility sit-supine  -LM     Sit-Supine [place] (Bed Mobility) moderate assist (50% patient effort); 1 person assist; verbal cues  -LM     Comment (Bed Mobility) Pt sitting up in chair upon entering room.  Pt states she feels very nauseated and request to return to bed.  HOB flat; No BR  -LM     Row Name 12/10/21 0922          Transfers    Comment (Transfers) Stood x 4 reps from recliner - first without AD; 2nd & 3rd using rw; fourth PT stood directly in front of patient and completed a stand pivot transfer to bed.  Pt was able to clear buttocks each stand, but is unable to fully extend knees and obtain full, upright posture.  -LM     Row Name 12/10/21 0922          Bed-Chair Transfer    Bed-Chair [place] (Transfers) maximum assist (25% patient effort); 1 person assist; verbal cues  -LM     Assistive Device (Bed-Chair Transfers) other (see comments)  PT stood directly in front of pt  -LM     Row Name 12/10/21 0922          Sit-Stand Transfer    Sit-Stand [place] (Transfers) maximum assist (25% patient effort); 1 person assist; verbal cues  -LM     Assistive Device (Sit-Stand Transfers) walker, front-wheeled  -LM     Row Name 12/10/21 0922          Gait/Stairs (Locomotion)    [place] Level (Gait) unable to assess  -LM     Comment (Gait/Stairs) Non-ambulatory at baseline  -LM           User Key  (r) =  Recorded By, (t) = Taken By, (c) = Cosigned By    Initials Name Provider Type     Gema Nieves PT Physical Therapist               Obj/Interventions     Row Name 12/10/21 0922          Motor Skills    Therapeutic Exercise hip; knee; ankle  -     Row Name 12/10/21 0922          Hip (Therapeutic Exercise)    Hip (Therapeutic Exercise) AROM (active range of motion)  -     Hip AROM (Therapeutic Exercise) bilateral; aBduction; aDduction; supine; 10 repetitions  -     Row Name 12/10/21 0922          Knee (Therapeutic Exercise)    Knee (Therapeutic Exercise) AROM (active range of motion); isometric exercises  -     Knee AROM (Therapeutic Exercise) bilateral; SAQ (short arc quad); heel slides; supine; 10 repetitions  -     Knee Isometrics (Therapeutic Exercise) bilateral; quad sets; supine; 10 repetitions  -     Row Name 12/10/21 0922          Ankle (Therapeutic Exercise)    Ankle (Therapeutic Exercise) AROM (active range of motion)  -     Ankle AROM (Therapeutic Exercise) bilateral; dorsiflexion; plantarflexion; supine; 10 repetitions  -           User Key  (r) = Recorded By, (t) = Taken By, (c) = Cosigned By    Initials Name Provider Type     Gema Nieves PT Physical Therapist               Goals/Plan    No documentation.                Clinical Impression     Row Name 12/10/21 0922          Pain    Additional Documentation Pain Scale: Numbers Pre/Post-Treatment (Group)  -[facility] Name 12/10/21 0922          Pain Scale: Numbers Pre/Post-Treatment    Pretreatment Pain Rating 6/10  -LM     Posttreatment Pain Rating 5/10  -LM     Pain Location abdomen  -     Pain Intervention(s) Repositioned; Ambulation/increased activity  -     Row Name 12/10/21 0922          Plan of Care Review    Plan of Care Reviewed With patient; son  -     Progress no change  -     Outcome Summary Pt stood multiple reps with and without walker with MaxAx1.  Pt is able to clear buttocks, but unable to stand fully  upright and extend knees.  Stand pivot completed from recliner-->bed with MaxAx1, and pt transferred sit-->supine with ModAx1.  BLE ther ex completed without complaint.  Continue with skilled PT to improve mobility and safety.  -LM     Row Name 12/10/21 0922          Vital Signs    Pre Systolic BP Rehab 114  -LM     Pre Treatment Diastolic BP 70  -LM     Pretreatment Heart Rate (beats/min) 95  -LM     Posttreatment Heart Rate (beats/min) 102  -LM     Pre SpO2 (%) 98  -LM     O2 Delivery Pre Treatment room air  -LM     Post SpO2 (%) 96  -LM     O2 Delivery Post Treatment room air  -LM     Pre Patient Position Sitting  -LM     Post Patient Position Supine  -LM     Row Name 12/10/21 0922          Positioning and Restraints    Pre-Treatment Position sitting in chair/recliner  -LM     Post Treatment Position bed  -LM     In Bed supine; call light within reach; encouraged to call for assist; exit alarm on; with family/caregiver; with other staff; notified nsg  with APRN  -LM           User Key  (r) = Recorded By, (t) = Taken By, (c) = Cosigned By    Initials Name Provider Type    LM Gema Nieves, PT Physical Therapist               Outcome Measures     Row Name 12/10/21 0922          How much help from another person do you currently need...    Turning from your back to your side while in flat bed without using bedrails? 2  -LM     Moving from lying on back to sitting on the side of a flat bed without bedrails? 2  -LM     Moving to and from a bed to a chair (including a wheelchair)? 2  -LM     Standing up from a chair using your arms (e.g., wheelchair, bedside chair)? 2  -LM     Climbing 3-5 steps with a railing? 1  -LM     To walk in hospital room? 1  -LM     AM-PAC 6 Clicks Score (PT) 10  -LM     Row Name 12/10/21 0922          Functional Assessment    Outcome Measure Options AM-PAC 6 Clicks Basic Mobility (PT)  -LM           User Key  (r) = Recorded By, (t) = Taken By, (c) = Cosigned By    Initials Name Provider Type      Gema Nieves, PT Physical Therapist                             Physical Therapy Education                 Title: PT OT SLP Therapies (In Progress)     Topic: Physical Therapy (In Progress)     Point: Mobility training (Done)     Learning Progress Summary           Patient Acceptance, E, REECE,DU by  at 12/1/2021 1538                   Point: Home exercise program (Not Started)     Learner Progress:  Not documented in this visit.          Point: Body mechanics (Not Started)     Learner Progress:  Not documented in this visit.          Point: Precautions (Done)     Learning Progress Summary           Patient Acceptance, E, VU,DU by  at 12/1/2021 1538                               User Key     Initials Effective Dates Name Provider Type Discipline     06/16/21 -  Gema Nieves, GARO Physical Therapist PT              PT Recommendation and Plan  Planned Therapy Interventions (PT): balance training, bed mobility training, gait training, home exercise program, motor coordination training, neuromuscular re-education, patient/family education, postural re-education, ROM (range of motion), strengthening, stretching, transfer training, wheelchair management/propulsion training  Plan of Care Reviewed With: patient, son  Progress: no change  Outcome Summary: Pt stood multiple reps with and without walker with MaxAx1.  Pt is able to clear buttocks, but unable to stand fully upright and extend knees.  Stand pivot completed from recliner-->bed with MaxAx1, and pt transferred sit-->supine with ModAx1.  BLE ther ex completed without complaint.  Continue with skilled PT to improve mobility and safety.     Time Calculation:    PT Charges     Row Name 12/10/21 0922             Time Calculation    Start Time 0922  -LM      PT Received On 12/10/21  -      PT Goal Re-Cert Due Date 12/17/21  -              Timed Charges    28167 - PT Therapeutic Exercise Minutes 8  -LM      48106 - PT Therapeutic Activity Minutes 19  -LM               Total Minutes    Timed Charges Total Minutes 27  -LM       Total Minutes 27  -LM            User Key  (r) = Recorded By, (t) = Taken By, (c) = Cosigned By    Initials Name Provider Type    Gema Larry, PT Physical Therapist              Therapy Charges for Today     Code Description Service Date Service Provider Modifiers Qty    08909421373  PT THERAPEUTIC ACT EA 15 MIN 12/10/2021 Gema Nieves, PT GP 1    30573990374 HC PT THER PROC EA 15 MIN 12/10/2021 Gema Nieves, PT GP 1          PT G-Codes  Outcome Measure Options: AM-PAC 6 Clicks Basic Mobility (PT)  AM-PAC 6 Clicks Score (PT): 10    Gema Nieves PT  12/10/2021

## 2022-06-04 NOTE — ED NOTES
Patient normally confused, patient following commands at this time     Miguel Angel Ontiveros RN  06/04/22 1062

## 2022-06-04 NOTE — ED NOTES
Trauma PA at bedside suturing wounds, family brought back and updated on plan of care      Jose Veliz RN  06/04/22 5222

## 2022-06-04 NOTE — H&P
History & Physical        Patient:  Yovanny Griffin  YOB: 1950    MRN: [id number]     Acct: [de-identified]    PCP: Gabby Mark MD    Date of Admission: 6/4/2022    Date of Service: Pt seen/examined on 06/04/22  and Admitted to Inpatient with expected LOS greater than two midnights due to medical therapy. ASSESSMENT/PLAN:    1. Trauma by Fall--CT head does not reveal anything acute, CT cervical spine does not reveal anything acute, appreciate trauma services input  2. Atrial Fib with RVR--add Lopressor 25 mg twice a day with hold parameters, maintain telemetry, no anticoagulation at this time secondary to #1 and #4  3. Possible UTI (POA)--patient has been started on Omnicef 300 mg twice a day, await culture report  4. Large right-sided periorbital hematoma status post suture repair--secondary to #1, ice as needed  5. Comminuted fracture of the anterior wall of the right maxillary sinus--ENT was consulted in the emergency department and stated nothing to do at this time, recommend Omnicef 300 mg twice a day for 4 weeks  6. CKD stage III--monitor  7. Diabetes mellitus type 2, uncontrolled--we will hold oral hypoglycemic at this time and add low-dose sliding scale along with hypoglycemia protocol and monitor  8. Essential hypertension, uncontrolled--we will add low-dose beta-blocker and monitor closely as patient does have a history of orthostatic hypotension and is on midodrine  9. Chronic normocytic anemia  10. CAD--aspirin and Plavix will be on hold at this time; on statin, beta-blocker ordered  11. Orthostatic hypotension history--on midodrine  12. Dementia  13. Chronic diastolic heart failure--EF from March 19, 2021 revealed an EF 60%; BNP elevated at 3250, patient did receive a 1 L bolus of normal saline in the emergency department, currently asymptomatic and no edema  14.  CODE STATUS--DNR Comfort Care arrest      Chief Complaint: Fall at assisted living      History Of Present Illness: 66 y.o. female who presented to [facility] with fall at assisted living; patient has a past medical history of CKD, diabetes, hypertension, CAD along with dementia; she had an unwitnessed fall at the assisted living unit; it is reported that she typically walks with a walker and she was walking around outside without anybody realizing it and somehow she had an unwitnessed fall and was found lying on the concrete on the sidewalk; it is reported per EMS that the patient broke upper denture bridge and had blood suctioned out of her mouth, she has had worsening confusion over the past few weeks but no change today; she was found to have a large right sided periorbital hematoma along with a comminuted fracture of the anterior wall of the right maxillary sinus so ENT was consulted from the emergency department and recommends Omnicef 300 mg twice a day for 4 weeks and to follow-up outpatient as no urgent needs need to be done; trauma services also saw and sutured the laceration above her right eye; she was found to be in atrial fibrillation with a ventricular rate of 132, it appears on May 31, 2022 she had an EKG that showed an atrial fibrillation with heart rate 112. In the emergency department, she is pleasantly confused; blood pressure stable, she is afebrile and on room air; she can tell me her name and she cannot answer any other questions and the only thing she tells me is \"does not really matter\", according to the ER staff her baseline is able to state her name; ECF paperwork reviewed she is a DNR Comfort Care arrest; she was given a 1 L 0.9 normal saline bolus, Omnicef 300 mg x 1 dose along with a tetanus shot, she is being admitted to hospital service for further care and evaluation.     Past Medical History:          Diagnosis Date    Acute kidney injury ([facility] [address]     Arthritis     CAD (coronary artery disease)     CKD (chronic kidney disease) stage 3, GFR 30-59 ml/min (HCC)     Diabetes mellitus, insulin dependent (IDDM), controlled     Escherichia coli septicemia (HCC)     Essential tremor     History of blood transfusion     Hyperlipidemia     Hypertension     Hypoxemic respiratory failure, chronic (HCC)     secondary to sepsis and possibly pneumonia    MI (myocardial infarction) ([facility] [address] 2011    Normocytic anemia     Osteoporosis     Pneumonia        Past Surgical History:          Procedure Laterality Date    CYSTOSCOPY  12/7/12    with stent insertion    HIP SURGERY Left 9/6/2020    HIP PINNING performed by Alona Griffith DO at [address] [place] LITHOTRIPSY  12/18/2012    right    OVARY REMOVAL Bilateral     age 38       Medications Prior to Admission:      Prior to Admission medications    Medication Sig Start Date End Date Taking? Authorizing Provider   glimepiride (AMARYL) 1 MG tablet Take 1 tablet by mouth every morning (before breakfast) 3/22/21   Jose C Mcgovern MD   clopidogrel (PLAVIX) 75 MG tablet Take 75 mg by mouth daily    Historical Provider, MD   lidocaine 4 % external patch Place 2 patches onto the skin daily 11/14/20   JOANNA Venegas CNP   midodrine (PROAMATINE) 10 MG tablet Take 1 tablet by mouth 3 times daily (with meals) 11/13/20   JOANNA Venegas CNP   FLUoxetine (PROZAC) 10 MG capsule Take 1 capsule by mouth daily 9/24/20   Claudia Coker MD   Cholecalciferol 75 MCG (3000 UT) TABS Take 1 tablet by mouth daily 9/23/20   Claudia Coker MD   atorvastatin (LIPITOR) 80 MG tablet Take 80 mg by mouth daily    Historical Provider, MD   ezetimibe (ZETIA) 10 MG tablet Take 10 mg by mouth daily    Historical Provider, MD   Acetaminophen (TYLENOL PO) Take  by mouth as needed. Historical Provider, MD   pantoprazole (PROTONIX) 40 MG tablet Take 40 mg by mouth daily     Historical Provider, MD   aspirin 81 MG EC tablet Take 81 mg by mouth daily.       Historical Provider, MD       Allergies:  Patient has no known allergies. Social History:   reports that she has never smoked. She has never used smokeless tobacco. She reports current alcohol use. She reports that she does not use drugs. Family History:      Positive as follows:        Problem Relation Age of Onset    Heart Disease Mother     Breast Cancer Mother [de-identified]       REVIEW OF SYSTEMS:   Unable to obtain as patient continues to state \"does not matter\"    PHYSICAL EXAM:    BP (!) 139/96   Pulse (!) 124   Temp 97.1 °F (36.2 °C)   Resp 28   Ht 5' 2\" (1.575 m)   Wt 155 lb (70.3 kg)   SpO2 97%   BMI 28.35 kg/m²     General appearance:  No apparent distress, appears stated age and cooperative. HEENT: Right eye with bruising and edema  Neck: Supple, with full range of motion. No jugular venous distention. Trachea midline. Respiratory:  Normal respiratory effort. Clear to auscultation, bilaterally without Rales/Wheezes/Rhonchi. Cardiovascular:  irregular rate and rhythm   Abdomen: Soft, non-tender, non-distended with normal bowel sounds. Musculoskeletal:  No clubbing, cyanosis or edema bilaterally. Full range of motion without deformity. Skin: Skin color, texture, turgor normal.    Neurologic:  Neurovascularly intact without any focal sensory/motor deficits.  Cranial nerves: II-XII intact, grossly non-focal.  Psychiatric:  Alert and oriented to name only which appears to be her baseline; she squeezes my hands to command and wiggles all of her toes  Capillary Refill: Brisk,< 3 seconds   Peripheral Pulses: +2 palpable, equal bilaterally       Labs:     Recent Labs     06/04/22  1132   WBC 6.1   HGB 10.2*   HCT 34.7*        Recent Labs     06/04/22  1132      K 4.5      CO2 27   BUN 21   CREATININE 1.5*   CALCIUM 10.1     Recent Labs     06/04/22  1132   AST 22   ALT <5*   BILITOT 0.4   ALKPHOS 51     Recent Labs     06/04/22  1132   TROPONINT < 0.010     Urinalysis:      Lab Results   Component Value Date    NITRU NEGATIVE 05/31/2022    WBCUA 15-25 05/31/2022    WBCUA >200W/CLUMPS 10/20/2011    BACTERIA NONE SEEN 05/31/2022    RBCUA > 200 05/31/2022    BLOODU LARGE 05/31/2022    SPECGRAV 1.017 03/18/2021    GLUCOSEU NEGATIVE 05/31/2022       Radiology:     XR PELVIS (1-2 VIEWS)    Result Date: 6/4/2022  PROCEDURE: XR PELVIS (1-2 VIEWS), XR FEMUR LEFT (MIN 2 VIEWS) CLINICAL INFORMATION: 78-year-old female who fell. Left hip pain. COMPARISON: Images dated 11/09/2020 and 09/06/2020. TECHNIQUE: An AP view of the pelvis and 4 views of the left femur were obtained. FINDINGS: The pelvic ring is intact. The bilateral superior and inferior pubic rami are intact. There are some degenerative changes in the lower lumbar spine. There is no acute fracture. There is no dislocation. 3 surgical screws are again seen at the proximal left femur. These are unchanged since the prior exam. There is tricompartmental joint space narrowing at the left knee. No acute fracture or dislocation. **This report has been created using voice recognition software. It may contain minor errors which are inherent in voice recognition technology. ** Final report electronically signed by Dr Trudy Henry on 6/4/2022 12:10 PM    XR FEMUR LEFT (MIN 2 VIEWS)    Result Date: 6/4/2022  PROCEDURE: XR PELVIS (1-2 VIEWS), XR FEMUR LEFT (MIN 2 VIEWS) CLINICAL INFORMATION: 78-year-old female who fell. Left hip pain. COMPARISON: Images dated 11/09/2020 and 09/06/2020. TECHNIQUE: An AP view of the pelvis and 4 views of the left femur were obtained. FINDINGS: The pelvic ring is intact. The bilateral superior and inferior pubic rami are intact. There are some degenerative changes in the lower lumbar spine. There is no acute fracture. There is no dislocation. 3 surgical screws are again seen at the proximal left femur. These are unchanged since the prior exam. There is tricompartmental joint space narrowing at the left knee. No acute fracture or dislocation.  **This report has been created using voice recognition software. It may contain minor errors which are inherent in voice recognition technology. ** Final report electronically signed by Dr Grady Wright on 6/4/2022 12:10 PM    CT HEAD WO CONTRAST    Result Date: 6/4/2022  PROCEDURE: CT HEAD WO CONTRAST CLINICAL INFORMATION: 68-year-old female with an unwitnessed fall. COMPARISON: CT 5/31/2022. TECHNIQUE: Noncontrast 5 mm axial images were obtained through the brain. All CT scans at this facility use dose modulation, iterative reconstruction, and/or weight-based dosing when appropriate to reduce radiation dose to as low as reasonably achievable. FINDINGS: There is prominence of the sulci and gyri consistent with age-related volume loss. There is no hemorrhage. There are no intra-or extra-axial collections. There is no hydrocephalus, midline shift or mass effect. The gray-white matter differentiation is preserved. There is some hypoattenuation in the periventricular white matter consistent with chronic microvascular ischemic disease. There is some mucosal thickening in the ethmoid air cells, and right maxillary sinus. The paranasal sinuses and mastoid air cells are otherwise clear. There is no suspicious calvarial abnormality. There is a large hematoma in the right periorbital region. 1. No acute intracranial hemorrhage, mass effect or midline shift. 2. Large right-sided periorbital hematoma. **This report has been created using voice recognition software. It may contain minor errors which are inherent in voice recognition technology. ** Final report electronically signed by Dr Grady Wright on 6/4/2022 11:56 AM    CT FACIAL BONES WO CONTRAST    Result Date: 6/4/2022  PROCEDURE: CT FACIAL BONES WO CONTRAST CLINICAL INFORMATION: 68-year-old female who fell onto cement. Large laceration to the forehead. Facial swelling. COMPARISON: CT scan 3/18/2021. TECHNIQUE: Noncontrast 3 mm axial CT images were obtained through the sinuses/facial bones.  3 mm coronal reconstructions were obtained. All CT scans at this facility use dose modulation, iterative reconstruction, and/or weight-based dosing when appropriate to reduce radiation dose to as low as reasonably achievable. FINDINGS: There is a comminuted fracture of the anterior aspect of the right maxillary sinus which is best seen on axial images series #4 image #[id number]. There is a hematoma overlying this region measuring approximately 2.5 x 1.5 cm. Layering blood products are noted in the right maxillary sinus. There is mucosal thickening in the mastoid air cells. The orbital rims are intact. The nasal bones, nasal septum, zygomatic arches and mandible are intact. There is a large right-sided periorbital hematoma. The mastoid air cells are clear. There are no suspicious findings in the imaged aspects of the brain parenchyma. Comminuted fracture of the anterior wall of the right maxillary sinus. **This report has been created using voice recognition software. It may contain minor errors which are inherent in voice recognition technology. ** Final report electronically signed by Dr Digna Kerr on 6/4/2022 12:04 PM    CT CERVICAL SPINE WO CONTRAST    Result Date: 6/4/2022  PROCEDURE: CT CERVICAL SPINE WO CONTRAST CLINICAL INFORMATION: 69-year-old female who fell. Trauma. COMPARISON: CT scan 3/18/2021. TECHNIQUE: 3 mm noncontrast axial images were obtained through the cervical spine with sagittal and coronal reconstructions. All CT scans at this facility use dose modulation, iterative reconstruction, and/or weight-based dosing when appropriate to reduce radiation dose to as low as reasonably achievable. FINDINGS: The cervical vertebral body heights and alignment are preserved. There is no acute compression fracture. There is no subluxation. There is no prevertebral soft tissue swelling. There is narrowing of the disc space at C6-C7. There is diffuse facet arthropathy.  There is degenerative narrowing at the atlantodental interval. The lateral masses of C1 and C2 are appropriately aligned. There are no suspicious findings in the visualized portions of the lung apices. Degenerative changes with no acute fracture or subluxation. **This report has been created using voice recognition software. It may contain minor errors which are inherent in voice recognition technology. ** Final report electronically signed by Dr Lamont Dupont on 6/4/2022 11:59 AM    XR CHEST PORTABLE    Result Date: 6/4/2022  PROCEDURE: XR CHEST PORTABLE CLINICAL INFORMATION: Trauma. The patient fell. COMPARISON: Radiograph 5/31/2022. TECHNIQUE: AP supine view of the chest was obtained. FINDINGS: The lungs are clear. There is cardiomegaly. The pulmonary vasculature is within normal limits. There is no significant pleural effusion or pneumothorax. Visualized portions of the upper abdomen are within normal limits. The osseous structures are intact. There is generalized osteopenia. No acute fractures or suspicious osseous lesions. There is no acute intrathoracic process. **This report has been created using voice recognition software. It may contain minor errors which are inherent in voice recognition technology. ** Final report electronically signed by Dr Lamont Dupont on 6/4/2022 12:05 PM      EKG:  I have reviewed the EKG with the following interpretation: Atrial fibrillation heart rate 132      Thank you Linden Bray MD for the opportunity to be involved in this patient's care.     Electronically signed by JOANNA Oro CNP on 6/4/2022 at 1:51 PM

## 2022-06-04 NOTE — ED NOTES
Pt assisted onto bedpan at this time. Urine sample obtained. Pt medicated per MAR. Pt tolerated well. Respirations unlabored. Lights off for comfort.       Sanchez Randhawa RN  06/04/22 [id number]

## 2022-06-04 NOTE — PLAN OF CARE
Problem: Discharge Planning  Goal: Discharge to home or other facility with appropriate resources  Outcome: Progressing  Flowsheets (Taken 6/4/2022 1447)  Discharge to home or other facility with appropriate resources: Identify barriers to discharge with patient and caregiver  Note: Patient from [facility] , patient plan to return to [facility] since she does not qualify for AL      Problem: Pain  Goal: Verbalizes/displays adequate comfort level or baseline comfort level  Outcome: Progressing  Flowsheets (Taken 6/4/2022 1850)  Verbalizes/displays adequate comfort level or baseline comfort level:   Encourage patient to monitor pain and request assistance   Assess pain using appropriate pain scale  Note: Patient denies pain at this time, will continue to monitor     Problem: Safety - Adult  Goal: Free from fall injury  Outcome: Progressing  Flowsheets (Taken 6/4/2022 1850)  Free From Fall Injury: Instruct family/caregiver on patient safety  Note: No falls noted this shift. Continue falling star program. Bed alarm on, bed in low position. Call light and personal belongings in reach. Patient uses call light appropriately.        Problem: ABCDS Injury Assessment  Goal: Absence of physical injury  Outcome: Progressing  Flowsheets (Taken 6/4/2022 1850)  Absence of Physical Injury: Implement safety measures based on patient assessment     Problem: Respiratory - Adult  Goal: Achieves optimal ventilation and oxygenation  Outcome: Progressing  Flowsheets (Taken 6/4/2022 1850)  Achieves optimal ventilation and oxygenation:   Assess for changes in respiratory status   Assess for changes in mentation and behavior     Problem: Cardiovascular - Adult  Goal: Maintains optimal cardiac output and hemodynamic stability  Outcome: Progressing  Flowsheets (Taken 6/4/2022 1850)  Maintains optimal cardiac output and hemodynamic stability: Monitor blood pressure and heart rate     Problem: Skin/Tissue Integrity - Adult  Goal: Skin integrity remains intact  Outcome: Progressing  Flowsheets (Taken 6/4/2022 1850)  Skin Integrity Remains Intact: Monitor for areas of redness and/or skin breakdown     Problem: Neurosensory - Adult  Goal: Achieves stable or improved neurological status  Outcome: Progressing  Flowsheets (Taken 6/4/2022 1850)  Achieves stable or improved neurological status: Assess for and report changes in neurological status     Problem: Neurosensory - Adult  Goal: Achieves maximal functionality and self care  Outcome: Progressing  Flowsheets (Taken 6/4/2022 1850)  Achieves maximal functionality and self care: Monitor swallowing and airway patency with patient fatigue and changes in neurological status     Care plan reviewed with patient. Patient verbalize understanding of the plan of care and contribute to goal setting.

## 2022-06-04 NOTE — ED NOTES
Spoke with [facility] on 6K and informed of pt travel to the floor.       Tatiana Flanagan RN  06/04/22 1419

## 2022-06-04 NOTE — CONSULTS
Trauma consult    Patient:  Beto Ho date: 6/4/2022   YOB: 1950 Date of Evaluation: 6/4/2022  MRN: [id number]  Acct: [de-identified]    Injury Date:6/4/2022  injury time: Morning  PCP: Adam Anderson MD   Referring physician: Dr. Claudell Monday    Time of Trauma Surgeon Notification: [id number]  Time of SUGEY Arrival: 1112  Time of Trauma Surgeon Arrival: 4573    Assessment:    Principal Problem:    Atrial fibrillation with RVR ([address]  Active Problems:    Cephalohematoma    Laceration of face    Unwitnessed fall  Resolved Problems:    * No resolved hospital problems. *  Right maxillary sinus fracture nonoperative management outpatient management. Chronic sinusitis  Plan:    Patient is vitally stable and mentating appropriately at baseline on exam. CT head, neck, facial bones reveals only a comminuted right frontal maxillary sinus bone fracture. X-ray chest, pelvis, left femur, left knee show no apparent life threatening or unstable injuries evident on physical exam. Patient stable from trauma perspective. Patient from assisted living which is likely not adequate supervision, will likely require admission for placement. A. fib RVR apparently not previously addressed, will require admission to medicine service. I discussed patient case with Dr. [facility], he states patient may follow-up outpatient, call office to set up appointment, he does recommend 4 weeks cefdinir for chronic left sphenoid sinusitis. Trauma service would recommend holding Plavix for 5-7 days for right periorbital ecchymosis/hematoma development, though will leave to discretion of hospitalist due to A. fib RVR. Patient POA at bedside agreeable to plan. Care in coordination with trauma surgeon and ED provider. Position per ED provider.   thank you for consultation    Activation:    [] Level I (Trauma Alert)   [x] Level II (Injury Call)   [] Level III (Trauma Consult)   [] Downgraded (Time: )   Mode of Arrival: EMS transportation  Referring Facility: None  Loss of Consciousness []No []Yes[]Unknown  Duration(min):  Mechanism of Injury:  []Motor Vehicle crash   []Single Vehicle [] []Passenger []Scene Fatality []Front Seat  []Restrained   []Air Bag Deployed   []Ejected []Rollover []Pedestrian []Trapped   Type of vehicle:   Protective Devices:   []Motorcycle  Wearing Helmet []Yes []No  []Bicycle  Wearing Helmet []Yes []No  [x]Fall   Distance -  suspected from standing  []Assault    Abuse Reported []Yes []No  []Gunshot  []Stabbing  []Work Related  []Burn: []Flame []Scald []Electrical []Chemical []Contact []Inhalation []House Fire  []Other:     Specialties contacted for 30 minute response: N/A    Subjective   Chief Complaint: Right head pain    History of Present Illness:    She is a 66 y.o. female presenting at [facility] by activation of level 2 trauma, brought by EMS from assisted living following a unwitnessed fall with no reported LOC; past medical history CAD on Plavix, DM, dementia and other comorbidities. Per report she was found down on the sidewalk outside of her assisted living, assumed to fallen. Patient reports right-sided face pain, as well as left leg pain. POA and  at bedside state patient has been doing well at nursing home until about 2 weeks ago when she began having increasing fall and deteriorating mental status, states patient as of last 2 weeks has a baseline of orientation to self only, though prior to 2 weeks she would have been oriented x4. And appears to be at baseline today consistent with past 2 weeks. Patient denies chest pain, shortness of breath, cough, headache, dizziness, lightheadedness, numbness, paraesthesias, weakness, chills, fevers, abdominal pain, nausea, vomiting, diarrhea, blood in stool, neck pain, or back pain. Care in coordination with trauma surgeon Dr. Abby Davidson  . Review of Systems:   Review of Systems   Constitutional: Negative for chills and fever.    HENT: Positive for facial swelling (Right face). Negative for mouth sores and nosebleeds. Eyes: Negative for photophobia, pain and visual disturbance. Respiratory: Negative for chest tightness and shortness of breath. Cardiovascular: Negative for chest pain and palpitations. Gastrointestinal: Negative for abdominal pain, nausea and vomiting. Musculoskeletal: Positive for gait problem (Increasing falls). Negative for arthralgias, back pain and neck pain. Skin: Positive for wound (Laceration to right superior eyelid just below orbital rim, actively bleeding). Negative for color change and pallor. Neurological: Negative for dizziness, seizures, syncope, weakness, light-headedness, numbness and headaches. Hematological: Does not bruise/bleed easily. Psychiatric/Behavioral: Positive for confusion. Negative for agitation. The patient is not nervous/anxious. Patient has no known allergies.   Past Surgical History:   Procedure Laterality Date    CYSTOSCOPY  12/7/12    with stent insertion    HIP SURGERY Left 9/6/2020    HIP PINNING performed by Tessy Breen DO at [address] [place] [address]    LITHOTRIPSY  12/18/2012    right    OVARY REMOVAL Bilateral     age 38     Past Medical History:   Diagnosis Date    Acute kidney injury ([facility] [address]     Arthritis     CAD (coronary artery disease)     CKD (chronic kidney disease) stage 3, GFR 30-59 ml/min ([facility])     Diabetes mellitus, insulin dependent (IDDM), controlled     Escherichia coli septicemia ([facility])     Essential tremor     History of blood transfusion     Hyperlipidemia     Hypertension     Hypoxemic respiratory failure, chronic ([address]     secondary to sepsis and possibly pneumonia    MI (myocardial infarction) ([facility] [address] 2011    Normocytic anemia     Osteoporosis     Pneumonia      Past Surgical History:   Procedure Laterality Date    CYSTOSCOPY  12/7/12    with stent insertion    HIP SURGERY Left 9/6/2020    HIP PINNING performed by Tessy Breen DO at [facility] HYSTERECTOMY      42    LITHOTRIPSY  12/18/2012    right    OVARY REMOVAL Bilateral     age 38     Social History     Socioeconomic History    Marital status:      Spouse name: None    Number of children: 0    Years of education: None    Highest education level: None   Occupational History    None   Tobacco Use    Smoking status: Never Smoker    Smokeless tobacco: Never Used   Vaping Use    Vaping Use: Never used   Substance and Sexual Activity    Alcohol use: Yes     Comment: rarely    Drug use: No    Sexual activity: Not Currently   Other Topics Concern    None   Social History Narrative    None     Social Determinants of Health     Financial Resource Strain:     Difficulty of Paying Living Expenses: Not on file   Food Insecurity:     Worried About Running Out of Food in the Last Year: Not on file    Brionna of Food in the Last Year: Not on file   Transportation Needs:     Lack of Transportation (Medical): Not on file    Lack of Transportation (Non-Medical):  Not on file   Physical Activity:     Days of Exercise per Week: Not on file    Minutes of Exercise per Session: Not on file   Stress:     Feeling of Stress : Not on file   Social Connections:     Frequency of Communication with Friends and Family: Not on file    Frequency of Social Gatherings with Friends and Family: Not on file    Attends Temple Services: Not on file    Active Member of Clubs or Organizations: Not on file    Attends Club or Organization Meetings: Not on file    Marital Status: Not on file   Intimate Partner Violence:     Fear of Current or Ex-Partner: Not on file    Emotionally Abused: Not on file    Physically Abused: Not on file    Sexually Abused: Not on file   Housing Stability:     Unable to Pay for Housing in the Last Year: Not on file    Number of [place] in the Last Year: Not on file    Unstable Housing in the Last Year: Not on file     Family History   Problem Relation Age of Onset    Heart Disease Mother     Breast [START ON 6/5/2022] Vitamin D  3,000 Units Oral Daily    lidocaine  2 patch TransDERmal Daily    midodrine  10 mg Oral TID     [START ON 6/5/2022] pantoprazole  40 mg Oral Daily    sodium chloride flush  5-40 mL IntraVENous 2 times per day    insulin lispro  0-6 Units SubCUTAneous TID     insulin lispro  0-3 Units SubCUTAneous Nightly    FLUoxetine  20 mg Oral Daily    metoprolol tartrate  12.5 mg Oral BID     Continuous Infusions:   sodium chloride      dextrose       PRN Meds:  Objective   ED TRIAGE VITALS  BP: (!) 130/96, Temp: 97.1 °F (36.2 °C), Heart Rate: 97, Resp: 18, SpO2: 97 %  Anna Coma Scale  Eye Opening: Spontaneous  Best Verbal Response: Confused  Best Motor Response: Obeys commands  [place] Coma Scale Score: 14  Results for orders placed or performed during the hospital encounter of 06/04/22   CBC with Auto Differential   Result Value Ref Range    WBC 6.1 4.8 - 10.8 thou/mm3    RBC 4.30 4.20 - 5.40 mill/mm3    Hemoglobin 10.2 (L) 12.0 - 16.0 gm/dl    Hematocrit 34.7 (L) 37.0 - 47.0 %    MCV 80.7 (L) 81.0 - 99.0 fL    MCH 23.7 (L) 26.0 - 33.0 pg    MCHC 29.4 (L) 32.2 - 35.5 gm/dl    RDW-CV 18.6 (H) 11.5 - 14.5 %    RDW-SD 54.6 (H) 35.0 - 45.0 fL    Platelets 605 186 - 884 thou/mm3    MPV 10.0 9.4 - 12.4 fL    Seg Neutrophils 68.9 %    Lymphocytes 17.8 %    Monocytes 10.2 %    Eosinophils 2.1 %    Basophils 0.5 %    Immature Granulocytes 0.5 %    Segs Absolute 4.2 1.8 - 7.7 thou/mm3    Lymphocytes Absolute 1.1 1.0 - 4.8 thou/mm3    Monocytes Absolute 0.6 0.4 - 1.3 thou/mm3    Eosinophils Absolute 0.1 0.0 - 0.4 thou/mm3    Basophils Absolute 0.0 0.0 - 0.1 thou/mm3    Immature Grans (Abs) 0.03 0.00 - 0.07 thou/mm3    nRBC 0 /100 wbc   Comprehensive Metabolic Panel   Result Value Ref Range    Glucose 139 (H) 70 - 108 mg/dL    CREATININE 1.5 (H) 0.4 - 1.2 mg/dL    BUN 21 7 - 22 mg/dL    Sodium 140 135 - 145 meq/L    Potassium 4.5 3.5 - 5.2 meq/L    Chloride 101 98 - 111 meq/L    CO2 27 23 - 33 meq/L Calcium 10.1 8.5 - 10.5 mg/dL    AST 22 5 - 40 U/L    Alkaline Phosphatase 51 38 - 126 U/L    Total Protein 6.6 6.1 - 8.0 g/dL    Albumin 4.4 3.5 - 5.1 g/dL    Total Bilirubin 0.4 0.3 - 1.2 mg/dL    ALT <5 (L) 11 - 66 U/L   Ethanol   Result Value Ref Range    ETHYL ALCOHOL, SERUM < 0.01 0.00 %   Urine Drug Screen   Result Value Ref Range    AMPHETAMINE+METHAMPHETAMINE URINE SCREEN Negative NEGATIVE    Barbiturate Quant, Ur Negative NEGATIVE    Benzodiazepine Quant, Ur Negative NEGATIVE    Cannabinoid Quant, Ur Negative NEGATIVE    Cocaine Metab Quant, Ur Negative NEGATIVE    Opiates, Urine Negative NEGATIVE    Oxycodone Negative NEGATIVE    PCP Quant, Ur Negative NEGATIVE   Troponin   Result Value Ref Range    Troponin T < 0.010 ng/ml   Brain Natriuretic Peptide   Result Value Ref Range    Pro-BNP 3250.0 (H) 0.0 - 900.0 pg/mL   Anion Gap   Result Value Ref Range    Anion Gap 12.0 8.0 - 16.0 meq/L   Glomerular Filtration Rate, Estimated   Result Value Ref Range    Est, Glom Filt Rate 34 (A) ml/min/1.73m2   Osmolality   Result Value Ref Range    Osmolality Calc 284.6 275.0 - 300.0 mOsmol/kg   TSH with Reflex   Result Value Ref Range    TSH 3.370 0.400 - 4.200 uIU/mL   Microscopic Urinalysis   Result Value Ref Range    Glucose, Urine NEGATIVE NEGATIVE mg/dl    Bilirubin Urine NEGATIVE NEGATIVE    Ketones, Urine TRACE (A) NEGATIVE    Specific Gravity, UA 1.023 1.002 - 1.030    Blood, Urine LARGE (A) NEGATIVE    pH, UA 5.0 5.0 - 9.0    Protein,  (A) NEGATIVE mg/dl    Urobilinogen, Urine 1.0 0.0 - 1.0 eu/dl    Nitrite, Urine NEGATIVE NEGATIVE    Leukocytes, UA MODERATE (A) NEGATIVE    Color, UA YELLOW YELLOW-STRAW    Character, Urine CLOUDY (A) [id number]    RBC, UA 15-25 0-2/hpf /hpf    WBC, UA > 100 0-4/hpf /hpf    Epithelial Cells, UA NONE SEEN 3-5/hpf /hpf    Bacteria, UA MANY FEW/NONE SEEN    Casts NONE SEEN NONE SEEN /lpf    Crystals NONE SEEN NONE SEEN    Renal Epithelial, UA NONE SEEN NONE SEEN Yeast, UA NONE SEEN NONE SEEN    Casts NONE SEEN /lpf    Miscellaneous Lab Test Result NONE SEEN    EKG 12 Lead   Result Value Ref Range    Ventricular Rate 123 BPM    Atrial Rate 174 BPM    QRS Duration 64 ms    Q-T Interval 328 ms    QTc Calculation (Bazett) 469 ms    R Axis -27 degrees    T Axis 45 degrees   EKG 12 Lead   Result Value Ref Range    Ventricular Rate 132 BPM    QRS Duration 64 ms    Q-T Interval 314 ms    QTc Calculation (Bazett) 465 ms    R Axis -43 degrees    T Axis 53 degrees       Physical Exam:  Patient Vitals for the past 24 hrs:   BP Temp Temp src Pulse Resp SpO2 Height Weight   06/04/22 1445 (!) 130/96 97.1 °F (36.2 °C) Oral 97 18 97 % -- --   06/04/22 1334 (!) 139/96 -- -- (!) 124 28 97 % -- --   06/04/22 1314 -- -- -- (!) 125 -- -- -- --   06/04/22 1304 (!) 126/102 -- -- (!) 125 27 96 % -- --   06/04/22 1240 122/82 -- -- (!) 116 16 95 % -- --   06/04/22 1152 134/80 -- -- (!) 112 16 95 % -- --   06/04/22 1112 -- -- -- -- -- -- 5' 2\" (1.575 m) 155 lb (70.3 kg)   06/04/22 1112 125/88 97.1 °F (36.2 °C) -- (!) 105 20 95 % -- --   06/04/22 1106 125/88 97.9 °F (36.6 °C) Oral (!) 114 20 96 % -- --     Primary Assessment:  Airway: Patent, trachea midline  Breathing: Breath sounds present and equal bilaterally, spontaneous, and unlabored  Circulation: Hemodynamically stable, 2+ central and peripheral pulses. Disability: ALVAREZ x 4, following commands. GCS =14    Secondary Assessment:  GENERAL: Presents sitting upright in bed unassisted, and only to person.; In no acute distress and well nourished  HEAD: Cephalhematoma over right brow, 0.5 cm laceration to upper right eyelid just under the brow (actively oozing dark red blood), right SOHEILA orbital ecchymosis. .  Tenderness to palpation over right brow lateral orbital rim, and right zygomatic region. . No raccoon eyes, silverman signs or visible CSF leakage. EYES: No apparent trauma, discharge, or hematoma bilaterally.   Right eye reactive at 3 mm, left eye reactive at 4 mm  EARS: Set evenly on head. No apparent external trauma. THROAT: Teeth present with none missing, cracked or bleeding. Mucosa is pink, moist. Tongue mobile, no deviation, no vesiculations, trauma, or bleeding. Throat is pink and patent. NECK: C-spine maintained by c-collar placed in field. Neck is atraumatic, trachea midline, no visible lacerations, step off deformity, expanding swelling or midline tenderness. CARDIO: No visible chest wall deformity or palpable crepitus. No heaves or lifts. Strong/regular S1/S2 rate and rhythm. No rubs murmurs, or gallops. 2+ radial, posterior tibial, and dorsalis pedal pulses. Capillary refill <2 sec. No extremity edema noted. PULMONARY:  Trachea midline, no audible wheezing, increased respiratory effort, accessory muscle use, or asymmetrical chest wall movement. Lung sounds are clear and audible to ascultation in superior and inferior fields. ABDOMEN: Abdomen is non distended without lacerations. Abdomen soft and nontender to palpation in all quadrants. MSK: Moving all extremities. Tenderness to palpation over left hip and knee. Pelvis stable to compression. No other deformity, contusion, or bleeding.  strength 5/5 and equal bilaterally. No tenderness to palpation at the midline of cervical, thoracic, and lumbar spine. NEURO: Follows commands, reasoning intact, recalls recent events. PMS intact, moves limbs freely. No focal neurological deficits  SKIN: Appropriate for ethnicity, warm and dry. No visible deformity, contusions, abrasions, punctures, burns, tenderness, lacerations, or swelling. WOUND: See head. Medical Decision Making: On arrival patient vital signs tachycardic in A. fib. Patient sent for CT head neck, facial bones as well as x-ray left femur, left knee, and pelvis showing only a right maxillary sinus fracture.   ENT contacted for recommendations, state may follow-up outpatient, recommend 4 weeks cefdinir for chronic sphenoid sinusitis. She would likely benefit from medical admission for nursing home placement and evaluation of A. fib RVR. Disposition per ED provider. Trauma service will sign off at this time. Radiology:     XR FEMUR LEFT (MIN 2 VIEWS)   Final Result   No acute fracture or dislocation. **This report has been created using voice recognition software. It may contain minor errors which are inherent in voice recognition technology. **      Final report electronically signed by Dr Nataly Wells on 6/4/2022 12:10 PM      XR CHEST PORTABLE   Final Result   There is no acute intrathoracic process. **This report has been created using voice recognition software. It may contain minor errors which are inherent in voice recognition technology. **      Final report electronically signed by Dr Nataly Wells on 6/4/2022 12:05 PM      XR PELVIS (1-2 VIEWS)   Final Result   No acute fracture or dislocation. **This report has been created using voice recognition software. It may contain minor errors which are inherent in voice recognition technology. **      Final report electronically signed by Dr Nataly Wells on 6/4/2022 12:10 PM      CT HEAD WO CONTRAST   Final Result      1. No acute intracranial hemorrhage, mass effect or midline shift. 2. Large right-sided periorbital hematoma. **This report has been created using voice recognition software. It may contain minor errors which are inherent in voice recognition technology. **      Final report electronically signed by Dr Natlay Wells on 6/4/2022 11:56 AM      CT CERVICAL SPINE WO CONTRAST   Final Result   Degenerative changes with no acute fracture or subluxation. **This report has been created using voice recognition software. It may contain minor errors which are inherent in voice recognition technology. **      Final report electronically signed by Dr Nataly Wells on 6/4/2022 11:59 AM CT FACIAL BONES WO CONTRAST   Final Result   Comminuted fracture of the anterior wall of the right maxillary sinus. **This report has been created using voice recognition software. It may contain minor errors which are inherent in voice recognition technology. **       Final report electronically signed by Dr Zohreh Garcia on 6/4/2022 12:04 PM        Fast Exam: No.  Isolated head injury. Electronically signed by CARRIE Mishra on 6/4/2022 at 4:08 PM    Patient seen and examined independently by me 6/4/2022     I personally supervised the PA/NP in the evaluation, management and development of the treatment plan for Li Ramirez  on the same date of service as above. I personally interviewed Li Ramirez   and  discussed his review of symptoms as able due to the patient's condition, as well as performed an individual physical exam on the same date of service as above. In addition I discussed the patient's condition and treatment options with the patient, if able, and/or designated family if available. I have also reviewed and agree with the past medical,  family and social history updates as well as care plans unless otherwise noted below. All questions were answered. I examined independently and reviewed relevant data myself and may have done so in the context of team rounds. A full chart review was performed by me. I attest that this medical record entry accurately reflects signatures and notations that I made in my capacity as an M. D. when I treated and diagnosed Li Ramirez on the date of service above     I was responsible for all medical decision making involving this encounter. I identified and/or confirmed all problems associated with this patient encounter by my own direct physical examination of this patient and review of all radiology studies and labwork  that were ordered and available.  I  discussed the management of all of the identified problems with the APN or PA.  I formulated the treatment plan for all identified problems and discussed those with the APN or PA . This management plan was then carried out and the patient's orders for care by the APN or PA. Total time spent on this patient encounter was 50 minutes which includes the direct physical exam as well as all the other encounter activities described above. Time was discontiguous. Time does not include procedures. Please see our orders that were directed and approved by me if there are any new ones for the updated patient care plan. Above discussed and I agree with documentation and orders placed by KRISTAN Finley PA-C    See my additional comments below for any other updated orders and plans. Patient seen and evaluated in the emergency department. Second trip to the emergency department was in on June 1 with mental status changes and was discharged. Found down soft tissue facial trauma small laceration on the right eyebrow with some bleeding. Maxillary sinus fracture nonoperative discussed with ENT. Outpatient management. Patient with A. fib RVR. Patient being admitted to medicine for placement was in assisted living. If frequent falls may not be a good candidate for anticoagulation. Defer to medicine service. Care coordinated directly with Betzaida Anaya PA-C. No indications of issues which need admission to trauma services at this point. All data and imaging reviewed independently by myself, patient examined.

## 2022-06-04 NOTE — ED PROVIDER NOTES
[address] [facility]  EMERGENCY MEDICINE ATTENDING ATTESTATION      Evaluation of Malathi George. Case discussed and care plan developed with resident physician. I agree with the resident physician documentation and plan as documented by him, except if my documentation differs. Patient seen, interviewed and examined by me. I reviewed the medical, surgical, family and social history, medications and allergies. I have reviewed the nursing documentation. I have reviewed the patient's vital signs and are abnormal from Tachycardia per my interpretation. Body mass index is 28.35 kg/m². Pulsoxymetry is normal per my interpretation. Brief H&P   Patient c/o unwitnessed fall at assisted living. Patient typically walks with a walker and walked outside with anyone noticing and was found lying on the concrete on the sidewalk, per EMS, patient broke upper denture bridge and had blood suctioned out of her mouth, has had worsening confusion over the past few weeks but no acute change today. Patient complains of pain everywhere but worse in her face    Physical exam is notable for laceration to the right eyebrow with right periorbital hematoma and tenderness around the right orbit and maxilla, no midline spinal tenderness, minimal LLE tenderness, A&Ox1 at reported baseline, heart rate tachycardic and irregular      Medical Decision Making   MDM:   Patient is a 68-year-old female with a history of type 2 diabetes, dementia, recurrent falls who presents after unwitnessed fall on the sidewalk at her assisted living facility after reportedly not using her walker. Traumatic work-up significant for right periorbital hematoma with laceration that was repaired by trauma service at bedside and right anterior maxillary fracture.   Per ENT, 4 weeks of cefdinir for chronic sphenoid sinusitis, do not apply pressure to the right anterior maxillary sinus and no nose blowing but patient can follow-up outpatient. Trauma signed off. Patient also noted to have what appears to be new onset A. fib with rate in the 100s to 130s but is otherwise hemodynamically stable. Given frequent falls, new onset A. fib plan to admit to hospitalist team for further management and likely will require placement in a nursing facility. Plan:    Admit to medicine for frequent falls, new onset A. fib   Cefdinir x4 weeks, outpatient follow-up with ENT    Please see the resident physician completed note for final disposition except as documented on this attestation. I have reviewed and interpreted all available lab, radiology and ekg results available at the moment. Diagnosis, treatment and disposition plans were discussed and agreed upon by patient. This transcription was electronically signed. It was dictated by use of voice recognition software and electronically transcribed. The transcription may contain errors not detected in proofreading.      I performed direct supervision and was present for the critical portion following procedures: None  Critical care time on this case: None    Electronically signed by Mikel Flynn MD on 6/4/22 at 11:57 AM EDT        Mikel Flynn MD  06/04/22 1575

## 2022-06-04 NOTE — ED PROVIDER NOTES
[facility] ENCOUNTER        Pt Name: Lake Barry  MRN: [id number]  [place] 1950  Date of evaluation: 6/4/2022  Treating Resident Physician: Soha Jaffe MD  Supervising Physician: Dr. Shashank Chaney       Chief Complaint   Patient presents with   [address] Head Injury     Patient interviewed and examined by me immediately on arrival.  History and Physical exam limited by: Patient unable to communicate for medical reasons  Further information obtained after primary survey and initial critical actions were performed. History obtained from chart review. PRIMARY SURVEY AND INTERVENTION   Airway: Patent. Breathing: Regular respiratory pattern, symmetric chest rise, lungs clear to auscultation. Circulation: Good capillary refill, no identifiable external bleeding, good pulses in all extremities. Exposure: No additional injuries identified. Disability: No focal neurological deficit. Patient awake. eFAST: No visible abdominal free fluid, no pericardial effusion, no identifiable pneumothorax. See full report below. INITIAL MEDICAL DECISION MAKING   Initial assessment:   1. Unwitnessed fall  2. Clear primary survey  3. Right eye hematoma  PLAN: Large bore IV lines, cardiac/respiratory monitoring, labs, analgesia, trauma team activated prior to arrival, bedside US, observation. SECONDARY SURVEY AND INTERVENTION  HISTORY OF PRESENT ILLNESS    HPI  Lake Barry is a 66 y.o. female who presents to the emergency department for evaluation of unwitnessed fall. The patient lives at an assisted living facility and attempted to ambulate away from a facility and was found laying in the sidewalk. The staff noticed bleeding around the head and some oropharyngeal bleeding. Her denture plate was cracked. She has a history of dementia and prior to 2 weeks ago did follow commands and was AOx4.   However over the last 2 weeks she has been having recurrent falls and deviation from her baseline. She is coming in today by squad and has a c-collar in place. Squad did say that they suctioned out minimal amount of blood from the oropharynx. The patient has no other acute complaints at this time. REVIEW OF SYSTEMS   Review of Systems   Unable to perform ROS: Dementia         PAST MEDICAL AND SURGICAL HISTORY     Past Medical History:   Diagnosis Date    Acute kidney injury ([facility]     Arthritis     CAD (coronary artery disease)     CKD (chronic kidney disease) stage 3, GFR 30-59 ml/min (HCC)     Diabetes mellitus, insulin dependent (IDDM), controlled     Escherichia coli septicemia (HCC)     Essential tremor     History of blood transfusion     Hyperlipidemia     Hypertension     Hypoxemic respiratory failure, chronic (HCC)     secondary to sepsis and possibly pneumonia    MI (myocardial infarction) ([facility] 2011    Normocytic anemia     Osteoporosis     Pneumonia        Past Surgical History:   Procedure Laterality Date    CYSTOSCOPY  12/7/12    with stent insertion    HIP SURGERY Left 9/6/2020    HIP PINNING performed by Aleksandra Montano DO at [address] LITHOTRIPSY  12/18/2012    right    OVARY REMOVAL Bilateral     age 38     Unable to confirm at this moment, Except as available on EMR.       MEDICATIONS     Current Facility-Administered Medications:     cefdinir (OMNICEF) capsule 300 mg, 300 mg, Oral, 2 times per day, JOANNA Alexander CNP    atorvastatin (LIPITOR) tablet 80 mg, 80 mg, Oral, Daily, JOANNA Alexander CNP    [START ON 6/5/2022] Vitamin D (CHOLECALCIFEROL) tablet 3,000 Units, 3,000 Units, Oral, Daily, JOANNA Alexander - CNP    lidocaine 4 % external patch 2 patch, 2 patch, TransDERmal, Daily, JOANNA Alexander CNP    midodrine (PROAMATINE) tablet 10 mg, 10 mg, Oral, TID , JOANNA Alexander CNP, 10 mg at 06/04/22 1623    [START ON 6/5/2022] pantoprazole (PROTONIX) tablet 40 mg, 40 mg, Oral, Daily, JOANNA Alexander - CNP    sodium chloride flush 0.9 % injection 5-40 mL, 5-40 mL, IntraVENous, 2 times per day, JOANNA Alexander - CNP    sodium chloride flush 0.9 % injection 5-40 mL, 5-40 mL, IntraVENous, PRN, JOANNA Alexander - CNP    0.9 % sodium chloride infusion, , IntraVENous, PRN, JOANNA Alexander - CNP    ondansetron (ZOFRAN-ODT) disintegrating tablet 4 mg, 4 mg, Oral, Q8H PRN **OR** ondansetron (ZOFRAN) injection 4 mg, 4 mg, IntraVENous, Q6H PRN, JOANNA Alexander - CNP    polyethylene glycol (GLYCOLAX) packet 17 g, 17 g, Oral, Daily PRN, JOANNA Vera CNP    acetaminophen (TYLENOL) tablet 650 mg, 650 mg, Oral, Q6H PRN **OR** acetaminophen (TYLENOL) suppository 650 mg, 650 mg, Rectal, Q6H PRN, JOANNA Alexander CNP    potassium chloride (KLOR-CON M) extended release tablet 40 mEq, 40 mEq, Oral, PRN **OR** potassium bicarb-citric acid (EFFER-K) effervescent tablet 40 mEq, 40 mEq, Oral, PRN **OR** potassium chloride 10 mEq/100 mL IVPB (Peripheral Line), 10 mEq, IntraVENous, PRN, JOANNA Vera CNP    magnesium sulfate 2000 mg in 50 mL IVPB premix, 2,000 mg, IntraVENous, PRN, JOANNA Alexander - CNP    insulin lispro (HUMALOG) injection vial 0-6 Units, 0-6 Units, SubCUTAneous, TID WC, JOANNA Alexander - CNP    insulin lispro (HUMALOG) injection vial 0-3 Units, 0-3 Units, SubCUTAneous, Nightly, JOANNA Alexander CNP    glucose chewable tablet 16 g, 4 tablet, Oral, PRN, JOANNA Alexander - CNP    dextrose bolus 10% 125 mL, 125 mL, IntraVENous, PRN **OR** dextrose bolus 10% 250 mL, 250 mL, IntraVENous, PRN, JOANNA Alexander CNP    glucagon (rDNA) injection 1 mg, 1 mg, IntraMUSCular, PRN, Leni A Rethman, APRN - CNP    dextrose 5 % solution, 100 mL/hr, IntraVENous, PRN, JOANNA Vera CNP    FLUoxetine (PROZAC) capsule 20 mg, 20 mg, Oral, Daily, Leni Spencer JOANNA - CNP, 20 mg at 06/04/22 1623    metoprolol tartrate (LOPRESSOR) tablet 12.5 mg, 12.5 mg, Oral, BID, Leni Spencer, APRN - CNP, 12.5 mg at 06/04/22 1623  Unable to confirm at this moment, Except as available on EMR. SOCIAL HISTORY     Social History     Social History Narrative    Not on file     Social History     Tobacco Use    Smoking status: Never Smoker    Smokeless tobacco: Never Used   Vaping Use    Vaping Use: Never used   Substance Use Topics    Alcohol use: Yes     Comment: rarely    Drug use: No     Unable to confirm at this moment, Except as available on EMR. ALLERGIES   No Known Allergies  Unable to confirm at this moment, Except as available on EMR. FAMILY HISTORY     Family History   Problem Relation Age of Onset    Heart Disease Mother     Breast Cancer Mother [de-identified]     Unable to confirm at this moment, Except as available on EMR. PREVIOUS RECORDS   Previous records reviewed. PHYSICAL EXAM     ED Triage Vitals   BP Temp Temp Source Heart Rate Resp SpO2 Height Weight   06/04/22 1106 06/04/22 1106 06/04/22 1106 06/04/22 1106 06/04/22 1106 06/04/22 1106 06/04/22 1112 06/04/22 1112   125/88 97.9 °F (36.6 °C) Oral (!) 114 20 96 % 5' 2\" (1.575 m) 155 lb (70.3 kg)     Initial vital signs and nursing assessment reviewed and abnormal from Tachycardia. Pulsoximetry is normal per my interpretation. Additional Vital Signs:  Vitals:    06/04/22 1920   BP: (!) 129/100   Pulse: (!) 105   Resp: 20   Temp: 99.5 °F (37.5 °C)   SpO2: 97%       EKG monitor: Normal sinus rhythm, no ectopy, tachycardic. Physical Exam  Vitals reviewed. Constitutional:       General: She is awake. She is in acute distress. Appearance: She is normal weight. She is not toxic-appearing or diaphoretic. HENT:      Head: Right periorbital erythema present. No Johnson's sign.      Eyes:      Pupils: Pupils are unequal.      Right eye: Pupil is round and reactive. Left eye: Pupil is round and reactive. Comments: Left pupil 4 mm, right pupil 3 mm but both round and reactive to light   Neck:      Comments: C-collar in place  Cardiovascular:      Rate and Rhythm: Tachycardia present. Pulmonary:      Breath sounds: No decreased breath sounds. Comments: Bilateral breath sounds present  Abdominal:      General: Abdomen is flat. Palpations: Abdomen is soft. Tenderness: There is no abdominal tenderness. Neurological:      GCS: GCS eye subscore is 4. GCS verbal subscore is 5. GCS motor subscore is 6. Psychiatric:         Mood and Affect: Affect is flat. Speech: She is noncommunicative. Behavior: Behavior is not agitated or aggressive. Behavior is cooperative. FURTHER MEDICAL DECISION MAKING   Additional Assessment: Periorbital hematoma  Closed fracture of the anterior maxilla on the right side  New onset atrial fibrillation. Further PLAN: Trauma did obtain ENT recommendations. Recommending no nose blowing, do not touch nose. Also 4 weeks of Omnicef for what appears to be chronic sphenoid sinusitis. Trauma did sign off. We will admit patient to hospitalist service for new onset atrial fibrillation.       ED RESULTS   Laboratory results:  Labs Reviewed   CBC WITH AUTO DIFFERENTIAL - Abnormal; Notable for the following components:       Result Value    Hemoglobin 10.2 (*)     Hematocrit 34.7 (*)     MCV 80.7 (*)     MCH 23.7 (*)     MCHC 29.4 (*)     RDW-CV 18.6 (*)     RDW-SD 54.6 (*)     All other components within normal limits   COMPREHENSIVE METABOLIC PANEL - Abnormal; Notable for the following components:    Glucose 139 (*)     CREATININE 1.5 (*)     ALT <5 (*)     All other components within normal limits   BRAIN NATRIURETIC PEPTIDE - Abnormal; Notable for the following components:    Pro-BNP 3250.0 (*)     All other components within normal limits   GLOMERULAR FILTRATION RATE, ESTIMATED - Abnormal; software. It may contain minor errors which are inherent in voice recognition technology. **      Final report electronically signed by Dr Stephanie Ortiz on 6/4/2022 11:56 AM      CT CERVICAL SPINE WO CONTRAST   Final Result   Degenerative changes with no acute fracture or subluxation. **This report has been created using voice recognition software. It may contain minor errors which are inherent in voice recognition technology. **      Final report electronically signed by Dr Stephanie Ortiz on 6/4/2022 11:59 AM      CT FACIAL BONES WO CONTRAST   Final Result   Comminuted fracture of the anterior wall of the right maxillary sinus. **This report has been created using voice recognition software. It may contain minor errors which are inherent in voice recognition technology. **       Final report electronically signed by Dr Stephanie Ortiz on 6/4/2022 12:04 PM          ED Medications administered this visit:   Medications   cefdinir (OMNICEF) capsule 300 mg (300 mg Oral Incomplete 6/4/22 2025)   atorvastatin (LIPITOR) tablet 80 mg (80 mg Oral Incomplete 6/4/22 2025)   Vitamin D (CHOLECALCIFEROL) tablet 3,000 Units (has no administration in time range)   lidocaine 4 % external patch 2 patch (2 patches TransDERmal Not Given 6/4/22 1625)   midodrine (PROAMATINE) tablet 10 mg (10 mg Oral Given 6/4/22 1623)   pantoprazole (PROTONIX) tablet 40 mg (has no administration in time range)   sodium chloride flush 0.9 % injection 5-40 mL (10 mLs IntraVENous Incomplete 6/4/22 2025)   sodium chloride flush 0.9 % injection 5-40 mL (has no administration in time range)   0.9 % sodium chloride infusion (has no administration in time range)   ondansetron (ZOFRAN-ODT) disintegrating tablet 4 mg (has no administration in time range)     Or   ondansetron (ZOFRAN) injection 4 mg (has no administration in time range)   polyethylene glycol (GLYCOLAX) packet 17 g (has no administration in time range)   acetaminophen (TYLENOL) tablet 650 mg (650 mg Oral Incomplete 6/4/22 2025)     Or   acetaminophen (TYLENOL) suppository 650 mg ( Rectal See Alternative 6/4/22 2025)   potassium chloride (KLOR-CON M) extended release tablet 40 mEq (has no administration in time range)     Or   potassium bicarb-citric acid (EFFER-K) effervescent tablet 40 mEq (has no administration in time range)     Or   potassium chloride 10 mEq/100 mL IVPB (Peripheral Line) (has no administration in time range)   magnesium sulfate 2000 mg in 50 mL IVPB premix (has no administration in time range)   insulin lispro (HUMALOG) injection vial 0-6 Units (0 Units SubCUTAneous Not Given 6/4/22 1617)   insulin lispro (HUMALOG) injection vial 0-3 Units (1 Units SubCUTAneous Incomplete 6/4/22 2024)   glucose chewable tablet 16 g (has no administration in time range)   dextrose bolus 10% 125 mL (has no administration in time range)     Or   dextrose bolus 10% 250 mL (has no administration in time range)   glucagon (rDNA) injection 1 mg (has no administration in time range)   dextrose 5 % solution (has no administration in time range)   FLUoxetine (PROZAC) capsule 20 mg (20 mg Oral Given 6/4/22 1623)   metoprolol tartrate (LOPRESSOR) tablet 12.5 mg (12.5 mg Oral Given 6/4/22 1623)   Tetanus-Diphth-Acell Pertussis (BOOSTRIX) injection 0.5 mL (0.5 mLs IntraMUSCular Given 6/4/22 1159)   lidocaine-EPINEPHrine 1 %-1:[id number] injection (  Given by Other 6/4/22 1338)   0.9 % sodium chloride bolus (0 mLs IntraVENous Stopped 6/4/22 1435)           POCUS eFAST   Date/Time: 06/04/22, 11:30 *AM   Performed by: Dr. Carla Lenz   Indications: fall from [facility]   Views obtained:     Callahan's Pouch:  Visualized     Splenorenal recess:  Visualized     Subxyphoid:  Visualized     Suprapubic:  Visualized   Findings:    Callahan's Pouch:       Intra-abdominal fluid: not identified      Splenorenal recess findings:      Intra-abdominal fluid: not identified      Subxyphoid:      Intra-pericardial fluid: not identified      Suprapubic findings:      Intra-abdominal fluid: not identified    Final impression:   FAST scan not positive for free fluid or pneumothorax at 11:34 *AM.      ED COURSE     ED Course as of 06/04/22 2041   Sat Jun 04, 2022   1236 Troponin T: < 0.010 [JT]   1244 CT FACIAL BONES WO CONTRAST  IMPRESSION:  Comminuted fracture of the anterior wall of the right maxillary sinus. [JT]   1244 Pro-BNP(!): 3250.0 [JT]   1315 CBC with Auto Differential(!):    WBC 6.1   RBC 4.30   Hemoglobin Quant 10. 2(!)   Hematocrit 34.7(!)   MCV 80.7(!)   MCH 23.7(!)   MCHC 29.4(!)   RDW-CV 18.6(!)   RDW-SD 54.6(!)   Platelet Count 450   MPV 10.0   Seg Neutrophils 68.9   Lymphocytes 17.8   Monocytes 10.2   Eosinophils 2.1   Basophils 0.5   Immature Granulocytes 0.5   Segs Absolute 4.2   Lymphocytes Absolute 1.1   Monocytes Absolute 0.6   Eosinophils Absolute 0.1   Basophils Absolute 0.0   Immature Grans (Abs) 0.03   Nucleated Red Blood Cells 0  No leukocytosis appreciated. Does have a mild microcytic anemia but appears to be about patient's baseline, no acute drop since 4 days ago. [JT]   1330 EKG showing an irregular rhythm without P waves. No axis deviation or ST elevations or depressions seen. 1 PVC is present. EKG is consistent with atrial fibrillation. [JT]   1342 ENT recs appreciated: 4 weeks of cefdinir for chronic sphenoid sinusitis. No nose blowing, do not press on nose and f/u in office. [JT]      ED Course User Index  [JT] Kanika Hardin MD         MEDICATION CHANGES     Current Discharge Medication List            FINAL DISPOSITION     Final diagnoses:   Fall, initial encounter   Periorbital hematoma of right eye   Closed fracture of right side of maxilla, initial encounter ([address]   New onset a-fib ([address]     Condition: condition: fair  Dispo: Admit to med/surg floor      This transcription was electronically signed. It was dictated by use of voice recognition software and electronically transcribed.  The transcription may contain errors not detected in proofreading.         Sarah Mcgrgeor MD  Resident  06/04/22 [id number]

## 2022-06-04 NOTE — ED NOTES
Upon first contact with patient this RN receives bedside shift report from HEMA Trinidad. Pt resting on cot with unlabored respirations. Pt denies any needs at this time.         Namita GILES RN  06/04/22 [id number]

## 2022-06-05 LAB
ANION GAP SERPL CALCULATED.3IONS-SCNC: 12 MEQ/L (ref 8–16)
BASOPHILS # BLD: 0.3 %
BASOPHILS ABSOLUTE: 0 THOU/MM3 (ref 0–0.1)
BUN BLDV-MCNC: 20 MG/DL (ref 7–22)
CALCIUM SERPL-MCNC: 9.5 MG/DL (ref 8.5–10.5)
CHLORIDE BLD-SCNC: 106 MEQ/L (ref 98–111)
CO2: 25 MEQ/L (ref 23–33)
CREAT SERPL-MCNC: 1.5 MG/DL (ref 0.4–1.2)
EKG Q-T INTERVAL: 306 MS
EKG QRS DURATION: 66 MS
EKG QTC CALCULATION (BAZETT): 388 MS
EKG R AXIS: -29 DEGREES
EKG T AXIS: 56 DEGREES
EKG VENTRICULAR RATE: 97 BPM
EOSINOPHIL # BLD: 1.1 %
EOSINOPHILS ABSOLUTE: 0.1 THOU/MM3 (ref 0–0.4)
ERYTHROCYTE [DISTWIDTH] IN BLOOD BY AUTOMATED COUNT: 19 % (ref 11.5–14.5)
ERYTHROCYTE [DISTWIDTH] IN BLOOD BY AUTOMATED COUNT: 55.8 FL (ref 35–45)
GFR SERPL CREATININE-BSD FRML MDRD: 34 ML/MIN/1.73M2
GLUCOSE BLD-MCNC: 110 MG/DL (ref 70–108)
GLUCOSE BLD-MCNC: 112 MG/DL (ref 70–108)
GLUCOSE BLD-MCNC: 116 MG/DL (ref 70–108)
GLUCOSE BLD-MCNC: 161 MG/DL (ref 70–108)
GLUCOSE BLD-MCNC: 188 MG/DL (ref 70–108)
HCT VFR BLD CALC: 30.3 % (ref 37–47)
HEMOGLOBIN: 8.6 GM/DL (ref 12–16)
HYPOCHROMIA: PRESENT
IMMATURE GRANS (ABS): 0.02 THOU/MM3 (ref 0–0.07)
IMMATURE GRANULOCYTES: 0.3 %
LYMPHOCYTES # BLD: 24.4 %
LYMPHOCYTES ABSOLUTE: 1.6 THOU/MM3 (ref 1–4.8)
MCH RBC QN AUTO: 23 PG (ref 26–33)
MCHC RBC AUTO-ENTMCNC: 28.4 GM/DL (ref 32.2–35.5)
MCV RBC AUTO: 81 FL (ref 81–99)
MONOCYTES # BLD: 10.7 %
MONOCYTES ABSOLUTE: 0.7 THOU/MM3 (ref 0.4–1.3)
NUCLEATED RED BLOOD CELLS: 0 /100 WBC
PLATELET # BLD: 185 THOU/MM3 (ref 130–400)
PMV BLD AUTO: 10.3 FL (ref 9.4–12.4)
POTASSIUM REFLEX MAGNESIUM: 4.3 MEQ/L (ref 3.5–5.2)
RBC # BLD: 3.74 MILL/MM3 (ref 4.2–5.4)
SEG NEUTROPHILS: 63.2 %
SEGMENTED NEUTROPHILS ABSOLUTE COUNT: 4 THOU/MM3 (ref 1.8–7.7)
SODIUM BLD-SCNC: 143 MEQ/L (ref 135–145)
WBC # BLD: 6.4 THOU/MM3 (ref 4.8–10.8)

## 2022-06-05 PROCEDURE — 80048 BASIC METABOLIC PNL TOTAL CA: CPT

## 2022-06-05 PROCEDURE — 85025 COMPLETE CBC W/AUTO DIFF WBC: CPT

## 2022-06-05 PROCEDURE — 1200000003 HC TELEMETRY R&B

## 2022-06-05 PROCEDURE — 6370000000 HC RX 637 (ALT 250 FOR IP): Performed by: NURSE PRACTITIONER

## 2022-06-05 PROCEDURE — 99232 SBSQ HOSP IP/OBS MODERATE 35: CPT | Performed by: NURSE PRACTITIONER

## 2022-06-05 PROCEDURE — 36415 COLL VENOUS BLD VENIPUNCTURE: CPT

## 2022-06-05 PROCEDURE — 93005 ELECTROCARDIOGRAM TRACING: CPT | Performed by: NURSE PRACTITIONER

## 2022-06-05 PROCEDURE — 2580000003 HC RX 258: Performed by: NURSE PRACTITIONER

## 2022-06-05 PROCEDURE — 93010 ELECTROCARDIOGRAM REPORT: CPT | Performed by: NUCLEAR MEDICINE

## 2022-06-05 PROCEDURE — 82948 REAGENT STRIP/BLOOD GLUCOSE: CPT

## 2022-06-05 RX ADMIN — INSULIN LISPRO 1 UNITS: 100 INJECTION, SOLUTION INTRAVENOUS; SUBCUTANEOUS at 17:19

## 2022-06-05 RX ADMIN — PANTOPRAZOLE SODIUM 40 MG: 40 TABLET, DELAYED RELEASE ORAL at 09:40

## 2022-06-05 RX ADMIN — Medication 3000 UNITS: at 09:40

## 2022-06-05 RX ADMIN — MIDODRINE HYDROCHLORIDE 10 MG: 10 TABLET ORAL at 11:17

## 2022-06-05 RX ADMIN — MIDODRINE HYDROCHLORIDE 10 MG: 10 TABLET ORAL at 17:20

## 2022-06-05 RX ADMIN — METOPROLOL TARTRATE 12.5 MG: 25 TABLET, FILM COATED ORAL at 09:40

## 2022-06-05 RX ADMIN — CEFDINIR 300 MG: 300 CAPSULE ORAL at 09:40

## 2022-06-05 RX ADMIN — FLUOXETINE 20 MG: 20 CAPSULE ORAL at 09:40

## 2022-06-05 RX ADMIN — SODIUM CHLORIDE, PRESERVATIVE FREE 10 ML: 5 INJECTION INTRAVENOUS at 20:23

## 2022-06-05 RX ADMIN — CEFDINIR 300 MG: 300 CAPSULE ORAL at 20:23

## 2022-06-05 RX ADMIN — ATORVASTATIN CALCIUM 80 MG: 80 TABLET, FILM COATED ORAL at 20:23

## 2022-06-05 RX ADMIN — MIDODRINE HYDROCHLORIDE 10 MG: 10 TABLET ORAL at 09:40

## 2022-06-05 RX ADMIN — INSULIN LISPRO 1 UNITS: 100 INJECTION, SOLUTION INTRAVENOUS; SUBCUTANEOUS at 11:19

## 2022-06-05 RX ADMIN — METOPROLOL TARTRATE 12.5 MG: 25 TABLET, FILM COATED ORAL at 20:23

## 2022-06-05 RX ADMIN — SODIUM CHLORIDE, PRESERVATIVE FREE 10 ML: 5 INJECTION INTRAVENOUS at 09:40

## 2022-06-05 ASSESSMENT — PAIN SCALES - PAIN ASSESSMENT IN ADVANCED DEMENTIA (PAINAD)
CONSOLABILITY: 0
NEGVOCALIZATION: 1
FACIALEXPRESSION: 0
FACIALEXPRESSION: 1
FACIALEXPRESSION: 1
TOTALSCORE: 0
CONSOLABILITY: 0
CONSOLABILITY: 0
BODYLANGUAGE: 1
BREATHING: 0
NEGVOCALIZATION: 0
NEGVOCALIZATION: 1
TOTALSCORE: 3
BREATHING: 0
CONSOLABILITY: 0
CONSOLABILITY: 0
BODYLANGUAGE: 0
TOTALSCORE: 3
NEGVOCALIZATION: 1
BREATHING: 0
FACIALEXPRESSION: 0
BODYLANGUAGE: 0
BREATHING: 0
FACIALEXPRESSION: 1
NEGVOCALIZATION: 1
BODYLANGUAGE: 0
NEGVOCALIZATION: 0
TOTALSCORE: 0
TOTALSCORE: 3
NEGVOCALIZATION: 1
BODYLANGUAGE: 1
BODYLANGUAGE: 1
NEGVOCALIZATION: 1
FACIALEXPRESSION: 1
TOTALSCORE: 3
TOTALSCORE: 3
CONSOLABILITY: 0
BODYLANGUAGE: 1
FACIALEXPRESSION: 1
BREATHING: 0
CONSOLABILITY: 0
FACIALEXPRESSION: 1
TOTALSCORE: 0
NEGVOCALIZATION: 0
NEGVOCALIZATION: 1
CONSOLABILITY: 0
BODYLANGUAGE: 1
BODYLANGUAGE: 1
TOTALSCORE: 0
BREATHING: 0
FACIALEXPRESSION: 0
BODYLANGUAGE: 1
FACIALEXPRESSION: 0
BODYLANGUAGE: 0
FACIALEXPRESSION: 1
CONSOLABILITY: 0
CONSOLABILITY: 0
BREATHING: 0
BREATHING: 0
NEGVOCALIZATION: 0
TOTALSCORE: 3
BREATHING: 0
CONSOLABILITY: 0
TOTALSCORE: 3

## 2022-06-05 ASSESSMENT — PAIN SCALES - GENERAL: PAINLEVEL_OUTOF10: 4

## 2022-06-05 ASSESSMENT — PAIN DESCRIPTION - LOCATION: LOCATION: CHEST

## 2022-06-05 NOTE — PROGRESS NOTES
Hospitalist Progress Note    Patient:  Efrain Lopez      Unit/Bed:6K-01/001-A    YOB: 1950    MRN: [id number]       Acct: [de-identified]     PCP: Candy Bartlett MD    Date of Admission: 6/4/2022    Assessment/Plan:    1. Trauma by Fall--CT head does not reveal anything acute, CT cervical spine does not reveal anything acute, appreciate trauma services input  2. Atrial Fib with RVR--on Lopressor 25 mg twice a day with hold parameters, maintain telemetry, no anticoagulation at this time secondary to #1 and #4; repeat EKG  3. Possible UTI (POA) with gram-negative bacilli--Omnicef 300 mg twice a day, await final culture report  4. Large right-sided periorbital hematoma status post suture repair--secondary to #1, ice as needed  5. Acute blood loss anemia--monitor closely and transfuse if hemoglobin less than 7  6. Comminuted fracture of the anterior wall of the right maxillary sinus--ENT was consulted in the emergency department and stated nothing to do at this time, recommend Omnicef 300 mg twice a day for 4 weeks  7. CKD stage III--monitor  8. Diabetes mellitus type 2, uncontrolled--holding oral hypoglycemic at this time; on low-dose sliding scale along with hypoglycemia protocol and monitor  9. Essential hypertension, uncontrolled--low-dose beta-blocker and monitor closely as patient does have a history of orthostatic hypotension and is on midodrine  10. Chronic normocytic anemia  11. CAD--aspirin and Plavix will be on hold at this time; on statin, beta-blocker   12. Orthostatic hypotension history--on midodrine  13. Dementia  14. Chronic diastolic heart failure--EF from March 19, 2021 revealed an EF 60%; BNP elevated at 3250, patient did receive a 1 L bolus of normal saline in the emergency department, currently asymptomatic and no edema; lungs are clear and she is on room air  15.  CODE STATUS--DNR Comfort Care arrest    Expected discharge date:  Pending clinical course    Disposition: [] Home       [] TCU       [] Rehab       [] Psych       [x] SNF       [] [facility]       [] Other-    Chief Complaint: fall at assisted living    Hospital Course:  66 y.o. female who presented to [address] [place] [address] with fall at assisted living; patient has a past medical history of CKD, diabetes, hypertension, CAD along with dementia; she had an unwitnessed fall at the assisted living unit; it is reported that she typically walks with a walker and she was walking around outside without anybody realizing it and somehow she had an unwitnessed fall and was found lying on the concrete on the sidewalk; it is reported per EMS that the patient broke upper denture bridge and had blood suctioned out of her mouth, she has had worsening confusion over the past few weeks but no change today; she was found to have a large right sided periorbital hematoma along with a comminuted fracture of the anterior wall of the right maxillary sinus so ENT was consulted from the emergency department and recommends Omnicef 300 mg twice a day for 4 weeks and to follow-up outpatient as no urgent needs need to be done; trauma services also saw and sutured the laceration above her right eye; she was found to be in atrial fibrillation with a ventricular rate of 132, it appears on May 31, 2022 she had an EKG that showed an atrial fibrillation with heart rate 112.     In the emergency department, she is pleasantly confused; blood pressure stable, she is afebrile and on room air; she can tell me her name and she cannot answer any other questions and the only thing she tells me is \"does not really matter\", according to the ER staff her baseline is able to state her name; F paperwork reviewed she is a DNR Comfort Care arrest; she was given a 1 L 0.9 normal saline bolus, Omnicef 300 mg x 1 dose along with a tetanus shot, she is being admitted to hospital service for further care and evaluation.     6/5--> hemodynamically stable, telemetry sitter at bedside, she is sitting up eating; she is currently pleasantly confused which is her baseline she can state her name and birthdate    Subjective (past 24 hours): Complains of pain to her right eye otherwise she states \"how you think you would feel if you looked like me\"; denies any other complaints at this time    Medications:  Reviewed    Infusion Medications    sodium chloride      dextrose       Scheduled Medications    cefdinir  300 mg Oral 2 times per day    atorvastatin  80 mg Oral Daily    Vitamin D  3,000 Units Oral Daily    lidocaine  2 patch TransDERmal Daily    midodrine  10 mg Oral TID WC    pantoprazole  40 mg Oral Daily    sodium chloride flush  5-40 mL IntraVENous 2 times per day    insulin lispro  0-6 Units SubCUTAneous TID WC    insulin lispro  0-3 Units SubCUTAneous Nightly    FLUoxetine  20 mg Oral Daily    metoprolol tartrate  12.5 mg Oral BID     PRN Meds: sodium chloride flush, sodium chloride, ondansetron **OR** ondansetron, polyethylene glycol, acetaminophen **OR** acetaminophen, potassium chloride **OR** potassium alternative oral replacement **OR** potassium chloride, magnesium sulfate, glucose, dextrose bolus **OR** dextrose bolus, glucagon (rDNA), dextrose      Intake/Output Summary (Last 24 hours) at 6/5/2022 0726  Last data filed at 6/4/2022 2025  Gross per 24 hour   Intake 125 ml   Output --   Net 125 ml       Diet:  ADULT DIET; Regular; 4 carb choices (60 gm/meal)    Exam:  BP (!) 137/94   Pulse (!) 102   Temp 97.9 °F (36.6 °C) (Axillary)   Resp 14   Ht 5' 2\" (1.575 m)   Wt 155 lb (70.3 kg)   SpO2 97%   BMI 28.35 kg/m²     General appearance: No apparent distress, appears stated age and cooperative. HEENT: Around right eye with edema and ecchymosis   Neck: Supple, with full range of motion. No jugular venous distention. Trachea midline. Respiratory:  Normal respiratory effort.  Clear to auscultation, bilaterally without Rales/Wheezes/Rhonchi. Cardiovascular: Regular rate and rhythm with normal S1/S2 without murmurs, rubs or gallops. Abdomen: Soft, non-tender, non-distended with normal bowel sounds. Musculoskeletal: passive and active ROM x 4 extremities. Skin: Skin color, texture, turgor normal.    Neurologic:  Neurovascularly intact without any focal sensory/motor deficits. Cranial nerves: II-XII intact, grossly non-focal.  Psychiatric: Alert and oriented to name and birthdate only, thought content not appropriate however it appears she is at her baseline  Capillary Refill: Brisk,< 3 seconds   Peripheral Pulses: +2 palpable, equal bilaterally     Labs:   Recent Labs     06/04/22  1132 06/05/22  0526   WBC 6.1 6.4   HGB 10.2* 8.6*   HCT 34.7* 30.3*    185     Recent Labs     06/04/22  1132 06/05/22  0526    143   K 4.5 4.3    106   CO2 27 25   BUN 21 20   CREATININE 1.5* 1.5*   CALCIUM 10.1 9.5     Recent Labs     06/04/22  1132   AST 22   ALT <5*   BILITOT 0.4   ALKPHOS 51     Microbiology:    Urine cx with gram-negative bacilli    Urinalysis:      Lab Results   Component Value Date    NITRU NEGATIVE 06/04/2022    WBCUA > 100 06/04/2022    WBCUA >200W/CLUMPS 10/20/2011    BACTERIA MANY 06/04/2022    RBCUA 15-25 06/04/2022    BLOODU LARGE 06/04/2022    SPECGRAV 1.023 06/04/2022    GLUCOSEU NEGATIVE 05/31/2022       Radiology:  XR PELVIS (1-2 VIEWS)    Result Date: 6/4/2022  PROCEDURE: XR PELVIS (1-2 VIEWS), XR FEMUR LEFT (MIN 2 VIEWS) CLINICAL INFORMATION: 43-year-old female who fell. Left hip pain. COMPARISON: Images dated 11/09/2020 and 09/06/2020. TECHNIQUE: An AP view of the pelvis and 4 views of the left femur were obtained. FINDINGS: The pelvic ring is intact. The bilateral superior and inferior pubic rami are intact. There are some degenerative changes in the lower lumbar spine. There is no acute fracture. There is no dislocation. 3 surgical screws are again seen at the proximal left femur.  These are unchanged since the prior exam. There is tricompartmental joint space narrowing at the left knee. No acute fracture or dislocation. **This report has been created using voice recognition software. It may contain minor errors which are inherent in voice recognition technology. ** Final report electronically signed by Dr Pattie Velazco on 6/4/2022 12:10 PM    XR FEMUR LEFT (MIN 2 VIEWS)    Result Date: 6/4/2022  PROCEDURE: XR PELVIS (1-2 VIEWS), XR FEMUR LEFT (MIN 2 VIEWS) CLINICAL INFORMATION: 41-year-old female who fell. Left hip pain. COMPARISON: Images dated 11/09/2020 and 09/06/2020. TECHNIQUE: An AP view of the pelvis and 4 views of the left femur were obtained. FINDINGS: The pelvic ring is intact. The bilateral superior and inferior pubic rami are intact. There are some degenerative changes in the lower lumbar spine. There is no acute fracture. There is no dislocation. 3 surgical screws are again seen at the proximal left femur. These are unchanged since the prior exam. There is tricompartmental joint space narrowing at the left knee. No acute fracture or dislocation. **This report has been created using voice recognition software. It may contain minor errors which are inherent in voice recognition technology. ** Final report electronically signed by Dr Pattie Velazco on 6/4/2022 12:10 PM    CT HEAD WO CONTRAST    Result Date: 6/4/2022  PROCEDURE: CT HEAD WO CONTRAST CLINICAL INFORMATION: 41-year-old female with an unwitnessed fall. COMPARISON: CT 5/31/2022. TECHNIQUE: Noncontrast 5 mm axial images were obtained through the brain. All CT scans at this facility use dose modulation, iterative reconstruction, and/or weight-based dosing when appropriate to reduce radiation dose to as low as reasonably achievable. FINDINGS: There is prominence of the sulci and gyri consistent with age-related volume loss. There is no hemorrhage. There are no intra-or extra-axial collections.   There is no hydrocephalus, midline shift or mass effect. The gray-white matter differentiation is preserved. There is some hypoattenuation in the periventricular white matter consistent with chronic microvascular ischemic disease. There is some mucosal thickening in the ethmoid air cells, and right maxillary sinus. The paranasal sinuses and mastoid air cells are otherwise clear. There is no suspicious calvarial abnormality. There is a large hematoma in the right periorbital region. 1. No acute intracranial hemorrhage, mass effect or midline shift. 2. Large right-sided periorbital hematoma. **This report has been created using voice recognition software. It may contain minor errors which are inherent in voice recognition technology. ** Final report electronically signed by Dr Trudy Henry on 6/4/2022 11:56 AM    CT FACIAL BONES WO CONTRAST    Result Date: 6/4/2022  PROCEDURE: CT FACIAL BONES WO CONTRAST CLINICAL INFORMATION: 64-year-old female who fell onto cement. Large laceration to the forehead. Facial swelling. COMPARISON: CT scan 3/18/2021. TECHNIQUE: Noncontrast 3 mm axial CT images were obtained through the sinuses/facial bones. 3 mm coronal reconstructions were obtained. All CT scans at this facility use dose modulation, iterative reconstruction, and/or weight-based dosing when appropriate to reduce radiation dose to as low as reasonably achievable. FINDINGS: There is a comminuted fracture of the anterior aspect of the right maxillary sinus which is best seen on axial images series #4 image #[id number]. There is a hematoma overlying this region measuring approximately 2.5 x 1.5 cm. Layering blood products are noted in the right maxillary sinus. There is mucosal thickening in the mastoid air cells. The orbital rims are intact. The nasal bones, nasal septum, zygomatic arches and mandible are intact. There is a large right-sided periorbital hematoma. The mastoid air cells are clear.  There are no suspicious findings in the imaged aspects of the brain parenchyma. Comminuted fracture of the anterior wall of the right maxillary sinus. **This report has been created using voice recognition software. It may contain minor errors which are inherent in voice recognition technology. ** Final report electronically signed by Dr Harris Zuñiga on 6/4/2022 12:04 PM    CT CERVICAL SPINE WO CONTRAST    Result Date: 6/4/2022  PROCEDURE: CT CERVICAL SPINE WO CONTRAST CLINICAL INFORMATION: 49-year-old female who fell. Trauma. COMPARISON: CT scan 3/18/2021. TECHNIQUE: 3 mm noncontrast axial images were obtained through the cervical spine with sagittal and coronal reconstructions. All CT scans at this facility use dose modulation, iterative reconstruction, and/or weight-based dosing when appropriate to reduce radiation dose to as low as reasonably achievable. FINDINGS: The cervical vertebral body heights and alignment are preserved. There is no acute compression fracture. There is no subluxation. There is no prevertebral soft tissue swelling. There is narrowing of the disc space at C6-C7. There is diffuse facet arthropathy. There is degenerative narrowing at the atlantodental interval. The lateral masses of C1 and C2 are appropriately aligned. There are no suspicious findings in the visualized portions of the lung apices. Degenerative changes with no acute fracture or subluxation. **This report has been created using voice recognition software. It may contain minor errors which are inherent in voice recognition technology. ** Final report electronically signed by Dr Harris Zuñiga on 6/4/2022 11:59 AM    XR CHEST PORTABLE    Result Date: 6/4/2022  PROCEDURE: XR CHEST PORTABLE CLINICAL INFORMATION: Trauma. The patient fell. COMPARISON: Radiograph 5/31/2022. TECHNIQUE: AP supine view of the chest was obtained. FINDINGS: The lungs are clear. There is cardiomegaly. The pulmonary vasculature is within normal limits.  There is no significant pleural effusion or pneumothorax. Visualized portions of the upper abdomen are within normal limits. The osseous structures are intact. There is generalized osteopenia. No acute fractures or suspicious osseous lesions. There is no acute intrathoracic process. **This report has been created using voice recognition software. It may contain minor errors which are inherent in voice recognition technology. ** Final report electronically signed by Dr Ned Jaimes on 6/4/2022 12:05 PM      DVT prophylaxis: [] Lovenox                                 [x] SCDs                                 [] SQ Heparin                                 [] Encourage ambulation           [] Already on Anticoagulation     Code Status: DNR-CCA    PT/OT Eval Status: on case    Tele:   [x] yes             [] no    Active Hospital Problems    Diagnosis Date Noted    Atrial fibrillation with RVR ([facility] [address] [I48.91] 06/04/2022     Priority: Medium    Cephalohematoma [P12.0] 06/04/2022     Priority: Medium    Laceration of face Eric Salter 06/04/2022     Priority: Medium    Unwitnessed fall [R29.6] 11/09/2020       Electronically signed by JOANNA Lake CNP on 6/5/2022 at 7:26 AM

## 2022-06-05 NOTE — PROGRESS NOTES
Pt admitted to  [address] [place] [address] via cart/stretcher. Complaints: Pain. IV none infusing. IV site free of s/s of infection or infiltration. Vital signs obtained. Assessment and data collection initiated. Two nurse skin assessment performed by aSndra Boyer RN and Lizbeth Bravo RN. Oriented to room. Policies and procedures for [facility] explained. Areli Blanca RN discussed hourly rounding with patient addressing 5 P's. Fall prevention and safety brochure discussed with patient. Bed alarm on. Call light in reach.

## 2022-06-05 NOTE — PLAN OF CARE
Problem: Discharge Planning  Goal: Discharge to home or other facility with appropriate resources  Outcome: Progressing  Flowsheets (Taken 6/4/2022 1447)  Discharge to home or other facility with appropriate resources: Identify barriers to discharge with patient and caregiver  Note: Patient from [facility] , patient plan to return to [facility] since she does not qualify for [facility]      Problem: Pain  Goal: Verbalizes/displays adequate comfort level or baseline comfort level  Outcome: Progressing  Flowsheets (Taken 6/4/2022 1850)  Verbalizes/displays adequate comfort level or baseline comfort level:   Encourage patient to monitor pain and request assistance   Assess pain using appropriate pain scale  Note: Patient denies pain at this time, will continue to monitor     Problem: Safety - Adult  Goal: Free from fall injury  Outcome: Progressing  Flowsheets (Taken 6/4/2022 1850)  Free From Fall Injury: Instruct family/caregiver on patient safety  Note: No falls noted this shift. Continue falling star program. Bed alarm on, bed in low position. Call light and personal belongings in reach.   Patient uses call light appropriately.        Problem: ABCDS Injury Assessment  Goal: Absence of physical injury  Outcome: Progressing  Flowsheets (Taken 6/4/2022 1850)  Absence of Physical Injury: Implement safety measures based on patient assessment     Problem: Respiratory - Adult  Goal: Achieves optimal ventilation and oxygenation  Outcome: Progressing  Flowsheets (Taken 6/5/2022 1850)  Achieves optimal ventilation and oxygenation:   Assess for changes in respiratory status   Assess for changes in mentation and behavior     Problem: Cardiovascular - Adult  Goal: Maintains optimal cardiac output and hemodynamic stability  Outcome: Progressing  Flowsheets (Taken 6/5/2022 1850)  Maintains optimal cardiac output and hemodynamic stability: Monitor blood pressure and heart rate     Problem: Skin/Tissue Integrity - Adult  Goal: Skin integrity remains intact  Outcome: Progressing  Flowsheets (Taken 6/5/2022 1850)  Skin Integrity Remains Intact: Monitor for areas of redness and/or skin breakdown     Problem: Neurosensory - Adult  Goal: Achieves stable or improved neurological status  Outcome: Progressing  Flowsheets (Taken 6/5/2022 1850)  Achieves stable or improved neurological status: Assess for and report changes in neurological status     Problem: Neurosensory - Adult  Goal: Achieves maximal functionality and self care  Outcome: Progressing  Flowsheets (Taken 6/5/2022 1850)  Achieves maximal functionality and self care: Monitor swallowing and airway patency with patient fatigue and changes in neurological status     Care plan reviewed with patient. Patient verbalize understanding of the plan of care and contribute to goal setting.

## 2022-06-06 LAB
ANION GAP SERPL CALCULATED.3IONS-SCNC: 11 MEQ/L (ref 8–16)
BUN BLDV-MCNC: 21 MG/DL (ref 7–22)
CALCIUM SERPL-MCNC: 9.7 MG/DL (ref 8.5–10.5)
CHLORIDE BLD-SCNC: 105 MEQ/L (ref 98–111)
CO2: 24 MEQ/L (ref 23–33)
CREAT SERPL-MCNC: 1.4 MG/DL (ref 0.4–1.2)
EKG ATRIAL RATE: 174 BPM
EKG Q-T INTERVAL: 314 MS
EKG Q-T INTERVAL: 328 MS
EKG QRS DURATION: 64 MS
EKG QRS DURATION: 64 MS
EKG QTC CALCULATION (BAZETT): 465 MS
EKG QTC CALCULATION (BAZETT): 469 MS
EKG R AXIS: -27 DEGREES
EKG R AXIS: -43 DEGREES
EKG T AXIS: 45 DEGREES
EKG T AXIS: 53 DEGREES
EKG VENTRICULAR RATE: 123 BPM
EKG VENTRICULAR RATE: 132 BPM
ERYTHROCYTE [DISTWIDTH] IN BLOOD BY AUTOMATED COUNT: 18.7 % (ref 11.5–14.5)
ERYTHROCYTE [DISTWIDTH] IN BLOOD BY AUTOMATED COUNT: 54.9 FL (ref 35–45)
GFR SERPL CREATININE-BSD FRML MDRD: 37 ML/MIN/1.73M2
GLUCOSE BLD-MCNC: 119 MG/DL (ref 70–108)
GLUCOSE BLD-MCNC: 153 MG/DL (ref 70–108)
GLUCOSE BLD-MCNC: 178 MG/DL (ref 70–108)
GLUCOSE BLD-MCNC: 195 MG/DL (ref 70–108)
HCT VFR BLD CALC: 30.2 % (ref 37–47)
HEMOGLOBIN: 8.5 GM/DL (ref 12–16)
MCH RBC QN AUTO: 22.7 PG (ref 26–33)
MCHC RBC AUTO-ENTMCNC: 28.1 GM/DL (ref 32.2–35.5)
MCV RBC AUTO: 80.5 FL (ref 81–99)
ORGANISM: ABNORMAL
PLATELET # BLD: 193 THOU/MM3 (ref 130–400)
PMV BLD AUTO: 9.9 FL (ref 9.4–12.4)
POTASSIUM REFLEX MAGNESIUM: 4.2 MEQ/L (ref 3.5–5.2)
RBC # BLD: 3.75 MILL/MM3 (ref 4.2–5.4)
SODIUM BLD-SCNC: 140 MEQ/L (ref 135–145)
URINE CULTURE, ROUTINE: ABNORMAL
WBC # BLD: 5.8 THOU/MM3 (ref 4.8–10.8)

## 2022-06-06 PROCEDURE — 99232 SBSQ HOSP IP/OBS MODERATE 35: CPT | Performed by: NURSE PRACTITIONER

## 2022-06-06 PROCEDURE — 93010 ELECTROCARDIOGRAM REPORT: CPT | Performed by: NUCLEAR MEDICINE

## 2022-06-06 PROCEDURE — 2580000003 HC RX 258: Performed by: NURSE PRACTITIONER

## 2022-06-06 PROCEDURE — 85027 COMPLETE CBC AUTOMATED: CPT

## 2022-06-06 PROCEDURE — 97535 SELF CARE MNGMENT TRAINING: CPT

## 2022-06-06 PROCEDURE — 82948 REAGENT STRIP/BLOOD GLUCOSE: CPT

## 2022-06-06 PROCEDURE — 80048 BASIC METABOLIC PNL TOTAL CA: CPT

## 2022-06-06 PROCEDURE — 6370000000 HC RX 637 (ALT 250 FOR IP): Performed by: NURSE PRACTITIONER

## 2022-06-06 PROCEDURE — 1200000003 HC TELEMETRY R&B

## 2022-06-06 PROCEDURE — 97166 OT EVAL MOD COMPLEX 45 MIN: CPT

## 2022-06-06 PROCEDURE — 36415 COLL VENOUS BLD VENIPUNCTURE: CPT

## 2022-06-06 RX ADMIN — SODIUM CHLORIDE, PRESERVATIVE FREE 10 ML: 5 INJECTION INTRAVENOUS at 08:31

## 2022-06-06 RX ADMIN — INSULIN LISPRO 1 UNITS: 100 INJECTION, SOLUTION INTRAVENOUS; SUBCUTANEOUS at 16:45

## 2022-06-06 RX ADMIN — Medication 3000 UNITS: at 08:29

## 2022-06-06 RX ADMIN — METOPROLOL TARTRATE 12.5 MG: 25 TABLET, FILM COATED ORAL at 20:39

## 2022-06-06 RX ADMIN — SODIUM CHLORIDE, PRESERVATIVE FREE 10 ML: 5 INJECTION INTRAVENOUS at 20:39

## 2022-06-06 RX ADMIN — PANTOPRAZOLE SODIUM 40 MG: 40 TABLET, DELAYED RELEASE ORAL at 08:29

## 2022-06-06 RX ADMIN — MIDODRINE HYDROCHLORIDE 10 MG: 10 TABLET ORAL at 08:28

## 2022-06-06 RX ADMIN — MIDODRINE HYDROCHLORIDE 10 MG: 10 TABLET ORAL at 11:18

## 2022-06-06 RX ADMIN — CEFDINIR 300 MG: 300 CAPSULE ORAL at 20:39

## 2022-06-06 RX ADMIN — ATORVASTATIN CALCIUM 80 MG: 80 TABLET, FILM COATED ORAL at 20:39

## 2022-06-06 RX ADMIN — CEFDINIR 300 MG: 300 CAPSULE ORAL at 08:28

## 2022-06-06 RX ADMIN — INSULIN LISPRO 1 UNITS: 100 INJECTION, SOLUTION INTRAVENOUS; SUBCUTANEOUS at 20:37

## 2022-06-06 RX ADMIN — ACETAMINOPHEN 650 MG: 325 TABLET ORAL at 08:29

## 2022-06-06 RX ADMIN — METOPROLOL TARTRATE 12.5 MG: 25 TABLET, FILM COATED ORAL at 08:29

## 2022-06-06 RX ADMIN — MIDODRINE HYDROCHLORIDE 10 MG: 10 TABLET ORAL at 16:45

## 2022-06-06 RX ADMIN — INSULIN LISPRO 1 UNITS: 100 INJECTION, SOLUTION INTRAVENOUS; SUBCUTANEOUS at 11:18

## 2022-06-06 RX ADMIN — FLUOXETINE 20 MG: 20 CAPSULE ORAL at 08:28

## 2022-06-06 ASSESSMENT — PAIN SCALES - PAIN ASSESSMENT IN ADVANCED DEMENTIA (PAINAD)
FACIALEXPRESSION: 0
BREATHING: 0
TOTALSCORE: 0
FACIALEXPRESSION: 0
TOTALSCORE: 0
BREATHING: 0
CONSOLABILITY: 0
NEGVOCALIZATION: 0
FACIALEXPRESSION: 0
CONSOLABILITY: 0
TOTALSCORE: 0
TOTALSCORE: 0
FACIALEXPRESSION: 0
BODYLANGUAGE: 0
NEGVOCALIZATION: 0
BREATHING: 0
NEGVOCALIZATION: 0
TOTALSCORE: 0
BREATHING: 0
CONSOLABILITY: 0
FACIALEXPRESSION: 0
BREATHING: 0
TOTALSCORE: 0
TOTALSCORE: 0
NEGVOCALIZATION: 0
BODYLANGUAGE: 0
FACIALEXPRESSION: 0
BODYLANGUAGE: 0
CONSOLABILITY: 0
CONSOLABILITY: 0
BREATHING: 0
FACIALEXPRESSION: 0
BODYLANGUAGE: 0
NEGVOCALIZATION: 0
BREATHING: 0
FACIALEXPRESSION: 0
CONSOLABILITY: 0
BREATHING: 0
FACIALEXPRESSION: 0
BODYLANGUAGE: 0
FACIALEXPRESSION: 0
CONSOLABILITY: 0
BREATHING: 0
BREATHING: 0
NEGVOCALIZATION: 0
BODYLANGUAGE: 0
CONSOLABILITY: 0
BODYLANGUAGE: 0
BODYLANGUAGE: 0
NEGVOCALIZATION: 0
CONSOLABILITY: 0
CONSOLABILITY: 0
NEGVOCALIZATION: 0
BODYLANGUAGE: 0
NEGVOCALIZATION: 0
BODYLANGUAGE: 0
NEGVOCALIZATION: 0
TOTALSCORE: 0

## 2022-06-06 ASSESSMENT — PAIN DESCRIPTION - ORIENTATION: ORIENTATION: RIGHT

## 2022-06-06 ASSESSMENT — PAIN DESCRIPTION - LOCATION: LOCATION: HEAD

## 2022-06-06 ASSESSMENT — PAIN SCALES - GENERAL
PAINLEVEL_OUTOF10: 0

## 2022-06-06 NOTE — PROGRESS NOTES
Hospitalist Progress Note    Patient:  Manish Martin      Unit/Bed:6K-01/001-A    YOB: 1950    MRN: [id number]       Acct: [de-identified]     PCP: Joy Restrepo MD    Date of Admission: 6/4/2022    Assessment/Plan:    1. Trauma by Fall--CT head does not reveal anything acute, CT cervical spine does not reveal anything acute, appreciate trauma services input  2. Atrial Fib with RVR--on Lopressor 25 mg twice a day with hold parameters, maintain telemetry, no anticoagulation at this time secondary to #1 and #4;  3. Possible UTI (POA) with Klebsiella pneumoniae--Omnicef 300 mg twice a day~susceptible  4. Large right-sided periorbital hematoma status post suture repair--secondary to #1, ice as needed  5. Acute blood loss anemia--monitor closely and transfuse if hemoglobin less than 7; maintain fair stability last 24 hours  6. Comminuted fracture of the anterior wall of the right maxillary sinus--ENT was consulted in the emergency department and stated nothing to do at this time, recommend Omnicef 300 mg twice a day for 4 weeks  7. CKD stage III--monitor  8. Diabetes mellitus type 2, uncontrolled--holding oral hypoglycemic at this time; on low-dose sliding scale along with hypoglycemia protocol and monitor; sugars are fairly well controlled as glucose this morning was noted to be 119  9. Essential hypertension, uncontrolled--low-dose beta-blocker and monitor closely as patient does have a history of orthostatic hypotension and is on midodrine  10. Chronic normocytic anemia  11. CAD--aspirin and Plavix on hold at this time; on statin, beta-blocker   12. Orthostatic hypotension history--on midodrine  13. Dementia  14. Chronic diastolic heart failure--EF from March 19, 2021 revealed an EF 60%; BNP elevated at 3250, patient did receive a 1 L bolus of normal saline in the emergency department, currently asymptomatic and no edema; lungs are clear and she is on room air  15.  CODE STATUS--DNR Comfort Care arrest    Expected discharge date: Hopefully 6/6 or 6/7    Disposition:    [] Home       [] TCU       [] Rehab       [] Psych       [x] SNF       [] [place]       [] Other-    Chief Complaint: fall at assisted living    Hospital Course:  66 y.o. female who presented to [facility] with fall at assisted living; patient has a past medical history of CKD, diabetes, hypertension, CAD along with dementia; she had an unwitnessed fall at the assisted living unit; it is reported that she typically walks with a walker and she was walking around outside without anybody realizing it and somehow she had an unwitnessed fall and was found lying on the concrete on the sidewalk; it is reported per EMS that the patient broke upper denture bridge and had blood suctioned out of her mouth, she has had worsening confusion over the past few weeks but no change today; she was found to have a large right sided periorbital hematoma along with a comminuted fracture of the anterior wall of the right maxillary sinus so ENT was consulted from the emergency department and recommends Omnicef 300 mg twice a day for 4 weeks and to follow-up outpatient as no urgent needs need to be done; trauma services also saw and sutured the laceration above her right eye; she was found to be in atrial fibrillation with a ventricular rate of 132, it appears on May 31, 2022 she had an EKG that showed an atrial fibrillation with heart rate 112.     In the emergency department, she is pleasantly confused; blood pressure stable, she is afebrile and on room air; she can tell me her name and she cannot answer any other questions and the only thing she tells me is \"does not really matter\", according to the ER staff her baseline is able to state her name; F paperwork reviewed she is a DNR Comfort Care arrest; she was given a 1 L 0.9 normal saline bolus, Omnicef 300 mg x 1 dose along with a tetanus shot, she is being admitted to hospital service for further care and evaluation.     6/5--> hemodynamically stable, telemetry sitter at bedside, she is sitting up eating; she is currently pleasantly confused which is her baseline she can state her name and birthdate    6/6--> hemodynamically stable, pleasantly confused and actually eating and folding washcloths; edema looks much improved around the right eye today    Subjective (past 24 hours): Denies any complaints at this time, sitting up eating    Medications:  Reviewed    Infusion Medications    sodium chloride      dextrose       Scheduled Medications    cefdinir  300 mg Oral 2 times per day    atorvastatin  80 mg Oral Daily    Vitamin D  3,000 Units Oral Daily    lidocaine  2 patch TransDERmal Daily    midodrine  10 mg Oral TID WC    pantoprazole  40 mg Oral Daily    sodium chloride flush  5-40 mL IntraVENous 2 times per day    insulin lispro  0-6 Units SubCUTAneous TID WC    insulin lispro  0-3 Units SubCUTAneous Nightly    FLUoxetine  20 mg Oral Daily    metoprolol tartrate  12.5 mg Oral BID     PRN Meds: sodium chloride flush, sodium chloride, ondansetron **OR** ondansetron, polyethylene glycol, acetaminophen **OR** acetaminophen, potassium chloride **OR** potassium alternative oral replacement **OR** potassium chloride, magnesium sulfate, glucose, dextrose bolus **OR** dextrose bolus, glucagon (rDNA), dextrose      Intake/Output Summary (Last 24 hours) at 6/6/2022 1002  Last data filed at 6/6/2022 0841  Gross per 24 hour   Intake 945 ml   Output 150 ml   Net 795 ml       Diet:  ADULT DIET; Regular; 4 carb choices (60 gm/meal)    Exam:  /79   Pulse 95   Temp 98.8 °F (37.1 °C) (Oral)   Resp 16   Ht 5' 6\" (1.676 m)   Wt 125 lb 11.2 oz (57 kg)   SpO2 97%   BMI 20.29 kg/m²     General appearance: No apparent distress, appears stated age and cooperative.   HEENT: Around right eye with edema and ecchymosis however looks better today, sutures intact  Neck: Supple, with full range of motion. No jugular venous distention. Trachea midline. Respiratory:  Normal respiratory effort. Clear to auscultation, bilaterally without Rales/Wheezes/Rhonchi. Cardiovascular: irregular rate and rhythm . Abdomen: Soft, non-tender, non-distended with normal bowel sounds. Musculoskeletal: passive and active ROM x 4 extremities. Skin: Skin color, texture, turgor normal.    Neurologic:  Neurovascularly intact without any focal sensory/motor deficits. Cranial nerves: II-XII intact, grossly non-focal.  Psychiatric: Alert and oriented to name and birthdate only, she cannot tell me where she is at or the month or year, thought content not appropriate however it appears she is at her baseline  Capillary Refill: Brisk,< 3 seconds   Peripheral Pulses: +2 palpable, equal bilaterally     Labs:   Recent Labs     06/04/22 1132 06/05/22  0526 06/06/22  0549   WBC 6.1 6.4 5.8   HGB 10.2* 8.6* 8.5*   HCT 34.7* 30.3* 30.2*    185 193     Recent Labs     06/04/22  1132 06/05/22  0526 06/06/22  0549    143 140   K 4.5 4.3 4.2    106 105   CO2 27 25 24   BUN 21 20 21   CREATININE 1.5* 1.5* 1.4*   CALCIUM 10.1 9.5 9.7     Recent Labs     06/04/22 1132   AST 22   ALT <5*   BILITOT 0.4   ALKPHOS 51     Microbiology:    Urine cx with Klebsiella    Urinalysis:      Lab Results   Component Value Date    NITRU NEGATIVE 06/04/2022    WBCUA > 100 06/04/2022    WBCUA >200W/CLUMPS 10/20/2011    BACTERIA MANY 06/04/2022    RBCUA 15-25 06/04/2022    BLOODU LARGE 06/04/2022    SPECGRAV 1.023 06/04/2022    GLUCOSEU NEGATIVE 05/31/2022       Radiology:  XR PELVIS (1-2 VIEWS)    Result Date: 6/4/2022  PROCEDURE: XR PELVIS (1-2 VIEWS), XR FEMUR LEFT (MIN 2 VIEWS) CLINICAL INFORMATION: 64-year-old female who fell. Left hip pain. COMPARISON: Images dated 11/09/2020 and 09/06/2020. TECHNIQUE: An AP view of the pelvis and 4 views of the left femur were obtained. FINDINGS: The pelvic ring is intact.  The bilateral superior and inferior pubic rami are intact. There are some degenerative changes in the lower lumbar spine. There is no acute fracture. There is no dislocation. 3 surgical screws are again seen at the proximal left femur. These are unchanged since the prior exam. There is tricompartmental joint space narrowing at the left knee. No acute fracture or dislocation. **This report has been created using voice recognition software. It may contain minor errors which are inherent in voice recognition technology. ** Final report electronically signed by Dr Claudene Patch on 6/4/2022 12:10 PM    XR FEMUR LEFT (MIN 2 VIEWS)    Result Date: 6/4/2022  PROCEDURE: XR PELVIS (1-2 VIEWS), XR FEMUR LEFT (MIN 2 VIEWS) CLINICAL INFORMATION: 68-year-old female who fell. Left hip pain. COMPARISON: Images dated 11/09/2020 and 09/06/2020. TECHNIQUE: An AP view of the pelvis and 4 views of the left femur were obtained. FINDINGS: The pelvic ring is intact. The bilateral superior and inferior pubic rami are intact. There are some degenerative changes in the lower lumbar spine. There is no acute fracture. There is no dislocation. 3 surgical screws are again seen at the proximal left femur. These are unchanged since the prior exam. There is tricompartmental joint space narrowing at the left knee. No acute fracture or dislocation. **This report has been created using voice recognition software. It may contain minor errors which are inherent in voice recognition technology. ** Final report electronically signed by Dr Claudene Patch on 6/4/2022 12:10 PM    CT HEAD WO CONTRAST    Result Date: 6/4/2022  PROCEDURE: CT HEAD WO CONTRAST CLINICAL INFORMATION: 68-year-old female with an unwitnessed fall. COMPARISON: CT 5/31/2022. TECHNIQUE: Noncontrast 5 mm axial images were obtained through the brain.  All CT scans at this facility use dose modulation, iterative reconstruction, and/or weight-based dosing when appropriate to reduce radiation dose to as low as reasonably achievable. FINDINGS: There is prominence of the sulci and gyri consistent with age-related volume loss. There is no hemorrhage. There are no intra-or extra-axial collections. There is no hydrocephalus, midline shift or mass effect. The gray-white matter differentiation is preserved. There is some hypoattenuation in the periventricular white matter consistent with chronic microvascular ischemic disease. There is some mucosal thickening in the ethmoid air cells, and right maxillary sinus. The paranasal sinuses and mastoid air cells are otherwise clear. There is no suspicious calvarial abnormality. There is a large hematoma in the right periorbital region. 1. No acute intracranial hemorrhage, mass effect or midline shift. 2. Large right-sided periorbital hematoma. **This report has been created using voice recognition software. It may contain minor errors which are inherent in voice recognition technology. ** Final report electronically signed by Dr Brittny Layton on 6/4/2022 11:56 AM    CT FACIAL BONES WO CONTRAST    Result Date: 6/4/2022  PROCEDURE: CT FACIAL BONES WO CONTRAST CLINICAL INFORMATION: 68-year-old female who fell onto cement. Large laceration to the forehead. Facial swelling. COMPARISON: CT scan 3/18/2021. TECHNIQUE: Noncontrast 3 mm axial CT images were obtained through the sinuses/facial bones. 3 mm coronal reconstructions were obtained. All CT scans at this facility use dose modulation, iterative reconstruction, and/or weight-based dosing when appropriate to reduce radiation dose to as low as reasonably achievable. FINDINGS: There is a comminuted fracture of the anterior aspect of the right maxillary sinus which is best seen on axial images series #4 image #29-33. There is a hematoma overlying this region measuring approximately 2.5 x 1.5 cm. Layering blood products are noted in the right maxillary sinus. There is mucosal thickening in the mastoid air cells.  The orbital rims are intact. The nasal bones, nasal septum, zygomatic arches and mandible are intact. There is a large right-sided periorbital hematoma. The mastoid air cells are clear. There are no suspicious findings in the imaged aspects of the brain parenchyma. Comminuted fracture of the anterior wall of the right maxillary sinus. **This report has been created using voice recognition software. It may contain minor errors which are inherent in voice recognition technology. ** Final report electronically signed by Dr Katie Hdez on 6/4/2022 12:04 PM    CT CERVICAL SPINE WO CONTRAST    Result Date: 6/4/2022  PROCEDURE: CT CERVICAL SPINE WO CONTRAST CLINICAL INFORMATION: 64-year-old female who fell. Trauma. COMPARISON: CT scan 3/18/2021. TECHNIQUE: 3 mm noncontrast axial images were obtained through the cervical spine with sagittal and coronal reconstructions. All CT scans at this facility use dose modulation, iterative reconstruction, and/or weight-based dosing when appropriate to reduce radiation dose to as low as reasonably achievable. FINDINGS: The cervical vertebral body heights and alignment are preserved. There is no acute compression fracture. There is no subluxation. There is no prevertebral soft tissue swelling. There is narrowing of the disc space at C6-C7. There is diffuse facet arthropathy. There is degenerative narrowing at the atlantodental interval. The lateral masses of C1 and C2 are appropriately aligned. There are no suspicious findings in the visualized portions of the lung apices. Degenerative changes with no acute fracture or subluxation. **This report has been created using voice recognition software. It may contain minor errors which are inherent in voice recognition technology. ** Final report electronically signed by Dr Katie Hdez on 6/4/2022 11:59 AM    XR CHEST PORTABLE    Result Date: 6/4/2022  PROCEDURE: XR CHEST PORTABLE CLINICAL INFORMATION: Trauma. The patient fell. COMPARISON: Radiograph 5/31/2022. TECHNIQUE: AP supine view of the chest was obtained. FINDINGS: The lungs are clear. There is cardiomegaly. The pulmonary vasculature is within normal limits. There is no significant pleural effusion or pneumothorax. Visualized portions of the upper abdomen are within normal limits. The osseous structures are intact. There is generalized osteopenia. No acute fractures or suspicious osseous lesions. There is no acute intrathoracic process. **This report has been created using voice recognition software. It may contain minor errors which are inherent in voice recognition technology. ** Final report electronically signed by Dr Joselito Mills on 6/4/2022 12:05 PM      DVT prophylaxis: [] Lovenox                                 [x] SCDs                                 [] SQ Heparin                                 [] Encourage ambulation           [] Already on Anticoagulation     Code Status: DNR-CCA    PT/OT Eval Status: on case    Tele:   [x] yes atrial fibrillation heart rate 92             [] no    Active Hospital Problems    Diagnosis Date Noted    Atrial fibrillation with RVR ([address] [I48.91] 06/04/2022     Priority: Medium    Cephalohematoma [P12.0] 06/04/2022     Priority: Medium    Laceration of face Liz Perish 06/04/2022     Priority: Medium    Unwitnessed fall [R29.6] 11/09/2020       Electronically signed by JOANNA Bernard CNP on 6/6/2022 at 10:02 AM

## 2022-06-06 NOTE — PROGRESS NOTES
[facility]  INPATIENT OCCUPATIONAL THERAPY  [facility] RENAL TELEMETRY 6K  EVALUATION    Time:    Time In:   Time Out:   Timed Code Treatment Minutes: 12 Minutes  Minutes: 22          Date: 2022  Patient Name: Pamela Rueda,   Gender: female      MRN: [id number]  : 1950  (66 y.o.)  Referring Practitioner: Saw Spencer CNP  Diagnosis: new onset of afib  Additional Pertinent Hx: 66 y.o. female who presents to the emergency department for evaluation of unwitnessed fall. The patient lives at an assisted living facility and attempted to ambulate away from a facility and was found laying in the sidewalk. The staff noticed bleeding around the head and some oropharyngeal bleeding. Her denture plate was cracked. She has a history of dementia and prior to 2 weeks ago did follow commands and was AOx4. However over the last 2 weeks she has been having recurrent falls and deviation from her baseline. She is coming in today by squad and has a c-collar in place. Squad did say that they suctioned out minimal amount of blood from the oropharynx. Pt found to have a right side closed fracture of maxilla    Restrictions/Precautions:  Restrictions/Precautions: Fall Risk,General Precautions  Position Activity Restriction  Other position/activity restrictions: h/o dementia    Subjective  Chart Reviewed: Yes,Orders,Progress Notes,History and Physical  Patient assessed for rehabilitation services?: Yes    Subjective: Pt awake lying in bed with telesitter oresent, Pt seemingly very motivated to get up. Pain:  0/10:      Vitals: Vitals not assessed per clinical judgement, see nursing flowsheet    Social/Functional History:  Lives With: Alone  Type of Home: Assisted living ([address] [place] [address] per chart)                      Additional Comments: Pt unable to answer questions appropraitely this date d/t increased confusion. Per chart, pt is from assisted living facility. she uses a RW while there.  Pt has had more confusion in the last 2 weeks per chart as well. VISION:WNL    HEARING:  WNL    COGNITION: Slow Processing, Decreased Recall, Decreased Insight, Impaired Memory, Decreased Problem Solving, Decreased Safety Awareness and Difficulty Following Commands    RANGE OF MOTION:  Bilateral Upper Extremity:  WNL    STRENGTH:  Bilateral Upper Extremity:  bilateral UE general weakness and deconditioning throughout    SENSATION:   WFL    ADL:   Grooming: Stand By Assistance. seated to wash face  Lower Extremity Dressing: Maximum Assistance. donning socks. BALANCE:  Sitting Balance:  Stand By Assistance. at EOB   Standing Balance: [address] with bilateral UE support    BED MOBILITY:  Supine to Sit: Minimal Assistance      TRANSFERS:  Sit to Stand:  Contact Guard Assistance. from eOB with max cues for safety of hand placement  Stand to Sit: Contact Guard Assistance. into bedside chair iw max cues for safety     FUNCTIONAL MOBILITY:  Assistive Device: Rolling Walker  Assist Level:  Minimal Assistance. Distance: into hallways  Pt unsteady throughout requiring cues for walker safety and direction needed to go        Activity Tolerance:  Patient tolerance of  treatment: fair. Assessment:  Assessment: Pt s/p fall at assisted living facility. Pt with noted increased confusion last 2 weeks per chart as well. Pt dmeo decreased balance this date and ability to complete her ADL tasks and mobility at [facility] and safely. Pt would benefit from further skilled OT Services to increase her indep by improving her balance and safeyt awareness throughout.   Performance deficits / Impairments: Decreased functional mobility ,Decreased endurance,Decreased ADL status,Decreased balance,Decreased strength,Decreased safe awareness,Decreased cognition  Prognosis: Fair  REQUIRES OT FOLLOW-UP: Yes  Decision Making: Medium Complexity    Treatment Initiated: Treatment and education initiated within context of evaluation. Evaluation time included review of current medical information, gathering information related to past medical, social and functional history, completion of standardized testing, formal and informal observation of tasks, assessment of data and development of plan of care and goals. Treatment time included skilled education and facilitation of tasks to increase safety and independence with ADL's for improved functional independence and quality of life. Discharge Recommendations:  Continue to assess pending progress,Subacute/Skilled Nursing Edilson Treadwell would benefit from continued therapy after discharge    Patient Education:     Patient Education  Education Given To: Patient  Education Provided: Role of Therapy,Precautions  Education Method: Demonstration,Verbal  Barriers to Learning: Cognition  Education Outcome: Verbalized understanding,Unable to demonstrate understanding,Continued education needed    Equipment Recommendations: Other: continue to assess    Plan:  Times per Week: 3-5x  Current Treatment Recommendations: Balance training,Self-Care / ADL,Functional mobility training,Endurance training,Patient/Caregiver education & training. See long-term goal time frame for expected duration of plan of care. If no long-term goals established, a short length of stay is anticipated. Goals:  Patient goals : unable to state  Short Term Goals  Time Frame for Short term goals: by discharge  Short Term Goal 1: Pt to complete BADL routine with less than min A and mod vcs for attention to tasks or sequencing of tasks  Short Term Goal 2: pt to complete toileting tasks with CGA and min cues for safety  Short Term Goal 3: Pt to demo dynanic standing > 2 min with no UE support and CGA to complete sinkside ADL tasks         Following session, patient left in safe position with all fall risk precautions in place.

## 2022-06-06 NOTE — CARE COORDINATION
6/6/22, 7:18 AM EDT  DISCHARGE PLANNING EVALUATION:    Hiram Hill       Admitted: 6/4/2022/ [address] day: 2   Location: 6K-01/001-A Reason for admit: New onset a-fib [facility]) [I48.91]  Atrial fibrillation with RVR ([facility] [I48.91]  Fall, initial encounter [W19. XXXA]  Periorbital hematoma of right eye [H05.231]  Closed fracture of right side of maxilla, initial encounter ([facility] [S02.40CA]   PMH:  has a past medical history of Acute kidney injury ([facility] Arthritis, CAD (coronary artery disease), CKD (chronic kidney disease) stage 3, GFR 30-59 ml/min ([facility] Diabetes mellitus, insulin dependent (IDDM), controlled, Escherichia coli septicemia ([facility] Essential tremor, History of blood transfusion, Hyperlipidemia, Hypertension, Hypoxemic respiratory failure, chronic ([address] MI (myocardial infarction) ([facility] Normocytic anemia, Osteoporosis, and Pneumonia. Procedure: sutures received in ER  Barriers to Discharge:  Creat 1.4, Hgb 8.5, UA abn. Metoprolol initiated for Afib. PT/OT. ENT and trauma have signed off. PCP: Tari Moncada MD  Readmission Risk Score: 15.4 ( )%  Patient's Healthcare Decision Maker: Named in [address] [place] [address]    Patient Goals/Plan/Treatment Preferences: From [facility] following for ECF placement. Transportation/Food Security/Housekeeping Addressed:  No issues identified.

## 2022-06-07 VITALS
WEIGHT: 128.8 LBS | DIASTOLIC BLOOD PRESSURE: 78 MMHG | HEART RATE: 78 BPM | OXYGEN SATURATION: 98 % | HEIGHT: 66 IN | BODY MASS INDEX: 20.7 KG/M2 | TEMPERATURE: 97.5 F | RESPIRATION RATE: 16 BRPM | SYSTOLIC BLOOD PRESSURE: 109 MMHG

## 2022-06-07 LAB
ANION GAP SERPL CALCULATED.3IONS-SCNC: 13 MEQ/L (ref 8–16)
BUN BLDV-MCNC: 27 MG/DL (ref 7–22)
CALCIUM SERPL-MCNC: 9.6 MG/DL (ref 8.5–10.5)
CHLORIDE BLD-SCNC: 101 MEQ/L (ref 98–111)
CO2: 24 MEQ/L (ref 23–33)
CREAT SERPL-MCNC: 1.7 MG/DL (ref 0.4–1.2)
ERYTHROCYTE [DISTWIDTH] IN BLOOD BY AUTOMATED COUNT: 18.9 % (ref 11.5–14.5)
ERYTHROCYTE [DISTWIDTH] IN BLOOD BY AUTOMATED COUNT: 55.7 FL (ref 35–45)
GFR SERPL CREATININE-BSD FRML MDRD: 29 ML/MIN/1.73M2
GLUCOSE BLD-MCNC: 136 MG/DL (ref 70–108)
GLUCOSE BLD-MCNC: 264 MG/DL (ref 70–108)
GLUCOSE BLD-MCNC: 277 MG/DL (ref 70–108)
HCT VFR BLD CALC: 32.7 % (ref 37–47)
HEMOGLOBIN: 9.5 GM/DL (ref 12–16)
MCH RBC QN AUTO: 23.5 PG (ref 26–33)
MCHC RBC AUTO-ENTMCNC: 29.1 GM/DL (ref 32.2–35.5)
MCV RBC AUTO: 80.9 FL (ref 81–99)
PLATELET # BLD: 267 THOU/MM3 (ref 130–400)
PMV BLD AUTO: 10.2 FL (ref 9.4–12.4)
POTASSIUM REFLEX MAGNESIUM: 3.9 MEQ/L (ref 3.5–5.2)
RBC # BLD: 4.04 MILL/MM3 (ref 4.2–5.4)
SARS-COV-2, NAAT: NOT  DETECTED
SODIUM BLD-SCNC: 138 MEQ/L (ref 135–145)
WBC # BLD: 6.1 THOU/MM3 (ref 4.8–10.8)

## 2022-06-07 PROCEDURE — 87635 SARS-COV-2 COVID-19 AMP PRB: CPT

## 2022-06-07 PROCEDURE — 6370000000 HC RX 637 (ALT 250 FOR IP): Performed by: NURSE PRACTITIONER

## 2022-06-07 PROCEDURE — 85027 COMPLETE CBC AUTOMATED: CPT

## 2022-06-07 PROCEDURE — 99239 HOSP IP/OBS DSCHRG MGMT >30: CPT | Performed by: NURSE PRACTITIONER

## 2022-06-07 PROCEDURE — 82948 REAGENT STRIP/BLOOD GLUCOSE: CPT

## 2022-06-07 PROCEDURE — 36415 COLL VENOUS BLD VENIPUNCTURE: CPT

## 2022-06-07 PROCEDURE — 2580000003 HC RX 258: Performed by: NURSE PRACTITIONER

## 2022-06-07 PROCEDURE — 80048 BASIC METABOLIC PNL TOTAL CA: CPT

## 2022-06-07 RX ORDER — ASPIRIN 81 MG/1
81 TABLET ORAL DAILY
Qty: 30 TABLET | Refills: 3 | DISCHARGE
Start: 2022-06-07

## 2022-06-07 RX ORDER — MIDODRINE HYDROCHLORIDE 10 MG/1
10 TABLET ORAL
Qty: 30 TABLET | Refills: 0 | DISCHARGE
Start: 2022-06-07

## 2022-06-07 RX ORDER — CEFDINIR 300 MG/1
300 CAPSULE ORAL EVERY 12 HOURS SCHEDULED
Qty: 50 CAPSULE | Refills: 0 | DISCHARGE
Start: 2022-06-07 | End: 2022-07-02

## 2022-06-07 RX ORDER — EZETIMIBE 10 MG/1
10 TABLET ORAL DAILY
Qty: 30 TABLET | Refills: 3 | DISCHARGE
Start: 2022-06-07

## 2022-06-07 RX ORDER — PANTOPRAZOLE SODIUM 40 MG/1
40 TABLET, DELAYED RELEASE ORAL DAILY
Qty: 30 TABLET | Refills: 3 | DISCHARGE
Start: 2022-06-07

## 2022-06-07 RX ORDER — LIDOCAINE 4 G/G
2 PATCH TOPICAL DAILY
DISCHARGE
Start: 2022-06-08

## 2022-06-07 RX ORDER — ASPIRIN 81 MG/1
81 TABLET ORAL DAILY
Qty: 30 TABLET | Refills: 3 | DISCHARGE
Start: 2022-06-07 | End: 2022-06-07 | Stop reason: SDUPTHER

## 2022-06-07 RX ORDER — ACETAMINOPHEN 325 MG/1
650 TABLET ORAL EVERY 6 HOURS PRN
Qty: 120 TABLET | Refills: 3 | DISCHARGE
Start: 2022-06-07

## 2022-06-07 RX ORDER — CLOPIDOGREL BISULFATE 75 MG/1
75 TABLET ORAL DAILY
Qty: 30 TABLET | Refills: 3 | DISCHARGE
Start: 2022-06-07

## 2022-06-07 RX ORDER — GLIMEPIRIDE 1 MG/1
1 TABLET ORAL
Qty: 90 TABLET | Refills: 1 | DISCHARGE
Start: 2022-06-07

## 2022-06-07 RX ORDER — FLUOXETINE HYDROCHLORIDE 20 MG/1
20 CAPSULE ORAL DAILY
Qty: 30 CAPSULE | Refills: 3 | DISCHARGE
Start: 2022-06-08

## 2022-06-07 RX ORDER — ATORVASTATIN CALCIUM 80 MG/1
80 TABLET, FILM COATED ORAL DAILY
Qty: 30 TABLET | Refills: 3 | DISCHARGE
Start: 2022-06-07

## 2022-06-07 RX ORDER — CLOPIDOGREL BISULFATE 75 MG/1
75 TABLET ORAL DAILY
Qty: 30 TABLET | Refills: 3 | DISCHARGE
Start: 2022-06-07 | End: 2022-06-07 | Stop reason: SDUPTHER

## 2022-06-07 RX ORDER — ONDANSETRON 4 MG/1
4 TABLET, FILM COATED ORAL EVERY 6 HOURS PRN
DISCHARGE
Start: 2022-06-07

## 2022-06-07 RX ADMIN — MIDODRINE HYDROCHLORIDE 10 MG: 10 TABLET ORAL at 07:29

## 2022-06-07 RX ADMIN — METOPROLOL TARTRATE 12.5 MG: 25 TABLET, FILM COATED ORAL at 07:29

## 2022-06-07 RX ADMIN — Medication 3000 UNITS: at 07:29

## 2022-06-07 RX ADMIN — FLUOXETINE 20 MG: 20 CAPSULE ORAL at 07:29

## 2022-06-07 RX ADMIN — SODIUM CHLORIDE, PRESERVATIVE FREE 10 ML: 5 INJECTION INTRAVENOUS at 07:31

## 2022-06-07 RX ADMIN — PANTOPRAZOLE SODIUM 40 MG: 40 TABLET, DELAYED RELEASE ORAL at 07:29

## 2022-06-07 RX ADMIN — CEFDINIR 300 MG: 300 CAPSULE ORAL at 07:29

## 2022-06-07 RX ADMIN — MIDODRINE HYDROCHLORIDE 10 MG: 10 TABLET ORAL at 11:00

## 2022-06-07 RX ADMIN — INSULIN LISPRO 3 UNITS: 100 INJECTION, SOLUTION INTRAVENOUS; SUBCUTANEOUS at 11:02

## 2022-06-07 ASSESSMENT — PAIN SCALES - GENERAL: PAINLEVEL_OUTOF10: 0

## 2022-06-07 NOTE — PROGRESS NOTES
CLINICAL PHARMACY: DISCHARGE MED RECONCILIATION/REVIEW    [facility]) Select Patient?: No  Total # of Interventions Recommended: 1 - Updated Order #: 1   -   Total # Interventions Accepted: 0  Intervention Severity:   - Level 1 Intervention Present?: No   - Level 2 #: 0   - Level 3 #: 1   Time Spent (min): 15    Alexandra Hunter PharmD 6/7/2022 11:40 AM

## 2022-06-07 NOTE — CARE COORDINATION
6/7/22, 8:56 AM EDT    DISCHARGE ON [address] day: 3  Location: [facility]/001 Reason for admit: New onset a-fib [facility]) [I48.91]  Atrial fibrillation with RVR ([facility] [I48.91]  Fall, initial encounter [W19. XXXA]  Periorbital hematoma of right eye [H05.231]  Closed fracture of right side of maxilla, initial encounter ([facility] [S02.40CA]   Barriers to Discharge: await acceptance at [address] [place] [address].    PCP: Ratna Hawley MD  Readmission Risk Score: 16.3 ( )%  Patient Goals/Plan/Treatment Preferences: [facility]

## 2022-06-07 NOTE — PROGRESS NOTES
Attempted to call report to [facility], no answer, AVS and last dose [facility] faxed. LACP ambulette here to get patient at this time.

## 2022-06-07 NOTE — CARE COORDINATION
DISCHARGE/PLANNING EVALUATION  6/7/22, 7:57 AM EDT    Reason for Referral: From assisted living, likely needs skilled nursing. Mental Status: Patient is alert and oriented, slow to answer  Decision Making: Patient is making her own decisions, she does have two friends that assist when needed. Family/Social/Home Environment: Patient is from [address] [place] [address],[address] and recently fell. Patient feels a rehab stay would be beneficial.   Current Services including food security, transportation and housekeeping: not at this time   Current Equipment: walker  Payment Source: Medicare, [facility]  Concerns or Barriers to Discharge:  Not at this time   Post acute provider list with quality measures, geographic area and applicable managed care information provided. Questions regarding selection process answered: offered and prefers to continue with [facility]. Teach Back Method used with Patient regarding care plan and discharge plan. Patient verbalize understanding of the plan of care and contribute to goal setting. Patient goals, treatment preferences and discharge plan: Patient needs rehab at [facility].  Patient was seen yesterday and Larayne Meckel at [facility] was provided with referral.     Electronically signed by SIMON Michel, WANG on 6/7/2022 at 7:57 AM

## 2022-06-07 NOTE — CARE COORDINATION
6/7/22, 10:45 AM EDT    Patient goals/plan/ treatment preferences discussed by  and . Patient goals/plan/ treatment preferences reviewed with patient/ family. Patient/ family verbalize understanding of discharge plan and are in agreement with goal/plan/treatment preferences. Understanding was demonstrated using the teach back method. AVS provided by RN at time of discharge, which includes all necessary medical information pertaining to the patients current course of illness, treatment, post-discharge goals of care, and treatment preferences. Services At/After Discharge: [facility] (SNF)       IMM Letter  IMM Letter given to Patient/Family/Significant other/Guardian/POA/by[de-identified] Mansoor Andrade CM  IMM Letter date given[de-identified] 06/07/22  IMM Letter time given[de-identified] 1001     Patient discharge today to [facility] skilled medicare. Ambulette arranged for 12:30pm today. Anna Ge at [facility] updated. RN updated and blue envelope on chart.

## 2022-06-07 NOTE — PLAN OF CARE
Problem: Discharge Planning  Goal: Discharge to home or other facility with appropriate resources  Outcome: Progressing  Flowsheets (Taken 0/8/6056 1752 by Philipp Valenciar, RN)  Discharge to home or other facility with appropriate resources:   Identify barriers to discharge with patient and caregiver   Arrange for needed discharge resources and transportation as appropriate   Identify discharge learning needs (meds, wound care, etc)     Problem: Pain  Goal: Verbalizes/displays adequate comfort level or baseline comfort level  Outcome: Progressing  Flowsheets (Taken 6/6/2022 2303)  Verbalizes/displays adequate comfort level or baseline comfort level:   Encourage patient to monitor pain and request assistance   Assess pain using appropriate pain scale   Administer analgesics based on type and severity of pain and evaluate response   Implement non-pharmacological measures as appropriate and evaluate response     Problem: Safety - Adult  Goal: Free from fall injury  Outcome: Progressing  Note: Call light within reach. Side rails up x2. Bed alarm on. Non skid slippers available.        Problem: ABCDS Injury Assessment  Goal: Absence of physical injury  Outcome: Progressing  Flowsheets (Taken 5/9/8951 7638 by Philipp Rosas, RN)  Absence of Physical Injury: Implement safety measures based on patient assessment     Problem: Skin/Tissue Integrity - Adult  Goal: Skin integrity remains intact  Outcome: Progressing  Flowsheets (Taken 3/7/5626 0938 by Philipp Valenciar, RN)  Skin Integrity Remains Intact: Monitor for areas of redness and/or skin breakdown  Note: No new signs or symptoms of skin breakdown noted this shift, encouraging patient to turn and reposition self in bed q2h

## 2022-06-07 NOTE — PLAN OF CARE
Problem: Discharge Planning  Goal: Discharge to home or other facility with appropriate resources  6/7/2022 1024 by SIMON Kay, LSW  Outcome: Progressing  Flowsheets (Taken 0/3/9466 6520 by Shital Downs RN)  Discharge to home or other facility with appropriate resources:   Identify barriers to discharge with patient and caregiver   Arrange for needed discharge resources and transportation as appropriate   Identify discharge learning needs (meds, wound care, etc)    Skilled at [facility]. See SW notes 6/7/22.

## 2022-06-07 NOTE — DISCHARGE SUMMARY
Hospital Medicine Discharge Summary      Patient Identification:   Efrain Tilley   : 1950  MRN: [id number]   Account: [de-identified]      Patient's PCP: Author Enrique MD    Admit Date: 2022     Discharge Date:   2022    Admitting Physician: No admitting provider for patient encounter. Discharging Nurse Practitioner: JOANNA Lopez - CNP     Discharge Diagnoses with Assessment/Plan:  1. Trauma by Fall--CT head does not reveal anything acute, CT cervical spine does not reveal anything acute, appreciate trauma services input  2. Atrial Fib with RVR--on Lopressor 12.5 mg twice a day, no anticoagulation at this time secondary to #1 and #4;  3. Possible UTI (POA) with Klebsiella pneumoniae--Omnicef 300 mg twice a day~susceptible~please note patient is receiving 4 weeks of Omnicef secondary to #6  4. Large right-sided periorbital hematoma status post suture repair--secondary to #1, ice as needed; sutures can be removed on Joleen 10, 2022  5. Acute blood loss anemia--improved with hemoglobin 9.5  6. Comminuted fracture of the anterior wall of the right maxillary sinus--ENT was consulted in the emergency department and stated nothing to do at this time, recommend Omnicef 300 mg twice a day for 4 weeks; follow-up ENT outpatient in order placed on epic  7. CKD stage III--creatinine at 1.7 which is around her baseline, encourage oral intake at ECF  8. Diabetes mellitus type 2, uncontrolled--Amaryl 1 mg daily  9. Essential hypertension, uncontrolled--low-dose beta-blocker and monitor closely as patient does have a history of orthostatic hypotension and is on midodrine  10. Chronic normocytic anemia--see #5  11. CAD--aspirin and Plavix, statin, beta-blocker   12. Orthostatic hypotension history--on midodrine  13. Dementia--pleasantly confused  14.  Chronic diastolic heart failure--EF from 2021 revealed an EF 60%; BNP elevated at 3250, patient did receive a 1 L bolus of normal saline in the emergency department, currently asymptomatic and no edema; lungs are clear and she is on room air  15. CODE STATUS--DNR Comfort Care arrest     The patient was seen and examined on day of discharge and this discharge summary is in conjunction with any daily progress note from day of discharge.     Hospital Course:   Yanci Peres is a 66 y.o. female admitted to [address] [place] [address] on 6/4/2022 for fall at assisted living;71 y.o. female who presented to [address] [place] [address] with fall at assisted living; patient has a past medical history of CKD, diabetes, hypertension, CAD along with dementia; she had an unwitnessed fall at the assisted living unit; it is reported that she typically walks with a walker and she was walking around outside without anybody realizing it and somehow she had an unwitnessed fall and was found lying on the concrete on the sidewalk; it is reported per EMS that the patient broke upper denture bridge and had blood suctioned out of her mouth, she has had worsening confusion over the past few weeks but no change today; she was found to have a large right sided periorbital hematoma along with a comminuted fracture of the anterior wall of the right maxillary sinus so ENT was consulted from the emergency department and recommends Omnicef 300 mg twice a day for 4 weeks and to follow-up outpatient as no urgent needs need to be done; trauma services also saw and sutured the laceration above her right eye; she was found to be in atrial fibrillation with a ventricular rate of 132, it appears on May 31, 2022 she had an EKG that showed an atrial fibrillation with heart rate 112.     In the emergency department, she is pleasantly confused; blood pressure stable, she is afebrile and on room air; she can tell me her name and she cannot answer any other questions and the only thing she tells me is \"does not really matter\", according to the ER staff her baseline is able to state her name; ECF paperwork reviewed she is a DNR Comfort Care arrest; she was given a 1 L 0.9 normal saline bolus, Omnicef 300 mg x 1 dose along with a tetanus shot, she is being admitted to hospital service for further care and evaluation.     6/5--> hemodynamically stable, telemetry sitter at bedside, she is sitting up eating; she is currently pleasantly confused which is her baseline she can state her name and birthdate     6/6--> hemodynamically stable, pleasantly confused and actually eating and folding washcloths; edema looks much improved around the right eye today     6/7--> hemodynamically stable, remains pleasantly confused, sitting up in the chair, her edema and swelling around the right eye looks much improved; trauma service recommends holding aspirin/Plavix for 5 to 7 days so we will leave discharge instructions to review resumed on Joleen 10, 2022; sutures to be removed on Joleen 10, 2022 as well, at this time we feel she is stable for discharge as she is going to skilled nursing for rehabilitation.              Exam:     Vitals:  Vitals:    06/07/22 0430 06/07/22 0715 06/07/22 0733 06/07/22 1046   BP: 123/79 120/81  109/78   Pulse: 88 84  78   Resp: 16 16  16   Temp: 97.9 °F (36.6 °C) 98.1 °F (36.7 °C)  97.5 °F (36.4 °C)   TempSrc: Oral Oral  Oral   SpO2: 96% 98%  98%   Weight:   128 lb 12.8 oz (58.4 kg)    Height:         Weight: Weight: 128 lb 12.8 oz (58.4 kg)     24 hour intake/output:    Intake/Output Summary (Last 24 hours) at 6/7/2022 1229  Last data filed at 6/7/2022 4491  Gross per 24 hour   Intake 200 ml   Output 0 ml   Net 200 ml     General appearance: No apparent distress, appears stated age and cooperative. HEENT: Around right eye with edema and ecchymosis however looks better today, sutures intact  Neck: Supple, with full range of motion. No jugular venous distention. Trachea midline. Respiratory:  Normal respiratory effort.  Clear to auscultation, bilaterally without Rales/Wheezes/Rhonchi. Cardiovascular: irregular rate and rhythm . Abdomen: Soft, non-tender, non-distended with normal bowel sounds. Musculoskeletal: passive and active ROM x 4 extremities. Skin: Skin color, texture, turgor normal.    Neurologic:  Neurovascularly intact without any focal sensory/motor deficits. Cranial nerves: II-XII intact, grossly non-focal.  Psychiatric: Alert and oriented to name and birthdate only, she cannot tell me where she is at or the month or year, thought content not appropriate however it appears she is at her baseline  Capillary Refill: Brisk,< 3 seconds   Peripheral Pulses: +2 palpable, equal bilaterally        Labs: For convenience and continuity at follow-up the following most recent labs are provided:      CBC:    Lab Results   Component Value Date    WBC 6.1 06/07/2022    HGB 9.5 06/07/2022    HCT 32.7 06/07/2022     06/07/2022       Renal:    Lab Results   Component Value Date     06/07/2022    K 3.9 06/07/2022     06/07/2022    CO2 24 06/07/2022    BUN 27 06/07/2022    CREATININE 1.7 06/07/2022    CALCIUM 9.6 06/07/2022    PHOS 3.6 02/21/2021       Cardiac: No results for input(s): Katie Breeze in the last 72 hours. Significant Diagnostic Studies    Radiology:   XR FEMUR LEFT (MIN 2 VIEWS)   Final Result   No acute fracture or dislocation. **This report has been created using voice recognition software. It may contain minor errors which are inherent in voice recognition technology. **      Final report electronically signed by Dr Pattie Velazco on 6/4/2022 12:10 PM      XR CHEST PORTABLE   Final Result   There is no acute intrathoracic process. **This report has been created using voice recognition software. It may contain minor errors which are inherent in voice recognition technology. **      Final report electronically signed by Dr Pattie Velazco on 6/4/2022 12:05 PM      XR PELVIS (1-2 VIEWS)   Final Result   No acute fracture or dislocation. **This report has been created using voice recognition software. It may contain minor errors which are inherent in voice recognition technology. **      Final report electronically signed by Dr Joselito Mills on 6/4/2022 12:10 PM      CT HEAD WO CONTRAST   Final Result      1. No acute intracranial hemorrhage, mass effect or midline shift. 2. Large right-sided periorbital hematoma. **This report has been created using voice recognition software. It may contain minor errors which are inherent in voice recognition technology. **      Final report electronically signed by Dr Joselito Mills on 6/4/2022 11:56 AM      CT CERVICAL SPINE WO CONTRAST   Final Result   Degenerative changes with no acute fracture or subluxation. **This report has been created using voice recognition software. It may contain minor errors which are inherent in voice recognition technology. **      Final report electronically signed by Dr Joselito Mills on 6/4/2022 11:59 AM      CT FACIAL BONES WO CONTRAST   Final Result   Comminuted fracture of the anterior wall of the right maxillary sinus. **This report has been created using voice recognition software. It may contain minor errors which are inherent in voice recognition technology. **       Final report electronically signed by Dr Joselito Mills on 6/4/2022 12:04 PM             Consults:     IP CONSULT TO SOCIAL WORK    Disposition:    [] Home       [] TCU       [] Rehab       [] Psych       [x] SNF       [] [place]       [] Other-    Condition at Discharge: Stable    Code Status:  DNR-CCA     Pending tests at discharge:      none    Patient Instructions:    Discharge lab work: none  Activity: activity as tolerated  Diet: ADULT DIET;  Regular; 4 carb choices (60 gm/meal)      Follow-up visits:   [facility] [address] [address] [address] [address] [place] [address] MD [address]  [phone]    Schedule an appointment as soon as possible for a visit in 2 weeks  For Hospital Follow-Up         Discharge Medications:        Medication List      START taking these medications    cefdinir 300 MG capsule  Commonly known as: OMNICEF  Take 1 capsule by mouth every 12 hours for 50 doses     metoprolol tartrate 25 MG tablet  Commonly known as: LOPRESSOR  Take 0.5 tablets by mouth 2 times daily        CHANGE how you take these medications    acetaminophen 325 mg tablet  Commonly known as: TYLENOL  Take 2 tablets by mouth every 6 hours as needed for Pain  What changed:   · medication strength  · how much to take  · when to take this  · reasons to take this     aspirin 81 MG EC tablet  Take 1 tablet by mouth daily Please resume on Joleen 10, 2022  What changed: additional instructions     clopidogrel 75 MG tablet  Commonly known as: PLAVIX  Take 1 tablet by mouth daily Please resume on Joleen 10, 2022  What changed: additional instructions     FLUoxetine 20 MG capsule  Commonly known as: PROZAC  Take 1 capsule by mouth daily  Start taking on: June 8, 2022  What changed:   · medication strength  · how much to take        CONTINUE taking these medications    atorvastatin 80 MG tablet  Commonly known as: LIPITOR  Take 1 tablet by mouth daily     carbidopa-levodopa  mg per tablet  Commonly known as: SINEMET  Take 1 tablet by mouth 3 times daily     Cholecalciferol 75 MCG (3000 UT) Tabs  Take 1 tablet by mouth daily     ezetimibe 10 mg tablet  Commonly known as: ZETIA  Take 1 tablet by mouth daily     glimepiride 1 MG tablet  Commonly known as: AMARYL  Take 1 tablet by mouth every morning (before breakfast)     guaiFENesin 100 MG/5ML liquid  Commonly known as: Tussin  Take 10 mLs by mouth 3 times daily as needed for Cough     lidocaine 4 % external patch  Place 2 patches onto the skin daily  Start taking on: June 8, 2022     midodrine 10 MG tablet  Commonly known as: PROAMATINE  Take 1 tablet by mouth 3 times daily (with meals)     ondansetron 4 MG tablet  Commonly known as: Zofran  Take 1 tablet by mouth every 6 hours as needed for Nausea or Vomiting     pantoprazole 40 MG tablet  Commonly known as: Protonix  Take 1 tablet by mouth daily        STOP taking these medications    CALCIUM PO     Hydrocortisone Acetate 1.12 %(1% Base) Crea     metFORMIN 1000 MG tablet  Commonly known as: GLUCOPHAGE     Milk of Magnesia 400 MG/5ML suspension  Generic drug: magnesium hydroxide     VITAMIN B 12 PO           Where to Get Your Medications      Information about where to get these medications is not yet available    Ask your nurse or doctor about these medications  · acetaminophen 325 mg tablet  · aspirin 81 MG EC tablet  · atorvastatin 80 MG tablet  · carbidopa-levodopa  mg per tablet  · cefdinir 300 MG capsule  · Cholecalciferol 75 MCG (3000 UT) Tabs  · clopidogrel 75 MG tablet  · ezetimibe 10 mg tablet  · FLUoxetine 20 MG capsule  · glimepiride 1 MG tablet  · guaiFENesin 100 MG/5ML liquid  · lidocaine 4 % external patch  · metoprolol tartrate 25 MG tablet  · midodrine 10 MG tablet  · ondansetron 4 MG tablet  · pantoprazole 40 MG tablet         Time Spent on discharge is more than 45 minutes in the examination, evaluation, counseling and review of medications and discharge plan. Signed: Thank you Andrae Juarez MD for the opportunity to be involved in this patient's care.     Electronically signed by JOANNA Salazar CNP on 6/7/2022 at 12:29 PM

## 2022-06-07 NOTE — DISCHARGE INSTR - COC
Continuity of Care Form    Patient Name: Yanci Peres   :  1950  MRN:  [id number]    Admit date:  2022  Discharge date:  2022    Code Status Order: DNR-CCA   Advance Directives:      Admitting Physician:  No admitting provider for patient encounter.   PCP: Uvaldo Ontiveros MD    Discharging Nurse: [facility] Unit/Room#: 6K-01/001-A  Discharging Unit Phone Number: [phone]    Emergency Contact:   Extended Emergency Contact Information  Primary Emergency Contact: Cherri Smyth  Address: [facility] [address] [place] [address] Phone: [phone]  Relation: Other  Secondary Emergency Contact: Sylvester Schultz  [facility] [address] [facility], [address]  Home Phone: [phone]  Work Phone: [phone]  Relation: Other    Past Surgical History:  Past Surgical History:   Procedure Laterality Date    CYSTOSCOPY  10    with stent insertion    HIP SURGERY Left 2020    HIP PINNING performed by [place] DO at [address] [facility]      42    LITHOTRIPSY  2012    right    OVARY REMOVAL Bilateral     age 38       Immunization History:   Immunization History   Administered Date(s) Administered    COVID-19, Pfizer Purple top, DILUTE for use, 12+ yrs, 30mcg/0.3mL dose 2021, 2021, 2021    Tdap (Boostrix, Adacel) 2022       Active Problems:  Patient Active Problem List   Diagnosis Code    Pneumonia J18.9    Chest pain R07.9    Renal stone N20.0    Chest pain, atypical R07.89    Lung mass R91.8    Limb tremor R25.1    Numbness and tingling in right hand R20.0, R20.2    Syncope and collapse R55    Closed fracture of neck of left femur (HCC) S72.002A    Acute respiratory failure with hypoxia ([facility] J96.01    Subcapital fracture of femur, left, closed, with routine healing, subsequent encounter S72.012D    Type 2 diabetes mellitus without complication, with long-term current use of insulin ([facility] E11.9, Z79.4    Vitamin D deficiency E55.9    Age-related osteoporosis with current pathological fracture with routine healing M80.00XD    Unwitnessed fall R29.6    Scalp hematoma S00. 03XA    Recurrent falls R29.6    Fall at home, initial encounter [address] XXXA, Y92.009    Severe malnutrition ([facility] E43    Altered mental status R41.82    Acute kidney injury superimposed on CKD (HCC) N17.9, N18.9    Encephalopathy G93.40    Fall from bed W06. XXXA    Contusion of face S00.83XA    Syncope R55    Orthostatic hypotension I95.1    Atrial fibrillation with RVR ([facility]) I48.91    Cephalohematoma P12.0    Laceration of face S01.81XA       Isolation/Infection:   Isolation            No Isolation          Patient Infection Status       Infection Onset Added Last Indicated Last Indicated By Review Planned Expiration Resolved Resolved By    None active    Resolved    COVID-19 (Rule Out) 11/12/20 11/12/20 11/12/20 COVID-19 (Ordered)   11/12/20 Rule-Out Test Resulted    COVID-19 (Rule Out) 09/05/20 09/05/20 09/05/20 COVID-19 (Ordered)   09/05/20 Rule-Out Test Resulted            Nurse Assessment:  Last Vital Signs: /81   Pulse 84   Temp 98.1 °F (36.7 °C) (Oral)   Resp 16   Ht 5' 6\" (1.676 m)   Wt 128 lb 12.8 oz (58.4 kg)   SpO2 98%   BMI 20.79 kg/m²     Last documented pain score (0-10 scale): Pain Level: 0  Last Weight:   Wt Readings from Last 1 Encounters:   06/07/22 128 lb 12.8 oz (58.4 kg)     Mental Status:  disoriented and patient follows commands but not oriented    IV Access:  - None    Nursing Mobility/ADLs:  Walking   Assisted  Transfer  Assisted  Bathing  Assisted  Dressing  Assisted  Toileting  Assisted  Feeding  Assisted  Med Admin  Assisted  Med Delivery   whole    Wound Care Documentation and Therapy:        Elimination:  Continence:    Bowel: No  Bladder: No  Urinary Catheter: None   Colostomy/Ileostomy/Ileal Conduit: No       Date of Last BM: 6/7/2022    Intake/Output Summary (Last 24 hours) at 6/7/2022 1022  Last data filed at 6/7/2022 0811  Gross per 24 hour   Intake 200 ml   Output 0 ml   Net 200 ml     I/O last 3 completed shifts: In: 525 [P.O.:520; I.V.:5]  Out: 150 [Urine:150]    Safety Concerns:     History of Falls (last 30 days)    Impairments/Disabilities:      None    Nutrition Therapy:  Current Nutrition Therapy:   - Oral Diet:  Carb Control 4 carbs/meal (1800kcals/day)    Routes of Feeding: Oral  Liquids: No Restrictions  Daily Fluid Restriction: no  Last Modified Barium Swallow with Video (Video Swallowing Test): not done    Treatments at the Time of Hospital Discharge:   Respiratory Treatments: none  Oxygen Therapy:  is not on home oxygen therapy.   Ventilator:    - No ventilator support    Rehab Therapies: Physical Therapy and Occupational Therapy  Weight Bearing Status/Restrictions: No weight bearing restrictions  Other Medical Equipment (for information only, NOT a DME order):  walker and hospital bed  Other Treatments: none    Patient's personal belongings (please select all that are sent with patient):  Melina GARRIDO SIGNATURE:  Electronically signed by Hawa Smith RN on 6/7/22 at 11:56 AM EDT    CASE MANAGEMENT/SOCIAL WORK SECTION    Inpatient Status Date: 6/4/22    Readmission Risk Assessment Score:  Readmission Risk              Risk of Unplanned Readmission:  17           Discharging to Facility/ Agency   Name: [facility]  Address:    [address] [place] [address] [address] [address]         Phone: [phone]       Fax: [phone]            Dialysis Facility (if applicable)   Name:  Address:  Dialysis Schedule:  Phone:  Fax:    / signature: Electronically signed by SIMON Henderson, SARANW on 6/7/22 at 10:22 AM EDT    PHYSICIAN SECTION    Prognosis: Good    Condition at Discharge: Stable    Rehab Potential (if transferring to Rehab): Good    Recommended Labs or Other Treatments After Discharge: PT/OT; please encourage oral fluids; please resume aspirin/Plavix on Joleen 10, 2022; sutures out on Joleen 10, 7180    Physician Certification: I certify the above information and transfer of Jamal Tinoco  is necessary for the continuing treatment of the diagnosis listed and that she requires Lamont Cabrerauel for greater 30 days.      Update Admission H&P: No change in H&P    PHYSICIAN SIGNATURE:  Electronically signed by JOANNA Myers CNP on 6/7/22 at 11:33 AM EDT

## 2022-09-05 ENCOUNTER — APPOINTMENT (OUTPATIENT)
Dept: CT IMAGING | Age: 72
End: 2022-09-05
Payer: MEDICARE

## 2022-09-05 ENCOUNTER — HOSPITAL ENCOUNTER (EMERGENCY)
Age: 72
Discharge: HOME | End: 2022-09-05
Payer: MEDICARE

## 2022-09-05 VITALS
HEART RATE: 82 BPM | RESPIRATION RATE: 18 BRPM | TEMPERATURE: 97.3 F | SYSTOLIC BLOOD PRESSURE: 123 MMHG | DIASTOLIC BLOOD PRESSURE: 89 MMHG | OXYGEN SATURATION: 96 %

## 2022-09-05 DIAGNOSIS — S39.012A BACK STRAIN, INITIAL ENCOUNTER: ICD-10-CM
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
## 2022-09-05 PROCEDURE — 72131 CT LUMBAR SPINE W/O DYE: CPT

## 2022-09-05 PROCEDURE — 72192 CT PELVIS W/O DYE: CPT

## 2022-09-05 PROCEDURE — 72128 CT CHEST SPINE W/O DYE: CPT

## 2022-09-05 PROCEDURE — 72125 CT NECK SPINE W/O DYE: CPT

## 2022-09-05 PROCEDURE — 6370000000 HC RX 637 (ALT 250 FOR IP): Performed by: NURSE PRACTITIONER

## 2022-09-05 PROCEDURE — 70450 CT HEAD/BRAIN W/O DYE: CPT

## 2022-09-05 PROCEDURE — 99284 EMERGENCY DEPT VISIT MOD MDM: CPT

## 2022-09-05 RX ORDER — ACETAMINOPHEN 325 MG/1
650 TABLET ORAL ONCE
Status: COMPLETED | OUTPATIENT
Start: 2022-09-05 | End: 2022-09-05

## 2022-09-05 RX ADMIN — ACETAMINOPHEN 650 MG: 325 TABLET ORAL at 18:46

## 2022-09-05 ASSESSMENT — PAIN DESCRIPTION - LOCATION: LOCATION: BACK

## 2022-09-05 ASSESSMENT — PAIN - FUNCTIONAL ASSESSMENT: PAIN_FUNCTIONAL_ASSESSMENT: WONG-BAKER FACES

## 2022-09-05 ASSESSMENT — PAIN SCALES - WONG BAKER: WONGBAKER_NUMERICALRESPONSE: 2

## 2022-09-05 NOTE — ED PROVIDER NOTES
tablet, R-3DC to SNF      FLUoxetine (PROZAC) 20 MG capsule Take 1 capsule by mouth daily, Disp-30 capsule, R-3DC to SNF      glimepiride (AMARYL) 1 MG tablet Take 1 tablet by mouth every morning (before breakfast), Disp-90 tablet, R-1DC to SNF      ondansetron (ZOFRAN) 4 MG tablet Take 1 tablet by mouth every 6 hours as needed for Nausea or VomitingDC to SNF      atorvastatin (LIPITOR) 80 MG tablet Take 1 tablet by mouth daily, Disp-30 tablet, R-3DC to SNF      ezetimibe (ZETIA) 10 mg tablet Take 1 tablet by mouth daily, Disp-30 tablet, R-3DC to SNF      carbidopa-levodopa (SINEMET)  mg per tablet Take 1 tablet by mouth 3 times daily, Disp-90 tablet, R-3DC to SNF      metoprolol tartrate (LOPRESSOR) 25 MG tablet Take 0.5 tablets by mouth 2 times daily, Disp-60 tablet, R-3DC to SNF      guaiFENesin (TUSSIN) 100 MG/5ML liquid Take 10 mLs by mouth 3 times daily as needed for CoughDC to SNF      lidocaine 4 % external patch Place 2 patches onto the skin daily, TransDERmal, DAILY Starting Wed 6/8/2022, DC to SNF      midodrine (PROAMATINE) 10 MG tablet Take 1 tablet by mouth 3 times daily (with meals), Disp-30 tablet, R-0DC to SNF      pantoprazole (PROTONIX) 40 MG tablet Take 1 tablet by mouth daily, Disp-30 tablet, R-3DC to SNF      Cholecalciferol 75 MCG (3000 UT) TABS Take 1 tablet by mouth daily, Disp-90 tablet, R-3DC to SNF      aspirin 81 MG EC tablet Take 1 tablet by mouth daily Please resume on Joleen 10, 2022, Disp-30 tablet, R-3DC to SNF      clopidogrel (PLAVIX) 75 MG tablet Take 1 tablet by mouth daily Please resume on Joleen 10, 2022, Disp-30 tablet, R-3DC to SNF             ALLERGIES     has No Known Allergies. FAMILY HISTORY     She indicated that her mother is alive. She indicated that her father is alive. family history includes Breast Cancer (age of onset: [de-identified]) in her mother; Heart Disease in her mother. SOCIAL HISTORY       Social History     Socioeconomic History    Marital status:   Right upper arm: Normal.      Left upper arm: Normal.      Right elbow: Normal.      Left elbow: Normal.      Right wrist: Normal.      Left wrist: Normal.      Right hand: Normal.      Left hand: Normal.      Cervical back: Normal range of motion and neck supple. Tenderness present. No swelling, erythema, rigidity, spasms, torticollis or bony tenderness. Normal range of motion. Thoracic back: Tenderness and bony tenderness present. Decreased range of motion. Lumbar back: Tenderness and bony tenderness present. No swelling, edema or deformity. Decreased range of motion. Negative right straight leg raise test and negative left straight leg raise test.      Right hip: Tenderness and bony tenderness present. No crepitus. Normal range of motion. Normal strength. Left hip: Tenderness and bony tenderness present. No crepitus. Normal range of motion. Normal strength. Right upper leg: Normal.      Left upper leg: Normal.      Right knee: Normal.      Left knee: Normal.      Right lower leg: Normal.      Left lower leg: Normal.      Right ankle: Normal.      Left ankle: Normal.      Right foot: Normal.      Left foot: Normal.   Skin:     General: Skin is warm and dry. Capillary Refill: Capillary refill takes less than 2 seconds. Coloration: Skin is not jaundiced or pale. Findings: No bruising or erythema. Neurological:      General: No focal deficit present. Mental Status: She is alert. GCS: GCS eye subscore is 4. GCS verbal subscore is 4. GCS motor subscore is 6. Cranial Nerves: Cranial nerves are intact. Sensory: Sensation is intact. Motor: Motor function is intact.        DIFFERENTIAL DIAGNOSIS:   Fracture, strain, sprain, contusion    DIAGNOSTIC RESULTS       RADIOLOGY: non-plainfilm images(s) such as CT, Ultrasound and MRI are read by the radiologist.  Plain radiographic images are visualized and preliminarily interpreted by the emergency physician unless otherwise stated below. CT HEAD WO CONTRAST   Final Result   No acute intracranial hemorrhage, mass effect or midline shift. **This report has been created using voice recognition software. It may contain minor errors which are inherent in voice recognition technology. **      Final report electronically signed by Dr Naomi Chappell on 9/5/2022 8:14 PM      CT CERVICAL SPINE WO CONTRAST   Final Result   Degenerative changes with no acute fracture or subluxation. **This report has been created using voice recognition software. It may contain minor errors which are inherent in voice recognition technology. **      Final report electronically signed by Dr Naomi Chappell on 9/5/2022 7:57 PM      CT THORACIC SPINE WO CONTRAST   Final Result      1. No acute fracture or subluxation throughout the thoracic spine. 2. There is a 2 cm pulmonary nodule near the left lung base. The lungs are incompletely visualized. Further evaluation with IV contrast enhanced thoracic CT is recommended. **This report has been created using voice recognition software. It may contain minor errors which are inherent in voice recognition technology. **      Final report electronically signed by Dr Naomi Chappell on 9/5/2022 8:09 PM      CT LUMBAR SPINE WO CONTRAST   Final Result   Degenerative changes with no acute fracture or subluxation. **This report has been created using voice recognition software. It may contain minor errors which are inherent in voice recognition technology. **      Final report electronically signed by Dr Naomi Chappell on 9/5/2022 8:01 PM      CT PELVIS WO CONTRAST Additional Contrast? None   Final Result   No acute fracture or dislocation. **This report has been created using voice recognition software. It may contain minor errors which are inherent in voice recognition technology. **      Final report electronically signed by Dr Naomi Chappell on 9/5/2022 8:12 PM LABS:   Labs Reviewed - No data to display    EMERGENCY DEPARTMENT COURSE:   Vitals:    Vitals:    09/05/22 1823 09/05/22 1905 09/05/22 2005 09/05/22 2115   BP: (!) 140/84 (!) 131/94 133/82 123/89   Pulse: 85 81 75 82   Resp: 22 21 17 18   Temp: 97.3 °F (36.3 °C)      SpO2: 94% 97% 96% 96%       MDM  Patient was seen and evaluated in the emergency department, patient appeared to be in no acute distress, vital signs reviewed, no significant findings noted. Physical exam was completed, she has midline tenderness to the lumbar thoracic and cervical spine, she had pain with movement in the bed. She noted decreased range of motion of the lower extremities, she had no decrease sensation, pulses were equal, upper extremities were also normal.  Cranial nerves were intact, she was able to follow commands. She has dementia at baseline what makes it difficult to examine mental status. She had CT head, cervical, thoracic, lumbar, pelvis to rule out fractures. No significant findings were noted. I discussed my findings and plan of care with the patient she is amenable with discharge. Patient discharged back to her nursing facility. Medications   acetaminophen (TYLENOL) tablet 650 mg (650 mg Oral Given 9/5/22 1846)       Patient was seenindependently by myself. The patient's final impression and disposition and plan was determined by myself. CRITICAL CARE:   None    CONSULTS:  None    PROCEDURES:  None    FINAL IMPRESSION     1. Fall on same level from slipping, tripping or stumbling, initial encounter    2.  Back strain, initial encounter          DISPOSITION/PLAN   Patient discharged    PATIENT REFERREDTO:  Leroy Dunne MD  [address] [place] [address]  [phone]    Go to   For follow up and evaluation    DISCHARGE MEDICATIONS:  Discharge Medication List as of 9/5/2022  8:23 PM          (Please note that portions of this note were completed with a voice recognition program.  Efforts were made to edit the dictations but occasionally words are mis-transcribed.)      Provider:  I personally performed the services described in the documentation,reviewed and edited the documentation which was dictated to the scribe in my presence, and it accurately records my words and actions.     Victor M Kapadia CNP 09/05/22 11:58 PM    Charity Kapadia, APRN - KALEY         [employer], JOANNA - CNP  09/05/22 0718

## 2022-09-05 NOTE — ED NOTES
Report received from Nica Knight, [facility]. Pt resting in bed. No distress noted. Call light within reach.       Karis Mccray RN  09/05/22 4022

## 2022-09-05 NOTE — ED TRIAGE NOTES
PT comes to ED by [facility] from [facility]. Pt was walking without her walker about 4 steps and fell forward. Pt did not hit head and did not lose consciousness. EMS states that while they were getting her up she was having some tenderness and winced when the EMT placed their hand on the R scapula. PT is nonverbal most of the time because of hx of parkinson's and dementia. PT does not appear to be in distress at this time.

## 2022-09-05 NOTE — ED NOTES
ED nurse-to-nurse bedside report    Chief Complaint   Patient presents with    Fall      LOC: alert to only name  Vital signs   Vitals:    09/05/22 1823 09/05/22 1905   BP: (!) 140/84 (!) 131/94   Pulse: 85 81   Resp: 22 21   Temp: 97.3 °F (36.3 °C)    SpO2: 94% 97%      Pain:    Pain Interventions: tylenol  Pain Goal: pt not in distress  Oxygen: No    Current needs required monitored   Telemetry: Yes  LDAs:    Continuous Infusions:   Mobility: Requires assistance * 1  Cooley Fall Risk Score: No flowsheet data found.   Fall Interventions: SRx2,posey alarm in place  Report given to: Mahendra Mcclendon RN  09/05/22 6264

## 2022-09-06 NOTE — ED NOTES
Discharge instructions and follow up discussed with pt. Pt discharged with all belongings. [facility] to transport pt back to [facility].         Anthony Juarez RN  09/05/22 5690

## 2022-09-06 NOTE — ED NOTES
Patient resting in bed with eyes closed. Respirations easy and unlabored. No distress noted. Call light within reach.        Anthony Juarez RN  09/05/22 2005

## 2022-10-19 ENCOUNTER — APPOINTMENT (OUTPATIENT)
Dept: CT IMAGING | Age: 72
End: 2022-10-19
Payer: MEDICARE

## 2022-10-19 ENCOUNTER — APPOINTMENT (OUTPATIENT)
Dept: GENERAL RADIOLOGY | Age: 72
End: 2022-10-19
Payer: MEDICARE

## 2022-10-19 ENCOUNTER — HOSPITAL ENCOUNTER (EMERGENCY)
Age: 72
Discharge: NURSING, REHAB OR LONG-TERM CARE | End: 2022-10-20
Attending: EMERGENCY MEDICINE
Payer: MEDICARE

## 2022-10-19 DIAGNOSIS — R41.81 AGE-RELATED COGNITIVE DECLINE: ICD-10-CM
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
## 2022-10-19 DIAGNOSIS — N39.0 URINARY TRACT INFECTION WITHOUT HEMATURIA, SITE UNSPECIFIED: ICD-10-CM

## 2022-10-19 DIAGNOSIS — S09.90XA CLOSED HEAD INJURY, INITIAL ENCOUNTER: ICD-10-CM

## 2022-10-19 LAB
ALBUMIN SERPL-MCNC: 3.6 G/DL (ref 3.5–5.1)
ALP BLD-CCNC: 112 U/L (ref 38–126)
ALT SERPL-CCNC: < 5 U/L (ref 11–66)
ANION GAP SERPL CALCULATED.3IONS-SCNC: 8 MEQ/L (ref 8–16)
AST SERPL-CCNC: 30 U/L (ref 5–40)
BASOPHILS # BLD: 0.5 %
BASOPHILS ABSOLUTE: 0 THOU/MM3 (ref 0–0.1)
BILIRUB SERPL-MCNC: 0.6 MG/DL (ref 0.3–1.2)
BILIRUBIN DIRECT: < 0.2 MG/DL (ref 0–0.3)
BUN BLDV-MCNC: 29 MG/DL (ref 7–22)
CALCIUM SERPL-MCNC: 9.8 MG/DL (ref 8.5–10.5)
CHLORIDE BLD-SCNC: 99 MEQ/L (ref 98–111)
CO2: 31 MEQ/L (ref 23–33)
CREAT SERPL-MCNC: 1.9 MG/DL (ref 0.4–1.2)
EOSINOPHIL # BLD: 3.3 %
EOSINOPHILS ABSOLUTE: 0.2 THOU/MM3 (ref 0–0.4)
ERYTHROCYTE [DISTWIDTH] IN BLOOD BY AUTOMATED COUNT: 21.5 % (ref 11.5–14.5)
ERYTHROCYTE [DISTWIDTH] IN BLOOD BY AUTOMATED COUNT: 63.7 FL (ref 35–45)
GFR SERPL CREATININE-BSD FRML MDRD: 27 ML/MIN/1.73M2
GLUCOSE BLD-MCNC: 228 MG/DL (ref 70–108)
HCT VFR BLD CALC: 41 % (ref 37–47)
HEMOGLOBIN: 12.3 GM/DL (ref 12–16)
IMMATURE GRANS (ABS): 0.02 THOU/MM3 (ref 0–0.07)
IMMATURE GRANULOCYTES: 0.3 %
LIPASE: 46.7 U/L (ref 5.6–51.3)
LYMPHOCYTES # BLD: 17.2 %
LYMPHOCYTES ABSOLUTE: 1.1 THOU/MM3 (ref 1–4.8)
MAGNESIUM: 2 MG/DL (ref 1.6–2.4)
MCH RBC QN AUTO: 25.1 PG (ref 26–33)
MCHC RBC AUTO-ENTMCNC: 30 GM/DL (ref 32.2–35.5)
MCV RBC AUTO: 83.7 FL (ref 81–99)
MONOCYTES # BLD: 12.8 %
MONOCYTES ABSOLUTE: 0.8 THOU/MM3 (ref 0.4–1.3)
NUCLEATED RED BLOOD CELLS: 0 /100 WBC
OSMOLALITY CALCULATION: 288.7 MOSMOL/KG (ref 275–300)
PLATELET # BLD: 183 THOU/MM3 (ref 130–400)
PMV BLD AUTO: 9.8 FL (ref 9.4–12.4)
POTASSIUM SERPL-SCNC: 5 MEQ/L (ref 3.5–5.2)
RBC # BLD: 4.9 MILL/MM3 (ref 4.2–5.4)
SEG NEUTROPHILS: 65.9 %
SEGMENTED NEUTROPHILS ABSOLUTE COUNT: 4.2 THOU/MM3 (ref 1.8–7.7)
SODIUM BLD-SCNC: 138 MEQ/L (ref 135–145)
TOTAL PROTEIN: 6.7 G/DL (ref 6.1–8)
TROPONIN T: < 0.01 NG/ML
WBC # BLD: 6.4 THOU/MM3 (ref 4.8–10.8)

## 2022-10-19 PROCEDURE — 84484 ASSAY OF TROPONIN QUANT: CPT

## 2022-10-19 PROCEDURE — 85025 COMPLETE CBC W/AUTO DIFF WBC: CPT

## 2022-10-19 PROCEDURE — 70450 CT HEAD/BRAIN W/O DYE: CPT

## 2022-10-19 PROCEDURE — 83690 ASSAY OF LIPASE: CPT

## 2022-10-19 PROCEDURE — 71045 X-RAY EXAM CHEST 1 VIEW: CPT

## 2022-10-19 PROCEDURE — 72170 X-RAY EXAM OF PELVIS: CPT

## 2022-10-19 PROCEDURE — 83735 ASSAY OF MAGNESIUM: CPT

## 2022-10-19 PROCEDURE — 82248 BILIRUBIN DIRECT: CPT

## 2022-10-19 PROCEDURE — 72125 CT NECK SPINE W/O DYE: CPT

## 2022-10-19 PROCEDURE — 80053 COMPREHEN METABOLIC PANEL: CPT

## 2022-10-19 PROCEDURE — 99284 EMERGENCY DEPT VISIT MOD MDM: CPT

## 2022-10-19 PROCEDURE — 36415 COLL VENOUS BLD VENIPUNCTURE: CPT

## 2022-10-19 ASSESSMENT — ENCOUNTER SYMPTOMS
NAUSEA: 0
BLOOD IN STOOL: 0
CHOKING: 0
ABDOMINAL DISTENTION: 0
CHEST TIGHTNESS: 0
VOMITING: 0
TROUBLE SWALLOWING: 0
COUGH: 0
VOICE CHANGE: 0
SHORTNESS OF BREATH: 0
EYE DISCHARGE: 0
EYE ITCHING: 0
ABDOMINAL PAIN: 0
EYE REDNESS: 0
PHOTOPHOBIA: 0
CONSTIPATION: 0
SINUS PRESSURE: 0
SORE THROAT: 0
EYE PAIN: 0
DIARRHEA: 0
BACK PAIN: 0
RHINORRHEA: 0
WHEEZING: 0

## 2022-10-19 ASSESSMENT — PAIN SCALES - GENERAL: PAINLEVEL_OUTOF10: 4

## 2022-10-19 ASSESSMENT — PAIN - FUNCTIONAL ASSESSMENT
PAIN_FUNCTIONAL_ASSESSMENT: 0-10
PAIN_FUNCTIONAL_ASSESSMENT: NONE - DENIES PAIN

## 2022-10-20 VITALS
BODY MASS INDEX: 20.4 KG/M2 | HEIGHT: 67 IN | HEART RATE: 89 BPM | DIASTOLIC BLOOD PRESSURE: 82 MMHG | OXYGEN SATURATION: 93 % | SYSTOLIC BLOOD PRESSURE: 125 MMHG | WEIGHT: 130 LBS | TEMPERATURE: 97.4 F | RESPIRATION RATE: 16 BRPM

## 2022-10-20 LAB
BACTERIA: ABNORMAL /HPF
BILIRUBIN URINE: NEGATIVE
BLOOD, URINE: ABNORMAL
CASTS 2: ABNORMAL /LPF
CASTS UA: ABNORMAL /LPF
CHARACTER, URINE: ABNORMAL
COLOR: YELLOW
CRYSTALS, UA: ABNORMAL
EPITHELIAL CELLS, UA: ABNORMAL /HPF
GLUCOSE URINE: NEGATIVE MG/DL
KETONES, URINE: ABNORMAL
LEUKOCYTE ESTERASE, URINE: ABNORMAL
MISCELLANEOUS 2: ABNORMAL
NITRITE, URINE: NEGATIVE
PH UA: 5 (ref 5–9)
PROTEIN UA: 100
RBC URINE: ABNORMAL /HPF
RENAL EPITHELIAL, UA: ABNORMAL
SPECIFIC GRAVITY, URINE: 1.02 (ref 1–1.03)
UROBILINOGEN, URINE: 1 EU/DL (ref 0–1)
WBC UA: > 200 /HPF
YEAST: ABNORMAL

## 2022-10-20 PROCEDURE — 87186 SC STD MICRODIL/AGAR DIL: CPT

## 2022-10-20 PROCEDURE — 87086 URINE CULTURE/COLONY COUNT: CPT

## 2022-10-20 PROCEDURE — 81001 URINALYSIS AUTO W/SCOPE: CPT

## 2022-10-20 PROCEDURE — 87077 CULTURE AEROBIC IDENTIFY: CPT

## 2022-10-20 RX ORDER — CEPHALEXIN 500 MG/1
500 CAPSULE ORAL 4 TIMES DAILY
Qty: 40 CAPSULE | Refills: 0 | Status: SHIPPED | OUTPATIENT
Start: 2022-10-20 | End: 2022-10-30

## 2022-10-20 ASSESSMENT — PAIN - FUNCTIONAL ASSESSMENT
PAIN_FUNCTIONAL_ASSESSMENT: NONE - DENIES PAIN
PAIN_FUNCTIONAL_ASSESSMENT: NONE - DENIES PAIN

## 2022-10-20 NOTE — DISCHARGE INSTRUCTIONS
Patient had a fall, mild closed head injury, patient has a urinary tract infection. Caregivers were instructed to use Tylenol Motrin for any pain. They are instructed to give the antibiotics as prescribed. They are instructed to have the patient follow-up with a primary care physician and do so within the next 1 to 2 days. They are instructed to return this patient to the emergency room immediately for any new or worsening complaints.

## 2022-10-20 NOTE — ED TRIAGE NOTES
Presents to the ED via EMS with c/o fall, coming from the nursing home. EMS reports pt fell upon standing, pt hit head on a wheelchair beside her. Pt did not LOC, and is not on blood thinners. EMS reports pt does have a knot on the back of head. Pt reports having pain on back of head 5/10 now.  Vss.

## 2022-10-20 NOTE — ED PROVIDER NOTES
[facility]  eMERGENCY dEPARTMENT eNCOUnter          CHIEF COMPLAINT       Chief Complaint   Patient presents with    Fall       Nurses Notes reviewed and I agree except as noted in the HPI. HISTORY OF PRESENT ILLNESS    Katherin Sheikh is a 74 y.o. female who presents from the nursing home. Apparently she had 2 falls today. Last fall she hit her head on the wheelchair. There was no loss of consciousness patient is not on any blood thinners. Patient does have dementia. She also has tremors. Patient is oriented to her self. She does remember the fall. She does not know what year it is. She does know she is in the hospital but she does not know which one. She is able to move all extremities. She is very pleasant at bedside. She is not complaining of any pain. No other physical complaints at this time. REVIEW OF SYSTEMS     Review of Systems   Constitutional:  Negative for activity change, appetite change, diaphoresis, fatigue and unexpected weight change. HENT:  Negative for congestion, ear discharge, ear pain, hearing loss, rhinorrhea, sinus pressure, sore throat, trouble swallowing and voice change. Eyes:  Negative for photophobia, pain, discharge, redness and itching. Respiratory:  Negative for cough, choking, chest tightness, shortness of breath and wheezing. Cardiovascular:  Negative for chest pain, palpitations and leg swelling. Gastrointestinal:  Negative for abdominal distention, abdominal pain, blood in stool, constipation, diarrhea, nausea and vomiting. Endocrine: Negative for polydipsia, polyphagia and polyuria. Genitourinary:  Negative for decreased urine volume, difficulty urinating, dysuria, enuresis, frequency, hematuria and urgency. Musculoskeletal:  Negative for arthralgias, back pain, gait problem, myalgias, neck pain and neck stiffness. Skin:  Negative for pallor and rash. Allergic/Immunologic: Negative for immunocompromised state. Neurological:  Negative for dizziness, tremors, seizures, syncope, facial asymmetry, weakness, light-headedness, numbness and headaches. Hematological:  Negative for adenopathy. Does not bruise/bleed easily. Psychiatric/Behavioral:  Negative for agitation, hallucinations and suicidal ideas. The patient is not nervous/anxious. PAST MEDICAL HISTORY    has a past medical history of Acute kidney injury ([facility] [address] Arthritis, CAD (coronary artery disease), CKD (chronic kidney disease) stage 3, GFR 30-59 ml/min ([facility] [address] Diabetes mellitus, insulin dependent (IDDM), controlled, Escherichia coli septicemia ([facility] [address] Essential tremor, History of blood transfusion, Hyperlipidemia, Hypertension, Hypoxemic respiratory failure, chronic ([facility] [address] [facility] Normocytic anemia, Osteoporosis, and Pneumonia. SURGICAL HISTORY      has a past surgical history that includes Cystoscopy (12/7/12); Lithotripsy (12/18/2012); hip surgery (Left, 9/6/2020); Ovary removal (Bilateral); and Hysterectomy.     CURRENT MEDICATIONS       Previous Medications    ACETAMINOPHEN (TYLENOL) 325 MG TABLET    Take 2 tablets by mouth every 6 hours as needed for Pain    ASPIRIN 81 MG EC TABLET    Take 1 tablet by mouth daily Please resume on Joleen 10, 2022    ATORVASTATIN (LIPITOR) 80 MG TABLET    Take 1 tablet by mouth daily    CARBIDOPA-LEVODOPA (SINEMET)  MG PER TABLET    Take 1 tablet by mouth 3 times daily    CHOLECALCIFEROL 75 MCG (3000 UT) TABS    Take 1 tablet by mouth daily    CLOPIDOGREL (PLAVIX) 75 MG TABLET    Take 1 tablet by mouth daily Please resume on Joleen 10, 2022    EZETIMIBE (ZETIA) 10 MG TABLET    Take 1 tablet by mouth daily    FLUOXETINE (PROZAC) 20 MG CAPSULE    Take 1 capsule by mouth daily    GLIMEPIRIDE (AMARYL) 1 MG TABLET    Take 1 tablet by mouth every morning (before breakfast)    GUAIFENESIN (TUSSIN) 100 MG/5ML LIQUID    Take 10 mLs by mouth 3 times daily as needed for Cough    LIDOCAINE 4 % EXTERNAL PATCH    Place 2 patches onto the skin daily    METOPROLOL TARTRATE (LOPRESSOR) 25 MG TABLET    Take 0.5 tablets by mouth 2 times daily    MIDODRINE (PROAMATINE) 10 MG TABLET    Take 1 tablet by mouth 3 times daily (with meals)    ONDANSETRON (ZOFRAN) 4 MG TABLET    Take 1 tablet by mouth every 6 hours as needed for Nausea or Vomiting    PANTOPRAZOLE (PROTONIX) 40 MG TABLET    Take 1 tablet by mouth daily       ALLERGIES     has No Known Allergies. FAMILY HISTORY     She indicated that her mother is alive. She indicated that her father is alive. family history includes Breast Cancer (age of onset: [de-identified]) in her mother; Heart Disease in her mother. SOCIAL HISTORY      reports that she has never smoked. She has never used smokeless tobacco. She reports current alcohol use. She reports that she does not use drugs. PHYSICAL EXAM     INITIAL VITALS:  height is 5' 7\" (1.702 m) and weight is 130 lb (59 kg). Her oral temperature is 97.4 °F (36.3 °C). Her blood pressure is 118/82 and her pulse is 79. Her respiration is 16 and oxygen saturation is 94%. Physical Exam  Vitals and nursing note reviewed. Constitutional:       General: She is not in acute distress. Appearance: She is well-developed. She is not diaphoretic. HENT:      Head: Normocephalic and atraumatic. Right Ear: Hearing, tympanic membrane, ear canal and external ear normal.      Left Ear: Hearing, tympanic membrane, ear canal and external ear normal.      Nose: Nose normal.      Mouth/Throat:      Lips: Pink. Mouth: Mucous membranes are moist.      Dentition: Normal dentition. No dental tenderness, dental caries or gum lesions. Tongue: No lesions. Palate: No mass. Pharynx: Oropharynx is clear. No oropharyngeal exudate. Tonsils: No tonsillar exudate or tonsillar abscesses. Eyes:      General: No scleral icterus. Right eye: No discharge. Left eye: No discharge.       Conjunctiva/sclera: Conjunctivae normal.      Pupils: Pupils are equal, round, and reactive to light. Neck:      Thyroid: No thyroid mass, thyromegaly or thyroid tenderness. Vascular: No JVD. Trachea: No tracheal deviation. Cardiovascular:      Rate and Rhythm: Normal rate and regular rhythm. Pulses:           Carotid pulses are 2+ on the right side and 2+ on the left side. Radial pulses are 2+ on the right side and 2+ on the left side. Femoral pulses are 2+ on the right side and 2+ on the left side. Popliteal pulses are 2+ on the right side and 2+ on the left side. Dorsalis pedis pulses are 2+ on the right side and 2+ on the left side. Posterior tibial pulses are 2+ on the right side and 2+ on the left side. Heart sounds: Normal heart sounds, S1 normal and S2 normal. No murmur heard. No friction rub. No gallop. Pulmonary:      Effort: Pulmonary effort is normal.      Breath sounds: Normal breath sounds. No stridor. No decreased breath sounds, wheezing, rhonchi or rales. Chest:      Chest wall: No lacerations, deformity, swelling, tenderness, crepitus or edema. There is no dullness to percussion. Abdominal:      General: Bowel sounds are normal. There is no distension. Palpations: Abdomen is soft. There is no mass. Tenderness: no abdominal tenderness There is no guarding or rebound. Hernia: No hernia is present. Musculoskeletal:         General: No tenderness. Normal range of motion. Cervical back: Normal range of motion and neck supple. No edema, erythema, signs of trauma, rigidity, torticollis or crepitus. Muscular tenderness present. No pain with movement or spinous process tenderness. Normal range of motion. Right lower leg: No edema. Left lower leg: No edema. Lymphadenopathy:      Cervical: No cervical adenopathy. Skin:     General: Skin is warm and dry. Capillary Refill: Capillary refill takes less than 2 seconds.       Findings: No bruising, ecchymosis, lesion or rash. Neurological:      Mental Status: She is alert. She is disoriented. GCS: GCS eye subscore is 4. GCS verbal subscore is 5. GCS motor subscore is 6. Cranial Nerves: Cranial nerves 2-12 are intact. No cranial nerve deficit. Sensory: Sensation is intact. Motor: Motor function is intact. Coordination: Coordination is intact. Coordination normal.      Deep Tendon Reflexes: Reflexes are normal and symmetric. Psychiatric:         Speech: Speech normal.         Behavior: Behavior normal.         Thought Content: Thought content normal.         Judgment: Judgment normal.         DIFFERENTIAL DIAGNOSIS:   Fall, infection, intracranial injury, cervical fracture, rib fracture, thoracic injury intrathoracic injury, pelvic fracture, dementia, gait instability    DIAGNOSTIC RESULTS     EKG: All EKG's are interpreted by the Emergency Department Physician who either signs or Co-signs this chart in the absence of a cardiologist.  None    RADIOLOGY: non-plain film images(s) such as CT, Ultrasound and MRI are read by the radiologist.  CT CERVICAL SPINE WO CONTRAST   Final Result   Impression:   No acute fracture or dislocation. Degenerative changes. This document has been electronically signed by: Denny Carter MD on    10/19/2022 11:59 PM      All CTs at this facility use dose modulation techniques and iterative    reconstructions, and/or weight-based dosing   when appropriate to reduce radiation to a low as reasonably achievable. CT HEAD WO CONTRAST   Final Result   No acute intracranial findings. Chronic changes as described. Right lateral parietal scalp hematoma. No acute fracture. This document has been electronically signed by:  Denny Carter MD on    10/19/2022 11:58 PM      All CTs at this facility use dose modulation techniques and iterative    reconstructions, and/or weight-based dosing   when appropriate to reduce radiation to a low as reasonably achievable. XR PELVIS (1-2 VIEWS)   Final Result   No acute fracture or dislocation. Stable postoperative change. This document has been electronically signed by: Elizabeth Lopez MD on    10/19/2022 11:37 PM      XR CHEST 1 VIEW   Final Result   Impression:   No acute pathology. Prominent reticular markings bilaterally consistent with chronic fibrotic    change. This document has been electronically signed by:  Elizabeth Lopez MD on    10/19/2022 11:35 PM            LABS:   Labs Reviewed   CBC WITH AUTO DIFFERENTIAL - Abnormal; Notable for the following components:       Result Value    MCH 25.1 (*)     MCHC 30.0 (*)     RDW-CV 21.5 (*)     RDW-SD 63.7 (*)     All other components within normal limits   BASIC METABOLIC PANEL - Abnormal; Notable for the following components:    Glucose 228 (*)     BUN 29 (*)     Creatinine 1.9 (*)     All other components within normal limits   HEPATIC FUNCTION PANEL - Abnormal; Notable for the following components:    ALT <5 (*)     All other components within normal limits   GLOMERULAR FILTRATION RATE, ESTIMATED - Abnormal; Notable for the following components:    Est, Glom Filt Rate 27 (*)     All other components within normal limits   URINE WITH REFLEXED MICRO - Abnormal; Notable for the following components:    Ketones, Urine TRACE (*)     Blood, Urine LARGE (*)     Protein,  (*)     Leukocyte Esterase, Urine LARGE (*)     Character, Urine TURBID (*)     All other components within normal limits   CULTURE, REFLEXED, URINE    Narrative:     Source: urine, clean catch       Site: clean void          Current Antibiotics: not stated   LIPASE   TROPONIN   MAGNESIUM   ANION GAP   OSMOLALITY       EMERGENCY DEPARTMENT COURSE:   Vitals:    Vitals:    10/19/22 2200 10/19/22 2350 10/20/22 0038   BP: 121/80 104/73 118/82   Pulse: 82 73 79   Resp: 15 18 16   Temp: 97.4 °F (36.3 °C)     TempSrc: Oral     SpO2: 98% 96% 94%   Weight: 130 lb (59 kg) Height: 5' 7\" (1.702 m)       Patient was assessed at bedside labs and imaging are ordered. Patient has what I interpret has some age-related cognitive decline. She does have a history of falls. She is not on blood thinners. She is able to move everything. She is neurologically intact. She is very pleasant and does not complain of any pain. Here today I reviewed all labs and imaging all within normal limits. Patient does have a urinary tract infection. She was given 1 dose of Keflex here she will be given Keflex for her back at the nursing home. Caregivers are instructed have the patient follow-up with a primary care physician and do so within the next 1 to 2 days. They are instructed to have the patient use Tylenol Motrin for any pain. They instructed to return this patient to the emergency room immediately for any new or worsening complaints. I did discuss case with the patient at bedside who understood and agreed with the plan. Patient is subsequently discharged home in stable condition. Patient had a fall, mild closed head injury, patient has a urinary tract infection. Caregivers were instructed to use Tylenol Motrin for any pain. They are instructed to give the antibiotics as prescribed. They are instructed to have the patient follow-up with a primary care physician and do so within the next 1 to 2 days. They are instructed to return this patient to the emergency room immediately for any new or worsening complaints. CRITICAL CARE:   None    CONSULTS:  None    PROCEDURES:  None    FINAL IMPRESSION      1. Fall, initial encounter    2. Closed head injury, initial encounter    3. Urinary tract infection without hematuria, site unspecified    4.  Age-related cognitive decline          DISPOSITION/PLAN   Discharge    PATIENT REFERRED TO:  Candice Sheehan MD  [address] [place] [address]  [phone]    Call in 1 day      DISCHARGE MEDICATIONS:  New Prescriptions CEPHALEXIN (KEFLEX) 500 MG CAPSULE    Take 1 capsule by mouth 4 times daily for 10 days       (Please note that portions of this note were completed with a voice recognition program.  Efforts were made to edit the dictations but occasionally words are mis-transcribed.)    Grayling Dance, DO Mary Ann Thomas DO  10/20/22 0110

## 2022-10-20 NOTE — ED NOTES
Pt ambulated to bathroom at this time. Pt provided urine sample.      Martha Mcqueen RN  10/20/22 0028
Pt resting on cot comfortably at this time. Pt denies a need for anything else at this time.      Julienne Cabrera RN  10/19/22 5807
Pt resting on cot with eyes closed at this time. Pt respirations even and unlabored.  [address], RN  10/20/22 8639
Pt resting with eyes closed at this time. Respirations even and unlabrored.  KHRIS Jack RN  10/20/22 4100
Report called to nursing home at this time.      Isael Servin RN  10/20/22 3372
Reports given to Brett, [address], RN  10/19/22 3397
Class II - visualization of the soft palate, fauces, and uvula

## 2022-10-23 LAB
ORGANISM: ABNORMAL
URINE CULTURE REFLEX: ABNORMAL

## 2022-10-25 NOTE — PROGRESS NOTES
Pharmacy Note  ED Culture Follow-up    Shadi Rojas is a 74 y.o. female. Allergies: Patient has no known allergies. Labs:  Lab Results   Component Value Date    BUN 29 (H) 10/19/2022    CREATININE 1.9 (H) 10/19/2022    WBC 6.4 10/19/2022     Estimated Creatinine Clearance: 24 mL/min (A) (based on SCr of 1.9 mg/dL (H)). Current antimicrobials:   Cephalexin 500 mg 4x daily x 10 days    ASSESSMENT:  Micro results:   Urine culture: positive for Streptococcus anginosus, E. Coli, Enterococcus faecium     PLAN:  Need for intervention: Yes, but will defer to provider at nursing home  Discussed with: Marisela Bai MD  Chosen treatment:    Patient will be treated by provider at nursing home. Faxed results to [facility] [phone]    Patient response:   No need to contact patient    Called/sent in prescription to: Not applicable    Please call with any questions.  Andi Jameson [facility], PharmD 5:43 PM 10/25/2022

## 2022-11-25 ENCOUNTER — HOSPITAL ENCOUNTER (EMERGENCY)
Age: 72
Discharge: HOME | End: 2022-11-25
Attending: EMERGENCY MEDICINE
Payer: MEDICARE

## 2022-11-25 ENCOUNTER — APPOINTMENT (OUTPATIENT)
Dept: CT IMAGING | Age: 72
End: 2022-11-25
Payer: MEDICARE

## 2022-11-25 VITALS
DIASTOLIC BLOOD PRESSURE: 84 MMHG | OXYGEN SATURATION: 94 % | HEIGHT: 67 IN | WEIGHT: 135 LBS | HEART RATE: 88 BPM | SYSTOLIC BLOOD PRESSURE: 115 MMHG | RESPIRATION RATE: 18 BRPM | TEMPERATURE: 97.7 F | BODY MASS INDEX: 21.19 KG/M2

## 2022-11-25 DIAGNOSIS — S01.01XA LACERATION OF SCALP, INITIAL ENCOUNTER: ICD-10-CM
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
## 2022-11-25 PROCEDURE — 90715 TDAP VACCINE 7 YRS/> IM: CPT | Performed by: STUDENT IN AN ORGANIZED HEALTH CARE EDUCATION/TRAINING PROGRAM

## 2022-11-25 PROCEDURE — 2500000003 HC RX 250 WO HCPCS: Performed by: STUDENT IN AN ORGANIZED HEALTH CARE EDUCATION/TRAINING PROGRAM

## 2022-11-25 PROCEDURE — 72125 CT NECK SPINE W/O DYE: CPT

## 2022-11-25 PROCEDURE — 12001 RPR S/N/AX/GEN/TRNK 2.5CM/<: CPT

## 2022-11-25 PROCEDURE — 70450 CT HEAD/BRAIN W/O DYE: CPT

## 2022-11-25 PROCEDURE — 99284 EMERGENCY DEPT VISIT MOD MDM: CPT

## 2022-11-25 PROCEDURE — 90471 IMMUNIZATION ADMIN: CPT | Performed by: STUDENT IN AN ORGANIZED HEALTH CARE EDUCATION/TRAINING PROGRAM

## 2022-11-25 PROCEDURE — 6360000002 HC RX W HCPCS: Performed by: STUDENT IN AN ORGANIZED HEALTH CARE EDUCATION/TRAINING PROGRAM

## 2022-11-25 RX ORDER — LIDOCAINE HYDROCHLORIDE 10 MG/ML
10 INJECTION, SOLUTION EPIDURAL; INFILTRATION; INTRACAUDAL; PERINEURAL ONCE
Status: COMPLETED | OUTPATIENT
Start: 2022-11-25 | End: 2022-11-25

## 2022-11-25 RX ADMIN — LIDOCAINE HYDROCHLORIDE ANHYDROUS 10 ML: 10 INJECTION, SOLUTION INFILTRATION at 11:45

## 2022-11-25 RX ADMIN — TETANUS TOXOID, REDUCED DIPHTHERIA TOXOID AND ACELLULAR PERTUSSIS VACCINE, ADSORBED 0.5 ML: 5; 2.5; 8; 8; 2.5 SUSPENSION INTRAMUSCULAR at 10:56

## 2022-11-25 ASSESSMENT — PAIN - FUNCTIONAL ASSESSMENT
PAIN_FUNCTIONAL_ASSESSMENT: NONE - DENIES PAIN
PAIN_FUNCTIONAL_ASSESSMENT: NONE - DENIES PAIN

## 2022-11-25 NOTE — DISCHARGE INSTRUCTIONS
Go to your primary care physician or return to the emergency department in 7 - 10 days to have your staples removed. For pain use acetaminophen (Tylenol) or ibuprofen (Motrin / Advil), unless prescribed medications that have acetaminophen or ibuprofen (or similar medications) in it. You can take over the counter acetaminophen tablets (1 - 2 tablets of the 500-mg strength every 6 hours) or ibuprofen tablets (2 tablets every 4 hours). You can shower with the laceration, would avoid baths or swimming in lakes / rivers. Apply bacitracin / triple antibiotic ointment / Neosporin / Vaseline to the wound twice a day. When you go outside, place sunscreen on the healing wound after the sutures have been removed for the next year to help with scarring. PLEASE RETURN TO THE EMERGENCY DEPARTMENT IMMEDIATELY for worsening symptoms, redness around the wound or redness streaking up the body part, white drainage from the wound, or if you develop any concerning symptoms such as: high fever not relieved by acetaminophen (Tylenol) and/or ibuprofen (Motrin / Advil), chills, shortness of breath, chest pain, feeling of your heart fluttering or racing, persistent nausea and/or vomiting, vomiting up blood, blood in your stool, numbness, loss of consciousness, weakness or tingling in the arms or legs or change in color of the extremities, changes in mental status, persistent headache, blurry vision, loss of bladder / bowel control, unable to follow up with your physician, or other any other care or concern. Latosha Wild

## 2022-11-25 NOTE — ED PROVIDER NOTES
[place] ENCOUNTER          Pt Name: Lisa Eisenberg  MRN: [id number]  [place] 1950  Date of evaluation: 11/25/2022  Treating Resident Physician: Tarsha Olmstead MD  Supervising Physician: Dr. Susi Dan    History obtained from the patient's chart. CHIEF COMPLAINT       Chief Complaint   Patient presents with    Fall    Head Injury           HISTORY OF PRESENT ILLNESS    HPI  Lisa Eisenberg is a 74 y.o. female with past medical history of recurrent falls, A. fib with RVR not on thinners, type 2 diabetes, CKD who presents to the emergency department for evaluation of an unwitnessed fall. Patient is coming from the dementia unit at [facility]. She was seen eating breakfast in the dining room and when staff came back the patient was found on the floor in a puddle of blood. Patient is AO x1 and unable to provide further history. FAST exam on arrival is negative here. The patient has no other acute complaints at this time.            REVIEW OF SYSTEMS   Review of Systems   Unable to perform ROS: Dementia       PAST MEDICAL AND SURGICAL HISTORY     Past Medical History:   Diagnosis Date    Acute kidney injury ([address]     Arthritis     CAD (coronary artery disease)     CKD (chronic kidney disease) stage 3, GFR 30-59 ml/min (HCC)     Diabetes mellitus, insulin dependent (IDDM), controlled     Escherichia coli septicemia (HCC)     Essential tremor     History of blood transfusion     Hyperlipidemia     Hypertension     Hypoxemic respiratory failure, chronic ([address]     secondary to sepsis and possibly pneumonia    MI (myocardial infarction) ([address] 2011    Normocytic anemia     Osteoporosis     Pneumonia      Past Surgical History:   Procedure Laterality Date    CYSTOSCOPY  12/7/12    with stent insertion    HIP SURGERY Left 9/6/2020    [facility]      38    LITHOTRIPSY 12/18/2012    right    OVARY REMOVAL Bilateral     age 38         MEDICATIONS     Current Facility-Administered Medications:     lidocaine PF 1 % injection 10 mL, 10 mL, IntraDERmal, Once, Kassy Carrion MD    Current Outpatient Medications:     acetaminophen (TYLENOL) 325 mg tablet, Take 2 tablets by mouth every 6 hours as needed for Pain, Disp: 120 tablet, Rfl: 3    FLUoxetine (PROZAC) 20 MG capsule, Take 1 capsule by mouth daily, Disp: 30 capsule, Rfl: 3    glimepiride (AMARYL) 1 MG tablet, Take 1 tablet by mouth every morning (before breakfast), Disp: 90 tablet, Rfl: 1    ondansetron (ZOFRAN) 4 MG tablet, Take 1 tablet by mouth every 6 hours as needed for Nausea or Vomiting, Disp: , Rfl:     atorvastatin (LIPITOR) 80 MG tablet, Take 1 tablet by mouth daily, Disp: 30 tablet, Rfl: 3    ezetimibe (ZETIA) 10 mg tablet, Take 1 tablet by mouth daily, Disp: 30 tablet, Rfl: 3    carbidopa-levodopa (SINEMET)  mg per tablet, Take 1 tablet by mouth 3 times daily, Disp: 90 tablet, Rfl: 3    metoprolol tartrate (LOPRESSOR) 25 MG tablet, Take 0.5 tablets by mouth 2 times daily, Disp: 60 tablet, Rfl: 3    guaiFENesin (TUSSIN) 100 MG/5ML liquid, Take 10 mLs by mouth 3 times daily as needed for Cough, Disp: , Rfl:     lidocaine 4 % external patch, Place 2 patches onto the skin daily, Disp: , Rfl:     midodrine (PROAMATINE) 10 MG tablet, Take 1 tablet by mouth 3 times daily (with meals), Disp: 30 tablet, Rfl: 0    pantoprazole (PROTONIX) 40 MG tablet, Take 1 tablet by mouth daily, Disp: 30 tablet, Rfl: 3    Cholecalciferol 75 MCG (3000 UT) TABS, Take 1 tablet by mouth daily, Disp: 90 tablet, Rfl: 3    aspirin 81 MG EC tablet, Take 1 tablet by mouth daily Please resume on Joleen 10, 2022, Disp: 30 tablet, Rfl: 3    clopidogrel (PLAVIX) 75 MG tablet, Take 1 tablet by mouth daily Please resume on Joleen 10, 2022, Disp: 30 tablet, Rfl: 3      SOCIAL HISTORY     Social History     Social History Narrative    Not on file Social History     Tobacco Use    Smoking status: Never    Smokeless tobacco: Never   Vaping Use    Vaping Use: Never used   Substance Use Topics    Alcohol use: Yes     Comment: rarely    Drug use: No         ALLERGIES   No Known Allergies      FAMILY HISTORY     Family History   Problem Relation Age of Onset    Heart Disease Mother     Breast Cancer Mother [de-identified]         PREVIOUS RECORDS   Previous records reviewed. PHYSICAL EXAM     ED Triage Vitals [11/25/22 1010]   BP Temp Temp Source Heart Rate Resp SpO2 Height Weight   (!) 131/95 97.7 °F (36.5 °C) Oral 92 18 95 % 5' 7\" (1.702 m) 135 lb (61.2 kg)     Initial vital signs and nursing assessment reviewed and abnormal from hypertension . Body mass index is 21.14 kg/m². Pulsoximetry is normal per my interpretation. Additional Vital Signs:  Vitals:    11/25/22 1053   BP: (!) 129/94   Pulse: 98   Resp: 18   Temp:    SpO2: 94%       Physical Exam  Vitals reviewed. Constitutional:       Appearance: Normal appearance. She is not ill-appearing or diaphoretic. HENT:      Head:        Right Ear: External ear normal.      Left Ear: External ear normal.      Nose: Nose normal.      Mouth/Throat:      Mouth: Mucous membranes are moist.      Pharynx: Oropharynx is clear. Eyes:      Extraocular Movements: Extraocular movements intact. Conjunctiva/sclera: Conjunctivae normal.   Cardiovascular:      Rate and Rhythm: Normal rate. Pulses:           Radial pulses are 2+ on the left side. Dorsalis pedis pulses are 2+ on the right side and 2+ on the left side. Pulmonary:      Effort: Pulmonary effort is normal.   Chest:      Chest wall: No mass, lacerations, deformity, swelling, tenderness or crepitus. Abdominal:      General: Abdomen is flat. Palpations: Abdomen is soft. Tenderness: There is no abdominal tenderness. Musculoskeletal:      Cervical back: Normal range of motion. No spinous process tenderness or muscular tenderness. Right lower leg: No edema. Left lower leg: No edema. Skin:     General: Skin is warm and dry. Capillary Refill: Capillary refill takes less than 2 seconds. Neurological:      Mental Status: She is alert. Mental status is at baseline. Psychiatric:         Mood and Affect: Mood normal.         Behavior: Behavior normal.           MEDICAL DECISION MAKING   Initial Assessment: This is a 68-year-old female with known dementia coming from the dementia unit after an unwitnessed fall resulting in a posterior scalp laceration. She is not on any blood thinners. On physical exam she is AO x1 with out obvious deformities to her extremities, soft and nontender abdomen, no signs of trauma to the chest, and no pain to palpation of the midline spine. Range of motion is good in the neck without midline C-spine tenderness. Fall from standing height  Scalp laceration. Plan:   CT scan of her head and neck  Tetanus update  Laceration repair. MDM: Patient with a laceration over her scalp with unknown tetanus status was given a tetanus booster and laceration repaired using staples and local anesthesia with lidocaine. CT scans obtained given patient is a poor historian and fall in an elderly. Fast was negative so CT scans of the chest, abdomen, and pelvis were deferred at this time. CT scans were negative for intracranial hemorrhage or any other acute traumatic injury. Upon discharge, patient is in stable condition. ED RESULTS   Laboratory results:  Labs Reviewed - No data to display    Radiologic studies results:  CT HEAD WO CONTRAST   Final Result    No evidence of an acute process. **This report has been created using voice recognition software. It may contain minor errors which are inherent in voice recognition technology. **      Final report electronically signed by Dr. Renetta Hadley on 11/25/2022 11:29 AM      CT CERVICAL SPINE WO CONTRAST   Final Result   No acute process            **This report has been created using voice recognition software. It may contain minor errors which are inherent in voice recognition technology. **      Final report electronically signed by Dr. Renzo Devine on 11/25/2022 11:38 AM          ED Medications administered this visit:   Medications   lidocaine PF 1 % injection 10 mL (has no administration in time range)   Tetanus-Diphth-Acell Pertussis (BOOSTRIX) injection 0.5 mL (0.5 mLs IntraMUSCular Given 11/25/22 1056)         ED COURSE        Lac Repair    Date/Time: 11/25/2022 11:50 AM  Performed by: Deonna West MD  Authorized by: Leonarda Parish MD     Consent:     Consent obtained:  Verbal and written    Consent given by:  Patient    Risks, benefits, and alternatives were discussed: yes      Risks discussed:  Infection, pain, need for additional repair and poor wound healing    Alternatives discussed:  No treatment  Universal protocol:     Procedure explained and questions answered to patient or proxy's satisfaction: yes      Relevant documents present and verified: yes      Test results available: yes      Imaging studies available: yes      Required blood products, implants, devices, and special equipment available: yes      Site/side marked: yes      Immediately prior to procedure, a time out was called: yes      Patient identity confirmed:  Verbally with patient and arm band  Anesthesia:     Anesthesia method:  Local infiltration    Local anesthetic:  Lidocaine 1% w/o epi  Laceration details:     Location:  Scalp    Scalp location: right occipitopartietal.    Length (cm):  2.5  Pre-procedure details:     Preparation:  Patient was prepped and draped in usual sterile fashion and imaging obtained to evaluate for foreign bodies  Exploration:     Limited defect created (wound extended): no      Hemostasis achieved with:  Direct pressure    Imaging obtained comment:  Head CT    Imaging outcome: foreign body not noted      Wound exploration: wound explored through full range of motion and entire depth of wound visualized      Contaminated: no    Treatment:     Area cleansed with:  Saline    Amount of cleaning:  Standard    Irrigation solution:  Sterile saline    Irrigation method:  Syringe    Visualized foreign bodies/material removed: no      Debridement:  None    Undermining:  None    Scar revision: no      Layers/structures repaired:  Deep subcutaneous  Skin repair:     Repair method:  Staples    Number of staples:  3  Approximation:     Approximation:  Close  Repair type:     Repair type:  Simple  Post-procedure details:     Dressing:  Open (no dressing)    Procedure completion:  Tolerated well, no immediate complications      Strict return precautions and follow up instructions were discussed with the patient prior to discharge, with which the patient agrees. MEDICATION CHANGES     New Prescriptions    No medications on file         FINAL DISPOSITION     Final diagnoses:   Fall, initial encounter   Laceration of scalp, initial encounter     Condition: condition: fair  Dispo: Discharge to nursing home      This transcription was electronically signed. Parts of this transcriptions may have been dictated by use of voice recognition software and electronically transcribed, and parts may have been transcribed with the assistance of an ED scribe. The transcription may contain errors not detected in proofreading. Please refer to my supervising physician's documentation if my documentation differs.     Electronically Signed: Jann Rios MD, 11/25/22, 12:18 PM          Jann Rios MD  Resident  11/25/22 2379

## 2022-11-25 NOTE — ED NOTES
Pt's POA at bedside. Updated pt and family on POC. Verbalized understanding.  Transport organized to bring pt back to [facility]  11/25/22 0444

## 2022-11-25 NOTE — ED TRIAGE NOTES
Pt to ED by EMS from [facility] with c/c head injury following a fall. Pt fell at approx 0900 today. Pt has laceration noted to right posterior head. Bleeding controlled. Pt is confused at baseline, hx dementia. Pt given 650mg tylenol PTA at 0930. Does not take blood thinners.  Pt denies pain on arrival.

## 2023-07-03 NOTE — CARE COORDINATION
11/11/20, 2:56 PM EST    DISCHARGE PLANNING EVALUATION      ORLIN spoke to [facility] with [facility] [address] [address]. They will have a bed on Fri, at the earliest would be tomorrow afternoon if there are any discharges after Dr round today. ORLIN spoke to the patient's Navya Delcid. ORLIN informed her that the attending wants to discharge and she was surprised as she state yesterday's attending did not feel this way, that he was ordering all kinds of things yet \"to get to the bottom\" of her dizziness. Brie Arzate feels that her heart issues are not resolved and that her DM was not stable yet. Her first choice is [place]. ORLIN updated the CM of the above and that we need a COVID test as well. ORLIN updated Audrey with [facility]. Stay well hydrated. Drink a minimum of 64 oz of non-carbonated, non-caffeinated fluids daily. Nutritional education occurred during visit. Tolerating diet. Continue following with dietitian and follow their recommendations as directed. Continue  60-80  grams of protein each day. Continue to track. Signs and symptoms reviewed with patient that would be concerning and need her to return to office for re-evaluation. Patient will call if any questions or concerns arrise. Importance of physical activity discussed with patient. Continue physical activity and strength training  Continue taking Multivitamin, Calcium as directed  Will try weaning off Protonix. Take every other day for 2 weeks and stop as able. Encouraged to attend support groups  3 month labs reviewed with patient today  6 month labs ordered- to be drawn one week prior to next apt. SECA scale completed and reviewed with patient today. Continue to monitor BP- adjust medication with PCP as needed. Continue CPAP Qhs- adjust pressure with pulmonary as needed.

## (undated) DEVICE — BASIC SINGLE BASIN BTC-LF: Brand: MEDLINE INDUSTRIES, INC.

## (undated) DEVICE — BANDAGE COMPR W6INXL5YD SELF ADH COHESIVE CO FLX

## (undated) DEVICE — Device

## (undated) DEVICE — GLOVE ORANGE PI 8   MSG9080

## (undated) DEVICE — SUTURE MCRYL SZ 3-0 L27IN ABSRB UD L24MM PS-1 3/8 CIR PRIM Y936H

## (undated) DEVICE — GOWN,AURORA,NON-REINFORCED,2XL: Brand: MEDLINE

## (undated) DEVICE — SYRINGE IRRIG 60ML SFT PLIABLE BLB EZ TO GRP 1 HND USE W/

## (undated) DEVICE — SPONGE GZ W4XL4IN COT 12 PLY TYP VII WVN C FLD DSGN

## (undated) DEVICE — BLANKET WRM W29.9XL79.1IN UP BODY FORC AIR MISTRAL-AIR

## (undated) DEVICE — GLOVE ORANGE PI 7 1/2   MSG9075

## (undated) DEVICE — BAG,BANDED,W/RUBBERBAND,STERILE,30X36: Brand: MEDLINE

## (undated) DEVICE — PADDING CAST W6INXL4YD COT LO LINTING WYTEX

## (undated) DEVICE — LIMB HOLDER, WRIST/ANKLE: Brand: DEROYAL

## (undated) DEVICE — DRESSING TRNSPAR W8XL12IN FLM SURESITE 123

## (undated) DEVICE — 5.0MM CANNULATED DRILL BIT: Brand: CANNULATED SCREWS

## (undated) DEVICE — ADHESIVE SKIN CLSR 0.7ML TOP DERMBND ADV

## (undated) DEVICE — PACK PROCEDURE SURG SET UP SRMC

## (undated) DEVICE — SPONGE LAP W18XL18IN WHT COT 4 PLY FLD STRUNG RADPQ DISP ST

## (undated) DEVICE — GUIDE PIN TH 3.2X300MM

## (undated) DEVICE — 6619 2 PTNT ISO SYS INCISE AREA&LT;(&GT;&&LT;)&GT;P: Brand: STERI-DRAPE™ IOBAN™ 2

## (undated) DEVICE — PAD,ABDOMINAL,5"X9",ST,LF,25/BX: Brand: MEDLINE INDUSTRIES, INC.

## (undated) DEVICE — GOWN,SIRUS,NONRNF,SETINSLV,XL,20/CS: Brand: MEDLINE

## (undated) DEVICE — APPLICATOR MEDICATED 26 CC SOLUTION HI LT ORNG CHLORAPREP

## (undated) DEVICE — COVER,MAYO STAND,STERILE: Brand: MEDLINE